# Patient Record
Sex: MALE | Race: WHITE | URBAN - NONMETROPOLITAN AREA
[De-identification: names, ages, dates, MRNs, and addresses within clinical notes are randomized per-mention and may not be internally consistent; named-entity substitution may affect disease eponyms.]

---

## 2017-05-30 ENCOUNTER — OPTICAL OFFICE (OUTPATIENT)
Dept: URBAN - NONMETROPOLITAN AREA CLINIC 4 | Facility: CLINIC | Age: 62
Setting detail: OPHTHALMOLOGY
End: 2017-05-30
Payer: COMMERCIAL

## 2017-05-30 ENCOUNTER — DOCTOR'S OFFICE (OUTPATIENT)
Dept: URBAN - NONMETROPOLITAN AREA CLINIC 1 | Facility: CLINIC | Age: 62
Setting detail: OPHTHALMOLOGY
End: 2017-05-30
Payer: COMMERCIAL

## 2017-05-30 DIAGNOSIS — H52.03: ICD-10-CM

## 2017-05-30 DIAGNOSIS — H43.393: ICD-10-CM

## 2017-05-30 DIAGNOSIS — H25.13: ICD-10-CM

## 2017-05-30 DIAGNOSIS — H52.4: ICD-10-CM

## 2017-05-30 DIAGNOSIS — H52.223: ICD-10-CM

## 2017-05-30 PROCEDURE — V2203 LENS SPHCYL BIFOCAL 4.00D/.1: HCPCS | Performed by: OPTOMETRIST

## 2017-05-30 PROCEDURE — 92014 COMPRE OPH EXAM EST PT 1/>: CPT | Performed by: OPTOMETRIST

## 2017-05-30 PROCEDURE — V2020 VISION SVCS FRAMES PURCHASES: HCPCS | Performed by: OPTOMETRIST

## 2017-05-30 PROCEDURE — V2760 SCRATCH RESISTANT COATING: HCPCS | Performed by: OPTOMETRIST

## 2017-05-30 PROCEDURE — 92015 DETERMINE REFRACTIVE STATE: CPT | Performed by: OPTOMETRIST

## 2017-05-30 ASSESSMENT — REFRACTION_MANIFEST
OU_VA: 20/
OS_VA2: 20/
OD_VA1: 20/
OS_VA2: 20/
OD_VA2: 20/
OU_VA: 20/
OD_VA1: 20/
OD_VA2: 20/
OS_VA1: 20/
OS_VA3: 20/
OD_VA3: 20/
OS_VA1: 20/
OD_VA3: 20/
OS_VA3: 20/

## 2017-05-30 ASSESSMENT — REFRACTION_CURRENTRX
OS_VPRISM_DIRECTION: BF
OS_OVR_VA: 20/
OD_CYLINDER: -0.50
OS_SPHERE: +1.00
OD_VPRISM_DIRECTION: BF
OD_AXIS: 177
OS_ADD: +2.25
OD_ADD: +2.25
OS_CYLINDER: 0.00
OD_OVR_VA: 20/
OS_AXIS: 180
OD_OVR_VA: 20/
OD_OVR_VA: 20/
OS_OVR_VA: 20/
OS_OVR_VA: 20/
OD_SPHERE: +1.25

## 2017-05-30 ASSESSMENT — VISUAL ACUITY
OS_BCVA: 20/20-2
OD_BCVA: 20/20-2

## 2017-05-30 ASSESSMENT — CONFRONTATIONAL VISUAL FIELD TEST (CVF)
OS_FINDINGS: FULL
OD_FINDINGS: FULL

## 2017-05-30 ASSESSMENT — REFRACTION_AUTOREFRACTION
OS_CYLINDER: -0.75
OS_AXIS: 062
OD_AXIS: 124
OD_CYLINDER: -0.25
OD_SPHERE: +1.75
OS_SPHERE: +2.50

## 2017-05-30 ASSESSMENT — SPHEQUIV_DERIVED
OD_SPHEQUIV: 1.625
OS_SPHEQUIV: 2.125

## 2017-05-30 ASSESSMENT — REFRACTION_OUTSIDERX
OD_VA3: 20/
OD_AXIS: 175
OS_CYLINDER: -0.50
OD_CYLINDER: -0.50
OS_SPHERE: +1.75
OS_VA3: 20/
OS_AXIS: 055
OD_VA1: 20/20
OD_ADD: +2.50
OD_VA2: 20/20
OS_VA1: 20/20
OS_ADD: +2.50
OU_VA: 20/
OD_SPHERE: +1.75
OS_VA2: 20/20

## 2017-09-27 ENCOUNTER — DOCTOR'S OFFICE (OUTPATIENT)
Dept: URBAN - NONMETROPOLITAN AREA CLINIC 1 | Facility: CLINIC | Age: 62
Setting detail: OPHTHALMOLOGY
End: 2017-09-27
Payer: COMMERCIAL

## 2017-09-27 DIAGNOSIS — H00.14: ICD-10-CM

## 2017-09-27 PROCEDURE — 92012 INTRM OPH EXAM EST PATIENT: CPT | Performed by: OPTOMETRIST

## 2017-09-27 ASSESSMENT — REFRACTION_OUTSIDERX
OS_AXIS: 055
OD_SPHERE: +1.75
OD_VA1: 20/20
OS_CYLINDER: -0.50
OS_ADD: +2.50
OD_ADD: +2.50
OD_CYLINDER: -0.50
OD_VA2: 20/20
OD_VA3: 20/
OS_VA3: 20/
OS_VA2: 20/20
OU_VA: 20/
OD_AXIS: 175
OS_SPHERE: +1.75
OS_VA1: 20/20

## 2017-09-27 ASSESSMENT — REFRACTION_CURRENTRX
OD_AXIS: 177
OD_OVR_VA: 20/
OS_OVR_VA: 20/
OD_VPRISM_DIRECTION: BF
OS_CYLINDER: 0.00
OD_CYLINDER: -0.50
OS_ADD: +2.25
OS_SPHERE: +1.00
OS_VPRISM_DIRECTION: BF
OD_OVR_VA: 20/
OD_SPHERE: +1.25
OS_OVR_VA: 20/
OS_AXIS: 180
OS_OVR_VA: 20/
OD_ADD: +2.25
OD_OVR_VA: 20/

## 2017-09-27 ASSESSMENT — REFRACTION_MANIFEST
OS_VA2: 20/
OU_VA: 20/
OS_VA3: 20/
OD_VA2: 20/
OD_VA1: 20/
OD_VA3: 20/
OS_VA3: 20/
OS_VA1: 20/
OD_VA1: 20/
OD_VA3: 20/
OD_VA2: 20/
OU_VA: 20/
OS_VA1: 20/
OS_VA2: 20/

## 2017-09-27 ASSESSMENT — SPHEQUIV_DERIVED
OS_SPHEQUIV: 2.125
OD_SPHEQUIV: 1.625

## 2017-09-27 ASSESSMENT — VISUAL ACUITY
OD_BCVA: 20/40-2
OS_BCVA: 20/20-2

## 2017-09-27 ASSESSMENT — REFRACTION_AUTOREFRACTION
OS_SPHERE: +2.50
OD_CYLINDER: -0.25
OD_AXIS: 124
OS_AXIS: 062
OD_SPHERE: +1.75
OS_CYLINDER: -0.75

## 2020-12-30 ENCOUNTER — OPTICAL OFFICE (OUTPATIENT)
Dept: URBAN - NONMETROPOLITAN AREA CLINIC 4 | Facility: CLINIC | Age: 65
Setting detail: OPHTHALMOLOGY
End: 2020-12-30
Payer: COMMERCIAL

## 2020-12-30 ENCOUNTER — DOCTOR'S OFFICE (OUTPATIENT)
Dept: URBAN - NONMETROPOLITAN AREA CLINIC 1 | Facility: CLINIC | Age: 65
Setting detail: OPHTHALMOLOGY
End: 2020-12-30
Payer: COMMERCIAL

## 2020-12-30 VITALS — HEIGHT: 60 IN

## 2020-12-30 DIAGNOSIS — H40.033: ICD-10-CM

## 2020-12-30 DIAGNOSIS — H52.4: ICD-10-CM

## 2020-12-30 DIAGNOSIS — H52.03: ICD-10-CM

## 2020-12-30 DIAGNOSIS — H52.223: ICD-10-CM

## 2020-12-30 PROBLEM — H00.14 CHALAZION; LEFT UPPER LID: Status: RESOLVED | Noted: 2017-09-27 | Resolved: 2020-12-30

## 2020-12-30 PROCEDURE — 92004 COMPRE OPH EXAM NEW PT 1/>: CPT | Performed by: OPTOMETRIST

## 2020-12-30 PROCEDURE — V2203 LENS SPHCYL BIFOCAL 4.00D/.1: HCPCS | Performed by: OPTOMETRIST

## 2020-12-30 PROCEDURE — 92132 CPTRZD OPH DX IMG ANT SGM: CPT | Performed by: OPTOMETRIST

## 2020-12-30 PROCEDURE — V2760 SCRATCH RESISTANT COATING: HCPCS | Performed by: OPTOMETRIST

## 2020-12-30 PROCEDURE — V2020 VISION SVCS FRAMES PURCHASES: HCPCS | Performed by: OPTOMETRIST

## 2020-12-30 ASSESSMENT — REFRACTION_CURRENTRX
OD_ADD: +2.50
OS_ADD: +2.50
OD_OVR_VA: 20/
OD_AXIS: 001
OD_CYLINDER: -0.50
OD_VPRISM_DIRECTION: BF
OS_AXIS: 052
OS_CYLINDER: -0.50
OS_OVR_VA: 20/
OS_SPHERE: +1.75
OD_SPHERE: +1.75
OS_VPRISM_DIRECTION: BF

## 2020-12-30 ASSESSMENT — REFRACTION_MANIFEST
OD_CYLINDER: -0.50
OS_VA1: 20/20
OD_SPHERE: +2.00
OS_SPHERE: +2.50
OD_VA2: 20/20
OD_AXIS: 120
OS_CYLINDER: -0.75
OD_VA1: 20/20
OS_ADD: +2.50
OS_AXIS: 075
OS_VA2: 20/20
OD_ADD: +2.50

## 2020-12-30 ASSESSMENT — VISUAL ACUITY
OD_BCVA: 20/30-1
OS_BCVA: 20/20-2

## 2020-12-30 ASSESSMENT — REFRACTION_AUTOREFRACTION
OD_AXIS: 119
OD_SPHERE: +2.25
OS_AXIS: 076
OS_SPHERE: +2.75
OS_CYLINDER: -1.00
OD_CYLINDER: -0.50

## 2020-12-30 ASSESSMENT — TONOMETRY
OD_IOP_MMHG: 15
OS_IOP_MMHG: 16

## 2020-12-30 ASSESSMENT — CONFRONTATIONAL VISUAL FIELD TEST (CVF)
OD_FINDINGS: FULL
OS_FINDINGS: FULL

## 2020-12-30 ASSESSMENT — SPHEQUIV_DERIVED
OS_SPHEQUIV: 2.25
OD_SPHEQUIV: 1.75
OS_SPHEQUIV: 2.125
OD_SPHEQUIV: 2

## 2021-09-11 ENCOUNTER — APPOINTMENT (EMERGENCY)
Dept: RADIOLOGY | Facility: HOSPITAL | Age: 66
DRG: 291 | End: 2021-09-11
Payer: MEDICARE

## 2021-09-11 ENCOUNTER — APPOINTMENT (EMERGENCY)
Dept: CT IMAGING | Facility: HOSPITAL | Age: 66
DRG: 291 | End: 2021-09-11
Payer: MEDICARE

## 2021-09-11 ENCOUNTER — HOSPITAL ENCOUNTER (INPATIENT)
Facility: HOSPITAL | Age: 66
LOS: 3 days | DRG: 291 | End: 2021-09-14
Attending: EMERGENCY MEDICINE | Admitting: FAMILY MEDICINE
Payer: MEDICARE

## 2021-09-11 ENCOUNTER — APPOINTMENT (EMERGENCY)
Dept: NON INVASIVE DIAGNOSTICS | Facility: HOSPITAL | Age: 66
DRG: 291 | End: 2021-09-11
Payer: MEDICARE

## 2021-09-11 DIAGNOSIS — I50.9 ACUTE CONGESTIVE HEART FAILURE, UNSPECIFIED HEART FAILURE TYPE (HCC): ICD-10-CM

## 2021-09-11 DIAGNOSIS — R06.02 SOB (SHORTNESS OF BREATH): Primary | ICD-10-CM

## 2021-09-11 DIAGNOSIS — R79.89 ELEVATED D-DIMER: ICD-10-CM

## 2021-09-11 DIAGNOSIS — R79.89 ELEVATED BRAIN NATRIURETIC PEPTIDE (BNP) LEVEL: ICD-10-CM

## 2021-09-11 DIAGNOSIS — I50.9 NEW ONSET OF CONGESTIVE HEART FAILURE (HCC): ICD-10-CM

## 2021-09-11 DIAGNOSIS — R60.0 BILATERAL LOWER EXTREMITY EDEMA: ICD-10-CM

## 2021-09-11 PROBLEM — I16.1 HYPERTENSIVE EMERGENCY: Status: ACTIVE | Noted: 2021-09-11

## 2021-09-11 LAB
ALBUMIN SERPL BCP-MCNC: 3.8 G/DL (ref 3.5–5)
ALP SERPL-CCNC: 127 U/L (ref 46–116)
ALT SERPL W P-5'-P-CCNC: 23 U/L (ref 12–78)
ANION GAP SERPL CALCULATED.3IONS-SCNC: 11 MMOL/L (ref 4–13)
AST SERPL W P-5'-P-CCNC: 31 U/L (ref 5–45)
BACTERIA UR QL AUTO: NORMAL /HPF
BASOPHILS # BLD AUTO: 0.05 THOUSANDS/ΜL (ref 0–0.1)
BASOPHILS NFR BLD AUTO: 1 % (ref 0–1)
BILIRUB SERPL-MCNC: 2.24 MG/DL (ref 0.2–1)
BILIRUB UR QL STRIP: NEGATIVE
BUN SERPL-MCNC: 15 MG/DL (ref 5–25)
CALCIUM SERPL-MCNC: 9.1 MG/DL (ref 8.3–10.1)
CHLORIDE SERPL-SCNC: 100 MMOL/L (ref 100–108)
CK SERPL-CCNC: 64 U/L (ref 39–308)
CLARITY UR: ABNORMAL
CO2 SERPL-SCNC: 25 MMOL/L (ref 21–32)
COLOR UR: YELLOW
CREAT SERPL-MCNC: 1.26 MG/DL (ref 0.6–1.3)
D DIMER PPP FEU-MCNC: 2.55 UG/ML FEU
EOSINOPHIL # BLD AUTO: 0.09 THOUSAND/ΜL (ref 0–0.61)
EOSINOPHIL NFR BLD AUTO: 1 % (ref 0–6)
ERYTHROCYTE [DISTWIDTH] IN BLOOD BY AUTOMATED COUNT: 13.4 % (ref 11.6–15.1)
GFR SERPL CREATININE-BSD FRML MDRD: 59 ML/MIN/1.73SQ M
GLUCOSE SERPL-MCNC: 138 MG/DL (ref 65–140)
GLUCOSE UR STRIP-MCNC: NEGATIVE MG/DL
HCT VFR BLD AUTO: 44.1 % (ref 36.5–49.3)
HGB BLD-MCNC: 14.8 G/DL (ref 12–17)
HGB UR QL STRIP.AUTO: ABNORMAL
IMM GRANULOCYTES # BLD AUTO: 0.03 THOUSAND/UL (ref 0–0.2)
IMM GRANULOCYTES NFR BLD AUTO: 0 % (ref 0–2)
INR PPP: 1.18 (ref 0.84–1.19)
KETONES UR STRIP-MCNC: NEGATIVE MG/DL
LEUKOCYTE ESTERASE UR QL STRIP: NEGATIVE
LIPASE SERPL-CCNC: 150 U/L (ref 73–393)
LYMPHOCYTES # BLD AUTO: 1.67 THOUSANDS/ΜL (ref 0.6–4.47)
LYMPHOCYTES NFR BLD AUTO: 19 % (ref 14–44)
MAGNESIUM SERPL-MCNC: 1.9 MG/DL (ref 1.6–2.6)
MCH RBC QN AUTO: 28.9 PG (ref 26.8–34.3)
MCHC RBC AUTO-ENTMCNC: 33.6 G/DL (ref 31.4–37.4)
MCV RBC AUTO: 86 FL (ref 82–98)
MONOCYTES # BLD AUTO: 0.59 THOUSAND/ΜL (ref 0.17–1.22)
MONOCYTES NFR BLD AUTO: 7 % (ref 4–12)
NEUTROPHILS # BLD AUTO: 6.5 THOUSANDS/ΜL (ref 1.85–7.62)
NEUTS SEG NFR BLD AUTO: 72 % (ref 43–75)
NITRITE UR QL STRIP: NEGATIVE
NON-SQ EPI CELLS URNS QL MICRO: NORMAL /HPF
NRBC BLD AUTO-RTO: 0 /100 WBCS
NT-PROBNP SERPL-MCNC: ABNORMAL PG/ML
PH UR STRIP.AUTO: 6 [PH]
PLATELET # BLD AUTO: 157 THOUSANDS/UL (ref 149–390)
PMV BLD AUTO: 11.8 FL (ref 8.9–12.7)
POTASSIUM SERPL-SCNC: 3.5 MMOL/L (ref 3.5–5.3)
PROT SERPL-MCNC: 8 G/DL (ref 6.4–8.2)
PROT UR STRIP-MCNC: NEGATIVE MG/DL
PROTHROMBIN TIME: 14.9 SECONDS (ref 11.6–14.5)
RBC # BLD AUTO: 5.12 MILLION/UL (ref 3.88–5.62)
RBC #/AREA URNS AUTO: NORMAL /HPF
SARS-COV-2 RNA RESP QL NAA+PROBE: NEGATIVE
SODIUM SERPL-SCNC: 136 MMOL/L (ref 136–145)
SP GR UR STRIP.AUTO: 1.01 (ref 1–1.03)
TROPONIN I SERPL-MCNC: 0.04 NG/ML
TROPONIN I SERPL-MCNC: 0.06 NG/ML
TROPONIN I SERPL-MCNC: 0.07 NG/ML
UROBILINOGEN UR QL STRIP.AUTO: 1 E.U./DL
WBC # BLD AUTO: 8.93 THOUSAND/UL (ref 4.31–10.16)
WBC #/AREA URNS AUTO: NORMAL /HPF

## 2021-09-11 PROCEDURE — 84484 ASSAY OF TROPONIN QUANT: CPT | Performed by: EMERGENCY MEDICINE

## 2021-09-11 PROCEDURE — 93970 EXTREMITY STUDY: CPT

## 2021-09-11 PROCEDURE — 96374 THER/PROPH/DIAG INJ IV PUSH: CPT

## 2021-09-11 PROCEDURE — 85025 COMPLETE CBC W/AUTO DIFF WBC: CPT | Performed by: EMERGENCY MEDICINE

## 2021-09-11 PROCEDURE — 82550 ASSAY OF CK (CPK): CPT | Performed by: EMERGENCY MEDICINE

## 2021-09-11 PROCEDURE — 99222 1ST HOSP IP/OBS MODERATE 55: CPT | Performed by: FAMILY MEDICINE

## 2021-09-11 PROCEDURE — 99285 EMERGENCY DEPT VISIT HI MDM: CPT

## 2021-09-11 PROCEDURE — 36415 COLL VENOUS BLD VENIPUNCTURE: CPT | Performed by: EMERGENCY MEDICINE

## 2021-09-11 PROCEDURE — U0003 INFECTIOUS AGENT DETECTION BY NUCLEIC ACID (DNA OR RNA); SEVERE ACUTE RESPIRATORY SYNDROME CORONAVIRUS 2 (SARS-COV-2) (CORONAVIRUS DISEASE [COVID-19]), AMPLIFIED PROBE TECHNIQUE, MAKING USE OF HIGH THROUGHPUT TECHNOLOGIES AS DESCRIBED BY CMS-2020-01-R: HCPCS | Performed by: EMERGENCY MEDICINE

## 2021-09-11 PROCEDURE — 80053 COMPREHEN METABOLIC PANEL: CPT | Performed by: EMERGENCY MEDICINE

## 2021-09-11 PROCEDURE — 85379 FIBRIN DEGRADATION QUANT: CPT | Performed by: EMERGENCY MEDICINE

## 2021-09-11 PROCEDURE — 83880 ASSAY OF NATRIURETIC PEPTIDE: CPT | Performed by: EMERGENCY MEDICINE

## 2021-09-11 PROCEDURE — 71045 X-RAY EXAM CHEST 1 VIEW: CPT

## 2021-09-11 PROCEDURE — 99285 EMERGENCY DEPT VISIT HI MDM: CPT | Performed by: EMERGENCY MEDICINE

## 2021-09-11 PROCEDURE — 71275 CT ANGIOGRAPHY CHEST: CPT

## 2021-09-11 PROCEDURE — 83690 ASSAY OF LIPASE: CPT | Performed by: EMERGENCY MEDICINE

## 2021-09-11 PROCEDURE — 83735 ASSAY OF MAGNESIUM: CPT | Performed by: EMERGENCY MEDICINE

## 2021-09-11 PROCEDURE — G1004 CDSM NDSC: HCPCS

## 2021-09-11 PROCEDURE — 81001 URINALYSIS AUTO W/SCOPE: CPT | Performed by: EMERGENCY MEDICINE

## 2021-09-11 PROCEDURE — 93005 ELECTROCARDIOGRAM TRACING: CPT

## 2021-09-11 PROCEDURE — 96375 TX/PRO/DX INJ NEW DRUG ADDON: CPT

## 2021-09-11 PROCEDURE — 1124F ACP DISCUSS-NO DSCNMKR DOCD: CPT | Performed by: EMERGENCY MEDICINE

## 2021-09-11 PROCEDURE — 85610 PROTHROMBIN TIME: CPT | Performed by: EMERGENCY MEDICINE

## 2021-09-11 PROCEDURE — 84484 ASSAY OF TROPONIN QUANT: CPT | Performed by: FAMILY MEDICINE

## 2021-09-11 PROCEDURE — U0005 INFEC AGEN DETEC AMPLI PROBE: HCPCS | Performed by: EMERGENCY MEDICINE

## 2021-09-11 RX ORDER — POTASSIUM CHLORIDE 20 MEQ/1
20 TABLET, EXTENDED RELEASE ORAL DAILY
Status: DISCONTINUED | OUTPATIENT
Start: 2021-09-12 | End: 2021-09-13

## 2021-09-11 RX ORDER — LABETALOL 20 MG/4 ML (5 MG/ML) INTRAVENOUS SYRINGE
10 ONCE
Status: COMPLETED | OUTPATIENT
Start: 2021-09-11 | End: 2021-09-11

## 2021-09-11 RX ORDER — FUROSEMIDE 10 MG/ML
40 INJECTION INTRAMUSCULAR; INTRAVENOUS 2 TIMES DAILY
Status: DISCONTINUED | OUTPATIENT
Start: 2021-09-12 | End: 2021-09-12

## 2021-09-11 RX ORDER — METHYLPREDNISOLONE SODIUM SUCCINATE 125 MG/2ML
80 INJECTION, POWDER, LYOPHILIZED, FOR SOLUTION INTRAMUSCULAR; INTRAVENOUS ONCE
Status: COMPLETED | OUTPATIENT
Start: 2021-09-11 | End: 2021-09-11

## 2021-09-11 RX ORDER — SODIUM CHLORIDE 9 MG/ML
3 INJECTION INTRAVENOUS
Status: DISCONTINUED | OUTPATIENT
Start: 2021-09-11 | End: 2021-09-14 | Stop reason: HOSPADM

## 2021-09-11 RX ORDER — CARVEDILOL 6.25 MG/1
6.25 TABLET ORAL 2 TIMES DAILY WITH MEALS
Status: DISCONTINUED | OUTPATIENT
Start: 2021-09-12 | End: 2021-09-11

## 2021-09-11 RX ORDER — ALBUTEROL SULFATE 90 UG/1
2 AEROSOL, METERED RESPIRATORY (INHALATION) ONCE
Status: COMPLETED | OUTPATIENT
Start: 2021-09-11 | End: 2021-09-11

## 2021-09-11 RX ORDER — CARVEDILOL 6.25 MG/1
6.25 TABLET ORAL 2 TIMES DAILY WITH MEALS
Status: DISCONTINUED | OUTPATIENT
Start: 2021-09-12 | End: 2021-09-14 | Stop reason: HOSPADM

## 2021-09-11 RX ORDER — ACETAMINOPHEN 325 MG/1
650 TABLET ORAL EVERY 4 HOURS PRN
Status: DISCONTINUED | OUTPATIENT
Start: 2021-09-11 | End: 2021-09-14 | Stop reason: HOSPADM

## 2021-09-11 RX ORDER — FUROSEMIDE 10 MG/ML
40 INJECTION INTRAMUSCULAR; INTRAVENOUS ONCE
Status: COMPLETED | OUTPATIENT
Start: 2021-09-11 | End: 2021-09-11

## 2021-09-11 RX ADMIN — FUROSEMIDE 40 MG: 10 INJECTION, SOLUTION INTRAMUSCULAR; INTRAVENOUS at 17:10

## 2021-09-11 RX ADMIN — METHYLPREDNISOLONE SODIUM SUCCINATE 80 MG: 125 INJECTION, POWDER, FOR SOLUTION INTRAMUSCULAR; INTRAVENOUS at 16:30

## 2021-09-11 RX ADMIN — ALBUTEROL SULFATE 2 PUFF: 90 AEROSOL, METERED RESPIRATORY (INHALATION) at 16:33

## 2021-09-11 RX ADMIN — IOHEXOL 85 ML: 350 INJECTION, SOLUTION INTRAVENOUS at 17:24

## 2021-09-11 RX ADMIN — LABETALOL 20 MG/4 ML (5 MG/ML) INTRAVENOUS SYRINGE 10 MG: at 16:31

## 2021-09-11 NOTE — Clinical Note
Case was discussed with and the patient's admission status was agreed to be Admission Status: inpatient status to the service of

## 2021-09-11 NOTE — PLAN OF CARE
Problem: PAIN - ADULT  Goal: Verbalizes/displays adequate comfort level or baseline comfort level  Description: Interventions:  - Encourage patient to monitor pain and request assistance  - Assess pain using appropriate pain scale  - Administer analgesics based on type and severity of pain and evaluate response  - Implement non-pharmacological measures as appropriate and evaluate response  - Consider cultural and social influences on pain and pain management  - Notify physician/advanced practitioner if interventions unsuccessful or patient reports new pain  Outcome: Progressing     Problem: INFECTION - ADULT  Goal: Absence or prevention of progression during hospitalization  Description: INTERVENTIONS:  - Assess and monitor for signs and symptoms of infection  - Monitor lab/diagnostic results  - Monitor all insertion sites, i e  indwelling lines, tubes, and drains  - Monitor endotracheal if appropriate and nasal secretions for changes in amount and color  - [place] appropriate cooling/warming therapies per order  - Administer medications as ordered  - Instruct and encourage patient and family to use good hand hygiene technique  - Identify and instruct in appropriate isolation precautions for identified infection/condition  Outcome: Progressing  Goal: Absence of fever/infection during neutropenic period  Description: INTERVENTIONS:  - Monitor WBC    Outcome: Progressing     Problem: SAFETY ADULT  Goal: Patient will remain free of falls  Description: INTERVENTIONS:  - Educate patient/family on patient safety including physical limitations  - Instruct patient to call for assistance with activity   - Consult OT/PT to assist with strengthening/mobility   - Keep Call bell within reach  - Keep bed low and locked with side rails adjusted as appropriate  - Keep care items and personal belongings within reach  - Initiate and maintain comfort rounds  - Make Fall Risk Sign visible to staff  -- Apply yellow socks and bracelet for high fall risk patients  - Consider moving patient to room near nurses station  Outcome: Progressing  Goal: Maintain or return to baseline ADL function  Description: INTERVENTIONS:  -  Assess patient's ability to carry out ADLs; assess patient's baseline for ADL function and identify physical deficits which impact ability to perform ADLs (bathing, care of mouth/teeth, toileting, grooming, dressing, etc )  - Assess/evaluate cause of self-care deficits   - Assess range of motion  - Assess patient's mobility; develop plan if impaired  - Assess patient's need for assistive devices and provide as appropriate  - Encourage maximum independence but intervene and supervise when necessary  - Involve family in performance of ADLs  - Assess for home care needs following discharge   - Consider OT consult to assist with ADL evaluation and planning for discharge  - Provide patient education as appropriate  Outcome: Progressing  Goal: Maintains/Returns to pre admission functional level  Description: INTERVENTIONS:  - Perform BMAT or MOVE assessment daily    - Set and communicate daily mobility goal to care team and patient/family/caregiver     - Collaborate with rehabilitation services on mobility goals if consulted  -- Out of bed for toileting  - Record patient progress and toleration of activity level   Outcome: Progressing     Problem: DISCHARGE PLANNING  Goal: Discharge to home or other facility with appropriate resources  Description: INTERVENTIONS:  - Identify barriers to discharge w/patient and caregiver  - Arrange for needed discharge resources and transportation as appropriate  - Identify discharge learning needs (meds, wound care, etc )  - Arrange for interpretive services to assist at discharge as needed  - Refer to Case Management Department for coordinating discharge planning if the patient needs post-hospital services based on physician/advanced practitioner order or complex needs related to functional status, cognitive ability, or social support system  Outcome: Progressing     Problem: Knowledge Deficit  Goal: Patient/family/caregiver demonstrates understanding of disease process, treatment plan, medications, and discharge instructions  Description: Complete learning assessment and assess knowledge base    Interventions:  - Provide teaching at level of understanding  - Provide teaching via preferred learning methods  Outcome: Progressing

## 2021-09-11 NOTE — ED PROVIDER NOTES
History  Chief Complaint   Patient presents with    Shortness of Breath     past two weeksv was abt of zithromax that end last saturday pt states is still sob     59-year-old male with a past medical history of smoking, quit two weeks ago presents with two weeks of gradually worsening shortness of breath, dyspnea on exertion, cough with productive white tan sputum him bilateral lower extremity edema  Patient states he he does not go to doctors does not take any medications  Patient arrives at ED with blood pressure 209/121  He is tachycardic at 116  Denies history of myocardial infarction, stents, congestive heart failure, pulmonary embolism, pneumonia or deep vein thrombosis  Patient has not received COVID-19 vaccinations  Patient denies fever, chills, chest pain, back pain, rash  Patient states he notice some swelling of his lower legs and ankles two weeks ago when he had symptom onset for shortness of breath and cough  Patient is not on home oxygen  No recent hospitalizations  History provided by:  Medical records and patient   used: No    Shortness of Breath  Severity:  Moderate  Onset quality:  Gradual  Duration:  2 weeks  Timing:  Constant  Progression:  Worsening  Chronicity:  New  Context: activity    Context: not animal exposure, not emotional upset, not fumes, not known allergens, not occupational exposure, not pollens, not smoke exposure, not strong odors, not URI and not weather changes    Relieved by:  Nothing  Worsened by:   Activity, coughing, exertion and movement  Ineffective treatments:  None tried  Associated symptoms: cough and sputum production    Associated symptoms: no abdominal pain, no chest pain, no claudication, no diaphoresis, no ear pain, no fever, no headaches, no hemoptysis, no neck pain, no PND, no rash, no sore throat, no syncope, no swollen glands, no vomiting and no wheezing    Cough:     Cough characteristics:  Productive    Sputum characteristics: White and brown    Severity:  Moderate    Onset quality:  Gradual    Duration:  2 days    Timing:  Intermittent    Progression:  Waxing and waning    Chronicity:  New  Risk factors: tobacco use    Risk factors: no recent alcohol use, no family hx of DVT, no hx of cancer, no hx of PE/DVT, no obesity, no prolonged immobilization and no recent surgery        None       History reviewed  No pertinent past medical history  History reviewed  No pertinent surgical history  History reviewed  No pertinent family history  I have reviewed and agree with the history as documented  E-Cigarette/Vaping    E-Cigarette Use Never User      E-Cigarette/Vaping Substances     Social History     Tobacco Use    Smoking status: Former Smoker     Types: Cigarettes    Smokeless tobacco: Never Used    Tobacco comment: quit two weeks ago   Vaping Use    Vaping Use: Never used   Substance Use Topics    Alcohol use: Yes     Alcohol/week: 4 0 standard drinks     Types: 4 Cans of beer per week    Drug use: Not Currently       Review of Systems   Constitutional: Negative  Negative for diaphoresis and fever  HENT: Negative  Negative for ear pain and sore throat  Eyes: Negative  Respiratory: Positive for cough, sputum production and shortness of breath  Negative for apnea, hemoptysis, choking, chest tightness and wheezing  Cardiovascular: Positive for leg swelling  Negative for chest pain, claudication, syncope and PND  Gastrointestinal: Negative  Negative for abdominal pain, constipation, diarrhea, nausea and vomiting  Endocrine: Negative  Genitourinary: Negative  Musculoskeletal: Negative  Negative for neck pain  Skin: Negative  Negative for rash  Allergic/Immunologic: Negative  Neurological: Negative  Negative for headaches  Hematological: Negative  Psychiatric/Behavioral: Negative  All other systems reviewed and are negative        Physical Exam  Physical Exam  Vitals and nursing note reviewed  Exam conducted with a chaperone present  Constitutional:       General: He is not in acute distress  Appearance: Normal appearance  He is well-developed and normal weight  He is not ill-appearing, toxic-appearing or diaphoretic  Comments: Slightly anxious, well appearing, in no acute distress   HENT:      Head: Normocephalic and atraumatic  Jaw: There is normal jaw occlusion  Right Ear: Hearing, tympanic membrane, ear canal and external ear normal  There is no impacted cerumen  No mastoid tenderness  No hemotympanum  Left Ear: Hearing, tympanic membrane, ear canal and external ear normal  There is no impacted cerumen  No mastoid tenderness  No hemotympanum  Nose: Nose normal       Right Nostril: No epistaxis  Left Nostril: No epistaxis  Right Sinus: No maxillary sinus tenderness or frontal sinus tenderness  Left Sinus: No maxillary sinus tenderness or frontal sinus tenderness  Mouth/Throat:      Lips: Pink  No lesions  Mouth: Mucous membranes are moist  No lacerations or angioedema  Tongue: No lesions  Tongue does not deviate from midline  Palate: No mass and lesions  Pharynx: Oropharynx is clear  Uvula midline  No pharyngeal swelling, oropharyngeal exudate, posterior oropharyngeal erythema or uvula swelling  Tonsils: No tonsillar exudate or tonsillar abscesses  Eyes:      General: Lids are normal  Vision grossly intact  Gaze aligned appropriately  No visual field deficit or scleral icterus  Right eye: No discharge  Left eye: No discharge  Extraocular Movements: Extraocular movements intact  Right eye: No nystagmus  Left eye: No nystagmus  Conjunctiva/sclera: Conjunctivae normal       Right eye: Right conjunctiva is not injected  Left eye: Left conjunctiva is not injected  Pupils: Pupils are equal, round, and reactive to light  Neck:      Thyroid: No thyroid mass or thyromegaly  Trachea: Trachea and phonation normal    Cardiovascular:      Rate and Rhythm: Regular rhythm  Tachycardia present  Pulses: Normal pulses  Radial pulses are 2+ on the right side and 2+ on the left side  Dorsalis pedis pulses are 2+ on the right side and 2+ on the left side  Heart sounds: Normal heart sounds, S1 normal and S2 normal    Pulmonary:      Effort: Pulmonary effort is normal  No tachypnea, accessory muscle usage, respiratory distress or retractions  Breath sounds: Normal air entry  No stridor or decreased air movement  Examination of the right-middle field reveals decreased breath sounds  Examination of the left-middle field reveals decreased breath sounds  Examination of the right-lower field reveals decreased breath sounds  Examination of the left-lower field reveals decreased breath sounds  Decreased breath sounds present  No wheezing, rhonchi or rales  Chest:      Chest wall: No tenderness  Abdominal:      General: Abdomen is flat  Bowel sounds are normal  There is no distension  Palpations: Abdomen is soft  Abdomen is not rigid  There is no hepatomegaly, splenomegaly or mass  Tenderness: There is no abdominal tenderness  There is no right CVA tenderness, left CVA tenderness, guarding or rebound  Negative signs include Edward's sign and McBurney's sign  Hernia: No hernia is present  Genitourinary:     Penis: Normal        Testes: Normal    Musculoskeletal:         General: No swelling, tenderness, deformity or signs of injury  Normal range of motion  Cervical back: Full passive range of motion without pain, normal range of motion and neck supple  No rigidity  No spinous process tenderness or muscular tenderness  Right lower leg: No deformity, lacerations, tenderness or bony tenderness  1+ Edema present  Left lower leg: No deformity, lacerations, tenderness or bony tenderness  1+ Edema present        Comments: Plus one pitting edema bilateral lower extremities; no swelling, induration, tenderness or warmth of bilateral calves; negative Anahi's sign bilaterally; +2 dorsalis pedis pulses bilaterally with good cap refill less than 2 seconds; motor and sensation intact  Lymphadenopathy:      Cervical: No cervical adenopathy  Skin:     General: Skin is warm and dry  Capillary Refill: Capillary refill takes less than 2 seconds  Coloration: Skin is not ashen, cyanotic, jaundiced, mottled, pale or sallow  Findings: No abrasion, abscess, acne, bruising, burn, ecchymosis, erythema, signs of injury, laceration, lesion, petechiae, rash or wound  Rash is not macular or papular  Nails: There is no clubbing  Neurological:      General: No focal deficit present  Mental Status: He is alert and oriented to person, place, and time  Mental status is at baseline  He is not disoriented  GCS: GCS eye subscore is 4  GCS verbal subscore is 5  GCS motor subscore is 6  Cranial Nerves: Cranial nerves are intact  No cranial nerve deficit, dysarthria or facial asymmetry  Sensory: Sensation is intact  No sensory deficit  Motor: Motor function is intact  No weakness, tremor, atrophy, abnormal muscle tone or seizure activity  Coordination: Coordination is intact  Coordination normal       Gait: Gait is intact  Gait normal       Comments: Patient is AAOx4, GCS 15; speaking clearly and appropriately; motor and sensation intact; visual fields intact; cranial nerves II-XII grossly intact; no facial droop, slurred speech or arm drift   Psychiatric:         Attention and Perception: Attention and perception normal  He is attentive  Mood and Affect: Mood and affect normal          Speech: Speech normal          Behavior: Behavior normal  Behavior is cooperative  Thought Content:  Thought content normal          Cognition and Memory: Cognition and memory normal          Judgment: Judgment normal          Vital Signs  ED Triage Vitals   Temperature Pulse Respirations Blood Pressure SpO2   09/11/21 1557 09/11/21 1557 09/11/21 1557 09/11/21 1557 09/11/21 1557   (!) 97 °F (36 1 °C) (!) 116 16 (!) 209/121 98 %      Temp src Heart Rate Source Patient Position - Orthostatic VS BP Location FiO2 (%)   -- 09/11/21 1756 09/11/21 1756 09/11/21 1557 --    Monitor Sitting Left arm       Pain Score       09/11/21 1757       No Pain           Vitals:    09/11/21 1745 09/11/21 1756 09/11/21 1845 09/11/21 1939   BP:  (!) 162/101  (!) 176/111   Pulse: 83 87 87 95   Patient Position - Orthostatic VS:  Sitting           Visual Acuity      ED Medications  Medications   sodium chloride (PF) 0 9 % injection 3 mL (has no administration in time range)   potassium chloride (K-DUR,KLOR-CON) CR tablet 20 mEq (has no administration in time range)   furosemide (LASIX) injection 40 mg (has no administration in time range)   enoxaparin (LOVENOX) subcutaneous injection 40 mg (has no administration in time range)   acetaminophen (TYLENOL) tablet 650 mg (has no administration in time range)   carvedilol (COREG) tablet 6 25 mg (has no administration in time range)   albuterol (PROVENTIL HFA,VENTOLIN HFA) inhaler 2 puff (2 puffs Inhalation Given 9/11/21 1633)   methylPREDNISolone sodium succinate (Solu-MEDROL) injection 80 mg (80 mg Intravenous Given 9/11/21 1630)   Labetalol HCl (NORMODYNE) injection 10 mg (10 mg Intravenous Given 9/11/21 1631)   furosemide (LASIX) injection 40 mg (40 mg Intravenous Given 9/11/21 1710)   iohexol (OMNIPAQUE) 350 MG/ML injection (SINGLE-DOSE) 100 mL (85 mL Intravenous Given 9/11/21 1724)       Diagnostic Studies  Results Reviewed     Procedure Component Value Units Date/Time    Urine Microscopic [[id number]]  (Normal) Collected: 09/11/21 1753    Lab Status: Final result Specimen: Urine, Clean Catch Updated: 09/11/21 1814     RBC, UA 0-1 /hpf      WBC, UA 0-1 /hpf      Epithelial Cells None Seen /hpf      Bacteria, UA None Seen /hpf     UA w Reflex to Microscopic w Reflex to Culture [[id number]]  (Abnormal) Collected: 09/11/21 1753    Lab Status: Final result Specimen: Urine, Clean Catch Updated: 09/11/21 1803     Color, UA Yellow     Clarity, UA Slightly Cloudy     Specific Sabinal, UA 1 010     pH, UA 6 0     Leukocytes, UA Negative     Nitrite, UA Negative     Protein, UA Negative mg/dl      Glucose, UA Negative mg/dl      Ketones, UA Negative mg/dl      Urobilinogen, UA 1 0 E U /dl      Bilirubin, UA Negative     Blood, UA Trace-Intact    [facility] [[id number]]  (Normal) Collected: 09/11/21 1620    Lab Status: Final result Specimen: Nares from Nose Updated: 09/11/21 1717     SARS-CoV-2 Negative    Narrative: The specimen collection materials, transport medium, and/or testing methodology utilized in the production of these test results have been proven to be reliable in a limited validation with an abbreviated program under the Emergency Utilization Authorization provided by the FDA  Testing reported as "Presumptive positive" will be confirmed with secondary testing to ensure result accuracy  Clinical caution and judgement should be used with the interpretation of these results with consideration of the clinical impression and other laboratory testing  Testing reported as "Positive" or "Negative" has been proven to be accurate according to standard laboratory validation requirements  All testing is performed with control materials showing appropriate reactivity at standard intervals        Troponin I [[id number]]  (Normal) Collected: 09/11/21 1609    Lab Status: Final result Specimen: Blood from Arm, Left Updated: 09/11/21 1643     Troponin I 0 04 ng/mL     Lipase [[id number]]  (Normal) Collected: 09/11/21 1609    Lab Status: Final result Specimen: Blood from Arm, Left Updated: 09/11/21 1641     Lipase 150 u/L     NT-BNP PRO [[id number]]  (Abnormal) Collected: 09/11/21 1609    Lab Status: Final result Specimen: Blood from Arm, Left Updated: 09/11/21 1641     NT-proBNP 23,164 pg/mL     Magnesium [[id number]]  (Normal) Collected: 09/11/21 1609    Lab Status: Final result Specimen: Blood from Arm, Left Updated: 09/11/21 1641     Magnesium 1 9 mg/dL     CK (with reflex to MB) [[id number]]  (Normal) Collected: 09/11/21 1609    Lab Status: Final result Specimen: Blood from Arm, Left Updated: 09/11/21 1641     Total CK 64 U/L     Comprehensive metabolic panel [[id number]]  (Abnormal) Collected: 09/11/21 1609    Lab Status: Final result Specimen: Blood from Arm, Left Updated: 09/11/21 1636     Sodium 136 mmol/L      Potassium 3 5 mmol/L      Chloride 100 mmol/L      CO2 25 mmol/L      ANION GAP 11 mmol/L      BUN 15 mg/dL      Creatinine 1 26 mg/dL      Glucose 138 mg/dL      Calcium 9 1 mg/dL      AST 31 U/L      ALT 23 U/L      Alkaline Phosphatase 127 U/L      Total Protein 8 0 g/dL      Albumin 3 8 g/dL      Total Bilirubin 2 24 mg/dL      eGFR 59 ml/min/1 73sq m     Narrative:      [place] guidelines for Chronic Kidney Disease (CKD):     Stage 1 with normal or high GFR (GFR > 90 mL/min/1 73 square meters)    Stage 2 Mild CKD (GFR = 60-89 mL/min/1 73 square meters)    Stage 3A Moderate CKD (GFR = 45-59 mL/min/1 73 square meters)    Stage 3B Moderate CKD (GFR = 30-44 mL/min/1 73 square meters)    Stage 4 Severe CKD (GFR = 15-29 mL/min/1 73 square meters)    Stage 5 End Stage CKD (GFR <15 mL/min/1 73 square meters)  Note: GFR calculation is accurate only with a steady state creatinine    D-dimer, quantitative [[id number]]  (Abnormal) Collected: 09/11/21 1609    Lab Status: Final result Specimen: Blood from Arm, Left Updated: 09/11/21 1634     D-Dimer, Quant 2 55 ug/ml FEU     Protime-INR [[id number]]  (Abnormal) Collected: 09/11/21 1609    Lab Status: Final result Specimen: Blood from Arm, Left Updated: 09/11/21 1630     Protime 14 9 seconds      INR 1 18    CBC and differential [[id number]] Collected: 09/11/21 1609    Lab Status: Final result Specimen: Blood from Arm, Left Updated: 09/11/21 1618     WBC 8 93 Thousand/uL      RBC 5 12 Million/uL      Hemoglobin 14 8 g/dL      Hematocrit 44 1 %      MCV 86 fL      MCH 28 9 pg      MCHC 33 6 g/dL      RDW 13 4 %      MPV 11 8 fL      Platelets 569 Thousands/uL      nRBC 0 /100 WBCs      Neutrophils Relative 72 %      Immat GRANS % 0 %      Lymphocytes Relative 19 %      Monocytes Relative 7 %      Eosinophils Relative 1 %      Basophils Relative 1 %      Neutrophils Absolute 6 50 Thousands/µL      Immature Grans Absolute 0 03 Thousand/uL      Lymphocytes Absolute 1 67 Thousands/µL      Monocytes Absolute 0 59 Thousand/µL      Eosinophils Absolute 0 09 Thousand/µL      Basophils Absolute 0 05 Thousands/µL                  CTA ED chest PE Study   Final Result by Becca Grace DO (09/11 1814)      Study limited by respiratory motion  No evidence of central pulmonary embolus within limitations  Moderate right and small left pleural effusions with bilateral compressive atelectasis and suspected mild central vascular congestion  Workstation performed: [id number]         X-ray chest 1 view portable   ED Interpretation by Ernie Collet, MD (98/59 [id number])   Chest x-ray has moderate vascular congestion and right pleural effusion; no pneumothorax, mediastinal widening or focal consolidation  VAS lower limb venous duplex study, complete bilateral    (Results Pending)              Procedures  ECG 12 Lead Documentation Only    Date/Time: 9/11/2021 4:10 PM  Performed by: Ernie Collet, MD  Authorized by:  Ernie Collet, MD     Indications / Diagnosis:  Sob  ECG reviewed by me, the ED Provider: yes    Patient location:  ED  Previous ECG:     Comparison to cardiac monitor: Yes    Interpretation:     Interpretation: abnormal    Rate:     ECG rate:  114bpm    ECG rate assessment: tachycardic    Rhythm:     Rhythm: sinus tachycardia Ectopy:     Ectopy: PVCs    QRS:     QRS axis:  Normal  Conduction:     Conduction: normal    ST segments:     ST segments:  Non-specific  T waves:     T waves: inverted      Inverted:  V5 and V6  Other findings:     Other findings: LAE and LVH    Comments:      No STEMI  CA 130ms  QRS 94ms  QT 482ms    Cardiac monitoring ordered  Heart rate and rhythm as above  I have personally read and interpreted the EKG as above  ED Course  ED Course as of Sep 11 2141   Sat Sep 11, 2021   1807 COVID-19 negative      1827 CTA:IMPRESSION:     Study limited by respiratory motion  No evidence of central pulmonary embolus within limitations      Moderate right and small left pleural effusions with bilateral compressive atelectasis and suspected mild central vascular congestion             1827 Reassessed patient  Updated him on labs, imaging and plan for admission for apparent new onset congestive heart failure  Patient is on 2 L nasal cannula  He is amenable to plan  Patient stated that vascular checks mention that his duplex lower extremities were negative for blood clot  Ivet Malone, Hospitalist for [place] inpatient admission for suspected new-onset congestive heart failure  [id number] Dr Mykel Malone accepts patient to [facility] inpatient  SBIRT 20yo+      Most Recent Value   SBIRT (24 yo +)   In order to provide better care to our patients, we are screening all of our patients for alcohol and drug use  Would it be okay to ask you these screening questions? Yes Filed at: 09/11/2021 1559   Initial Alcohol Screen: US AUDIT-C    1  How often do you have a drink containing alcohol?  0 Filed at: 09/11/2021 1559   2  How many drinks containing alcohol do you have on a typical day you are drinking? 0 Filed at: 09/11/2021 1559   3a  Male UNDER 65: How often do you have five or more drinks on one occasion? 0 Filed at: 09/11/2021 1559   3b  FEMALE Any Age, or MALE 65+:  How often do you have 4 or more drinks on one occassion? 0 Filed at: 09/11/2021 1559   Audit-C Score  0 Filed at: 09/11/2021 1559   ALONDRA: How many times in the past year have you    Used an illegal drug or used a prescription medication for non-medical reasons?   Never Filed at: 09/11/2021 1559        [facility]  Number of Diagnoses or Management Options     Amount and/or Complexity of Data Reviewed  Clinical lab tests: reviewed and ordered  Tests in the radiology section of CPT®: reviewed and ordered  Tests in the medicine section of CPT®: ordered and reviewed  Review and summarize past medical records: yes  Discuss the patient with other providers: yes  Independent visualization of images, tracings, or specimens: yes (CXR, EKG, CT PE, vascular duplex LEs)    Risk of Complications, Morbidity, and/or Mortality  Presenting problems: moderate  Diagnostic procedures: moderate  Management options: moderate    Patient Progress  Patient progress: stable      Disposition  Final diagnoses:   SOB (shortness of breath)   Elevated brain natriuretic peptide (BNP) level   Elevated d-dimer   Bilateral lower extremity edema     Time reflects when diagnosis was documented in both MDM as applicable and the Disposition within this note     Time User Action Codes Description Comment    9/11/2021  5:04 PM Atlantic Inch Add [R06 02] SOB (shortness of breath)     9/11/2021  5:04 PM [place] Inch Add [R79 89] Elevated brain natriuretic peptide (BNP) level     9/11/2021  5:04 PM [place] Inch Add [R79 89] Elevated d-dimer     9/11/2021  5:04 PM Atlantic Inch Add [R60 0] Bilateral lower extremity edema     9/11/2021  7:27 PM Lavada Ree Add [I50 9] New onset of congestive heart failure [facility])       ED Disposition     ED Disposition Condition Date/Time Comment    Admit Stable Sat Sep 11, 2021  6:42 PM Case was discussed with Dr Simone Mahan and the patient's admission status was agreed to be Admission Status: inpatient status to the service of   [facility]   Follow-up Information    None         There are no discharge medications for this patient  No discharge procedures on file      PDMP Review     None          ED Provider  Electronically Signed by    MD Mariela Wolf Ala, MD  09/11/21 2012

## 2021-09-11 NOTE — ASSESSMENT & PLAN NOTE
· Says SBP greater than 180 with the acute heart failure  Slowly lower the blood pressure okay in 160s today start Coreg 6 25 mg p o  B i d  Will hold off on Ace or Arb secondary to having IV contrast and having diuresis will stay start if EF is in systolic range it or blood pressure is uncontrolled when assure 24 hours creatinine remains stable    Avoid Norvasc secondary to leg swelling

## 2021-09-11 NOTE — PLAN OF CARE
Problem: INFECTION - ADULT  Goal: Absence or prevention of progression during hospitalization  Description: INTERVENTIONS:  - Assess and monitor for signs and symptoms of infection  - Monitor lab/diagnostic results  - Monitor all insertion sites, i e  indwelling lines, tubes, and drains  - Monitor endotracheal if appropriate and nasal secretions for changes in amount and color  - [place] appropriate cooling/warming therapies per order  - Administer medications as ordered  - Instruct and encourage patient and family to use good hand hygiene technique  - Identify and instruct in appropriate isolation precautions for identified infection/condition  Outcome: Progressing

## 2021-09-11 NOTE — H&P
[address]  H&P- Alli Blood 1955, 78 y o  male MRN: [id number]  Unit/Bed#: -[facility] Encounter: [id number]  Primary Care Provider: Tim Lund MD   Date and time admitted to hospital: 9/11/2021  3:56 PM    * Acute congestive heart failure ([address] )  Assessment & Plan  Wt Readings from Last 3 Encounters:   09/11/21 63 5 kg (140 lb)     · Patient has not seen a doctor has no medical problems or takes medications greater than 10 years positive edema pleural effusions shortness of breath proBNP of over 20,000  Unknown systolic or diastolic  · Was given Lasix 40 mg IV in the ER proceed with Lasix 40 mg IV b i d  Starting from tomorrow as he got a dose today KCl 20 mEq p o  Daily  · Control blood pressure although lower slowly will start Coreg 6 25 mg p o  B i d  Avoid Norvasc with leg swelling  · Daily weights I's and O's  · One thousand five hundred fluid restriction  · CTA is negative for acute PE  · T bili is elevated secondary to suspect congestive hepatopathy will see his RV as well  · 2D echo  · Cardiology consult  · Telemetry  · Check a TSH  · Lipid panel  · EKG nonischemic just sinus tachycardia  · Troponin negative trend 2 more  · Venous duplex is negative for acute DVT        Hypertensive emergency  Assessment & Plan  · Says SBP greater than 180 with the acute heart failure  Slowly lower the blood pressure okay in 160s today start Coreg 6 25 mg p o  B i d  Will hold off on Ace or Arb secondary to having IV contrast and having diuresis will stay start if EF is in systolic range it or blood pressure is uncontrolled when assure 24 hours creatinine remains stable  Avoid Norvasc secondary to leg swelling    VTE Pharmacologic Prophylaxis: VTE Score: 3 Moderate Risk (Score 3-4) - Pharmacological DVT Prophylaxis Ordered: enoxaparin (Lovenox)  Code Status: Level 1 - Full Code   Discussion with family: Updated  (wife) at bedside      Anticipated Length of Stay: Patient will be admitted on an inpatient basis with an anticipated length of stay of greater than 2 midnights secondary to Acute CHF hypertensive emergency  Total Time for Visit, including Counseling / Coordination of Care: 60 minutes Greater than 50% of this total time spent on direct patient counseling and coordination of care  Chief Complaint:  Shortness of breath    History of Present Illness:  Elaina Betts is a 78 y o  male with a PMH of no past medical history has not seen a physician in greater than 20 years who presents with shortness of breath that has been happening for a couple of days more exertional associated with productive cough he received antibiotics but has not gotten better today he woke up there was positive lower extremity edema  He has not been able to sleep secondary to shortness of breath so positive PND and orthopnea  Denies any chest pain or dizziness did not have any heart history does not take any medication has not seen a physician in greater than 10 years  No nausea vomiting diarrhea abdominal pain he did urinate after the Lasix dose in the ER  Review of Systems:  Review of Systems   Constitutional: Negative for chills and fever  HENT: Negative for ear pain and sore throat  Eyes: Negative for pain and visual disturbance  Respiratory: Positive for cough and shortness of breath  Cardiovascular: Positive for leg swelling  Negative for chest pain and palpitations  Gastrointestinal: Negative for abdominal pain and vomiting  Genitourinary: Negative for dysuria and hematuria  Musculoskeletal: Negative for arthralgias and back pain  Skin: Negative for color change and rash  Neurological: Negative for seizures and syncope  All other systems reviewed and are negative  Past Medical and Surgical History:   History reviewed  No pertinent past medical history  History reviewed  No pertinent surgical history      Meds/Allergies:  Prior to Admission medications Not on File     I have reviewed home medications with patient personally  Allergies: No Known Allergies    Social History:  Marital Status: /Civil Union     Substance Use History:   Social History     Substance and Sexual Activity   Alcohol Use Yes    Alcohol/week: 4 0 standard drinks    Types: 4 Cans of beer per week     Social History     Tobacco Use   Smoking Status Former Smoker    Types: Cigarettes   Smokeless Tobacco Never Used   Tobacco Comment    quit two weeks ago     Social History     Substance and Sexual Activity   Drug Use Not Currently       Family History:  History reviewed  No pertinent family history  Physical Exam:     Vitals:   Blood Pressure: (!) 162/101 (09/11/21 1756)  Pulse: 87 (09/11/21 1845)  Temperature: (!) 97 °F (36 1 °C) (09/11/21 1557)  Respirations: 16 (09/11/21 1845)  Height: 5' 6" (167 6 cm) (09/11/21 1557)  Weight - Scale: 63 5 kg (140 lb) (09/11/21 1557)  SpO2: 100 % (09/11/21 1845)    Physical Exam  Vitals and nursing note reviewed  Constitutional:       Appearance: He is well-developed  HENT:      Head: Normocephalic and atraumatic  Eyes:      Conjunctiva/sclera: Conjunctivae normal    Cardiovascular:      Rate and Rhythm: Normal rate and regular rhythm  Heart sounds: No murmur heard  Pulmonary:      Effort: Pulmonary effort is normal  No respiratory distress  Breath sounds: Rales present  Abdominal:      Palpations: Abdomen is soft  Tenderness: There is no abdominal tenderness  Musculoskeletal:         General: Swelling (Right greater than left + 2 on the right +1 on the left up to the knees) present  Cervical back: Neck supple  Skin:     General: Skin is warm and dry  Neurological:      General: No focal deficit present  Mental Status: He is alert and oriented to person, place, and time  Mental status is at baseline     Psychiatric:         Mood and Affect: Mood normal          Additional Data:     Lab Results:  Results from last 7 days   Lab Units 09/11/21  1609   WBC Thousand/uL 8 93   HEMOGLOBIN g/dL 14 8   HEMATOCRIT % 44 1   PLATELETS Thousands/uL 157   NEUTROS PCT % 72   LYMPHS PCT % 19   MONOS PCT % 7   EOS PCT % 1     Results from last 7 days   Lab Units 09/11/21  1609   SODIUM mmol/L 136   POTASSIUM mmol/L 3 5   CHLORIDE mmol/L 100   CO2 mmol/L 25   BUN mg/dL 15   CREATININE mg/dL 1 26   ANION GAP mmol/L 11   CALCIUM mg/dL 9 1   ALBUMIN g/dL 3 8   TOTAL BILIRUBIN mg/dL 2 24*   ALK PHOS U/L 127*   ALT U/L 23   AST U/L 31   GLUCOSE RANDOM mg/dL 138     Results from last 7 days   Lab Units 09/11/21  1609   INR  1 18                   Imaging: Reviewed radiology reports from this admission including: chest CT scan  CTA ED chest PE Study   Final Result by Eros Mills DO (09/11 1814)      Study limited by respiratory motion  No evidence of central pulmonary embolus within limitations  Moderate right and small left pleural effusions with bilateral compressive atelectasis and suspected mild central vascular congestion  Workstation performed: [id number]         X-ray chest 1 view portable   ED Interpretation by Yung Heart MD ([id number]/22 [id number])   Chest x-ray has moderate vascular congestion and right pleural effusion; no pneumothorax, mediastinal widening or focal consolidation  VAS lower limb venous duplex study, complete bilateral    (Results Pending)       EKG and Other Studies Reviewed on Admission:   · EKG: Sinus Tachycardia    ** Please Note: This note has been constructed using a voice recognition system   **

## 2021-09-11 NOTE — ASSESSMENT & PLAN NOTE
Wt Readings from Last 3 Encounters:   09/11/21 63 5 kg (140 lb)     · Patient has not seen a doctor has no medical problems or takes medications greater than 10 years positive edema pleural effusions shortness of breath proBNP of over 20,000  Unknown systolic or diastolic  · Was given Lasix 40 mg IV in the ER proceed with Lasix 40 mg IV b i d  Starting from tomorrow as he got a dose today KCl 20 mEq p o  Daily  · Control blood pressure although lower slowly will start Coreg 6 25 mg p o  B i d   Avoid Norvasc with leg swelling  · Daily weights I's and O's  · One thousand five hundred fluid restriction  · CTA is negative for acute PE  · T bili is elevated secondary to suspect congestive hepatopathy will see his RV as well  · 2D echo  · Cardiology consult  · Telemetry  · Check a TSH  · Lipid panel  · EKG nonischemic just sinus tachycardia  · Troponin negative trend 2 more  · Venous duplex is negative for acute DVT

## 2021-09-12 PROBLEM — R79.89 ELEVATED TROPONIN: Status: ACTIVE | Noted: 2021-09-12

## 2021-09-12 PROBLEM — J41.1 MUCOPURULENT CHRONIC BRONCHITIS (HCC): Status: ACTIVE | Noted: 2021-09-12

## 2021-09-12 PROBLEM — N18.31 STAGE 3A CHRONIC KIDNEY DISEASE (HCC): Status: ACTIVE | Noted: 2021-09-12

## 2021-09-12 PROBLEM — R77.8 ELEVATED TROPONIN: Status: ACTIVE | Noted: 2021-09-12

## 2021-09-12 PROBLEM — Z72.0 TOBACCO ABUSE: Status: ACTIVE | Noted: 2021-09-12

## 2021-09-12 LAB
ALBUMIN SERPL BCP-MCNC: 3 G/DL (ref 3.5–5)
ALP SERPL-CCNC: 97 U/L (ref 46–116)
ALT SERPL W P-5'-P-CCNC: 23 U/L (ref 12–78)
ANION GAP SERPL CALCULATED.3IONS-SCNC: 10 MMOL/L (ref 4–13)
AST SERPL W P-5'-P-CCNC: 24 U/L (ref 5–45)
BILIRUB SERPL-MCNC: 1.49 MG/DL (ref 0.2–1)
BUN SERPL-MCNC: 19 MG/DL (ref 5–25)
CALCIUM ALBUM COR SERPL-MCNC: 9.3 MG/DL (ref 8.3–10.1)
CALCIUM SERPL-MCNC: 8.5 MG/DL (ref 8.3–10.1)
CHLORIDE SERPL-SCNC: 102 MMOL/L (ref 100–108)
CHOLEST SERPL-MCNC: 115 MG/DL (ref 50–200)
CO2 SERPL-SCNC: 25 MMOL/L (ref 21–32)
CREAT SERPL-MCNC: 1.34 MG/DL (ref 0.6–1.3)
GFR SERPL CREATININE-BSD FRML MDRD: 54 ML/MIN/1.73SQ M
GLUCOSE SERPL-MCNC: 154 MG/DL (ref 65–140)
HDLC SERPL-MCNC: 35 MG/DL
LDLC SERPL CALC-MCNC: 73 MG/DL (ref 0–100)
MAGNESIUM SERPL-MCNC: 1.9 MG/DL (ref 1.6–2.6)
NONHDLC SERPL-MCNC: 80 MG/DL
POTASSIUM SERPL-SCNC: 3.8 MMOL/L (ref 3.5–5.3)
PROT SERPL-MCNC: 6.7 G/DL (ref 6.4–8.2)
SODIUM SERPL-SCNC: 137 MMOL/L (ref 136–145)
TRIGL SERPL-MCNC: 34 MG/DL
TROPONIN I SERPL-MCNC: 0.03 NG/ML
TSH SERPL DL<=0.05 MIU/L-ACNC: 0.58 UIU/ML (ref 0.36–3.74)

## 2021-09-12 PROCEDURE — 84484 ASSAY OF TROPONIN QUANT: CPT | Performed by: FAMILY MEDICINE

## 2021-09-12 PROCEDURE — 84443 ASSAY THYROID STIM HORMONE: CPT | Performed by: FAMILY MEDICINE

## 2021-09-12 PROCEDURE — 93970 EXTREMITY STUDY: CPT | Performed by: SURGERY

## 2021-09-12 PROCEDURE — 99233 SBSQ HOSP IP/OBS HIGH 50: CPT | Performed by: FAMILY MEDICINE

## 2021-09-12 PROCEDURE — 80053 COMPREHEN METABOLIC PANEL: CPT | Performed by: FAMILY MEDICINE

## 2021-09-12 PROCEDURE — 83735 ASSAY OF MAGNESIUM: CPT | Performed by: FAMILY MEDICINE

## 2021-09-12 PROCEDURE — 80061 LIPID PANEL: CPT | Performed by: FAMILY MEDICINE

## 2021-09-12 RX ORDER — NICOTINE 21 MG/24HR
14 PATCH, TRANSDERMAL 24 HOURS TRANSDERMAL DAILY
Status: DISCONTINUED | OUTPATIENT
Start: 2021-09-12 | End: 2021-09-14 | Stop reason: HOSPADM

## 2021-09-12 RX ORDER — FUROSEMIDE 40 MG/1
40 TABLET ORAL DAILY
Status: DISCONTINUED | OUTPATIENT
Start: 2021-09-13 | End: 2021-09-14 | Stop reason: HOSPADM

## 2021-09-12 RX ORDER — FUROSEMIDE 40 MG/1
40 TABLET ORAL DAILY
Status: DISCONTINUED | OUTPATIENT
Start: 2021-09-12 | End: 2021-09-12

## 2021-09-12 RX ADMIN — POTASSIUM CHLORIDE 20 MEQ: 1500 TABLET, EXTENDED RELEASE ORAL at 08:25

## 2021-09-12 RX ADMIN — FUROSEMIDE 40 MG: 10 INJECTION, SOLUTION INTRAMUSCULAR; INTRAVENOUS at 08:25

## 2021-09-12 RX ADMIN — CARVEDILOL 6.25 MG: 6.25 TABLET, FILM COATED ORAL at 21:13

## 2021-09-12 RX ADMIN — ENOXAPARIN SODIUM 40 MG: 40 INJECTION SUBCUTANEOUS at 05:45

## 2021-09-12 RX ADMIN — NICOTINE 14 MG: 14 PATCH, EXTENDED RELEASE TRANSDERMAL at 09:36

## 2021-09-12 NOTE — ASSESSMENT & PLAN NOTE
· SBP greater than 180 with the acute heart failure     · Placed on Coreg 6 25 mg p o  B i d  Will hold off on Ace or Arb secondary to having IV contrast on admission  · Blood pressure appears to be improving

## 2021-09-12 NOTE — ASSESSMENT & PLAN NOTE
Probably has undiagnosed COPD baseline  Heavy tobacco use  Will place on p r n   Albuterol as needed and counseled on smoking cessation

## 2021-09-12 NOTE — PLAN OF CARE
Problem: PAIN - ADULT  Goal: Verbalizes/displays adequate comfort level or baseline comfort level  Description: Interventions:  - Encourage patient to monitor pain and request assistance  - Assess pain using appropriate pain scale  - Administer analgesics based on type and severity of pain and evaluate response  - Implement non-pharmacological measures as appropriate and evaluate response  - Consider cultural and social influences on pain and pain management  - Notify physician/advanced practitioner if interventions unsuccessful or patient reports new pain  Outcome: Progressing     Problem: INFECTION - ADULT  Goal: Absence or prevention of progression during hospitalization  Description: INTERVENTIONS:  - Assess and monitor for signs and symptoms of infection  - Monitor lab/diagnostic results  - Monitor all insertion sites, i e  indwelling lines, tubes, and drains  - Monitor endotracheal if appropriate and nasal secretions for changes in amount and color  - [place] appropriate cooling/warming therapies per order  - Administer medications as ordered  - Instruct and encourage patient and family to use good hand hygiene technique  - Identify and instruct in appropriate isolation precautions for identified infection/condition  Outcome: Progressing  Goal: Absence of fever/infection during neutropenic period  Description: INTERVENTIONS:  - Monitor WBC    Outcome: Progressing     Problem: SAFETY ADULT  Goal: Patient will remain free of falls  Description: INTERVENTIONS:  - Educate patient/family on patient safety including physical limitations  - Instruct patient to call for assistance with activity   - Consult OT/PT to assist with strengthening/mobility   - Keep Call bell within reach  - Keep bed low and locked with side rails adjusted as appropriate  - Keep care items and personal belongings within reach  - Initiate and maintain comfort rounds  - Make Fall Risk Sign visible to staff  - Offer Toileting every 2 Hours, in advance of need  - Initiate/Maintain bed/chair alarm  - Obtain necessary fall risk management equipment:   - Apply yellow socks and bracelet for high fall risk patients  - Consider moving patient to room near nurses station  Outcome: Progressing  Goal: Maintain or return to baseline ADL function  Description: INTERVENTIONS:  -  Assess patient's ability to carry out ADLs; assess patient's baseline for ADL function and identify physical deficits which impact ability to perform ADLs (bathing, care of mouth/teeth, toileting, grooming, dressing, etc )  - Assess/evaluate cause of self-care deficits   - Assess range of motion  - Assess patient's mobility; develop plan if impaired  - Assess patient's need for assistive devices and provide as appropriate  - Encourage maximum independence but intervene and supervise when necessary  - Involve family in performance of ADLs  - Assess for home care needs following discharge   - Consider OT consult to assist with ADL evaluation and planning for discharge  - Provide patient education as appropriate  Outcome: Progressing  Goal: Maintains/Returns to pre admission functional level  Description: INTERVENTIONS:  - Perform BMAT or MOVE assessment daily    - Set and communicate daily mobility goal to care team and patient/family/caregiver  - Collaborate with rehabilitation services on mobility goals if consulted  - Perform Range of Motion 3 times a day  - Reposition patient every 2 hours    - Dangle patient 3 times a day  - Stand patient 3 times a day  - Ambulate patient 3 times a day  - Out of bed to chair 3 times a day   - Out of bed for meals 3 times a day  - Out of bed for toileting  - Record patient progress and toleration of activity level   Outcome: Progressing     Problem: DISCHARGE PLANNING  Goal: Discharge to home or other facility with appropriate resources  Description: INTERVENTIONS:  - Identify barriers to discharge w/patient and caregiver  - Arrange for needed discharge resources and transportation as appropriate  - Identify discharge learning needs (meds, wound care, etc )  - Arrange for interpretive services to assist at discharge as needed  - Refer to Case Management Department for coordinating discharge planning if the patient needs post-hospital services based on physician/advanced practitioner order or complex needs related to functional status, cognitive ability, or social support system  Outcome: Progressing     Problem: Knowledge Deficit  Goal: Patient/family/caregiver demonstrates understanding of disease process, treatment plan, medications, and discharge instructions  Description: Complete learning assessment and assess knowledge base  Interventions:  - Provide teaching at level of understanding  - Provide teaching via preferred learning methods  Outcome: Progressing     Problem: Nutrition/Hydration-ADULT  Goal: Nutrient/Hydration intake appropriate for improving, restoring or maintaining nutritional needs  Description: Monitor and assess patient's nutrition/hydration status for malnutrition  Collaborate with interdisciplinary team and initiate plan and interventions as ordered  Monitor patient's weight and dietary intake as ordered or per policy  Utilize nutrition screening tool and intervene as necessary  Determine patient's food preferences and provide high-protein, high-caloric foods as appropriate       INTERVENTIONS:  - Monitor oral intake, urinary output, labs, and treatment plans  - Assess nutrition and hydration status and recommend course of action  - Evaluate amount of meals eaten  - Assist patient with eating if necessary   - Allow adequate time for meals  - Recommend/ encourage appropriate diets, oral nutritional supplements, and vitamin/mineral supplements  - Order, calculate, and assess calorie counts as needed  - Recommend, monitor, and adjust tube feedings and TPN/PPN based on assessed needs  - Assess need for intravenous fluids  - Provide specific nutrition/hydration education as appropriate  - Include patient/family/caregiver in decisions related to nutrition  Outcome: Progressing     Problem: Potential for Falls  Goal: Patient will remain free of falls  Description: INTERVENTIONS:  - Educate patient/family on patient safety including physical limitations  - Instruct patient to call for assistance with activity   - Consult OT/PT to assist with strengthening/mobility   - Keep Call bell within reach  - Keep bed low and locked with side rails adjusted as appropriate  - Keep care items and personal belongings within reach  - Initiate and maintain comfort rounds  - Make Fall Risk Sign visible to staff  - Offer Toileting every 2 Hours, in advance of need  - Initiate/Maintain bed/chair alarm  - Obtain necessary fall risk management equipment:   - Apply yellow socks and bracelet for high fall risk patients  - Consider moving patient to room near nurses station  Outcome: Progressing

## 2021-09-12 NOTE — ASSESSMENT & PLAN NOTE
Secondary to demand ischemia due to acute CHF exacerbation and hypertensive urgency    Continue medical management

## 2021-09-12 NOTE — ASSESSMENT & PLAN NOTE
Lab Results   Component Value Date    EGFR 54 09/12/2021    EGFR 59 09/11/2021    CREATININE 1 34 (H) 09/12/2021    CREATININE 1 26 09/11/2021   Patient has CKD stage 3 with creatinine around 1 2  Continue to observe for now during diuresis    Currently at baseline

## 2021-09-12 NOTE — ASSESSMENT & PLAN NOTE
Wt Readings from Last 3 Encounters:   09/12/21 58 2 kg (128 lb 3 2 oz)     · Patient has not seen a doctor has no medical problems or takes medications greater than 10 years  · Presented with shortness of breath proBNP of over 20,000  CTA chest negative for pulmonary embolism but shows bilateral pleural effusions  Unknown systolic or diastolic  · Was given Lasix 40 mg IV in the ER proceed with Lasix 40 mg IV b i d  creatinine went up to 1 3  Also received IV contrast yesterday along with diuretics  Will discontinue IV Lasix and placed on Lasix 40 mg oral daily    Will wean off oxygen today as patient is clinically feeling better  · Daily weights I's and O's  · 1500 ml fluid restriction  · CTA is negative for acute PE  · T bili is elevated secondary to suspect congestive hepatopathy   · 2D echo  · Cardiology consult  · Telemetry  · Normal TSH and lipid panel except for low HDL  · EKG nonischemic just sinus tachycardia  · Venous duplex is negative for acute DVT

## 2021-09-12 NOTE — PROGRESS NOTES
[address]  Progress Note - Kendra Jaramillo 1955, 78 y o  male MRN: [id number]  Unit/Bed#: -01 Encounter: [id number]  Primary Care Provider: iLz Kinsey MD   Date and time admitted to hospital: 9/11/2021  3:56 PM    * Acute congestive heart failure ([address] )  Assessment & Plan  Wt Readings from Last 3 Encounters:   09/12/21 58 2 kg (128 lb 3 2 oz)     · Patient has not seen a doctor has no medical problems or takes medications greater than 10 years  · Presented with shortness of breath proBNP of over 20,000  CTA chest negative for pulmonary embolism but shows bilateral pleural effusions  Unknown systolic or diastolic  · Was given Lasix 40 mg IV in the ER proceed with Lasix 40 mg IV b i d  creatinine went up to 1 3  Also received IV contrast yesterday along with diuretics  Will discontinue IV Lasix and placed on Lasix 40 mg oral daily  Will wean off oxygen today as patient is clinically feeling better  · Daily weights I's and O's  · 1500 ml fluid restriction  · CTA is negative for acute PE  · T bili is elevated secondary to suspect congestive hepatopathy   · 2D echo  · Cardiology consult  · Telemetry  · Normal TSH and lipid panel except for low HDL  · EKG nonischemic just sinus tachycardia  · Venous duplex is negative for acute DVT        Hypertensive emergency  Assessment & Plan  · SBP greater than 180 with the acute heart failure  · Placed on Coreg 6 25 mg p o  B i d  Will hold off on Ace or Arb secondary to having IV contrast on admission  · Blood pressure appears to be improving    Mucopurulent chronic bronchitis (HCC)  Assessment & Plan  Probably has undiagnosed COPD baseline  Heavy tobacco use  Will place on p r n   Albuterol as needed and counseled on smoking cessation    Tobacco abuse  Assessment & Plan   on smoking cessation placed on nicotine replacement therapy    Elevated troponin  Assessment & Plan  Secondary to demand ischemia due to acute CHF exacerbation and hypertensive urgency  Continue medical management    Stage 3a chronic kidney disease [facility])  Assessment & Plan  Lab Results   Component Value Date    EGFR 54 2021    EGFR 59 2021    CREATININE 1 34 (H) 2021    CREATININE 1 26 2021   Patient has CKD stage 3 with creatinine around 1 2  Continue to observe for now during diuresis  Currently at baseline    Acute respiratory failure with hypoxia:  Secondary to fluid overload due to acute CHF exacerbation  Continue diuresis and try to wean off oxygen completely today  Patient was requiring 2 L since admission  VTE Pharmacologic Prophylaxis:   Pharmacologic: Enoxaparin (Lovenox)  Mechanical VTE Prophylaxis in Place: Yes    Patient Centered Rounds: I have performed bedside rounds with nursing staff today  Discussions with Specialists or Other Care Team Provider:  None    Education and Discussions with Family / Patient:  Discussed with patient at bedside    Time Spent for Care: 45 minutes  More than 50% of total time spent on counseling and coordination of care as described above  Current Length of Stay: 1 day(s)    Current Patient Status: Inpatient   Certification Statement: The patient will continue to require additional inpatient hospital stay due to Acute CHF exacerbation    Discharge Plan:  Pending progress    Code Status: Level 1 - Full Code      Subjective:   Patient denies any chest pain currently  He states shortness of breath is improving  He states he is urinating well  Feels much better compared to yesterday    Objective:     Vitals:   Temp (24hrs), Av 7 °F (36 5 °C), Min:97 °F (36 1 °C), Max:98 1 °F (36 7 °C)    Temp:  [97 °F (36 1 °C)-98 1 °F (36 7 °C)] 98 1 °F (36 7 °C)  HR:  [] 80  Resp:  [16-22] 18  BP: (154-209)/() 158/83  SpO2:  [91 %-100 %] 92 %  Body mass index is 20 69 kg/m²  Input and Output Summary (last 24 hours):        Intake/Output Summary (Last 24 hours) at 2021 [address] [place] [address] Road filed at 9/12/2021 1026  Gross per 24 hour   Intake 300 ml   Output 450 ml   Net -150 ml       Physical Exam:     Physical Exam  Vitals and nursing note reviewed  Constitutional:       Appearance: Normal appearance  HENT:      Head: Normocephalic and atraumatic  Right Ear: External ear normal       Left Ear: External ear normal       Nose: Nose normal       Mouth/Throat:      Pharynx: Oropharynx is clear  Eyes:      Pupils: Pupils are equal, round, and reactive to light  Cardiovascular:      Rate and Rhythm: Normal rate and regular rhythm  Heart sounds: Normal heart sounds  Pulmonary:      Effort: Pulmonary effort is normal       Comments: Moderate air entry bilaterally with decreased breath sounds bilateral bases  Abdominal:      General: Bowel sounds are normal       Palpations: Abdomen is soft  Tenderness: There is no abdominal tenderness  Musculoskeletal:         General: Normal range of motion  Cervical back: Normal range of motion and neck supple  Right lower leg: Edema present  Left lower leg: Edema present  Skin:     General: Skin is warm and dry  Capillary Refill: Capillary refill takes less than 2 seconds  Neurological:      General: No focal deficit present  Mental Status: He is alert and oriented to person, place, and time     Psychiatric:         Mood and Affect: Mood normal            Additional Data:     Labs:    Results from last 7 days   Lab Units 09/11/21  1609   WBC Thousand/uL 8 93   HEMOGLOBIN g/dL 14 8   HEMATOCRIT % 44 1   PLATELETS Thousands/uL 157   NEUTROS PCT % 72   LYMPHS PCT % 19   MONOS PCT % 7   EOS PCT % 1     Results from last 7 days   Lab Units 09/12/21  0549   SODIUM mmol/L 137   POTASSIUM mmol/L 3 8   CHLORIDE mmol/L 102   CO2 mmol/L 25   BUN mg/dL 19   CREATININE mg/dL 1 34*   ANION GAP mmol/L 10   CALCIUM mg/dL 8 5   ALBUMIN g/dL 3 0*   TOTAL BILIRUBIN mg/dL 1 49*   ALK PHOS U/L 97   ALT U/L 23   AST U/L 24 GLUCOSE RANDOM mg/dL 154*     Results from last 7 days   Lab Units 09/11/21  1609   INR  1 18                       * I Have Reviewed All Lab Data Listed Above  * Additional Pertinent Lab Tests Reviewed: [address] Admission Reviewed    Imaging:    Imaging Reports Reviewed Today Include:  CTA chest  Imaging Personally Reviewed by Myself Includes:  CTA chest    Recent Cultures (last 7 days):           Last 24 Hours Medication List:   Current Facility-Administered Medications   Medication Dose Route Frequency Provider Last Rate    acetaminophen  650 mg Oral Q4H PRN Conner Ramos MD      carvedilol  6 25 mg Oral BID With Meals Conner Ramos MD      enoxaparin  40 mg Subcutaneous Daily Conner Ramos MD      [START ON 9/13/2021] furosemide  40 mg Oral Daily Desiree Gutierrez MD      nicotine  14 mg Transdermal Daily Desiree Gutierrez MD      potassium chloride  20 mEq Oral Daily Conner Ramos MD      sodium chloride (PF)  3 mL Intravenous Q1H PRN Tamera Coon MD          Today, Patient Was Seen By: Desiree Gutierrez MD    ** Please Note: Dictation voice to text software may have been used in the creation of this document   **

## 2021-09-13 ENCOUNTER — APPOINTMENT (INPATIENT)
Dept: NON INVASIVE DIAGNOSTICS | Facility: HOSPITAL | Age: 66
DRG: 291 | End: 2021-09-13
Payer: MEDICARE

## 2021-09-13 PROBLEM — I50.21 ACUTE SYSTOLIC (CONGESTIVE) HEART FAILURE (HCC): Status: ACTIVE | Noted: 2021-09-11

## 2021-09-13 LAB
ANION GAP SERPL CALCULATED.3IONS-SCNC: 5 MMOL/L (ref 4–13)
ATRIAL RATE: 114 BPM
BUN SERPL-MCNC: 31 MG/DL (ref 5–25)
CALCIUM SERPL-MCNC: 8.7 MG/DL (ref 8.3–10.1)
CHLORIDE SERPL-SCNC: 101 MMOL/L (ref 100–108)
CO2 SERPL-SCNC: 30 MMOL/L (ref 21–32)
CREAT SERPL-MCNC: 1.51 MG/DL (ref 0.6–1.3)
GFR SERPL CREATININE-BSD FRML MDRD: 47 ML/MIN/1.73SQ M
GLUCOSE SERPL-MCNC: 102 MG/DL (ref 65–140)
P AXIS: 75 DEGREES
POTASSIUM SERPL-SCNC: 4.4 MMOL/L (ref 3.5–5.3)
PR INTERVAL: 130 MS
QRS AXIS: 59 DEGREES
QRSD INTERVAL: 94 MS
QT INTERVAL: 350 MS
QTC INTERVAL: 482 MS
SODIUM SERPL-SCNC: 136 MMOL/L (ref 136–145)
T WAVE AXIS: 113 DEGREES
VENTRICULAR RATE: 114 BPM

## 2021-09-13 PROCEDURE — 93306 TTE W/DOPPLER COMPLETE: CPT | Performed by: INTERNAL MEDICINE

## 2021-09-13 PROCEDURE — 99232 SBSQ HOSP IP/OBS MODERATE 35: CPT | Performed by: FAMILY MEDICINE

## 2021-09-13 PROCEDURE — 80048 BASIC METABOLIC PNL TOTAL CA: CPT | Performed by: FAMILY MEDICINE

## 2021-09-13 PROCEDURE — 99222 1ST HOSP IP/OBS MODERATE 55: CPT | Performed by: INTERNAL MEDICINE

## 2021-09-13 PROCEDURE — 93306 TTE W/DOPPLER COMPLETE: CPT

## 2021-09-13 RX ORDER — MAGNESIUM HYDROXIDE/ALUMINUM HYDROXICE/SIMETHICONE 120; 1200; 1200 MG/30ML; MG/30ML; MG/30ML
30 SUSPENSION ORAL EVERY 4 HOURS PRN
Status: DISCONTINUED | OUTPATIENT
Start: 2021-09-13 | End: 2021-09-14 | Stop reason: HOSPADM

## 2021-09-13 RX ADMIN — FUROSEMIDE 40 MG: 40 TABLET ORAL at 08:19

## 2021-09-13 RX ADMIN — CARVEDILOL 6.25 MG: 6.25 TABLET, FILM COATED ORAL at 08:19

## 2021-09-13 RX ADMIN — ALUMINUM HYDROXIDE, MAGNESIUM HYDROXIDE, AND SIMETHICONE 30 ML: 200; 200; 20 SUSPENSION ORAL at 17:12

## 2021-09-13 RX ADMIN — POTASSIUM CHLORIDE 20 MEQ: 1500 TABLET, EXTENDED RELEASE ORAL at 08:19

## 2021-09-13 RX ADMIN — ENOXAPARIN SODIUM 40 MG: 40 INJECTION SUBCUTANEOUS at 08:20

## 2021-09-13 RX ADMIN — NICOTINE 14 MG: 14 PATCH, EXTENDED RELEASE TRANSDERMAL at 08:22

## 2021-09-13 NOTE — ASSESSMENT & PLAN NOTE
/121 at admission  Evidence of LVH on ECG  Pt has not sought health care in at least 20 years    Currently on Carvedilol 6 25mg BID with improvement in BP  Avoiding ACE/ARB secondary to renal status, but can consider addition if renal status improves  Continue Furosemide 40mg daily  Avoiding Amlodipine secondary to leg swelling but can consider if needed for BP support

## 2021-09-13 NOTE — ASSESSMENT & PLAN NOTE
Reviewed the significant health risks associated with tobacco use and smoking  Strongly encouraged cessation  He is on nicotine replacement patches during hospitalization

## 2021-09-13 NOTE — ASSESSMENT & PLAN NOTE
Wt Readings from Last 3 Encounters:   09/13/21 58 7 kg (129 lb 6 4 oz)     · Patient has not seen a doctor has no medical problems or takes medications greater than 10 years  · Presented with shortness of breath proBNP of over 20,000  CTA chest negative for pulmonary embolism but shows bilateral pleural effusions  Unknown systolic or diastolic  · Was given Lasix 40 mg IV in the ER proceed with Lasix 40 mg IV b i d  creatinine went up to 1 3  Also received IV contrast yesterday along with diuretics  Will discontinue IV Lasix and placed on Lasix 40 mg oral daily  Will wean off oxygen today as patient is clinically feeling better  · Daily weights I's and O's  · 1500 ml fluid restriction  · CTA is negative for acute PE  · T bili is elevated secondary to suspect congestive hepatopathy   · 2D echo shows ef of 25-30 %  consult placed to cardio might need cardiac cath once renal function stabilizes  · Telemetry  · Normal TSH and lipid panel except for low HDL  · EKG nonischemic just sinus tachycardia  · Venous duplex is negative for acute DVT

## 2021-09-13 NOTE — CONSULTS
Consult received for CHF ed  Pt reports his wife was in critical care 3 years ago, survived and living at home with him though is unable to "do much" including prepare meals at home  He does not have any cooking skills, says he can only prepare cereal and therefore relies on convenience foods such as subs and pizza for meals  Pt is interested in meals on wheels which may optimize nutrition/decrease sodium intake, relayed to CM  Discussed avoidance of salt shaker, pt reports, "I'm the kind of norma that salts his salt " Says this would be a big adjustment for him though is willing to try, encouraged salt avoidance at home with use of Mrs  Dash, herbs/spices as alternative  Encouraged daily weights and report significant changes  Provided with HF nutrition therapy handout and RD contact information  Continue diet as ordered  Thank you for the consult

## 2021-09-13 NOTE — PROGRESS NOTES
[address]  Progress Note - Db Roberto 1955, 78 y o  male MRN: [id number]  Unit/Bed#: -01 Encounter: [id number]  Primary Care Provider: Jeremy Alvarado MD   Date and time admitted to hospital: 9/11/2021  3:56 PM    * Acute systolic (congestive) heart failure ([address] )  Assessment & Plan  Wt Readings from Last 3 Encounters:   09/13/21 58 7 kg (129 lb 6 4 oz)     · Patient has not seen a doctor has no medical problems or takes medications greater than 10 years  · Presented with shortness of breath proBNP of over 20,000  CTA chest negative for pulmonary embolism but shows bilateral pleural effusions  Unknown systolic or diastolic  · Was given Lasix 40 mg IV in the ER proceed with Lasix 40 mg IV b i d  creatinine went up to 1 3  Also received IV contrast yesterday along with diuretics  Will discontinue IV Lasix and placed on Lasix 40 mg oral daily  Will wean off oxygen today as patient is clinically feeling better  · Daily weights I's and O's  · 1500 ml fluid restriction  · CTA is negative for acute PE  · T bili is elevated secondary to suspect congestive hepatopathy   · 2D echo shows ef of 25-30 %  consult placed to cardio might need cardiac cath once renal function stabilizes  · Telemetry  · Normal TSH and lipid panel except for low HDL  · EKG nonischemic just sinus tachycardia  · Venous duplex is negative for acute DVT        Hypertensive emergency  Assessment & Plan  · SBP greater than 180 with the acute heart failure  · Placed on Coreg 6 25 mg p o  B i d  Will hold off on Ace or Arb secondary to having IV contrast on admission  · Blood pressure appears to be improving    Mucopurulent chronic bronchitis (HCC)  Assessment & Plan  Probably has undiagnosed COPD baseline  Heavy tobacco use  Will place on p r n   Albuterol as needed and counseled on smoking cessation    Tobacco abuse  Assessment & Plan   on smoking cessation placed on nicotine replacement therapy    Elevated troponin  Assessment & Plan  Secondary to demand ischemia due to acute CHF exacerbation and hypertensive urgency  Continue medical management    Stage 3a chronic kidney disease [facility])  Assessment & Plan  Lab Results   Component Value Date    EGFR 47 2021    EGFR 54 2021    EGFR 59 2021    CREATININE 1 51 (H) 2021    CREATININE 1 34 (H) 2021    CREATININE 1 26 2021   Patient has CKD stage 3 with creatinine around 1 2  Continue to observe for now during diuresis  Currently however at 1 5  Continue Lasix  Monitor renal function for now      VTE Pharmacologic Prophylaxis:   Pharmacologic: Enoxaparin (Lovenox)  Mechanical VTE Prophylaxis in Place: Yes    Patient Centered Rounds: I have performed bedside rounds with nursing staff today  Discussions with Specialists or Other Care Team Provider:  Discussed with Cardiology    Education and Discussions with Family / Patient:  Discussed with patient bedside    Time Spent for Care: 30 minutes  More than 50% of total time spent on counseling and coordination of care as described above  Current Length of Stay: 2 day(s)    Current Patient Status: Inpatient   Certification Statement: The patient will continue to require additional inpatient hospital stay due to Acute systolic CHF exacerbation    Discharge Plan:  Pending progress  Once renal function improves might need cardiac catheterization    Code Status: Level 1 - Full Code      Subjective:   Patient denies any chest pain or shortness of breath or abdominal pain  He states he is feeling a little better but complaining little bit of heartburn today  Objective:     Vitals:   Temp (24hrs), Av 8 °F (36 6 °C), Min:97 3 °F (36 3 °C), Max:98 3 °F (36 8 °C)    Temp:  [97 3 °F (36 3 °C)-98 3 °F (36 8 °C)] 97 6 °F (36 4 °C)  HR:  [] 83  Resp:  [18-20] 19  BP: (138-170)/() 138/93  SpO2:  [91 %-99 %] 99 %  Body mass index is 20 89 kg/m²       Input and Output Summary (last 24 hours): Intake/Output Summary (Last 24 hours) at 9/13/2021 1622  Last data filed at 9/13/2021 1408  Gross per 24 hour   Intake 960 ml   Output --   Net 960 ml       Physical Exam:     Physical Exam  Vitals and nursing note reviewed  Constitutional:       Appearance: Normal appearance  HENT:      Head: Normocephalic and atraumatic  Right Ear: External ear normal       Left Ear: External ear normal       Nose: Nose normal       Mouth/Throat:      Pharynx: Oropharynx is clear  Eyes:      Pupils: Pupils are equal, round, and reactive to light  Cardiovascular:      Rate and Rhythm: Normal rate and regular rhythm  Heart sounds: Normal heart sounds  Pulmonary:      Effort: Pulmonary effort is normal       Comments: Moderate air entry bilaterally with mild decreased breath sounds bilateral bases  Abdominal:      General: Bowel sounds are normal       Palpations: Abdomen is soft  Tenderness: There is no abdominal tenderness  Musculoskeletal:         General: Normal range of motion  Cervical back: Normal range of motion and neck supple  Skin:     General: Skin is warm and dry  Capillary Refill: Capillary refill takes less than 2 seconds  Neurological:      General: No focal deficit present  Mental Status: He is alert and oriented to person, place, and time     Psychiatric:         Mood and Affect: Mood normal            Additional Data:     Labs:    Results from last 7 days   Lab Units 09/11/21  1609   WBC Thousand/uL 8 93   HEMOGLOBIN g/dL 14 8   HEMATOCRIT % 44 1   PLATELETS Thousands/uL 157   NEUTROS PCT % 72   LYMPHS PCT % 19   MONOS PCT % 7   EOS PCT % 1     Results from last 7 days   Lab Units 09/13/21  0556 09/12/21  0549   SODIUM mmol/L 136 137   POTASSIUM mmol/L 4 4 3 8   CHLORIDE mmol/L 101 102   CO2 mmol/L 30 25   BUN mg/dL 31* 19   CREATININE mg/dL 1 51* 1 34*   ANION GAP mmol/L 5 10   CALCIUM mg/dL 8 7 8 5   ALBUMIN g/dL  --  3 0*   TOTAL BILIRUBIN mg/dL  --  1 49*   ALK PHOS U/L  --  97   ALT U/L  --  23   AST U/L  --  24   GLUCOSE RANDOM mg/dL 102 154*     Results from last 7 days   Lab Units 09/11/21  1609   INR  1 18                       * I Have Reviewed All Lab Data Listed Above  * Additional Pertinent Lab Tests Reviewed: [address] Admission Reviewed    Imaging:    Imaging Reports Reviewed Today Include:  2D echo  Imaging Personally Reviewed by Myself Includes:  2D echo    Recent Cultures (last 7 days):           Last 24 Hours Medication List:   Current Facility-Administered Medications   Medication Dose Route Frequency Provider Last Rate    acetaminophen  650 mg Oral Q4H PRN Jet Washburn MD      carvedilol  6 25 mg Oral BID With Meals Jet Washburn MD      enoxaparin  40 mg Subcutaneous Daily Jet Washburn MD      furosemide  40 mg Oral Daily Satnam Isaac MD      nicotine  14 mg Transdermal Daily Satnam Isaac MD      sodium chloride (PF)  3 mL Intravenous Q1H PRN Gilma Bonilla MD          Today, Patient Was Seen By: Satnam Isaac MD    ** Please Note: Dictation voice to text software may have been used in the creation of this document   **

## 2021-09-13 NOTE — NURSING NOTE
Patient instructed again to use the urinal, so we can keep track of his I and O's  He has been voiding in the toilet  He said he would try

## 2021-09-13 NOTE — ASSESSMENT & PLAN NOTE
Lab Results   Component Value Date    EGFR 47 09/13/2021    EGFR 54 09/12/2021    EGFR 59 09/11/2021    CREATININE 1 51 (H) 09/13/2021    CREATININE 1 34 (H) 09/12/2021    CREATININE 1 26 09/11/2021     Creatinine 1 51 today (1 26 at admission)  Continue Furosemide 40mg daily but will monitor labs tomorrow

## 2021-09-13 NOTE — ASSESSMENT & PLAN NOTE
Lab Results   Component Value Date    EGFR 47 09/13/2021    EGFR 54 09/12/2021    EGFR 59 09/11/2021    CREATININE 1 51 (H) 09/13/2021    CREATININE 1 34 (H) 09/12/2021    CREATININE 1 26 09/11/2021   Patient has CKD stage 3 with creatinine around 1 2  Continue to observe for now during diuresis  Currently however at 1 5  Continue Lasix    Monitor renal function for now

## 2021-09-13 NOTE — ASSESSMENT & PLAN NOTE
Troponin level 0 04->0 06-> 0 07-> 0 03  This is likely in the setting of demand with hypertensive urgency  Telemetry ordered  Continue Carvedilol 6 25mg BID  No complaints of chest pain

## 2021-09-13 NOTE — PLAN OF CARE
Problem: PAIN - ADULT  Goal: Verbalizes/displays adequate comfort level or baseline comfort level  Description: Interventions:  - Encourage patient to monitor pain and request assistance  - Assess pain using appropriate pain scale  - Administer analgesics based on type and severity of pain and evaluate response  - Implement non-pharmacological measures as appropriate and evaluate response  - Consider cultural and social influences on pain and pain management  - Notify physician/advanced practitioner if interventions unsuccessful or patient reports new pain  Outcome: Progressing     Problem: INFECTION - ADULT  Goal: Absence or prevention of progression during hospitalization  Description: INTERVENTIONS:  - Assess and monitor for signs and symptoms of infection  - Monitor lab/diagnostic results  - Monitor all insertion sites, i e  indwelling lines, tubes, and drains  - Monitor endotracheal if appropriate and nasal secretions for changes in amount and color  - [place] appropriate cooling/warming therapies per order  - Administer medications as ordered  - Instruct and encourage patient and family to use good hand hygiene technique  - Identify and instruct in appropriate isolation precautions for identified infection/condition  Outcome: Progressing     Problem: SAFETY ADULT  Goal: Patient will remain free of falls  Description: INTERVENTIONS:  - Educate patient/family on patient safety including physical limitations  - Instruct patient to call for assistance with activity   - Consult OT/PT to assist with strengthening/mobility   - Keep Call bell within reach  - Keep bed low and locked with side rails adjusted as appropriate  - Keep care items and personal belongings within reach  - Initiate and maintain comfort rounds  - Make Fall Risk Sign visible to staff  - Offer Toileting every 2 Hours, in advance of need  - Initiate/Maintain bed/chair alarm  - Obtain necessary fall risk management equipment:   - Apply yellow socks and bracelet for high fall risk patients  - Consider moving patient to room near nurses station  Outcome: Progressing  Goal: Maintain or return to baseline ADL function  Description: INTERVENTIONS:  -  Assess patient's ability to carry out ADLs; assess patient's baseline for ADL function and identify physical deficits which impact ability to perform ADLs (bathing, care of mouth/teeth, toileting, grooming, dressing, etc )  - Assess/evaluate cause of self-care deficits   - Assess range of motion  - Assess patient's mobility; develop plan if impaired  - Assess patient's need for assistive devices and provide as appropriate  - Encourage maximum independence but intervene and supervise when necessary  - Involve family in performance of ADLs  - Assess for home care needs following discharge   - Consider OT consult to assist with ADL evaluation and planning for discharge  - Provide patient education as appropriate  Outcome: Progressing  Goal: Maintains/Returns to pre admission functional level  Description: INTERVENTIONS:  - Perform BMAT or MOVE assessment daily    - Set and communicate daily mobility goal to care team and patient/family/caregiver  - Collaborate with rehabilitation services on mobility goals if consulted  - Perform Range of Motion 3 times a day  - Reposition patient every 2 hours    - Dangle patient 3 times a day  - Stand patient 3 times a day  - Ambulate patient 3 times a day  - Out of bed to chair 3 times a day   - Out of bed for meals 3 times a day  - Out of bed for toileting  - Record patient progress and toleration of activity level   Outcome: Progressing     Problem: DISCHARGE PLANNING  Goal: Discharge to home or other facility with appropriate resources  Description: INTERVENTIONS:  - Identify barriers to discharge w/patient and caregiver  - Arrange for needed discharge resources and transportation as appropriate  - Identify discharge learning needs (meds, wound care, etc )  - Arrange for interpretive services to assist at discharge as needed  - Refer to Case Management Department for coordinating discharge planning if the patient needs post-hospital services based on physician/advanced practitioner order or complex needs related to functional status, cognitive ability, or social support system  Outcome: Progressing     Problem: Knowledge Deficit  Goal: Patient/family/caregiver demonstrates understanding of disease process, treatment plan, medications, and discharge instructions  Description: Complete learning assessment and assess knowledge base  Interventions:  - Provide teaching at level of understanding  - Provide teaching via preferred learning methods  Outcome: Progressing     Problem: Nutrition/Hydration-ADULT  Goal: Nutrient/Hydration intake appropriate for improving, restoring or maintaining nutritional needs  Description: Monitor and assess patient's nutrition/hydration status for malnutrition  Collaborate with interdisciplinary team and initiate plan and interventions as ordered  Monitor patient's weight and dietary intake as ordered or per policy  Utilize nutrition screening tool and intervene as necessary  Determine patient's food preferences and provide high-protein, high-caloric foods as appropriate       INTERVENTIONS:  - Monitor oral intake, urinary output, labs, and treatment plans  - Assess nutrition and hydration status and recommend course of action  - Evaluate amount of meals eaten  - Assist patient with eating if necessary   - Allow adequate time for meals  - Recommend/ encourage appropriate diets, oral nutritional supplements, and vitamin/mineral supplements  - Order, calculate, and assess calorie counts as needed  - Recommend, monitor, and adjust tube feedings and TPN/PPN based on assessed needs  - Assess need for intravenous fluids  - Provide specific nutrition/hydration education as appropriate  - Include patient/family/caregiver in decisions related to nutrition  Outcome: Progressing     Problem: Potential for Falls  Goal: Patient will remain free of falls  Description: INTERVENTIONS:  - Educate patient/family on patient safety including physical limitations  - Instruct patient to call for assistance with activity   - Consult OT/PT to assist with strengthening/mobility   - Keep Call bell within reach  - Keep bed low and locked with side rails adjusted as appropriate  - Keep care items and personal belongings within reach  - Initiate and maintain comfort rounds  - Make Fall Risk Sign visible to staff  - Offer Toileting every 2 Hours, in advance of need  - Initiate/Maintain bed/chair alarm  - Obtain necessary fall risk management equipment:   - Apply yellow socks and bracelet for high fall risk patients  - Consider moving patient to room near nurses station  Outcome: Progressing

## 2021-09-13 NOTE — CONSULTS
Consult Cardiology    Daniels  1955, 78 y o  male MRN: [id number]    Unit/Bed#: -01 Encounter: [id number]    Attending Provider: Maria M Daigle MD   Primary Care Provider: Sonya Segal MD   Date admitted to hospital: 9/11/2021       Inpatient consult to Cardiology  Consult performed by: YUE Connor  Consult ordered by: Maria M Daigle MD          * Acute systolic (congestive) heart failure [facility])  Assessment & Plan  Wt Readings from Last 3 Encounters:   09/13/21 58 7 kg (129 lb 6 4 oz)     Pt presented with acute onset of shortness of breath and leg swelling  NT proBNP at admission was 23,164 with evidence of cardiopulmonary congestion on chest xray  He has responded well to diuresis with IV Furosemide 40mg initially  He reports good urine output today with oral Furosemide 40mg today  Echo today with final result pending, however preliminary review indicates < 35%  Ordered Telemetry  Continue Carvedilol 6 25mg BID  Continue Furosemide 40mg daily  Consider ACE/ARB addition as renal status allows  I did discuss risk for sudden cardiac death with pt with EF < 35%  Consider transfer to [facility] for cardiac cath and heart failure team consult vs discharge to home with [facility] and outpatient cardiac cath/heart failure referral asap  Recommend monitoring overnight and reassess pt status tomorrow        Tobacco abuse  Assessment & Plan  Reviewed the significant health risks associated with tobacco use and smoking  Strongly encouraged cessation  He is on nicotine replacement patches during hospitalization      Elevated troponin  Assessment & Plan  Troponin level 0 04->0 06-> 0 07-> 0 03  This is likely in the setting of demand with hypertensive urgency  Telemetry ordered  Continue Carvedilol 6 25mg BID  No complaints of chest pain      Stage 3a chronic kidney disease [facility])  Assessment & Plan  Lab Results   Component Value Date    EGFR 47 09/13/2021    EGFR 54 09/12/2021    EGFR 59 2021    CREATININE 1 51 (H) 2021    CREATININE 1 34 (H) 2021    CREATININE 1 26 2021     Creatinine 1 51 today (1 26 at admission)  Continue Furosemide 40mg daily but will monitor labs tomorrow      Hypertensive emergency  Assessment & Plan  /121 at admission  Evidence of LVH on ECG  Pt has not sought health care in at least 20 years  Currently on Carvedilol 6 25mg BID with improvement in BP  Avoiding ACE/ARB secondary to renal status, but can consider addition if renal status improves  Continue Furosemide 40mg daily  Avoiding Amlodipine secondary to leg swelling but can consider if needed for BP support            Physician Requesting Consult: Gayathri Elliott MD    Reason for Consult / Principal Problem: Acute heart failure          HPI: Rafael Allen is a 78y o  year old male who has a history of tobacco use  He has not sought medical care in the past 20 years  Patient presented to [facility] ER on 2021 with c/o acute shortness of breath  He states that on Friday night 9/10/2021 he was out to dinner with friends, and he noticed that he was short of breath  He states he felt "fluid in his lungs" with sputum production  His friend told him that he did not look great and thought he may be coming down with Covid 19 infection  He advised him to seek medical attention, which prompted him to seek medical care  Upon admission he was found to have evidence of cardiopulmonary congestion on chest xray  NT proBNP was 23,164  He was started on Furosemide 40mg IV and was transitioned to oral Furosemide 40mg daily today  He reports increased urine output, improved breathing, and improved leg swelling  His blood pressure was quite elevated at 209/121  Echocardiogram performed today with final read pending at the time of this consultation  He denies any cardiac history  He reports that his mother and brother both  from heart failure  He reports about 30 pack year smoking history   He states he drinks about 6 mixed alcoholic drinks per week  He denies h/o illicit substance use  He denies history of chest pain or shortness of breath  Denies lightheadedness other than the night before he came to the hospital  He states he did not notice leg swelling until he came to the hospital  He denies any ongoing shortness of breath  He retired from his job as an  in May of this year  He states he tolerated activity well while working but has been fairly sedentary since retiring  Review of Systems   Constitutional: Negative for chills and fever  HENT: Negative for ear pain and sore throat  Eyes: Negative for pain and visual disturbance  Respiratory: Positive for cough and shortness of breath  Cardiovascular: Positive for leg swelling  Negative for chest pain and palpitations  Gastrointestinal: Negative for abdominal pain, nausea and vomiting  Belching, indigestion   Genitourinary: Negative for dysuria and hematuria  Musculoskeletal: Negative for arthralgias and back pain  Skin: Negative for color change and rash  Neurological: Negative for seizures and syncope  All other systems reviewed and are negative  Historical Information     History reviewed  No pertinent past medical history    Past Surgical History:   Procedure Laterality Date    APPENDECTOMY      age 5    INGUINAL HERNIA REPAIR Right 1991     Social History     Substance and Sexual Activity   Alcohol Use Yes    Alcohol/week: 6 0 standard drinks    Types: 6 Shots of liquor per week     Social History     Substance and Sexual Activity   Drug Use Never     Social History     Tobacco Use   Smoking Status Former Smoker    Years: 30 00    Types: Cigarettes   Smokeless Tobacco Never Used   Tobacco Comment    quit two weeks ago       Family History:   Family History   Problem Relation Age of Onset    Heart failure Mother     Heart failure Brother        Meds/Allergies     current meds:   Current Facility-Administered Medications   Medication Dose Route Frequency    acetaminophen (TYLENOL) tablet 650 mg  650 mg Oral Q4H PRN    aluminum-magnesium hydroxide-simethicone (MYLANTA) oral suspension 30 mL  30 mL Oral Q4H PRN    carvedilol (COREG) tablet 6 25 mg  6 25 mg Oral BID With Meals    enoxaparin (LOVENOX) subcutaneous injection 40 mg  40 mg Subcutaneous Daily    furosemide (LASIX) tablet 40 mg  40 mg Oral Daily    nicotine (NICODERM CQ) 14 mg/24hr TD 24 hr patch 14 mg  14 mg Transdermal Daily    sodium chloride (PF) 0 9 % injection 3 mL  3 mL Intravenous Q1H PRN          No Known Allergies    Objective     Vitals: Blood pressure 138/93, pulse 83, temperature 97 6 °F (36 4 °C), temperature source Oral, resp  rate 18, height 5' 6" (1 676 m), weight 58 7 kg (129 lb 6 4 oz), SpO2 99 %  , Body mass index is 20 89 kg/m²  Orthostatic Blood Pressures      Most Recent Value   Blood Pressure  138/93 filed at 09/13/2021 1533   Patient Position - Orthostatic VS  Sitting filed at 09/11/2021 8333          Systolic (63GDF), EYC:908 , Min:138 , YBL:552     Diastolic (26NCT), PRH:62, Min:85, Max:110        Intake/Output Summary (Last 24 hours) at 9/13/2021 1722  Last data filed at 9/13/2021 1408  Gross per 24 hour   Intake 600 ml   Output --   Net 600 ml       Weight (last 2 days)     Date/Time   Weight    09/13/21 0555   58 7 (129 4)    09/12/21 0600   58 2 (128 2)    09/11/21 1930   58 1 (128)    09/11/21 1557   63 5 (140)              Invasive Devices     Peripheral Intravenous Line            Peripheral IV 09/11/21 Left Arm 2 days                Physical Exam  Vitals and nursing note reviewed  Constitutional:       Appearance: He is well-developed  HENT:      Head: Normocephalic and atraumatic  Eyes:      Conjunctiva/sclera: Conjunctivae normal    Neck:      Vascular: No carotid bruit or JVD  Cardiovascular:      Rate and Rhythm: Normal rate and regular rhythm  No extrasystoles are present       Heart sounds: Normal heart sounds, S1 normal and S2 normal  No murmur heard  Comments: Moderate bilateral ankle edema  Pulmonary:      Effort: Pulmonary effort is normal  No respiratory distress  Breath sounds: Examination of the right-lower field reveals decreased breath sounds and wheezing  Examination of the left-lower field reveals decreased breath sounds and wheezing  Decreased breath sounds and wheezing present  No rhonchi or rales  Abdominal:      Palpations: Abdomen is soft  Tenderness: There is no abdominal tenderness  Musculoskeletal:      Cervical back: Neck supple  Skin:     General: Skin is warm and dry  Neurological:      Mental Status: He is alert  Psychiatric:         Attention and Perception: Attention normal          Mood and Affect: Mood and affect normal          Speech: Speech normal          Behavior: Behavior normal  Behavior is cooperative           Cognition and Memory: Cognition and memory normal               Laboratory Results:    Results from last 7 days   Lab Units 09/12/21 0223 09/11/21 2231 09/11/21 2005 09/11/21  1609   CK TOTAL U/L  --   --   --  64   TROPONIN I ng/mL 0 03 0 07* 0 06* 0 04       CBC with diff:   Results from last 7 days   Lab Units 09/11/21  1609   WBC Thousand/uL 8 93   HEMOGLOBIN g/dL 14 8   HEMATOCRIT % 44 1   MCV fL 86   PLATELETS Thousands/uL 157   MCH pg 28 9   MCHC g/dL 33 6   RDW % 13 4   MPV fL 11 8   NRBC AUTO /100 WBCs 0         CMP:  Results from last 7 days   Lab Units 09/13/21 0556 09/12/21 0549 09/11/21  1609   POTASSIUM mmol/L 4 4 3 8 3 5   CHLORIDE mmol/L 101 102 100   CO2 mmol/L 30 25 25   BUN mg/dL 31* 19 15   CREATININE mg/dL 1 51* 1 34* 1 26   CALCIUM mg/dL 8 7 8 5 9 1   AST U/L  --  24 31   ALT U/L  --  23 23   ALK PHOS U/L  --  97 127*   EGFR ml/min/1 73sq m 47 54 59       BMP:  Results from last 7 days   Lab Units 09/13/21 0556 09/12/21 0549 09/11/21  1609   POTASSIUM mmol/L 4 4 3 8 3 5   CHLORIDE mmol/L 101 102 100 CO2 mmol/L 30 25 25   BUN mg/dL 31* 19 15   CREATININE mg/dL 1 51* 1 34* 1 26   CALCIUM mg/dL 8 7 8 5 9 1       BNP:  23, 164 on 9/11/2021    Magnesium:   Results from last 7 days   Lab Units 09/12/21  0549 09/11/21  1609   MAGNESIUM mg/dL 1 9 1 9       Coags:   Results from last 7 days   Lab Units 09/11/21  1609   INR  1 18       TSH:   0 581    Lipid Profile:   Lab Results   Component Value Date    CHOLESTEROL 115 09/12/2021     Lab Results   Component Value Date    HDL 35 (L) 09/12/2021     Lab Results   Component Value Date    TRIG 34 09/12/2021     Lab Results   Component Value Date    [place] 80 09/12/2021        Cardiac testing:     Echocardiogram results pending at the time of my examination    Imaging: I have personally reviewed pertinent reports  X-ray chest 1 view portable    Result Date: 9/12/2021  Narrative: CHEST INDICATION:   chest pain  COMPARISON:  None EXAM PERFORMED/VIEWS:  XR CHEST PORTABLE FINDINGS: Cardiomediastinal silhouette appears unremarkable  Mild central vascular congestion  No pneumothorax  Small to moderate bilateral pleural effusions  Osseous structures appear within normal limits for patient age  Impression: Mild central pulmonary vascular congestion  Small to moderate bilateral pleural effusions  Workstation performed: [id number]     CTA ED chest PE Study    Result Date: 9/11/2021  Narrative: CTA - CHEST WITH IV CONTRAST - PULMONARY ANGIOGRAM INDICATION:   Shortness of breath, PE suspected, intermediate prob, positive D-dimer  COMPARISON: Chest radiograph also on this date TECHNIQUE: CTA examination of the chest was performed using angiographic technique according to a protocol specifically tailored to evaluate for pulmonary embolism  Axial, sagittal, and coronal 2D reformatted images were created from the source data and  submitted for interpretation  In addition, coronal 3D MIP postprocessing was performed on the acquisition scanner    Radiation dose length product (DLP) for this visit:  212 mGy-cm  This examination, like all CT scans performed in the [facility], was performed utilizing techniques to minimize radiation dose exposure, including the use of iterative reconstruction and automated exposure control  IV Contrast:  85 mL of iohexol (OMNIPAQUE)  FINDINGS: PULMONARY ARTERIAL TREE:  No pulmonary embolus is seen  LUNGS:  Mild emphysema  Degradation from respiratory motion evident  There is mild compressive atelectasis right middle and bilateral lower lobes  Difficult to assess the pulmonary vasculature given respiratory motion although element of mild congestion is suspected  PLEURA:  Moderate right and small left pleural effusions  Mild biapical fibrocalcific scarring  HEART/GREAT VESSELS:  The heart is top normal size to borderline enlarged  MEDIASTINUM AND REINALDO:  Unremarkable  CHEST WALL AND LOWER NECK:   Unremarkable  VISUALIZED STRUCTURES IN THE UPPER ABDOMEN:  Unremarkable  OSSEOUS STRUCTURES:  No acute fracture or destructive osseous lesion  Degenerative changes of the spine  Impression: Study limited by respiratory motion  No evidence of central pulmonary embolus within limitations  Moderate right and small left pleural effusions with bilateral compressive atelectasis and suspected mild central vascular congestion  Workstation performed: [id number]     VAS lower limb venous duplex study, complete bilateral    Result Date: 9/12/2021  Narrative:  [facility] REPORT CLINICAL: Indications: Patient presents with right lower extremity edema  Operative History: No cardiovascular surgery   CONCLUSION:  Impression: RIGHT LOWER LIMB: No evidence of acute or chronic deep vein thrombosis  No evidence of superficial thrombophlebitis noted  Doppler evaluation shows a normal response to augmentation maneuvers  Popliteal, posterior tibial and anterior tibial arterial Doppler waveforms are monophasic    LEFT LOWER LIMB: No evidence of acute or chronic deep vein thrombosis  No evidence of superficial thrombophlebitis noted  Doppler evaluation shows a normal response to augmentation maneuvers  Popliteal, posterior tibial and anterior tibial arterial Doppler waveforms are monophasic  Technical findings were given to Dr Lyndon Garcia  SIGNATURE: Electronically Signed by: Tremayne Pappas on 2021 10:24:57 AM      EKG reviewed personally: [id number]: Sinus tachycardia, rate 114, possible left atrial enlargement, left ventricular hypertrophy  Counseling / Coordination of Care  Total floor / unit time spent today 45 minutes  Greater than 50% of total time was spent with the patient and / or family counseling and / or coordination of care  A description of the counseling / coordination of care: Discussed with Dr Viktor Olivas          Code Status: Level 1 - Full Code

## 2021-09-14 ENCOUNTER — HOSPITAL ENCOUNTER (INPATIENT)
Facility: HOSPITAL | Age: 66
LOS: 4 days | Discharge: HOME HEALTH | DRG: 286 | End: 2021-09-18
Attending: HOSPITALIST | Admitting: INTERNAL MEDICINE
Payer: MEDICARE

## 2021-09-14 ENCOUNTER — APPOINTMENT (INPATIENT)
Dept: RADIOLOGY | Facility: HOSPITAL | Age: 66
DRG: 291 | End: 2021-09-14
Payer: MEDICARE

## 2021-09-14 VITALS
HEIGHT: 66 IN | DIASTOLIC BLOOD PRESSURE: 103 MMHG | OXYGEN SATURATION: 98 % | WEIGHT: 129.85 LBS | TEMPERATURE: 97.6 F | SYSTOLIC BLOOD PRESSURE: 166 MMHG | HEART RATE: 81 BPM | RESPIRATION RATE: 17 BRPM | BODY MASS INDEX: 20.87 KG/M2

## 2021-09-14 DIAGNOSIS — I50.21 ACUTE SYSTOLIC (CONGESTIVE) HEART FAILURE (HCC): Primary | ICD-10-CM

## 2021-09-14 DIAGNOSIS — I16.1 HYPERTENSIVE EMERGENCY: ICD-10-CM

## 2021-09-14 DIAGNOSIS — I50.9 NEW ONSET OF CONGESTIVE HEART FAILURE (HCC): ICD-10-CM

## 2021-09-14 PROBLEM — E46 PROTEIN CALORIE MALNUTRITION (HCC): Status: ACTIVE | Noted: 2021-09-14

## 2021-09-14 LAB
ANION GAP SERPL CALCULATED.3IONS-SCNC: 8 MMOL/L (ref 4–13)
BUN SERPL-MCNC: 31 MG/DL (ref 5–25)
CALCIUM SERPL-MCNC: 8.8 MG/DL (ref 8.3–10.1)
CHLORIDE SERPL-SCNC: 100 MMOL/L (ref 100–108)
CO2 SERPL-SCNC: 28 MMOL/L (ref 21–32)
CREAT SERPL-MCNC: 1.4 MG/DL (ref 0.6–1.3)
GFR SERPL CREATININE-BSD FRML MDRD: 52 ML/MIN/1.73SQ M
GLUCOSE SERPL-MCNC: 104 MG/DL (ref 65–140)
POTASSIUM SERPL-SCNC: 4.2 MMOL/L (ref 3.5–5.3)
SODIUM SERPL-SCNC: 136 MMOL/L (ref 136–145)

## 2021-09-14 PROCEDURE — 99223 1ST HOSP IP/OBS HIGH 75: CPT | Performed by: INTERNAL MEDICINE

## 2021-09-14 PROCEDURE — 99239 HOSP IP/OBS DSCHRG MGMT >30: CPT | Performed by: STUDENT IN AN ORGANIZED HEALTH CARE EDUCATION/TRAINING PROGRAM

## 2021-09-14 PROCEDURE — 71046 X-RAY EXAM CHEST 2 VIEWS: CPT

## 2021-09-14 PROCEDURE — 80048 BASIC METABOLIC PNL TOTAL CA: CPT | Performed by: FAMILY MEDICINE

## 2021-09-14 PROCEDURE — 1124F ACP DISCUSS-NO DSCNMKR DOCD: CPT | Performed by: INTERNAL MEDICINE

## 2021-09-14 RX ORDER — NICOTINE 21 MG/24HR
14 PATCH, TRANSDERMAL 24 HOURS TRANSDERMAL DAILY
Status: CANCELLED | OUTPATIENT
Start: 2021-09-15

## 2021-09-14 RX ORDER — ACETAMINOPHEN 325 MG/1
650 TABLET ORAL EVERY 4 HOURS PRN
Status: CANCELLED | OUTPATIENT
Start: 2021-09-14

## 2021-09-14 RX ORDER — CARVEDILOL 6.25 MG/1
6.25 TABLET ORAL 2 TIMES DAILY WITH MEALS
Status: CANCELLED | OUTPATIENT
Start: 2021-09-14

## 2021-09-14 RX ORDER — SODIUM CHLORIDE 9 MG/ML
3 INJECTION INTRAVENOUS
Status: DISCONTINUED | OUTPATIENT
Start: 2021-09-14 | End: 2021-09-18 | Stop reason: HOSPADM

## 2021-09-14 RX ORDER — CARVEDILOL 6.25 MG/1
6.25 TABLET ORAL 2 TIMES DAILY WITH MEALS
Status: DISCONTINUED | OUTPATIENT
Start: 2021-09-15 | End: 2021-09-17

## 2021-09-14 RX ORDER — SODIUM CHLORIDE 9 MG/ML
3 INJECTION INTRAVENOUS
Status: CANCELLED | OUTPATIENT
Start: 2021-09-14

## 2021-09-14 RX ORDER — MAGNESIUM HYDROXIDE/ALUMINUM HYDROXICE/SIMETHICONE 120; 1200; 1200 MG/30ML; MG/30ML; MG/30ML
30 SUSPENSION ORAL EVERY 4 HOURS PRN
Status: DISCONTINUED | OUTPATIENT
Start: 2021-09-14 | End: 2021-09-18 | Stop reason: HOSPADM

## 2021-09-14 RX ORDER — NICOTINE 21 MG/24HR
14 PATCH, TRANSDERMAL 24 HOURS TRANSDERMAL DAILY
Status: DISCONTINUED | OUTPATIENT
Start: 2021-09-15 | End: 2021-09-18 | Stop reason: HOSPADM

## 2021-09-14 RX ORDER — MAGNESIUM HYDROXIDE/ALUMINUM HYDROXICE/SIMETHICONE 120; 1200; 1200 MG/30ML; MG/30ML; MG/30ML
30 SUSPENSION ORAL EVERY 4 HOURS PRN
Status: CANCELLED | OUTPATIENT
Start: 2021-09-14

## 2021-09-14 RX ORDER — ACETAMINOPHEN 325 MG/1
650 TABLET ORAL EVERY 4 HOURS PRN
Status: DISCONTINUED | OUTPATIENT
Start: 2021-09-14 | End: 2021-09-18 | Stop reason: HOSPADM

## 2021-09-14 RX ORDER — FUROSEMIDE 40 MG/1
40 TABLET ORAL DAILY
Status: CANCELLED | OUTPATIENT
Start: 2021-09-15

## 2021-09-14 RX ORDER — FUROSEMIDE 40 MG/1
40 TABLET ORAL DAILY
Status: DISCONTINUED | OUTPATIENT
Start: 2021-09-15 | End: 2021-09-16

## 2021-09-14 RX ADMIN — ENOXAPARIN SODIUM 40 MG: 40 INJECTION SUBCUTANEOUS at 08:53

## 2021-09-14 RX ADMIN — CARVEDILOL 6.25 MG: 6.25 TABLET, FILM COATED ORAL at 08:53

## 2021-09-14 RX ADMIN — FUROSEMIDE 40 MG: 40 TABLET ORAL at 08:53

## 2021-09-14 RX ADMIN — CARVEDILOL 6.25 MG: 6.25 TABLET, FILM COATED ORAL at 16:16

## 2021-09-14 RX ADMIN — NICOTINE 14 MG: 14 PATCH, EXTENDED RELEASE TRANSDERMAL at 08:54

## 2021-09-14 NOTE — TRANSPORTATION MEDICAL NECESSITY
Section I - General Information    Name of Patient: Giovanna Epley                 : 1955    Medicare #: [id number]  Transport Date: 21 (PCS is valid for round trips on this date and for all repetitive trips in the 60-day range as noted below )  Origin: [address]: [facility]   Is the pt's stay covered under Medicare Part A (PPS/DRG)   [x]     Closest appropriate facility? If no, why is transport to more distant facility required? Yes  If hospice pt, is this transport related to pt's terminal illness? NA       Section II - Medical Necessity Questionnaire  Ambulance transportation is medically necessary only if other means of transport are contraindicated or would be potentially harmful to the patient  To meet this requirement, the patient must either be "bed confined" or suffer from a condition such that transport by means other than ambulance is contraindicated by the patient's condition  The following questions must be answered by the medical professional signing below for this form to be valid:    1)  Describe the MEDICAL CONDITION (physical and/or mental) of this patient AT [address] [place] [id number] that requires the patient to be transported in an ambulance and why transport by other means is contraindicated by the patient's condition: Pt needing higher level of care, for cardiac cath    2) Is the patient "bed confined" as defined below? No  To be "be confined" the patient must satisfy all three of the following conditions: (1) unable to get up from bed without Assistance; AND (2) unable to ambulate; AND (3) unable to sit in a chair or wheelchair  3) Can this patient safely be transported by car or wheelchair van (i e , seated during transport without a medical attendant or monitoring)?    No    4) In addition to completing questions 1-3 above, please check any of the following conditions that apply*:   *Note: supporting documentation for any boxes checked must be maintained in the patient's medical records  If hosp-hosp transfer, describe services needed at 2nd facility not available at 1st facility? Medical attendant required   Cardiac monitoring required en route       Section III - Signature of Physician or Healthcare Professional  I certify that the above information is true and correct based on my evaluation of this patient, and represent that the patient requires transport by ambulance and that other forms of transport are contraindicated  I understand that this information will be used by the Centers for Medicare and Medicaid Services (CMS) to support the determination of medical necessity for ambulance services, and I represent that I have personal knowledge of the patient's condition at time of transport  []  If this box is checked, I also certify that the patient is physically or mentally incapable of signing the ambulance service's claim and that the institution with which I am affiliated has furnished care, services, or assistance to the patient  My signature below is made on behalf of the patient pursuant to 42 CFR §424 36(b)(4)  In accordance with 42 CFR §424 37, the specific reason(s) that the patient is physically or mentally incapable of signing the claim form is as follows: Mamie Zaidi of Physician* or Healthcare Professional______________________________________________________________  Signature Date 09/14/21 (For scheduled repetitive transports, this form is not valid for transports performed more than 60 days after this date)    Printed Name & Credentials of Physician or Healthcare Professional (MD, DO, RN, etc )________________________________  *Form must be signed by patient's attending physician for scheduled, repetitive transports   For non-repetitive, unscheduled ambulance transports, if unable to obtain the signature of the attending physician, any of the following may sign (choose appropriate option below)  [] Physician Assistant []  Clinical Nurse Specialist []  Registered Nurse  []  Nurse Practitioner  [x] Discharge Planner

## 2021-09-14 NOTE — DISCHARGE SUMMARY
[address]  Discharge- [facility] 1955, 78 y o  male MRN: [id number]  Unit/Bed#: -01 Encounter: [id number]  Primary Care Provider: Daniel Henry MD   Date and time admitted to hospital: 9/11/2021  3:56 PM    Mucopurulent chronic bronchitis ([address] )  Assessment & Plan  Probably has undiagnosed COPD baseline  Heavy tobacco use  Will place on p r n  Albuterol as needed and counseled on smoking cessation    Tobacco abuse  Assessment & Plan   on smoking cessation placed on nicotine replacement therapy    Elevated troponin  Assessment & Plan  Secondary to demand ischemia due to acute CHF exacerbation and hypertensive urgency  Continue medical management    Stage 3a chronic kidney disease [facility])  Assessment & Plan  Lab Results   Component Value Date    EGFR 52 09/14/2021    EGFR 47 09/13/2021    EGFR 54 09/12/2021    CREATININE 1 40 (H) 09/14/2021    CREATININE 1 51 (H) 09/13/2021    CREATININE 1 34 (H) 09/12/2021   Patient has CKD stage 3 with creatinine around 1 2  Continue to observe for now during diuresis  Currently however at 1 5  Continue Lasix  Monitor renal function for now    Hypertensive emergency  Assessment & Plan  · SBP greater than 180 with the acute heart failure  · Placed on Coreg 6 25 mg p o  B i d  Will hold off on Ace or Arb secondary to having IV contrast on admission  · Blood pressure appears to be improving    * Acute systolic (congestive) heart failure (HCC)  Assessment & Plan  Wt Readings from Last 3 Encounters:   09/14/21 58 9 kg (129 lb 13 6 oz)     · Patient has not seen a doctor has no medical problems or takes medications greater than 10 years  · Presented with shortness of breath proBNP of over 20,000  CTA chest negative for pulmonary embolism but shows bilateral pleural effusions  Unknown systolic or diastolic  · Was given Lasix 40 mg IV in the ER proceed with Lasix 40 mg IV b i d  creatinine went up to 1 3    Also received IV contrast yesterday along with diuretics  Diuresing well with p o  Lasix however patient developed worsening shortness of breath overnight  Discussed with Dr Deeanna Riedel who had recommended that patient be transferred for catheterization evaluation  Dr Can Enrique from [facility] AND [facility] aware  Patient accepted at [facility] under SLIM service pending bed  · Daily weights I's and O's  · 1500 ml fluid restriction  · CTA is negative for acute PE  · T bili is elevated secondary to suspect congestive hepatopathy   · 2D echo shows ef of 25-30 %  consult placed to cardio might need cardiac cath once renal function stabilizes  · Telemetry  · Normal TSH and lipid panel except for low HDL  · EKG nonischemic just sinus tachycardia  · Venous duplex is negative for acute DVT            Discharging Physician / Practitioner: Brooke Aldridge MD  PCP: Zakia Mari MD  Admission Date:   Admission Orders (From admission, onward)     Ordered        09/11/21 1842  Inpatient Admission  Once                   Discharge Date: 09/14/21    Medical Problems     Resolved Problems  Date Reviewed: 9/13/2021    None                Consultations During Hospital Stay:  · Cardiology    Procedures Performed:   · Imaging    Significant Findings / Test Results:   XR chest pa & lateral   Final Result by Ant Zavala MD (09/14 1129)      Persistent small pleural effusions and bibasilar atelectasis  Workstation performed: [id number]         CTA ED chest PE Study   Final Result by Anil Beckett DO (09/11 1814)      Study limited by respiratory motion  No evidence of central pulmonary embolus within limitations  Moderate right and small left pleural effusions with bilateral compressive atelectasis and suspected mild central vascular congestion              Workstation performed: [id number]         VAS lower limb venous duplex study, complete bilateral   Final Result by Kristyn Xiao DO (09/12 1024)      X-ray chest 1 view portable   ED Interpretation by Hadley Mueller MD (81/58 1041)   Chest x-ray has moderate vascular congestion and right pleural effusion; no pneumothorax, mediastinal widening or focal consolidation  Final Result by Christy Kohli MD (09/12 1521)      Mild central pulmonary vascular congestion  Small to moderate bilateral pleural effusions  Workstation performed: [id number]         ·   · 2D echo  "LEFT VENTRICLE:  Systolic function was severely reduced  Ejection fraction was estimated to be 25 %  There was severe diffuse hypokinesis  Wall thickness was increased  There was mild concentric hypertrophy      RIGHT VENTRICLE:  Systolic function was reduced  TAPSE 1 4 cm      LEFT ATRIUM:  The atrium was mildly dilated      MITRAL VALVE:  There was moderate regurgitation      AORTIC VALVE:  There was trace regurgitation      TRICUSPID VALVE:  There was mild regurgitation  Estimated peak PA pressure was 40 mmHg (assuming an estimated right atrial pressure of 10 mmHg given top normal IVC size and blunted IVC collapse)  The findings suggest mild pulmonary hypertension      PULMONIC VALVE:  There was mild regurgitation      IVC, HEPATIC VEINS:  The respirophasic change in diameter was less than 50%      PERICARDIUM:  There was a large left pleural effusion      COMPARISONS:  No prior study for comparison "    Incidental Findings:   · None    Test Results Pending at Discharge (will require follow up):   · none     Outpatient Tests Requested:  · none    Complications:  none    Reason for Admission: acute systolic congestive heart failure    Hospital Course:     Kristine Eden is a 78 y o  male patient who originally presented to the hospital on 9/11/2021 due to worsening shortness of breath  Had previously not seen a doctor in more than 20 years    Patient noted that shortness of breath was exertional   Was found to be fluid overloaded on admission and was started on IV diuresis  2D echo done during this admission showed ejection fraction of 25-30%  Cardiology was consulted on the case and recommendation for cardiac catheterization was dependent on whether not patient showed any signs of clinical improvement  Given worsening kidney function as well as no further improvement clinically, Cardiology recommended that patient be transferred to the [facility] for a cardiac catheterization and possible thoracentesis for his effusions  Dr Long Infante of Cardiology aware  Patient was accepted under the SLIM service pending bed  Please see above list of diagnoses and related plan for additional information  Condition at Discharge: stable     Discharge Day Visit / Exam:     Subjective:  Patient seen and examined at bedside  Endorses worsening shortness of breath at night and on ambulation  Endorses improvement in lower extremity edema  Vitals: Blood Pressure: (!) 166/103 (09/14/21 0655)  Pulse: 81 (09/14/21 0655)  Temperature: 97 6 °F (36 4 °C) (09/14/21 0655)  Temp Source: Oral (09/13/21 1533)  Respirations: 17 (09/14/21 0655)  Height: 5' 6" (167 6 cm) (09/11/21 1557)  Weight - Scale: 58 9 kg (129 lb 13 6 oz) (09/14/21 0600)  SpO2: 98 % (09/14/21 0900)    Exam:   Physical Exam  Vitals and nursing note reviewed  Constitutional:       Appearance: He is well-developed  HENT:      Head: Normocephalic and atraumatic  Eyes:      Conjunctiva/sclera: Conjunctivae normal    Cardiovascular:      Rate and Rhythm: Normal rate and regular rhythm  Heart sounds: No murmur heard  Pulmonary:      Effort: Pulmonary effort is normal  No respiratory distress  Breath sounds: Normal breath sounds  Comments: Decreased breath sounds bilateral bases  Abdominal:      Palpations: Abdomen is soft  Tenderness: There is no abdominal tenderness  Musculoskeletal:      Cervical back: Neck supple  Skin:     General: Skin is warm and dry     Neurological:      Mental Status: He is alert  Discussion with Family:  Discussed with the patient at bedside, left voicemail for his wife    Discharge instructions/Information to patient and family:   See after visit summary for information provided to patient and family  Provisions for Follow-Up Care:  See after visit summary for information related to follow-up care and any pertinent home health orders  Disposition:     [address] [facility]  [address] Transfer to [address] [place] [address] to [facility] SNF:   · Not Applicable to this Patient - Not Applicable to this Patient    Planned Readmission:  None     Discharge Statement:  I spent 40 minutes discharging the patient  This time was spent on the day of discharge  I had direct contact with the patient on the day of discharge  Greater than 50% of the total time was spent examining patient, answering all patient questions, arranging and discussing plan of care with patient as well as directly providing post-discharge instructions  Additional time then spent on discharge activities  Discharge Medications:  See after visit summary for reconciled discharge medications provided to patient and family        ** Please Note: This note has been constructed using a voice recognition system **

## 2021-09-14 NOTE — EMTALA/ACUTE CARE TRANSFER
[address] [facility] [address]  [address]  [facility] [address]  Dept: [phone]      ACUTE CARE TRANSFER CONSENT    NAME Indra Martel                                         1955                              MRN [id number]    I have been informed of my rights regarding examination, treatment, and transfer   by Dr Wanetta Spatz:      Risks:        Consent for Transfer:  I acknowledge that my medical condition has been evaluated and explained to me by the treating physician or other qualified medical person and/or my attending physician, who has recommended that I be transferred to the service of    at    The above potential benefits of such transfer, the potential risks associated with such transfer, and the probable risks of not being transferred have been explained to me, and I fully understand them  The doctor has explained that, in my case, the benefits of transfer outweigh the risks  I agree to be transferred  I authorize the performance of emergency medical procedures and treatments upon me in both transit and upon arrival at the receiving facility  Additionally, I authorize the release of any and all medical records to the receiving facility and request they be transported with me, if possible  I understand that the safest mode of transportation during a medical emergency is an ambulance and that the Hospital advocates the use of this mode of transport  Risks of traveling to the receiving facility by car, including absence of medical control, life sustaining equipment, such as oxygen, and medical personnel has been explained to me and I fully understand them  (CANDIDO CORRECT BOX BELOW)  [  ]  I consent to the stated transfer and to be transported by ambulance/helicopter  [  ]  I consent to the stated transfer, but refuse transportation by ambulance and accept full responsibility for my transportation by car    I understand the risks of non-ambulance transfers and I exonerate the Hospital and its staff from any deterioration in my condition that results from this refusal     X___________________________________________    DATE  21  TIME________  Signature of patient or legally responsible individual signing on patient behalf           RELATIONSHIP TO PATIENT_________________________          Provider Certification    NAME Minal Nix                                         1955                              MRN [id number]    A medical screening exam was performed on the above named patient  Based on the examination:    Condition Necessitating Transfer new diagnosis of heart failure    Patient Condition:  stable    Reason for Transfer:  evaluation for cardiac cath    Transfer Requirements: Facility     · Space available and qualified personnel available for treatment as acknowledged by    · Agreed to accept transfer and to provide appropriate medical treatment as acknowledged by          · Appropriate medical records of the examination and treatment of the patient are provided at the time of transfer   [address] [facility], [address] _MPT______  · Transfer will be performed by qualified personnel from    and appropriate transfer equipment as required, including the use of necessary and appropriate life support measures      Provider Certification: I have examined the patient and explained the following risks and benefits of being transferred/refusing transfer to the patient/family:         Based on these reasonable risks and benefits to the patient and/or the unborn child(belgica), and based upon the information available at the time of the patients examination, I certify that the medical benefits reasonably to be expected from the provision of appropriate medical treatments at another medical facility outweigh the increasing risks, if any, to the individuals medical condition, and in the case of labor to the unborn child, from effecting the transfer      X___Debra Galvez______________________ DATE 09/14/21        TIME_______      ORIGINAL - SEND TO MEDICAL RECORDS   COPY - SEND WITH PATIENT DURING TRANSFER

## 2021-09-14 NOTE — ASSESSMENT & PLAN NOTE
Lab Results   Component Value Date    EGFR 52 09/14/2021    EGFR 47 09/13/2021    EGFR 54 09/12/2021    CREATININE 1 40 (H) 09/14/2021    CREATININE 1 51 (H) 09/13/2021    CREATININE 1 34 (H) 09/12/2021   Patient has CKD stage 3 with creatinine around 1 2  Continue to observe for now during diuresis  Currently however at 1 5  Continue Lasix    Monitor renal function for now

## 2021-09-14 NOTE — ASSESSMENT & PLAN NOTE
Wt Readings from Last 3 Encounters:   09/14/21 58 9 kg (129 lb 13 6 oz)     · Patient has not seen a doctor has no medical problems or takes medications greater than 10 years  · Presented with shortness of breath proBNP of over 20,000  CTA chest negative for pulmonary embolism but shows bilateral pleural effusions  Unknown systolic or diastolic  · Was given Lasix 40 mg IV in the ER proceed with Lasix 40 mg IV b i d  creatinine went up to 1 3  Also received IV contrast yesterday along with diuretics  Diuresing well with p o  Lasix however patient developed worsening shortness of breath overnight  Discussed with Dr Marie Park who had recommended that patient be transferred for catheterization evaluation  Dr Sweetie Link from [facility] aware  Patient accepted at [facility] under SLIM service pending bed  · Daily weights I's and O's  · 1500 ml fluid restriction  · CTA is negative for acute PE  · T bili is elevated secondary to suspect congestive hepatopathy   · 2D echo shows ef of 25-30 %  consult placed to cardio might need cardiac cath once renal function stabilizes  · Telemetry  · Normal TSH and lipid panel except for low HDL  · EKG nonischemic just sinus tachycardia  · Venous duplex is negative for acute DVT

## 2021-09-14 NOTE — PLAN OF CARE
Problem: PAIN - ADULT  Goal: Verbalizes/displays adequate comfort level or baseline comfort level  Description: Interventions:  - Encourage patient to monitor pain and request assistance  - Assess pain using appropriate pain scale  - Administer analgesics based on type and severity of pain and evaluate response  - Implement non-pharmacological measures as appropriate and evaluate response  - Consider cultural and social influences on pain and pain management  - Notify physician/advanced practitioner if interventions unsuccessful or patient reports new pain  Outcome: Progressing     Problem: INFECTION - ADULT  Goal: Absence or prevention of progression during hospitalization  Description: INTERVENTIONS:  - Assess and monitor for signs and symptoms of infection  - Monitor lab/diagnostic results  - Monitor all insertion sites, i e  indwelling lines, tubes, and drains  - Monitor endotracheal if appropriate and nasal secretions for changes in amount and color  - [place] appropriate cooling/warming therapies per order  - Administer medications as ordered  - Instruct and encourage patient and family to use good hand hygiene technique  - Identify and instruct in appropriate isolation precautions for identified infection/condition  Outcome: Progressing     Problem: SAFETY ADULT  Goal: Patient will remain free of falls  Description: INTERVENTIONS:  - Educate patient/family on patient safety including physical limitations  - Instruct patient to call for assistance with activity   - Consult OT/PT to assist with strengthening/mobility   - Keep Call bell within reach  - Keep bed low and locked with side rails adjusted as appropriate  - Keep care items and personal belongings within reach  - Initiate and maintain comfort rounds  - Make Fall Risk Sign visible to staff  - Offer Toileting every 2 Hours, in advance of need  - Initiate/Maintain bed/chair alarm  - Obtain necessary fall risk management equipment:   - Apply yellow socks and bracelet for high fall risk patients  - Consider moving patient to room near nurses station  Outcome: Progressing  Goal: Maintain or return to baseline ADL function  Description: INTERVENTIONS:  -  Assess patient's ability to carry out ADLs; assess patient's baseline for ADL function and identify physical deficits which impact ability to perform ADLs (bathing, care of mouth/teeth, toileting, grooming, dressing, etc )  - Assess/evaluate cause of self-care deficits   - Assess range of motion  - Assess patient's mobility; develop plan if impaired  - Assess patient's need for assistive devices and provide as appropriate  - Encourage maximum independence but intervene and supervise when necessary  - Involve family in performance of ADLs  - Assess for home care needs following discharge   - Consider OT consult to assist with ADL evaluation and planning for discharge  - Provide patient education as appropriate  Outcome: Progressing  Goal: Maintains/Returns to pre admission functional level  Description: INTERVENTIONS:  - Perform BMAT or MOVE assessment daily    - Set and communicate daily mobility goal to care team and patient/family/caregiver  - Collaborate with rehabilitation services on mobility goals if consulted  - Perform Range of Motion 3 times a day  - Reposition patient every 2 hours    - Dangle patient 3 times a day  - Stand patient 3 times a day  - Ambulate patient 3 times a day  - Out of bed to chair 3 times a day   - Out of bed for meals 3 times a day  - Out of bed for toileting  - Record patient progress and toleration of activity level   Outcome: Progressing     Problem: DISCHARGE PLANNING  Goal: Discharge to home or other facility with appropriate resources  Description: INTERVENTIONS:  - Identify barriers to discharge w/patient and caregiver  - Arrange for needed discharge resources and transportation as appropriate  - Identify discharge learning needs (meds, wound care, etc )  - Arrange for interpretive services to assist at discharge as needed  - Refer to Case Management Department for coordinating discharge planning if the patient needs post-hospital services based on physician/advanced practitioner order or complex needs related to functional status, cognitive ability, or social support system  Outcome: Progressing     Problem: Knowledge Deficit  Goal: Patient/family/caregiver demonstrates understanding of disease process, treatment plan, medications, and discharge instructions  Description: Complete learning assessment and assess knowledge base  Interventions:  - Provide teaching at level of understanding  - Provide teaching via preferred learning methods  Outcome: Progressing     Problem: Nutrition/Hydration-ADULT  Goal: Nutrient/Hydration intake appropriate for improving, restoring or maintaining nutritional needs  Description: Monitor and assess patient's nutrition/hydration status for malnutrition  Collaborate with interdisciplinary team and initiate plan and interventions as ordered  Monitor patient's weight and dietary intake as ordered or per policy  Utilize nutrition screening tool and intervene as necessary  Determine patient's food preferences and provide high-protein, high-caloric foods as appropriate       INTERVENTIONS:  - Monitor oral intake, urinary output, labs, and treatment plans  - Assess nutrition and hydration status and recommend course of action  - Evaluate amount of meals eaten  - Assist patient with eating if necessary   - Allow adequate time for meals  - Recommend/ encourage appropriate diets, oral nutritional supplements, and vitamin/mineral supplements  - Order, calculate, and assess calorie counts as needed  - Recommend, monitor, and adjust tube feedings and TPN/PPN based on assessed needs  - Assess need for intravenous fluids  - Provide specific nutrition/hydration education as appropriate  - Include patient/family/caregiver in decisions related to nutrition  Outcome: Progressing     Problem: Potential for Falls  Goal: Patient will remain free of falls  Description: INTERVENTIONS:  - Educate patient/family on patient safety including physical limitations  - Instruct patient to call for assistance with activity   - Consult OT/PT to assist with strengthening/mobility   - Keep Call bell within reach  - Keep bed low and locked with side rails adjusted as appropriate  - Keep care items and personal belongings within reach  - Initiate and maintain comfort rounds  - Make Fall Risk Sign visible to staff  - Offer Toileting every 2 Hours, in advance of need  - Initiate/Maintain bed/chair alarm  - Obtain necessary fall risk management equipment:   - Apply yellow socks and bracelet for high fall risk patients  - Consider moving patient to room near nurses station  Outcome: Progressing

## 2021-09-15 LAB
AMPHETAMINES SERPL QL SCN: NEGATIVE
ANION GAP SERPL CALCULATED.3IONS-SCNC: 5 MMOL/L (ref 4–13)
BARBITURATES UR QL: NEGATIVE
BENZODIAZ UR QL: NEGATIVE
BUN SERPL-MCNC: 34 MG/DL (ref 5–25)
CALCIUM SERPL-MCNC: 8.8 MG/DL (ref 8.3–10.1)
CHLORIDE SERPL-SCNC: 102 MMOL/L (ref 100–108)
CO2 SERPL-SCNC: 29 MMOL/L (ref 21–32)
COCAINE UR QL: NEGATIVE
CREAT SERPL-MCNC: 1.3 MG/DL (ref 0.6–1.3)
EST. AVERAGE GLUCOSE BLD GHB EST-MCNC: 117 MG/DL
FERRITIN SERPL-MCNC: 65 NG/ML (ref 8–388)
GFR SERPL CREATININE-BSD FRML MDRD: 56 ML/MIN/1.73SQ M
GLUCOSE SERPL-MCNC: 100 MG/DL (ref 65–140)
HBA1C MFR BLD: 5.7 %
MAGNESIUM SERPL-MCNC: 2.5 MG/DL (ref 1.6–2.6)
METHADONE UR QL: NEGATIVE
OPIATES UR QL SCN: NEGATIVE
OXYCODONE+OXYMORPHONE UR QL SCN: NEGATIVE
PCP UR QL: NEGATIVE
POTASSIUM SERPL-SCNC: 4 MMOL/L (ref 3.5–5.3)
SODIUM SERPL-SCNC: 136 MMOL/L (ref 136–145)
THC UR QL: NEGATIVE

## 2021-09-15 PROCEDURE — 83735 ASSAY OF MAGNESIUM: CPT | Performed by: INTERNAL MEDICINE

## 2021-09-15 PROCEDURE — 99222 1ST HOSP IP/OBS MODERATE 55: CPT | Performed by: INTERNAL MEDICINE

## 2021-09-15 PROCEDURE — B2111ZZ FLUOROSCOPY OF MULTIPLE CORONARY ARTERIES USING LOW OSMOLAR CONTRAST: ICD-10-PCS | Performed by: INTERNAL MEDICINE

## 2021-09-15 PROCEDURE — 99233 SBSQ HOSP IP/OBS HIGH 50: CPT | Performed by: FAMILY MEDICINE

## 2021-09-15 PROCEDURE — 83036 HEMOGLOBIN GLYCOSYLATED A1C: CPT | Performed by: INTERNAL MEDICINE

## 2021-09-15 PROCEDURE — 80307 DRUG TEST PRSMV CHEM ANLYZR: CPT | Performed by: PHYSICIAN ASSISTANT

## 2021-09-15 PROCEDURE — 80048 BASIC METABOLIC PNL TOTAL CA: CPT | Performed by: INTERNAL MEDICINE

## 2021-09-15 PROCEDURE — 84166 PROTEIN E-PHORESIS/URINE/CSF: CPT | Performed by: PHYSICIAN ASSISTANT

## 2021-09-15 PROCEDURE — 82728 ASSAY OF FERRITIN: CPT | Performed by: PHYSICIAN ASSISTANT

## 2021-09-15 PROCEDURE — 84166 PROTEIN E-PHORESIS/URINE/CSF: CPT | Performed by: PATHOLOGY

## 2021-09-15 RX ORDER — ASPIRIN 81 MG/1
324 TABLET, CHEWABLE ORAL ONCE
Status: COMPLETED | OUTPATIENT
Start: 2021-09-16 | End: 2021-09-16

## 2021-09-15 RX ORDER — HYDRALAZINE HYDROCHLORIDE 25 MG/1
25 TABLET, FILM COATED ORAL EVERY 8 HOURS SCHEDULED
Status: DISCONTINUED | OUTPATIENT
Start: 2021-09-15 | End: 2021-09-17

## 2021-09-15 RX ADMIN — CARVEDILOL 6.25 MG: 6.25 TABLET, FILM COATED ORAL at 15:55

## 2021-09-15 RX ADMIN — ENOXAPARIN SODIUM 40 MG: 40 INJECTION SUBCUTANEOUS at 09:02

## 2021-09-15 RX ADMIN — HYDRALAZINE HYDROCHLORIDE 25 MG: 25 TABLET, FILM COATED ORAL at 21:21

## 2021-09-15 RX ADMIN — NICOTINE 14 MG: 14 PATCH, EXTENDED RELEASE TRANSDERMAL at 09:02

## 2021-09-15 RX ADMIN — FUROSEMIDE 40 MG: 40 TABLET ORAL at 09:02

## 2021-09-15 RX ADMIN — HYDRALAZINE HYDROCHLORIDE 25 MG: 25 TABLET, FILM COATED ORAL at 13:32

## 2021-09-15 RX ADMIN — CARVEDILOL 6.25 MG: 6.25 TABLET, FILM COATED ORAL at 09:02

## 2021-09-15 NOTE — ASSESSMENT & PLAN NOTE
Lab Results   Component Value Date    EGFR 56 09/15/2021    EGFR 52 09/14/2021    EGFR 47 09/13/2021    CREATININE 1 30 09/15/2021    CREATININE 1 40 (H) 09/14/2021    CREATININE 1 51 (H) 09/13/2021     Monitor kidney function closely  Avoid nephrotoxins  Monitor postvoid residuals

## 2021-09-15 NOTE — ASSESSMENT & PLAN NOTE
Wt Readings from Last 3 Encounters:   09/15/21 58 4 kg (128 lb 12 8 oz)   09/14/21 58 9 kg (129 lb 13 6 oz)     · Originally presented to  with shortness of breath, ProBNP >37156  · CTA is negative for acute PE  · Newly diagnosed acute systolic congestive heart failure  · Patient transferred to [facility] for HF evaluation and cardiac cath  · EF 25%  · Normal TSH and lipid panel except for low HDL  · Continue carvedilol  · Started Furosemide 40 mg p o   Daily  · Monitor I/O, daily weights  · As an 2 g salt diet fluid restriction  · Cardiology following

## 2021-09-15 NOTE — ASSESSMENT & PLAN NOTE
Lab Results   Component Value Date    EGFR 52 09/14/2021    EGFR 47 09/13/2021    EGFR 54 09/12/2021    CREATININE 1 40 (H) 09/14/2021    CREATININE 1 51 (H) 09/13/2021    CREATININE 1 34 (H) 09/12/2021     Monitor kidney function closely  Avoid nephrotoxins  Monitor postvoid residuals

## 2021-09-15 NOTE — CASE MANAGEMENT
67yo male transferred from [facility] with new CHF, COPD, bilateral pleural effusions, HTN, CKD3A  He is alert and oriented, independent ADLS, retired, drives  He resides in [place] with his wife Zach Diallo, phone [phone] and his son Immanuel Galindo, phone [phone]  They live in a 3 story home with 1STE and bathroom on 2 floors  Stairway has railings  He has no hx of HHC, no rehab, denies mental illness and D&A  However noted in chart pt has 6 ETOH drinks/week and 6 shots per week  Pt  Denies any problem  His PCP is Dr Daniel Sneed and he prefers [facility] in [place]  He has no RX plan and has not had a COVID vaccine but wants one, will inform attending  Pt is agreeable to [employer] [facility] if needed  Needs cardiac cath  Son will transport home at discharge  CM following  CM called financial counselors and pt is eligible for 100% coverage x1 at discharge for meds, under the FPL score of 149  CM reviewed d/c planning process including the following: identifying help at home, patient preference for d/c planning needs, Discharge Lounge, [facility] Meds to Bed program, availability of treatment team to discuss questions or concerns patient and/or family may have regarding understanding medications and recognizing signs and symptoms once discharged  CM also encouraged patient to follow up with all recommended appointments after discharge  Patient advised of importance for patient and family to participate in managing patients medical well being  Patient/caregiver received discharge checklist  Content reviewed  Patient/caregiver encouraged to participate in discharge plan of care prior to discharge home

## 2021-09-15 NOTE — PROGRESS NOTES
[facility]  Progress Note - John Valadez 1955, 78 y o  male MRN: [id number]  Unit/Bed#: [facility] 409-01 Encounter: [id number]  Primary Care Provider: Simran Brunner MD   Date and time admitted to hospital: 9/14/2021  6:08 PM    * Acute systolic (congestive) heart failure [facility])  Assessment & Plan  Wt Readings from Last 3 Encounters:   09/15/21 58 4 kg (128 lb 12 8 oz)   09/14/21 58 9 kg (129 lb 13 6 oz)     · Originally presented to  with shortness of breath, ProBNP >09071  · CTA is negative for acute PE  · Newly diagnosed acute systolic congestive heart failure  · [facility] and cardiac cath  · EF 25%  · Normal TSH and lipid panel except for low HDL  · Continue carvedilol  · Started Furosemide 40 mg p o  Daily  · Monitor I/O, daily weights  · As an 2 g salt diet fluid restriction  · Cardiology following        Protein calorie malnutrition ([address] )  Assessment & Plan  Encouraged adequate protein and calorie intake  Nutrition therapy    Tobacco abuse  Assessment & Plan  Tobacco cessation discussed    Stage 3a chronic kidney disease [facility])  Assessment & Plan  Lab Results   Component Value Date    EGFR 56 09/15/2021    EGFR 52 09/14/2021    EGFR 47 09/13/2021    CREATININE 1 30 09/15/2021    CREATININE 1 40 (H) 09/14/2021    CREATININE 1 51 (H) 09/13/2021     Monitor kidney function closely  Avoid nephrotoxins  Monitor postvoid residuals      VTE Pharmacologic Prophylaxis:   Pharmacologic: Enoxaparin (Lovenox)  Mechanical VTE Prophylaxis in Place: Yes    Patient Centered Rounds: I have performed bedside rounds with nursing staff today       Current Length of Stay: 1 day(s)    Current Patient Status: Inpatient   Certification Statement: The patient will continue to require additional inpatient hospital stay due to pending cardiac cath    Discharge Plan: pending progress    Code Status: Level 1 - Full Code      Subjective:   Patient sitting up, feels well  Denies any dyspnea or chest pain  Only trace pitting lower extremity edema  Objective:     Vitals:   Temp (24hrs), Av 9 °F (36 6 °C), Min:97 7 °F (36 5 °C), Max:98 2 °F (36 8 °C)    Temp:  [97 7 °F (36 5 °C)-98 2 °F (36 8 °C)] 97 7 °F (36 5 °C)  HR:  [75-81] 78  Resp:  [18] 18  BP: (134-160)/(76-94) 159/76  SpO2:  [94 %-98 %] 95 %  Body mass index is 20 79 kg/m²  Input and Output Summary (last 24 hours): Intake/Output Summary (Last 24 hours) at 9/15/2021 1105  Last data filed at 9/15/2021 0354  Gross per 24 hour   Intake 100 ml   Output 300 ml   Net -200 ml       Physical Exam:     Physical Exam  Vitals and nursing note reviewed  Constitutional:       General: He is not in acute distress  Appearance: Normal appearance  HENT:      Head: Normocephalic and atraumatic  Right Ear: External ear normal       Left Ear: External ear normal       Nose: Nose normal    Eyes:      Extraocular Movements: Extraocular movements intact  Pulmonary:      Effort: Pulmonary effort is normal    Musculoskeletal:      Cervical back: Normal range of motion  Right lower leg: Edema present  Left lower leg: Edema present  Comments: Only trace pitting edema   Neurological:      Mental Status: He is alert  Mental status is at baseline     Psychiatric:         Mood and Affect: Mood normal           Labs:    Results from last 7 days   Lab Units 21  1609   WBC Thousand/uL 8 93   HEMOGLOBIN g/dL 14 8   HEMATOCRIT % 44 1   PLATELETS Thousands/uL 157   NEUTROS PCT % 72   LYMPHS PCT % 19   MONOS PCT % 7   EOS PCT % 1     Results from last 7 days   Lab Units 09/15/21  0504 21  0549   SODIUM mmol/L 136 137   POTASSIUM mmol/L 4 0 3 8   CHLORIDE mmol/L 102 102   CO2 mmol/L 29 25   BUN mg/dL 34* 19   CREATININE mg/dL 1 30 1 34*   ANION GAP mmol/L 5 10   CALCIUM mg/dL 8 8 8 5   ALBUMIN g/dL  --  3 0*   TOTAL BILIRUBIN mg/dL  --  1 49*   ALK PHOS U/L  --  97   ALT U/L  --  23   AST U/L  --  24   GLUCOSE RANDOM mg/dL 100 154*     Results from last 7 days   Lab Units 09/11/21  1609   INR  1 18         Results from last 7 days   Lab Units 09/15/21  0504   HEMOGLOBIN A1C % 5 7*              Recent Cultures (last 7 days):           Last 24 Hours Medication List:   Current Facility-Administered Medications   Medication Dose Route Frequency Provider Last Rate    acetaminophen  650 mg Oral Q4H PRN Hanh Segundo MD      aluminum-magnesium hydroxide-simethicone  30 mL Oral Q4H PRN Hanh Segundo MD      carvedilol  6 25 mg Oral BID With Meals Hanh Segundo MD      enoxaparin  40 mg Subcutaneous Daily Hanh Segundo MD      furosemide  40 mg Oral Daily Hanh Segundo MD      nicotine  14 mg Transdermal Daily Hahn Segundo MD      sodium chloride (PF)  3 mL Intravenous Q1H PRN Hanh Segundo MD          Today, Patient Was Seen By: Rohan King MD    ** Please Note: Dictation voice to text software may have been used in the creation of this document   **

## 2021-09-15 NOTE — H&P
[address] [facility]  H&P- Mercy Correa 1955, 78 y o  male MRN: [id number]  Unit/Bed#: [facility] 409-01 Encounter: [id number]  Primary Care Provider: Henrry Last MD   Date and time admitted to hospital: 9/14/2021  6:08 PM    * Acute systolic (congestive) heart failure [facility])  Assessment & Plan  Wt Readings from Last 3 Encounters:   09/14/21 58 9 kg (129 lb 13 6 oz)       Newly diagnosed acute systolic congestive heart failure  EF 25%  Continue carvedilol  Furosemide 40 mg p o  Daily  Monitor I/O, daily weights  As an 2 g salt diet fluid restriction  Cardiology following      Protein calorie malnutrition ([address] )  Assessment & Plan  Encouraged adequate protein and calorie intake  Nutrition therapy    Tobacco abuse  Assessment & Plan  Tobacco cessation discussed    Stage 3a chronic kidney disease [facility])  Assessment & Plan  Lab Results   Component Value Date    EGFR 52 09/14/2021    EGFR 47 09/13/2021    EGFR 54 09/12/2021    CREATININE 1 40 (H) 09/14/2021    CREATININE 1 51 (H) 09/13/2021    CREATININE 1 34 (H) 09/12/2021     Monitor kidney function closely  Avoid nephrotoxins  Monitor postvoid residuals        VTE Pharmacologic Prophylaxis: VTE Score: 3 Moderate Risk (Score 3-4) - Pharmacological DVT Prophylaxis Ordered: enoxaparin (Lovenox)  Code Status: Level 1 - Full Code   Discussion with family: discussed with patient, wife and son at bedside - updated   Anticipated Length of Stay: Patient will be admitted on an inpatient basis with an anticipated length of stay of greater than 2 midnights secondary to Acute systolic chf for further management   Chief Complaint:     Transferred for management of new systolic CHF, Cardiology evaluation    History of Present Illness:  Mercy Correa is a 78 y o  male transferred for management of new systolic congestive heart failure cardiology evaluation    Patient [facility] cell he ER with worsening shortness of breath, he was noted to have systolic congestive heart failure EF 25%  He received IV Lasix and has been transition to p o  Lasix his placed on carvedilol  He has been transferred to [facility] for cardiology evaluation  Presently reports improving symptoms of dyspnea, reports decreased lower extremity swelling  He reports easy fatigability  Reports smoking cigarettes for many years and has quit 5 days back  Presented denies chest pain palpitations diaphoresis  Denies abdominal pain nausea vomiting diarrhea  Denies urinary symptoms  His hospital course at [facility] she SLE reviewed  His family has at bedside history reviewed    Review of Systems:  Review of Systems   All other systems reviewed and are negative  Past Medical and Surgical History:   No past medical history on file  Past Surgical History:   Procedure Laterality Date    APPENDECTOMY      age 5   Ch[facility] Anderson INGUINAL HERNIA REPAIR Right 1991       Meds/Allergies:  Prior to Admission medications    Not on File     I have reviewed home medications using recent Epic encounter      Allergies: No Known Allergies    Social History:  Marital Status: /Civil Union   Occupation:  Retired - reports he was the   Patient Pre-hospital Living Situation: Home  Patient Pre-hospital Level of Mobility: walks  Patient Pre-hospital Diet Restrictions: no  Substance Use History:   Social History     Substance and Sexual Activity   Alcohol Use Yes    Alcohol/week: 6 0 standard drinks    Types: 6 Shots of liquor per week     Social History     Tobacco Use   Smoking Status Former Smoker    Years: 30 00    Types: Cigarettes   Smokeless Tobacco Never Used   Tobacco Comment    quit two weeks ago     Social History     Substance and Sexual Activity   Drug Use Never       Family History:  Family History   Problem Relation Age of Onset    Heart failure Mother     Heart failure Brother        Physical Exam:     Vitals:   Blood Pressure: 157/93 (09/14/21 1831)  Pulse: 81 (09/14/21 1831)  Temperature: 97 9 °F (36 6 °C) (09/14/21 1831)  Temp Source: Oral (09/14/21 1831)  SpO2: 98 % (09/14/21 1831)    Physical Exam     Comfortably sitting up in chair  Features of protein calorie malnutrition noted  Neck supple  Lungs diminished breath sounds bilaterally  No additional sounds  Heart sounds S1 and S2 noted  Abdomen soft  Awake alert obeys simple commands  Pulses noted  Bilateral pedal edema noted  No rash    Additional Data:     Lab Results:  Results from last 7 days   Lab Units 09/11/21  1609   WBC Thousand/uL 8 93   HEMOGLOBIN g/dL 14 8   HEMATOCRIT % 44 1   PLATELETS Thousands/uL 157   NEUTROS PCT % 72   LYMPHS PCT % 19   MONOS PCT % 7   EOS PCT % 1     Results from last 7 days   Lab Units 09/14/21  0502 09/12/21  0549   SODIUM mmol/L 136 137   POTASSIUM mmol/L 4 2 3 8   CHLORIDE mmol/L 100 102   CO2 mmol/L 28 25   BUN mg/dL 31* 19   CREATININE mg/dL 1 40* 1 34*   ANION GAP mmol/L 8 10   CALCIUM mg/dL 8 8 8 5   ALBUMIN g/dL  --  3 0*   TOTAL BILIRUBIN mg/dL  --  1 49*   ALK PHOS U/L  --  97   ALT U/L  --  23   AST U/L  --  24   GLUCOSE RANDOM mg/dL 104 154*     Results from last 7 days   Lab Units 09/11/21  1609   INR  1 18       Imaging: Reviewed radiology reports from this admission including: chest xray and ECHO      EKG and Other Studies Reviewed on Admission:   · EKG: Sinus rhythm, features of LVH noted  ** Please Note: This note has been constructed using a voice recognition system   **

## 2021-09-15 NOTE — ASSESSMENT & PLAN NOTE
Wt Readings from Last 3 Encounters:   09/14/21 58 9 kg (129 lb 13 6 oz)       Newly diagnosed acute systolic congestive heart failure  EF 25%  Continue carvedilol  Furosemide 40 mg p o   Daily  Monitor I/O, daily weights  As an 2 g salt diet fluid restriction  Cardiology following

## 2021-09-15 NOTE — CONSULTS
Advanced Heart Failure / Pulmonary Hypertension Service Consultation    Beba Oliva 78 y o  male  MRN: [id number]  Unit/Bed#: [facility] 409-01; Encounter: [id number]    Assessment:  Principal Problem:    Acute systolic (congestive) heart failure ([facility] )  Active Problems:    Stage 3a chronic kidney disease ([facility] [address] )    Tobacco abuse    Protein calorie malnutrition ([facility] )      HPI:   Beba Oliva is a 48-year-old man with no PMH (had not seen by providers in over 10 years) who presented to [facility] on 09/11/2021 with worsening SOB and productive cough for about 2 days and bilateral LE swelling for the last 2 weeks, per documentation  Upon arrival, patient noted to be hypertensive (209/121 mmHg) and tachycardic  Workup in ED revealed troponin peaking at 0 07 with NT proBNP of 23,164  CTA chest with moderate right and small left pleural effusions; no evidence of PE  Was started on IV Lasix (received 2 doses of 40 mg in total) and also given albuterol and Solu-Medrol  Cardiology consulted, and TTE completed revealing LVEF 25%  Started on carvedilol for BP control  Transitioned to PO Lasix on 09/13  Transferred to [facility] on 09/14/2021 for HF evaluation and [facility]  Patient seen and examined  He admitted to not fully understanding hospital course and reason for transfer  Reviewed this and answered all questions to his satisfaction  Patient states that on Friday (09/10), while he was out for dinner with friends, friend noted that he didn't "look good" and that he was concerned patient might have pneumonia or COVID  Patient then proceeded to ED for testing  Upon further conversation, he does endorse improvement in breathing and lesser LE swelling  Reviewed patient's medical, family, and social history; EMR updated as appropriate  States that about 7 years ago, had his BP checked at his wife's doctor appointment and that it was "high" then  Does endorse decreased food intake/appetite  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 and often skips meals or eats out  Heart failure service was consulted for "new onset of congestive heart failure " Patient has never followed with an outpatient cardiologist      Today's Plan:   Orders placed for [facility]   Continue on PO Lasix   MAPs running in 100-110s  Will add on hydralazine 25 mg q8 hours   Strict I&Os with daily weights  Continue CV diet with fluid restriction  Agreeable to inpatient nutrition consult   Check NICM serology   Plan to discuss LifeVest prior to discharge  Plan:  Newly diagnosed HFrEF; LVEF 25%; LVIDd 4 76 cm; NYHA II; ACC/AHA Stage B   Etiology: unclear  Possible HTN heart disease  TTE 09/13/2021: LVEF 25%  LVIDd 4 76 cm  IVSd 1 14 cm  Severe diffuse hypokinesis  Reduced RVSF  Moderate MR  Mild TR  PASP 40 mmHg  Reviewed importance of low sodium diet and fluid restriction  Strict I&Os with daily weights  CV diet with fluid restriction recommended  Neurohormonal Blockade:  --Beta Blocker: carvedilol 6 25 mg q12 hours  --ARNi / ACEi / ARB: No    --Aldosterone Antagonist: No    --SGLT2 Inhibitor: No    --SVR Reducing Agents: hydralazine 25 mg q8 hours  --Home Diuretic: None  --Inpatient Diuretic: PO Lasix 40 mg daily  Sudden Cardiac Death Risk Reduction:  --LVEF 25%  Plan to discuss LifeVest prior to discharge  --Plan to reassess LVEF with limited TTE in mid 12/2021  Electrical Resynchronization:  --Candidacy for BiV device: narrow QRS  Advanced Therapies (if appropriate): Will continue to monitor  Chronic kidney disease, stage III   Baseline creatinine: unclear  Today, creatinine of 1 30 (1 40 on 09/14)  Hypertensive urgency: BP remain elevated, but more controlled  History of tobacco abuse: last smoked about 1 month ago     Unvaccinated against COVID-19    Past Medical History:   Diagnosis Date    HFrEF (heart failure with reduced ejection fraction) ([facility] ) 09/2021    Hypertension     Tobacco abuse        Review of Systems   Constitutional: Positive for appetite change  Negative for activity change, fatigue, fever and unexpected weight change  HENT: Negative for congestion, postnasal drip, rhinorrhea, sneezing, sore throat and trouble swallowing  Eyes: Negative  Respiratory: Positive for cough and shortness of breath (improved)  Negative for chest tightness  Cardiovascular: Positive for leg swelling (improved)  Negative for chest pain and palpitations  Gastrointestinal: Negative for abdominal distention, abdominal pain, diarrhea, nausea and vomiting  Endocrine: Negative  Genitourinary: Positive for frequency  Negative for decreased urine volume, difficulty urinating, dysuria and urgency  Musculoskeletal: Negative  Skin: Negative  Allergic/Immunologic: Negative  Neurological: Negative for dizziness, tremors, syncope, weakness, light-headedness and headaches  Hematological: Negative  Psychiatric/Behavioral: Negative for agitation, confusion and sleep disturbance  The patient is not nervous/anxious  14-point ROS completed and negative except as stated above and/or in the HPI      Current Facility-Administered Medications:     acetaminophen (TYLENOL) tablet 650 mg, 650 mg, Oral, Q4H PRN, Suma Qiu MD    aluminum-magnesium hydroxide-simethicone (MYLANTA) oral suspension 30 mL, 30 mL, Oral, Q4H PRN, Suma Qiu MD  [employer]  [START ON 9/16/2021] aspirin chewable tablet 324 mg, 324 mg, Oral, Once, Dragonfly, BETINA    carvedilol (COREG) tablet 6 25 mg, 6 25 mg, Oral, BID With Meals, Suma Qiu MD, 6 25 mg at 09/15/21 0902    enoxaparin (LOVENOX) subcutaneous injection 40 mg, 40 mg, Subcutaneous, Daily, Suma Qiu MD, 40 mg at 09/15/21 0902    furosemide (LASIX) tablet 40 mg, 40 mg, Oral, Daily, Suma Qiu MD, 40 mg at 09/15/21 0902    hydrALAZINE (APRESOLINE) tablet 25 mg, 25 mg, Oral, Q8H [facility] BETINA Valdez    nicotine (NICODERM CQ) 14 mg/24hr TD 24 hr patch 14 mg, 14 mg, Transdermal, Daily, Tigre Kwok MD, 14 mg at 09/15/21 0902    [COMPLETED] Insert peripheral IV, , , Once **AND** sodium chloride (PF) 0 9 % injection 3 mL, 3 mL, Intravenous, Q1H PRN, Tigre Kwok MD    No Known Allergies     Social History     Socioeconomic History    Marital status: /Civil Union     Spouse name: Not on file    Number of children: 2    Years of education: Not on file    Highest education level: Not on file   Occupational History    Not on file   Tobacco Use    Smoking status: Former Smoker     Packs/day: 1 00     Years: 45 00     Pack years: 45 00     Types: Cigarettes     Start date:      Quit date: 2021     Years since quittin 1    Smokeless tobacco: Never Used    Tobacco comment: Began smoking in his early 25s, on average smoking 1+ packs daily  Quit in 2021 (Updated 09/15/2021)  Vaping Use    Vaping Use: Never used   Substance and Sexual Activity    Alcohol use: Yes     Alcohol/week: 6 0 standard drinks     Types: 6 Shots of liquor per week     Comment: Drinks about 4 Bacardi and cokes every week  (Updated 09/15/2021)   Drug use: Never     Comment: Last assessed 09/15/2021   Sexual activity: Not on file   Other Topics Concern    Not on file   Social History Narrative    Has 2 biological sons  Social Determinants of Health     Financial Resource Strain:     Difficulty of Paying Living Expenses:    Food Insecurity:     Worried About Running Out of Food in the Last Year:     [address] in the Last Year:    Transportation Needs:     Lack of Transportation (Medical):      Lack of Transportation (Non-Medical):    Physical Activity:     Days of Exercise per Week:     Minutes of Exercise per Session:    Stress:     Feeling of Stress :    Social Connections:     Frequency of Communication with Friends and Family:     Frequency of Social Gatherings with Friends and Family:     Attends Yarsani Services:     Active Member of Clubs or Organizations:     Attends Club or Organization Meetings:     Marital Status:    Intimate Partner Violence:     Fear of Current or Ex-Partner:     Emotionally Abused:     Physically Abused:     Sexually Abused:      Family History   Problem Relation Age of Onset    Heart failure Mother     Other Father         "blood clot"    COPD Sister     Heart failure Brother     Valvular heart disease Brother     No Known Problems Son     No Known Problems Son        Vitals:  Blood pressure 167/92, pulse 66, temperature 97 8 °F (36 6 °C), temperature source Oral, resp  rate 16, weight 58 4 kg (128 lb 12 8 oz), SpO2 98 %  Body mass index is 20 79 kg/m²  I/O last 3 completed shifts: In: 100 [P O :100]  Out: 300 [Urine:300]    Wt Readings from Last 3 Encounters:   09/15/21 58 4 kg (128 lb 12 8 oz)   09/14/21 58 9 kg (129 lb 13 6 oz)       Vitals:    09/15/21 0255 09/15/21 0600 09/15/21 0700 09/15/21 1100   BP: 151/94  159/76 167/92   BP Location: Right arm   Right arm   Pulse: 75  78 66   Resp: 18  18 16   Temp: 97 7 °F (36 5 °C)  97 7 °F (36 5 °C) 97 8 °F (36 6 °C)   TempSrc: Oral  Oral Oral   SpO2: 96%  95% 98%   Weight:  58 4 kg (128 lb 12 8 oz)         Physical Exam  Vitals reviewed  Constitutional:       General: He is awake  He is not in acute distress  Appearance: Normal appearance  He is well-developed and normal weight  Comments: Appears frail   HENT:      Head: Normocephalic  Nose: Nose normal       Mouth/Throat:      Mouth: Mucous membranes are moist    Eyes:      General: No scleral icterus  Conjunctiva/sclera: Conjunctivae normal    Neck:      Vascular: JVD present  Trachea: No tracheal deviation  Cardiovascular:      Rate and Rhythm: Normal rate and regular rhythm  Pulses: Normal pulses  Heart sounds: Murmur heard       Pulmonary:      Effort: Pulmonary effort is normal  No tachypnea, bradypnea, accessory muscle usage or respiratory distress  Breath sounds: Normal air entry  No decreased air movement  Rhonchi present  No decreased breath sounds or rales  Abdominal:      General: Bowel sounds are normal  There is no distension  Palpations: Abdomen is soft  Tenderness: There is no abdominal tenderness  Musculoskeletal:      Cervical back: Neck supple  Right lower leg: Edema (trace) present  Left lower leg: Edema (trace) present  Skin:     General: Skin is warm and dry  Coloration: Skin is pale  Skin is not jaundiced  Neurological:      General: No focal deficit present  Mental Status: He is alert and oriented to person, place, and time  Psychiatric:         Attention and Perception: Attention normal          Mood and Affect: Mood and affect normal          Speech: Speech normal          Behavior: Behavior normal  Behavior is cooperative  Thought Content:  Thought content normal      Central Line (day, reason): No   Pinzon Catheter (day, reason): No      Labs & Results:  Results from last 7 days   Lab Units 09/12/21  0223 09/11/21  2231 09/11/21 2005 09/11/21  1609   CK TOTAL U/L  --   --   --  64   TROPONIN I ng/mL 0 03 0 07* 0 06* 0 04     Results from last 7 days   Lab Units 09/11/21  1609   WBC Thousand/uL 8 93   HEMOGLOBIN g/dL 14 8   HEMATOCRIT % 44 1   PLATELETS Thousands/uL 157         Results from last 7 days   Lab Units 09/15/21  0504 09/14/21  0502 09/13/21  0556 09/12/21  0549 09/11/21  1609   POTASSIUM mmol/L 4 0 4 2 4 4 3 8 3 5   CHLORIDE mmol/L 102 100 101 102 100   CO2 mmol/L 29 28 30 25 25   BUN mg/dL 34* 31* 31* 19 15   CREATININE mg/dL 1 30 1 40* 1 51* 1 34* 1 26   CALCIUM mg/dL 8 8 8 8 8 7 8 5 9 1   ALK PHOS U/L  --   --   --  97 127*   ALT U/L  --   --   --  23 23   AST U/L  --   --   --  24 31     Results from last 7 days   Lab Units 09/11/21  1609   INR  1 18     Flora Stern PA-C

## 2021-09-16 ENCOUNTER — APPOINTMENT (OUTPATIENT)
Dept: NON INVASIVE DIAGNOSTICS | Facility: HOSPITAL | Age: 66
DRG: 286 | End: 2021-09-16
Payer: MEDICARE

## 2021-09-16 LAB
ALBUMIN UR ELPH-MCNC: 100 %
ALPHA1 GLOB MFR UR ELPH: 0 %
ALPHA2 GLOB MFR UR ELPH: 0 %
ANION GAP SERPL CALCULATED.3IONS-SCNC: 3 MMOL/L (ref 4–13)
B-GLOBULIN MFR UR ELPH: 0 %
BUN SERPL-MCNC: 30 MG/DL (ref 5–25)
CALCIUM SERPL-MCNC: 8.9 MG/DL (ref 8.3–10.1)
CHLORIDE SERPL-SCNC: 101 MMOL/L (ref 100–108)
CO2 SERPL-SCNC: 30 MMOL/L (ref 21–32)
CREAT SERPL-MCNC: 1.33 MG/DL (ref 0.6–1.3)
CRYOGLOB RF SER-ACNC: ABNORMAL [IU]/ML
ERYTHROCYTE [DISTWIDTH] IN BLOOD BY AUTOMATED COUNT: 13.7 % (ref 11.6–15.1)
GAMMA GLOB MFR UR ELPH: 0 %
GFR SERPL CREATININE-BSD FRML MDRD: 55 ML/MIN/1.73SQ M
GLUCOSE SERPL-MCNC: 88 MG/DL (ref 65–140)
HCT VFR BLD AUTO: 42.6 % (ref 36.5–49.3)
HGB BLD-MCNC: 13.8 G/DL (ref 12–17)
HIV 1+2 AB+HIV1 P24 AG SERPL QL IA: NORMAL
HIV1 P24 AG SER QL: NORMAL
MCH RBC QN AUTO: 28.5 PG (ref 26.8–34.3)
MCHC RBC AUTO-ENTMCNC: 32.4 G/DL (ref 31.4–37.4)
MCV RBC AUTO: 88 FL (ref 82–98)
PLATELET # BLD AUTO: 140 THOUSANDS/UL (ref 149–390)
PMV BLD AUTO: 12.6 FL (ref 8.9–12.7)
POTASSIUM SERPL-SCNC: 3.3 MMOL/L (ref 3.5–5.3)
PROT PATTERN UR ELPH-IMP: NORMAL
PROT UR-MCNC: 6 MG/DL
RBC # BLD AUTO: 4.85 MILLION/UL (ref 3.88–5.62)
RHEUMATOID FACT SER QL LA: POSITIVE
SODIUM SERPL-SCNC: 134 MMOL/L (ref 136–145)
WBC # BLD AUTO: 8.09 THOUSAND/UL (ref 4.31–10.16)

## 2021-09-16 PROCEDURE — 86430 RHEUMATOID FACTOR TEST QUAL: CPT | Performed by: PHYSICIAN ASSISTANT

## 2021-09-16 PROCEDURE — 99232 SBSQ HOSP IP/OBS MODERATE 35: CPT | Performed by: FAMILY MEDICINE

## 2021-09-16 PROCEDURE — 84165 PROTEIN E-PHORESIS SERUM: CPT | Performed by: PATHOLOGY

## 2021-09-16 PROCEDURE — 87806 HIV AG W/HIV1&2 ANTB W/OPTIC: CPT | Performed by: PHYSICIAN ASSISTANT

## 2021-09-16 PROCEDURE — 86431 RHEUMATOID FACTOR QUANT: CPT | Performed by: PHYSICIAN ASSISTANT

## 2021-09-16 PROCEDURE — 84165 PROTEIN E-PHORESIS SERUM: CPT | Performed by: PHYSICIAN ASSISTANT

## 2021-09-16 PROCEDURE — 93454 CORONARY ARTERY ANGIO S&I: CPT | Performed by: PHYSICIAN ASSISTANT

## 2021-09-16 PROCEDURE — 99232 SBSQ HOSP IP/OBS MODERATE 35: CPT | Performed by: INTERNAL MEDICINE

## 2021-09-16 PROCEDURE — 80048 BASIC METABOLIC PNL TOTAL CA: CPT | Performed by: FAMILY MEDICINE

## 2021-09-16 PROCEDURE — 86038 ANTINUCLEAR ANTIBODIES: CPT | Performed by: PHYSICIAN ASSISTANT

## 2021-09-16 PROCEDURE — 85027 COMPLETE CBC AUTOMATED: CPT | Performed by: FAMILY MEDICINE

## 2021-09-16 PROCEDURE — 93454 CORONARY ARTERY ANGIO S&I: CPT | Performed by: INTERNAL MEDICINE

## 2021-09-16 RX ORDER — POTASSIUM CHLORIDE 20 MEQ/1
20 TABLET, EXTENDED RELEASE ORAL ONCE
Status: COMPLETED | OUTPATIENT
Start: 2021-09-16 | End: 2021-09-16

## 2021-09-16 RX ORDER — NITROGLYCERIN 20 MG/100ML
INJECTION INTRAVENOUS CODE/TRAUMA/SEDATION MEDICATION
Status: COMPLETED | OUTPATIENT
Start: 2021-09-16 | End: 2021-09-16

## 2021-09-16 RX ORDER — POTASSIUM CHLORIDE 20 MEQ/1
40 TABLET, EXTENDED RELEASE ORAL ONCE
Status: COMPLETED | OUTPATIENT
Start: 2021-09-16 | End: 2021-09-16

## 2021-09-16 RX ORDER — MIDAZOLAM HYDROCHLORIDE 2 MG/2ML
INJECTION, SOLUTION INTRAMUSCULAR; INTRAVENOUS CODE/TRAUMA/SEDATION MEDICATION
Status: COMPLETED | OUTPATIENT
Start: 2021-09-16 | End: 2021-09-16

## 2021-09-16 RX ORDER — HEPARIN SODIUM 1000 [USP'U]/ML
INJECTION, SOLUTION INTRAVENOUS; SUBCUTANEOUS CODE/TRAUMA/SEDATION MEDICATION
Status: COMPLETED | OUTPATIENT
Start: 2021-09-16 | End: 2021-09-16

## 2021-09-16 RX ORDER — FENTANYL CITRATE 50 UG/ML
INJECTION, SOLUTION INTRAMUSCULAR; INTRAVENOUS CODE/TRAUMA/SEDATION MEDICATION
Status: COMPLETED | OUTPATIENT
Start: 2021-09-16 | End: 2021-09-16

## 2021-09-16 RX ORDER — LIDOCAINE HYDROCHLORIDE 10 MG/ML
INJECTION, SOLUTION EPIDURAL; INFILTRATION; INTRACAUDAL; PERINEURAL CODE/TRAUMA/SEDATION MEDICATION
Status: COMPLETED | OUTPATIENT
Start: 2021-09-16 | End: 2021-09-16

## 2021-09-16 RX ORDER — FUROSEMIDE 20 MG/1
20 TABLET ORAL DAILY
Status: DISCONTINUED | OUTPATIENT
Start: 2021-09-17 | End: 2021-09-18 | Stop reason: HOSPADM

## 2021-09-16 RX ORDER — ONDANSETRON 2 MG/ML
4 INJECTION INTRAMUSCULAR; INTRAVENOUS EVERY 6 HOURS PRN
Status: DISCONTINUED | OUTPATIENT
Start: 2021-09-16 | End: 2021-09-18 | Stop reason: HOSPADM

## 2021-09-16 RX ORDER — VERAPAMIL HYDROCHLORIDE 2.5 MG/ML
INJECTION, SOLUTION INTRAVENOUS CODE/TRAUMA/SEDATION MEDICATION
Status: COMPLETED | OUTPATIENT
Start: 2021-09-16 | End: 2021-09-16

## 2021-09-16 RX ORDER — ACETAMINOPHEN 325 MG/1
650 TABLET ORAL EVERY 4 HOURS PRN
Status: DISCONTINUED | OUTPATIENT
Start: 2021-09-16 | End: 2021-09-16 | Stop reason: SDUPTHER

## 2021-09-16 RX ADMIN — CARVEDILOL 6.25 MG: 6.25 TABLET, FILM COATED ORAL at 08:29

## 2021-09-16 RX ADMIN — HYDRALAZINE HYDROCHLORIDE 25 MG: 25 TABLET, FILM COATED ORAL at 21:01

## 2021-09-16 RX ADMIN — VERAPAMIL HYDROCHLORIDE 2.5 MG: 2.5 INJECTION, SOLUTION INTRAVENOUS at 13:16

## 2021-09-16 RX ADMIN — POTASSIUM CHLORIDE 40 MEQ: 1500 TABLET, EXTENDED RELEASE ORAL at 11:46

## 2021-09-16 RX ADMIN — CARVEDILOL 6.25 MG: 6.25 TABLET, FILM COATED ORAL at 16:24

## 2021-09-16 RX ADMIN — MIDAZOLAM 2 MG: 1 INJECTION INTRAMUSCULAR; INTRAVENOUS at 13:06

## 2021-09-16 RX ADMIN — HYDRALAZINE HYDROCHLORIDE 25 MG: 25 TABLET, FILM COATED ORAL at 14:04

## 2021-09-16 RX ADMIN — HYDRALAZINE HYDROCHLORIDE 25 MG: 25 TABLET, FILM COATED ORAL at 06:23

## 2021-09-16 RX ADMIN — FUROSEMIDE 40 MG: 40 TABLET ORAL at 08:29

## 2021-09-16 RX ADMIN — Medication 200 MCG: at 13:15

## 2021-09-16 RX ADMIN — POTASSIUM CHLORIDE 20 MEQ: 1500 TABLET, EXTENDED RELEASE ORAL at 14:11

## 2021-09-16 RX ADMIN — FENTANYL CITRATE 50 MCG: 50 INJECTION INTRAMUSCULAR; INTRAVENOUS at 13:06

## 2021-09-16 RX ADMIN — LIDOCAINE HYDROCHLORIDE 1 ML: 10 INJECTION, SOLUTION EPIDURAL; INFILTRATION; INTRACAUDAL; PERINEURAL at 13:12

## 2021-09-16 RX ADMIN — IOHEXOL 40 ML: 350 INJECTION, SOLUTION INTRAVENOUS at 13:28

## 2021-09-16 RX ADMIN — HEPARIN SODIUM 4000 UNITS: 1000 INJECTION INTRAVENOUS; SUBCUTANEOUS at 13:16

## 2021-09-16 RX ADMIN — ASPIRIN 81 MG CHEWABLE TABLET 324 MG: 81 TABLET CHEWABLE at 08:30

## 2021-09-16 RX ADMIN — NICOTINE 14 MG: 14 PATCH, EXTENDED RELEASE TRANSDERMAL at 08:32

## 2021-09-16 NOTE — CASE MANAGEMENT
Pt agreeable to [facility] and referral sent for nursing care

## 2021-09-16 NOTE — ASSESSMENT & PLAN NOTE
Wt Readings from Last 3 Encounters:   09/16/21 56 7 kg (125 lb)   09/14/21 58 9 kg (129 lb 13 6 oz)     · Originally presented to  with shortness of breath, ProBNP >32659  · CTA is negative for acute PE  · Newly diagnosed acute systolic congestive heart failure  · Patient transferred to [place] for HF evaluation and cardiac cath- tentatively today  · EF 25%  · Normal TSH and lipid panel except for low HDL  · Continue carvedilol  · Started Furosemide 40 mg p o   Daily  · Started on hydralazine and coreg  · Monitor I/O, daily weights  · As an 2 g salt diet fluid restriction  · Cardiology following

## 2021-09-16 NOTE — ASSESSMENT & PLAN NOTE
Lab Results   Component Value Date    EGFR 55 09/16/2021    EGFR 56 09/15/2021    EGFR 52 09/14/2021    CREATININE 1 33 (H) 09/16/2021    CREATININE 1 30 09/15/2021    CREATININE 1 40 (H) 09/14/2021     Monitor kidney function closely  Avoid nephrotoxins  Monitor postvoid residuals

## 2021-09-16 NOTE — PROGRESS NOTES
Advanced Heart Failure / Pulmonary Hypertension Service Progress Note    Zehra Perdomo 78 y o  male   MRN: [id number]  Unit/Bed#: [facility] 409-01; Encounter: [id number]    Assessment:  Principal Problem:    Acute systolic (congestive) heart failure (HCC)  Active Problems:    Stage 3a chronic kidney disease ([facility] )    Tobacco abuse    Protein calorie malnutrition ([facility] )      Subjective:   Zehra Perdomo is a 42-year-old man with no PMH (had not seen by providers in over 10 years) who presented to [facility] on 09/11/2021 with worsening SOB and productive cough for about 2 days and bilateral LE swelling for the last 2 weeks, per documentation  Upon arrival, patient noted to be hypertensive (209/121 mmHg) and tachycardic  Workup in ED revealed troponin peaking at 0 07 with NT proBNP of 23,164  CTA chest with moderate right and small left pleural effusions; no evidence of PE  Was started on IV Lasix (received 2 doses of 40 mg in total) and also given albuterol and Solu-Medrol  Cardiology consulted, and TTE completed revealing LVEF 25%  Started on carvedilol for BP control  Transitioned to PO Lasix on 09/13  Transferred to [facility] on 09/14/2021 for HF evaluation and [facility]  Patient seen and examined  No significant events overnight  Feeling anxious about cath today  Breathing much improved  Reports was experiencing orthopnea prior to admission, now resolved  Objective: Intake/ Output: Not accurate  Weight: 125 lbs (128 lbs on 09/15)  Telemetry: NSR, rates in 60-70s  Today's Plan:   To go for [facility] today   Potassium of 3 3 this AM  Will give PO potassium 60 mEq in total today  Magnesium 2 5 on 09/15   Decrease Lasix to 20 mg daily in anticipation of adding on ARNi +/- MRA s/p [facility]   JORGE LUIS, RF screen, SPEP, and UPEP in process   Will consider outpatient PSG   Interested in receiving COVID vaccinations; plan to have completed as outpatient       Plan:  Newly diagnosed HFrEF; LVEF 25%; LVIDd 4 76 cm; NYHA II; ACC/AHA Stage B              Etiology: unclear  Possible HTN heart disease +/- ischemia  No EtOH or substance abuse history  Non-reactive HIV; unremarkable UDS  TTE 09/13/2021: LVEF 25%  LVIDd 4 76 cm  IVSd 1 14 cm  Severe diffuse hypokinesis  Reduced RVSF  Moderate MR  Mild TR  PASP 40 mmHg  Reviewed importance of low sodium diet and fluid restriction  Strict I&Os with daily weights  CV diet with fluid restriction recommended      Neurohormonal Blockade:  --Beta Blocker: carvedilol 6 25 mg q12 hours  --ARNi / ACEi / ARB: No    --Aldosterone Antagonist: No    --SGLT2 Inhibitor: No    --SVR Reducing Agents: hydralazine 25 mg q8 hours  --Home Diuretic: None  --Inpatient Diuretic: PO Lasix 20 mg daily      Sudden Cardiac Death Risk Reduction:  --LVEF 25%  Plan to discuss LifeVest prior to discharge  --Plan to reassess LVEF with limited TTE in mid 12/2021        Electrical Resynchronization:  --Candidacy for BiV device: narrow QRS     Advanced Therapies (if appropriate): Will continue to monitor      Chronic kidney disease, stage III              Baseline creatinine: unclear  Today, creatinine of 1 33 (1 30 on 09/15)     Hypertensive urgency / Hypertension  History of tobacco abuse: last smoked about 1 month ago  Unvaccinated against COVID-19    Vitals:   Blood pressure 139/73, pulse 63, temperature 97 5 °F (36 4 °C), temperature source Oral, resp  rate 14, weight 56 7 kg (125 lb), SpO2 94 %  Body mass index is 20 18 kg/m²  I/O last 3 completed shifts:   In: 460 [P O :460]  Out: 300 [Urine:300]    Wt Readings from Last 3 Encounters:   09/16/21 56 7 kg (125 lb)   09/14/21 58 9 kg (129 lb 13 6 oz)     Vitals:    09/16/21 0600 09/16/21 0623 09/16/21 0735 09/16/21 1100   BP:  (!) 185/111 162/80 139/73   BP Location:   Left arm Right arm   Pulse:   74 63   Resp:   14 14   Temp:   97 6 °F (36 4 °C) 97 5 °F (36 4 °C)   TempSrc:   Oral Oral   SpO2:   98% 94%   Weight: 56 7 kg (125 lb)          Physical Exam  Vitals reviewed  Constitutional:       General: He is awake  He is not in acute distress  Appearance: Normal appearance  He is well-developed and normal weight  Comments: Appears frail   HENT:      Head: Normocephalic  Nose: Nose normal       Mouth/Throat:      Mouth: Mucous membranes are moist    Eyes:      General: No scleral icterus  Conjunctiva/sclera: Conjunctivae normal    Neck:      Vascular: No JVD  Trachea: No tracheal deviation  Cardiovascular:      Rate and Rhythm: Normal rate and regular rhythm  No extrasystoles are present  Pulses: Normal pulses  Heart sounds: Murmur heard  Pulmonary:      Effort: Pulmonary effort is normal  No tachypnea, bradypnea, accessory muscle usage or respiratory distress  Breath sounds: Normal air entry  No decreased breath sounds, wheezing or rales  Abdominal:      General: Bowel sounds are normal  There is no distension  Palpations: Abdomen is soft  Tenderness: There is no abdominal tenderness  Musculoskeletal:      Cervical back: Neck supple  Right lower leg: No edema  Left lower leg: No edema  Skin:     General: Skin is warm and dry  Coloration: Skin is not jaundiced or pale  Neurological:      General: No focal deficit present  Mental Status: He is alert and oriented to person, place, and time  Psychiatric:         Attention and Perception: Attention normal          Mood and Affect: Affect normal  Mood is anxious  Speech: Speech normal          Behavior: Behavior normal  Behavior is cooperative  Thought Content:  Thought content normal      Central Line (day, reason): No    Pinzon Catheter (day, reason): No      Current Facility-Administered Medications:     acetaminophen (TYLENOL) tablet 650 mg, 650 mg, Oral, Q4H PRN, MD Maryam Marin aluminum-magnesium hydroxide-simethicone (MYLANTA) oral suspension 30 mL, 30 mL, Oral, Q4H PRN, Sis Azevedo MD    carvedilol (COREG) tablet 6 25 mg, 6 25 mg, Oral, BID With Meals, Sis Azevedo MD, 6 25 mg at 09/16/21 0829    enoxaparin (LOVENOX) subcutaneous injection 40 mg, 40 mg, Subcutaneous, Daily, Sis Azevedo MD, 40 mg at 09/15/21 0902    [START ON 9/17/2021] furosemide (LASIX) tablet 20 mg, 20 mg, Oral, Daily, Anders Hammond PA-C    hydrALAZINE (APRESOLINE) tablet 25 mg, 25 mg, Oral, Q8H [address], Mare Valdez PA-C, 25 mg at 09/16/21 0396    nicotine (NICODERM CQ) 14 mg/24hr TD 24 hr patch 14 mg, 14 mg, Transdermal, Daily, Sis Azevedo MD, 14 mg at 09/16/21 5413    potassium chloride (K-DUR,KLOR-CON) CR tablet 20 mEq, 20 mEq, Oral, Once, Anders Hammond PA-C    [COMPLETED] Insert peripheral IV, , , Once **AND** sodium chloride (PF) 0 9 % injection 3 mL, 3 mL, Intravenous, Q1H PRN, Sis zAevedo MD    Labs & Results:  Results from last 7 days   Lab Units 09/12/21  0223 09/11/21  2231 09/11/21 2005 09/11/21  1609   CK TOTAL U/L  --   --   --  64   TROPONIN I ng/mL 0 03 0 07* 0 06* 0 04     Results from last 7 days   Lab Units 09/16/21  0446 09/11/21  1609   WBC Thousand/uL 8 09 8 93   HEMOGLOBIN g/dL 13 8 14 8   HEMATOCRIT % 42 6 44 1   PLATELETS Thousands/uL 140* 157         Results from last 7 days   Lab Units 09/16/21  0446 09/15/21  0504 09/14/21  0502 09/12/21  0549 09/11/21  1609   POTASSIUM mmol/L 3 3* 4 0 4 2 3 8 3 5   CHLORIDE mmol/L 101 102 100 102 100   CO2 mmol/L 30 29 28 25 25   BUN mg/dL 30* 34* 31* 19 15   CREATININE mg/dL 1 33* 1 30 1 40* 1 34* 1 26   CALCIUM mg/dL 8 9 8 8 8 8 8 5 9 1   ALK PHOS U/L  --   --   --  97 127*   ALT U/L  --   --   --  23 23   AST U/L  --   --   --  24 31     Results from last 7 days   Lab Units 09/11/21  1609   INR  1 18     Sheldon Mar PA-C

## 2021-09-16 NOTE — PROGRESS NOTES
[address] [facility]  Progress Note - Elaina Betts 1955, 78 y o  male MRN: [id number]  Unit/Bed#: [facility] 409-01 Encounter: [id number]  Primary Care Provider: Ishaan Martínez MD   Date and time admitted to hospital: 9/14/2021  6:08 PM    * Acute systolic (congestive) heart failure [facility])  Assessment & Plan  Wt Readings from Last 3 Encounters:   09/16/21 56 7 kg (125 lb)   09/14/21 58 9 kg (129 lb 13 6 oz)     · Originally presented to  with shortness of breath, ProBNP >40391  · CTA is negative for acute PE  · Newly diagnosed acute systolic congestive heart failure  · Patient transferred to [facility] for HF evaluation and cardiac cath- tentatively today  · EF 25%  · Normal TSH and lipid panel except for low HDL  · Continue carvedilol  · Started Furosemide 40 mg p o  Daily  · Started on hydralazine and coreg  · Monitor I/O, daily weights  · As an 2 g salt diet fluid restriction  · Cardiology following        Protein calorie malnutrition ([address] )  Assessment & Plan  Encouraged adequate protein and calorie intake  Nutrition therapy    Tobacco abuse  Assessment & Plan  Tobacco cessation discussed    Stage 3a chronic kidney disease [facility])  Assessment & Plan  Lab Results   Component Value Date    EGFR 55 09/16/2021    EGFR 56 09/15/2021    EGFR 52 09/14/2021    CREATININE 1 33 (H) 09/16/2021    CREATININE 1 30 09/15/2021    CREATININE 1 40 (H) 09/14/2021     Monitor kidney function closely  Avoid nephrotoxins  Monitor postvoid residuals         VTE Pharmacologic Prophylaxis:   Pharmacologic: Enoxaparin (Lovenox)  Mechanical VTE Prophylaxis in Place: Yes    Patient Centered Rounds: I have performed bedside rounds with nursing staff today       Current Length of Stay: 2 day(s)    Current Patient Status: Inpatient   Certification Statement: The patient will continue to require additional inpatient hospital stay due to pending cardiac cath    Discharge Plan: pending progress    Code Status: Level 1 - Full Code      Subjective:   pablo feels well  denies dyspnea, chest pain, or nay other complains  No leg edema    Objective:     Vitals:   Temp (24hrs), Av 9 °F (36 6 °C), Min:97 5 °F (36 4 °C), Max:98 4 °F (36 9 °C)    Temp:  [97 5 °F (36 4 °C)-98 4 °F (36 9 °C)] 97 5 °F (36 4 °C)  HR:  [63-81] 63  Resp:  [14-18] 14  BP: (138-185)/() 139/73  SpO2:  [94 %-99 %] 94 %  Body mass index is 20 18 kg/m²  Input and Output Summary (last 24 hours): Intake/Output Summary (Last 24 hours) at 2021 1203  Last data filed at 9/15/2021 1401  Gross per 24 hour   Intake 240 ml   Output --   Net 240 ml       Physical Exam:     Physical Exam  Vitals and nursing note reviewed  Constitutional:       General: He is not in acute distress  Appearance: Normal appearance  HENT:      Head: Normocephalic and atraumatic  Right Ear: External ear normal       Left Ear: External ear normal       Nose: Nose normal    Eyes:      Extraocular Movements: Extraocular movements intact  Pulmonary:      Effort: Pulmonary effort is normal    Musculoskeletal:      Cervical back: Normal range of motion  Neurological:      Mental Status: He is alert  Mental status is at baseline     Psychiatric:         Mood and Affect: Mood normal           Additional Data:     Labs:    Results from last 7 days   Lab Units 21  0446 21  1609   WBC Thousand/uL 8 09 8 93   HEMOGLOBIN g/dL 13 8 14 8   HEMATOCRIT % 42 6 44 1   PLATELETS Thousands/uL 140* 157   NEUTROS PCT %  --  72   LYMPHS PCT %  --  19   MONOS PCT %  --  7   EOS PCT %  --  1     Results from last 7 days   Lab Units 21  0446 21  0549   SODIUM mmol/L 134* 137   POTASSIUM mmol/L 3 3* 3 8   CHLORIDE mmol/L 101 102   CO2 mmol/L 30 25   BUN mg/dL 30* 19   CREATININE mg/dL 1 33* 1 34*   ANION GAP mmol/L 3* 10   CALCIUM mg/dL 8 9 8 5   ALBUMIN g/dL  --  3 0*   TOTAL BILIRUBIN mg/dL  --  1 49*   ALK PHOS U/L  --  97   ALT U/L  --  23   AST U/L  --  24 GLUCOSE RANDOM mg/dL 88 154*     Results from last 7 days   Lab Units 09/11/21  1609   INR  1 18         Results from last 7 days   Lab Units 09/15/21  0504   HEMOGLOBIN A1C % 5 7*                        Last 24 Hours Medication List:   Current Facility-Administered Medications   Medication Dose Route Frequency Provider Last Rate    acetaminophen  650 mg Oral Q4H PRN Harini House MD      aluminum-magnesium hydroxide-simethicone  30 mL Oral Q4H PRN Harini House MD      carvedilol  6 25 mg Oral BID With Meals Harini House MD      enoxaparin  40 mg Subcutaneous Daily Harini House MD      [START ON 9/17/2021] furosemide  20 mg Oral Daily Hao Valdez PA-C      hydrALAZINE  25 mg Oral Q8H [address] Hao Valdez PA-C      nicotine  14 mg Transdermal Daily Harini House MD      potassium chloride  20 mEq Oral Once Hao Valdez PA-C      sodium chloride (PF)  3 mL Intravenous Q1H PRN Harini House MD          Today, Patient Was Seen By: Arabella Watts MD    ** Please Note: Dictation voice to text software may have been used in the creation of this document   **

## 2021-09-17 PROBLEM — R76.8 RHEUMATOID FACTOR POSITIVE: Status: ACTIVE | Noted: 2021-09-16

## 2021-09-17 LAB
ANION GAP SERPL CALCULATED.3IONS-SCNC: 5 MMOL/L (ref 4–13)
BUN SERPL-MCNC: 26 MG/DL (ref 5–25)
CALCIUM SERPL-MCNC: 8.9 MG/DL (ref 8.3–10.1)
CHLORIDE SERPL-SCNC: 103 MMOL/L (ref 100–108)
CO2 SERPL-SCNC: 27 MMOL/L (ref 21–32)
CREAT SERPL-MCNC: 1.28 MG/DL (ref 0.6–1.3)
ERYTHROCYTE [DISTWIDTH] IN BLOOD BY AUTOMATED COUNT: 13.8 % (ref 11.6–15.1)
GFR SERPL CREATININE-BSD FRML MDRD: 58 ML/MIN/1.73SQ M
GLUCOSE SERPL-MCNC: 90 MG/DL (ref 65–140)
HCT VFR BLD AUTO: 42.3 % (ref 36.5–49.3)
HGB BLD-MCNC: 13.7 G/DL (ref 12–17)
MCH RBC QN AUTO: 28.4 PG (ref 26.8–34.3)
MCHC RBC AUTO-ENTMCNC: 32.4 G/DL (ref 31.4–37.4)
MCV RBC AUTO: 88 FL (ref 82–98)
PLATELET # BLD AUTO: 156 THOUSANDS/UL (ref 149–390)
PMV BLD AUTO: 12.7 FL (ref 8.9–12.7)
POTASSIUM SERPL-SCNC: 4 MMOL/L (ref 3.5–5.3)
RBC # BLD AUTO: 4.83 MILLION/UL (ref 3.88–5.62)
RYE IGE QN: NEGATIVE
SODIUM SERPL-SCNC: 135 MMOL/L (ref 136–145)
WBC # BLD AUTO: 9.57 THOUSAND/UL (ref 4.31–10.16)

## 2021-09-17 PROCEDURE — 99232 SBSQ HOSP IP/OBS MODERATE 35: CPT | Performed by: INTERNAL MEDICINE

## 2021-09-17 PROCEDURE — 80048 BASIC METABOLIC PNL TOTAL CA: CPT | Performed by: FAMILY MEDICINE

## 2021-09-17 PROCEDURE — 85027 COMPLETE CBC AUTOMATED: CPT | Performed by: FAMILY MEDICINE

## 2021-09-17 PROCEDURE — 99232 SBSQ HOSP IP/OBS MODERATE 35: CPT | Performed by: FAMILY MEDICINE

## 2021-09-17 RX ORDER — HYDRALAZINE HYDROCHLORIDE 25 MG/1
25 TABLET, FILM COATED ORAL EVERY 8 HOURS SCHEDULED
Qty: 90 TABLET | Refills: 0 | Status: SHIPPED | OUTPATIENT
Start: 2021-09-17 | End: 2021-09-17 | Stop reason: HOSPADM

## 2021-09-17 RX ORDER — CARVEDILOL 6.25 MG/1
6.25 TABLET ORAL EVERY 12 HOURS SCHEDULED
Status: DISCONTINUED | OUTPATIENT
Start: 2021-09-17 | End: 2021-09-18 | Stop reason: HOSPADM

## 2021-09-17 RX ORDER — FUROSEMIDE 20 MG/1
20 TABLET ORAL DAILY
Qty: 30 TABLET | Refills: 0 | Status: SHIPPED | OUTPATIENT
Start: 2021-09-18 | End: 2021-10-06 | Stop reason: SDUPTHER

## 2021-09-17 RX ORDER — CARVEDILOL 6.25 MG/1
6.25 TABLET ORAL EVERY 12 HOURS SCHEDULED
Qty: 60 TABLET | Refills: 0 | Status: SHIPPED | OUTPATIENT
Start: 2021-09-17 | End: 2021-10-06 | Stop reason: SDUPTHER

## 2021-09-17 RX ADMIN — NICOTINE 14 MG: 14 PATCH, EXTENDED RELEASE TRANSDERMAL at 09:50

## 2021-09-17 RX ADMIN — ENOXAPARIN SODIUM 40 MG: 40 INJECTION SUBCUTANEOUS at 09:50

## 2021-09-17 RX ADMIN — SACUBITRIL AND VALSARTAN 1 TABLET: 49; 51 TABLET, FILM COATED ORAL at 17:35

## 2021-09-17 RX ADMIN — CARVEDILOL 6.25 MG: 6.25 TABLET, FILM COATED ORAL at 22:24

## 2021-09-17 RX ADMIN — FUROSEMIDE 20 MG: 20 TABLET ORAL at 09:50

## 2021-09-17 RX ADMIN — CARVEDILOL 6.25 MG: 6.25 TABLET, FILM COATED ORAL at 09:50

## 2021-09-17 RX ADMIN — SACUBITRIL AND VALSARTAN 1 TABLET: 49; 51 TABLET, FILM COATED ORAL at 09:50

## 2021-09-17 RX ADMIN — HYDRALAZINE HYDROCHLORIDE 25 MG: 25 TABLET, FILM COATED ORAL at 05:40

## 2021-09-17 NOTE — DISCHARGE INSTRUCTIONS
1  Please see the post cardiac catheterization instructions  No heavy lifting, greater than 10 lbs  or strenuous activity for 48 hrs  2 Remove band aid tomorrow  Shower and wash area- wrist gently with soap and water- beginning tomorrow  Rinse and pat dry  Apply new water seal band aid  Repeat this process for 5 days  No powders, creams lotions or antibiotic ointments  for 5 days  No tub baths, hot tubs or swimming for 5 days  3  No driving for 2 days      Left Heart Catheterization   WHAT YOU NEED TO KNOW:   A left heart catheterization is a procedure to look at your heart and its arteries  You may need this procedure if you have chest pain, heart disease, or your heart is not working as it should  DISCHARGE INSTRUCTIONS:   Call 911 for any of the following:   · You have any of the following signs of a heart attack:      ? Squeezing, pressure, or pain in your chest     ? and  any of the following:     ? Discomfort or pain in your back, neck, jaw, stomach, or arm     ? Shortness of breath    ? Nausea or vomiting    ? Lightheadedness or a sudden cold sweat    · You have any of the following signs of a stroke:      ? Numbness or drooping on one side of your face     ? Weakness in an arm or leg    ? Confusion or difficulty speaking    ? Dizziness, a severe headache, or vision loss    Seek care immediately if:   · Your arm or leg feels warm, tender, and painful  It may look swollen and red  · The leg or arm used for your angiogram is numb, painful, or changes color  · The bruise at your catheter site gets bigger or becomes swollen  · Your wound does not stop bleeding even after you apply firm pressure for 15 minutes  · You have weakness in an arm or leg  · You become confused or have difficulty speaking  · You have dizziness, a severe headache, or vision loss  Contact your healthcare provider if:   · You have a fever  · Your catheter site is red, leaks pus, or smells bad  · You have increasing pain at your catheter site  · You have questions or concerns about your condition or care  Limit activity as directed:   · Avoid unnecessary stair climbing for 48 hours, if a catheter was put in your groin  · Do not place pressure on your arm, hand, or wrist, if the catheter was placed in your wrist  Avoid pushing, pulling, or heavy lifting with that arm  · If you need to cough, support the area where the catheter was inserted with your hand  · Ask your healthcare provider how long you need to limit movement and avoid certain activities  · You may feel like resting more after your procedure  Slowly start to do more each day  Rest when you feel it is needed  Keep your bandage clean and dry:  Ask your healthcare provider when you can bathe and shower  Do not take baths or go in pools or hot tubs  Check your site every day for signs of infection such as swelling, redness, or pus  Drink liquids as directed:  Liquids help flush the dye used for your procedure out of your body  Ask your healthcare provider how much liquid to drink each day, and which liquids to drink  Some foods, such as soup and fruit, also provide liquid  Limit alcohol:  Do not drink alcohol for 24 hours after your procedure  Then limit alcohol  Women should limit alcohol to 1 drink a day  Men should limit alcohol to 2 drinks a day  A drink of alcohol is 12 ounces of beer, 5 ounces of wine, or 1½ ounces of liquor  Do not smoke:  Nicotine and other chemicals in cigarettes and cigars can damage your blood vessels  Ask your healthcare provider for information if you currently smoke and need help to quit  E-cigarettes or smokeless tobacco still contain nicotine  Talk to your healthcare provider before you use these products  Follow up with your healthcare provider as directed:  Write down your questions so you remember to ask them during your visits    © Copyright [employer] 2018 Information is for End User's use only and may not be sold, redistributed or otherwise used for commercial purposes  All illustrations and images included in CareNotes® are the copyrighted property of [employer]  or Cydney Laureano  The above information is an  only  It is not intended as medical advice for individual conditions or treatments  Talk to your doctor, nurse or pharmacist before following any medical regimen to see if it is safe and effective for you     --------------------------------------------------------------------------------------------------------------------------------------------------------------  Heart Failure   WHAT YOU NEED TO KNOW:   Heart failure is a condition that does not allow your heart to fill or pump properly  Not enough oxygen in your blood gets to your organs and tissues  Fluid may not move through your body properly  Fluid builds up and causes swelling and trouble breathing  This is known as congestive heart failure  Heart failure may start in the left or right ventricle  Heart failure is often caused by damage or injury to your heart  The damage may be caused by other heart problems, diabetes, or high blood pressure  The damage may have also been caused by an infection  Heart failure is a long-term condition that tends to get worse over time  It is important to manage your health to improve your quality of life  DISCHARGE INSTRUCTIONS:   Call your local emergency number (911 in the [address] [facility]) if:   · You have any of the following signs of a heart attack:      ? Squeezing, pressure, or pain in your chest    ? You may  also have any of the following:     § Discomfort or pain in your back, neck, jaw, stomach, or arm    § Shortness of breath    § Nausea or vomiting    § Lightheadedness or a sudden cold sweat      Call your doctor if:   · Your heartbeat is fast, slow, or uneven all the time  · You have symptoms of worsening heart failure:      ?  Shortness of breath at rest, at night, or that is getting worse in any way    ? Weight gain of 3 or more pounds (1 4 kg) in a day, or more than your healthcare provider says is okay    ? More swelling in your legs or ankles    ? Abdominal pain or swelling    ? More coughing    ? Loss of appetite    ? Feeling tired all the time    · You feel hopeless or depressed, or you have lost interest in things you used to enjoy  · You often feel worried or afraid  · You have questions or concerns about your condition or care  Medicines:   · Medicines  may be given to help regulate your heart rhythm and lower your blood pressure  You may also need medicines to help decrease extra fluids  Medicines, such as NSAIDs, may be stopped if they are causing your heart failure to become worse  Do not stop any of your medicines on your own  · Take your medicine as directed  Contact your healthcare provider if you think your medicine is not helping or if you have side effects  Tell him or her if you are allergic to any medicine  Keep a list of the medicines, vitamins, and herbs you take  Include the amounts, and when and why you take them  Bring the list or the pill bottles to follow-up visits  Carry your medicine list with you in case of an emergency  Go to cardiac rehab if directed:  Cardiac rehab is a program run by specialists who will help you safely strengthen your heart  In the program you will learn about exercise, relaxation, stress management, and heart-healthy nutrition  Manage swelling from extra fluid:   · Elevate (raise) your legs above the level of your heart  This will help with fluid that builds up in your legs or ankles  Elevate your legs as often as possible during the day  Prop your legs on pillows or blankets to keep them elevated comfortably  Try not to stand for long periods of time during the day  Move around to keep your blood circulating  · Limit sodium (salt)  Ask how much sodium you can have each day   Your healthcare provider may give you a limit, such as 2,300 milligrams (mg) a day  Your provider or a dietitian can teach you how to read food labels for the number of mg in a food  He or she can also help you find ways to have less salt  For example, if you add salt to food as you cook, do not add more at the table  · Drink liquids as directed  You may need to limit the amount of liquid you drink within 24 hours  Your healthcare provider will tell you how much liquid to have and which liquids are best for you  He or she may tell you to limit liquid to 1 5 to 2 liters in a day  He or she will also tell you how often to drink liquid throughout the day  · Weigh yourself every morning  Use the same scale, in the same spot  Do this after you use the bathroom, but before you eat or drink  Wear the same type of clothing each time  Write down your weight and call your healthcare provider if you have a sudden weight gain  Swelling and weight gain are signs of fluid buildup  Manage heart failure: Your quality of life may improve with treatment and the following:  · Do not smoke  Nicotine and other chemicals in cigarettes and cigars can cause lung and heart damage  Ask your healthcare provider for information if you currently smoke and need help to quit  E-cigarettes or smokeless tobacco still contain nicotine  Talk to your healthcare provider before you use these products  · Do not drink alcohol or use illegal drugs  Alcohol and drugs can increase your risk for high blood pressure, diabetes, and coronary artery disease  · Eat heart-healthy foods  Heart-healthy foods include fruits, vegetables, lean meat (such as beef, chicken, or pork), and low-fat dairy products  Fatty fish such as salmon and tuna are also heart healthy  Other heart-healthy foods include walnuts, whole-grain breads, beans, and cooked beans  Replace butter and margarine with heart-healthy oils such as olive oil or canola oil   Your provider or a dietitian can help you create heart-healthy meal plans  · Manage any chronic health conditions you have  These include high blood pressure, diabetes, obesity, high cholesterol, metabolic syndrome, and COPD  You will have fewer symptoms if you manage these health conditions  Follow your healthcare provider's recommendations and follow up with him or her regularly  · Maintain a healthy weight  Being overweight can increase your risk for high blood pressure, diabetes, and coronary artery disease  These conditions can make your symptoms worse  Ask your healthcare provider how much you should weigh  Ask him or her to help you create a weight loss plan if you are overweight  · Stay active  Activity can help keep your symptoms from getting worse  Walking is a type of physical activity that helps maintain your strength and improve your mood  Physical activity also helps you manage your weight  Work with your healthcare provider to create an exercise plan that is right for you  · Get vaccines as directed  The flu and pneumonia can be severe for a person who has heart failure  Vaccines protect you from these infections  Get a flu shot every year as soon as it is recommended, usually in September or October  You may also need the pneumonia vaccine  Your healthcare provider can tell you if you need other vaccines, and when to get them  Follow up with your doctor within 7 days and as directed: You may need to return for other tests  You may need home health care  A healthcare provider will monitor your vital signs, weight, and make sure your medicines are working  Write down your questions so you remember to ask them during your visits  Join a support group:  Heart failure can be difficult to manage  It may be helpful to talk with others who have heart failure  You may learn how to better manage your condition or get emotional support   For more information:  · American [employer] Association  [address]   Phone: 8- 253 - [phone]  Web Address: https://[employer]/  [address] \[facility] [place] [facility]\"" 2021 Information is for End User's use only and may not be sold, redistributed or otherwise used for commercial purposes  All illustrations and images included in CareNotes® are the copyrighted property of [employer]  or [facility] Josesito Martini   The above information is an  only  It is not intended as medical advice for individual conditions or treatments  Talk to your doctor, nurse or pharmacist before following any medical regimen to see if it is safe and effective for you

## 2021-09-17 NOTE — PROGRESS NOTES
Advanced Heart Failure / Pulmonary Hypertension Service Progress Note    Tg Saravia 78 y o  male   MRN: [id number]  Unit/Bed#: [facility] 409-01; Encounter: [id number]    Assessment:  Principal Problem:    Acute systolic (congestive) heart failure (HCC)  Active Problems:    Stage 3a chronic kidney disease ([facility] )    Tobacco abuse    Protein calorie malnutrition ([facility] )    Rheumatoid factor positive      Subjective:   Tg Saravia is a 79-year-old man with no PMH (had not seen by providers in over 10 years) who presented to [facility] on 09/11/2021 with worsening SOB and productive cough for about 2 days and bilateral LE swelling for the last 2 weeks, per documentation  Upon arrival, patient noted to be hypertensive (209/121 mmHg) and tachycardic  Workup in ED revealed troponin peaking at 0 07 with NT proBNP of 23,164  CTA chest with moderate right and small left pleural effusions; no evidence of PE  Was started on IV Lasix (received 2 doses of 40 mg in total) and also given albuterol and Solu-Medrol  Cardiology consulted, and TTE completed revealing LVEF 25%  Started on carvedilol for BP control  Transitioned to PO Lasix on 09/13  Transferred to [address] [place] [address] on 09/14/2021 for HF evaluation and LHC  Patient seen and examined  No significant events overnight  Reviewed [facility] results  Marcia Howell for discharge; reports he has a lot of things to do at home (including grocery shopping for his wife and 2 sons)  Feeling fatigued today  Objective: Intake/ Output: Not accurate  Weight: 123 lbs (125 lbs on 09/16)  Telemetry: NSR, rates in 80s  Today's Plan:   Start Entresto 49-51 mg BID  Rx sent down to [facility] for price check   Discontinue hydralazine   Discussed LifeVest today, patient agreeable if affordable  Paperwork submitted to case management  Okay for home fitting if necessary   [facility] yesterday with 30% stenosis of mid RCA  Cardiomyopathy out of proportion to CAD   Plan for outpatient cMRI   Plan to add on spironolactone at hospital follow-up if MAP >70 and eGFR remains >30   JORGE LUIS and SPEP in process   Will consider outpatient PSG   Interested in receiving COVID vaccinations; plan to have completed as outpatient   Stable for discharge today from cardiology standpoint  Hospital follow-up scheduled for 09/24/2021 at 4 PM at [place] office  Plan:  Newly diagnosed HFrEF; LVEF 25%; LVIDd 4 76 cm; NYHA II; ACC/AHA Stage B              Etiology: Nonischemic  Possible HTN heart disease  No substance abuse history; daily alcohol use Non-reactive HIV; unremarkable UDS and UPEP  RF positive  TTE 09/13/2021: LVEF 25%  LVIDd 4 76 cm  IVSd 1 14 cm  Severe diffuse hypokinesis  Reduced RVSF  Moderate MR  Mild TR  PASP 40 mmHg  [facility] 09/16/2021: 30% stenosis of mid RCA  Reviewed importance of low sodium diet and fluid restriction  Strict I&Os with daily weights  CV diet with fluid restriction recommended      Neurohormonal Blockade:  --Beta Blocker: carvedilol 6 25 mg q12 hours  --ARNi / ACEi / ARB: Entresto 49-51 mg BID  --Aldosterone Antagonist: No    --SGLT2 Inhibitor: No   --Home Diuretic: None  --Inpatient Diuretic: PO Lasix 20 mg daily      Sudden Cardiac Death Risk Reduction:  --LVEF 25%  Agreeable to LifeVest, paperwork in process  --Plan to reassess LVEF with limited TTE in mid 12/2021        Electrical Resynchronization:  --Candidacy for BiV device: narrow QRS     Advanced Therapies (if appropriate): Will continue to monitor      Chronic kidney disease, stage III              Baseline creatinine: unclear  Today, creatinine of 1 28 (1 33 on 09/16)     Hypertensive urgency / Hypertension  History of tobacco abuse: last smoked about 1 month ago  Rheumatoid factor positive  Health maintenance: unvaccinated against COVID-19      Vitals:   Blood pressure 122/59, pulse 75, temperature 97 9 °F (36 6 °C), temperature source Oral, resp  rate 17, weight 56 kg (123 lb 6 4 oz), SpO2 96 %  Body mass index is 19 92 kg/m²  I/O last 3 completed shifts: In: 476 [P O :476]  Out: 100 [Urine:100]    Wt Readings from Last 3 Encounters:   09/17/21 56 kg (123 lb 6 4 oz)   09/14/21 58 9 kg (129 lb 13 6 oz)     Vitals:    09/17/21 0543 09/17/21 0700 09/17/21 1100 09/17/21 1157   BP:  (!) 172/86 120/59 122/59   BP Location:  Right arm     Pulse:  85 73 75   Resp:  16 17    Temp:  98 1 °F (36 7 °C) 97 9 °F (36 6 °C)    TempSrc:  Oral Oral    SpO2:  96%     Weight: 56 kg (123 lb 6 4 oz)          Physical Exam  Vitals reviewed  Constitutional:       General: He is awake  He is not in acute distress  Appearance: Normal appearance  He is well-developed and normal weight  HENT:      Head: Normocephalic  Nose: Nose normal       Mouth/Throat:      Mouth: Mucous membranes are moist    Eyes:      General: No scleral icterus  Conjunctiva/sclera: Conjunctivae normal    Neck:      Vascular: No JVD  Trachea: No tracheal deviation  Cardiovascular:      Rate and Rhythm: Normal rate and regular rhythm  No extrasystoles are present  Pulses: Normal pulses  Heart sounds: Murmur heard  Comments: Radial access site without hematoma or active bleeding  Pulmonary:      Effort: Pulmonary effort is normal  No tachypnea, bradypnea or respiratory distress  Breath sounds: Normal air entry  No decreased breath sounds, rhonchi or rales  Abdominal:      General: Bowel sounds are normal  There is no distension  Palpations: Abdomen is soft  Tenderness: There is no abdominal tenderness  Musculoskeletal:      Cervical back: Neck supple  Right lower leg: No edema  Left lower leg: No edema  Skin:     General: Skin is warm and dry  Coloration: Skin is not jaundiced or pale  Neurological:      General: No focal deficit present        Mental Status: He is alert and oriented to person, place, and time  Psychiatric:         Attention and Perception: Attention normal          Mood and Affect: Mood and affect normal          Speech: Speech normal          Behavior: Behavior normal  Behavior is cooperative  Thought Content:  Thought content normal      Central Line (day, reason): No    Pinzon Catheter (day, reason): No      Current Facility-Administered Medications:     acetaminophen (TYLENOL) tablet 650 mg, 650 mg, Oral, Q4H PRN, Derrick Fothergill, MD    aluminum-magnesium hydroxide-simethicone (MYLANTA) oral suspension 30 mL, 30 mL, Oral, Q4H PRN, Derrick Fothergill, MD    carvedilol (COREG) tablet 6 25 mg, 6 25 mg, Oral, Q12H [address], Mare Valdez PA-C, 6 25 mg at 09/17/21 0950    enoxaparin (LOVENOX) subcutaneous injection 40 mg, 40 mg, Subcutaneous, Daily, Derrick Fothergill, MD, 40 mg at 09/17/21 0950    furosemide (LASIX) tablet 20 mg, 20 mg, Oral, Daily, Mare Valdez PA-C, 20 mg at 09/17/21 0950    nicotine (NICODERM CQ) 14 mg/24hr TD 24 hr patch 14 mg, 14 mg, Transdermal, Daily, Derrick Fothergill, MD, 14 mg at 09/17/21 0950    ondansetron (ZOFRAN) injection 4 mg, 4 mg, Intravenous, Q6H PRN, Taiwo Steinberg MD    sacubitril-valsartan (ENTRESTO) 49-51 MG per tablet 1 tablet, 1 tablet, Oral, BID, Adelaide Carrasco PA-C, 1 tablet at 09/17/21 0950    [COMPLETED] Insert peripheral IV, , , Once **AND** sodium chloride (PF) 0 9 % injection 3 mL, 3 mL, Intravenous, Q1H PRN, Derrick Fothergill, MD    Labs & Results:  Results from last 7 days   Lab Units 09/12/21  0223 09/11/21  2231 09/11/21 2005 09/11/21  1609   CK TOTAL U/L  --   --   --  64   TROPONIN I ng/mL 0 03 0 07* 0 06* 0 04     Results from last 7 days   Lab Units 09/17/21  0457 09/16/21  0446 09/11/21  1609   WBC Thousand/uL 9 57 8 09 8 93   HEMOGLOBIN g/dL 13 7 13 8 14 8   HEMATOCRIT % 42 3 42 6 44 1   PLATELETS Thousands/uL 156 140* 157         Results from last 7 days   Lab Units 09/17/21  0457 09/16/21  0446 09/15/21  0504 09/12/21  0549 09/11/21  1609   POTASSIUM mmol/L 4 0 3 3* 4 0 3 8 3 5   CHLORIDE mmol/L 103 101 102 102 100   CO2 mmol/L 27 30 29 25 25   BUN mg/dL 26* 30* 34* 19 15   CREATININE mg/dL 1 28 1 33* 1 30 1 34* 1 26   CALCIUM mg/dL 8 9 8 9 8 8 8 5 9 1   ALK PHOS U/L  --   --   --  97 127*   ALT U/L  --   --   --  23 23   AST U/L  --   --   --  24 31     Results from last 7 days   Lab Units 09/11/21  1609   INR  1 18     Mary Sousa PA-C

## 2021-09-17 NOTE — PROGRESS NOTES
[address] [facility]  Progress Note - Bernadine Ortega 1955, 78 y o  male MRN: [id number]  Unit/Bed#: [facility] 409-01 Encounter: [id number]  Primary Care Provider: Rudy Pascual MD   Date and time admitted to hospital: 9/14/2021  6:08 PM    * Acute systolic (congestive) heart failure [facility])  Assessment & Plan  Wt Readings from Last 3 Encounters:   09/17/21 56 kg (123 lb 6 4 oz)   09/14/21 58 9 kg (129 lb 13 6 oz)     · Originally presented to  with shortness of breath, ProBNP >71919  · CTA is negative for acute PE  · Newly diagnosed acute systolic congestive heart failure  · Patient transferred to [place] for HF evaluation and cardiac cath- tentatively today  · EF 25%  · Normal TSH and lipid panel except for low HDL  · Started Furosemide 40 mg p o  Daily  · Started on Coreg  · Started on [place] [place], sent meds to home start for Loni checking  · As an 2 g salt diet fluid restriction  · Cardiology following  · Patient will need LifeVest on discharge, likely tomorrow  Rheumatoid factor positive  Assessment & Plan  No other signs or symptoms, found positive on routine lab works via heart failure  Follow-up with PCP outpatient    Protein calorie malnutrition ([address] )  Assessment & Plan  Encouraged adequate protein and calorie intake  Nutrition therapy    Tobacco abuse  Assessment & Plan  Tobacco cessation discussed    Stage 3a chronic kidney disease [facility])  Assessment & Plan  Lab Results   Component Value Date    EGFR 58 09/17/2021    EGFR 55 09/16/2021    EGFR 56 09/15/2021    CREATININE 1 28 09/17/2021    CREATININE 1 33 (H) 09/16/2021    CREATININE 1 30 09/15/2021     Monitor kidney function closely  Avoid nephrotoxins  Monitor postvoid residuals      VTE Pharmacologic Prophylaxis:   Pharmacologic: Enoxaparin (Lovenox)  Mechanical VTE Prophylaxis in Place: Yes    Patient Centered Rounds: I have performed bedside rounds with nursing staff today      Discussions with Specialists or Other Care Team Provider:  Cardiology    Education and Discussions with Family / Patient:  Patient at bedside        Current Length of Stay: 3 day(s)    Current Patient Status: Inpatient   Certification Statement: The patient will continue to require additional inpatient hospital stay due to Pending set up with outpatient medication, pending LifeVest    Discharge Plan:  Home likely tomorrow, after LifeVest fitted, after outpatient medication sorted out    Code Status: Level 1 - Full Code      Subjective:   Patient examined at bedside  Not in distress  Denies any dyspnea, or any other complaints at this point  No leg swelling  Objective:     Vitals:   Temp (24hrs), Av 1 °F (36 7 °C), Min:97 9 °F (36 6 °C), Max:98 3 °F (36 8 °C)    Temp:  [97 9 °F (36 6 °C)-98 3 °F (36 8 °C)] 97 9 °F (36 6 °C)  HR:  [65-85] 75  Resp:  [16-17] 17  BP: (120-179)/(59-86) 122/59  SpO2:  [96 %-100 %] 96 %  Body mass index is 19 92 kg/m²  Input and Output Summary (last 24 hours): Intake/Output Summary (Last 24 hours) at 2021 1256  Last data filed at 2021 1732  Gross per 24 hour   Intake 476 ml   Output 100 ml   Net 376 ml       Physical Exam:     Physical Exam  Vitals and nursing note reviewed  Constitutional:       General: He is not in acute distress  Appearance: Normal appearance  HENT:      Head: Normocephalic and atraumatic  Right Ear: External ear normal       Left Ear: External ear normal       Nose: Nose normal    Eyes:      Extraocular Movements: Extraocular movements intact  Pulmonary:      Effort: Pulmonary effort is normal    Musculoskeletal:      Cervical back: Normal range of motion  Neurological:      Mental Status: He is alert  Mental status is at baseline     Psychiatric:         Mood and Affect: Mood normal          Results from last 7 days   Lab Units 21  0457 21  1609   WBC Thousand/uL 9 57 8 93   HEMOGLOBIN g/dL 13 7 14 8   HEMATOCRIT % 42 3 44 1   PLATELETS Thousands/uL 156 157   NEUTROS PCT %  --  72   LYMPHS PCT %  --  19   MONOS PCT %  --  7   EOS PCT %  --  1     Results from last 7 days   Lab Units 09/17/21  0457 09/12/21  0549   SODIUM mmol/L 135* 137   POTASSIUM mmol/L 4 0 3 8   CHLORIDE mmol/L 103 102   CO2 mmol/L 27 25   BUN mg/dL 26* 19   CREATININE mg/dL 1 28 1 34*   ANION GAP mmol/L 5 10   CALCIUM mg/dL 8 9 8 5   ALBUMIN g/dL  --  3 0*   TOTAL BILIRUBIN mg/dL  --  1 49*   ALK PHOS U/L  --  97   ALT U/L  --  23   AST U/L  --  24   GLUCOSE RANDOM mg/dL 90 154*     Results from last 7 days   Lab Units 09/11/21  1609   INR  1 18         Results from last 7 days   Lab Units 09/15/21  0504   HEMOGLOBIN A1C % 5 7*                      Last 24 Hours Medication List:   Current Facility-Administered Medications   Medication Dose Route Frequency Provider Last Rate    acetaminophen  650 mg Oral Q4H PRN Gina Sam MD      aluminum-magnesium hydroxide-simethicone  30 mL Oral Q4H PRN Gina Sam MD      carvedilol  6 25 mg Oral Q12H [address] Mare Mathews PA-C      enoxaparin  40 mg Subcutaneous Daily Gina Sam MD      furosemide  20 mg Oral Daily Gavino Jameson PA-C      nicotine  14 mg Transdermal Daily Gina Sam MD      ondansetron  4 mg Intravenous Q6H PRN Mellissa Strange MD      sacubitril-valsartan  1 tablet Oral BID Gavino Jameson PA-C      sodium chloride (PF)  3 mL Intravenous Q1H PRN Gina Sam MD          Today, Patient Was Seen By: Rosangela Tom MD    ** Please Note: Dictation voice to text software may have been used in the creation of this document   **

## 2021-09-17 NOTE — CASE MANAGEMENT
OP HF LCSW met with pt and HF Team     Addressed pt's use of ETOH  Pt has not made any decisions to quit drinking  He stated that he mostly drinks when he dines out and drinks about 1-2 drinks per day  Offered HOST for ETOH tx support however pt declined  Pt has HF Educational Booklet on his bedside table and stated that he's reading it  Encouraged pt to make healthy choices for his fluid intake since it's a limited amount

## 2021-09-17 NOTE — ASSESSMENT & PLAN NOTE
Lab Results   Component Value Date    EGFR 58 09/17/2021    EGFR 55 09/16/2021    EGFR 56 09/15/2021    CREATININE 1 28 09/17/2021    CREATININE 1 33 (H) 09/16/2021    CREATININE 1 30 09/15/2021     Monitor kidney function closely  Avoid nephrotoxins  Monitor postvoid residuals

## 2021-09-17 NOTE — ASSESSMENT & PLAN NOTE
Wt Readings from Last 3 Encounters:   09/17/21 56 kg (123 lb 6 4 oz)   09/14/21 58 9 kg (129 lb 13 6 oz)     · Originally presented to  with shortness of breath, ProBNP >30582  · CTA is negative for acute PE  · Newly diagnosed acute systolic congestive heart failure  · Patient transferred to [place] for HF evaluation and cardiac cath- tentatively today  · EF 25%  · Normal TSH and lipid panel except for low HDL  · Started Furosemide 40 mg p o  Daily  · Started on Coreg  · Started on [place] [place], sent meds to home start for Loni checking  · As an 2 g salt diet fluid restriction  · Cardiology following  · Patient will need LifeVest on discharge, likely tomorrow

## 2021-09-17 NOTE — CASE MANAGEMENT
Discharge planning continues  Possible d/c home on 9/18/21  Pt  Will have [employer] [facility] [place] on 9/21  Medicines are 100% covered by [facility]  Sherley Wilkinson CM director aware  [facility]  Pt educated about RX insurance when open enrollment starts 10/1/21  He has good understanding  Anticipate d/c home 1-2 days  Family transporting  Pt  Is eligible for 100% Mary Carmen care  Entresto, Lasix and Carvedilol priced at $1747 32   Sherley calhoun aware  Form completed and faxed to [address] for tomorrow  Life Vest will be delivered tomorrow at 1000

## 2021-09-17 NOTE — ASSESSMENT & PLAN NOTE
No other signs or symptoms, found positive on routine lab works via heart failure    Follow-up with PCP outpatient

## 2021-09-18 VITALS
SYSTOLIC BLOOD PRESSURE: 91 MMHG | BODY MASS INDEX: 19.64 KG/M2 | OXYGEN SATURATION: 98 % | HEART RATE: 66 BPM | DIASTOLIC BLOOD PRESSURE: 59 MMHG | RESPIRATION RATE: 14 BRPM | TEMPERATURE: 97.7 F | WEIGHT: 121.69 LBS

## 2021-09-18 LAB
ALBUMIN SERPL ELPH-MCNC: 3.91 G/DL (ref 3.5–5)
ALBUMIN SERPL ELPH-MCNC: 58.4 % (ref 52–65)
ALPHA1 GLOB SERPL ELPH-MCNC: 0.3 G/DL (ref 0.1–0.4)
ALPHA1 GLOB SERPL ELPH-MCNC: 4.5 % (ref 2.5–5)
ALPHA2 GLOB SERPL ELPH-MCNC: 0.55 G/DL (ref 0.4–1.2)
ALPHA2 GLOB SERPL ELPH-MCNC: 8.2 % (ref 7–13)
BETA GLOB ABNORMAL SERPL ELPH-MCNC: 0.44 G/DL (ref 0.4–0.8)
BETA1 GLOB SERPL ELPH-MCNC: 6.6 % (ref 5–13)
BETA2 GLOB SERPL ELPH-MCNC: 5.7 % (ref 2–8)
BETA2+GAMMA GLOB SERPL ELPH-MCNC: 0.38 G/DL (ref 0.2–0.5)
GAMMA GLOB ABNORMAL SERPL ELPH-MCNC: 1.11 G/DL (ref 0.5–1.6)
GAMMA GLOB SERPL ELPH-MCNC: 16.6 % (ref 12–22)
IGG/ALB SER: 1.4 {RATIO} (ref 1.1–1.8)
PROT PATTERN SERPL ELPH-IMP: NORMAL
PROT SERPL-MCNC: 6.7 G/DL (ref 6.4–8.2)

## 2021-09-18 PROCEDURE — 99232 SBSQ HOSP IP/OBS MODERATE 35: CPT | Performed by: INTERNAL MEDICINE

## 2021-09-18 PROCEDURE — 99239 HOSP IP/OBS DSCHRG MGMT >30: CPT | Performed by: FAMILY MEDICINE

## 2021-09-18 RX ADMIN — SACUBITRIL AND VALSARTAN 1 TABLET: 49; 51 TABLET, FILM COATED ORAL at 09:40

## 2021-09-18 RX ADMIN — FUROSEMIDE 20 MG: 20 TABLET ORAL at 09:33

## 2021-09-18 RX ADMIN — NICOTINE 14 MG: 14 PATCH, EXTENDED RELEASE TRANSDERMAL at 09:33

## 2021-09-18 RX ADMIN — CARVEDILOL 6.25 MG: 6.25 TABLET, FILM COATED ORAL at 09:33

## 2021-09-18 NOTE — ASSESSMENT & PLAN NOTE
Lab Results   Component Value Date    EGFR 58 09/17/2021    EGFR 55 09/16/2021    EGFR 56 09/15/2021    CREATININE 1 28 09/17/2021    CREATININE 1 33 (H) 09/16/2021    CREATININE 1 30 09/15/2021

## 2021-09-18 NOTE — PROGRESS NOTES
General Cardiology Progress Note   Kristine Eden 78 y o  male MRN: [id number]  Unit/Bed#: [facility] 409-01 Encounter: [id number]      Assessment:  Principal Problem:    Acute systolic (congestive) heart failure (HCC)  Active Problems:    Stage 3a chronic kidney disease (HCC)    Tobacco abuse    Protein calorie malnutrition (HCC)    Rheumatoid factor positive      Impression:     HTN/alcoholic CMP   Acute systolic CHF  o EF 97%  o Coreg, entresto, furosemide   Minimal Nonobstructive CAD   CKD 3   Tobacco use    Plan:     euvolemic   Stable for DC    Subjective:   Patient seen and examined  No complaints for me    Review of Systems   Constitutional: Negative for diaphoresis, fever, malaise/fatigue and night sweats  Cardiovascular: Negative for chest pain, dyspnea on exertion, leg swelling, orthopnea, palpitations and syncope  Respiratory: Negative for cough, shortness of breath, sputum production and wheezing  Skin: Negative for itching and rash  Gastrointestinal: Negative for abdominal pain, change in bowel habit and diarrhea  Neurological: Negative for dizziness, focal weakness, light-headedness and weakness  Objective   Vitals: Blood pressure 120/67, pulse 76, temperature 97 7 °F (36 5 °C), temperature source Oral, resp  rate 16, weight 55 2 kg (121 lb 11 1 oz), SpO2 95 %  , Body mass index is 19 64 kg/m² , I/O last 3 completed shifts:  In: -   Out: 200 [Urine:200]  No intake/output data recorded  Wt Readings from Last 3 Encounters:   09/18/21 55 2 kg (121 lb 11 1 oz)   09/14/21 58 9 kg (129 lb 13 6 oz)       Intake/Output Summary (Last 24 hours) at 9/18/2021 5755  Last data filed at 9/17/2021 2201  Gross per 24 hour   Intake --   Output 200 ml   Net -200 ml     I/O last 3 completed shifts:  In: -   Out: 200 [Urine:200]    No significant arrhythmias seen on telemetry review  Physical Exam  Constitutional:       General: He is not in acute distress       Appearance: He is not diaphoretic  HENT:      Head: Normocephalic and atraumatic  Neck:      Vascular: No JVD  Cardiovascular:      Rate and Rhythm: Normal rate and regular rhythm  Heart sounds: Normal heart sounds  No murmur heard  Pulmonary:      Effort: Pulmonary effort is normal  No respiratory distress  Breath sounds: Normal breath sounds  No wheezing or rales  Abdominal:      General: Bowel sounds are normal  There is no distension  Palpations: Abdomen is soft  Tenderness: There is no abdominal tenderness  Musculoskeletal:      Cervical back: Normal range of motion and neck supple  Right lower leg: No edema  Left lower leg: No edema  Skin:     General: Skin is warm and dry  Neurological:      Mental Status: He is alert and oriented to person, place, and time           Meds/Allergies   No Known Allergies    Current Facility-Administered Medications:     acetaminophen (TYLENOL) tablet 650 mg, 650 mg, Oral, Q4H PRN, Neha Newman MD    aluminum-magnesium hydroxide-simethicone (MYLANTA) oral suspension 30 mL, 30 mL, Oral, Q4H PRN, Neha Newman MD    carvedilol (COREG) tablet 6 25 mg, 6 25 mg, Oral, Q12H [address], Mare Valdez PA-C, 6 25 mg at 09/17/21 2224    enoxaparin (LOVENOX) subcutaneous injection 40 mg, 40 mg, Subcutaneous, Daily, Neha Newman MD, 40 mg at 09/17/21 0950    furosemide (LASIX) tablet 20 mg, 20 mg, Oral, Daily, Mare Valdez PA-C, 20 mg at 09/17/21 0950    nicotine (NICODERM CQ) 14 mg/24hr TD 24 hr patch 14 mg, 14 mg, Transdermal, Daily, Neha Newman MD, 14 mg at 09/17/21 0950    ondansetron (ZOFRAN) injection 4 mg, 4 mg, Intravenous, Q6H PRN, Taiwo Steinberg MD    sacubitril-valsartan (ENTRESTO) 49-51 MG per tablet 1 tablet, 1 tablet, Oral, BID, Chiquita Rodriguez PA-C, 1 tablet at 09/17/21 6905    [COMPLETED] Insert peripheral IV, , , Once **AND** sodium chloride (PF) 0 9 % injection 3 mL, 3 mL, Intravenous, Q1H PRN, Alisia Jade MD    Laboratory Results:  Results from last 7 days   Lab Units 09/12/21 0223 09/11/21 2231 09/11/21 2005 09/11/21  1609   CK TOTAL U/L  --   --   --  64   TROPONIN I ng/mL 0 03 0 07* 0 06* 0 04       CBC with diff:   Results from last 7 days   Lab Units 09/17/21 0457 09/16/21  0446 09/11/21  1609   WBC Thousand/uL 9 57 8 09 8 93   HEMOGLOBIN g/dL 13 7 13 8 14 8   HEMATOCRIT % 42 3 42 6 44 1   MCV fL 88 88 86   PLATELETS Thousands/uL 156 140* 157   MCH pg 28 4 28 5 28 9   MCHC g/dL 32 4 32 4 33 6   RDW % 13 8 13 7 13 4   MPV fL 12 7 12 6 11 8   NRBC AUTO /100 WBCs  --   --  0       CMP:  Results from last 7 days   Lab Units 09/17/21  0457 09/16/21  0446 09/15/21  0504 09/14/21  0502 09/13/21  0556 09/12/21  0549 09/11/21  1609   SODIUM mmol/L 135* 134* 136 136 136 137 136   POTASSIUM mmol/L 4 0 3 3* 4 0 4 2 4 4 3 8 3 5   CHLORIDE mmol/L 103 101 102 100 101 102 100   CO2 mmol/L 27 30 29 28 30 25 25   BUN mg/dL 26* 30* 34* 31* 31* 19 15   CREATININE mg/dL 1 28 1 33* 1 30 1 40* 1 51* 1 34* 1 26   CALCIUM mg/dL 8 9 8 9 8 8 8 8 8 7 8 5 9 1   AST U/L  --   --   --   --   --  24 31   ALT U/L  --   --   --   --   --  23 23   ALK PHOS U/L  --   --   --   --   --  97 127*   EGFR ml/min/1 73sq m 58 55 56 52 47 54 59       BMP:  Results from last 7 days   Lab Units 09/17/21  0457 09/16/21  0446 09/15/21  0504 09/14/21  0502 09/13/21  0556 09/12/21  0549 09/11/21  1609   SODIUM mmol/L 135* 134* 136 136 136 137 136   POTASSIUM mmol/L 4 0 3 3* 4 0 4 2 4 4 3 8 3 5   CHLORIDE mmol/L 103 101 102 100 101 102 100   CO2 mmol/L 27 30 29 28 30 25 25   BUN mg/dL 26* 30* 34* 31* 31* 19 15   CREATININE mg/dL 1 28 1 33* 1 30 1 40* 1 51* 1 34* 1 26   CALCIUM mg/dL 8 9 8 9 8 8 8 8 8 7 8 5 9 1       NT-proBNP: No results for input(s): NTBNP in the last 72 hours       Magnesium:   Results from last 7 days   Lab Units 09/15/21  0504 09/12/21  0549 09/11/21  1609   MAGNESIUM mg/dL 2 5 1 9 1 9       Coags:   Results from last 7 days   Lab Units 21  1609   INR  1 18       TSH:    Results from last 7 days   Lab Units 21  0549   TSH 3RD GENERATON uIU/mL 0 581       Hemoglobin A1C )  Results from last 7 days   Lab Units 09/15/21  0504   HEMOGLOBIN A1C % 5 7*       Lipid Profile:   No results found for: CHOL  Lab Results   Component Value Date    HDL 35 (L) 2021     Lab Results   Component Value Date    LDLCALC 73 2021     No results found for: LDLDIRECT  Lab Results   Component Value Date    TRIG 34 2021       Cardiac testing:   EKG personally reviewed by Megan Rodriguez MD      Results for orders placed during the hospital encounter of 21    Echo complete with contrast if indicated    Narrative  [address] [place] [address]    Transthoracic Echocardiogram  2D, M-mode, Doppler, and Color Doppler    Study date:  13-Sep-2021    Patient: Gabbie Abrams  MR number: [id number]  Account number: [de-identified]  : 1955  Age: 78 years  Gender: Male  Status: Inpatient  Location: [address] [place] [address]  Height: 66 in  Weight: 128 7 lb  BP: 144/ 85 mmHg    Indications: Shortness of Breath    Diagnoses: R06 02 - Shortness of breath    Sonographer:  Jose A Dukes RDCS, TRACEY  Referring Physician:  Purvi Grant MD  Group:  Melecio Wright  Interpreting Physician:  Dmitri Ramírez DO    SUMMARY    LEFT VENTRICLE:  Systolic function was severely reduced  Ejection fraction was estimated to be 25 %  There was severe diffuse hypokinesis  Wall thickness was increased  There was mild concentric hypertrophy  RIGHT VENTRICLE:  Systolic function was reduced  TAPSE 1 4 cm  LEFT ATRIUM:  The atrium was mildly dilated  MITRAL VALVE:  There was moderate regurgitation  AORTIC VALVE:  There was trace regurgitation  TRICUSPID VALVE:  There was mild regurgitation    Estimated peak PA pressure was 40 mmHg (assuming an estimated right atrial pressure of 10 mmHg given top normal IVC size and blunted IVC collapse)  The findings suggest mild pulmonary hypertension  PULMONIC VALVE:  There was mild regurgitation  IVC, HEPATIC VEINS:  The respirophasic change in diameter was less than 50%  PERICARDIUM:  There was a large left pleural effusion  COMPARISONS:  No prior study for comparison  HISTORY: PRIOR HISTORY: CHF,Htn    PROCEDURE: The study was performed in the [facility]  This was a routine study  The transthoracic approach was used  The study included complete 2D imaging, M-mode, complete spectral Doppler, and color Doppler  The heart  rate was 81 bpm, at the start of the study  Image quality was adequate  LEFT VENTRICLE: Size was normal  Systolic function was severely reduced  Ejection fraction was estimated to be 25 %  There was severe diffuse hypokinesis  Wall thickness was increased  There was mild concentric hypertrophy  DOPPLER: Left  ventricular diastolic function is abnormal     RIGHT VENTRICLE: The size was normal  Systolic function was reduced  TAPSE 1 4 cm  LEFT ATRIUM: The atrium was mildly dilated  RIGHT ATRIUM: Size was normal     MITRAL VALVE: Valve structure was normal  There was normal leaflet separation  DOPPLER: The transmitral velocity was within the normal range  There was no evidence for stenosis  There was moderate regurgitation  AORTIC VALVE: The valve was trileaflet  Leaflets exhibited mildly to moderately increased thickness, mild calcification, and normal cuspal separation  DOPPLER: Transaortic velocity was within the normal range  There was no evidence for  stenosis  There was trace regurgitation  TRICUSPID VALVE: The valve structure was normal  There was normal leaflet separation  DOPPLER: The transtricuspid velocity was within the normal range  There was no evidence for stenosis  There was mild regurgitation   Estimated peak PA  pressure was 40 mmHg (assuming an estimated right atrial pressure of 10 mmHg given top normal IVC size and blunted IVC collapse)  The findings suggest mild pulmonary hypertension  PULMONIC VALVE: Not well visualized  DOPPLER: There was mild regurgitation  PERICARDIUM: There was no pericardial effusion  There was a large left pleural effusion  The pericardium was normal in appearance  AORTA: The root exhibited normal size  SYSTEMIC VEINS: IVC: The inferior vena cava was top normal in size  The respirophasic change in diameter was less than 50%      SYSTEM MEASUREMENT TABLES    2D  %FS: 12 66 %  Ao Diam: 2 91 cm  Ao asc: 2 64 cm  EDV(Teich): 105 24 ml  EF(Teich): 27 26 %  ESV(Teich): 76 55 ml  HR 2Ch Q: 77 26 BPM  HR 4Ch Q: 79 3 BPM  IVSd: 1 14 cm  LA Diam: 3 97 cm  LAAs A4C: 14 04 cm2  LAESV A-L A4C: 32 79 ml  LAESV MOD A4C: 31 7 ml  LALs A4C: 5 1 cm  LVCO 2Ch Q: 3 55 L/min  LVCO 4Ch Q: 2 3 L/min  LVCO BiP Q: 2 93 L/min  LVEDV MOD A4C: 102 64 ml  LVEF 2Ch Q: 26 59 %  LVEF 4Ch Q: 24 12 %  LVEF BiP Q: 25 34 %  LVEF MOD A4C: 31 82 %  LVESV MOD A4C: 69 98 ml  LVIDd: 4 76 cm  LVIDs: 4 15 cm  LVLd 2Ch Q: 8 12 cm  LVLd 4Ch Q: 8 19 cm  LVLd A4C: 7 39 cm  LVLs 2Ch Q: 7 46 cm  LVLs 4Ch Q: 7 54 cm  LVLs A4C: 6 47 cm  LVPWd: 1 37 cm  LVSV 2Ch Q: 45 96 ml  LVSV 4Ch Q: 29 02 ml  LVSV BiP Q: 36 92 ml  LVVED 2Ch Q: 172 85 ml  LVVED 4Ch Q: 120 31 ml  LVVED BiP Q: 145 67 ml  LVVES 2Ch Q: 126 89 ml  LVVES 4Ch Q: 91 29 ml  LVVES BiP Q: 108 76 ml  RAAs A4C: 14 22 cm2  RAESV A-L: 40 17 ml  RAESV MOD: 39 48 ml  RALs: 4 27 cm  RVIDd: 3 44 cm  SV MOD A4C: 32 66 ml  SV(Teich): 28 69 ml    CW  TR Vmax: 2 74 m/s  TR maxP 97 mmHg    MM  TAPSE: 1 36 cm    PW  MV A Philip: 0 34 m/s  MV Dec Bremer: 6 97 m/s2  MV DecT: 131 75 ms  MV E Philip: 0 92 m/s  MV E/A Ratio: 2 7  MV PHT: 38 21 ms  MVA By PHT: 5 76 cm2    [facility] Accredited Echocardi[facility]    Prepared and electronically signed by    DO Sam  Signed 13-Sep-2021 17:11:48    No results found for this or any previous visit  Results for orders placed during the hospital encounter of 21    Cardiac catheterization    Narrative  [address]  [address] [facility], [address] [facility]  [phone]    [facility]    Invasive Cardiovascular Lab Complete Report    Patient: Georgia Whalen  MR number: [id number]  Account number: [de-identified]  Study date: 2021  Gender: Male  : 1955  Height: 66 1 in  Weight: 128 5 lb  BSA: 1 66 mï¾²    Allergies: NO KNOWN ALLERGIES    Diagnostic Cardiologist:  Talisha Samson MD  Primary Physician:  Qamar Rodriguez MD    SUMMARY    CORONARY CIRCULATION:  Left main: The vessel was medium to large sized  Angiography showed mild atherosclerosis  LAD: The vessel was medium to large sized  Angiography showed minor luminal irregularities  Circumflex: The vessel was medium sized  Angiography showed minor luminal irregularities  RCA: The vessel was medium sized  Mid RCA: There was a discrete 30 % stenosis  PROCEDURES PERFORMED    --  Left coronary angiography  --  Right coronary angiography  PROCEDURE: The risks and alternatives of the procedures and conscious sedation were explained to the patient and informed consent was obtained  The patient was brought to the cath lab and placed on the table  The planned puncture sites  were prepped and draped in the usual sterile fashion  --  Right radial artery access  After performing an Paresh's test to verify adequate ulnar artery supply to the hand, the radial site was prepped  The puncture site was infiltrated with local anesthetic  The vessel was accessed using the  modified Seldinger technique, a wire was advanced into the vessel, and a sheath was advanced over the wire into the vessel  --  Left coronary artery angiography  A catheter was advanced over a guidewire into the aorta and positioned in the left coronary artery ostium under fluoroscopic guidance   Angiography was performed  --  Right coronary artery angiography  A catheter was advanced over a guidewire into the aorta and positioned in the right coronary artery ostium under fluoroscopic guidance  Angiography was performed  PROCEDURE COMPLETION: The patient tolerated the procedure well and was discharged from the cath lab  TIMING: Test started at 13:07  Test concluded at 13:21  MEDICATIONS GIVEN: Fentanyl (1OOmcg/2 ml), 50 mcg, IV, at 13:03  Versed (2mg/2ml),  2 mg, IV, at 13:04  1% Lidocaine, 1 ml, subcutaneously, at 13:13  Nitroglycerin (200mcg/ml), 200 mcg, at 13:14  Verapamil (5mg/2ml), 2 5 mg, IV, at 13:14  Heparin 1000 units/ml, 4,000 units, IV, at 13:14  RADIATION EXPOSURE: Fluoroscopy  time: 0 min  CORONARY VESSELS:   --  Left main: The vessel was medium to large sized  Angiography showed mild atherosclerosis  --  LAD: The vessel was medium to large sized  Angiography showed minor luminal irregularities  --  Circumflex: The vessel was medium sized  Angiography showed minor luminal irregularities  --  RCA: The vessel was medium sized  --  Mid RCA: There was a discrete 30 % stenosis  IMPRESSIONS:  1  Non critical CAD  Non ischemic CM  Medical management  Prepared and signed by    Hermes Crespo MD  Signed 2021 13:25:30    Study diagram    Angiographic findings  Native coronary lesions:  ï¾·Mid RCA: Lesion 1: discrete, 30 % stenosis  Hemodynamic tables    Pressures:  Baseline  Pressures:  - HR: 66  Pressures:  - Rhythm:  Pressures:  -- Aortic Pressure (S/D/M): 161/83/110    Outputs:  Baseline  Outputs:  -- CALCULATIONS: [age]  Outputs:  -- CALCULATIONS: Body Surface Area: 1 66  Outputs:  -- CALCULATIONS: Height in cm: 168 00  Outputs:  -- CALCULATIONS: Sex: Male  Outputs:  -- CALCULATIONS: Weight in k 40    No results found for this or any previous visit  No results found for this or any previous visit  No results found for this or any previous visit            Trae Torres Kobe Francis MD    Portions of the record may have been created with voice recognition software  Occasional wrong word or "sound a like" substitutions may have occurred due to the inherent limitations of voice recognition software  Read the chart carefully and recognize, using context, where substitutions have occurred

## 2021-09-18 NOTE — ASSESSMENT & PLAN NOTE
Wt Readings from Last 3 Encounters:   09/18/21 55 2 kg (121 lb 11 1 oz)   09/14/21 58 9 kg (129 lb 13 6 oz)     · Originally presented to  with shortness of breath, ProBNP >00392  · CTA is negative for acute PE  · Newly diagnosed acute systolic congestive heart failure  · Patient transferred to [place] for HF evaluation and cardiac cath- tentatively today  · EF 25%  · Normal TSH and lipid panel except for low HDL  · Started Furosemide 40 mg p o   Daily  · Started on Coreg  · Started on [place], sent meds to home start for [place] checking  · As an 2 g salt diet fluid restriction  · Cardiology following  · Patient has LifeVest delivered today at bedside, discharged home

## 2021-09-18 NOTE — DISCHARGE SUMMARY
[facility]  Discharge- Larance Meigs 1955, 78 y o  male MRN: [id number]  Unit/Bed#: [facility] 409-01 Encounter: [id number]  Primary Care Provider: Larisa Morales MD   Date and time admitted to hospital: 9/14/2021  6:08 PM    * Acute systolic (congestive) heart failure [facility])  Assessment & Plan  Wt Readings from Last 3 Encounters:   09/18/21 55 2 kg (121 lb 11 1 oz)   09/14/21 58 9 kg (129 lb 13 6 oz)     · Originally presented to  with shortness of breath, ProBNP >69915  · CTA is negative for acute PE  · Newly diagnosed acute systolic congestive heart failure  · Patient transferred to [place] for HF evaluation and cardiac cath- tentatively today  · EF 25%  · Normal TSH and lipid panel except for low HDL  · Started Furosemide 40 mg p o  Daily  · Started on Coreg  · Started on Radha Mess, sent meds to home start for [place] checking  · As an 2 g salt diet fluid restriction  · Cardiology following  · Patient has LifeVest delivered today at bedside, discharged home        Rheumatoid factor positive  Assessment & Plan  No other signs or symptoms, found positive on routine lab works via heart failure    Follow-up with PCP outpatient    Protein calorie malnutrition [facility])  Assessment & Plan  Encouraged adequate protein and calorie intake  Nutrition therapy    Tobacco abuse  Assessment & Plan  Tobacco cessation discussed    Stage 3a chronic kidney disease [facility])  Assessment & Plan  Lab Results   Component Value Date    EGFR 58 09/17/2021    EGFR 55 09/16/2021    EGFR 56 09/15/2021    CREATININE 1 28 09/17/2021    CREATININE 1 33 (H) 09/16/2021    CREATININE 1 30 09/15/2021           Discharging Physician / Practitioner: Barbara Awad MD  PCP: Larisa Morales MD  Admission Date:   Admission Orders (From admission, onward)     Ordered        09/14/21 1856  Inpatient Admission  Once                   Discharge Date: 09/18/21    Medical Problems     Resolved Problems  Date Reviewed: 9/17/2021 None                Consultations During Hospital Stay:  · Cardiology/heart failure    Procedures Performed:   Cardiac catheterization 09/16/2020  SUMMARY     CORONARY CIRCULATION:  Left main: The vessel was medium to large sized  Angiography showed mild atherosclerosis  LAD: The vessel was medium to large sized  Angiography showed minor luminal irregularities  Circumflex: The vessel was medium sized  Angiography showed minor luminal irregularities  RCA: The vessel was medium sized  Mid RCA: There was a discrete 30 % stenosis      PROCEDURES PERFORMED     --  Left coronary angiography  --  Right coronary angiography  Significant Findings / Test Results:   CTA chest PE:    IMPRESSION:     Study limited by respiratory motion  No evidence of central pulmonary embolus within limitations      Moderate right and small left pleural effusions with bilateral compressive atelectasis and suspected mild central vascular congestion             Reason for Admission:  new onset heart failure    Hospital Course:     Zehra Perdomo is a 78 y o  male patient who originally presented to the hospital on 9/14/2021 , transfer from [facility] for heart failure evaluation and cardiac catheterization  Patient originally presented to ER with complaint of worsening shortness of breath  COVID-19 was negative, CTA PE was negative for pulmonary embolism  However revealed bilateral pleural effusion  Patient was started with IV Lasix  Patient had no previous history of any medical problem as he was not compliant with sitting PCP  Echocardiogram was obtained which revealed 25-30% ejection fraction  Cardio recommended patient to be transferred to [facility]  Patient underwent cardiac catheterization, no intervention was done  Patient started on Entresto, Coreg and Lasix  Case Management was consulted for medication for outpatient  Cardiology recommended outpatient cardiac MRI    Patient was fitted with LifeVest today and has at bedside  No patient is clear for discharge  Please see above list of diagnoses and related plan for additional information  Condition at Discharge: good     Discharge Day Visit / Exam:     Subjective:  Denies any dyspnea or leg swelling  Feels well  Vitals: Blood Pressure: 91/59 (09/18/21 1158)  Pulse: 66 (09/18/21 1158)  Temperature: 97 7 °F (36 5 °C) (09/18/21 1158)  Temp Source: Oral (09/18/21 1158)  Respirations: 14 (09/18/21 1158)  Weight - Scale: 55 2 kg (121 lb 11 1 oz) (09/18/21 0552)  SpO2: 98 % (09/18/21 1158)      Discharge instructions/Information to patient and family:   See after visit summary for information provided to patient and family  Provisions for Follow-Up Care:  See after visit summary for information related to follow-up care and any pertinent home health orders  Disposition:     Home with VNA Services (Reminder: Complete face to face encounter)  ·     Planned Readmission: no     Discharge Statement:  I spent 45 minutes discharging the patient  This time was spent on the day of discharge  I had direct contact with the patient on the day of discharge  Greater than 50% of the total time was spent examining patient, answering all patient questions, arranging and discussing plan of care with patient as well as directly providing post-discharge instructions  Additional time then spent on discharge activities  Discharge Medications:  See after visit summary for reconciled discharge medications provided to patient and family        ** Please Note: This note has been constructed using a voice recognition system **

## 2021-09-21 ENCOUNTER — PATIENT OUTREACH (OUTPATIENT)
Dept: CASE MANAGEMENT | Facility: OTHER | Age: 66
End: 2021-09-21

## 2021-09-21 NOTE — PROGRESS NOTES
Outreach TC to patient for CM assessment  No answer to call  Left message on voicemail requesting a return call from patient to Selin Dorsey 12, BSN; Outpatient Care Manager @ [phone]  First unanswered call to patient  Chart review reveals that patient was originally admitted to [place] [address] for CHF  Patient was in need of a higher level of service and was transferred to [facility] where he underwent a cardiac cahteterization  Patient was found to have EF of 25%  Patient was treated with IV diuretics and started on Entresto  Patient also rceived a Eloy Baldwin 125  This RN care manager will continue outreach for assist with obtaining part D coverage

## 2021-09-22 ENCOUNTER — EPISODE CHANGES (OUTPATIENT)
Dept: CASE MANAGEMENT | Facility: OTHER | Age: 66
End: 2021-09-22

## 2021-10-06 ENCOUNTER — OFFICE VISIT (OUTPATIENT)
Dept: CARDIOLOGY CLINIC | Facility: CLINIC | Age: 66
End: 2021-10-06
Payer: MEDICARE

## 2021-10-06 VITALS
SYSTOLIC BLOOD PRESSURE: 180 MMHG | WEIGHT: 123 LBS | OXYGEN SATURATION: 98 % | DIASTOLIC BLOOD PRESSURE: 110 MMHG | HEART RATE: 90 BPM | BODY MASS INDEX: 19.77 KG/M2 | HEIGHT: 66 IN | TEMPERATURE: 98.2 F

## 2021-10-06 DIAGNOSIS — Z72.0 TOBACCO ABUSE: ICD-10-CM

## 2021-10-06 DIAGNOSIS — I10 UNCONTROLLED HYPERTENSION: ICD-10-CM

## 2021-10-06 DIAGNOSIS — R76.8 RHEUMATOID FACTOR POSITIVE: ICD-10-CM

## 2021-10-06 DIAGNOSIS — I25.10 CORONARY ARTERY DISEASE INVOLVING NATIVE CORONARY ARTERY OF NATIVE HEART WITHOUT ANGINA PECTORIS: ICD-10-CM

## 2021-10-06 DIAGNOSIS — I42.8 NONISCHEMIC CARDIOMYOPATHY (HCC): Primary | ICD-10-CM

## 2021-10-06 DIAGNOSIS — N18.31 STAGE 3A CHRONIC KIDNEY DISEASE (HCC): ICD-10-CM

## 2021-10-06 PROCEDURE — 99214 OFFICE O/P EST MOD 30 MIN: CPT | Performed by: NURSE PRACTITIONER

## 2021-10-06 RX ORDER — CARVEDILOL 3.12 MG/1
3.12 TABLET ORAL EVERY 12 HOURS SCHEDULED
Qty: 60 TABLET | Refills: 11 | Status: SHIPPED | OUTPATIENT
Start: 2021-10-06 | End: 2022-08-04 | Stop reason: SDUPTHER

## 2021-10-06 RX ORDER — ASPIRIN 81 MG/1
81 TABLET ORAL DAILY
Qty: 90 TABLET | Refills: 3 | Status: SHIPPED | OUTPATIENT
Start: 2021-10-06

## 2021-10-06 RX ORDER — FUROSEMIDE 20 MG/1
20 TABLET ORAL DAILY
Qty: 30 TABLET | Refills: 11 | Status: SHIPPED | OUTPATIENT
Start: 2021-10-06 | End: 2022-08-04 | Stop reason: SDUPTHER

## 2021-10-08 PROBLEM — I10 UNCONTROLLED HYPERTENSION: Status: ACTIVE | Noted: 2021-09-11

## 2021-10-08 PROBLEM — I42.8 NONISCHEMIC CARDIOMYOPATHY (HCC): Status: ACTIVE | Noted: 2021-09-11

## 2021-10-08 PROBLEM — I25.10 CORONARY ARTERY DISEASE INVOLVING NATIVE CORONARY ARTERY OF NATIVE HEART WITHOUT ANGINA PECTORIS: Status: ACTIVE | Noted: 2021-10-08

## 2021-10-19 ENCOUNTER — TELEPHONE (OUTPATIENT)
Dept: RHEUMATOLOGY | Facility: CLINIC | Age: 66
End: 2021-10-19

## 2021-11-01 ENCOUNTER — EPISODE CHANGES (OUTPATIENT)
Dept: CASE MANAGEMENT | Facility: HOSPITAL | Age: 66
End: 2021-11-01

## 2021-12-22 ENCOUNTER — TELEPHONE (OUTPATIENT)
Dept: CARDIOLOGY CLINIC | Facility: CLINIC | Age: 66
End: 2021-12-22

## 2022-01-28 NOTE — CASE MANAGEMENT
Case Management Progress Note    Patient name Beba Oliva  Location /-01 MRN [id number]  : 1955 Date 2021       LOS (days): 2  Geometric Mean LOS (GMLOS) (days): 3 10  Days to GMLOS:1 4        PROGRESS NOTE:      As per Dietitian, pt is interested in receiving MOW  CM placing call to [facility] and referred pt to MOW program   Someone from the program will be reaching out to pt 
Case Management Progress Note    Patient name Jody Hernandez  Location /-01 MRN [id number]  : 1955 Date 2021       LOS (days): 3  Geometric Mean LOS (GMLOS) (days): 3 10  Days to GMLOS:0 3        OBJECTIVE:  Bundle(if applicable):    Current admission status: Inpatient  Preferred Pharmacy:   [address]  [address] [place] [address]  Phone: [phone] Fax: [phone]    Primary Care Provider: Patsy Major MD    Primary Insurance: MEDICARE  Secondary Insurance:     PROGRESS NOTE:  Pt for cardiac cath, for transfer to E  CM completed MN and placed on physical chart 
[phone]

## 2022-08-04 DIAGNOSIS — I50.21 ACUTE SYSTOLIC (CONGESTIVE) HEART FAILURE (HCC): ICD-10-CM

## 2022-08-04 DIAGNOSIS — I42.8 NONISCHEMIC CARDIOMYOPATHY (HCC): ICD-10-CM

## 2022-08-04 RX ORDER — FUROSEMIDE 20 MG/1
20 TABLET ORAL DAILY
Qty: 30 TABLET | Refills: 0 | Status: SHIPPED | OUTPATIENT
Start: 2022-08-04 | End: 2022-10-07 | Stop reason: SDUPTHER

## 2022-08-04 RX ORDER — CARVEDILOL 3.12 MG/1
3.12 TABLET ORAL EVERY 12 HOURS SCHEDULED
Qty: 60 TABLET | Refills: 0 | Status: SHIPPED | OUTPATIENT
Start: 2022-08-04 | End: 2022-10-07 | Stop reason: SDUPTHER

## 2022-08-04 NOTE — TELEPHONE ENCOUNTER
Patient has not been seen since last year  Will send 1 month supply but needs to come in for an appointment  Thank you!

## 2022-10-07 DIAGNOSIS — I42.8 NONISCHEMIC CARDIOMYOPATHY (HCC): ICD-10-CM

## 2022-10-07 DIAGNOSIS — I50.21 ACUTE SYSTOLIC (CONGESTIVE) HEART FAILURE (HCC): ICD-10-CM

## 2022-10-07 RX ORDER — FUROSEMIDE 20 MG/1
20 TABLET ORAL DAILY
Qty: 30 TABLET | Refills: 0 | Status: SHIPPED | OUTPATIENT
Start: 2022-10-07

## 2022-10-07 RX ORDER — CARVEDILOL 3.12 MG/1
3.12 TABLET ORAL EVERY 12 HOURS SCHEDULED
Qty: 60 TABLET | Refills: 0 | Status: SHIPPED | OUTPATIENT
Start: 2022-10-07

## 2022-10-07 NOTE — TELEPHONE ENCOUNTER
Pt called and needs refills on medications  Entresto Carvedilol Furosemide    Pharm [facility]   Call pt [phone]

## 2022-10-07 NOTE — TELEPHONE ENCOUNTER
This patient requested refills on all his medications, however he has not returned to the office in over a year and no showed repeatedly  I will send medications for short supply but he needs to follow up in order to continue receiving prescriptions    Thank you

## 2022-10-18 NOTE — TELEPHONE ENCOUNTER
Called Pt  No answer, LM to return call  Also, called spouse  No answer no voicemail  Letter sent to call the office

## 2022-12-30 DIAGNOSIS — I25.10 CORONARY ARTERY DISEASE INVOLVING NATIVE CORONARY ARTERY OF NATIVE HEART WITHOUT ANGINA PECTORIS: ICD-10-CM

## 2022-12-30 DIAGNOSIS — I42.8 NONISCHEMIC CARDIOMYOPATHY (HCC): ICD-10-CM

## 2022-12-30 DIAGNOSIS — I50.21 ACUTE SYSTOLIC (CONGESTIVE) HEART FAILURE (HCC): ICD-10-CM

## 2022-12-30 RX ORDER — CARVEDILOL 3.12 MG/1
3.12 TABLET ORAL EVERY 12 HOURS SCHEDULED
Qty: 60 TABLET | Refills: 0 | Status: SHIPPED | OUTPATIENT
Start: 2022-12-30

## 2022-12-30 RX ORDER — FUROSEMIDE 20 MG/1
20 TABLET ORAL DAILY
Qty: 30 TABLET | Refills: 0 | Status: SHIPPED | OUTPATIENT
Start: 2022-12-30

## 2022-12-30 RX ORDER — ASPIRIN 81 MG/1
81 TABLET ORAL DAILY
Qty: 90 TABLET | Refills: 3 | Status: CANCELLED | OUTPATIENT
Start: 2022-12-30

## 2023-01-03 ENCOUNTER — TELEPHONE (OUTPATIENT)
Dept: CARDIOLOGY CLINIC | Facility: CLINIC | Age: 68
End: 2023-01-03

## 2023-03-03 DIAGNOSIS — I42.8 NONISCHEMIC CARDIOMYOPATHY (HCC): ICD-10-CM

## 2023-03-03 DIAGNOSIS — I50.21 ACUTE SYSTOLIC (CONGESTIVE) HEART FAILURE (HCC): ICD-10-CM

## 2023-03-03 RX ORDER — FUROSEMIDE 20 MG/1
20 TABLET ORAL DAILY
Qty: 30 TABLET | Refills: 1 | Status: SHIPPED | OUTPATIENT
Start: 2023-03-03

## 2023-03-03 RX ORDER — CARVEDILOL 3.12 MG/1
3.12 TABLET ORAL EVERY 12 HOURS SCHEDULED
Qty: 60 TABLET | Refills: 1 | Status: SHIPPED | OUTPATIENT
Start: 2023-03-03

## 2023-03-03 NOTE — TELEPHONE ENCOUNTER
Pt calling in for refills  hasn't been seen since 10/2021, he did no show a few times since also  He is scheduled for April 4th  Pended a short supply    Last refilled end of Dec, so should have been out a while ago

## 2023-05-16 DIAGNOSIS — I25.10 CORONARY ARTERY DISEASE INVOLVING NATIVE CORONARY ARTERY OF NATIVE HEART WITHOUT ANGINA PECTORIS: ICD-10-CM

## 2023-05-16 DIAGNOSIS — I50.21 ACUTE SYSTOLIC (CONGESTIVE) HEART FAILURE (HCC): ICD-10-CM

## 2023-05-16 DIAGNOSIS — I42.8 NONISCHEMIC CARDIOMYOPATHY (HCC): ICD-10-CM

## 2023-05-16 RX ORDER — CARVEDILOL 3.12 MG/1
3.12 TABLET ORAL EVERY 12 HOURS SCHEDULED
Qty: 60 TABLET | Refills: 1 | Status: SHIPPED | OUTPATIENT
Start: 2023-05-16

## 2023-05-16 RX ORDER — ASPIRIN 81 MG/1
81 TABLET ORAL DAILY
Qty: 90 TABLET | Refills: 3 | Status: SHIPPED | OUTPATIENT
Start: 2023-05-16

## 2023-05-16 RX ORDER — FUROSEMIDE 20 MG/1
20 TABLET ORAL DAILY
Qty: 30 TABLET | Refills: 1 | Status: SHIPPED | OUTPATIENT
Start: 2023-05-16

## 2023-05-18 ENCOUNTER — TELEPHONE (OUTPATIENT)
Dept: CARDIOLOGY CLINIC | Facility: CLINIC | Age: 68
End: 2023-05-18

## 2023-05-18 NOTE — TELEPHONE ENCOUNTER
Patient called office for a medication refill stating he has been out for 3 days now on his medication     carvedilol (COREG) 3 125 mg tablet Take 1 tablet (3 125 mg total) by mouth every 12 (twelve) hours    furosemide (LASIX) 20 mg tablet Take 1 tablet (20 mg total) by mouth daily    sacubitril-valsartan (ENTRESTO) 49-51 MG TABS Take 1 tablet by mouth 2 (two) times a day    Pharmacy: [facility] [id number]   Ordering Provider: Deleta Seip CRNP  Supply: 90 day  Last OV: 4/4/23  Next OV: 6/27/23

## 2023-05-18 NOTE — TELEPHONE ENCOUNTER
These were all sent on 5/16 and is says receipt confirmed  Please have patient verify with pharmacy  OK to resend if pharmacy states they did not receive    Thank you

## 2023-06-27 ENCOUNTER — OFFICE VISIT (OUTPATIENT)
Dept: CARDIOLOGY CLINIC | Facility: CLINIC | Age: 68
End: 2023-06-27

## 2023-06-27 VITALS
DIASTOLIC BLOOD PRESSURE: 98 MMHG | HEIGHT: 66 IN | BODY MASS INDEX: 19.54 KG/M2 | HEART RATE: 60 BPM | WEIGHT: 121.6 LBS | OXYGEN SATURATION: 99 % | TEMPERATURE: 97.7 F | SYSTOLIC BLOOD PRESSURE: 168 MMHG

## 2023-06-27 DIAGNOSIS — I10 UNCONTROLLED HYPERTENSION: ICD-10-CM

## 2023-06-27 DIAGNOSIS — I42.8 NONISCHEMIC CARDIOMYOPATHY (HCC): ICD-10-CM

## 2023-06-27 DIAGNOSIS — I25.10 CORONARY ARTERY DISEASE INVOLVING NATIVE CORONARY ARTERY OF NATIVE HEART WITHOUT ANGINA PECTORIS: Primary | ICD-10-CM

## 2023-06-27 DIAGNOSIS — Z72.0 TOBACCO ABUSE: ICD-10-CM

## 2023-06-27 DIAGNOSIS — N18.31 STAGE 3A CHRONIC KIDNEY DISEASE (HCC): ICD-10-CM

## 2023-06-27 RX ORDER — FUROSEMIDE 20 MG/1
20 TABLET ORAL DAILY
Qty: 30 TABLET | Refills: 11 | Status: SHIPPED | OUTPATIENT
Start: 2023-06-27 | End: 2023-07-05 | Stop reason: ALTCHOICE

## 2023-06-27 RX ORDER — POLYETHYLENE GLYCOL 3350 17 G
2 POWDER IN PACKET (EA) ORAL AS NEEDED
Qty: 100 EACH | Refills: 0 | Status: SHIPPED | OUTPATIENT
Start: 2023-06-27

## 2023-06-27 RX ORDER — CARVEDILOL 3.12 MG/1
3.12 TABLET ORAL EVERY 12 HOURS SCHEDULED
Qty: 60 TABLET | Refills: 11 | Status: SHIPPED | OUTPATIENT
Start: 2023-06-27

## 2023-06-27 NOTE — PROGRESS NOTES
Cardiology Follow Up    [facility]  1955  [phone]  [facility]  [address]  [address] [facility] [place]  [phone]    Nadia Castillo presents for follow up of CAD, cardiomyopathy, uncontrolled HTN. 1. Coronary artery disease involving native coronary artery of native heart without angina pectoris  Assessment & Plan:  Cardiac catheterization on 9/16/2021 reveals 30% stenosis in mid RCA with minor luminal irregularities in other vessels. He is not taking aspirin, which I asked him to resume at 81 mg daily dosing. Not on statin therapy. Lipid panel ordered with plan for statin therapy. Strongly counseled on smoking cessation. Given script for nicotine lozenges. Orders:  -     Echo complete w/ contrast if indicated; Future; Expected date: 06/27/2023  -     Lipid Panel with Direct LDL reflex; Future  -     Comprehensive metabolic panel  -     CBC and differential; Future  -     aspirin (ECOTRIN LOW STRENGTH) 81 mg EC tablet; Take 1 tablet (81 mg total) by mouth daily  -     POCT ECG    2. Nonischemic cardiomyopathy [facility])  Assessment & Plan:  Admitted 9/2021 with heart failure. Echo on 9/13/2021 reveals EF < 25%  Cardiac cath 9/16/2021 showed mild disease. He met with [facility] Heart failure team.  Started on carvedilol, Entresto. He has only been taking medications once daily and I have urged him to resume BID dosing. Will titrate further as able based on renal function. Labs today. Suspect uncontrolled HTN and +/- ETOH as likely sources of cardiomyopathy. Strongly urged complete cessation of alcohol. He was prescribed a LifeVest which he quickly stopped using and no longer has. Will repeat echocardiogram with further discussion regarding ICD pending results. Orders:  -     Echo complete w/ contrast if indicated;  Future; Expected date: 06/27/2023  -     Comprehensive metabolic panel  -     CBC and differential; Future  - carvedilol (COREG) 3.125 mg tablet; Take 1 tablet (3.125 mg total) by mouth every 12 (twelve) hours  -     POCT ECG    3. Uncontrolled hypertension  Assessment & Plan:  Blood pressure remains poorly controlled, however he is not taking medications appropriately. Currently prescribed carvedilol 3.125 mg BID (did not tolerate higher dose), Entresto 49/51 mg BID. Will obtain labs now with plan to further titrate Entresto to 97/103 mg BID dosing. I urged him to take carvedilol and Entresto twice daily. Nurse visit BP check in 1-2 weeks. 4. Stage 3a chronic kidney disease ([address])  Assessment & Plan:  Baseline creatinine 1.2. Will monitor as no labs have been done in 2 years. 5. Tobacco abuse  Assessment & Plan:  Strongly counseled on the risks associated with smoking. He wishes to try lozenges to aid in cessation. Will monitor closely. Orders:  -     nicotine polacrilex (COMMIT) 2 MG lozenge; Apply 1 lozenge (2 mg total) to the mouth or throat as needed for smoking cessation       HPI  Damir Nuñez has a past medical history of nonischemic cardiomyopathy, nonobstructive CAD by cath, uncontrolled HTN, alcohol and tobacco use, and medical noncompliance. He established with [facility] cardiology during an admission to [address] [place] [address] in September 2021 for acute heart failure. Echocardiogram showed LVEF 25% with severe diffuse hypokinesis and mild LVH, mod MR. He was transferred to [facility] where he underwent evaluation by the Heart failure team. Cardiac catheterization 9/16/2023 showed 30% mid RCA lesion with MLI in the LM, LAD, LCx. He was started on goal directed medical therapy with carvedilol 6.25 mg BID, Entresto 49/51 mg BID, and furosemide 20 mg daily. He followed up with me on 10/6/2021 at which time he had missed an appointment with the Heart Failure team. He stopped his carvedilol on his own as he felt dizzy whenever taking it. He was only taking Entresto once daily. He told me he felt "great'.  He was using a nicotine patch and smoking 4 cig daily on top of that. He was not wearing his LifeVest as he reported he was not sure how to use it. He was instructed to resume carvedilol at 3.125 mg BID dosing. He was advised that he should be taking this and the Entresto every 12 hours. He was started on daily aspirin 81 mg. Blood work was ordered with plan to start statin at follow up. Unfortunately he was lost to follow up after that time. Our office and Mary Partida reached out to him repeatedly with no return call. 6/27/2023: Oc Higgins presents today for follow up. He was contacted to advise that we must follow up with him to continue filling his prescriptions. He states he is taking his medications but only once daily. He states he feels "great". He denies chest pain, shortness of breath, edema. He is not taking aspirin. He continues to smoke. He states he is only drinking socially. He is in need of a primary care provider. BP is very elevated today. He denies headaches or lightheadedness.      Medical Problems     Problem List     Nonischemic cardiomyopathy ([address] [facility])    Coronary artery disease involving native coronary artery of native heart without angina pectoris    Uncontrolled hypertension    Tobacco abuse    Stage 3a chronic kidney disease (HCC)    Elevated troponin    Mucopurulent chronic bronchitis (HCC)    Protein calorie malnutrition (HCC)    Rheumatoid factor positive        Past Medical History:   Diagnosis Date   • CAD (coronary artery disease)    • HFrEF (heart failure with reduced ejection fraction) ([address] [facility]) 09/2021   • Hypertension    • Rheumatoid factor positive 09/2021   • Stage 3 chronic kidney disease (HCC)    • Tobacco abuse      Social History     Socioeconomic History   • Marital status: /Civil Union     Spouse name: Not on file   • Number of children: 2   • Years of education: Not on file   • Highest education level: Not on file   Occupational History   • Not on file   Tobacco Use   • Smoking status: Every Day     Packs/day: 0.50     Years: 45.00     Total pack years: 22.50     Types: Cigarettes     Start date: 1974   • Smokeless tobacco: Never   • Tobacco comments:     Began smoking in his early 25s, on average smoking 1+ packs daily. Quit in 08/2021 (Updated 09/15/2021). Vaping Use   • Vaping Use: Never used   Substance and Sexual Activity   • Alcohol use: Not Currently     Alcohol/week: 6.0 standard drinks of alcohol     Types: 6 Shots of liquor per week     Comment: Drinks about 4 Bacardi and cokes every week. (Updated 09/15/2021). • Drug use: Never     Comment: Last assessed 09/15/2021. • Sexual activity: Not on file     Comment: defer   Other Topics Concern   • Not on file   Social History Narrative    Has 2 biological sons.       Social Determinants of Health     Financial Resource Strain: Not on file   Food Insecurity: Not on file   Transportation Needs: Not on file   Physical Activity: Not on file   Stress: Not on file   Social Connections: Not on file   Intimate Partner Violence: Not on file   Housing Stability: Not on file      Family History   Problem Relation Age of Onset   • Heart failure Mother    • Other Father         "blood clot"   • COPD Sister    • Heart failure Brother    • Valvular heart disease Brother    • No Known Problems Son    • No Known Problems Son      Past Surgical History:   Procedure Laterality Date   • APPENDECTOMY  1965   • CARDIAC CATHETERIZATION  9/16/2021   • INGUINAL HERNIA REPAIR Right 1991       Current Outpatient Medications:   •  aspirin (ECOTRIN LOW STRENGTH) 81 mg EC tablet, Take 1 tablet (81 mg total) by mouth daily, Disp: 90 tablet, Rfl: 3  •  carvedilol (COREG) 3.125 mg tablet, Take 1 tablet (3.125 mg total) by mouth every 12 (twelve) hours, Disp: 60 tablet, Rfl: 11  •  nicotine polacrilex (COMMIT) 2 MG lozenge, Apply 1 lozenge (2 mg total) to the mouth or throat as needed for smoking cessation, Disp: 100 each, Rfl: 0  •  amLODIPine (NORVASC) 5 mg tablet, TAKE 1 TABLET (5 MG TOTAL) BY MOUTH DAILY. , Disp: 90 tablet, Rfl: 4  No Known Allergies    Labs:     Chemistry        Component Value Date/Time    K 4.5 07/05/2023 1341     07/05/2023 1341    CO2 25 07/05/2023 1341    BUN 25 07/05/2023 1341    CREATININE 2.21 (H) 07/05/2023 1341        Component Value Date/Time    CALCIUM 9.1 07/05/2023 1341    ALKPHOS 70 07/05/2023 1341    AST 15 07/05/2023 1341    ALT 6 (L) 07/05/2023 1341        Cardiac testing:  ECG 6/27/2023: Normal sinus rhythm. LAD. LVH. Rate 89 bpm.    Cardiac catheterization 9/16/2021:  Left main: The vessel was medium to large sized. Angiography showed mild atherosclerosis. LAD: The vessel was medium to large sized. Angiography showed minor luminal irregularities. Circumflex: The vessel was medium sized. Angiography showed minor luminal irregularities. RCA: The vessel was medium sized. Mid RCA: There was a discrete 30 % stenosis.      Echo complete 9/13/2021:  EF 25 %. There was severe diffuse hypokinesis. Mild LVH. Right ventricle systolic function was reduced. TAPSE 1.4 cm. The left atrium was mildly dilated. Moderate MR. Trace AI. Mild TR. PASP 40 mmHg. The findings suggest mild pulmonary hypertension. Mild MD. PERICARDIUM: There was a large left pleural effusion.      ECG 9/11/2021: sinus tachycardia with possible left atrial enlargement, left ventricular hypertrophy    Review of Systems   Constitutional: Negative. HENT: Negative. Cardiovascular: Negative for chest pain, dyspnea on exertion, irregular heartbeat, leg swelling, near-syncope, orthopnea and palpitations. Respiratory: Negative for cough and snoring. Endocrine: Negative. Skin: Negative. Musculoskeletal: Negative. Gastrointestinal: Negative. Genitourinary: Negative. Neurological: Negative. Psychiatric/Behavioral: Negative.         Vitals:    06/27/23 1103   BP: 168/98   Pulse: 60   Temp: 97.7 °F (36.5 °C)   SpO2: 99%     Vitals:    06/27/23 1103 Weight: 55.2 kg (121 lb 9.6 oz)     Height: 5' 6" (167.6 cm)   Body mass index is 19.63 kg/m². Physical Exam  Vitals and nursing note reviewed. Constitutional:       General: He is not in acute distress. Appearance: He is well-developed. He is not diaphoretic. HENT:      Head: Normocephalic and atraumatic. Neck:      Vascular: No carotid bruit or JVD. Cardiovascular:      Rate and Rhythm: Normal rate and regular rhythm. Pulses: Intact distal pulses. Heart sounds: Normal heart sounds, S1 normal and S2 normal. No murmur heard. No friction rub. No gallop. Pulmonary:      Effort: Pulmonary effort is normal. No respiratory distress. Breath sounds: Normal breath sounds. Abdominal:      General: There is no distension. Palpations: Abdomen is soft. Tenderness: There is no abdominal tenderness. Skin:     General: Skin is warm and dry. Findings: No rash. Neurological:      Mental Status: He is alert and oriented to person, place, and time.    Psychiatric:         Behavior: Behavior normal.

## 2023-06-27 NOTE — PATIENT INSTRUCTIONS
Please restart aspirin enteric coated 81 mg daily. Ensure you are taking the carvedilol and Entresto every 12 hours (twice daily). Your blood pressure remains high. We will bring you back for a BP check next week after taking your medications twice daily and I will further adjust your medication as needed. Please continue to avoid alcohol completely. Continue to work on smoking cessation. Nicotine lozenges ordered, replace 1 cigarette with 1 lozenge. It is very important to follow up routinely so we can help improve your health conditions. Echocardiogram in 2 months.   Phone number for primary care in [address] [place] [address], [address] [place] [address], [address] [place] [address] Rd   [phone]

## 2023-07-05 ENCOUNTER — APPOINTMENT (OUTPATIENT)
Dept: LAB | Facility: HOSPITAL | Age: 68
End: 2023-07-05
Payer: COMMERCIAL

## 2023-07-05 ENCOUNTER — CLINICAL SUPPORT (OUTPATIENT)
Dept: CARDIOLOGY CLINIC | Facility: CLINIC | Age: 68
End: 2023-07-05
Payer: COMMERCIAL

## 2023-07-05 ENCOUNTER — TELEPHONE (OUTPATIENT)
Dept: CARDIOLOGY CLINIC | Facility: CLINIC | Age: 68
End: 2023-07-05

## 2023-07-05 VITALS
HEART RATE: 82 BPM | HEIGHT: 66 IN | BODY MASS INDEX: 19.29 KG/M2 | DIASTOLIC BLOOD PRESSURE: 108 MMHG | SYSTOLIC BLOOD PRESSURE: 182 MMHG | WEIGHT: 120 LBS | OXYGEN SATURATION: 98 %

## 2023-07-05 DIAGNOSIS — I42.8 NONISCHEMIC CARDIOMYOPATHY (HCC): ICD-10-CM

## 2023-07-05 DIAGNOSIS — I10 UNCONTROLLED HYPERTENSION: Primary | ICD-10-CM

## 2023-07-05 DIAGNOSIS — I25.10 CORONARY ARTERY DISEASE INVOLVING NATIVE CORONARY ARTERY OF NATIVE HEART WITHOUT ANGINA PECTORIS: ICD-10-CM

## 2023-07-05 LAB
ALBUMIN SERPL BCP-MCNC: 4.2 G/DL (ref 3.5–5)
ALP SERPL-CCNC: 70 U/L (ref 34–104)
ALT SERPL W P-5'-P-CCNC: 6 U/L (ref 7–52)
ANION GAP SERPL CALCULATED.3IONS-SCNC: 7 MMOL/L
AST SERPL W P-5'-P-CCNC: 15 U/L (ref 13–39)
BASOPHILS # BLD AUTO: 0.09 THOUSANDS/ÂΜL (ref 0–0.1)
BASOPHILS NFR BLD AUTO: 1 % (ref 0–1)
BILIRUB SERPL-MCNC: 0.77 MG/DL (ref 0.2–1)
BUN SERPL-MCNC: 25 MG/DL (ref 5–25)
CALCIUM SERPL-MCNC: 9.1 MG/DL (ref 8.4–10.2)
CHLORIDE SERPL-SCNC: 102 MMOL/L (ref 96–108)
CHOLEST SERPL-MCNC: 164 MG/DL
CO2 SERPL-SCNC: 25 MMOL/L (ref 21–32)
CREAT SERPL-MCNC: 2.21 MG/DL (ref 0.6–1.3)
EOSINOPHIL # BLD AUTO: 0.15 THOUSAND/ÂΜL (ref 0–0.61)
EOSINOPHIL NFR BLD AUTO: 2 % (ref 0–6)
ERYTHROCYTE [DISTWIDTH] IN BLOOD BY AUTOMATED COUNT: 13.3 % (ref 11.6–15.1)
GFR SERPL CREATININE-BSD FRML MDRD: 29 ML/MIN/1.73SQ M
GLUCOSE P FAST SERPL-MCNC: 99 MG/DL (ref 65–99)
HCT VFR BLD AUTO: 43.2 % (ref 36.5–49.3)
HDLC SERPL-MCNC: 35 MG/DL
HGB BLD-MCNC: 14.2 G/DL (ref 12–17)
IMM GRANULOCYTES # BLD AUTO: 0.03 THOUSAND/UL (ref 0–0.2)
IMM GRANULOCYTES NFR BLD AUTO: 0 % (ref 0–2)
LDLC SERPL CALC-MCNC: 114 MG/DL (ref 0–100)
LYMPHOCYTES # BLD AUTO: 1.4 THOUSANDS/ÂΜL (ref 0.6–4.47)
LYMPHOCYTES NFR BLD AUTO: 19 % (ref 14–44)
MCH RBC QN AUTO: 28.9 PG (ref 26.8–34.3)
MCHC RBC AUTO-ENTMCNC: 32.9 G/DL (ref 31.4–37.4)
MCV RBC AUTO: 88 FL (ref 82–98)
MONOCYTES # BLD AUTO: 0.41 THOUSAND/ÂΜL (ref 0.17–1.22)
MONOCYTES NFR BLD AUTO: 6 % (ref 4–12)
NEUTROPHILS # BLD AUTO: 5.3 THOUSANDS/ÂΜL (ref 1.85–7.62)
NEUTS SEG NFR BLD AUTO: 72 % (ref 43–75)
NRBC BLD AUTO-RTO: 0 /100 WBCS
PLATELET # BLD AUTO: 148 THOUSANDS/UL (ref 149–390)
PMV BLD AUTO: 12.1 FL (ref 8.9–12.7)
POTASSIUM SERPL-SCNC: 4.5 MMOL/L (ref 3.5–5.3)
PROT SERPL-MCNC: 7.9 G/DL (ref 6.4–8.4)
RBC # BLD AUTO: 4.92 MILLION/UL (ref 3.88–5.62)
SODIUM SERPL-SCNC: 134 MMOL/L (ref 135–147)
TRIGL SERPL-MCNC: 75 MG/DL
WBC # BLD AUTO: 7.38 THOUSAND/UL (ref 4.31–10.16)

## 2023-07-05 PROCEDURE — 99211 OFF/OP EST MAY X REQ PHY/QHP: CPT

## 2023-07-05 PROCEDURE — 85025 COMPLETE CBC W/AUTO DIFF WBC: CPT

## 2023-07-05 PROCEDURE — 80061 LIPID PANEL: CPT

## 2023-07-05 PROCEDURE — 36415 COLL VENOUS BLD VENIPUNCTURE: CPT

## 2023-07-05 PROCEDURE — 80053 COMPREHEN METABOLIC PANEL: CPT | Performed by: NURSE PRACTITIONER

## 2023-07-05 RX ORDER — AMLODIPINE BESYLATE 5 MG/1
5 TABLET ORAL DAILY
Qty: 30 TABLET | Refills: 11 | Status: SHIPPED | OUTPATIENT
Start: 2023-07-05

## 2023-07-05 NOTE — TELEPHONE ENCOUNTER
I attempted to reach patient regarding his lab results. His phone gave a busy signal and wife's phone went to voicemail but mailbox is full. I called his son and reached William Murray. I advised him that Matthew's kidney function is low on this blood work. He needs to stop the Entresto and furosemide. Start amlodipine 5 mg daily. I ordered a BMP to complete in 1 week. He needs to come back to the office for a BP check early next week. I asked his son to have Nadia Castillo call the office tomorrow to ensure he received the instructions and schedule the BP check.

## 2023-07-05 NOTE — PROGRESS NOTES
Pt is here for a BP check. States that he took his BP med's at ten oclock. States that he is having a lot of family issues at home that are stressing him out.

## 2023-07-11 NOTE — ASSESSMENT & PLAN NOTE
Lab Results   Component Value Date    HGBA1C 6.9 (A) 04/19/2023     Repeat A1c in one week at lab   Glucose in office today 180   Continue with current regimen at this time and will adjust regimen once result of A1c is received, educated on diet/ lifestyle changes continue metformin  mg daily and Tradjenta 5 mg daily Wt Readings from Last 3 Encounters:   09/13/21 58 7 kg (129 lb 6 4 oz)     Pt presented with acute onset of shortness of breath and leg swelling  NT proBNP at admission was 23,164 with evidence of cardiopulmonary congestion on chest xray  He has responded well to diuresis with IV Furosemide 40mg initially  He reports good urine output today with oral Furosemide 40mg today  Echo today with final result pending, however preliminary review indicates < 35%  Ordered Telemetry  Continue Carvedilol 6 25mg BID  Continue Furosemide 40mg daily  Consider ACE/ARB addition as renal status allows  I did discuss risk for sudden cardiac death with pt with EF < 35%  Consider transfer to [facility] for cardiac cath and heart failure team consult vs discharge to home with LifeVest and outpatient cardiac cath/heart failure referral asap    Recommend monitoring overnight and reassess pt status tomorrow

## 2023-07-13 NOTE — TELEPHONE ENCOUNTER
Called Pt, FAIZAM to return call. Called spouse, no answer, mailbox full. Call son, no answer, mailbox full. Letter sent to Pt to call us back.

## 2023-08-01 DIAGNOSIS — I10 UNCONTROLLED HYPERTENSION: ICD-10-CM

## 2023-08-01 RX ORDER — AMLODIPINE BESYLATE 5 MG/1
5 TABLET ORAL DAILY
Qty: 90 TABLET | Refills: 4 | Status: SHIPPED | OUTPATIENT
Start: 2023-08-01

## 2023-08-13 NOTE — ASSESSMENT & PLAN NOTE
Strongly counseled on the risks associated with smoking. He wishes to try lozenges to aid in cessation. Will monitor closely.

## 2023-08-13 NOTE — ASSESSMENT & PLAN NOTE
Admitted 9/2021 with heart failure. Echo on 9/13/2021 reveals EF < 25%  Cardiac cath 9/16/2021 showed mild disease. He met with [facility] failure team.  Started on carvedilol, Entresto. He has only been taking medications once daily and I have urged him to resume BID dosing. Will titrate further as able based on renal function. Labs today. Suspect uncontrolled HTN and +/- ETOH as likely sources of cardiomyopathy. Strongly urged complete cessation of alcohol. He was prescribed a LifeVest which he quickly stopped using and no longer has. Will repeat echocardiogram with further discussion regarding ICD pending results.

## 2023-08-13 NOTE — ASSESSMENT & PLAN NOTE
Blood pressure remains poorly controlled, however he is not taking medications appropriately. Currently prescribed carvedilol 3.125 mg BID (did not tolerate higher dose), Entresto 49/51 mg BID. Will obtain labs now with plan to further titrate Entresto to 97/103 mg BID dosing. I urged him to take carvedilol and Entresto twice daily. Nurse visit BP check in 1-2 weeks.

## 2023-08-13 NOTE — ASSESSMENT & PLAN NOTE
Cardiac catheterization on 9/16/2021 reveals 30% stenosis in mid RCA with minor luminal irregularities in other vessels. He is not taking aspirin, which I asked him to resume at 81 mg daily dosing. Not on statin therapy. Lipid panel ordered with plan for statin therapy. Strongly counseled on smoking cessation. Given script for nicotine lozenges.

## 2023-09-19 ENCOUNTER — APPOINTMENT (OUTPATIENT)
Dept: LAB | Facility: HOSPITAL | Age: 68
End: 2023-09-19
Payer: COMMERCIAL

## 2023-09-19 ENCOUNTER — OFFICE VISIT (OUTPATIENT)
Dept: CARDIOLOGY CLINIC | Facility: CLINIC | Age: 68
End: 2023-09-19
Payer: COMMERCIAL

## 2023-09-19 ENCOUNTER — TELEPHONE (OUTPATIENT)
Dept: CARDIOLOGY CLINIC | Facility: CLINIC | Age: 68
End: 2023-09-19

## 2023-09-19 VITALS
SYSTOLIC BLOOD PRESSURE: 198 MMHG | OXYGEN SATURATION: 99 % | TEMPERATURE: 98.3 F | HEART RATE: 96 BPM | DIASTOLIC BLOOD PRESSURE: 148 MMHG | HEIGHT: 66 IN | WEIGHT: 119.8 LBS | BODY MASS INDEX: 19.25 KG/M2

## 2023-09-19 DIAGNOSIS — R76.8 RHEUMATOID FACTOR POSITIVE: ICD-10-CM

## 2023-09-19 DIAGNOSIS — I42.8 NONISCHEMIC CARDIOMYOPATHY (HCC): ICD-10-CM

## 2023-09-19 DIAGNOSIS — Z72.0 TOBACCO ABUSE: ICD-10-CM

## 2023-09-19 DIAGNOSIS — I42.8 NONISCHEMIC CARDIOMYOPATHY (HCC): Primary | ICD-10-CM

## 2023-09-19 DIAGNOSIS — I10 UNCONTROLLED HYPERTENSION: ICD-10-CM

## 2023-09-19 DIAGNOSIS — N18.4 CHRONIC RENAL DISEASE, STAGE IV (HCC): ICD-10-CM

## 2023-09-19 DIAGNOSIS — I25.10 CORONARY ARTERY DISEASE INVOLVING NATIVE CORONARY ARTERY OF NATIVE HEART WITHOUT ANGINA PECTORIS: ICD-10-CM

## 2023-09-19 LAB
ANION GAP SERPL CALCULATED.3IONS-SCNC: 6 MMOL/L
BNP SERPL-MCNC: >4700 PG/ML (ref 0–100)
BUN SERPL-MCNC: 19 MG/DL (ref 5–25)
CALCIUM SERPL-MCNC: 9.5 MG/DL (ref 8.4–10.2)
CHLORIDE SERPL-SCNC: 106 MMOL/L (ref 96–108)
CO2 SERPL-SCNC: 25 MMOL/L (ref 21–32)
CREAT SERPL-MCNC: 1.94 MG/DL (ref 0.6–1.3)
GFR SERPL CREATININE-BSD FRML MDRD: 34 ML/MIN/1.73SQ M
GLUCOSE SERPL-MCNC: 113 MG/DL (ref 65–140)
POTASSIUM SERPL-SCNC: 5.1 MMOL/L (ref 3.5–5.3)
SODIUM SERPL-SCNC: 137 MMOL/L (ref 135–147)

## 2023-09-19 PROCEDURE — 80048 BASIC METABOLIC PNL TOTAL CA: CPT

## 2023-09-19 PROCEDURE — 99214 OFFICE O/P EST MOD 30 MIN: CPT | Performed by: NURSE PRACTITIONER

## 2023-09-19 PROCEDURE — 36415 COLL VENOUS BLD VENIPUNCTURE: CPT

## 2023-09-19 PROCEDURE — 83880 ASSAY OF NATRIURETIC PEPTIDE: CPT

## 2023-09-19 RX ORDER — SACUBITRIL AND VALSARTAN 49; 51 MG/1; MG/1
1 TABLET, FILM COATED ORAL 2 TIMES DAILY
COMMUNITY
End: 2023-09-19 | Stop reason: SINTOL

## 2023-09-19 RX ORDER — AMLODIPINE BESYLATE 10 MG/1
10 TABLET ORAL DAILY
Qty: 30 TABLET | Refills: 11 | Status: SHIPPED | OUTPATIENT
Start: 2023-09-19

## 2023-09-19 RX ORDER — FUROSEMIDE 20 MG/1
20 TABLET ORAL 2 TIMES DAILY
COMMUNITY
End: 2023-09-19 | Stop reason: ALTCHOICE

## 2023-09-19 RX ORDER — FUROSEMIDE 20 MG/1
20 TABLET ORAL DAILY
Qty: 30 TABLET | Refills: 11 | Status: SHIPPED | OUTPATIENT
Start: 2023-09-19

## 2023-09-19 NOTE — ASSESSMENT & PLAN NOTE
Cardiac catheterization on 9/16/2021 reveals 30% stenosis in mid RCA with minor luminal irregularities in other vessels. Continue aspirin 81 mg daily. Not on statin therapy.  on recent labs. Will discuss statin at follow up. Strongly counseled on smoking cessation. Given script for nicotine lozenges.

## 2023-09-19 NOTE — ASSESSMENT & PLAN NOTE
+ RF on lab testing for cardiomyopathy work up.   No additional findings and all other testing WNL  Rheumatology referral created

## 2023-09-19 NOTE — ASSESSMENT & PLAN NOTE
Admitted 9/2021 with heart failure. Echo on 9/13/2021 reveals EF < 25%  Cardiac cath 9/16/2021 showed mild disease. He met with [facility] failure team.  Started on carvedilol, Entresto. DAVID on recent labs. He was instructed to stop furosemide and Entresto. He never stopped Entresto. Repeat BMP today. Will adjust medication able based on renal function. Suspect uncontrolled HTN and +/- ETOH as likely sources of cardiomyopathy. He reports complete cessation of alcohol since his DUI. He was prescribed a LifeVest which he quickly stopped using and no longer has. Will repeat echocardiogram with further discussion regarding ICD pending results.

## 2023-09-19 NOTE — ASSESSMENT & PLAN NOTE
Lab Results   Component Value Date    EGFR 29 07/05/2023    EGFR 58 09/17/2021    EGFR 55 09/16/2021    CREATININE 2.21 (H) 07/05/2023    CREATININE 1.28 09/17/2021    CREATININE 1.33 (H) 09/16/2021     Furosemide and Entresto discontinued in July due to worsened renal function. He stopped furosemide but has continued to take Entresto. Repeat BMP today.

## 2023-09-19 NOTE — PATIENT INSTRUCTIONS
Please take your medications at 8am, 8pm for consistency. Labs today and we will decide on medication adjustments based on your kidney function. Our phone number is [phone] ([place]). I recommend getting OMRON blood pressure cuff. Bring this with you to your next visit. We will reschedule your echocardiogram.  Check into [employer] meals, or Meals on Wheels.

## 2023-09-19 NOTE — PROGRESS NOTES
Cardiology Follow Up    Azucena Alexandra  1955  [id number]  [facility]  [address]  [address] [facility] [place]  [phone]    Zainab King presents for follow up of CAD, cardiomyopathy, uncontrolled HTN. 1. Nonischemic cardiomyopathy [facility])  Assessment & Plan:  Admitted 9/2021 with heart failure. Echo on 9/13/2021 reveals EF < 25%  Cardiac cath 9/16/2021 showed mild disease. He met with [facility] Heart failure team.  Started on carvedilol, Entresto. DAVID on recent labs. He was instructed to stop furosemide and Entresto. He never stopped Entresto. Repeat BMP today. Will adjust medication able based on renal function. Suspect uncontrolled HTN and +/- ETOH as likely sources of cardiomyopathy. He reports complete cessation of alcohol since his DUI. He was prescribed a LifeVest which he quickly stopped using and no longer has. Will repeat echocardiogram with further discussion regarding ICD pending results. Orders:  -     B-Type Natriuretic Peptide(BNP); Future  -     Basic metabolic panel; Future    2. Coronary artery disease involving native coronary artery of native heart without angina pectoris  Assessment & Plan:  Cardiac catheterization on 9/16/2021 reveals 30% stenosis in mid RCA with minor luminal irregularities in other vessels. Continue aspirin 81 mg daily. Not on statin therapy.  on recent labs. Will discuss statin at follow up. Strongly counseled on smoking cessation. Given script for nicotine lozenges. 3. Uncontrolled hypertension  Assessment & Plan:  Blood pressure remains poorly controlled, however he is not taking medications appropriately. Currently prescribed amlodipine 5 mg daily, carvedilol 3.125 mg BID (did not tolerate higher dose). Entresto 49/51 mg BID was discontinued but he has continued to take. Repeat BMP today and further medication adjustments pending results.   Nurse visit BP check in 1-2 weeks.      4. Chronic renal disease, stage IV [facility])  Assessment & Plan:  Lab Results   Component Value Date    EGFR 29 07/05/2023    EGFR 58 09/17/2021    EGFR 55 09/16/2021    CREATININE 2.21 (H) 07/05/2023    CREATININE 1.28 09/17/2021    CREATININE 1.33 (H) 09/16/2021     Furosemide and Entresto discontinued in July due to worsened renal function. He stopped furosemide but has continued to take Entresto. Repeat BMP today. 5. Tobacco abuse  Assessment & Plan:  Strongly counseled on the risks associated with smoking. He wishes to try lozenges to aid in cessation. Will monitor closely. 6. Rheumatoid factor positive  Assessment & Plan:  + RF on lab testing for cardiomyopathy work up. No additional findings and all other testing WNL  Rheumatology referral created         IRIS Kay has a past medical history of nonischemic cardiomyopathy, nonobstructive CAD by cath, uncontrolled HTN, alcohol and tobacco use, and medical noncompliance.     [facility] an admission to [address] [place] [address] in September 2021 for acute heart failure. Echocardiogram showed LVEF 25% with severe diffuse hypokinesis and mild LVH, mod MR. He was transferred to [facility] where he underwent evaluation by the Heart failure team. Cardiac catheterization 9/16/2023 showed 30% mid RCA lesion with MLI in the LM, LAD, LCx. He was started on goal directed medical therapy with carvedilol 6.25 mg BID, Entresto 49/51 mg BID, and furosemide 20 mg daily.      He followed up with me on 10/6/2021 at which time he had missed an appointment with the Heart Failure team. He stopped his carvedilol on his own as he felt dizzy whenever taking it. He was only taking Entresto once daily. He told me he felt "great'. He was using a nicotine patch and smoking 4 cig daily on top of that. He was not wearing his LifeVest as he reported he was not sure how to use it. He was instructed to resume carvedilol at 3.125 mg BID dosing.  He was advised that he should be taking this and the Entresto every 12 hours. He was started on daily aspirin 81 mg. Blood work was ordered with plan to start statin at follow up.      Unfortunately he was lost to follow up after that time. Our office and Emma Cortés reached out to him repeatedly with no return call. He was lost to follow up until 6/27/2023. He was only taking medications once daily at that visit. He had gotten a DUI and quit drinking very recently. He continued to smoke. He denied cardiac complaints and stated he felt "great". He had not completed labs since 2021. An updated echocardiogram was ordered. Lab work was ordered and creatinine was elevated at 2. 2. he was asked to stop Entresto and furosemide and amlodipine 5 mg daily was started. 9/19/2023: Ricardo Kingsley presents for routine follow up. His BP is very elevated today. He states he took his meds right before this visit. He does not take them at consistent times each day. He tells me he did stop the furosemide but not the Entresto. He missed his echo appointment because he had a court hearing. He did not complete repeat labs. He has noticed orthopnea since being off furosemide. His weight continues to drop. He has not been eating well. His wife is struggling to cook. He reports reduced appetite with increased stress - currently dealing with legal issues related to a DUI. He denies chest pain, cough, swelling. No palpitations, syncope or near syncope. No lightheadedness.      Medical Problems     Problem List     Nonischemic cardiomyopathy [facility])    Coronary artery disease involving native coronary artery of native heart without angina pectoris    Uncontrolled hypertension    Tobacco abuse    Stage 3a chronic kidney disease (HCC)    Elevated troponin    Mucopurulent chronic bronchitis (HCC)    Protein calorie malnutrition (HCC)    Rheumatoid factor positive        Past Medical History:   Diagnosis Date   • CAD (coronary artery disease)    • HFrEF (heart failure with reduced ejection fraction) ([address] ) 09/2021   • Hypertension    • Rheumatoid factor positive 09/2021   • Stage 3 chronic kidney disease (HCC)    • Tobacco abuse      Social History     Socioeconomic History   • Marital status: /Civil Union     Spouse name: Not on file   • Number of children: 2   • Years of education: Not on file   • Highest education level: Not on file   Occupational History   • Not on file   Tobacco Use   • Smoking status: Every Day     Packs/day: 0.50     Years: 45.00     Total pack years: 22.50     Types: Cigarettes     Start date: 1974   • Smokeless tobacco: Never   • Tobacco comments:     Began smoking in his early 25s, on average smoking 1+ packs daily. Quit in 08/2021 (Updated 09/15/2021). Vaping Use   • Vaping Use: Never used   Substance and Sexual Activity   • Alcohol use: Not Currently     Alcohol/week: 6.0 standard drinks of alcohol     Types: 6 Shots of liquor per week     Comment: Drinks about 4 Bacardi and cokes every week. (Updated 09/15/2021). • Drug use: Never     Comment: Last assessed 09/15/2021. • Sexual activity: Not on file     Comment: defer   Other Topics Concern   • Not on file   Social History Narrative    Has 2 biological sons.       Social Determinants of Health     Financial Resource Strain: Not on file   Food Insecurity: Not on file   Transportation Needs: Not on file   Physical Activity: Not on file   Stress: Not on file   Social Connections: Not on file   Intimate Partner Violence: Not on file   Housing Stability: Not on file      Family History   Problem Relation Age of Onset   • Heart failure Mother    • Other Father         "blood clot"   • COPD Sister    • Heart failure Brother    • Valvular heart disease Brother    • No Known Problems Son    • No Known Problems Son      Past Surgical History:   Procedure Laterality Date   • APPENDECTOMY  1965   • CARDIAC CATHETERIZATION  9/16/2021   • INGUINAL HERNIA REPAIR Right 1991       Current Outpatient Medications:   •  amLODIPine (NORVASC) 5 mg tablet, TAKE 1 TABLET (5 MG TOTAL) BY MOUTH DAILY. , Disp: 90 tablet, Rfl: 4  •  aspirin (ECOTRIN LOW STRENGTH) 81 mg EC tablet, Take 1 tablet (81 mg total) by mouth daily, Disp: 90 tablet, Rfl: 3  •  carvedilol (COREG) 3.125 mg tablet, Take 1 tablet (3.125 mg total) by mouth every 12 (twelve) hours, Disp: 60 tablet, Rfl: 11  •  nicotine polacrilex (COMMIT) 2 MG lozenge, Apply 1 lozenge (2 mg total) to the mouth or throat as needed for smoking cessation, Disp: 100 each, Rfl: 0  •  sacubitril-valsartan (Entresto) 49-51 MG TABS, Take 1 tablet by mouth 2 (two) times a day, Disp: , Rfl:   No Known Allergies    Labs:     Chemistry        Component Value Date/Time    K 4.5 07/05/2023 1341     07/05/2023 1341    CO2 25 07/05/2023 1341    BUN 25 07/05/2023 1341    CREATININE 2.21 (H) 07/05/2023 1341        Component Value Date/Time    CALCIUM 9.1 07/05/2023 1341    ALKPHOS 70 07/05/2023 1341    AST 15 07/05/2023 1341    ALT 6 (L) 07/05/2023 1341        Lipid panel 7/5/2023 C 164. T 75. H 35. L 114. Cardiac testing:  ECG 6/27/2023: Normal sinus rhythm. LAD. LVH. Rate 89 bpm.     Cardiac catheterization 9/16/2021:  Left main: The vessel was medium to large sized. Angiography showed mild atherosclerosis. LAD: The vessel was medium to large sized. Angiography showed minor luminal irregularities. Circumflex: The vessel was medium sized. Angiography showed minor luminal irregularities. RCA: The vessel was medium sized. Mid RCA: There was a discrete 30 % stenosis.      Echo complete 9/13/2021:  EF 25 %. There was severe diffuse hypokinesis. Mild LVH. Right ventricle systolic function was reduced. TAPSE 1.4 cm. The left atrium was mildly dilated. Moderate MR.  Trace AI. Mild TR. PASP 40 mmHg. The findings suggest mild pulmonary hypertension.  Mild AK. PERICARDIUM: There was a large left pleural effusion.      ECG 9/11/2021: sinus tachycardia with possible left atrial enlargement, left ventricular hypertrophy    Review of Systems   Constitutional: Negative. HENT: Negative. Cardiovascular: Positive for dyspnea on exertion. Negative for chest pain, irregular heartbeat, near-syncope, orthopnea and palpitations. Respiratory: Negative for cough and snoring. Endocrine: Negative. Skin: Negative. Musculoskeletal: Negative. Gastrointestinal: Positive for bloating. Genitourinary: Negative. Neurological: Negative. Psychiatric/Behavioral: Negative. Vitals:    09/19/23 1305   BP: (!) 198/148   Pulse: 96   Temp: 98.3 °F (36.8 °C)   SpO2: 99%     Vitals:    09/19/23 1305   Weight: 54.3 kg (119 lb 12.8 oz)     Height: 5' 6" (167.6 cm)   Body mass index is 19.34 kg/m². Physical Exam  Vitals and nursing note reviewed. Constitutional:       General: He is not in acute distress. Appearance: He is well-developed. He is not diaphoretic. HENT:      Head: Normocephalic and atraumatic. Neck:      Vascular: No carotid bruit or JVD. Cardiovascular:      Rate and Rhythm: Normal rate and regular rhythm. Pulses: Intact distal pulses. Heart sounds: Normal heart sounds, S1 normal and S2 normal. No murmur heard. No friction rub. No gallop. Comments: Trace ankle edema  Pulmonary:      Effort: Pulmonary effort is normal. No respiratory distress. Breath sounds: Normal breath sounds. Abdominal:      General: There is no distension. Palpations: Abdomen is soft. Tenderness: There is no abdominal tenderness. Skin:     General: Skin is warm and dry. Findings: No rash. Neurological:      Mental Status: He is alert and oriented to person, place, and time.    Psychiatric:         Behavior: Behavior normal.

## 2023-09-19 NOTE — TELEPHONE ENCOUNTER
----- Message from Mariah Scott sent at 9/19/2023  2:32 PM EDT -----  Please notify Ricardo Kingsley that his kidney function is slightly improved but remains above normal. Please stop Entresto and increase amlodipine to 10 mg daily. Please bring him back for a BP check in the office in 2 weeks. Repeat BMP lab test in 2 weeks ordered. I sent the new dose of amlodipine to his pharmacy. I also placed a referral to the kidney specialist, their office should call in the next week or so to schedule an appointment. Thank you!

## 2023-09-19 NOTE — TELEPHONE ENCOUNTER
----- Message from [address] Kapil Scott sent at 9/19/2023  2:54 PM EDT -----  BNP is very elevated. Resume furosemide 20 mg daily. Sent to pharmacy. See other result note from today with additional instructions on BP check and repeat labs.

## 2023-09-19 NOTE — LETTER
[facility] [place] [id number]  [address] [place] [address] [address] [facility] [address]  Phone#  [phone]  Fax#  [phone]      September 27, 2023      Dear:   Marco Garay         Our office has attempted to contact you several times regarding your Results. Could you please contact our office at [phone]. Thank you.      Sincerely,     Faith Mejia

## 2023-09-19 NOTE — ASSESSMENT & PLAN NOTE
Blood pressure remains poorly controlled, however he is not taking medications appropriately. Currently prescribed amlodipine 5 mg daily, carvedilol 3.125 mg BID (did not tolerate higher dose). Entresto 49/51 mg BID was discontinued but he has continued to take. Repeat BMP today and further medication adjustments pending results. Nurse visit BP check in 1-2 weeks.

## 2023-09-19 NOTE — ADDENDUM NOTE
[facility]2023 [facility]54 PM     Modules accepted: Orders

## 2023-09-20 NOTE — TELEPHONE ENCOUNTER
Left message on mobile phone to call office back. Please advise as below when pt returns call. Home phone mailbox full.

## 2023-10-13 DIAGNOSIS — I10 UNCONTROLLED HYPERTENSION: ICD-10-CM

## 2023-10-13 RX ORDER — AMLODIPINE BESYLATE 10 MG/1
10 TABLET ORAL DAILY
Qty: 90 TABLET | Refills: 4 | Status: SHIPPED | OUTPATIENT
Start: 2023-10-13

## 2023-11-07 DIAGNOSIS — I42.8 NONISCHEMIC CARDIOMYOPATHY (HCC): ICD-10-CM

## 2023-11-09 RX ORDER — CARVEDILOL 3.12 MG/1
3.12 TABLET ORAL EVERY 12 HOURS SCHEDULED
Qty: 180 TABLET | Refills: 3 | Status: SHIPPED | OUTPATIENT
Start: 2023-11-09

## 2023-11-11 DIAGNOSIS — I42.8 NONISCHEMIC CARDIOMYOPATHY (HCC): ICD-10-CM

## 2023-11-13 RX ORDER — FUROSEMIDE 20 MG/1
20 TABLET ORAL DAILY
Qty: 90 TABLET | Refills: 4 | Status: SHIPPED | OUTPATIENT
Start: 2023-11-13

## 2023-12-04 ENCOUNTER — TELEPHONE (OUTPATIENT)
Dept: CARDIOLOGY CLINIC | Facility: CLINIC | Age: 68
End: 2023-12-04

## 2023-12-04 NOTE — TELEPHONE ENCOUNTER
Patient called to schedule a follow up with YUE Reid patient was due back in November in 2023.     Patient is currently scheduled for 2/5/24 @ 1140, please advise if patient needs to be scheduled sooner

## 2024-02-05 ENCOUNTER — OFFICE VISIT (OUTPATIENT)
Dept: CARDIOLOGY CLINIC | Facility: CLINIC | Age: 69
End: 2024-02-05
Payer: COMMERCIAL

## 2024-02-05 ENCOUNTER — TELEPHONE (OUTPATIENT)
Dept: CARDIOLOGY CLINIC | Facility: CLINIC | Age: 69
End: 2024-02-05

## 2024-02-05 ENCOUNTER — APPOINTMENT (OUTPATIENT)
Dept: LAB | Facility: HOSPITAL | Age: 69
End: 2024-02-05
Payer: COMMERCIAL

## 2024-02-05 VITALS
HEART RATE: 75 BPM | DIASTOLIC BLOOD PRESSURE: 90 MMHG | WEIGHT: 123.4 LBS | OXYGEN SATURATION: 99 % | HEIGHT: 66 IN | BODY MASS INDEX: 19.83 KG/M2 | SYSTOLIC BLOOD PRESSURE: 160 MMHG | TEMPERATURE: 98.5 F

## 2024-02-05 DIAGNOSIS — N18.31 STAGE 3A CHRONIC KIDNEY DISEASE (HCC): ICD-10-CM

## 2024-02-05 DIAGNOSIS — I50.22 CHRONIC SYSTOLIC HEART FAILURE (HCC): ICD-10-CM

## 2024-02-05 DIAGNOSIS — I10 UNCONTROLLED HYPERTENSION: ICD-10-CM

## 2024-02-05 DIAGNOSIS — R76.8 RHEUMATOID FACTOR POSITIVE: ICD-10-CM

## 2024-02-05 DIAGNOSIS — I42.8 NONISCHEMIC CARDIOMYOPATHY (HCC): Primary | ICD-10-CM

## 2024-02-05 DIAGNOSIS — I25.10 CORONARY ARTERY DISEASE INVOLVING NATIVE CORONARY ARTERY OF NATIVE HEART WITHOUT ANGINA PECTORIS: ICD-10-CM

## 2024-02-05 DIAGNOSIS — Z72.0 TOBACCO ABUSE: ICD-10-CM

## 2024-02-05 LAB
ANION GAP SERPL CALCULATED.3IONS-SCNC: 8 MMOL/L
BNP SERPL-MCNC: 2201 PG/ML (ref 0–100)
BUN SERPL-MCNC: 19 MG/DL (ref 5–25)
CALCIUM SERPL-MCNC: 9.3 MG/DL (ref 8.4–10.2)
CHLORIDE SERPL-SCNC: 101 MMOL/L (ref 96–108)
CO2 SERPL-SCNC: 28 MMOL/L (ref 21–32)
CREAT SERPL-MCNC: 2.01 MG/DL (ref 0.6–1.3)
ERYTHROCYTE [DISTWIDTH] IN BLOOD BY AUTOMATED COUNT: 13.3 % (ref 11.6–15.1)
GFR SERPL CREATININE-BSD FRML MDRD: 32 ML/MIN/1.73SQ M
GLUCOSE P FAST SERPL-MCNC: 110 MG/DL (ref 65–99)
HCT VFR BLD AUTO: 38 % (ref 36.5–49.3)
HGB BLD-MCNC: 12.3 G/DL (ref 12–17)
MCH RBC QN AUTO: 27.5 PG (ref 26.8–34.3)
MCHC RBC AUTO-ENTMCNC: 32.4 G/DL (ref 31.4–37.4)
MCV RBC AUTO: 85 FL (ref 82–98)
PLATELET # BLD AUTO: 159 THOUSANDS/UL (ref 149–390)
PMV BLD AUTO: 10.8 FL (ref 8.9–12.7)
POTASSIUM SERPL-SCNC: 3.9 MMOL/L (ref 3.5–5.3)
RBC # BLD AUTO: 4.47 MILLION/UL (ref 3.88–5.62)
SODIUM SERPL-SCNC: 137 MMOL/L (ref 135–147)
WBC # BLD AUTO: 7.97 THOUSAND/UL (ref 4.31–10.16)

## 2024-02-05 PROCEDURE — 85027 COMPLETE CBC AUTOMATED: CPT | Performed by: NURSE PRACTITIONER

## 2024-02-05 PROCEDURE — 83880 ASSAY OF NATRIURETIC PEPTIDE: CPT

## 2024-02-05 PROCEDURE — 36415 COLL VENOUS BLD VENIPUNCTURE: CPT | Performed by: NURSE PRACTITIONER

## 2024-02-05 PROCEDURE — 80048 BASIC METABOLIC PNL TOTAL CA: CPT

## 2024-02-05 PROCEDURE — 99214 OFFICE O/P EST MOD 30 MIN: CPT | Performed by: NURSE PRACTITIONER

## 2024-02-05 RX ORDER — FUROSEMIDE 40 MG/1
40 TABLET ORAL DAILY
Qty: 90 TABLET | Refills: 3 | Status: SHIPPED | OUTPATIENT
Start: 2024-02-05

## 2024-02-05 RX ORDER — ROSUVASTATIN CALCIUM 5 MG/1
5 TABLET, COATED ORAL DAILY
Qty: 30 TABLET | Refills: 11 | Status: SHIPPED | OUTPATIENT
Start: 2024-02-05

## 2024-02-05 RX ORDER — HYDRALAZINE HYDROCHLORIDE 25 MG/1
25 TABLET, FILM COATED ORAL 3 TIMES DAILY
Qty: 90 TABLET | Refills: 11 | Status: SHIPPED | OUTPATIENT
Start: 2024-02-05

## 2024-02-05 NOTE — ASSESSMENT & PLAN NOTE
Baseline creatinine 1.2.  Now with DAVID on CKD.  Referred to [facility] nephrology. I have encouraged him to schedule the appointment.  Will monitor BMP today

## 2024-02-05 NOTE — ASSESSMENT & PLAN NOTE
Admitted 9/2021 with heart failure.  Echo on 9/13/2021 reveals EF < 25%  Cardiac cath 9/16/2021 showed mild disease.  He met with [facility] Heart failure team.  Started on carvedilol, Entresto.  DAVID on recent labs and Entresto was stopped.  Add hydralazine for GDMT and BP optimization.   Plan for addition of isosorbide pending response.   Repeat BMP today.  Suspect uncontrolled HTN and +/- ETOH as likely sources of cardiomyopathy.  He reports complete cessation of alcohol since his DUI.  He was prescribed a LifeVest which he quickly stopped using and no longer has.  Will repeat echocardiogram with further discussion regarding ICD pending results.

## 2024-02-05 NOTE — PATIENT INSTRUCTIONS
Blood work today to recheck kidney function.  Your blood pressure is not controlled and you are retaining fluid. Increase furosemide back to 40 mg daily. Add hydralazine 25 mg 3 times daily.  Return in 1 week for a BP check.  Start rosuvastatin 5 mg daily in 1 week for cholesterol control.  Please continue to avoid alcohol completely.  We need the echocardiogram - will reschedule again today.

## 2024-02-05 NOTE — TELEPHONE ENCOUNTER
----- Message from YUE Griffin sent at 2/5/2024 12:58 PM EST -----  Please let Matthew know his blood count is stable. Kidney function remains at stage 3b CKD. I do want him to schedule with the nephrologist - can you please give him the phone number to schedule at [facility]? A referral was created in September. His BNP (test for fluid stretch in the heart) is better since being back on furosemide. We will need to monitor his kidney function closely with increasing the furosemide. I put an order to recheck labs in 1 week (BMP in chart). He can complete when he comes for his nurse visit next week.   Back Pain

## 2024-02-05 NOTE — ASSESSMENT & PLAN NOTE
Cardiac catheterization on 9/16/2021 reveals 30% stenosis in mid RCA with minor luminal irregularities in other vessels.  Continue aspirin 81 mg daily.  Not on statin therapy.  on 7/2023 labs.  Start rosuvastatin 5 mg daily.  Strongly counseled on smoking cessation. Given script for nicotine lozenges.

## 2024-02-05 NOTE — PROGRESS NOTES
Cardiology Follow Up    Matthew Alvarez  1955  [id number]  [facility] [place] [address]  [address]  [facility] [address] [phone]    Matthew presents for follow up of CAD, cardiomyopathy, uncontrolled HTN     1. Nonischemic cardiomyopathy (HCC)  Assessment & Plan:  Admitted 9/2021 with heart failure.  Echo on 9/13/2021 reveals EF < 25%  Cardiac cath 9/16/2021 showed mild disease.  He met with [facility] Heart failure team.  Started on carvedilol, Entresto.  DAVID on recent labs and Entresto was stopped.  Add hydralazine for GDMT and BP optimization.   Plan for addition of isosorbide pending response.   Repeat BMP today.  Suspect uncontrolled HTN and +/- ETOH as likely sources of cardiomyopathy.  He reports complete cessation of alcohol since his DUI.  He was prescribed a LifeVest which he quickly stopped using and no longer has.  Will repeat echocardiogram with further discussion regarding ICD pending results.    Orders:  -     furosemide (LASIX) 40 mg tablet; Take 1 tablet (40 mg total) by mouth daily    2. Chronic systolic heart failure (HCC)  Assessment & Plan:  Wt Readings from Last 3 Encounters:   02/05/24 56 kg (123 lb 6.4 oz)   09/19/23 54.3 kg (119 lb 12.8 oz)   07/05/23 54.4 kg (120 lb)     Echo 9/13/2021 with EF 25%. Updated echo ordered and pending.  He appears mildly volume overloaded on exam. + orthopnea  Increase furosemide back to 40 mg daily.  BMP, BNP today.  Reviewed 2 g sodium diet, daily weights, and s/s to report.  Nurse visit in 1 week for weight/BP check            Orders:  -     B-Type Natriuretic Peptide(BNP); Future    3. Coronary artery disease involving native coronary artery of native heart without angina pectoris  Assessment & Plan:  Cardiac catheterization on 9/16/2021 reveals 30% stenosis in mid RCA with minor luminal irregularities in other vessels.  Continue aspirin 81 mg daily.  Not on statin therapy.  on  7/2023 labs.  Start rosuvastatin 5 mg daily.  Strongly counseled on smoking cessation. Given script for nicotine lozenges.    Orders:  -     rosuvastatin (CRESTOR) 5 mg tablet; Take 1 tablet (5 mg total) by mouth daily    4. Uncontrolled hypertension  Assessment & Plan:  Blood pressure remains poorly controlled.  Currently prescribed amlodipine 10 mg daily, carvedilol 3.125 mg BID (did not tolerate higher dose), furosemide 20 mg daily.  Entresto stopped due to DAVID.  Add hydralazine 25 mg TID. Increase furosemide back to 40 mg daily.  Repeat BMP today.  Nurse visit BP check in 1 week.    Orders:  -     CBC and Platelet  -     hydrALAZINE (APRESOLINE) 25 mg tablet; Take 1 tablet (25 mg total) by mouth 3 (three) times a day    5. Tobacco abuse  Assessment & Plan:  Strongly counseled on the risks associated with smoking.  Previously given a script for nicotine lozenges which he has not yet started to use. Encouraged smoking cessation efforts.        6. Stage 3a chronic kidney disease (HCC)  Assessment & Plan:  Baseline creatinine 1.2.  Now with DAVID on CKD.  Referred to [facility] nephrology. I have encouraged him to schedule the appointment.  Will monitor BMP today    Orders:  -     Basic metabolic panel; Future    7. Rheumatoid factor positive  Assessment & Plan:  + RF on lab testing for cardiomyopathy work up.  No additional findings and all other testing [facility]  Rheumatology referral created but he never scheduled           HPI  Matthew has a past medical history of nonischemic cardiomyopathy, nonobstructive CAD by cath, uncontrolled HTN, alcohol and tobacco use, and medical noncompliance.     He established with [facility] cardiology during an admission to [facility] in September 2021 for acute heart failure. Echocardiogram showed LVEF 25% with severe diffuse hypokinesis and mild LVH, mod MR. He was transferred to [facility] where he underwent evaluation by the Heart failure team. Cardiac catheterization 9/16/2023 showed 30% mid RCA  "lesion with MLI in the LM, LAD, LCx. He was started on goal directed medical therapy with carvedilol 6.25 mg BID, Entresto 49/51 mg BID, and furosemide 20 mg daily.      He followed up with me on 10/6/2021 at which time he had missed an appointment with the Heart Failure team. He stopped his carvedilol on his own as he felt dizzy whenever taking it. He was only taking Entresto once daily. He told me he felt \"great'. He was using a nicotine patch and smoking 4 cig daily on top of that. He was not wearing his LifeVest as he reported he was not sure how to use it. He was instructed to resume carvedilol at 3.125 mg BID dosing. He was advised that he should be taking this and the Entresto every 12 hours. He was started on daily aspirin 81 mg. Blood work was ordered with plan to start statin at follow up.      Unfortunately he was lost to follow up after that time. Our office and [facility] reached out to him repeatedly with no return call.      He was lost to follow up until 6/27/2023. He was only taking medications once daily at that visit. He had gotten a DUI and quit drinking very recently. He continued to smoke. He denied cardiac complaints and stated he felt \"great\". He had not completed labs since 2021. An updated echocardiogram was ordered. Lab work was ordered and creatinine was elevated at 2.2. he was asked to stop Entresto and furosemide and amlodipine 5 mg daily was started.     He followed up with me on 9/19/2023 at which time he had not yet completed his echocardiogram. He was still taking the Entresto but had stopped furosemide and was experiencing orthopnea. His weight was down and he admitted he was not eating well due to increased stress. Repeat blood work continued to show progressive CKD. His Entresto was stopped and amlodipine increased to 10 mg daily. He was referred to nephrology.     2/5/2024: Matthew presents for routine follow up. He has not yet completed his echocardiogram. He is not taking Entresto. " He has been taking furosemide 20 mg daily and continues with lower leg swelling and orthopnea. Weight is up about 4 pounds from last OV. He denies chest pain, lightheadedness. He does note fatigue. He did not schedule with nephrology. He missed his last appt to monitor BP and is not checking at home. BP remains significantly elevated today. He is avoiding alcohol. He continues to smoke. He states he is waiting on a prescription of nicotine lozenges from his pharmacy.     Medical Problems       Problem List       Nonischemic cardiomyopathy (HCC)    Coronary artery disease involving native coronary artery of native heart without angina pectoris    Uncontrolled hypertension    Tobacco abuse    Stage 3a chronic kidney disease (HCC)    Elevated troponin    Mucopurulent chronic bronchitis (HCC)    Protein calorie malnutrition (HCC)    Rheumatoid factor positive    Chronic renal disease, stage IV ([facility])        Past Medical History:   Diagnosis Date    CAD (coronary artery disease)     HFrEF (heart failure with reduced ejection fraction) ([facility]) 09/2021    Hypertension     Rheumatoid factor positive 09/2021    Stage 3 chronic kidney disease ([facility])     Tobacco abuse      Social History     Socioeconomic History    Marital status: /Civil Union     Spouse name: Not on file    Number of children: 2    Years of education: Not on file    Highest education level: Not on file   Occupational History    Not on file   Tobacco Use    Smoking status: Every Day     Current packs/day: 0.50     Average packs/day: 0.5 packs/day for 50.1 years (25.0 ttl pk-yrs)     Types: Cigarettes     Start date: 1974    Smokeless tobacco: Never    Tobacco comments:     Began smoking in his early 20s, on average smoking 1+ packs daily. Quit in 08/2021 (Updated 09/15/2021).    Vaping Use    Vaping status: Never Used   Substance and Sexual Activity    Alcohol use: Not Currently     Alcohol/week: 6.0 standard drinks of alcohol     Types: 6 Shots of liquor  "per week    Drug use: Never     Comment: Last assessed 09/15/2021.    Sexual activity: Not on file     Comment: defer   Other Topics Concern    Not on file   Social History Narrative    Has 2 biological sons.      Social Determinants of Health     Financial Resource Strain: Not on file   Food Insecurity: Not on file   Transportation Needs: Not on file   Physical Activity: Not on file   Stress: Not on file   Social Connections: Not on file   Intimate Partner Violence: Not on file   Housing Stability: Not on file      Family History   Problem Relation Age of Onset    Heart failure Mother     Other Father         \"blood clot\"    COPD Sister     Heart failure Brother     Valvular heart disease Brother     No Known Problems Son     No Known Problems Son      Past Surgical History:   Procedure Laterality Date    APPENDECTOMY  1965    CARDIAC CATHETERIZATION  9/16/2021    INGUINAL HERNIA REPAIR Right 1991       Current Outpatient Medications:     amLODIPine (NORVASC) 10 mg tablet, TAKE 1 TABLET BY MOUTH EVERY DAY, Disp: 90 tablet, Rfl: 4    aspirin (ECOTRIN LOW STRENGTH) 81 mg EC tablet, Take 1 tablet (81 mg total) by mouth daily, Disp: 90 tablet, Rfl: 3    carvedilol (COREG) 3.125 mg tablet, TAKE 1 TABLET (3.125 MG TOTAL) BY MOUTH EVERY 12 (TWELVE) HOURS, Disp: 180 tablet, Rfl: 3    furosemide (LASIX) 40 mg tablet, Take 1 tablet (40 mg total) by mouth daily, Disp: 90 tablet, Rfl: 3    hydrALAZINE (APRESOLINE) 25 mg tablet, Take 1 tablet (25 mg total) by mouth 3 (three) times a day, Disp: 90 tablet, Rfl: 11    rosuvastatin (CRESTOR) 5 mg tablet, Take 1 tablet (5 mg total) by mouth daily, Disp: 30 tablet, Rfl: 11    nicotine polacrilex (COMMIT) 2 MG lozenge, Apply 1 lozenge (2 mg total) to the mouth or throat as needed for smoking cessation (Patient not taking: Reported on 2/5/2024), Disp: 100 each, Rfl: 0  No Known Allergies    Labs:     Chemistry        Component Value Date/Time    K 5.1 09/19/2023 1352     " "09/19/2023 1352    CO2 25 09/19/2023 1352    BUN 19 09/19/2023 1352    CREATININE 1.94 (H) 09/19/2023 1352        Component Value Date/Time    CALCIUM 9.5 09/19/2023 1352    ALKPHOS 70 07/05/2023 1341    AST 15 07/05/2023 1341    ALT 6 (L) 07/05/2023 1341        Lipid panel 7/5/2023 C 164. T 75. H 35. L 114.     Cardiac testing:  ECG 6/27/2023: Normal sinus rhythm. LAD. LVH. Rate 89 bpm.     Cardiac catheterization 9/16/2021:  Left main: The vessel was medium to large sized. Angiography showed mild atherosclerosis.   LAD: The vessel was medium to large sized. Angiography showed minor luminal irregularities.   Circumflex: The vessel was medium sized. Angiography showed minor luminal irregularities.   RCA: The vessel was medium sized. Mid RCA: There was a discrete 30 % stenosis.      Echo complete 9/13/2021:  EF 25 %. There was severe diffuse hypokinesis. Mild LVH. Right ventricle systolic function was reduced. TAPSE 1.4 cm. The left atrium was mildly dilated. Moderate MR.  Trace AI. Mild TR. PASP 40 mmHg. The findings suggest mild pulmonary hypertension. Mild DE. PERICARDIUM: There was a large left pleural effusion.      ECG 9/11/2021: sinus tachycardia with possible left atrial enlargement, left ventricular hypertrophy    Review of Systems   Constitutional: Positive for malaise/fatigue.   HENT: Negative.     Cardiovascular:  Positive for dyspnea on exertion, leg swelling and orthopnea. Negative for chest pain, irregular heartbeat, near-syncope and palpitations.   Respiratory:  Negative for cough and snoring.    Endocrine: Negative.    Skin: Negative.    Musculoskeletal: Negative.    Gastrointestinal: Negative.    Genitourinary: Negative.    Neurological: Negative.    Psychiatric/Behavioral: Negative.         Vitals:    02/05/24 1143   BP: 160/90   Pulse: 75   Temp: 98.5 °F (36.9 °C)   SpO2: 99%     Vitals:    02/05/24 1143   Weight: 56 kg (123 lb 6.4 oz)     Height: 5' 6\" (167.6 cm)   Body mass index is 19.92 " kg/m².    Physical Exam  Vitals and nursing note reviewed.   Constitutional:       General: He is not in acute distress.     Appearance: He is well-developed. He is not diaphoretic.   HENT:      Head: Normocephalic and atraumatic.   Neck:      Vascular: No carotid bruit or JVD.   Cardiovascular:      Rate and Rhythm: Normal rate and regular rhythm.      Pulses: Intact distal pulses.      Heart sounds: S1 normal and S2 normal. Murmur heard.      No friction rub. No gallop.      Comments: Bilateral ankle edema  Pulmonary:      Effort: Pulmonary effort is normal. No respiratory distress.      Breath sounds: Normal breath sounds.   Abdominal:      General: There is no distension.      Palpations: Abdomen is soft.      Tenderness: There is no abdominal tenderness.   Skin:     General: Skin is warm and dry.      Findings: No rash.   Neurological:      Mental Status: He is alert and oriented to person, place, and time.   Psychiatric:         Behavior: Behavior normal.

## 2024-02-05 NOTE — ASSESSMENT & PLAN NOTE
Strongly counseled on the risks associated with smoking.  Previously given a script for nicotine lozenges which he has not yet started to use. Encouraged smoking cessation efforts.

## 2024-02-05 NOTE — ASSESSMENT & PLAN NOTE
Blood pressure remains poorly controlled.  Currently prescribed amlodipine 10 mg daily, carvedilol 3.125 mg BID (did not tolerate higher dose), furosemide 20 mg daily.  Entresto stopped due to DAVID.  Add hydralazine 25 mg TID. Increase furosemide back to 40 mg daily.  Repeat BMP today.  Nurse visit BP check in 1 week.

## 2024-02-05 NOTE — ASSESSMENT & PLAN NOTE
Wt Readings from Last 3 Encounters:   02/05/24 56 kg (123 lb 6.4 oz)   09/19/23 54.3 kg (119 lb 12.8 oz)   07/05/23 54.4 kg (120 lb)     Echo 9/13/2021 with EF 25%. Updated echo ordered and pending.  He appears mildly volume overloaded on exam. + orthopnea  Increase furosemide back to 40 mg daily.  BMP, BNP today.  Reviewed 2 g sodium diet, daily weights, and s/s to report.  Nurse visit in 1 week for weight/BP check

## 2024-02-05 NOTE — ASSESSMENT & PLAN NOTE
+ RF on lab testing for cardiomyopathy work up.  No additional findings and all other testing WNL  Rheumatology referral created but he never scheduled

## 2024-02-28 DIAGNOSIS — I10 UNCONTROLLED HYPERTENSION: ICD-10-CM

## 2024-02-28 DIAGNOSIS — I25.10 CORONARY ARTERY DISEASE INVOLVING NATIVE CORONARY ARTERY OF NATIVE HEART WITHOUT ANGINA PECTORIS: ICD-10-CM

## 2024-03-01 RX ORDER — HYDRALAZINE HYDROCHLORIDE 25 MG/1
25 TABLET, FILM COATED ORAL 3 TIMES DAILY
Qty: 270 TABLET | Refills: 4 | Status: SHIPPED | OUTPATIENT
Start: 2024-03-01

## 2024-03-01 RX ORDER — ROSUVASTATIN CALCIUM 5 MG/1
5 TABLET, COATED ORAL DAILY
Qty: 90 TABLET | Refills: 4 | Status: SHIPPED | OUTPATIENT
Start: 2024-03-01

## 2024-09-03 ENCOUNTER — OFFICE VISIT (OUTPATIENT)
Dept: CARDIOLOGY CLINIC | Facility: CLINIC | Age: 69
End: 2024-09-03
Payer: COMMERCIAL

## 2024-09-03 VITALS
HEART RATE: 89 BPM | OXYGEN SATURATION: 99 % | DIASTOLIC BLOOD PRESSURE: 100 MMHG | WEIGHT: 122 LBS | HEIGHT: 66 IN | BODY MASS INDEX: 19.61 KG/M2 | SYSTOLIC BLOOD PRESSURE: 182 MMHG

## 2024-09-03 DIAGNOSIS — I42.8 NONISCHEMIC CARDIOMYOPATHY (HCC): Primary | ICD-10-CM

## 2024-09-03 DIAGNOSIS — Z72.0 TOBACCO ABUSE: ICD-10-CM

## 2024-09-03 DIAGNOSIS — I25.10 CORONARY ARTERY DISEASE INVOLVING NATIVE CORONARY ARTERY OF NATIVE HEART WITHOUT ANGINA PECTORIS: ICD-10-CM

## 2024-09-03 DIAGNOSIS — I50.22 CHRONIC SYSTOLIC HEART FAILURE (HCC): ICD-10-CM

## 2024-09-03 DIAGNOSIS — N18.31 STAGE 3A CHRONIC KIDNEY DISEASE (HCC): ICD-10-CM

## 2024-09-03 DIAGNOSIS — I10 UNCONTROLLED HYPERTENSION: ICD-10-CM

## 2024-09-03 PROCEDURE — 99214 OFFICE O/P EST MOD 30 MIN: CPT | Performed by: NURSE PRACTITIONER

## 2024-09-03 RX ORDER — FUROSEMIDE 40 MG
40 TABLET ORAL DAILY
Qty: 90 TABLET | Refills: 3 | Status: SHIPPED | OUTPATIENT
Start: 2024-09-03

## 2024-09-03 RX ORDER — CARVEDILOL 3.12 MG/1
3.12 TABLET ORAL EVERY 12 HOURS SCHEDULED
Qty: 180 TABLET | Refills: 3 | Status: SHIPPED | OUTPATIENT
Start: 2024-09-03

## 2024-09-03 RX ORDER — AMLODIPINE BESYLATE 10 MG/1
10 TABLET ORAL DAILY
Qty: 90 TABLET | Refills: 4 | Status: SHIPPED | OUTPATIENT
Start: 2024-09-03

## 2024-09-03 RX ORDER — ROSUVASTATIN CALCIUM 5 MG/1
5 TABLET, COATED ORAL DAILY
Qty: 90 TABLET | Refills: 4 | Status: SHIPPED | OUTPATIENT
Start: 2024-09-03

## 2024-09-03 NOTE — ASSESSMENT & PLAN NOTE
Blood pressure remains poorly controlled, complicated by poor medication adherence.  He has been taking only carvedilol 3.125 mg once daily.  I have asked him to resume BID dosing. Resume amlodipine 10 mg daily.  Consider resuming hydralazine based on BP response.  Nurse visit within 1 week for BP check.  BMP ordered, please complete.  Urged avoidance of alcohol and tobacco cessation.

## 2024-09-03 NOTE — PATIENT INSTRUCTIONS
Please restart amlodipine 10 mg daily. Continue carvedilol 3.125 mg twice daily.  Return within 1 week for a BP check. Bring your home BP cuff so we can show you how to use it.  Please complete the blood test and echo I ordered.   Schedule with primary care - [phone]  Avoid alcohol completely  I recommend compression socks daily

## 2024-09-03 NOTE — ASSESSMENT & PLAN NOTE
Wt Readings from Last 3 Encounters:   09/03/24 55.3 kg (122 lb)   02/05/24 56 kg (123 lb 6.4 oz)   09/19/23 54.3 kg (119 lb 12.8 oz)     Echo 9/13/2021 with EF 25%. Updated echo ordered and pending.  He appears fairly euvolemic on furosemide 40 mg daily.  BMP now  Reviewed 2 g sodium diet, daily weights, and s/s to report.

## 2024-09-03 NOTE — ASSESSMENT & PLAN NOTE
Admitted 9/2021 with heart failure.  Echo on 9/13/2021 reveals EF < 25%  Cardiac cath 9/16/2021 showed mild disease.  He met with [facility] Heart failure team.  Started on carvedilol, Entresto.  Entresto was stopped due to DAVID.  Suspect uncontrolled HTN and +/- ETOH as likely sources of cardiomyopathy.  He was prescribed a LifeVest which he quickly stopped using and no longer has.  Will repeat echocardiogram now and further treatment pending results.  Urged the importance of medication adherence.

## 2024-09-03 NOTE — PROGRESS NOTES
Cardiology Follow Up    Matthew Alvarez  1955  [id number]  [facility] [place] [address]  [address]  [facility] [address] [phone]    Matthew presents for follow up of non-obstructive CAD, non-ischemic cardiomyopathy, HFrEF, uncontrolled HTN     1. Nonischemic cardiomyopathy (HCC)  Assessment & Plan:  Admitted 9/2021 with heart failure.  Echo on 9/13/2021 reveals EF < 25%  Cardiac cath 9/16/2021 showed mild disease.  He met with [facility] Heart failure team.  Started on carvedilol, Entresto.  Entresto was stopped due to DAVID.  Suspect uncontrolled HTN and +/- ETOH as likely sources of cardiomyopathy.  He was prescribed a LifeVest which he quickly stopped using and no longer has.  Will repeat echocardiogram now and further treatment pending results.  Urged the importance of medication adherence.  Orders:  -     furosemide (LASIX) 40 mg tablet; Take 1 tablet (40 mg total) by mouth daily  -     carvedilol (COREG) 3.125 mg tablet; Take 1 tablet (3.125 mg total) by mouth every 12 (twelve) hours  2. Chronic systolic heart failure (HCC)  Assessment & Plan:  Wt Readings from Last 3 Encounters:   09/03/24 55.3 kg (122 lb)   02/05/24 56 kg (123 lb 6.4 oz)   09/19/23 54.3 kg (119 lb 12.8 oz)     Echo 9/13/2021 with EF 25%. Updated echo ordered and pending.  He appears fairly euvolemic on furosemide 40 mg daily.  BMP now  Reviewed 2 g sodium diet, daily weights, and s/s to report.    3. Coronary artery disease involving native coronary artery of native heart without angina pectoris  Assessment & Plan:  Cardiac catheterization on 9/16/2021 reveals 30% stenosis in mid RCA with minor luminal irregularities in other vessels.  Continue aspirin 81 mg daily.  Not on statin therapy.  on 7/2023 labs.  Start rosuvastatin 5 mg daily.  Strongly counseled on smoking cessation. Given script for nicotine lozenges.  Orders:  -     aspirin (ECOTRIN LOW STRENGTH) 81 mg EC  tablet; Take 1 tablet (81 mg total) by mouth daily  -     rosuvastatin (CRESTOR) 5 mg tablet; Take 1 tablet (5 mg total) by mouth daily  4. Uncontrolled hypertension  Assessment & Plan:  Blood pressure remains poorly controlled, complicated by poor medication adherence.  He has been taking only carvedilol 3.125 mg once daily.  I have asked him to resume BID dosing. Resume amlodipine 10 mg daily.  Consider resuming hydralazine based on BP response.  Nurse visit within 1 week for BP check.  BMP ordered, please complete.  Urged avoidance of alcohol and tobacco cessation.  Orders:  -     amLODIPine (NORVASC) 10 mg tablet; Take 1 tablet (10 mg total) by mouth daily  5. Stage 3a chronic kidney disease (HCC)  Assessment & Plan:  Baseline creatinine 1.2.  Now with DAVID on CKD.  Referred to [facility] nephrology. I have encouraged him to schedule the appointment.  Will monitor BMP today  6. Tobacco abuse  Assessment & Plan:  Strongly counseled on the risks associated with smoking.  Previously given a script for nicotine lozenges which he has not yet started to use. Encouraged smoking cessation efforts.       IRIS Castro has a past medical history of nonischemic cardiomyopathy, nonobstructive CAD by cath, uncontrolled HTN, alcohol and tobacco use, and medical noncompliance.     He established with [facility] cardiology during an admission to [facility] in September 2021 for acute heart failure. Echocardiogram showed LVEF 25% with severe diffuse hypokinesis and mild LVH, mod MR. He was transferred to [facility] where he underwent evaluation by the Heart failure team. Cardiac catheterization 9/16/2023 showed 30% mid RCA lesion with MLI in the LM, LAD, LCx. He was started on goal directed medical therapy with carvedilol 6.25 mg BID, Entresto 49/51 mg BID, and furosemide 20 mg daily.      He followed up with me on 10/6/2021 at which time he had missed an appointment with the Heart Failure team. He stopped his carvedilol on his own as he felt  "dizzy whenever taking it. He was only taking Entresto once daily. He told me he felt \"great'. He was using a nicotine patch and smoking 4 cig daily on top of that. He was not wearing his LifeVest as he reported he was not sure how to use it. He was instructed to resume carvedilol at 3.125 mg BID dosing. He was advised that he should be taking this and the Entresto every 12 hours. He was started on daily aspirin 81 mg. Blood work was ordered with plan to start statin at follow up.      Unfortunately he was lost to follow up after that time. Our office and [facility] reached out to him repeatedly with no return call.      He was lost to follow up until 6/27/2023. He was only taking medications once daily at that visit. He had gotten a DUI and quit drinking very recently. He continued to smoke. He denied cardiac complaints and stated he felt \"great\". He had not completed labs since 2021. An updated echocardiogram was ordered. Lab work was ordered and creatinine was elevated at 2.2. he was asked to stop Entresto and furosemide and amlodipine 5 mg daily was started.      He followed up with me on 9/19/2023 at which time he had not yet completed his echocardiogram. He was still taking the Entresto but had stopped furosemide and was experiencing orthopnea. His weight was down and he admitted he was not eating well due to increased stress. Repeat blood work continued to show progressive CKD. His Entresto was stopped and amlodipine increased to 10 mg daily. He was referred to nephrology.    He last followed up 2/5/2024 at which time he had not yet completed his echocardiogram He was taking furosemide 20 mg daily and noted lower leg edema, weight gain, and fatigue. Furosemide was increased to 40 mg daily. BP was significantly elevated. Hydralazine 25 mg TID was added to his regimen. He was again urged to take his medications as prescribed.    9/3/2024: Matthew presents for overdue follow up. BP remains severely elevated today. He " states he is taking carvedilol once daily and not taking any other medications. He is tearful today as his son was recently found  after a cardiac arrest. He is taking this hard. He denies any chest discomfort. He remains active doing home projects. He states he is tolerating activity well. He notes stable dyspnea on exertion. He states his edema is improved. No orthopnea. He is not checking his BP at home. He is due for blood work and the echo ordered last year. He states he is drinking occasionally but smoking heavily. He has not been following with primary care recently and I asked him to please schedule.    Medical Problems       Problem List       Nonischemic cardiomyopathy (HCC)    Coronary artery disease involving native coronary artery of native heart without angina pectoris    Uncontrolled hypertension    Tobacco abuse    Stage 3a chronic kidney disease (HCC)    Elevated troponin    Mucopurulent chronic bronchitis (HCC)    Protein calorie malnutrition (HCC)    Rheumatoid factor positive    Chronic systolic heart failure (HCC)        Past Medical History:   Diagnosis Date    CAD (coronary artery disease)     HFrEF (heart failure with reduced ejection fraction) ([facility]) 2021    Hypertension     Rheumatoid factor positive 2021    Stage 3 chronic kidney disease (HCC)     Tobacco abuse      Social History     Socioeconomic History    Marital status: /Civil Union     Spouse name: Not on file    Number of children: 2    Years of education: Not on file    Highest education level: Not on file   Occupational History    Not on file   Tobacco Use    Smoking status: Every Day     Current packs/day: 0.50     Average packs/day: 0.5 packs/day for 50.7 years (25.3 ttl pk-yrs)     Types: Cigarettes     Start date:     Smokeless tobacco: Never    Tobacco comments:     Began smoking in his early 20s, on average smoking 1+ packs daily. Quit in 2021 (Updated 09/15/2021).    Vaping Use    Vaping status:  "Never Used   Substance and Sexual Activity    Alcohol use: Yes     Alcohol/week: 6.0 standard drinks of alcohol     Types: 6 Shots of liquor per week     Comment: rare    Drug use: Never    Sexual activity: Not on file     Comment: defer   Other Topics Concern    Not on file   Social History Narrative    Has 2 biological sons.      Social Determinants of Health     Financial Resource Strain: Not on file   Food Insecurity: Not on file   Transportation Needs: Not on file   Physical Activity: Not on file   Stress: Not on file   Social Connections: Not on file   Intimate Partner Violence: Not on file   Housing Stability: Not on file      Family History   Problem Relation Age of Onset    Heart failure Mother     Other Father         \"blood clot\"    COPD Sister     Heart failure Brother     Valvular heart disease Brother     Heart attack Son     No Known Problems Son      Past Surgical History:   Procedure Laterality Date    APPENDECTOMY  1965    CARDIAC CATHETERIZATION  9/16/2021    INGUINAL HERNIA REPAIR Right 1991       Current Outpatient Medications:     amLODIPine (NORVASC) 10 mg tablet, Take 1 tablet (10 mg total) by mouth daily, Disp: 90 tablet, Rfl: 4    aspirin (ECOTRIN LOW STRENGTH) 81 mg EC tablet, Take 1 tablet (81 mg total) by mouth daily, Disp: 90 tablet, Rfl: 3    carvedilol (COREG) 3.125 mg tablet, Take 1 tablet (3.125 mg total) by mouth every 12 (twelve) hours, Disp: 180 tablet, Rfl: 3    furosemide (LASIX) 40 mg tablet, Take 1 tablet (40 mg total) by mouth daily, Disp: 90 tablet, Rfl: 3    rosuvastatin (CRESTOR) 5 mg tablet, Take 1 tablet (5 mg total) by mouth daily, Disp: 90 tablet, Rfl: 4    nicotine polacrilex (COMMIT) 2 MG lozenge, Apply 1 lozenge (2 mg total) to the mouth or throat as needed for smoking cessation (Patient not taking: Reported on 2/5/2024), Disp: 100 each, Rfl: 0  No Known Allergies    Labs:     Chemistry        Component Value Date/Time    K 3.9 02/05/2024 1221     02/05/2024 " "1221    CO2 28 02/05/2024 1221    BUN 19 02/05/2024 1221    CREATININE 2.01 (H) 02/05/2024 1221        Component Value Date/Time    CALCIUM 9.3 02/05/2024 1221    ALKPHOS 70 07/05/2023 1341    AST 15 07/05/2023 1341    ALT 6 (L) 07/05/2023 1341        Lipid panel 7/5/2023 C 164. T 75. H 35. L 114.     Cardiac testing:  ECG 6/27/2023: Normal sinus rhythm. LAD. LVH. Rate 89 bpm.     Cardiac catheterization 9/16/2021:  Left main: The vessel was medium to large sized. Angiography showed mild atherosclerosis.   LAD: The vessel was medium to large sized. Angiography showed minor luminal irregularities.   Circumflex: The vessel was medium sized. Angiography showed minor luminal irregularities.   RCA: The vessel was medium sized. Mid RCA: There was a discrete 30 % stenosis.      Echo complete 9/13/2021:  EF 25 %. There was severe diffuse hypokinesis. Mild LVH. Right ventricle systolic function was reduced. TAPSE 1.4 cm. The left atrium was mildly dilated. Moderate MR.  Trace AI. Mild TR. PASP 40 mmHg. The findings suggest mild pulmonary hypertension. Mild IL. PERICARDIUM: There was a large left pleural effusion.      ECG 9/11/2021: sinus tachycardia with possible left atrial enlargement, left ventricular hypertrophy    Review of Systems   Constitutional: Negative.   HENT: Negative.     Cardiovascular:  Positive for dyspnea on exertion and leg swelling. Negative for chest pain, irregular heartbeat, near-syncope, orthopnea and palpitations.   Respiratory:  Negative for cough and snoring.    Endocrine: Negative.    Skin: Negative.    Musculoskeletal: Negative.    Gastrointestinal: Negative.    Genitourinary: Negative.    Neurological: Negative.    Psychiatric/Behavioral: Negative.         Vitals:    09/03/24 0957   BP: (!) 182/100   Pulse: 89   SpO2: 99%     Vitals:    09/03/24 0957   Weight: 55.3 kg (122 lb)     Height: 5' 6\" (167.6 cm)   Body mass index is 19.69 kg/m².    Physical Exam  Vitals and nursing note reviewed. "   Constitutional:       General: He is not in acute distress.     Appearance: He is well-developed. He is not diaphoretic.   HENT:      Head: Normocephalic and atraumatic.   Neck:      Vascular: No carotid bruit or JVD.   Cardiovascular:      Rate and Rhythm: Normal rate and regular rhythm.      Pulses: Intact distal pulses.      Heart sounds: Normal heart sounds, S1 normal and S2 normal. No murmur heard.     No friction rub. No gallop.      Comments: Mild ankle edema bilaterally  Pulmonary:      Effort: Pulmonary effort is normal. No respiratory distress.      Breath sounds: Normal breath sounds.   Abdominal:      General: There is no distension.      Palpations: Abdomen is soft.      Tenderness: There is no abdominal tenderness.   Skin:     General: Skin is warm and dry.      Findings: No rash.   Neurological:      Mental Status: He is alert and oriented to person, place, and time.   Psychiatric:         Mood and Affect: Mood and affect normal.         Behavior: Behavior normal.

## 2024-11-10 ENCOUNTER — HOSPITAL ENCOUNTER (INPATIENT)
Facility: HOSPITAL | Age: 69
LOS: 10 days | Discharge: HOME | DRG: 673 | End: 2024-11-20
Attending: EMERGENCY MEDICINE | Admitting: ANESTHESIOLOGY
Payer: COMMERCIAL

## 2024-11-10 ENCOUNTER — APPOINTMENT (EMERGENCY)
Dept: RADIOLOGY | Facility: HOSPITAL | Age: 69
DRG: 673 | End: 2024-11-10
Payer: COMMERCIAL

## 2024-11-10 ENCOUNTER — APPOINTMENT (INPATIENT)
Dept: CT IMAGING | Facility: HOSPITAL | Age: 69
DRG: 673 | End: 2024-11-10
Payer: COMMERCIAL

## 2024-11-10 DIAGNOSIS — I50.9 CHF (CONGESTIVE HEART FAILURE) (HCC): Primary | ICD-10-CM

## 2024-11-10 DIAGNOSIS — R79.89 ELEVATED D-DIMER: ICD-10-CM

## 2024-11-10 DIAGNOSIS — I50.23 ACUTE ON CHRONIC SYSTOLIC HEART FAILURE (HCC): ICD-10-CM

## 2024-11-10 DIAGNOSIS — N17.9 ACUTE KIDNEY INJURY SUPERIMPOSED ON STAGE 3A CHRONIC KIDNEY DISEASE (HCC): ICD-10-CM

## 2024-11-10 DIAGNOSIS — Z99.2 ANEMIA DUE TO CHRONIC KIDNEY DISEASE, ON CHRONIC DIALYSIS (HCC): ICD-10-CM

## 2024-11-10 DIAGNOSIS — Z72.0 TOBACCO ABUSE: ICD-10-CM

## 2024-11-10 DIAGNOSIS — F41.9 ANXIETY: ICD-10-CM

## 2024-11-10 DIAGNOSIS — R53.1 WEAKNESS: ICD-10-CM

## 2024-11-10 DIAGNOSIS — I42.8 NONISCHEMIC CARDIOMYOPATHY (HCC): ICD-10-CM

## 2024-11-10 DIAGNOSIS — I42.0 DILATED CARDIOMYOPATHY (HCC): ICD-10-CM

## 2024-11-10 DIAGNOSIS — D63.1 ANEMIA DUE TO CHRONIC KIDNEY DISEASE, ON CHRONIC DIALYSIS (HCC): ICD-10-CM

## 2024-11-10 DIAGNOSIS — N18.31 ACUTE KIDNEY INJURY SUPERIMPOSED ON STAGE 3A CHRONIC KIDNEY DISEASE (HCC): ICD-10-CM

## 2024-11-10 DIAGNOSIS — N18.6 ANEMIA DUE TO CHRONIC KIDNEY DISEASE, ON CHRONIC DIALYSIS (HCC): ICD-10-CM

## 2024-11-10 DIAGNOSIS — I10 HYPERTENSION: ICD-10-CM

## 2024-11-10 DIAGNOSIS — N17.9 AKI (ACUTE KIDNEY INJURY) (HCC): ICD-10-CM

## 2024-11-10 PROBLEM — R65.10 SIRS (SYSTEMIC INFLAMMATORY RESPONSE SYNDROME) (HCC): Status: ACTIVE | Noted: 2024-11-10

## 2024-11-10 PROBLEM — R74.01 ELEVATED LIVER TRANSAMINASE LEVEL: Status: ACTIVE | Noted: 2024-11-10

## 2024-11-10 LAB
2HR DELTA HS TROPONIN: 29 NG/L
4HR DELTA HS TROPONIN: 86 NG/L
ALBUMIN SERPL BCG-MCNC: 4.2 G/DL (ref 3.5–5)
ALP SERPL-CCNC: 125 U/L (ref 34–104)
ALT SERPL W P-5'-P-CCNC: 724 U/L (ref 7–52)
ANION GAP SERPL CALCULATED.3IONS-SCNC: 16 MMOL/L (ref 4–13)
ANION GAP SERPL CALCULATED.3IONS-SCNC: 19 MMOL/L (ref 4–13)
APAP SERPL-MCNC: <2 UG/ML (ref 10–20)
APTT PPP: 32 SECONDS (ref 23–34)
AST SERPL W P-5'-P-CCNC: 962 U/L (ref 13–39)
BASOPHILS # BLD AUTO: 0.03 THOUSANDS/ÂΜL (ref 0–0.1)
BASOPHILS NFR BLD AUTO: 0 % (ref 0–1)
BILIRUB SERPL-MCNC: 3.58 MG/DL (ref 0.2–1)
BNP SERPL-MCNC: >4700 PG/ML (ref 0–100)
BUN SERPL-MCNC: 45 MG/DL (ref 5–25)
BUN SERPL-MCNC: 50 MG/DL (ref 5–25)
CALCIUM SERPL-MCNC: 8.9 MG/DL (ref 8.4–10.2)
CALCIUM SERPL-MCNC: 9.7 MG/DL (ref 8.4–10.2)
CARDIAC TROPONIN I PNL SERPL HS: 112 NG/L
CARDIAC TROPONIN I PNL SERPL HS: 169 NG/L
CARDIAC TROPONIN I PNL SERPL HS: 83 NG/L
CHLORIDE SERPL-SCNC: 97 MMOL/L (ref 96–108)
CHLORIDE SERPL-SCNC: 98 MMOL/L (ref 96–108)
CO2 SERPL-SCNC: 20 MMOL/L (ref 21–32)
CO2 SERPL-SCNC: 20 MMOL/L (ref 21–32)
CREAT SERPL-MCNC: 3.58 MG/DL (ref 0.6–1.3)
CREAT SERPL-MCNC: 3.95 MG/DL (ref 0.6–1.3)
D DIMER PPP FEU-MCNC: 12.54 UG/ML FEU
EOSINOPHIL # BLD AUTO: 0 THOUSAND/ÂΜL (ref 0–0.61)
EOSINOPHIL NFR BLD AUTO: 0 % (ref 0–6)
ERYTHROCYTE [DISTWIDTH] IN BLOOD BY AUTOMATED COUNT: 14.9 % (ref 11.6–15.1)
GFR SERPL CREATININE-BSD FRML MDRD: 14 ML/MIN/1.73SQ M
GFR SERPL CREATININE-BSD FRML MDRD: 16 ML/MIN/1.73SQ M
GLUCOSE SERPL-MCNC: 51 MG/DL (ref 65–140)
GLUCOSE SERPL-MCNC: 85 MG/DL (ref 65–140)
HCT VFR BLD AUTO: 42.3 % (ref 36.5–49.3)
HGB BLD-MCNC: 13.5 G/DL (ref 12–17)
IMM GRANULOCYTES # BLD AUTO: 0.08 THOUSAND/UL (ref 0–0.2)
IMM GRANULOCYTES NFR BLD AUTO: 1 % (ref 0–2)
INR PPP: 1.83 (ref 0.85–1.19)
LACTATE SERPL-SCNC: 4.6 MMOL/L (ref 0.5–2)
LACTATE SERPL-SCNC: 5.9 MMOL/L (ref 0.5–2)
LIPASE SERPL-CCNC: 26 U/L (ref 11–82)
LYMPHOCYTES # BLD AUTO: 0.82 THOUSANDS/ÂΜL (ref 0.6–4.47)
LYMPHOCYTES NFR BLD AUTO: 6 % (ref 14–44)
MAGNESIUM SERPL-MCNC: 2.2 MG/DL (ref 1.9–2.7)
MCH RBC QN AUTO: 26.8 PG (ref 26.8–34.3)
MCHC RBC AUTO-ENTMCNC: 31.9 G/DL (ref 31.4–37.4)
MCV RBC AUTO: 84 FL (ref 82–98)
MONOCYTES # BLD AUTO: 1.1 THOUSAND/ÂΜL (ref 0.17–1.22)
MONOCYTES NFR BLD AUTO: 8 % (ref 4–12)
NEUTROPHILS # BLD AUTO: 12.61 THOUSANDS/ÂΜL (ref 1.85–7.62)
NEUTS SEG NFR BLD AUTO: 85 % (ref 43–75)
NRBC BLD AUTO-RTO: 0 /100 WBCS
PLATELET # BLD AUTO: 188 THOUSANDS/UL (ref 149–390)
PMV BLD AUTO: 11.4 FL (ref 8.9–12.7)
POTASSIUM SERPL-SCNC: 4.4 MMOL/L (ref 3.5–5.3)
POTASSIUM SERPL-SCNC: 4.6 MMOL/L (ref 3.5–5.3)
PROT SERPL-MCNC: 8.1 G/DL (ref 6.4–8.4)
PROTHROMBIN TIME: 21.4 SECONDS (ref 12.3–15)
RBC # BLD AUTO: 5.03 MILLION/UL (ref 3.88–5.62)
SODIUM SERPL-SCNC: 134 MMOL/L (ref 135–147)
SODIUM SERPL-SCNC: 136 MMOL/L (ref 135–147)
TSH SERPL DL<=0.05 MIU/L-ACNC: 4.43 UIU/ML (ref 0.45–4.5)
WBC # BLD AUTO: 14.64 THOUSAND/UL (ref 4.31–10.16)

## 2024-11-10 PROCEDURE — 85730 THROMBOPLASTIN TIME PARTIAL: CPT | Performed by: EMERGENCY MEDICINE

## 2024-11-10 PROCEDURE — 84443 ASSAY THYROID STIM HORMONE: CPT

## 2024-11-10 PROCEDURE — 83735 ASSAY OF MAGNESIUM: CPT | Performed by: EMERGENCY MEDICINE

## 2024-11-10 PROCEDURE — 99285 EMERGENCY DEPT VISIT HI MDM: CPT

## 2024-11-10 PROCEDURE — 85379 FIBRIN DEGRADATION QUANT: CPT | Performed by: EMERGENCY MEDICINE

## 2024-11-10 PROCEDURE — 84484 ASSAY OF TROPONIN QUANT: CPT

## 2024-11-10 PROCEDURE — 80053 COMPREHEN METABOLIC PANEL: CPT | Performed by: EMERGENCY MEDICINE

## 2024-11-10 PROCEDURE — 36415 COLL VENOUS BLD VENIPUNCTURE: CPT | Performed by: EMERGENCY MEDICINE

## 2024-11-10 PROCEDURE — 83605 ASSAY OF LACTIC ACID: CPT

## 2024-11-10 PROCEDURE — 80048 BASIC METABOLIC PNL TOTAL CA: CPT

## 2024-11-10 PROCEDURE — 96374 THER/PROPH/DIAG INJ IV PUSH: CPT

## 2024-11-10 PROCEDURE — 85610 PROTHROMBIN TIME: CPT | Performed by: EMERGENCY MEDICINE

## 2024-11-10 PROCEDURE — 80074 ACUTE HEPATITIS PANEL: CPT

## 2024-11-10 PROCEDURE — 99223 1ST HOSP IP/OBS HIGH 75: CPT

## 2024-11-10 PROCEDURE — 71045 X-RAY EXAM CHEST 1 VIEW: CPT

## 2024-11-10 PROCEDURE — 83690 ASSAY OF LIPASE: CPT | Performed by: EMERGENCY MEDICINE

## 2024-11-10 PROCEDURE — 83880 ASSAY OF NATRIURETIC PEPTIDE: CPT | Performed by: EMERGENCY MEDICINE

## 2024-11-10 PROCEDURE — 83605 ASSAY OF LACTIC ACID: CPT | Performed by: EMERGENCY MEDICINE

## 2024-11-10 PROCEDURE — 85025 COMPLETE CBC W/AUTO DIFF WBC: CPT | Performed by: EMERGENCY MEDICINE

## 2024-11-10 PROCEDURE — 71250 CT THORAX DX C-: CPT

## 2024-11-10 PROCEDURE — 99285 EMERGENCY DEPT VISIT HI MDM: CPT | Performed by: EMERGENCY MEDICINE

## 2024-11-10 PROCEDURE — 93005 ELECTROCARDIOGRAM TRACING: CPT

## 2024-11-10 PROCEDURE — 74176 CT ABD & PELVIS W/O CONTRAST: CPT

## 2024-11-10 PROCEDURE — 80143 DRUG ASSAY ACETAMINOPHEN: CPT

## 2024-11-10 PROCEDURE — 84484 ASSAY OF TROPONIN QUANT: CPT | Performed by: EMERGENCY MEDICINE

## 2024-11-10 PROCEDURE — 96375 TX/PRO/DX INJ NEW DRUG ADDON: CPT

## 2024-11-10 RX ORDER — HEPARIN SODIUM 5000 [USP'U]/ML
5000 INJECTION, SOLUTION INTRAVENOUS; SUBCUTANEOUS EVERY 8 HOURS SCHEDULED
Status: DISCONTINUED | OUTPATIENT
Start: 2024-11-10 | End: 2024-11-20 | Stop reason: HOSPADM

## 2024-11-10 RX ORDER — HYDRALAZINE HYDROCHLORIDE 25 MG/1
25 TABLET, FILM COATED ORAL EVERY 8 HOURS SCHEDULED
Status: DISCONTINUED | OUTPATIENT
Start: 2024-11-10 | End: 2024-11-15

## 2024-11-10 RX ORDER — FUROSEMIDE 10 MG/ML
20 INJECTION INTRAMUSCULAR; INTRAVENOUS ONCE
Status: COMPLETED | OUTPATIENT
Start: 2024-11-10 | End: 2024-11-10

## 2024-11-10 RX ORDER — HYDRALAZINE HYDROCHLORIDE 20 MG/ML
10 INJECTION INTRAMUSCULAR; INTRAVENOUS EVERY 6 HOURS PRN
Status: DISCONTINUED | OUTPATIENT
Start: 2024-11-10 | End: 2024-11-20 | Stop reason: HOSPADM

## 2024-11-10 RX ORDER — CARVEDILOL 3.12 MG/1
3.12 TABLET ORAL EVERY 12 HOURS SCHEDULED
Status: DISCONTINUED | OUTPATIENT
Start: 2024-11-10 | End: 2024-11-12

## 2024-11-10 RX ORDER — FUROSEMIDE 10 MG/ML
40 INJECTION INTRAMUSCULAR; INTRAVENOUS ONCE
Status: COMPLETED | OUTPATIENT
Start: 2024-11-10 | End: 2024-11-10

## 2024-11-10 RX ORDER — NICOTINE 21 MG/24HR
1 PATCH, TRANSDERMAL 24 HOURS TRANSDERMAL DAILY
Status: DISCONTINUED | OUTPATIENT
Start: 2024-11-11 | End: 2024-11-20 | Stop reason: HOSPADM

## 2024-11-10 RX ORDER — HYDRALAZINE HYDROCHLORIDE 20 MG/ML
10 INJECTION INTRAMUSCULAR; INTRAVENOUS ONCE
Status: COMPLETED | OUTPATIENT
Start: 2024-11-10 | End: 2024-11-10

## 2024-11-10 RX ORDER — CLONIDINE HYDROCHLORIDE 0.1 MG/1
0.2 TABLET ORAL ONCE
Status: COMPLETED | OUTPATIENT
Start: 2024-11-10 | End: 2024-11-10

## 2024-11-10 RX ORDER — METOPROLOL TARTRATE 1 MG/ML
5 INJECTION, SOLUTION INTRAVENOUS ONCE
Status: COMPLETED | OUTPATIENT
Start: 2024-11-10 | End: 2024-11-10

## 2024-11-10 RX ORDER — AMLODIPINE BESYLATE 10 MG/1
10 TABLET ORAL DAILY
Status: DISCONTINUED | OUTPATIENT
Start: 2024-11-11 | End: 2024-11-11

## 2024-11-10 RX ORDER — FUROSEMIDE 10 MG/ML
60 INJECTION INTRAMUSCULAR; INTRAVENOUS
Status: DISCONTINUED | OUTPATIENT
Start: 2024-11-11 | End: 2024-11-11

## 2024-11-10 RX ORDER — ASPIRIN 81 MG/1
81 TABLET ORAL DAILY
Status: DISCONTINUED | OUTPATIENT
Start: 2024-11-11 | End: 2024-11-20 | Stop reason: HOSPADM

## 2024-11-10 RX ADMIN — CLONIDINE HYDROCHLORIDE 0.2 MG: 0.1 TABLET ORAL at 20:18

## 2024-11-10 RX ADMIN — FUROSEMIDE 40 MG: 10 INJECTION, SOLUTION INTRAMUSCULAR; INTRAVENOUS at 19:11

## 2024-11-10 RX ADMIN — METOPROLOL TARTRATE 5 MG: 5 INJECTION INTRAVENOUS at 20:00

## 2024-11-10 RX ADMIN — HYDRALAZINE HYDROCHLORIDE 25 MG: 25 TABLET ORAL at 21:30

## 2024-11-10 RX ADMIN — CARVEDILOL 3.12 MG: 3.12 TABLET, FILM COATED ORAL at 21:31

## 2024-11-10 RX ADMIN — HYDRALAZINE HYDROCHLORIDE 10 MG: 20 INJECTION INTRAMUSCULAR; INTRAVENOUS at 19:40

## 2024-11-10 RX ADMIN — HYDRALAZINE HYDROCHLORIDE 10 MG: 20 INJECTION INTRAMUSCULAR; INTRAVENOUS at 23:20

## 2024-11-10 RX ADMIN — FUROSEMIDE 20 MG: 10 INJECTION, SOLUTION INTRAMUSCULAR; INTRAVENOUS at 21:30

## 2024-11-10 NOTE — ED PROVIDER NOTES
Time reflects when diagnosis was documented in both MDM as applicable and the Disposition within this note       Time User Action Codes Description Comment    11/10/2024  7:10 PM Dian Wilkesdorothy VILLA Add [I50.9] CHF (congestive heart failure) ([facility])     11/10/2024  8:00 PM Dian Wilkesdorothy VILLA Add [N17.9] DAVID (acute kidney injury) ([facility])     11/10/2024  8:02 PM WalkerDian balesdorothy VILLA Add [I10] Hypertension     11/10/2024  8:16 PM Devyn Wilkes ALLEN Add [R79.89] Elevated d-dimer           ED Disposition       ED Disposition   Admit    Condition   Stable    Date/Time   Sun Nov 10, 2024  7:37 PM    Comment                  Assessment & Plan       Medical Decision Making  Amount and/or Complexity of Data Reviewed  Labs: ordered. Decision-making details documented in ED Course.  Radiology: ordered and independent interpretation performed. Decision-making details documented in ED Course.  ECG/medicine tests: ordered and independent interpretation performed. Decision-making details documented in ED Course.  Discussion of management or test interpretation with external provider(s): Differential diagnosis includes but not limited to STEMI, NSTEMI, PE, pneumonia, pneumothorax, musculoskeletal chest pain, costochondritis, gastritis, cholelithiasis, contusion, strain    Risk  Prescription drug management.  Decision regarding hospitalization.        ED Course as of 11/10/24 2036   Sun Nov 10, 2024   1854 Echocardiogram done in 2021 showed an EF of 25%.   1934 Echocardiogram from September 2021:  LEFT VENTRICLE:  Systolic function was severely reduced. Ejection fraction was estimated to be 25 %.  There was severe diffuse hypokinesis.  Wall thickness was increased.  There was mild concentric hypertrophy.     2035 LACTIC ACID(!!): 5.9  Doubt infectious etiology.  Probably secondary to CHF.       Medications   furosemide (LASIX) injection 40 mg (40 mg Intravenous Given 11/10/24 1911)   hydrALAZINE (APRESOLINE) injection 10 mg (10 mg Intravenous  "Given 11/10/24 1940)   metoprolol (LOPRESSOR) injection 5 mg (5 mg Intravenous Given 11/10/24 2000)   cloNIDine (CATAPRES) tablet 0.2 mg (0.2 mg Oral Given 11/10/24 2018)       ED Risk Strat Scores                           SBIRT 22yo+      Flowsheet Row Most Recent Value   Initial Alcohol Screen: US AUDIT-C     1. How often do you have a drink containing alcohol? 0 Filed at: 11/10/2024 1849   2. How many drinks containing alcohol do you have on a typical day you are drinking?  0 Filed at: 11/10/2024 1849   3a. Male UNDER 65: How often do you have five or more drinks on one occasion? 0 Filed at: 11/10/2024 1849   3b. FEMALE Any Age, or MALE 65+: How often do you have 4 or more drinks on one occassion? 0 Filed at: 11/10/2024 1849   Audit-C Score 0 Filed at: 11/10/2024 1849   ALONDRA: How many times in the past year have you...    Used an illegal drug or used a prescription medication for non-medical reasons? Never Filed at: 11/10/2024 1849                            History of Present Illness       Chief Complaint   Patient presents with    Shortness of Breath     Patient presents to the ED with complaints of shortness of breath especially while lying down. The patient reports a history of CHF.        Past Medical History:   Diagnosis Date    CAD (coronary artery disease)     HFrEF (heart failure with reduced ejection fraction) ([facility]) 09/2021    Hypertension     Rheumatoid factor positive 09/2021    Stage 3 chronic kidney disease (HCC)     Tobacco abuse       Past Surgical History:   Procedure Laterality Date    APPENDECTOMY  1965    CARDIAC CATHETERIZATION  9/16/2021    INGUINAL HERNIA REPAIR Right 1991      Family History   Problem Relation Age of Onset    Heart failure Mother     Other Father         \"blood clot\"    COPD Sister     Heart failure Brother     Valvular heart disease Brother     Heart attack Son     No Known Problems Son       Social History     Tobacco Use    Smoking status: Every Day     Current " packs/day: 0.50     Average packs/day: 0.5 packs/day for 50.9 years (25.4 ttl pk-yrs)     Types: Cigarettes     Start date: 1974    Smokeless tobacco: Never    Tobacco comments:     Began smoking in his early 20s, on average smoking 1+ packs daily. Quit in 08/2021 (Updated 09/15/2021).    Vaping Use    Vaping status: Never Used   Substance Use Topics    Alcohol use: Yes     Alcohol/week: 6.0 standard drinks of alcohol     Types: 6 Shots of liquor per week     Comment: rare    Drug use: Never      E-Cigarette/Vaping    E-Cigarette Use Never User       E-Cigarette/Vaping Substances      I have reviewed and agree with the history as documented.     Patient with history of CHF.  Does smoke.  No recent cough or cold symptoms.  Complains of increasing shortness of breath over the past few days.  Has dyspnea on exertion.  Has orthopnea and PND.  Increased leg swelling.  Increased abdominal girth.  Feels like when he had fluid overload.  Has been compliant with his water pill.  Not really compliant with his salt intake.  No chest pain.  Patient states he can normally get up a full flight of steps which is 14 steps.  Over the last day or 2 can only make it up [facility] before he needs to stop to catch his breath.      History provided by:  Patient   used: No    Shortness of Breath  Severity:  Mild  Onset quality:  Gradual  Duration:  3 days  Timing:  Constant  Progression:  Worsening  Chronicity:  Recurrent  Context: not emotional upset, not pollens and not URI    Relieved by:  Rest  Worsened by:  Exertion  Ineffective treatments:  None tried  Associated symptoms: no abdominal pain, no chest pain, no claudication, no cough, no diaphoresis, no ear pain, no fever, no headaches, no neck pain, no rash, no sore throat, no syncope, no vomiting and no wheezing        Review of Systems   Constitutional:  Negative for chills, diaphoresis and fever.   HENT:  Negative for ear pain, hearing loss, sore throat, trouble  swallowing and voice change.    Eyes:  Negative for pain and discharge.   Respiratory:  Positive for shortness of breath. Negative for cough and wheezing.    Cardiovascular:  Positive for leg swelling. Negative for chest pain, palpitations, claudication and syncope.   Gastrointestinal:  Negative for abdominal pain, blood in stool, constipation, diarrhea, nausea and vomiting.   Genitourinary:  Negative for dysuria, flank pain, frequency and hematuria.   Musculoskeletal:  Negative for joint swelling, neck pain and neck stiffness.   Skin:  Negative for rash and wound.   Neurological:  Negative for dizziness, seizures, syncope, facial asymmetry and headaches.   Psychiatric/Behavioral:  Negative for hallucinations, self-injury and suicidal ideas.    All other systems reviewed and are negative.          Objective       ED Triage Vitals [11/10/24 1847]   Temperature Pulse Blood Pressure Respirations SpO2 Patient Position - Orthostatic VS   98.2 °F (36.8 °C) 104 (!) 216/122 18 97 % Sitting      Temp Source Heart Rate Source BP Location FiO2 (%) Pain Score    Temporal Monitor Left arm -- 3      Vitals      Date and Time Temp Pulse SpO2 Resp BP Pain Score FACES Pain Rating User   11/10/24 2018 -- -- -- -- 169/89 -- --    11/10/24 1958 -- 97 97 % 18 197/106 -- --    11/10/24 1940 -- -- -- -- 196/109 -- --    11/10/24 1847 98.2 °F (36.8 °C) 104 97 % 18 216/122 3 -- RR            Physical Exam  Vitals and nursing note reviewed.   Constitutional:       General: He is not in acute distress.     Appearance: He is well-developed.   HENT:      Head: Normocephalic and atraumatic.      Right Ear: External ear normal.      Left Ear: External ear normal.   Eyes:      General: No scleral icterus.        Right eye: No discharge.         Left eye: No discharge.      Extraocular Movements: Extraocular movements intact.      Conjunctiva/sclera: Conjunctivae normal.   Cardiovascular:      Rate and Rhythm: Normal rate and regular rhythm.       Heart sounds: Normal heart sounds. No murmur heard.  Pulmonary:      Effort: Pulmonary effort is normal.      Breath sounds: Normal breath sounds. No wheezing or rales.   Abdominal:      General: Bowel sounds are normal. There is no distension.      Palpations: Abdomen is soft.      Tenderness: There is no abdominal tenderness. There is no guarding or rebound.   Musculoskeletal:         General: No deformity. Normal range of motion.      Cervical back: Normal range of motion and neck supple.      Right lower leg: Edema present.      Left lower leg: Edema present.   Skin:     General: Skin is warm and dry.      Findings: No rash.   Neurological:      General: No focal deficit present.      Mental Status: He is alert and oriented to person, place, and time.      Cranial Nerves: No cranial nerve deficit.   Psychiatric:         Mood and Affect: Mood normal.         Behavior: Behavior normal.         Thought Content: Thought content normal.         Judgment: Judgment normal.         Results Reviewed       Procedure Component Value Units Date/Time    HS Troponin I 2hr [[id number]] Collected: 11/10/24 2034    Lab Status: No result Specimen: Blood from Arm, Right     HS Troponin I 4hr [[id number]]     Lab Status: No result Specimen: Blood     Comprehensive metabolic panel [[id number]]  (Abnormal) Collected: 11/10/24 1853    Lab Status: Final result Specimen: Blood from Arm, Right Updated: 11/10/24 2003     Sodium 136 mmol/L      Potassium 4.4 mmol/L      Chloride 97 mmol/L      CO2 20 mmol/L      ANION GAP 19 mmol/L      BUN 45 mg/dL      Creatinine 3.58 mg/dL      Glucose 51 mg/dL      Calcium 9.7 mg/dL       U/L       U/L      Alkaline Phosphatase 125 U/L      Total Protein 8.1 g/dL      Albumin 4.2 g/dL      Total Bilirubin 3.58 mg/dL      eGFR 16 ml/min/1.73sq m     Narrative:      National Kid[employer] guidelines for Chronic Kidney Disease (CKD):     Stage 1 with normal or high GFR (GFR >  90 mL/min/1.73 square meters)    Stage 2 Mild CKD (GFR = 60-89 mL/min/1.73 square meters)    Stage 3A Moderate CKD (GFR = 45-59 mL/min/1.73 square meters)    Stage 3B Moderate CKD (GFR = 30-44 mL/min/1.73 square meters)    Stage 4 Severe CKD (GFR = 15-29 mL/min/1.73 square meters)    Stage 5 End Stage CKD (GFR <15 mL/min/1.73 square meters)  Note: GFR calculation is accurate only with a steady state creatinine    Magnesium [[id number]]  (Normal) Collected: 11/10/24 1853    Lab Status: Final result Specimen: Blood from Arm, Right Updated: 11/10/24 1959     Magnesium 2.2 mg/dL     Lipase [[id number]]  (Normal) Collected: 11/10/24 1853    Lab Status: Final result Specimen: Blood from Arm, Right Updated: 11/10/24 1959     Lipase 26 u/L     D-Dimer [[id number]]  (Abnormal) Collected: 11/10/24 1853    Lab Status: Final result Specimen: Blood from Arm, Right Updated: 11/10/24 1958     D-Dimer, Quant 12.54 ug/ml FEU     Narrative:      In the evaluation for possible pulmonary embolism, in the appropriate (Well's Score of 4 or less) patient, the age adjusted d-dimer cutoff for this patient can be calculated as:    Age x 0.01 (in ug/mL) for Age-adjusted D-dimer exclusion threshold for a patient over 50 years.    Lactic acid, plasma (w/reflex if result > 2.0) [[id number]]  (Abnormal) Collected: 11/10/24 1853    Lab Status: Final result Specimen: Blood from Arm, Right Updated: 11/10/24 1958     LACTIC ACID 5.9 mmol/L     Narrative:      Result may be elevated if tourniquet was used during collection.    Lactic acid 2 Hours [[id number]]     Lab Status: No result Specimen: Blood     B-Type Natriuretic Peptide(BNP) [[id number]]  (Abnormal) Collected: 11/10/24 1853    Lab Status: Final result Specimen: Blood from Arm, Right Updated: 11/10/24 1946     BNP >4,700 pg/mL     HS Troponin 0hr (reflex protocol) [[id number]]  (Abnormal) Collected: 11/10/24 0509    Lab Status: Final result Specimen: Blood from Arm, Right Updated: 11/10/24 1937      hs TnI 0hr 83 ng/L     Protime-INR [[id number]]  (Abnormal) Collected: 11/10/24 1853    Lab Status: Final result Specimen: Blood from Arm, Right Updated: 11/10/24 1930     Protime 21.4 seconds      INR 1.83    Narrative:      INR Therapeutic Range    Indication                                             INR Range      Atrial Fibrillation                                               2.0-3.0  Hypercoagulable State                                    2.0.2.3  Left Ventricular Asist Device                            2.0-3.0  Mechanical Heart Valve                                  -    Aortic(with afib, MI, embolism, HF, LA enlargement,    and/or coagulopathy)                                     2.0-3.0 (2.5-3.5)     Mitral                                                             2.5-3.5  Prosthetic/Bioprosthetic Heart Valve               2.0-3.0  Venous thromboembolism (VTE: VT, PE        2.0-3.0    APTT [[id number]]  (Normal) Collected: 11/10/24 1853    Lab Status: Final result Specimen: Blood from Arm, Right Updated: 11/10/24 1930     PTT 32 seconds     CBC and differential [[id number]]  (Abnormal) Collected: 11/10/24 1853    Lab Status: Final result Specimen: Blood from Arm, Right Updated: 11/10/24 1900     WBC 14.64 Thousand/uL      RBC 5.03 Million/uL      Hemoglobin 13.5 g/dL      Hematocrit 42.3 %      MCV 84 fL      MCH 26.8 pg      MCHC 31.9 g/dL      RDW 14.9 %      MPV 11.4 fL      Platelets 188 Thousands/uL      nRBC 0 /100 WBCs      Segmented % 85 %      Immature Grans % 1 %      Lymphocytes % 6 %      Monocytes % 8 %      Eosinophils Relative 0 %      Basophils Relative 0 %      Absolute Neutrophils 12.61 Thousands/µL      Absolute Immature Grans 0.08 Thousand/uL      Absolute Lymphocytes 0.82 Thousands/µL      Absolute Monocytes 1.10 Thousand/µL      Eosinophils Absolute 0.00 Thousand/µL      Basophils Absolute 0.03 Thousands/µL             XR chest 1 view portable   ED Interpretation by Devyn VILLA  MD Chung (11/10 1909)   CHF with effusions.          ECG 12 Lead Documentation Only    Date/Time: 11/10/2024 7:04 PM    Performed by: Devyn Wilkes MD  Authorized by: Devyn Wilkes MD    ECG reviewed by me, the ED Provider: yes    Patient location:  ED  Rate:     ECG rate:  100  Rhythm:     Rhythm: sinus rhythm    Ectopy:     Ectopy: none    QRS:     QRS axis:  Normal      ED Medication and Procedure Management   Prior to Admission Medications   Prescriptions Last Dose Informant Patient Reported? Taking?   amLODIPine (NORVASC) 10 mg tablet   No No   Sig: Take 1 tablet (10 mg total) by mouth daily   aspirin (ECOTRIN LOW STRENGTH) 81 mg EC tablet   No No   Sig: Take 1 tablet (81 mg total) by mouth daily   carvedilol (COREG) 3.125 mg tablet   No No   Sig: Take 1 tablet (3.125 mg total) by mouth every 12 (twelve) hours   furosemide (LASIX) 40 mg tablet   No No   Sig: Take 1 tablet (40 mg total) by mouth daily   nicotine polacrilex (COMMIT) 2 MG lozenge   No No   Sig: Apply 1 lozenge (2 mg total) to the mouth or throat as needed for smoking cessation   Patient not taking: Reported on 2/5/2024   rosuvastatin (CRESTOR) 5 mg tablet   No No   Sig: Take 1 tablet (5 mg total) by mouth daily      Facility-Administered Medications: None     Patient's Medications   Discharge Prescriptions    No medications on file     No discharge procedures on file.  ED SEPSIS DOCUMENTATION   Time reflects when diagnosis was documented in both MDM as applicable and the Disposition within this note       Time User Action Codes Description Comment    11/10/2024  7:10 PM Devyn Wilkes Add [I50.9] CHF (congestive heart failure) ([facility])     11/10/2024  8:00 PM Devyn Wilkes [N17.9] DAVID (acute kidney injury) ([facility])     11/10/2024  8:02 PM Devyn Wilkes [I10] Hypertension     11/10/2024  8:16 PM Devyn Wilkes [R79.89] Elevated d-dimer                  Devyn Wilkes MD  11/10/24 2016       Devyn Wilkes  MD  11/10/24 2017       Devyn Wilkes MD  11/10/24 2031

## 2024-11-11 ENCOUNTER — APPOINTMENT (INPATIENT)
Dept: NON INVASIVE DIAGNOSTICS | Facility: HOSPITAL | Age: 69
DRG: 673 | End: 2024-11-11
Payer: COMMERCIAL

## 2024-11-11 ENCOUNTER — APPOINTMENT (INPATIENT)
Dept: ULTRASOUND IMAGING | Facility: HOSPITAL | Age: 69
DRG: 673 | End: 2024-11-11
Payer: COMMERCIAL

## 2024-11-11 PROBLEM — N18.9 CHRONIC KIDNEY DISEASE: Status: ACTIVE | Noted: 2024-11-11

## 2024-11-11 PROBLEM — I25.5 ISCHEMIC CARDIOMYOPATHY: Status: ACTIVE | Noted: 2024-11-11

## 2024-11-11 PROBLEM — R80.8 OTHER PROTEINURIA: Status: ACTIVE | Noted: 2024-11-11

## 2024-11-11 PROBLEM — N17.9 AKI (ACUTE KIDNEY INJURY) (HCC): Status: ACTIVE | Noted: 2024-11-11

## 2024-11-11 LAB
ANA SER QL IA: NEGATIVE
ANION GAP SERPL CALCULATED.3IONS-SCNC: 11 MMOL/L (ref 4–13)
AORTIC ROOT: 2.6 CM
APICAL FOUR CHAMBER EJECTION FRACTION: 25 %
ASCENDING AORTA: 2.5 CM
ATRIAL RATE: 99 BPM
BACTERIA UR QL AUTO: ABNORMAL /HPF
BILIRUB UR QL STRIP: NEGATIVE
BSA FOR ECHO PROCEDURE: 1.62 M2
BUN SERPL-MCNC: 59 MG/DL (ref 5–25)
C3 SERPL-MCNC: 62 MG/DL (ref 87–200)
C4 SERPL-MCNC: 13 MG/DL (ref 19–52)
CALCIUM SERPL-MCNC: 8.4 MG/DL (ref 8.4–10.2)
CHLORIDE SERPL-SCNC: 100 MMOL/L (ref 96–108)
CLARITY UR: CLEAR
CO2 SERPL-SCNC: 22 MMOL/L (ref 21–32)
COLOR UR: YELLOW
CREAT SERPL-MCNC: 4.39 MG/DL (ref 0.6–1.3)
E WAVE DECELERATION TIME: 161 MS
E/A RATIO: 1.96
FRACTIONAL SHORTENING: 12 (ref 28–44)
GFR SERPL CREATININE-BSD FRML MDRD: 12 ML/MIN/1.73SQ M
GLUCOSE SERPL-MCNC: 105 MG/DL (ref 65–140)
GLUCOSE UR STRIP-MCNC: NEGATIVE MG/DL
HAV IGM SER QL: NORMAL
HBV CORE IGM SER QL: NORMAL
HBV SURFACE AG SER QL: NORMAL
HCV AB SER QL: NORMAL
HGB UR QL STRIP.AUTO: ABNORMAL
INTERVENTRICULAR SEPTUM IN DIASTOLE (PARASTERNAL SHORT AXIS VIEW): 1.4 CM
INTERVENTRICULAR SEPTUM: 1.4 CM (ref 0.6–1.1)
KETONES UR STRIP-MCNC: NEGATIVE MG/DL
LAAS-AP2: 21.1 CM2
LAAS-AP4: 11.5 CM2
LEFT ATRIUM SIZE: 4.6 CM
LEFT ATRIUM VOLUME (MOD BIPLANE): 57 ML
LEFT ATRIUM VOLUME INDEX (MOD BIPLANE): 35.2 ML/M2
LEFT INTERNAL DIMENSION IN SYSTOLE: 4.6 CM (ref 2.1–4)
LEFT VENTRICLE DIASTOLIC VOLUME (MOD BIPLANE): 108 ML
LEFT VENTRICLE DIASTOLIC VOLUME INDEX (MOD BIPLANE): 66.7 ML/M2
LEFT VENTRICLE SYSTOLIC VOLUME (MOD BIPLANE): 66 ML
LEFT VENTRICLE SYSTOLIC VOLUME INDEX (MOD BIPLANE): 40.7 ML/M2
LEFT VENTRICULAR INTERNAL DIMENSION IN DIASTOLE: 5.2 CM (ref 3.5–6)
LEFT VENTRICULAR POSTERIOR WALL IN END DIASTOLE: 1.4 CM
LEFT VENTRICULAR STROKE VOLUME: 33 ML
LEUKOCYTE ESTERASE UR QL STRIP: ABNORMAL
LV EF: 39 %
LVSV (TEICH): 33 ML
MUCOUS THREADS UR QL AUTO: ABNORMAL
MV E'TISSUE VEL-SEP: 6 CM/S
MV PEAK A VEL: 0.51 M/S
MV PEAK E VEL: 100 CM/S
MV STENOSIS PRESSURE HALF TIME: 47 MS
MV VALVE AREA P 1/2 METHOD: 4.68
NITRITE UR QL STRIP: NEGATIVE
NON-SQ EPI CELLS URNS QL MICRO: ABNORMAL /HPF
P AXIS: 53 DEGREES
PH UR STRIP.AUTO: 6 [PH]
POTASSIUM SERPL-SCNC: 4.6 MMOL/L (ref 3.5–5.3)
PR INTERVAL: 154 MS
PROT UR STRIP-MCNC: ABNORMAL MG/DL
QRS AXIS: -37 DEGREES
QRSD INTERVAL: 102 MS
QT INTERVAL: 390 MS
QTC INTERVAL: 500 MS
RA PRESSURE ESTIMATED: 8 MMHG
RBC #/AREA URNS AUTO: ABNORMAL /HPF
RIGHT ATRIUM AREA SYSTOLE A4C: 18.8 CM2
RIGHT VENTRICLE ID DIMENSION: 3 CM
RV PSP: 67 MMHG
SL CV LEFT ATRIUM LENGTH A2C: 4.7 CM
SL CV PED ECHO LEFT VENTRICLE DIASTOLIC VOLUME (MOD BIPLANE) 2D: 128 ML
SL CV PED ECHO LEFT VENTRICLE SYSTOLIC VOLUME (MOD BIPLANE) 2D: 95 ML
SODIUM SERPL-SCNC: 133 MMOL/L (ref 135–147)
SP GR UR STRIP.AUTO: 1.02 (ref 1–1.03)
T WAVE AXIS: 97 DEGREES
TR MAX PG: 59 MMHG
TR PEAK VELOCITY: 3.8 M/S
TRICUSPID ANNULAR PLANE SYSTOLIC EXCURSION: 1.9 CM
TRICUSPID VALVE PEAK REGURGITATION VELOCITY: 3.83 M/S
UROBILINOGEN UR QL STRIP.AUTO: 1 E.U./DL
VENTRICULAR RATE: 99 BPM
WBC #/AREA URNS AUTO: ABNORMAL /HPF

## 2024-11-11 PROCEDURE — 84165 PROTEIN E-PHORESIS SERUM: CPT | Performed by: INTERNAL MEDICINE

## 2024-11-11 PROCEDURE — 83520 IMMUNOASSAY QUANT NOS NONAB: CPT | Performed by: INTERNAL MEDICINE

## 2024-11-11 PROCEDURE — 76775 US EXAM ABDO BACK WALL LIM: CPT

## 2024-11-11 PROCEDURE — 86038 ANTINUCLEAR ANTIBODIES: CPT | Performed by: INTERNAL MEDICINE

## 2024-11-11 PROCEDURE — 86160 COMPLEMENT ANTIGEN: CPT | Performed by: INTERNAL MEDICINE

## 2024-11-11 PROCEDURE — 86037 ANCA TITER EACH ANTIBODY: CPT | Performed by: INTERNAL MEDICINE

## 2024-11-11 PROCEDURE — 80048 BASIC METABOLIC PNL TOTAL CA: CPT

## 2024-11-11 PROCEDURE — 86334 IMMUNOFIX E-PHORESIS SERUM: CPT | Performed by: INTERNAL MEDICINE

## 2024-11-11 PROCEDURE — 93970 EXTREMITY STUDY: CPT | Performed by: SURGERY

## 2024-11-11 PROCEDURE — 81001 URINALYSIS AUTO W/SCOPE: CPT

## 2024-11-11 PROCEDURE — 76705 ECHO EXAM OF ABDOMEN: CPT

## 2024-11-11 PROCEDURE — 93306 TTE W/DOPPLER COMPLETE: CPT

## 2024-11-11 PROCEDURE — 99222 1ST HOSP IP/OBS MODERATE 55: CPT | Performed by: INTERNAL MEDICINE

## 2024-11-11 PROCEDURE — 99232 SBSQ HOSP IP/OBS MODERATE 35: CPT | Performed by: STUDENT IN AN ORGANIZED HEALTH CARE EDUCATION/TRAINING PROGRAM

## 2024-11-11 PROCEDURE — 93970 EXTREMITY STUDY: CPT

## 2024-11-11 RX ORDER — ISOSORBIDE MONONITRATE 60 MG/1
60 TABLET, EXTENDED RELEASE ORAL DAILY
Status: DISCONTINUED | OUTPATIENT
Start: 2024-11-11 | End: 2024-11-12

## 2024-11-11 RX ADMIN — AMLODIPINE BESYLATE 10 MG: 10 TABLET ORAL at 08:03

## 2024-11-11 RX ADMIN — ASPIRIN 81 MG: 81 TABLET, COATED ORAL at 08:03

## 2024-11-11 RX ADMIN — HYDRALAZINE HYDROCHLORIDE 25 MG: 25 TABLET ORAL at 05:25

## 2024-11-11 RX ADMIN — CARVEDILOL 3.12 MG: 3.12 TABLET, FILM COATED ORAL at 21:04

## 2024-11-11 RX ADMIN — HYDRALAZINE HYDROCHLORIDE 25 MG: 25 TABLET ORAL at 14:19

## 2024-11-11 RX ADMIN — ISOSORBIDE MONONITRATE 60 MG: 60 TABLET, EXTENDED RELEASE ORAL at 11:36

## 2024-11-11 RX ADMIN — FUROSEMIDE 60 MG: 10 INJECTION, SOLUTION INTRAMUSCULAR; INTRAVENOUS at 08:03

## 2024-11-11 RX ADMIN — NICOTINE 1 PATCH: 14 PATCH, EXTENDED RELEASE TRANSDERMAL at 08:03

## 2024-11-11 RX ADMIN — CARVEDILOL 3.12 MG: 3.12 TABLET, FILM COATED ORAL at 08:03

## 2024-11-11 RX ADMIN — HYDRALAZINE HYDROCHLORIDE 25 MG: 25 TABLET ORAL at 21:04

## 2024-11-11 NOTE — ASSESSMENT & PLAN NOTE
Patient presents with volume overload  No known history of liver disease, does have history of heavy alcohol use  Patient endorses initially no further use of alcohol, then states last drink was 2 weeks ago -unreliable historian  Monitor on CIWA as a precaution  Endorses no upper abdominal pain, states lower abdominal distention/pain increasing over the past week  , , bilirubin 3.58  Check CT abdomen pelvis: Mild degree of ascites within the gallbladder.  Versus pericholecystic fluid.  Evaluation gallbladder wall thickening is limited.  Recommend dedicated ultrasound for further evaluation  Right upper quadrant ultrasound pending  Hepatitis panel negative  Tylenol level within normal limits  Trend in the a.m.

## 2024-11-11 NOTE — ASSESSMENT & PLAN NOTE
Elevated troponin upon presentation of 83, 2-hour troponin of 112  Patient is without chest pain currently  Nonischemic EKG  Likely elevated secondary to demand from accelerated hypertension and CHF exacerbation  Appreciate cardiology consultation

## 2024-11-11 NOTE — ASSESSMENT & PLAN NOTE
Wt Readings from Last 3 Encounters:   11/10/24 55.6 kg (122 lb 9.6 oz)   09/03/24 55.3 kg (122 lb)   02/05/24 56 kg (123 lb 6.4 oz)   Presents to ED with increasing shortness of breath and leg swelling, abdominal distention  ED diuresed with Lasix IV 40 mg x 1  Takes Lasix 40 mg daily for diuresis as outpatient -endorses compliance but does have history of medication noncompliance  Is on beta-blocker with Coreg, no longer on Entresto secondary to DAVID in the past  Was noncompliant with LifeVest and no longer has this  Follows with [facility] cardiology    CXR with concern for CHF  BNP >4700  EKG NSR without ischemic change, QTc 500  ECHO 2021 revealed EF 25% with severe diffuse hypokinesis, right ventricle with reduced systolic function left atrium dilation, mild pulmonary valve regurg, moderate mitral regurgitation, trace aortic regurgitation, mild tricuspid regurgitation, findings to suggest mild pulmonary hypertension  Repeat ECHO ordered    Was previously on Entresto-stopped due to DAVID in the past  Continue diuresis with 60 mg IV Lasix twice daily per CHF protocol algorithm   Daily weights, Strict I & Os  Cardiac diet, low sodium <2g, fluid restriction <1800  Appreciate cardiology consult

## 2024-11-11 NOTE — ASSESSMENT & PLAN NOTE
Lab Results   Component Value Date    EGFR 12 11/11/2024    EGFR 14 11/10/2024    EGFR 16 11/10/2024    CREATININE 4.39 (H) 11/11/2024    CREATININE 3.95 (H) 11/10/2024    CREATININE 3.58 (H) 11/10/2024

## 2024-11-11 NOTE — CASE MANAGEMENT
Case Management Assessment & Discharge Planning Note    Patient name Matthew Alvarez  Location /-01 MRN [id number]  : 1955 Date 2024       Current Admission Date: 11/10/2024  Current Admission Diagnosis:Acute on chronic systolic heart failure (HCC)   Patient Active Problem List    Diagnosis Date Noted Date Diagnosed    Chronic kidney disease 2024     Other proteinuria 2024     DAVID (acute kidney injury) (HCC) 2024     Ischemic cardiomyopathy 2024     Elevated liver transaminase level 11/10/2024     SIRS (systemic inflammatory response syndrome) (HCC) 11/10/2024     Elevated d-dimer 11/10/2024     Acute on chronic systolic heart failure (HCC) 2024     Coronary artery disease involving native coronary artery of native heart without angina pectoris 10/08/2021     Rheumatoid factor positive 2021     Protein calorie malnutrition (HCC) 2021     Acute kidney injury superimposed on stage 3a chronic kidney disease (HCC) 2021     Elevated troponin 2021     Tobacco abuse 2021     Mucopurulent chronic bronchitis (HCC) 2021     Nonischemic cardiomyopathy (HCC) 2021     Uncontrolled hypertension 2021       LOS (days): 1  Geometric Mean LOS (GMLOS) (days): 4.4  Days to GMLOS:3.7     OBJECTIVE:    Risk of Unplanned Readmission Score: 12.12         Current admission status: Inpatient  Referral Reason: Other (PT/OT pnd)    Preferred Pharmacy:   [facility]pharmacy #[id number] - [place] - [address] [facility]  [address]  [facility] [place] [address]  Phone: [phone] Fax: [phone]    [facility] - [place] - [address]  [address]  [place] [address]  Phone: [phone] Fax: [phone]    Primary Care Provider: Olive Rodriguez MD    Primary Insurance: [facility] RE[facility]  Secondary Insurance:     ASSESSMENT:  Active Health Care Proxies    There are no active Health Care  Proxies on file.       Advance Directives  Does patient have a Health Care POA?: No  Was patient offered paperwork?: Yes  Does patient currently have a Health Care decision maker?: Yes, please see Health Care Proxy section  Does patient have Advance Directives?: No  Was patient offered paperwork?: Yes  Primary Contact: Kellee (Spouse)         Readmission Root Cause  30 Day Readmission: No    Patient Information  Admitted from:: Home  Mental Status: Alert  During Assessment patient was accompanied by: Not accompanied during assessment  Assessment information provided by:: Patient  Primary Caregiver: Self  Support Systems: Spouse/significant other, Son  County of Residence: [facility]  What city do you live in?: [place]  Home entry access options. Select all that apply.: Stairs  Number of steps to enter home.: 1  Do the steps have railings?: No  Type of Current Residence: [address] home  Upon entering residence, is there a bedroom on the main floor (no further steps)?: No  A bedroom is located on the following floor levels of residence (select all that apply):: 2nd Floor  Upon entering residence, is there a bathroom on the main floor (no further steps)?: No  Indicate which floors of current residence have a bathroom (select all the apply):: 2nd Floor  Number of steps to 2nd floor from main floor: One Flight  Living Arrangements: Lives w/ Spouse/significant other, Lives w/ Son  Is patient a ?: No    Activities of Daily Living Prior to Admission  Functional Status: Independent  Completes ADLs independently?: Yes  Ambulates independently?: Yes  Does patient use assisted devices?: No  Does patient currently own DME?: Yes  What DME does the patient currently own?: Wheelchair, Walker  Does patient have a history of Outpatient Therapy (PT/OT)?: No  Does the patient have a history of Short-Term Rehab?: No  Does patient have a history of HHC?: Yes  Does patient currently have HHC?: No         Patient Information  Continued  Income Source: SSI/SSD  Does patient have prescription coverage?: Yes  Does patient receive dialysis treatments?: No  Does patient have a history of substance abuse?: Yes  Historical substance use preference: Alcohol/ETOH  History of Withdrawal Symptoms: Denies past symptoms  Is patient currently in treatment for substance abuse?: No. Patient declined treatment information.  Does patient have a history of Mental Health Diagnosis?: No         Means of Transportation  Means of Transport to [facility]:: Drives Self      Social Determinants of Health (SDOH)      Flowsheet Row Most Recent Value   Housing Stability    In the last 12 months, was there a time when you were not able to pay the mortgage or rent on time? N   At any time in the past 12 months, were you homeless or living in a shelter (including now)? N   Transportation Needs    In the past 12 months, has lack of transportation kept you from medical appointments or from getting medications? no   In the past 12 months, has lack of transportation kept you from meetings, work, or from getting things needed for daily living? No   Food Insecurity    Within the past 12 months, you worried that your food would run out before you got the money to buy more. Never true   Within the past 12 months, the food you bought just didn't last and you didn't have money to get more. Never true   Utilities    In the past 12 months has the electric, gas, oil, or water company threatened to shut off services in your home? No            DISCHARGE DETAILS:    Discharge planning discussed with:: Patient  Freedom of Choice: Yes  Comments - Freedom of Choice: pending therapy assessments  CM contacted family/caregiver?: Yes (TCT to spouse - no answer, VM not set up)  Were Treatment Team discharge recommendations reviewed with patient/caregiver?: Yes  Did patient/caregiver verbalize understanding of patient care needs?: Yes  Were patient/caregiver advised of the risks associated with  not following Treatment Team discharge recommendations?: Yes    Contacts  Relationship to Patient:: Family  Contact Method: Phone  Phone Number: [phone]  Reason/Outcome: Continuity of Care, Discharge Planning         CM met with patient at the bedside,baseline information  was obtained. CM discussed the role of CM in helping the patient develop a discharge plan and assist the patient in carry out their plan.  Patient was IPTA, uses no DME at baseline for ambulation, and has no hx of rehab/therapy.   Patient lives at home independently with their spouse/son Matthew and uses no  for ambulation but owns WC, walker, and shower chair. Patient has no hx of STR/OPPT and a history of HC with [employer].  Patient admitted to drinking 1x mo - last time 3 weeks ago - usually has 2 mixed drinks.   CM discussed discharge planning and pending therapy assessments.   Patient is unsure of discharge transportation - their spouse does not drive - it may have to be his sister to pick him up.     Cm attempted TCT to spouse - no answer, VM not set up. CM unable to reach family.

## 2024-11-11 NOTE — ASSESSMENT & PLAN NOTE
Lab Results   Component Value Date    EGFR 12 11/11/2024    EGFR 14 11/10/2024    EGFR 16 11/10/2024    CREATININE 4.39 (H) 11/11/2024    CREATININE 3.95 (H) 11/10/2024    CREATININE 3.58 (H) 11/10/2024   Nephrology involved , work up ongoing

## 2024-11-11 NOTE — ASSESSMENT & PLAN NOTE
Patient presents with volume overload  No known history of liver disease, does have history of heavy alcohol use  Patient endorses initially no further use of alcohol, then states last drink was 2 weeks ago -unreliable historian  Monitor on CIWA as a precaution  Endorses no upper abdominal pain, states lower abdominal distention/pain increasing over the past week  , , bilirubin 3.58  Check CT abdomen pelvis, pending results low threshold for right upper quadrant ultrasound  Check hepatitis panel  Tylenol level  Trend in the a.m.

## 2024-11-11 NOTE — ASSESSMENT & PLAN NOTE
D-dimer elevated to 12.54  Patient without hemoptysis, pleuritic chest pain  Does have shortness of breath and mild tachycardia that is improved  Currently no oxygen requirements, saturating well on room air  Kidney function precludes CTA chest  We will check lower extremity venous duplex and echo tomorrow  Heparin subcu DVT prophylaxis

## 2024-11-11 NOTE — PROGRESS NOTES
Progress Note - Hospitalist   Name: Matthew Alvarez 69 y.o. male I MRN: [id number]  Unit/Bed#: -01 I Date of Admission: 11/10/2024   Date of Service: 11/11/2024 I Hospital Day: 1    Assessment & Plan  Acute on chronic systolic heart failure (HCC)  Wt Readings from Last 3 Encounters:   11/10/24 55.6 kg (122 lb 9.6 oz)   09/03/24 55.3 kg (122 lb)   02/05/24 56 kg (123 lb 6.4 oz)   Presents to ED with increasing shortness of breath and leg swelling, abdominal distention  ED diuresed with Lasix IV 40 mg x 1  Takes Lasix 40 mg daily for diuresis as outpatient -endorses compliance but does have history of medication noncompliance  Is on beta-blocker with Coreg, no longer on Entresto secondary to DAVID in the past  Was noncompliant with LifeVest and no longer has this  [facility] cardiology  CXR with concern for CHF  BNP >4700  EKG NSR without ischemic change, QTc 500  ECHO 2021 revealed EF 25% with severe diffuse hypokinesis, right ventricle with reduced systolic function left atrium dilation, mild pulmonary valve regurg, moderate mitral regurgitation, trace aortic regurgitation, mild tricuspid regurgitation, findings to suggest mild pulmonary hypertension  Repeat ECHO ordered  On admission started on 60 mg IV Lasix twice daily per CHF protocol algorithm -on hold currently due to worsening kidney function  Today 11/11 patient appears euvolemic on exam  Daily weights, Strict I & Os  Cardiac diet, low sodium <2g, fluid restriction <1800  Cardiology on board and following  Uncontrolled hypertension  Patient with uncontrolled hypertension-known history of this  Prescribed amlodipine, Coreg, Lasix -patient endorses that he is only taking Coreg and Lasix  Amlodipine discontinued  Continue Coreg and hydralazine 25 mg 3 times daily  IV Lasix on hold due to worsening DAVID  Was previously on Entresto which was stopped due to DAVID  Start Imdur 60 mg p.o. daily  Acute kidney injury superimposed on stage 3a chronic  kidney disease (HCC)  Lab Results   Component Value Date    EGFR 12 11/11/2024    EGFR 14 11/10/2024    EGFR 16 11/10/2024    CREATININE 4.39 (H) 11/11/2024    CREATININE 3.95 (H) 11/10/2024    CREATININE 3.58 (H) 11/10/2024   Creatinine worsened today to 4.3 likely secondary to IV diuresis  Patient with acute on chronic kidney disease  Baseline creatinine 1.9-2  Creatinine 3.9 at time of admission likely secondary to hypervolemia  CT abdomen pelvis: Shows thickening of the bladder wall which could be due to chronic bladder outlet obstruction   Renal ultrasound pending  Follow urinary retention protocol  Nephrology on board and following recommend serologies to rule out intrinsic disease  SPEP/UPEP, ANCA, JORGE LUIS, C3, C4, urine protein creatinine ratio  Elevated troponin  Elevated troponin upon presentation of 83, 2-hour troponin of 112  Patient is without chest pain currently  Nonischemic EKG  Likely elevated secondary to demand from accelerated hypertension and CHF exacerbation  Cardiology on board and following  Tobacco abuse  Recommend complete cessation-patient endorses he stopped smoking 3 days ago  NRT offered  Mucopurulent chronic bronchitis (HCC)  Currently patient without wheeze-not in acute exacerbation  Not maintained on medications at baseline  Elevated liver transaminase level  Patient presents with volume overload  No known history of liver disease, does have history of heavy alcohol use  Patient endorses initially no further use of alcohol, then states last drink was 2 weeks ago -unreliable historian  Monitor on CIWA as a precaution  Endorses no upper abdominal pain, states lower abdominal distention/pain increasing over the past week  , , bilirubin 3.58  Check CT abdomen pelvis: Mild degree of ascites within the gallbladder.  Versus pericholecystic fluid.  Evaluation gallbladder wall thickening is limited.  Recommend dedicated ultrasound for further evaluation  Right upper quadrant  ultrasound pending  Hepatitis panel negative  Tylenol level within normal limits  Trend in the a.m.  SIRS (systemic inflammatory response syndrome) (HCC)  Met SIRS criteria with tachycardia, leukocytosis   Has elevated lactic acid of 5.9 which is thought to be secondary to CHF exacerbation  No active signs for infection at this time  Monitor off of antibiotics  Elevated d-dimer  D-dimer elevated to 12.54  Patient without hemoptysis, pleuritic chest pain  Does have shortness of breath and mild tachycardia that is improved  Currently no oxygen requirements, saturating well on room air  Kidney function precludes CTA chest  Lower extremity venous duplex pending  TTE pending  Heparin subcu DVT prophylaxis  Chronic kidney disease  Lab Results   Component Value Date    EGFR 12 11/11/2024    EGFR 14 11/10/2024    EGFR 16 11/10/2024    CREATININE 4.39 (H) 11/11/2024    CREATININE 3.95 (H) 11/10/2024    CREATININE 3.58 (H) 11/10/2024     Ischemic cardiomyopathy  Chronic, continue aspirin, carvedilol    VTE Pharmacologic Prophylaxis:   High Risk (Score >/= 5) - Pharmacological DVT Prophylaxis Ordered: heparin. Sequential Compression Devices Ordered.    Mobility:   Basic Mobility Inpatient Raw Score: 24  JH-HLM Goal: 8: Walk 250 feet or more  JH-HLM Achieved: 7: Walk 25 feet or more  JH-HLM Goal NOT achieved. Continue with multidisciplinary rounding and encourage appropriate mobility to improve upon JH-HLM goals.    Patient Centered Rounds: I performed bedside rounds with nursing staff today.   Discussions with Specialists or Other Care Team Provider: Cardiology    Education and Discussions with Family / Patient: Attempted to update  (wife) via phone. Unable to contact.    Current Length of Stay: 1 day(s)  Current Patient Status: Inpatient   Certification Statement: The patient will continue to require additional inpatient hospital stay due to worsening kidney function  Discharge Plan: Anticipate discharge in  "48-72 hrs to to be determined    Code Status: Level 1 - Full Code    Subjective   Seen and examined at bedside.  No acute events overnight.  Patient asks when he will be discharged.  Discussed with patient that he had worsening kidney function likely secondary to IV diuresis, and is not currently medically stable for discharge.  Patient verbalized understanding.  Patient reports that he feels \"110% better compared to admission\".  He denies any shortness of breath, chest tightness, nausea, vomiting, or constipation.  No other acute complaints at this time.    Objective :  Temp:  [97.2 °F (36.2 °C)-98.2 °F (36.8 °C)] 97.3 °F (36.3 °C)  HR:  [] 71  BP: (142-216)/() 157/90  Resp:  [17-18] 17  SpO2:  [91 %-97 %] 95 %  O2 Device: Nasal cannula  Nasal Cannula O2 Flow Rate (L/min):  [0.5 L/min] 0.5 L/min    Body mass index is 19.79 kg/m².     Input and Output Summary (last 24 hours):     Intake/Output Summary (Last 24 hours) at 11/11/2024 1225  Last data filed at 11/11/2024 0616  Gross per 24 hour   Intake 720 ml   Output 20 ml   Net 700 ml       Physical Exam  Constitutional:       General: He is not in acute distress.     Appearance: Normal appearance. He is not ill-appearing or toxic-appearing.   HENT:      Head: Normocephalic and atraumatic.   Eyes:      Extraocular Movements: Extraocular movements intact.      Pupils: Pupils are equal, round, and reactive to light.   Cardiovascular:      Rate and Rhythm: Normal rate and regular rhythm.      Heart sounds: Murmur heard.   Pulmonary:      Effort: Pulmonary effort is normal.      Comments: Diminished breath sounds bilaterally  Abdominal:      General: Abdomen is flat.      Tenderness: There is no abdominal tenderness. There is no guarding.      Comments: No asterixis   Musculoskeletal:         General: Swelling present.      Comments: Trace bilateral lower extremity pitting edema   Skin:     General: Skin is warm.   Neurological:      General: No focal deficit " present.      Mental Status: He is alert and oriented to person, place, and time. Mental status is at baseline.   Psychiatric:         Mood and Affect: Mood normal.         Behavior: Behavior normal.           Lines/Drains:        Telemetry:  Telemetry Orders (From admission, onward)               24 Hour Telemetry Monitoring  Continuous x 24 Hours (Telem)        Question:  Reason for 24 Hour Telemetry  Answer:  Decompensated CHF- and any one of the following: continuous diuretic infusion or total diuretic dose >200 mg daily, associated electrolyte derangement (I.e. K < 3.0), ionotropic drip (continuous infusion), hx of ventricular arrhythmia, or new EF < 35%                     Telemetry Reviewed: Normal Sinus Rhythm  Indication for Continued Telemetry Use: Acute CHF on >200 mg lasix/day or equivalent dose or with new reduced EF.                Lab Results: I have reviewed the following results:   Results from last 7 days   Lab Units 11/10/24  1853   WBC Thousand/uL 14.64*   HEMOGLOBIN g/dL 13.5   HEMATOCRIT % 42.3   PLATELETS Thousands/uL 188   SEGS PCT % 85*   LYMPHO PCT % 6*   MONO PCT % 8   EOS PCT % 0     Results from last 7 days   Lab Units 11/11/24  0528 11/10/24  2320 11/10/24  1853   SODIUM mmol/L 133*   < > 136   POTASSIUM mmol/L 4.6   < > 4.4   CHLORIDE mmol/L 100   < > 97   CO2 mmol/L 22   < > 20*   BUN mg/dL 59*   < > 45*   CREATININE mg/dL 4.39*   < > 3.58*   ANION GAP mmol/L 11   < > 19*   CALCIUM mg/dL 8.4   < > 9.7   ALBUMIN g/dL  --   --  4.2   TOTAL BILIRUBIN mg/dL  --   --  3.58*   ALK PHOS U/L  --   --  125*   ALT U/L  --   --  724*   AST U/L  --   --  962*   GLUCOSE RANDOM mg/dL 105   < > 51*    < > = values in this interval not displayed.     Results from last 7 days   Lab Units 11/10/24  1853   INR  1.83*             Results from last 7 days   Lab Units 11/10/24  2320 11/10/24  1853   LACTIC ACID mmol/L 4.6* 5.9*       Recent Cultures (last 7 days):               Last 24 Hours Medication  List:     Current Facility-Administered Medications:     aspirin (ECOTRIN LOW STRENGTH) EC tablet 81 mg, Daily    carvedilol (COREG) tablet 3.125 mg, Q12H JEEVAN    heparin (porcine) subcutaneous injection 5,000 Units, Q8H JEEVAN    hydrALAZINE (APRESOLINE) injection 10 mg, Q6H PRN    hydrALAZINE (APRESOLINE) tablet 25 mg, Q8H JEEVAN    isosorbide mononitrate (IMDUR) 24 hr tablet 60 mg, Daily    nicotine (NICODERM CQ) 14 mg/24hr TD 24 hr patch 1 patch, Daily    Administrative Statements   Today, Patient Was Seen By: Amy Rodriguez MD      **Please Note: This note may have been constructed using a voice recognition system.**

## 2024-11-11 NOTE — ASSESSMENT & PLAN NOTE
Patient with uncontrolled hypertension-known history of this  Prescribed amlodipine, Coreg, Lasix -patient endorses that he is only taking Coreg and Lasix  Will restart amlodipine, continue Coreg and transition Lasix to IV for help with volume control  Was previously on Entresto which was stopped due to DAVID  Trial hydralazine 25 mg 3 times daily -as recommended by outpatient cardiologist if blood pressures are not adequately controlled

## 2024-11-11 NOTE — PLAN OF CARE
Problem: CARDIOVASCULAR - ADULT  Goal: Maintains optimal cardiac output and hemodynamic stability  Description: INTERVENTIONS:  - Monitor I/O, vital signs and rhythm  - Monitor for S/S and trends of decreased cardiac output  - Administer and titrate ordered vasoactive medications to optimize hemodynamic stability  - Assess quality of pulses, skin color and temperature  - Assess for signs of decreased coronary artery perfusion  - Instruct patient to report change in severity of symptoms  11/11/2024 0029 by Janett Olivo  Outcome: Progressing  11/11/2024 0029 by Janett Olivo  Outcome: Progressing  11/11/2024 0024 by Janett Olivo  Outcome: Progressing  Goal: Absence of cardiac dysrhythmias or at baseline rhythm  Description: INTERVENTIONS:  - Continuous cardiac monitoring, vital signs, obtain 12 lead EKG if ordered  - Administer antiarrhythmic and heart rate control medications as ordered  - Monitor electrolytes and administer replacement therapy as ordered  11/11/2024 0029 by Janett Olivo  Outcome: Progressing  11/11/2024 0029 by Janett Olivo  Outcome: Progressing  11/11/2024 0024 by Janett Olivo  Outcome: Progressing     Problem: PAIN - ADULT  Goal: Verbalizes/displays adequate comfort level or baseline comfort level  Description: Interventions:  - Encourage patient to monitor pain and request assistance  - Assess pain using appropriate pain scale  - Administer analgesics based on type and severity of pain and evaluate response  - Implement non-pharmacological measures as appropriate and evaluate response  - Consider cultural and social influences on pain and pain management  - Notify physician/advanced practitioner if interventions unsuccessful or patient reports new pain  11/11/2024 0029 by Janett Olivo  Outcome: Progressing  11/11/2024 0029 by Janett Olivo  Outcome: Progressing  11/11/2024 0024 by Janett Olivo  Outcome: Progressing     Problem: SAFETY ADULT  Goal: Patient will remain free of falls  Description: INTERVENTIONS:  - Educate  patient/family on patient safety including physical limitations  - Instruct patient to call for assistance with activity   - Consult OT/PT to assist with strengthening/mobility   - Keep Call bell within reach  - Keep bed low and locked with side rails adjusted as appropriate  - Keep care items and personal belongings within reach  - Initiate and maintain comfort rounds  - Make Fall Risk Sign visible to staff  - Offer Toileting every 2 Hours, in advance of need  - Initiate/Maintain bed/chair alarm  - Obtain necessary fall risk management equipment:   - Apply yellow socks and bracelet for high fall risk patients  - Consider moving patient to room near nurses station  11/11/2024 0029 by Janett Olivo  Outcome: Progressing  11/11/2024 0029 by Janett Olivo  Outcome: Progressing  11/11/2024 0024 by Janett Olivo  Outcome: Progressing  Goal: Maintain or return to baseline ADL function  Description: INTERVENTIONS:  -  Assess patient's ability to carry out ADLs; assess patient's baseline for ADL function and identify physical deficits which impact ability to perform ADLs (bathing, care of mouth/teeth, toileting, grooming, dressing, etc.)  - Assess/evaluate cause of self-care deficits   - Assess range of motion  - Assess patient's mobility; develop plan if impaired  - Assess patient's need for assistive devices and provide as appropriate  - Encourage maximum independence but intervene and supervise when necessary  - Involve family in performance of ADLs  - Assess for home care needs following discharge   - Consider OT consult to assist with ADL evaluation and planning for discharge  - Provide patient education as appropriate  11/11/2024 0029 by Janett Olivo  Outcome: Progressing  11/11/2024 0029 by Janett Olivo  Outcome: Progressing  11/11/2024 0024 by Janett Olivo  Outcome: Progressing  Goal: Maintains/Returns to pre admission functional level  Description: INTERVENTIONS:  - Perform AM-PAC 6 Click Basic Mobility/ Daily Activity assessment  daily.  - Set and communicate daily mobility goal to care team and patient/family/caregiver.   - Collaborate with rehabilitation services on mobility goals if consulted  - Perform Range of Motion  times a day.  - Reposition patient .  - Dangle patient 4 times a day  - Stand patient 4 times a day  - Ambulate patient 4 times a day  - Out of bed to chair 3 times a day   - Out of bed for meals 3 times a day  - Out of bed for toileting  - Record patient progress and toleration of activity level   11/11/2024 0029 by Janett Olivo  Outcome: Progressing  11/11/2024 0029 by Janett Olivo  Outcome: Progressing  11/11/2024 0024 by Janett Olivo  Outcome: Progressing     Problem: Knowledge Deficit  Goal: Patient/family/caregiver demonstrates understanding of disease process, treatment plan, medications, and discharge instructions  Description: Complete learning assessment and assess knowledge base.  Interventions:  - Provide teaching at level of understanding  - Provide teaching via preferred learning methods  11/11/2024 0029 by Janett Olivo  Outcome: Progressing  11/11/2024 0029 by Janett Olivo  Outcome: Progressing  11/11/2024 0024 by Janett Olivo  Outcome: Progressing

## 2024-11-11 NOTE — ASSESSMENT & PLAN NOTE
D-dimer elevated to 12.54  Patient without hemoptysis, pleuritic chest pain  Does have shortness of breath and mild tachycardia that is improved  Currently no oxygen requirements, saturating well on room air  Kidney function precludes CTA chest  Lower extremity venous duplex pending  TTE pending  Heparin subcu DVT prophylaxis

## 2024-11-11 NOTE — CONSULTS
Consultation - Nephrology   Matthew Alvarez 69 y.o. male MRN: [id number]  Unit/Bed#: -01 Encounter: [id number]      Assessment & Plan     Assessment / Plan:    1.  Renal    The patient presents to the hospital with worsening of his kidney function over the last couple years.  In 2021 his creatinine was 1.2 in 2023 and into early 2024 it was 2.  He presents and his creatinine is 3.9 with diuretic and went up to 4.3 today.  Urinalysis had trace leukocytes 1+ protein microscopically was not that impressive for an active urine sediment.  He had a CAT scan and the comment was no hydronephrosis and he had an atrophic right kidney.    The patient's last echocardiogram had an LVEF of 25% and RV systolic dysfunction and that was from 2021.    He also was found to have significant increase in his AST and ALT.possibly from chronic passive congestion if worsening right-sided heart failure present.    From talking to the patient it does not sound like he really had any changes in medications his blood pressures just been running high.  He does not really have any new symptoms related to anything.    Does not sound like he is volume depleted there was not evidence of obstruction so it raises the concern of an intrinsic process.    The urine is not that impressive for glomerulonephritis or for that matter interstitial nephritis but we should check some serologies to rule out an intrinsic disease.    Ultimately if nothing pans out in terms of serologic workup the issue is whether or not it is due to worsening cardiorenal syndrome.  He is going to be having an echocardiogram cardiology is following.  Potentially suggest presenting with worse right and left-sided heart failure resulting in cardiorenal syndrome?    The acute rise potentially was from diuretics so would hold for now unless urgently needed.    Of note he did have an SPEP and UPEP a few years ago that were negative for monoclonal protein.    Renal ultrasound to get  size of kidneys  SPEP/UPEP  ANCA, JORGE LUIS, C3, C4  Urine protein creatinine ratio  Hold diuretics for now  Monitor renal function and volume status.  Await echocardiogram report to see if this has worsened  Monitor LFTs primary service    2.  Hypertension    Patient had significantly elevated blood pressure with initiating some medications systolic pressures been around 140 mmHg.      History of Present Illness   Physician Requesting Consult: Amy Rodriguez MD  Reason for Consult / Principal Problem: Acute on chronic kidney disease  Hx and PE limited by:   HPI: Matthew Alvarez is a 69 y.o. year old male who presents to the hospital complaining that over the last month he has had some shortness of breath especially when he tried to lay flat in bed and he noticed that his legs were swelling.  Otherwise he had no complaints he denied taking any new medications he was not taking over-the-counter medications.  He was evaluated in the emergency room and found to have a creatinine of 3.5 which is above his prior baseline and we are asked to see him for his renal failure for evaluation and recommendations.  History obtained from chart review and the patient.    Inpatient consult to Nephrology  Consult performed by: Brandyn Ariza MD  Consult ordered by: Kerri Chisholm PA-C          Review of Systems   Constitutional:  Negative for appetite change, chills, diaphoresis and fever.   HENT: Negative.     Eyes: Negative.    Respiratory:  Negative for cough, chest tightness, shortness of breath and wheezing.         No hemoptysis.   Cardiovascular:  Positive for leg swelling. Negative for chest pain and palpitations.   Gastrointestinal:  Negative for abdominal pain, diarrhea, nausea and vomiting.   Genitourinary:  Negative for difficulty urinating, dysuria, flank pain and hematuria.   Musculoskeletal:         No significant arthralgias or myalgias.   Skin:  Negative for rash.   Neurological:  Negative for dizziness, syncope and  "headaches.   Psychiatric/Behavioral:  Negative for agitation, behavioral problems, confusion and decreased concentration.        Historical Information   Patient Active Problem List   Diagnosis    Nonischemic cardiomyopathy (HCC)    Uncontrolled hypertension    Acute kidney injury superimposed on stage 3a chronic kidney disease (HCC)    Elevated troponin    Tobacco abuse    Mucopurulent chronic bronchitis (HCC)    Protein calorie malnutrition (HCC)    Rheumatoid factor positive    Coronary artery disease involving native coronary artery of native heart without angina pectoris    Acute on chronic systolic heart failure (HCC)    Elevated liver transaminase level    SIRS (systemic inflammatory response syndrome) (HCC)    Elevated d-dimer     Past Medical History:   Diagnosis Date    CAD (coronary artery disease)     HFrEF (heart failure with reduced ejection fraction) (HCC) 09/2021    Hypertension     Rheumatoid factor positive 09/2021    Stage 3 chronic kidney disease (HCC)     Tobacco abuse      Past Surgical History:   Procedure Laterality Date    APPENDECTOMY  1965    CARDIAC CATHETERIZATION  9/16/2021    INGUINAL HERNIA REPAIR Right 1991     Social History   Social History     Substance and Sexual Activity   Alcohol Use Yes    Alcohol/week: 6.0 standard drinks of alcohol    Types: 6 Shots of liquor per week    Comment: rare     Social History     Substance and Sexual Activity   Drug Use Never     Social History     Tobacco Use   Smoking Status Every Day    Current packs/day: 0.50    Average packs/day: 0.5 packs/day for 50.9 years (25.4 ttl pk-yrs)    Types: Cigarettes    Start date: 1974   Smokeless Tobacco Never   Tobacco Comments    Began smoking in his early 20s, on average smoking 1+ packs daily. Quit in 08/2021 (Updated 09/15/2021).      Family History   Problem Relation Age of Onset    Heart failure Mother     Other Father         \"blood clot\"    COPD Sister     Heart failure Brother     Valvular heart " "disease Brother     Heart attack Son     No Known Problems Son        Meds/Allergies   current meds:   Current Facility-Administered Medications:     amLODIPine (NORVASC) tablet 10 mg, Daily    aspirin (ECOTRIN LOW STRENGTH) EC tablet 81 mg, Daily    carvedilol (COREG) tablet 3.125 mg, Q12H JEEVAN    furosemide (LASIX) injection 60 mg, BID (diuretic)    heparin (porcine) subcutaneous injection 5,000 Units, Q8H JEEVAN    hydrALAZINE (APRESOLINE) injection 10 mg, Q6H PRN    hydrALAZINE (APRESOLINE) tablet 25 mg, Q8H JEEVAN    nicotine (NICODERM CQ) 14 mg/24hr TD 24 hr patch 1 patch, Daily  No Known Allergies    Objective     Intake/Output Summary (Last 24 hours) at 11/11/2024 0945  Last data filed at 11/11/2024 0616  Gross per 24 hour   Intake 720 ml   Output 20 ml   Net 700 ml     Body mass index is 19.79 kg/m².    Invasive Devices:        PHYSICAL EXAM:  /80 (BP Location: Right arm)   Pulse 71   Temp 97.7 °F (36.5 °C) (Temporal)   Resp 18   Ht 5' 6\" (1.676 m)   Wt 55.6 kg (122 lb 9.6 oz)   SpO2 95%   BMI 19.79 kg/m²     Physical Exam  Constitutional:       General: He is not in acute distress.     Appearance: He is not toxic-appearing.   HENT:      Head: Normocephalic and atraumatic.      Nose: Nose normal.      Mouth/Throat:      Mouth: Mucous membranes are dry.   Eyes:      General: No scleral icterus.     Extraocular Movements: Extraocular movements intact.   Cardiovascular:      Rate and Rhythm: Normal rate and regular rhythm.      Heart sounds: Murmur heard.      No friction rub. No gallop.      Comments: Mild edema.  Pulmonary:      Effort: Pulmonary effort is normal. No respiratory distress.      Breath sounds: Examination of the right-lower field reveals decreased breath sounds. Examination of the left-lower field reveals decreased breath sounds. Decreased breath sounds present. No wheezing or rales.   Abdominal:      General: Bowel sounds are normal. There is no distension.      Palpations: Abdomen is " soft.      Tenderness: There is no abdominal tenderness.   Musculoskeletal:      Cervical back: Normal range of motion and neck supple.   Skin:     Findings: No rash.   Neurological:      Mental Status: He is alert and oriented to person, place, and time.      Comments: No asterixis.   Psychiatric:         Mood and Affect: Mood normal.         Behavior: Behavior normal.           Current Weight: Weight - Scale: 55.6 kg (122 lb 9.6 oz)  First Weight: Weight - Scale: 57.9 kg (127 lb 10.3 oz)    Lab Results:    Results from last 7 days   Lab Units 11/10/24  1853   WBC Thousand/uL 14.64*   HEMOGLOBIN g/dL 13.5   HEMATOCRIT % 42.3   PLATELETS Thousands/uL 188     Results from last 7 days   Lab Units 11/11/24  0528   POTASSIUM mmol/L 4.6   CHLORIDE mmol/L 100   CO2 mmol/L 22   BUN mg/dL 59*   CREATININE mg/dL 4.39*   CALCIUM mg/dL 8.4     Results from last 7 days   Lab Units 11/11/24  0528 11/10/24  2320 11/10/24  1853   POTASSIUM mmol/L 4.6   < > 4.4   CHLORIDE mmol/L 100   < > 97   CO2 mmol/L 22   < > 20*   BUN mg/dL 59*   < > 45*   CREATININE mg/dL 4.39*   < > 3.58*   CALCIUM mg/dL 8.4   < > 9.7   ALK PHOS U/L  --   --  125*   ALT U/L  --   --  724*   AST U/L  --   --  962*    < > = values in this interval not displayed.

## 2024-11-11 NOTE — PLAN OF CARE
Problem: CARDIOVASCULAR - ADULT  Goal: Maintains optimal cardiac output and hemodynamic stability  Description: INTERVENTIONS:  - Monitor I/O, vital signs and rhythm  - Monitor for S/S and trends of decreased cardiac output  - Administer and titrate ordered vasoactive medications to optimize hemodynamic stability  - Assess quality of pulses, skin color and temperature  - Assess for signs of decreased coronary artery perfusion  - Instruct patient to report change in severity of symptoms  Outcome: Progressing  Goal: Absence of cardiac dysrhythmias or at baseline rhythm  Description: INTERVENTIONS:  - Continuous cardiac monitoring, vital signs, obtain 12 lead EKG if ordered  - Administer antiarrhythmic and heart rate control medications as ordered  - Monitor electrolytes and administer replacement therapy as ordered  Outcome: Progressing     Problem: PAIN - ADULT  Goal: Verbalizes/displays adequate comfort level or baseline comfort level  Description: Interventions:  - Encourage patient to monitor pain and request assistance  - Assess pain using appropriate pain scale  - Administer analgesics based on type and severity of pain and evaluate response  - Implement non-pharmacological measures as appropriate and evaluate response  - Consider cultural and social influences on pain and pain management  - Notify physician/advanced practitioner if interventions unsuccessful or patient reports new pain  Outcome: Progressing     Problem: INFECTION - ADULT  Goal: Absence or prevention of progression during hospitalization  Description: INTERVENTIONS:  - Assess and monitor for signs and symptoms of infection  - Monitor lab/diagnostic results  - Monitor all insertion sites, i.e. indwelling lines, tubes, and drains  - Monitor endotracheal if appropriate and nasal secretions for changes in amount and color  - [place] appropriate cooling/warming therapies per order  - Administer medications as ordered  - Instruct and encourage  patient and family to use good hand hygiene technique  - Identify and instruct in appropriate isolation precautions for identified infection/condition  Outcome: Progressing  Goal: Absence of fever/infection during neutropenic period  Description: INTERVENTIONS:  - Monitor WBC    Outcome: Progressing     Problem: SAFETY ADULT  Goal: Patient will remain free of falls  Description: INTERVENTIONS:  - Educate patient/family on patient safety including physical limitations  - Instruct patient to call for assistance with activity   - Consult OT/PT to assist with strengthening/mobility   - Keep Call bell within reach  - Keep bed low and locked with side rails adjusted as appropriate  - Keep care items and personal belongings within reach  - Initiate and maintain comfort rounds  - Make Fall Risk Sign visible to staff  - Offer Toileting every 2 Hours, in advance of need  - Initiate/Maintain bed/chair alarm  - Obtain necessary fall risk management equipment:   - Apply yellow socks and bracelet for high fall risk patients  - Consider moving patient to room near nurses station  Outcome: Progressing  Goal: Maintain or return to baseline ADL function  Description: INTERVENTIONS:  -  Assess patient's ability to carry out ADLs; assess patient's baseline for ADL function and identify physical deficits which impact ability to perform ADLs (bathing, care of mouth/teeth, toileting, grooming, dressing, etc.)  - Assess/evaluate cause of self-care deficits   - Assess range of motion  - Assess patient's mobility; develop plan if impaired  - Assess patient's need for assistive devices and provide as appropriate  - Encourage maximum independence but intervene and supervise when necessary  - Involve family in performance of ADLs  - Assess for home care needs following discharge   - Consider OT consult to assist with ADL evaluation and planning for discharge  - Provide patient education as appropriate  Outcome: Progressing  Goal:  Maintains/Returns to pre admission functional level  Description: INTERVENTIONS:  - Perform AM-PAC 6 Click Basic Mobility/ Daily Activity assessment daily.  - Set and communicate daily mobility goal to care team and patient/family/caregiver.   - Collaborate with rehabilitation services on mobility goals if consulted  - Perform Range of Motion  times a day.  - Reposition patient .  - Dangle patient 4 times a day  - Stand patient 4 times a day  - Ambulate patient 4 times a day  - Out of bed to chair 3 times a day   - Out of bed for meals 3 times a day  - Out of bed for toileting  - Record patient progress and toleration of activity level   Outcome: Progressing     Problem: Knowledge Deficit  Goal: Patient/family/caregiver demonstrates understanding of disease process, treatment plan, medications, and discharge instructions  Description: Complete learning assessment and assess knowledge base.  Interventions:  - Provide teaching at level of understanding  - Provide teaching via preferred learning methods  Outcome: Progressing

## 2024-11-11 NOTE — ASSESSMENT & PLAN NOTE
Wt Readings from Last 3 Encounters:   11/10/24 55.6 kg (122 lb 9.6 oz)   09/03/24 55.3 kg (122 lb)   02/05/24 56 kg (123 lb 6.4 oz)   Presents to ED with increasing shortness of breath and leg swelling, abdominal distention  ED diuresed with Lasix IV 40 mg x 1  Takes Lasix 40 mg daily for diuresis as outpatient -endorses compliance but does have history of medication noncompliance  Is on beta-blocker with Coreg, no longer on Entresto secondary to DAVID in the past  Was noncompliant with LifeVest and no longer has this  Follows with [facility] cardiology  CXR with concern for CHF  BNP >4700  EKG NSR without ischemic change, QTc 500  ECHO 2021 revealed EF 25% with severe diffuse hypokinesis, right ventricle with reduced systolic function left atrium dilation, mild pulmonary valve regurg, moderate mitral regurgitation, trace aortic regurgitation, mild tricuspid regurgitation, findings to suggest mild pulmonary hypertension  Repeat ECHO ordered  On admission started on 60 mg IV Lasix twice daily per CHF protocol algorithm -on hold currently due to worsening kidney function  Today 11/11 patient appears euvolemic on exam  Daily weights, Strict I & Os  Cardiac diet, low sodium <2g, fluid restriction <1800  Cardiology on board and following

## 2024-11-11 NOTE — ASSESSMENT & PLAN NOTE
Elevated troponin upon presentation of 83, 2-hour troponin of 112  Patient is without chest pain currently  Nonischemic EKG  Likely elevated secondary to demand from accelerated hypertension and CHF exacerbation  Cardiology on board and following

## 2024-11-11 NOTE — UTILIZATION REVIEW
Initial Clinical Review    Admission: Date/Time/Statement:   Admission Orders (From admission, onward)       Ordered        11/10/24 2016  INPATIENT ADMISSION  Once                          Orders Placed This Encounter   Procedures    INPATIENT ADMISSION     Standing Status:   Standing     Number of Occurrences:   1     Order Specific Question:   Level of Care     Answer:   Med Surg [16]     Order Specific Question:   Estimated length of stay     Answer:   More than 2 Midnights     Order Specific Question:   Certification     Answer:   I certify that inpatient services are medically necessary for this patient for a duration of greater than two midnights. See H&P and MD Progress Notes for additional information about the patient's course of treatment.     ED Arrival Information       Expected   -    Arrival   11/10/2024 18:45    Acuity   Urgent              Means of arrival   Ambulance    Escorted by   [facility]   Hospitalist    Admission type   Emergency              Arrival complaint   -             Chief Complaint   Patient presents with    Shortness of Breath     Patient presents to the ED with complaints of shortness of breath especially while lying down. The patient reports a history of CHF.        Initial Presentation: 69 y.o. male to ED via EMS from home  Present to ED with worsening shortness of breath. Endorses lower abdominal pain sporadically as well as bloating with shortness of breath worse with laying down and exertion as well as lower extremity swelling.   PMHX: CAD, HFrEF, hypertension, stage III CKD, tobacco use, history of alcohol use; ECHO 2021 revealed EF 25%    Admitted to [facility] with DX: Acute on chronic systolic heart failure   on exam: hypertensive; tachy; lungs with rales; B/L LE edema; BNP >4,700; WBC 14.64; Cr 3.58 (baseline 1.9-2.0); AG 19; T bili 3.58; ; ; Gluc 51; INR 1.83; LA 5.9; D-dimer 12.54  PLAN: recd additional lasix iv x1; recd additional hydralazine  "iv x1; tele monitoring; fluid restriction; monitor labs; f/u echo; cardiology consult; f/u CT abdomen / pelvis to evaluate kidneys for any signs of obstructive uropathy; nephrology consult; f/u Hepatitis panel; f/u LE venous duplex      Anticipated Length of Stay/Certification Statement: Patient will be admitted on an inpatient basis with an anticipated length of stay of greater than 2 midnights secondary to CHF exacerbation.       Date: 11/11/24      Day 2   Patient reports that he feels \"110% better compared to admission\". Lasix on hold d/t worsening kidney function; Cr 4.3; Hepatitis panel negative   CT Groundglass opacities at the left lower lobe which could be on the basis of pulmonary edema. Atypical pneumonia may have a similar appearance. Bilateral mild to moderate pleural effusions with overlying compressive atelectasis. Mild degree of ascites within the gallbladder fossa versus pericholecystic fluid. There is thickening of the bladder wall which could be due to chronic bladder outlet obstruction. However, possibility of cystitis is not excluded. Correlate clinically and with urinalysis.   Plan: Start Imdur 60 mg p.o. daily; tele monitoring; fluid restriction; monitor labs; f/u echo; cardiology following; f/u LE venous duplex; f/u renal U/S; f/u SPEP/UPEP, ANCA, JORGE LUIS, C3, C4, urine protein creatinine ratio        NEPHROLOGY CONSULT  Renal: The patient presents to the hospital with worsening of his kidney function over the last couple years.  In 2021 his creatinine was 1.2 in 2023 and into early 2024 it was 2.  He presents and his creatinine is 3.9 with diuretic and went up to 4.3 today.  Urinalysis had trace leukocytes 1+ protein microscopically was not that impressive for an active urine sediment.  He had a CAT scan and the comment was no hydronephrosis and he had an atrophic right kidney. He also was found to have significant increase in his AST and ALT.possibly from chronic passive congestion if worsening " right-sided heart failure present. From talking to the patient it does not sound like he really had any changes in medications his blood pressures just been running high.  He does not really have any new symptoms related to anything. Does not sound like he is volume depleted there was not evidence of obstruction so it raises the concern of an intrinsic process. The urine is not that impressive for glomerulonephritis or for that matter interstitial nephritis but we should check some serologies to rule out an intrinsic disease. Ultimately if nothing pans out in terms of serologic workup the issue is whether or not it is due to worsening cardiorenal syndrome.  He is going to be having an echocardiogram cardiology is following.  Potentially suggest presenting with worse right and left-sided heart failure resulting in cardiorenal syndrome? The acute rise potentially was from diuretics so would hold for now unless urgently needed.  Plan: Renal U/S; SPEP/UPEP / ANCA, JORGE LUIS, C3, C4 / Urine protein creatinine ratio; hold diuretics      ED Treatment-Medication Administration from 11/10/2024 1845 to 11/10/2024 2047         Date/Time Order Dose Route Action     11/10/2024 1911 furosemide (LASIX) injection 40 mg 40 mg Intravenous Given     11/10/2024 1940 hydrALAZINE (APRESOLINE) injection 10 mg 10 mg Intravenous Given     11/10/2024 2000 metoprolol (LOPRESSOR) injection 5 mg 5 mg Intravenous Given     11/10/2024 2018 cloNIDine (CATAPRES) tablet 0.2 mg 0.2 mg Oral Given            Scheduled Medications:  aspirin, 81 mg, Oral, Daily  carvedilol, 3.125 mg, Oral, Q12H [facility]  heparin (porcine), 5,000 Units, Subcutaneous, Q8H [facility]  hydrALAZINE, 25 mg, Oral, Q8H [facility]  isosorbide mononitrate, 60 mg, Oral, Daily  nicotine, 1 patch, Transdermal, Daily  furosemide (LASIX) injection 60 mg, intravenous BID (recd x1 dose 11/11)       furosemide (LASIX) injection 20 mg  Dose: 20 mg  Freq: Once Route: IV  Start: 11/10/24 2100 End: 11/10/24  2130      Continuous IV Infusions: None       PRN Meds:  hydrALAZINE, 10 mg, Intravenous, Q6H PRN  (11/10 recd x1)       ED Triage Vitals [11/10/24 1847]   Temperature Pulse Respirations Blood Pressure SpO2 Pain Score   98.2 °F (36.8 °C) 104 18 (!) 216/122 97 % 3     Weight (last 2 days)       Date/Time Weight    11/11/24 1046 55.3 (122)    11/10/24 2059 55.6 (122.6)    11/10/24 1847 57.9 (127.65)            Vital Signs (last 3 days)       Date/Time Temp Pulse Resp BP MAP (mmHg) SpO2 Calculated FIO2 (%) - Nasal Cannula Nasal Cannula O2 Flow Rate (L/min) O2 Device Patient Position - Orthostatic VS Dennis Coma Scale Score CIWA-Ar Total Pain    11/11/24 1115 97.3 °F (36.3 °C) 71 17 157/90 112 -- -- -- -- Lying -- 0 --    11/11/24 1046 -- 75 -- 157/90 -- -- -- -- -- -- -- -- --    11/11/24 0715 97.7 °F (36.5 °C) 71 18 142/80 101 -- -- -- -- -- -- 0 --    11/11/24 06:16:58 97.7 °F (36.5 °C) 71 -- 147/81 103 95 % -- -- -- -- -- -- --    11/11/24 05:24:55 -- 75 -- 147/88 108 94 % -- -- -- -- -- -- --    11/11/24 03:14:59 97.9 °F (36.6 °C) 73 -- 148/89 109 -- -- -- -- -- -- 0 --    11/11/24 01:33:33 -- 77 -- 178/96 123 91 % -- -- -- -- -- -- --    11/10/24 2349 -- -- -- -- -- -- -- -- -- -- -- -- No Pain    11/10/24 2315 -- 81 -- 183/100 -- -- -- -- -- -- -- 0 --    11/10/24 2242 97.5 °F (36.4 °C) 82 17 180/100 127 96 % -- -- -- -- -- -- --    11/10/24 2050 -- -- -- -- -- 94 % 22 0.5 L/min Nasal cannula -- -- -- No Pain    11/10/24 20:49:59 97.2 °F (36.2 °C) 80 18 181/100 127 94 % -- -- -- Lying -- -- --    11/10/24 2018 -- -- -- 169/89 -- -- -- -- -- -- -- -- --    11/10/24 1958 -- 97 18 197/106 144 97 % -- -- -- -- -- -- --    11/10/24 1940 -- -- -- 196/109 -- -- -- -- -- -- -- -- --    11/10/24 1928 -- -- -- -- -- -- -- -- -- -- 15 -- --    11/10/24 1927 -- -- -- -- -- -- -- -- None (Room air) -- -- -- --    11/10/24 1847 98.2 °F (36.8 °C) 104 18 216/122 -- 97 % -- -- None (Room air) Sitting -- -- 3           Jeanie  Score       Row Name 11/11/24 1115 11/11/24 0715 11/11/24 03:14:59       CIWA-Ar    Pulse -- -- 73    Nausea and Vomiting 0 0 0    Tactile Disturbances 0 0 0    Tremor 0 0 0    Auditory Disturbances 0 0 0    Paroxysmal Sweats 0 0 0    Visual Disturbances 0 0 0    Anxiety 0 0 0    Headache, Fullness in Head 0 0 0    Agitation 0 0 0    Orientation and Clouding of Sensorium 0 0 0    CIWA-Ar Total 0 0 0      Row Name 11/10/24 2315             CIWA-Ar    /100      Pulse 81      Nausea and Vomiting 0      Tactile Disturbances 0      Tremor 0      Auditory Disturbances 0      Paroxysmal Sweats 0      Visual Disturbances 0      Anxiety 0      Headache, Fullness in Head 0      Agitation 0      Orientation and Clouding of Sensorium 0      CIWA-Ar Total 0                      Pertinent Labs/Diagnostic Test Results:   Radiology:  CT chest abdomen pelvis wo contrast   Final Interpretation by Fabián Singh DO (11/11 0039)      Groundglass opacities at the left lower lobe which could be on the basis of pulmonary edema. Atypical pneumonia may have a similar appearance. Correlate clinically.      Bilateral mild to moderate pleural effusions with overlying compressive atelectasis      Mild degree of ascites within the gallbladder fossa versus pericholecystic fluid. Evaluation for gallbladder wall thickening is limited secondary to noncontrast examination and patient motion. Recommend dedicated ultrasound for further evaluation.      There is thickening of the bladder wall which could be due to chronic bladder outlet obstruction. However, possibility of cystitis is not excluded. Correlate clinically and with urinalysis.            Workstation performed: [id number]         XR chest 1 view portable   ED Interpretation by Devyn Wilkes MD (11/10 1909)   CHF with effusions.      Final Interpretation by Luis Almeida MD (11/11 0910)      Findings suggest CHF with mild edema and bilateral left greater than right  small to moderate pleural effusions. Findings are concordant with ER interpretation            Workstation performed: [id number]          VAS VENOUS DUPLEX - LOWER LIMB BILATERAL    (Results Pending)   US right upper quadrant    (Results Pending)   US kidney and bladder    (Results Pending)     Cardiology:  Echo complete w/ contrast if indicated   Final Result by Shaina Delgado MD (11/11 1336)        Left Ventricle: Left ventricular cavity size is severely dilated. There    is mild concentric hypertrophy. The left ventricular ejection fraction is    20-24 %. Systolic function is severely reduced. There is severe global    hypokinesis.     Left Atrium: The atrium is mildly dilated.     Mitral Valve: There is mild regurgitation.     Tricuspid Valve: There is mild regurgitation.     Pericardium: There is a moderately sized left pleural effusion.         ECG 12 lead   Final Result by Shaina Delgado MD (11/11 0805)   Normal sinus rhythm   Left axis deviation   Minimal voltage criteria for LVH, may be normal variant ( [place] product    )   T wave abnormality, consider lateral ischemia   Prolonged QT   When compared with ECG of 9/11/2021   Premature ventricular complexes are no longer Present   Confirmed by Shaina Delgado ([id number]) on 11/11/2024 8:05:44 AM          Results from last 7 days   Lab Units 11/10/24  1853   WBC Thousand/uL 14.64*   HEMOGLOBIN g/dL 13.5   HEMATOCRIT % 42.3   PLATELETS Thousands/uL 188   TOTAL NEUT ABS Thousands/µL 12.61*        Results from last 7 days   Lab Units 11/11/24  0528 11/10/24  2320 11/10/24  1853   SODIUM mmol/L 133* 134* 136   POTASSIUM mmol/L 4.6 4.6 4.4   CHLORIDE mmol/L 100 98 97   CO2 mmol/L 22 20* 20*   ANION GAP mmol/L 11 16* 19*   BUN mg/dL 59* 50* 45*   CREATININE mg/dL 4.39* 3.95* 3.58*   EGFR ml/min/1.73sq m 12 14 16   CALCIUM mg/dL 8.4 8.9 9.7   MAGNESIUM mg/dL  --   --  2.2     Results from last 7 days   Lab Units 11/10/24  1853   AST U/L 962*   ALT U/L 724*   ALK PHOS U/L  125*   TOTAL PROTEIN g/dL 8.1   ALBUMIN g/dL 4.2   TOTAL BILIRUBIN mg/dL 3.58*        Results from last 7 days   Lab Units 11/11/24  0528 11/10/24  2320 11/10/24  1853   GLUCOSE RANDOM mg/dL 105 85 51*        Results from last 7 days   Lab Units 11/10/24  2320 11/10/24  2034 11/10/24  1853   HS TNI 0HR ng/L  --   --  83*   HS TNI 2HR ng/L  --  112*  --    HSTNI D2 ng/L  --  29*  --    HS TNI 4HR ng/L 169*  --   --    HSTNI D4 ng/L 86*  --   --      Results from last 7 days   Lab Units 11/10/24  1853   D-DIMER QUANTITATIVE ug/ml FEU 12.54*     Results from last 7 days   Lab Units 11/10/24  1853   PROTIME seconds 21.4*   INR  1.83*   PTT seconds 32     Results from last 7 days   Lab Units 11/10/24  1853   TSH 3RD GENERATON uIU/mL 4.435        Results from last 7 days   Lab Units 11/10/24  2320 11/10/24  1853   LACTIC ACID mmol/L 4.6* 5.9*        Results from last 7 days   Lab Units 11/10/24  1853   BNP pg/mL >4,700*        Results from last 7 days   Lab Units 11/10/24  2320   HEP B S AG  Non-reactive   HEP C AB  Non-reactive   HEP B C IGM  Non-reactive     Results from last 7 days   Lab Units 11/10/24  1853   LIPASE u/L 26        Results from last 7 days   Lab Units 11/11/24  0541   CLARITY UA  Clear   COLOR UA  Yellow   SPEC GRAV UA  1.020   PH UA  6.0   GLUCOSE UA mg/dl Negative   KETONES UA mg/dl Negative   BLOOD UA  Small*   PROTEIN UA mg/dl 30 (1+)*   NITRITE UA  Negative   BILIRUBIN UA  Negative   UROBILINOGEN UA E.U./dl 1.0   LEUKOCYTES UA  Trace*   WBC UA /hpf 2-4   RBC UA /hpf 0-1   BACTERIA UA /hpf Moderate*   EPITHELIAL CELLS WET PREP /hpf Occasional   MUCUS THREADS  Occasional*        Results from last 7 days   Lab Units 11/10/24  1853   ACETAMINOPHEN LVL ug/mL <2*          Past Medical History:   Diagnosis Date    CAD (coronary artery disease)     HFrEF (heart failure with reduced ejection fraction) ([facility]) 09/2021    Hypertension     Rheumatoid factor positive 09/2021    Stage 3 chronic kidney disease  (HCC)     Tobacco abuse      Present on Admission:   Uncontrolled hypertension   Acute kidney injury superimposed on stage 3a chronic kidney disease (HCC)   Acute on chronic systolic heart failure (HCC)   Tobacco abuse   Mucopurulent chronic bronchitis (HCC)   Elevated troponin      Admitting Diagnosis: CHF (congestive heart failure) (HCC) [I50.9]  SOB (shortness of breath) [R06.02]  Hypertension [I10]  DAVID (acute kidney injury) (HCC) [N17.9]  Elevated d-dimer [R79.89]  Age/Sex: 69 y.o. male    Network Utilization Review Department  ATTENTION: Please call with any questions or concerns to [phone] and carefully listen to the prompts so that you are directed to the right person. All voicemails are confidential.   For Discharge needs, contact Care Management DC Support Team at [phone] opt. 2  Send all requests for admission clinical reviews, approved or denied determinations and any other requests to dedicated fax number below belonging to the campus where the patient is receiving treatment. List of dedicated fax numbers for the Facilities:  FACILITY NAME UR FAX NUMBER   ADMISSION DENIALS (Administrative/Medical Necessity) [phone]   DISCHARGE SUPPORT TEAM (NETWORK) [phone]   PARENT CHILD HEALTH (Maternity/NICU/Pediatrics) [phone]   [facility] [phone]   [facility] [phone]   [facility] [phone]   [facility] [phone]   [facility] [phone]   General acute hospital [phone]   [facility] [phone]   [facility] [phone]   [facility] [phone]   [facility] [phone]   [facility] [phone]   [facility] [phone]

## 2024-11-11 NOTE — ASSESSMENT & PLAN NOTE
Met SIRS criteria with tachycardia, leukocytosis   Has elevated lactic acid of 5.9 which is thought to be secondary to CHF exacerbation  No active signs for infection at this time  Monitor off of antibiotics

## 2024-11-11 NOTE — ASSESSMENT & PLAN NOTE
Currently patient without wheeze-not in acute exacerbation  Not maintained on medications at baseline

## 2024-11-11 NOTE — ASSESSMENT & PLAN NOTE
Currently on Coreg and Hydralazine   DAVID caused by entresto in the past  Add imdur 60 mg PO daily

## 2024-11-11 NOTE — CONSULTS
Consultation - Cardiology   Name: Matthew Alvarez 69 y.o. male I MRN: [id number]  Unit/Bed#: -01 I Date of Admission: 11/10/2024   Date of Service: 11/11/2024 I Hospital Day: 1   Consults  Physician Requesting Evaluation: Amy Rodriguez MD   Reason for Evaluation / Principal Problem: Acute on chronic HFrEF    Assessment & Plan  Acute on chronic systolic heart failure (HCC)  Wt Readings from Last 3 Encounters:   11/10/24 55.6 kg (122 lb 9.6 oz)   09/03/24 55.3 kg (122 lb)   02/05/24 56 kg (123 lb 6.4 oz)   Came in with edema, sob, elevated bnp, and pulmonary vascular congestion  Given IV lasix but with worsening renal function  Diuresis now on hold   Currently appearing euvolemic clinically     Uncontrolled hypertension  Stop amlodipine   Start imdur 60 mg PO daily  Continue Coreg and Hydralazine   Ischemic cardiomyopathy  Currently on Coreg and Hydralazine   DAVID caused by entresto in the past  Add imdur 60 mg PO daily   Acute kidney injury superimposed on stage 3a chronic kidney disease (HCC)  Lab Results   Component Value Date    EGFR 12 11/11/2024    EGFR 14 11/10/2024    EGFR 16 11/10/2024    CREATININE 4.39 (H) 11/11/2024    CREATININE 3.95 (H) 11/10/2024    CREATININE 3.58 (H) 11/10/2024   Nephrology involved , work up ongoing   Elevated troponin  No cp  No EKG changes   Tobacco abuse    Mucopurulent chronic bronchitis (HCC)    I have discussed the above management plan in detail with the primary service.     History of Present Illness   Matthew Alvarez is a 69 y.o. male with history of nonischemic cardiomyopathy, smoking, CHFrEF, presented to the hospital for concerns with edema and pulmonary vascular congestion. Initially found to be in DAVID felt to be from volume overload. With diuresis, symptoms improved but renal function worsening. He is happy with his improvement at this time. Currently feels back to his baseline. Follows with [facility] cardiology.     Review of Systems   Constitutional:  Negative for  chills and fatigue.   Respiratory:  Positive for shortness of breath. Negative for chest tightness.    Cardiovascular:  Positive for leg swelling. Negative for chest pain.   Skin:  Negative for color change and pallor.   Neurological:  Negative for dizziness and weakness.   Psychiatric/Behavioral:  Negative for agitation.      I have reviewed the patient's PMH, PSH, Social History, Family History, Meds, and Allergies    Objective :  Temp:  [97.2 °F (36.2 °C)-98.2 °F (36.8 °C)] 97.7 °F (36.5 °C)  HR:  [] 71  BP: (142-216)/() 142/80  Resp:  [17-18] 18  SpO2:  [91 %-97 %] 95 %  O2 Device: Nasal cannula  Nasal Cannula O2 Flow Rate (L/min):  [0.5 L/min] 0.5 L/min  Orthostatic Blood Pressures      Flowsheet Row Most Recent Value   Blood Pressure 142/80 filed at 11/11/2024 0715   Patient Position - Orthostatic VS Lying filed at 11/10/2024 2049          First Weight: Weight - Scale: 57.9 kg (127 lb 10.3 oz) (11/10/24 1847)  Vitals:    11/10/24 1847 11/10/24 2059   Weight: 57.9 kg (127 lb 10.3 oz) 55.6 kg (122 lb 9.6 oz)       Physical Exam  Vitals and nursing note reviewed.   Constitutional:       Appearance: Normal appearance.   HENT:      Head: Normocephalic and atraumatic.   Neck:      Comments: No JVD  Cardiovascular:      Rate and Rhythm: Normal rate.      Heart sounds: No murmur heard.  Pulmonary:      Effort: Pulmonary effort is normal.   Abdominal:      Palpations: Abdomen is soft.   Musculoskeletal:         General: No swelling.   Skin:     General: Skin is warm.      Capillary Refill: Capillary refill takes less than 2 seconds.   Neurological:      General: No focal deficit present.      Mental Status: He is alert and oriented to person, place, and time.   Psychiatric:         Mood and Affect: Mood normal.         Thought Content: Thought content normal.           Lab Results: I have reviewed the following results:CBC/BMP:   .     11/10/24  1853 11/10/24  2320 11/11/24  0528   WBC 14.64*  --   --     HGB 13.5  --   --    HCT 42.3  --   --      --   --    SODIUM 136   < > 133*   K 4.4   < > 4.6   CL 97   < > 100   CO2 20*   < > 22   BUN 45*   < > 59*   CREATININE 3.58*   < > 4.39*   GLUC 51*   < > 105   MG 2.2  --   --     < > = values in this interval not displayed.      Results from last 7 days   Lab Units 11/10/24  1853   WBC Thousand/uL 14.64*   HEMOGLOBIN g/dL 13.5   HEMATOCRIT % 42.3   PLATELETS Thousands/uL 188     Results from last 7 days   Lab Units 11/11/24  0528 11/10/24  2320 11/10/24  1853   POTASSIUM mmol/L 4.6 4.6 4.4   CHLORIDE mmol/L 100 98 97   CO2 mmol/L 22 20* 20*   BUN mg/dL 59* 50* 45*   CREATININE mg/dL 4.39* 3.95* 3.58*   CALCIUM mg/dL 8.4 8.9 9.7     Results from last 7 days   Lab Units 11/10/24  1853   INR  1.83*   PTT seconds 32     Lab Results   Component Value Date    HGBA1C 5.7 (H) 09/15/2021     Lab Results   Component Value Date    CKTOTAL 64 09/11/2021    TROPONINI 0.03 09/12/2021       Imaging Results Review: I reviewed radiology reports from this admission including: chest xray.  Other Study Results Review: EKG was reviewed.

## 2024-11-11 NOTE — H&P
H&P - Hospitalist   Name: Matthew Alvarez 69 y.o. male I MRN: [id number]  Unit/Bed#: -01 I Date of Admission: 11/10/2024   Date of Service: 11/10/2024 I Hospital Day: 0     Assessment & Plan  Acute on chronic systolic heart failure (HCC)  Wt Readings from Last 3 Encounters:   11/10/24 55.6 kg (122 lb 9.6 oz)   09/03/24 55.3 kg (122 lb)   02/05/24 56 kg (123 lb 6.4 oz)   Presents to ED with increasing shortness of breath and leg swelling, abdominal distention  ED diuresed with Lasix IV 40 mg x 1  Takes Lasix 40 mg daily for diuresis as outpatient -endorses compliance but does have history of medication noncompliance  Is on beta-blocker with Coreg, no longer on Entresto secondary to DAVID in the past  Was noncompliant with LifeVest and no longer has this  Follows with [facility] cardiology    CXR with concern for CHF  BNP >4700  EKG NSR without ischemic change, QTc 500  ECHO 2021 revealed EF 25% with severe diffuse hypokinesis, right ventricle with reduced systolic function left atrium dilation, mild pulmonary valve regurg, moderate mitral regurgitation, trace aortic regurgitation, mild tricuspid regurgitation, findings to suggest mild pulmonary hypertension  Repeat ECHO ordered    Was previously on Entresto-stopped due to DAVID in the past  Continue diuresis with 60 mg IV Lasix twice daily per CHF protocol algorithm   Daily weights, Strict I & Os  Cardiac diet, low sodium <2g, fluid restriction <1800  Appreciate cardiology consult  Uncontrolled hypertension  Patient with uncontrolled hypertension-known history of this  Prescribed amlodipine, Coreg, Lasix -patient endorses that he is only taking Coreg and Lasix  Will restart amlodipine, continue Coreg and transition Lasix to IV for help with volume control  Was previously on Entresto which was stopped due to DAVID  Trial hydralazine 25 mg 3 times daily -as recommended by outpatient cardiologist if blood pressures are not adequately controlled  Acute kidney injury  superimposed on stage 3a chronic kidney disease (HCC)  Lab Results   Component Value Date    EGFR 16 11/10/2024    EGFR 32 02/05/2024    EGFR 34 09/19/2023    CREATININE 3.58 (H) 11/10/2024    CREATININE 2.01 (H) 02/05/2024    CREATININE 1.94 (H) 09/19/2023   Patient with acute on chronic kidney disease  Baseline creatinine 1.9-2  Elevated to 3.5 at time of presentation likely secondary to hypervolemia  Check CT abdomen pelvis to evaluate kidneys for any signs of obstructive uropathy  Follow urinary retention protocol  Continue diuresis with IV Lasix  Appreciate nephrology consult  Elevated troponin  Elevated troponin upon presentation of 83, 2-hour troponin of 112  Patient is without chest pain currently  Nonischemic EKG  Likely elevated secondary to demand from accelerated hypertension and CHF exacerbation  Appreciate cardiology consultation  Tobacco abuse  Recommend complete cessation-patient endorses he stopped smoking 3 days ago  NRT offered  Mucopurulent chronic bronchitis (HCC)  Currently patient without wheeze-not in acute exacerbation  Not maintained on medications at baseline  Elevated liver transaminase level  Patient presents with volume overload  No known history of liver disease, does have history of heavy alcohol use  Patient endorses initially no further use of alcohol, then states last drink was 2 weeks ago -unreliable historian  Monitor on CIWA as a precaution  Endorses no upper abdominal pain, states lower abdominal distention/pain increasing over the past week  , , bilirubin 3.58  Check CT abdomen pelvis, pending results low threshold for right upper quadrant ultrasound  Check hepatitis panel  Tylenol level  Trend in the a.m.  SIRS (systemic inflammatory response syndrome) (HCC)  Met SIRS criteria with tachycardia, leukocytosis   Has elevated lactic acid of 5.9 which is thought to be secondary to CHF exacerbation  No active signs for infection at this time  Monitor off of  antibiotics  Elevated d-dimer  D-dimer elevated to 12.54  Patient without hemoptysis, pleuritic chest pain  Does have shortness of breath and mild tachycardia that is improved  Currently no oxygen requirements, saturating well on room air  Kidney function precludes CTA chest  We will check lower extremity venous duplex and echo tomorrow  Heparin subcu DVT prophylaxis      VTE Pharmacologic Prophylaxis:   High Risk (Score >/= 5) - Pharmacological DVT Prophylaxis Ordered: heparin. Sequential Compression Devices Ordered.  Code Status: Level 1 - Full Code   Discussion with family:  Attempted to reach patient's wife via phone, sent to voicemail and voicemail box full unable to leave message.     Anticipated Length of Stay: Patient will be admitted on an inpatient basis with an anticipated length of stay of greater than 2 midnights secondary to CHF exacerbation.    History of Present Illness   Chief Complaint:   Chief Complaint   Patient presents with    Shortness of Breath     Patient presents to the ED with complaints of shortness of breath especially while lying down. The patient reports a history of CHF.          Matthew Alvarez is a 69 y.o. male with a PMH of CAD, HFrEF, hypertension, stage III CKD, tobacco use, history of alcohol use who presents with shortness of breath increasing.  Denying any chest pain, states he is having lower abdominal pain sporadically as well as bloating with shortness of breath worse with laying down and exertion as well as lower extremity swelling.  This is on ongoing and worsening.  Endorses compliance with his current diuretic regimen, does not take all his blood pressure medications as prescribed that she has been not taking amlodipine.  Also states he is no longer taking his aspirin daily for his CAD.  Endorses that his diet is not always compliant with low-sodium but does feel that he does not break a fluid restriction.  Denies any dizziness, lightheadedness, chest pain, pleuritic  chest pain, cough, hemoptysis, nausea vomiting, difficulty with urination.    Review of Systems   Constitutional:  Positive for fatigue. Negative for activity change, chills and fever.   HENT:  Negative for congestion, rhinorrhea and sore throat.    Eyes:  Negative for visual disturbance.   Respiratory:  Positive for shortness of breath. Negative for cough and chest tightness.    Cardiovascular:  Positive for leg swelling. Negative for chest pain and palpitations.   Gastrointestinal:  Positive for abdominal distention and abdominal pain. Negative for constipation, diarrhea, nausea and vomiting.   Genitourinary:  Negative for difficulty urinating, dysuria, frequency and urgency.   Musculoskeletal:  Negative for arthralgias and myalgias.   Skin:  Negative for rash.   Neurological:  Negative for seizures, syncope, weakness and headaches.   All other systems reviewed and are negative.      Historical Information   Past Medical History:   Diagnosis Date    CAD (coronary artery disease)     HFrEF (heart failure with reduced ejection fraction) ([facility]) 09/2021    Hypertension     Rheumatoid factor positive 09/2021    Stage 3 chronic kidney disease (HCC)     Tobacco abuse      Past Surgical History:   Procedure Laterality Date    APPENDECTOMY  1965    CARDIAC CATHETERIZATION  9/16/2021    INGUINAL HERNIA REPAIR Right 1991     Social History     Tobacco Use    Smoking status: Every Day     Current packs/day: 0.50     Average packs/day: 0.5 packs/day for 50.9 years (25.4 ttl pk-yrs)     Types: Cigarettes     Start date: 1974    Smokeless tobacco: Never    Tobacco comments:     Began smoking in his early 20s, on average smoking 1+ packs daily. Quit in 08/2021 (Updated 09/15/2021).    Vaping Use    Vaping status: Never Used   Substance and Sexual Activity    Alcohol use: Yes     Alcohol/week: 6.0 standard drinks of alcohol     Types: 6 Shots of liquor per week     Comment: rare    Drug use: Never    Sexual activity: Not on file     " Comment: defer     E-Cigarette/Vaping    E-Cigarette Use Never User      E-Cigarette/Vaping Substances     Family History   Problem Relation Age of Onset    Heart failure Mother     Other Father         \"blood clot\"    COPD Sister     Heart failure Brother     Valvular heart disease Brother     Heart attack Son     No Known Problems Son      Social History:  Marital Status: /Civil Union   Occupation: None  Patient Pre-hospital Living Situation: Home, With spouse  Patient Pre-hospital Level of Mobility: walks  Patient Pre-hospital Diet Restrictions: None    Meds/Allergies   I have reviewed home medications with patient personally.  Prior to Admission medications    Medication Sig Start Date End Date Taking? Authorizing Provider   carvedilol (COREG) 3.125 mg tablet Take 1 tablet (3.125 mg total) by mouth every 12 (twelve) hours 9/3/24  Yes YUE Griffin   furosemide (LASIX) 40 mg tablet Take 1 tablet (40 mg total) by mouth daily 9/3/24  Yes YUE Griffin   amLODIPine (NORVASC) 10 mg tablet Take 1 tablet (10 mg total) by mouth daily 9/3/24 11/10/24  YUE Griffin   aspirin (ECOTRIN LOW STRENGTH) 81 mg EC tablet Take 1 tablet (81 mg total) by mouth daily 9/3/24 11/10/24  YUE Griffin   nicotine polacrilex (COMMIT) 2 MG lozenge Apply 1 lozenge (2 mg total) to the mouth or throat as needed for smoking cessation  Patient not taking: Reported on 2/5/2024 6/27/23 11/10/24  YUE Griffin   rosuvastatin (CRESTOR) 5 mg tablet Take 1 tablet (5 mg total) by mouth daily 9/3/24 11/10/24  YUE Griffin     No Known Allergies    Objective :  Temp:  [97.2 °F (36.2 °C)-98.2 °F (36.8 °C)] 97.2 °F (36.2 °C)  HR:  [] 80  BP: (169-216)/() 181/100  Resp:  [18] 18  SpO2:  [94 %-97 %] 94 %  O2 Device: None (Room air)    Physical Exam  Vitals and nursing note reviewed.   Constitutional:       General: He is not in acute distress.     Appearance: Normal appearance. He is ill-appearing. "   HENT:      Head: Normocephalic and atraumatic.      Nose: No congestion.      Mouth/Throat:      Mouth: Mucous membranes are moist.      Pharynx: Oropharynx is clear.   Eyes:      Conjunctiva/sclera: Conjunctivae normal.   Cardiovascular:      Rate and Rhythm: Normal rate.      Pulses: Normal pulses.      Heart sounds: Normal heart sounds.   Pulmonary:      Effort: Pulmonary effort is normal. No respiratory distress.      Breath sounds: Rales present.   Abdominal:      General: Bowel sounds are normal. There is distension.      Palpations: Abdomen is soft.   Musculoskeletal:         General: Normal range of motion.      Cervical back: Normal range of motion.      Right lower leg: Edema present.      Left lower leg: Edema present.   Skin:     General: Skin is warm and dry.   Neurological:      Mental Status: He is alert and oriented to person, place, and time.   Psychiatric:         Mood and Affect: Mood normal.         Behavior: Behavior normal.          Lines/Drains:            Lab Results: I have reviewed the following results:  Results from last 7 days   Lab Units 11/10/24  1853   WBC Thousand/uL 14.64*   HEMOGLOBIN g/dL 13.5   HEMATOCRIT % 42.3   PLATELETS Thousands/uL 188   SEGS PCT % 85*   LYMPHO PCT % 6*   MONO PCT % 8   EOS PCT % 0     Results from last 7 days   Lab Units 11/10/24  1853   SODIUM mmol/L 136   POTASSIUM mmol/L 4.4   CHLORIDE mmol/L 97   CO2 mmol/L 20*   BUN mg/dL 45*   CREATININE mg/dL 3.58*   ANION GAP mmol/L 19*   CALCIUM mg/dL 9.7   ALBUMIN g/dL 4.2   TOTAL BILIRUBIN mg/dL 3.58*   ALK PHOS U/L 125*   ALT U/L 724*   AST U/L 962*   GLUCOSE RANDOM mg/dL 51*     Results from last 7 days   Lab Units 11/10/24  1853   INR  1.83*         Lab Results   Component Value Date    HGBA1C 5.7 (H) 09/15/2021     Results from last 7 days   Lab Units 11/10/24  1853   LACTIC ACID mmol/L 5.9*       Imaging Results Review: I reviewed radiology reports from this admission including: chest xray.  Other Study  Results Review: EKG was reviewed.     Administrative Statements       ** Please Note: This note has been constructed using a voice recognition system. **

## 2024-11-11 NOTE — ASSESSMENT & PLAN NOTE
Lab Results   Component Value Date    EGFR 12 11/11/2024    EGFR 14 11/10/2024    EGFR 16 11/10/2024    CREATININE 4.39 (H) 11/11/2024    CREATININE 3.95 (H) 11/10/2024    CREATININE 3.58 (H) 11/10/2024   Creatinine worsened today to 4.3 likely secondary to IV diuresis  Patient with acute on chronic kidney disease  Baseline creatinine 1.9-2  Creatinine 3.9 at time of admission likely secondary to hypervolemia  CT abdomen pelvis: Shows thickening of the bladder wall which could be due to chronic bladder outlet obstruction   Renal ultrasound pending  Follow urinary retention protocol  Nephrology on board and following recommend serologies to rule out intrinsic disease  SPEP/UPEP, ANCA, JORGE LUIS, C3, C4, urine protein creatinine ratio

## 2024-11-11 NOTE — ASSESSMENT & PLAN NOTE
Wt Readings from Last 3 Encounters:   11/10/24 55.6 kg (122 lb 9.6 oz)   09/03/24 55.3 kg (122 lb)   02/05/24 56 kg (123 lb 6.4 oz)   Came in with edema, sob, elevated bnp, and pulmonary vascular congestion  Given IV lasix but with worsening renal function  Diuresis now on hold   Currently appearing euvolemic clinically

## 2024-11-11 NOTE — ASSESSMENT & PLAN NOTE
Lab Results   Component Value Date    EGFR 16 11/10/2024    EGFR 32 02/05/2024    EGFR 34 09/19/2023    CREATININE 3.58 (H) 11/10/2024    CREATININE 2.01 (H) 02/05/2024    CREATININE 1.94 (H) 09/19/2023   Patient with acute on chronic kidney disease  Baseline creatinine 1.9-2  Elevated to 3.5 at time of presentation likely secondary to hypervolemia  Check CT abdomen pelvis to evaluate kidneys for any signs of obstructive uropathy  Follow urinary retention protocol  Continue diuresis with IV Lasix  Appreciate nephrology consult

## 2024-11-11 NOTE — ASSESSMENT & PLAN NOTE
Patient with uncontrolled hypertension-known history of this  Prescribed amlodipine, Coreg, Lasix -patient endorses that he is only taking Coreg and Lasix  Amlodipine discontinued  Continue Coreg and hydralazine 25 mg 3 times daily  IV Lasix on hold due to worsening DAVID  Was previously on Entresto which was stopped due to DAVID  Start Imdur 60 mg p.o. daily

## 2024-11-12 ENCOUNTER — APPOINTMENT (INPATIENT)
Dept: CT IMAGING | Facility: HOSPITAL | Age: 69
DRG: 673 | End: 2024-11-12
Payer: COMMERCIAL

## 2024-11-12 PROBLEM — E87.1 HYPONATREMIA: Status: ACTIVE | Noted: 2024-11-12

## 2024-11-12 PROBLEM — I42.0 DILATED CARDIOMYOPATHY (HCC): Status: ACTIVE | Noted: 2024-11-11

## 2024-11-12 LAB
ALBUMIN SERPL BCG-MCNC: 3.4 G/DL (ref 3.5–5)
ALBUMIN SERPL BCG-MCNC: 3.8 G/DL (ref 3.5–5)
ALP SERPL-CCNC: 101 U/L (ref 34–104)
ALP SERPL-CCNC: 94 U/L (ref 34–104)
ALT SERPL W P-5'-P-CCNC: 1255 U/L (ref 7–52)
ALT SERPL W P-5'-P-CCNC: 1260 U/L (ref 7–52)
ANION GAP SERPL CALCULATED.3IONS-SCNC: 14 MMOL/L (ref 4–13)
ANION GAP SERPL CALCULATED.3IONS-SCNC: 15 MMOL/L (ref 4–13)
AST SERPL W P-5'-P-CCNC: 1122 U/L (ref 13–39)
AST SERPL W P-5'-P-CCNC: 939 U/L (ref 13–39)
BILIRUB SERPL-MCNC: 2.01 MG/DL (ref 0.2–1)
BILIRUB SERPL-MCNC: 2.13 MG/DL (ref 0.2–1)
BUN SERPL-MCNC: 86 MG/DL (ref 5–25)
BUN SERPL-MCNC: 97 MG/DL (ref 5–25)
CALCIUM ALBUM COR SERPL-MCNC: 8.9 MG/DL (ref 8.3–10.1)
CALCIUM SERPL-MCNC: 8.4 MG/DL (ref 8.4–10.2)
CALCIUM SERPL-MCNC: 8.5 MG/DL (ref 8.4–10.2)
CHLORIDE SERPL-SCNC: 95 MMOL/L (ref 96–108)
CHLORIDE SERPL-SCNC: 96 MMOL/L (ref 96–108)
CO2 SERPL-SCNC: 19 MMOL/L (ref 21–32)
CO2 SERPL-SCNC: 19 MMOL/L (ref 21–32)
CREAT SERPL-MCNC: 6.14 MG/DL (ref 0.6–1.3)
CREAT SERPL-MCNC: 6.62 MG/DL (ref 0.6–1.3)
ERYTHROCYTE [DISTWIDTH] IN BLOOD BY AUTOMATED COUNT: 15 % (ref 11.6–15.1)
GFR SERPL CREATININE-BSD FRML MDRD: 7 ML/MIN/1.73SQ M
GFR SERPL CREATININE-BSD FRML MDRD: 8 ML/MIN/1.73SQ M
GLUCOSE SERPL-MCNC: 114 MG/DL (ref 65–140)
GLUCOSE SERPL-MCNC: 137 MG/DL (ref 65–140)
HCT VFR BLD AUTO: 32.9 % (ref 36.5–49.3)
HGB BLD-MCNC: 11 G/DL (ref 12–17)
LACTATE SERPL-SCNC: 1.3 MMOL/L (ref 0.5–2)
MCH RBC QN AUTO: 27.4 PG (ref 26.8–34.3)
MCHC RBC AUTO-ENTMCNC: 33.4 G/DL (ref 31.4–37.4)
MCV RBC AUTO: 82 FL (ref 82–98)
PLATELET # BLD AUTO: 106 THOUSANDS/UL (ref 149–390)
PMV BLD AUTO: 11.6 FL (ref 8.9–12.7)
POTASSIUM SERPL-SCNC: 4.8 MMOL/L (ref 3.5–5.3)
POTASSIUM SERPL-SCNC: 4.8 MMOL/L (ref 3.5–5.3)
PROT SERPL-MCNC: 6.7 G/DL (ref 6.4–8.4)
PROT SERPL-MCNC: 7 G/DL (ref 6.4–8.4)
RBC # BLD AUTO: 4.02 MILLION/UL (ref 3.88–5.62)
SODIUM SERPL-SCNC: 129 MMOL/L (ref 135–147)
SODIUM SERPL-SCNC: 129 MMOL/L (ref 135–147)
WBC # BLD AUTO: 10.93 THOUSAND/UL (ref 4.31–10.16)

## 2024-11-12 PROCEDURE — 83605 ASSAY OF LACTIC ACID: CPT | Performed by: PHYSICIAN ASSISTANT

## 2024-11-12 PROCEDURE — 74176 CT ABD & PELVIS W/O CONTRAST: CPT

## 2024-11-12 PROCEDURE — 85027 COMPLETE CBC AUTOMATED: CPT | Performed by: STUDENT IN AN ORGANIZED HEALTH CARE EDUCATION/TRAINING PROGRAM

## 2024-11-12 PROCEDURE — 84166 PROTEIN E-PHORESIS/URINE/CSF: CPT

## 2024-11-12 PROCEDURE — 80053 COMPREHEN METABOLIC PANEL: CPT | Performed by: STUDENT IN AN ORGANIZED HEALTH CARE EDUCATION/TRAINING PROGRAM

## 2024-11-12 PROCEDURE — 99233 SBSQ HOSP IP/OBS HIGH 50: CPT | Performed by: ANESTHESIOLOGY

## 2024-11-12 PROCEDURE — 86335 IMMUNFIX E-PHORSIS/URINE/CSF: CPT

## 2024-11-12 PROCEDURE — 99233 SBSQ HOSP IP/OBS HIGH 50: CPT | Performed by: INTERNAL MEDICINE

## 2024-11-12 RX ORDER — METOLAZONE 5 MG/1
10 TABLET ORAL DAILY
Status: DISCONTINUED | OUTPATIENT
Start: 2024-11-12 | End: 2024-11-13

## 2024-11-12 RX ORDER — MILRINONE LACTATE 0.2 MG/ML
0.25 INJECTION, SOLUTION INTRAVENOUS CONTINUOUS
Status: DISCONTINUED | OUTPATIENT
Start: 2024-11-12 | End: 2024-11-12

## 2024-11-12 RX ORDER — CARVEDILOL 3.12 MG/1
3.12 TABLET ORAL 2 TIMES DAILY WITH MEALS
Status: DISCONTINUED | OUTPATIENT
Start: 2024-11-12 | End: 2024-11-14

## 2024-11-12 RX ORDER — BUMETANIDE 0.25 MG/ML
1 INJECTION INTRAMUSCULAR; INTRAVENOUS CONTINUOUS
Status: DISCONTINUED | OUTPATIENT
Start: 2024-11-12 | End: 2024-11-13

## 2024-11-12 RX ORDER — ISOSORBIDE MONONITRATE 30 MG/1
60 TABLET, EXTENDED RELEASE ORAL DAILY
Status: DISCONTINUED | OUTPATIENT
Start: 2024-11-12 | End: 2024-11-14

## 2024-11-12 RX ORDER — BUMETANIDE 0.25 MG/ML
4 INJECTION, SOLUTION INTRAMUSCULAR; INTRAVENOUS ONCE
Status: COMPLETED | OUTPATIENT
Start: 2024-11-12 | End: 2024-11-12

## 2024-11-12 RX ADMIN — BUMETANIDE 4 MG: 0.25 INJECTION INTRAMUSCULAR; INTRAVENOUS at 12:52

## 2024-11-12 RX ADMIN — Medication 1 MG/HR: at 12:57

## 2024-11-12 RX ADMIN — ASPIRIN 81 MG: 81 TABLET, COATED ORAL at 09:45

## 2024-11-12 RX ADMIN — ISOSORBIDE MONONITRATE 60 MG: 30 TABLET, EXTENDED RELEASE ORAL at 15:24

## 2024-11-12 RX ADMIN — HYDRALAZINE HYDROCHLORIDE 25 MG: 25 TABLET ORAL at 15:24

## 2024-11-12 RX ADMIN — METOLAZONE 10 MG: 5 TABLET ORAL at 12:52

## 2024-11-12 RX ADMIN — CARVEDILOL 3.12 MG: 3.12 TABLET, FILM COATED ORAL at 17:57

## 2024-11-12 RX ADMIN — HYDRALAZINE HYDROCHLORIDE 25 MG: 25 TABLET ORAL at 05:09

## 2024-11-12 RX ADMIN — HEPARIN SODIUM 5000 UNITS: 5000 INJECTION, SOLUTION INTRAVENOUS; SUBCUTANEOUS at 15:24

## 2024-11-12 RX ADMIN — Medication 1 MG/HR: at 19:05

## 2024-11-12 RX ADMIN — HYDRALAZINE HYDROCHLORIDE 25 MG: 25 TABLET ORAL at 21:00

## 2024-11-12 NOTE — PROGRESS NOTES
NEPHROLOGY PROGRESS NOTE    Matthew Alvarez 69 y.o. male MRN: [id number]  Unit/Bed#: -01 Encounter: [id number]  Reason for Consult: Renal failure    The patient is awake and alert he is feels chilled is having a little bit of shivers.  He has no fever.  Says he is not eating that much and is not urinating that much at all.  He is worried about his wife being at home and that seems to be his biggest concern.    ASSESSMENT/PLAN:    1.  Renal    Patient has acute on chronic kidney disease.  He presents to the hospital and the creatinine is significantly above prior baseline and is increasing now on a daily basis significantly and today is up to 6.1.  Some serologies are being sent C3 and C4 are low there is nothing necessarily specific about that but will await other serologies.  JORGE LUIS was negative.  The patient is currently oliguric.    I had a long discussion with him about worsening kidney function and given his clinical presentation he is not urinating there is nothing really obvious at this point to point to an exact cause I am concerned there is an intrinsic reason and we will working that up with some serologies.    In the meantime I told him we need to deal with what is going on now and my concern is that he is not producing urine and he is not going to start recovering his renal function near future as were trying to determine the cause so I would recommend initiating dialysis.    He understood was in agreement.    Consult IR for PermCath placement  Case management aware regarding outpatient dialysis placement if needed on discharge  Initiate hemodialysis when lying in likely 11/13    2.  Cardiac    Patient has history of significant cardiomyopathy.  Repeat echocardiogram done yesterday was relatively unchanged as the EF was 20-24%.  The right ventricle function was commented as normal.  There did not appear to be any significant valvular heart disease.  His BNP is significantly elevated and there is some  "concern that he may have low cardiac output cardiology has been involved in the planning a right heart cath to assess.  A venous oxygen saturation would be a good indicator as to whether or not he had very poor cardiac output.  These will help determine if inotropic support would be helpful.    It is discovered that he is very low cardiac output essentially in cardiogenic shock could explain renal failure and hepatitis.  Await right heart catheter results.    ICU and cardiology on consult  Hold diuretics for now    3.  Hepatitis    Patient with transaminitis and AST and ALT are increasing.  Wondering if this could be related to passive congestion from cardiogenic issues.    ICU team involved await results of cardiac cath to see if that gives any insight.        SUBJECTIVE:  Review of Systems   Constitutional: Positive for decreased appetite and malaise/fatigue. Negative for chills, diaphoresis and fever.   HENT: Negative.     Eyes: Negative.    Cardiovascular:  Negative for chest pain, dyspnea on exertion and orthopnea.   Respiratory:  Negative for cough, hemoptysis and shortness of breath.    Gastrointestinal:  Negative for abdominal pain, diarrhea, nausea and vomiting.   Genitourinary:         Not urinating much.   Neurological:  Negative for dizziness and headaches.   Psychiatric/Behavioral:  Negative for altered mental status.        OBJECTIVE:  Current Weight: Weight - Scale: 55.2 kg (121 lb 12.8 oz)  Vitals:Temp (24hrs), Av.5 °F (36.4 °C), Min:97.2 °F (36.2 °C), Max:97.9 °F (36.6 °C)  Current: Temperature: 97.5 °F (36.4 °C)   Blood pressure 148/88, pulse 72, temperature 97.5 °F (36.4 °C), resp. rate 17, height 5' 6\" (1.676 m), weight 55.2 kg (121 lb 12.8 oz), SpO2 94%. , Body mass index is 19.66 kg/m².      Intake/Output Summary (Last 24 hours) at 2024 0907  Last data filed at 2024 1507  Gross per 24 hour   Intake 240 ml   Output --   Net 240 ml       Physical Exam: /88   Pulse 72   " "Temp 97.5 °F (36.4 °C)   Resp 17   Ht 5' 6\" (1.676 m)   Wt 55.2 kg (121 lb 12.8 oz)   SpO2 94%   BMI 19.66 kg/m²   Physical Exam  Constitutional:       General: He is not in acute distress.     Appearance: He is not toxic-appearing.   HENT:      Head: Normocephalic and atraumatic.      Nose: Nose normal.      Mouth/Throat:      Mouth: Mucous membranes are dry.   Eyes:      General: No scleral icterus.     Extraocular Movements: Extraocular movements intact.   Cardiovascular:      Rate and Rhythm: Normal rate and regular rhythm.      Heart sounds:      No friction rub. No gallop.   Pulmonary:      Effort: Pulmonary effort is normal. No respiratory distress.      Breath sounds: Rhonchi present. No wheezing or rales.   Abdominal:      General: Bowel sounds are normal. There is no distension.      Palpations: Abdomen is soft.      Tenderness: There is no abdominal tenderness.   Neurological:      Mental Status: He is alert and oriented to person, place, and time.      Comments: No asterixis     Psychiatric:         Mood and Affect: Mood normal.         Behavior: Behavior normal.         Medications:    Current Facility-Administered Medications:     aspirin (ECOTRIN LOW STRENGTH) EC tablet 81 mg, 81 mg, Oral, Daily, Kerri Chisholm PA-C, 81 mg at 11/12/24 0945    carvedilol (COREG) tablet 3.125 mg, 3.125 mg, Oral, Q12H JEEVAN, JASON Estrella-C, 3.125 mg at 11/12/24 0945    heparin (porcine) subcutaneous injection 5,000 Units, 5,000 Units, Subcutaneous, Q8H [facility], Kerri Chisholm PA-C    hydrALAZINE (APRESOLINE) injection 10 mg, 10 mg, Intravenous, Q6H PRN, JASON Estrella-C, 10 mg at 11/10/24 2320    hydrALAZINE (APRESOLINE) tablet 25 mg, 25 mg, Oral, Q8H [facility], JASON Estrella-MARYCRUZ, 25 mg at 11/12/24 0509    isosorbide mononitrate (IMDUR) 24 hr tablet 60 mg, 60 mg, Oral, Daily, JASON Rayo-MARYCRUZ, 60 mg at 11/12/24 0945    nicotine (NICODERM CQ) 14 mg/24hr TD 24 hr patch 1 patch, 1 patch, Transdermal, Daily, Kerri Chisholm, " "BETINA, 1 patch at 11/11/24 0803    Laboratory Results:  Lab Results   Component Value Date    WBC 10.93 (H) 11/12/2024    HGB 11.0 (L) 11/12/2024    HCT 32.9 (L) 11/12/2024    MCV 82 11/12/2024     (L) 11/12/2024     Lab Results   Component Value Date    SODIUM 129 (L) 11/12/2024    K 4.8 11/12/2024    CL 96 11/12/2024    CO2 19 (L) 11/12/2024    BUN 86 (H) 11/12/2024    CREATININE 6.14 (H) 11/12/2024    GLUC 114 11/12/2024    CALCIUM 8.4 11/12/2024     Lab Results   Component Value Date    CALCIUM 8.4 11/12/2024     No results found for: \"LABPROT\"    "

## 2024-11-12 NOTE — OCCUPATIONAL THERAPY NOTE
Occupational Therapy         Patient Name: Matthew Alvarez  Today's Date: 11/12/2024 11/12/24 6443   Note Type   Note type Evaluation;Cancelled Session   Cancel Reasons Medical status       Received order for OT consult. Chart reviewed. Pt with change in medical status this date requiring transfer to critical care service. Will cancel OT services this date and continue to follow case and address as appropriate and as time allows.     Antoino Santiago OTR/L

## 2024-11-12 NOTE — ASSESSMENT & PLAN NOTE
Lab Results   Component Value Date    EGFR 7 11/12/2024    EGFR 8 11/12/2024    EGFR 12 11/11/2024    CREATININE 6.62 (H) 11/12/2024    CREATININE 6.14 (H) 11/12/2024    CREATININE 4.39 (H) 11/11/2024   DAVID worsening  Unlikely caused by simply IV Lasix initially given  Nephrology team likely to start CRRT

## 2024-11-12 NOTE — ASSESSMENT & PLAN NOTE
Lab Results   Component Value Date    EGFR 7 11/12/2024    EGFR 8 11/12/2024    EGFR 12 11/11/2024    CREATININE 6.62 (H) 11/12/2024    CREATININE 6.14 (H) 11/12/2024    CREATININE 4.39 (H) 11/11/2024     Suspected secondary to cardiorenal syndrome  Baseline Cr 1.3-1.5   Home regimen lasix 40mg daily   Diuresed with total of lasix 120 mg since admission   Has become essentially anuric overnight   Kidney US negative   Cr now 6.14   Nephrology following, recommends iHD 11/13  Will place temp hd line today  Bumex 4mg and metolazone 10mg bolus followed by bumex gtt at 1mL/hr

## 2024-11-12 NOTE — UTILIZATION REVIEW
Continued Stay Review  SEE INITIAL REVIEW COMPLETED 11/10 BY JUAN M VASQUEZ RN  Date: 11/12                          Current Patient Class: Inpatient  Current Level of Care: MEd/surg    HPI:69 y.o. male initially admitted on 11/10     Assessment/Plan:Date: 11/12  Day 3: Has surpassed a 2nd midnight with active treatments and services.Initially admitted for acute on chronic chf. With acute on chronic ckd.  Being followed by nephrology.  NA neg.  NOt urinating well.  Plan to start HD. Consult IR for permcath placement. Creat 6.14 today up from 3.58 on admission.  Baseline 1.9-2. EF 20-24%.  Rhonchi heard in lungs. IV lasix remains on hold due to renal function.  Continue with primacor drip.          Medications:   Scheduled Medications:  aspirin, 81 mg, Oral, Daily  heparin (porcine), 5,000 Units, Subcutaneous, Q8H JEEVAN  hydrALAZINE, 25 mg, Oral, Q8H JEEVAN  nicotine, 1 patch, Transdermal, Daily      Continuous IV Infusions:  milrinone (Primacor) infusion, 0.25 mcg/kg/min, Intravenous, Continuous      PRN Meds:  hydrALAZINE, 10 mg, Intravenous, Q6H PRN      Discharge Plan: TBD    Vital Signs (last 3 days)       Date/Time Temp Pulse Resp BP MAP (mmHg) SpO2 Calculated FIO2 (%) - Nasal Cannula Nasal Cannula O2 Flow Rate (L/min) O2 Device Patient Position - Orthostatic VS [place] Coma Scale Score CIWA-Ar Total Pain    11/12/24 0945 -- 72 -- 148/88 -- -- -- -- -- -- -- -- --    11/12/24 0800 -- -- -- -- -- 94 % -- -- None (Room air) -- 15 -- No Pain    11/12/24 07:30:51 -- -- -- -- -- 93 % -- -- -- -- -- -- --    11/12/24 0715 97.5 °F (36.4 °C) 74 17 148/86 107 93 % -- -- -- -- -- 0 --    11/12/24 04:59:42 -- 69 -- 146/83 104 93 % -- -- -- -- -- -- --    11/12/24 0300 97.9 °F (36.6 °C) 68 18 144/84 104 96 % -- -- None (Room air) Lying -- 0 --    11/11/24 23:21:18 97.5 °F (36.4 °C) 66 -- 143/83 103 93 % -- -- -- -- -- 0 --    11/11/24 2100 -- -- -- -- -- -- -- -- -- -- -- -- No Pain    11/11/24 1900 97.5 °F (36.4 °C) 67 18  140/79 99 94 % -- -- None (Room air) Sitting -- 0 --    11/11/24 1515 97.2 °F (36.2 °C) 69 17 126/65 85 -- -- -- -- Lying -- 0 --    11/11/24 1419 -- -- -- 147/87 -- -- -- -- -- -- -- -- --    11/11/24 1115 97.3 °F (36.3 °C) 71 17 157/90 112 -- -- -- -- Lying -- 0 --    11/11/24 1046 -- 75 -- 157/90 -- -- -- -- -- -- -- -- --    11/11/24 0800 -- -- -- -- -- 95 % -- -- None (Room air) -- 15 -- No Pain    11/11/24 0715 97.7 °F (36.5 °C) 71 18 142/80 101 -- -- -- -- -- -- 0 --    11/11/24 06:16:58 97.7 °F (36.5 °C) 71 -- 147/81 103 95 % -- -- -- -- -- -- --    11/11/24 05:24:55 -- 75 -- 147/88 108 94 % -- -- -- -- -- -- --    11/11/24 03:14:59 97.9 °F (36.6 °C) 73 -- 148/89 109 -- -- -- -- -- -- 0 --    11/11/24 01:33:33 -- 77 -- 178/96 123 91 % -- -- -- -- -- -- --    11/10/24 2349 -- -- -- -- -- -- -- -- -- -- -- -- No Pain    11/10/24 2315 -- 81 -- 183/100 -- -- -- -- -- -- -- 0 --    11/10/24 2242 97.5 °F (36.4 °C) 82 17 180/100 127 96 % -- -- -- -- -- -- --    11/10/24 2050 -- -- -- -- -- 94 % 22 0.5 L/min Nasal cannula -- -- -- No Pain    11/10/24 20:49:59 97.2 °F (36.2 °C) 80 18 181/100 127 94 % -- -- -- Lying -- -- --    11/10/24 2018 -- -- -- 169/89 -- -- -- -- -- -- -- -- --    11/10/24 1958 -- 97 18 197/106 144 97 % -- -- -- -- -- -- --    11/10/24 1940 -- -- -- 196/109 -- -- -- -- -- -- -- -- --    11/10/24 1928 -- -- -- -- -- -- -- -- -- -- 15 -- --    11/10/24 1927 -- -- -- -- -- -- -- -- None (Room air) -- -- -- --    11/10/24 1847 98.2 °F (36.8 °C) 104 18 216/122 -- 97 % -- -- None (Room air) Sitting -- -- 3          Weight (last 2 days)       Date/Time Weight    11/12/24 0546 55.2 (121.8)    11/12/24 0500 55.2 (121.8)    11/11/24 1046 55.3 (122)    11/10/24 2059 55.6 (122.6)    11/10/24 1847 57.9 (127.65)           CIWA-Ar Score       Row Name 11/12/24 0715 11/12/24 0300 11/11/24 23:21:18       CIWA-Ar    Nausea and Vomiting 0 0 0    Tactile Disturbances 0 0 0    Tremor 0 0 0    Auditory Disturbances 0  0 0    Paroxysmal Sweats 0 0 0    Visual Disturbances 0 0 0    Anxiety 0 0 0    Headache, Fullness in Head 0 0 0    Agitation 0 0 0    Orientation and Clouding of Sensorium 0 0 0    CIWA-Ar Total 0 0 0      Row Name 11/11/24 1900 11/11/24 1515 11/11/24 1115       CIWA-Ar    Nausea and Vomiting 0 0 0    Tactile Disturbances 0 0 0    Tremor 0 0 0    Auditory Disturbances 0 0 0    Paroxysmal Sweats 0 0 0    Visual Disturbances 0 0 0    Anxiety 0 0 0    Headache, Fullness in Head 0 0 0    Agitation 0 0 0    Orientation and Clouding of Sensorium 0 0 0    CIWA-Ar Total 0 0 0      Row Name 11/11/24 0715 11/11/24 03:14:59 11/10/24 2315       CIWA-Ar    BP -- -- 183/100    Pulse -- 73 81    Nausea and Vomiting 0 0 0    Tactile Disturbances 0 0 0    Tremor 0 0 0    Auditory Disturbances 0 0 0    Paroxysmal Sweats 0 0 0    Visual Disturbances 0 0 0    Anxiety 0 0 0    Headache, Fullness in Head 0 0 0    Agitation 0 0 0    Orientation and Clouding of Sensorium 0 0 0    CIWA-Ar Total 0 0 0                  Pertinent Labs/Diagnostic Results:   Radiology:  US right upper quadrant   Final Interpretation by Ciaran Gutierrez MD (11/11 1451)      No acute findings.            Workstation performed: [id number]         US kidney and bladder   Final Interpretation by Ciaran Gutierrez MD (11/11 1451)      No acute findings.            Workstation performed: [id number]          VAS VENOUS DUPLEX - LOWER LIMB BILATERAL   Final Interpretation by Kristyn Xiao DO (11/11 1814)      CT chest abdomen pelvis wo contrast   Final Interpretation by Fabián Singh DO (11/11 0039)      Groundglass opacities at the left lower lobe which could be on the basis of pulmonary edema. Atypical pneumonia may have a similar appearance. Correlate clinically.      Bilateral mild to moderate pleural effusions with overlying compressive atelectasis      Mild degree of ascites within the gallbladder fossa versus pericholecystic  fluid. Evaluation for gallbladder wall thickening is limited secondary to noncontrast examination and patient motion. Recommend dedicated ultrasound for further evaluation.      There is thickening of the bladder wall which could be due to chronic bladder outlet obstruction. However, possibility of cystitis is not excluded. Correlate clinically and with urinalysis.            Workstation performed: [id number]         XR chest 1 view portable   ED Interpretation by Devyn Wilkes MD (11/10 1909)   CHF with effusions.      Final Interpretation by Luis Almeida MD (11/11 0910)      Findings suggest CHF with mild edema and bilateral left greater than right small to moderate pleural effusions. Findings are concordant with ER interpretation            Workstation performed: [id number]         CT abdomen pelvis wo contrast    (Results Pending)     Cardiology:  Echo complete w/ contrast if indicated   Final Result by Shaina Delgado MD (11/11 6623)        Left Ventricle: Left ventricular cavity size is severely dilated. There    is mild concentric hypertrophy. The left ventricular ejection fraction is    20-24 %. Systolic function is severely reduced. There is severe global    hypokinesis.     Left Atrium: The atrium is mildly dilated.     Mitral Valve: There is mild regurgitation.     Tricuspid Valve: There is mild regurgitation.     Pericardium: There is a moderately sized left pleural effusion.         ECG 12 lead   Final Result by Shaina Delgado MD (11/11 9485)   Normal sinus rhythm   Left axis deviation   Minimal voltage criteria for LVH, may be normal variant ( Maciel product    )   T wave abnormality, consider lateral ischemia   Prolonged QT   When compared with ECG of 9/11/2021   Premature ventricular complexes are no longer Present   Confirmed by Shaina Delgado ([id number]) on 11/11/2024 8:05:44 AM                  Results from last 7 days   Lab Units 11/12/24  0508 11/10/24  1853   WBC Thousand/uL 10.93*  14.64*   HEMOGLOBIN g/dL 11.0* 13.5   HEMATOCRIT % 32.9* 42.3   PLATELETS Thousands/uL 106* 188   TOTAL NEUT ABS Thousands/µL  --  12.61*         Results from last 7 days   Lab Units 11/12/24  0508 11/11/24  0528 11/10/24  2320 11/10/24  1853   SODIUM mmol/L 129* 133* 134* 136   POTASSIUM mmol/L 4.8 4.6 4.6 4.4   CHLORIDE mmol/L 96 100 98 97   CO2 mmol/L 19* 22 20* 20*   ANION GAP mmol/L 14* 11 16* 19*   BUN mg/dL 86* 59* 50* 45*   CREATININE mg/dL 6.14* 4.39* 3.95* 3.58*   EGFR ml/min/1.73sq m 8 12 14 16   CALCIUM mg/dL 8.4 8.4 8.9 9.7   MAGNESIUM mg/dL  --   --   --  2.2     Results from last 7 days   Lab Units 11/12/24  0508 11/10/24  1853   AST U/L 1,122* 962*   ALT U/L 1,255* 724*   ALK PHOS U/L 94 125*   TOTAL PROTEIN g/dL 6.7 8.1   ALBUMIN g/dL 3.4* 4.2   TOTAL BILIRUBIN mg/dL 2.01* 3.58*         Results from last 7 days   Lab Units 11/12/24  0508 11/11/24  0528 11/10/24  2320 11/10/24  1853   GLUCOSE RANDOM mg/dL 114 105 85 51*     Results from last 7 days   Lab Units 11/10/24  2320 11/10/24  2034 11/10/24  1853   HS TNI 0HR ng/L  --   --  83*   HS TNI 2HR ng/L  --  112*  --    HSTNI D2 ng/L  --  29*  --    HS TNI 4HR ng/L 169*  --   --    HSTNI D4 ng/L 86*  --   --      Results from last 7 days   Lab Units 11/10/24  1853   D-DIMER QUANTITATIVE ug/ml FEU 12.54*     Results from last 7 days   Lab Units 11/10/24  1853   PROTIME seconds 21.4*   INR  1.83*   PTT seconds 32     Results from last 7 days   Lab Units 11/10/24  1853   TSH 3RD GENERATON uIU/mL 4.435         Results from last 7 days   Lab Units 11/10/24  2320 11/10/24  1853   LACTIC ACID mmol/L 4.6* 5.9*             Results from last 7 days   Lab Units 11/10/24  1853   BNP pg/mL >4,700*       Results from last 7 days   Lab Units 11/10/24  2320   HEP B S AG  Non-reactive   HEP C AB  Non-reactive   HEP B C IGM  Non-reactive     Results from last 7 days   Lab Units 11/10/24  1853   LIPASE u/L 26     Results from last 7 days   Lab Units 11/11/24  0541    CLARITY UA  Clear   COLOR UA  Yellow   SPEC GRAV UA  1.020   PH UA  6.0   GLUCOSE UA mg/dl Negative   KETONES UA mg/dl Negative   BLOOD UA  Small*   PROTEIN UA mg/dl 30 (1+)*   NITRITE UA  Negative   BILIRUBIN UA  Negative   UROBILINOGEN UA E.U./dl 1.0   LEUKOCYTES UA  Trace*   WBC UA /hpf 2-4   RBC UA /hpf 0-1   BACTERIA UA /hpf Moderate*   EPITHELIAL CELLS WET PREP /hpf Occasional   MUCUS THREADS  Occasional*       Results from last 7 days   Lab Units 11/10/24  1853   ACETAMINOPHEN LVL ug/mL <2*             Network Utilization Review Department  ATTENTION: Please call with any questions or concerns to [phone] and carefully listen to the prompts so that you are directed to the right person. All voicemails are confidential.   For Discharge needs, contact Care Management DC Support Team at [phone] opt. 2  Send all requests for admission clinical reviews, approved or denied determinations and any other requests to dedicated fax number below belonging to the [facility] where the patient is receiving treatment. List of dedicated fax numbers for the Facilities:  FACILITY NAME UR FAX NUMBER   ADMISSION DENIALS (Administrative/Medical Necessity) [phone]   DISCHARGE SUPPORT TEAM (NETWORK) [phone]   PARENT CHILD HEALTH (Maternity/NICU/Pediatrics) [phone]   [facility] [phone]   [facility] [phone]   [facility] [phone]   [facility] [phone]   [facility] [phone]   [facility] [phone]   [facility] [phone]   [facility] [phone]   [facility] [phone]   [facility] [phone]   [facility] [phone]   [facility] [phone]

## 2024-11-12 NOTE — ASSESSMENT & PLAN NOTE
Suspected in the setting of CHF exacerbation   On admission met criteria with tachycardia and leukocytosis   No hypotension, lactic elevated in the setting of poor perfusion/limited forward flow

## 2024-11-12 NOTE — PROGRESS NOTES
Progress Note - Cardiology   Name: Matthew Alvarez 69 y.o. male I MRN: [id number]  Unit/Bed#: -01 I Date of Admission: 11/10/2024   Date of Service: 11/12/2024 I Hospital Day: 2    Assessment & Plan  Acute on chronic systolic heart failure (HCC)  Wt Readings from Last 3 Encounters:   11/12/24 55.2 kg (121 lb 12.8 oz)   09/03/24 55.3 kg (122 lb)   02/05/24 56 kg (123 lb 6.4 oz)   Came in with edema, sob, elevated bnp, and pulmonary vascular congestion  Given IV lasix but with worsening renal function  Diuresis now on hold   Currently appearing only slightly hypervolemic   Would recommend RHC vs floating [place] to measure PA pressures and guide management  Elevated LFTs noted but pt is warm and perfused, clearly NOT cardiogenic shock picture with elevated bp  It is likely that with HD, patient will clinically improve  Given pressure elevated and no evidence of cardiogenic shock, would not recommend inotropic support   Uncontrolled hypertension  Continue Coreg, hydralazine and imdur  Dilated cardiomyopathy (HCC)  On GDMT with Coreg, hydralazine and imdue  Acute kidney injury superimposed on stage 3a chronic kidney disease (HCC)  Lab Results   Component Value Date    EGFR 7 11/12/2024    EGFR 8 11/12/2024    EGFR 12 11/11/2024    CREATININE 6.62 (H) 11/12/2024    CREATININE 6.14 (H) 11/12/2024    CREATININE 4.39 (H) 11/11/2024   DAVID worsening  Unlikely caused by simply IV Lasix initially given  Nephrology team likely to start CRRT   Elevated troponin  No cp  No EKG changes   Tobacco abuse    Mucopurulent chronic bronchitis (HCC)      Subjective     Pt reports breathing has improved but has not urinated. Feels that he doesn't even need to urinate    Objective :  Temp:  [97.2 °F (36.2 °C)-97.9 °F (36.6 °C)] 97.5 °F (36.4 °C)  HR:  [66-74] 72  BP: (126-148)/(65-88) 148/88  Resp:  [17-18] 17  SpO2:  [93 %-96 %] 94 %  O2 Device: None (Room air)  Orthostatic Blood Pressures      Flowsheet Row Most Recent Value   Blood  Pressure 148/88 filed at 11/12/2024 0945   Patient Position - Orthostatic VS Lying filed at 11/12/2024 0300          First Weight: Weight - Scale: 57.9 kg (127 lb 10.3 oz) (11/10/24 1847)  Vitals:    11/12/24 0500 11/12/24 0546   Weight: 55.2 kg (121 lb 12.8 oz) 55.2 kg (121 lb 12.8 oz)     Renal function worsening and now showing shock liver signs. Perfusing extremities and bp elevated. Likely in acute renal failure. Question volume status.       Physical Exam  Vitals and nursing note reviewed.   Constitutional:       Appearance: Normal appearance.   HENT:      Head: Normocephalic and atraumatic.   Cardiovascular:      Rate and Rhythm: Normal rate.      Heart sounds: No murmur heard.  Pulmonary:      Effort: Pulmonary effort is normal.   Musculoskeletal:         General: No swelling.      Cervical back: Neck supple.   Skin:     General: Skin is warm.      Capillary Refill: Capillary refill takes less than 2 seconds.   Neurological:      General: No focal deficit present.      Mental Status: He is alert.   Psychiatric:         Mood and Affect: Mood normal.           Lab Results: I have reviewed the following results:  Results from last 7 days   Lab Units 11/12/24  0508 11/10/24  1853   WBC Thousand/uL 10.93* 14.64*   HEMOGLOBIN g/dL 11.0* 13.5   HEMATOCRIT % 32.9* 42.3   PLATELETS Thousands/uL 106* 188     Results from last 7 days   Lab Units 11/12/24  1051 11/12/24  0508 11/11/24  0528   POTASSIUM mmol/L 4.8 4.8 4.6   CHLORIDE mmol/L 95* 96 100   CO2 mmol/L 19* 19* 22   BUN mg/dL 97* 86* 59*   CREATININE mg/dL 6.62* 6.14* 4.39*   CALCIUM mg/dL 8.5 8.4 8.4     Results from last 7 days   Lab Units 11/10/24  1853   INR  1.83*   PTT seconds 32     Lab Results   Component Value Date    HGBA1C 5.7 (H) 09/15/2021     Lab Results   Component Value Date    CKTOTAL 64 09/11/2021    TROPONINI 0.03 09/12/2021     Echo 11/11/2024:    Left Ventricle: Left ventricular cavity size is severely dilated. There is mild concentric  hypertrophy. The left ventricular ejection fraction is 20-24 %. Systolic function is severely reduced. There is severe global hypokinesis.    Left Atrium: The atrium is mildly dilated.    Mitral Valve: There is mild regurgitation.    Tricuspid Valve: There is mild regurgitation.    Pericardium: There is a moderately sized left pleural effusion.

## 2024-11-12 NOTE — PROGRESS NOTES
ICU ACCEPTANCE NOTE- Critical Care/ICU   Name: Matthew Alvarez 69 y.o. male I MRN: [id number]  Unit/Bed#: -01 I Date of Admission: 11/10/2024   Date of Service: 11/12/2024 I Hospital Day: 2      Assessment & Plan  Acute on chronic systolic heart failure (HCC)  Wt Readings from Last 3 Encounters:   11/12/24 55.2 kg (121 lb 12.8 oz)   09/03/24 55.3 kg (122 lb)   02/05/24 56 kg (123 lb 6.4 oz)     Etiology: nonischemic, dilated cardiomyopathy   Echo 11/11: LVEF 20-24%, LV severely dilated, diastolic dysfunction  Home regimen: coreg 3.125 BID  Not in cardiogenic shock currently - warm extremities, well perfused, lactic cleared from 11/11  Cardiology following  Recommends continue hydralazine 25 q8hrs, continue coreg, add imdur 60 daily     Dilated cardiomyopathy (HCC)  See plan under acute on chronic systolic heart failure   Acute kidney injury superimposed on stage 3a chronic kidney disease (HCC)  Lab Results   Component Value Date    EGFR 7 11/12/2024    EGFR 8 11/12/2024    EGFR 12 11/11/2024    CREATININE 6.62 (H) 11/12/2024    CREATININE 6.14 (H) 11/12/2024    CREATININE 4.39 (H) 11/11/2024     Suspected secondary to cardiorenal syndrome  Baseline Cr 1.3-1.5   Home regimen lasix 40mg daily   Diuresed with total of lasix 120 mg since admission   Has become essentially anuric overnight   Kidney US negative   Cr now 6.14   Nephrology following, recommends iHD 11/13  Will place temp hd line today  Bumex 4mg and metolazone 10mg bolus followed by bumex gtt at 1mL/hr  Uncontrolled hypertension  Home regimen: coreg 3.125 mg BID  Cardiology following, added hydralazine and imdur  Elevated troponin  Non ischemic in origin, likely demand related vs. Reduced clearance in the setting of renal failure   EKG nonischemic   Denies chest pain     Tobacco abuse  Continue NRT  Elevated liver transaminase level  Suspected secondary to congestive hepatopathy/ischemic hepatitis  and low CO  Hx of heavy EtOH use   Transaminases  continuing to rise into 1000s  Reports abdominal pain   RUQ US negative  CT C/A/P on admission: Mild degree of ascites within the gallbladder.  Versus pericholecystic fluid.   CT A/P 11/12: follow up  Hepatitis panel, tylenol level negative   SIRS (systemic inflammatory response syndrome) (HCC)  Suspected in the setting of CHF exacerbation   On admission met criteria with tachycardia and leukocytosis   No hypotension, lactic elevated in the setting of poor perfusion/limited forward flow    Elevated d-dimer  D dimer elevated on admission 12.54  Pt without hemoptysis, pleuritic chest pain  Currently on RA  LE venous duplex negative for DVT   TTE not revealing of any large PE or abnormal RV:LV ratio   Continue heparin subcutaneous DVT ppx   Hyponatremia  In the setting of volume overload and CHF exacerbation  Na 129   Continue to trend BMP  Fluid restriction 1800mL  Disposition: Critical care    ICU Core Measures     A: Assess, Prevent, and Manage Pain Has pain been assessed? Yes  Need for changes to pain regimen? No   B: Both SAT/SAT  N/A   C: Choice of Sedation RASS Goal: 0 Alert and Calm  Need for changes to sedation or analgesia regimen? No   D: Delirium CAM-ICU: Negative   E: Early Mobility  Plan for early mobility? Yes   F: Family Engagement Plan for family engagement today? Yes         Prophylaxis:  VTE VTE covered by:  heparin (porcine), Subcutaneous       Stress Ulcer  not ordered         24 Hour Events : 69M Hx of nonischemic dilated cardiomyopathy (EF 20-24%) who PRESENTED with SOB and orthopnea and was ADMITTED 11/10 for acute on chronic HFrEF exacerbation complicated by acute on chronic renal failure in the setting of cardiorenal syndrome. NOW being transferred to critical care service for multi-organ failure (cardiac, renal, and hepatic) requiring dialysis in the setting of decompensated HF.      On admission with lasix with no improvement in renal function, kidney US negative. Will diurese again today  with Bumex and metolazone. Planning for dialysis tomorrow 11/13. Cardiology following for HFrEF. Continued home coreg and added hydralazine and imdur. Echo on 11/11 showed LVEF 20-24%. LFTs elevated on admission suspected due to congestion in setting of HF exacerbation. CT C/A/P on 11/10 showed mild degree of ascites within the gallbladder fossa versus pericholecystic fluid. RUQ US negative. Pt reporting abdominal pain on 11/12, repeat CT A/P today.    Subjective   Review of Systems: Review of Systems   Constitutional:  Positive for activity change and unexpected weight change.   Respiratory:  Positive for shortness of breath.    Cardiovascular: Negative.    Gastrointestinal:  Positive for abdominal pain.   Neurological: Negative.    Psychiatric/Behavioral: Negative.         Objective :                   Vitals I/O      Most Recent Min/Max in 24hrs   Temp (!) 97.3 °F (36.3 °C) Temp  Min: 97.2 °F (36.2 °C)  Max: 97.9 °F (36.6 °C)   Pulse 77 Pulse  Min: 66  Max: 77   Resp (!) 23 Resp  Min: 17  Max: 23   /86 BP  Min: 126/65  Max: 158/86   O2 Sat 96 % SpO2  Min: 93 %  Max: 96 %      Intake/Output Summary (Last 24 hours) at 11/12/2024 1319  Last data filed at 11/11/2024 1507  Gross per 24 hour   Intake 240 ml   Output --   Net 240 ml       Diet Cardiovascular; Cardiac; Fluid Restriction 1800 ML    Invasive Monitoring           Physical Exam   Physical Exam  Skin:     General: Skin is warm and dry.      Capillary Refill: Capillary refill takes less than 2 seconds.   HENT:      Head: Normocephalic and atraumatic.   Cardiovascular:      Rate and Rhythm: Normal rate and regular rhythm.      Heart sounds: Normal heart sounds.   Abdominal:      Palpations: Abdomen is soft.      Tenderness: There is abdominal tenderness (RUQ).   Constitutional:       Appearance: He is well-developed and well-nourished.   Pulmonary:      Effort: Pulmonary effort is normal.      Breath sounds: Normal breath sounds.   Neurological:       General: No focal deficit present.      Mental Status: He is alert and oriented to person, place and time. Mental status is at baseline.      Motor: gross motor function is at baseline for patient. Strength full and intact in all extremities.          Diagnostic Studies        Lab Results: I have reviewed the following results:     Medications:  Scheduled PRN   aspirin, 81 mg, Daily  heparin (porcine), 5,000 Units, Q8H JEEVAN  hydrALAZINE, 25 mg, Q8H JEEVAN  isosorbide mononitrate, 60 mg, Daily  metolazone, 10 mg, Daily  nicotine, 1 patch, Daily      hydrALAZINE, 10 mg, Q6H PRN       Continuous    bumetanide (BUMEX) 12.5 mg infusion 50 mL, 1 mg/hr, Last Rate: 1 mg/hr (11/12/24 1257)         Labs:   CBC    Recent Labs     11/10/24  1853 11/12/24  0508   WBC 14.64* 10.93*   HGB 13.5 11.0*   HCT 42.3 32.9*    106*     BMP    Recent Labs     11/12/24  0508 11/12/24  1051   SODIUM 129* 129*   K 4.8 4.8   CL 96 95*   CO2 19* 19*   AGAP 14* 15*   BUN 86* 97*   CREATININE 6.14* 6.62*   CALCIUM 8.4 8.5       Coags    Recent Labs     11/10/24  1853   INR 1.83*   PTT 32        Additional Electrolytes  Recent Labs     11/10/24  1853   MG 2.2          Blood Gas    No recent results  No recent results LFTs  Recent Labs     11/12/24  0508 11/12/24  1051   ALT 1,255* 1,260*   AST 1,122* 939*   ALKPHOS 94 101   ALB 3.4* 3.8   TBILI 2.01* 2.13*       Infectious  No recent results  Glucose  Recent Labs     11/10/24  2320 11/11/24  0528 11/12/24  0508 11/12/24  1051   GLUC 85 105 114 137

## 2024-11-12 NOTE — ASSESSMENT & PLAN NOTE
D dimer elevated on admission 12.54  Pt without hemoptysis, pleuritic chest pain  Currently on RA  LE venous duplex negative for DVT   TTE not revealing of any large PE or abnormal RV:LV ratio   Continue heparin subcutaneous DVT ppx

## 2024-11-12 NOTE — PLAN OF CARE
Problem: GENITOURINARY - ADULT  Goal: Maintains or returns to baseline urinary function  Description: INTERVENTIONS:  - Assess urinary function  - Encourage oral fluids to ensure adequate hydration if ordered  - Administer IV fluids as ordered to ensure adequate hydration  - Administer ordered medications as needed  - Offer frequent toileting  - Follow urinary retention protocol if ordered  Outcome: Progressing  Goal: Absence of urinary retention  Description: INTERVENTIONS:  - Assess patient’s ability to void and empty bladder  - Monitor I/O  - Bladder scan as needed  - Discuss with physician/AP medications to alleviate retention as needed  - Discuss catheterization for long term situations as appropriate  Outcome: Progressing  Goal: Urinary catheter remains patent  Description: INTERVENTIONS:  - Assess patency of urinary catheter  - If patient has a chronic cabral, consider changing catheter if non-functioning  - Follow guidelines for intermittent irrigation of non-functioning urinary catheter  Outcome: Progressing     Problem: METABOLIC, FLUID AND ELECTROLYTES - ADULT  Goal: Electrolytes maintained within normal limits  Description: INTERVENTIONS:  - Monitor labs and assess patient for signs and symptoms of electrolyte imbalances  - Administer electrolyte replacement as ordered  - Monitor response to electrolyte replacements, including repeat lab results as appropriate  - Instruct patient on fluid and nutrition as appropriate  Outcome: Progressing  Goal: Fluid balance maintained  Description: INTERVENTIONS:  - Monitor labs   - Monitor I/O and WT  - Instruct patient on fluid and nutrition as appropriate  - Assess for signs & symptoms of volume excess or deficit  Outcome: Progressing  Goal: Glucose maintained within target range  Description: INTERVENTIONS:  - Monitor Blood Glucose as ordered  - Assess for signs and symptoms of hyperglycemia and hypoglycemia  - Administer ordered medications to maintain glucose  within target range  - Assess nutritional intake and initiate nutrition service referral as needed  Outcome: Progressing     Problem: CARDIOVASCULAR - ADULT  Goal: Maintains optimal cardiac output and hemodynamic stability  Description: INTERVENTIONS:  - Monitor I/O, vital signs and rhythm  - Monitor for S/S and trends of decreased cardiac output  - Administer and titrate ordered vasoactive medications to optimize hemodynamic stability  - Assess quality of pulses, skin color and temperature  - Assess for signs of decreased coronary artery perfusion  - Instruct patient to report change in severity of symptoms  Outcome: Progressing  Goal: Absence of cardiac dysrhythmias or at baseline rhythm  Description: INTERVENTIONS:  - Continuous cardiac monitoring, vital signs, obtain 12 lead EKG if ordered  - Administer antiarrhythmic and heart rate control medications as ordered  - Monitor electrolytes and administer replacement therapy as ordered  Outcome: Progressing     Problem: Knowledge Deficit  Goal: Patient/family/caregiver demonstrates understanding of disease process, treatment plan, medications, and discharge instructions  Description: Complete learning assessment and assess knowledge base.  Interventions:  - Provide teaching at level of understanding  - Provide teaching via preferred learning methods  Outcome: Progressing

## 2024-11-12 NOTE — ASSESSMENT & PLAN NOTE
Wt Readings from Last 3 Encounters:   11/12/24 55.2 kg (121 lb 12.8 oz)   09/03/24 55.3 kg (122 lb)   02/05/24 56 kg (123 lb 6.4 oz)   Came in with edema, sob, elevated bnp, and pulmonary vascular congestion  Given IV lasix but with worsening renal function  Diuresis now on hold   Currently appearing only slightly hypervolemic   Would recommend RHC vs floating [place] to measure PA pressures and guide management  Elevated LFTs noted but pt is warm and perfused, clearly NOT cardiogenic shock picture with elevated bp  It is likely that with HD, patient will clinically improve  Given pressure elevated and no evidence of cardiogenic shock, would not recommend inotropic support

## 2024-11-12 NOTE — HOSPITAL COURSE
69M Hx of nonischemic dilated cardiomyopathy (EF 20-24%) who PRESENTED with SOB and orthopnea and was ADMITTED 11/10 to [facility] for acute on chronic HFrEF exacerbation complicated by acute on chronic renal failure in the setting of cardiorenal syndrome. 11/12 transferred to critical care service for multi-organ failure requiring dialysis in the setting of decompensated HF.     On admission diuresed with lasix with no improvement in renal function, kidney US negative. Diuresed again on 11/12  with bumex and metolazone bolus and bumex gtt without any effect on UOP. Cardiology following for HFrEF with HTN. Echo on 11/11 showed LVEF 20-24%. Continued home coreg and added hydralazine and imdur. LFTs elevated on admission suspected due to congestion secondary to HF exacerbation poor flow. CT C/A/P on 11/10 showed mild degree of ascites within the gallbladder fossa vs. pericholecystic fluid. RUQ US negative. Pt reporting abdominal pain on 11/12, repeat CT A/P negative. Received one session intermittent hemodialysis on 11/13 with 1L volume removal. Transaminases now down trending. Overall, pt is a poor candidate for dialysis and return of renal function has poor prognosis. Will likely continue to require HD moving forward. Cardiology recommends life vest on discharge and outpt. Follow up.

## 2024-11-12 NOTE — ASSESSMENT & PLAN NOTE
Suspected secondary to congestive hepatopathy/ischemic hepatitis  and low CO  Hx of heavy EtOH use   Transaminases continuing to rise into 1000s  Reports abdominal pain   RUQ US negative  CT C/A/P on admission: Mild degree of ascites within the gallbladder.  Versus pericholecystic fluid.   CT A/P 11/12: follow up  Hepatitis panel, tylenol level negative

## 2024-11-12 NOTE — ASSESSMENT & PLAN NOTE
Non ischemic in origin, likely demand related vs. Reduced clearance in the setting of renal failure   EKG nonischemic   Denies chest pain

## 2024-11-12 NOTE — ASSESSMENT & PLAN NOTE
In the setting of volume overload and CHF exacerbation  Na 129   Continue to trend BMP  Fluid restriction 1800mL

## 2024-11-12 NOTE — PHYSICAL THERAPY NOTE
PHYSICAL THERAPY NOTE          Patient Name: Matthew Alvarez  Today's Date: 11/12/2024 11/12/24 6121   Note Type   Note type Evaluation;Cancelled Session   Cancel Reasons Medical status       Received order for PT consult. Chart reviewed. Patient with change in medical status this date requiring transfer to critical care service. Will cancel PT services this date and see patient as medically appropriate at a later time.     Maral Morris, PT,DPT

## 2024-11-12 NOTE — TELEMEDICINE
e-Consult (IPC)  - Interventional Radiology  Matthew Alvarez 69 y.o. male MRN: [id number]  Unit/Bed#: -01 Encounter: [id number]          Interventional Radiology has been consulted to evaluate Matthew Alvarez    We were consulted by Nephrology concerning this patient with acute on chronic renal failure.    Inpatient Consult to IR  Consult performed by: Juan Alberto Collins MD  Consult ordered by: Brandyn Ariza MD        11/12/24    Assessment/Recommendation:   69 yr old male with acute on chronic renal failure. Request to place Permacath for dialysis. This will be scheduled in IR. Coordinate with IR nurse for time.    >5 min, >50% time spent reviewing/analyzing record, written report will be generated in the EMR.     Thank you for allowing Interventional Radiology to participate in the care of Matthew Alvarez. Please don't hesitate to call or TigerText us with any questions.     Juan Alberto Collins MD

## 2024-11-12 NOTE — PLAN OF CARE
Problem: CARDIOVASCULAR - ADULT  Goal: Maintains optimal cardiac output and hemodynamic stability  Description: INTERVENTIONS:  - Monitor I/O, vital signs and rhythm  - Monitor for S/S and trends of decreased cardiac output  - Administer and titrate ordered vasoactive medications to optimize hemodynamic stability  - Assess quality of pulses, skin color and temperature  - Assess for signs of decreased coronary artery perfusion  - Instruct patient to report change in severity of symptoms  Outcome: Progressing  Goal: Absence of cardiac dysrhythmias or at baseline rhythm  Description: INTERVENTIONS:  - Continuous cardiac monitoring, vital signs, obtain 12 lead EKG if ordered  - Administer antiarrhythmic and heart rate control medications as ordered  - Monitor electrolytes and administer replacement therapy as ordered  Outcome: Progressing     Problem: PAIN - ADULT  Goal: Verbalizes/displays adequate comfort level or baseline comfort level  Description: Interventions:  - Encourage patient to monitor pain and request assistance  - Assess pain using appropriate pain scale  - Administer analgesics based on type and severity of pain and evaluate response  - Implement non-pharmacological measures as appropriate and evaluate response  - Consider cultural and social influences on pain and pain management  - Notify physician/advanced practitioner if interventions unsuccessful or patient reports new pain  Outcome: Progressing     Problem: INFECTION - ADULT  Goal: Absence or prevention of progression during hospitalization  Description: INTERVENTIONS:  - Assess and monitor for signs and symptoms of infection  - Monitor lab/diagnostic results  - Monitor all insertion sites, i.e. indwelling lines, tubes, and drains  - Monitor endotracheal if appropriate and nasal secretions for changes in amount and color  - [place] appropriate cooling/warming therapies per order  - Administer medications as ordered  - Instruct and encourage  patient and family to use good hand hygiene technique  - Identify and instruct in appropriate isolation precautions for identified infection/condition  Outcome: Progressing     Problem: SAFETY ADULT  Goal: Patient will remain free of falls  Description: INTERVENTIONS:  - Educate patient/family on patient safety including physical limitations  - Instruct patient to call for assistance with activity   - Consult OT/PT to assist with strengthening/mobility   - Keep Call bell within reach  - Keep bed low and locked with side rails adjusted as appropriate  - Keep care items and personal belongings within reach  - Initiate and maintain comfort rounds  - Make Fall Risk Sign visible to staff  - Offer Toileting every 2 Hours, in advance of need  - Initiate/Maintain bed/chair alarm  - Obtain necessary fall risk management equipment:   - Apply yellow socks and bracelet for high fall risk patients  - Consider moving patient to room near nurses station  Outcome: Progressing  Goal: Maintain or return to baseline ADL function  Description: INTERVENTIONS:  -  Assess patient's ability to carry out ADLs; assess patient's baseline for ADL function and identify physical deficits which impact ability to perform ADLs (bathing, care of mouth/teeth, toileting, grooming, dressing, etc.)  - Assess/evaluate cause of self-care deficits   - Assess range of motion  - Assess patient's mobility; develop plan if impaired  - Assess patient's need for assistive devices and provide as appropriate  - Encourage maximum independence but intervene and supervise when necessary  - Involve family in performance of ADLs  - Assess for home care needs following discharge   - Consider OT consult to assist with ADL evaluation and planning for discharge  - Provide patient education as appropriate  Outcome: Progressing  Goal: Maintains/Returns to pre admission functional level  Description: INTERVENTIONS:  - Perform AM-PAC 6 Click Basic Mobility/ Daily Activity  assessment daily.  - Set and communicate daily mobility goal to care team and patient/family/caregiver.   - Collaborate with rehabilitation services on mobility goals if consulted  - Perform Range of Motion  times a day.  - Reposition patient .  - Dangle patient 4 times a day  - Stand patient 4 times a day  - Ambulate patient 4 times a day  - Out of bed to chair 3 times a day   - Out of bed for meals 3 times a day  - Out of bed for toileting  - Record patient progress and toleration of activity level   Outcome: Progressing     Problem: Knowledge Deficit  Goal: Patient/family/caregiver demonstrates understanding of disease process, treatment plan, medications, and discharge instructions  Description: Complete learning assessment and assess knowledge base.  Interventions:  - Provide teaching at level of understanding  - Provide teaching via preferred learning methods  Outcome: Progressing

## 2024-11-12 NOTE — ASSESSMENT & PLAN NOTE
Wt Readings from Last 3 Encounters:   11/12/24 55.2 kg (121 lb 12.8 oz)   09/03/24 55.3 kg (122 lb)   02/05/24 56 kg (123 lb 6.4 oz)     Etiology: nonischemic, dilated cardiomyopathy   Echo 11/11: LVEF 20-24%, LV severely dilated, diastolic dysfunction  Home regimen: coreg 3.125 BID  Not in cardiogenic shock currently - warm extremities, well perfused, lactic cleared from 11/11  Cardiology following  Recommends continue hydralazine 25 q8hrs, continue coreg, add imdur 60 daily

## 2024-11-13 ENCOUNTER — APPOINTMENT (INPATIENT)
Dept: DIALYSIS | Facility: HOSPITAL | Age: 69
DRG: 673 | End: 2024-11-13
Payer: COMMERCIAL

## 2024-11-13 ENCOUNTER — APPOINTMENT (INPATIENT)
Dept: RADIOLOGY | Facility: HOSPITAL | Age: 69
DRG: 673 | End: 2024-11-13
Payer: COMMERCIAL

## 2024-11-13 PROBLEM — R34 OLIGURIA: Status: ACTIVE | Noted: 2024-11-13

## 2024-11-13 PROBLEM — N18.6 ENCOUNTER FOR HEMODIALYSIS FOR ESRD (HCC): Status: ACTIVE | Noted: 2024-11-13

## 2024-11-13 PROBLEM — Z99.2 ENCOUNTER FOR HEMODIALYSIS FOR ESRD (HCC): Status: ACTIVE | Noted: 2024-11-13

## 2024-11-13 PROBLEM — E87.5 HYPERKALEMIA: Status: ACTIVE | Noted: 2024-11-13

## 2024-11-13 LAB
ALBUMIN SERPL BCG-MCNC: 3.6 G/DL (ref 3.5–5)
ALP SERPL-CCNC: 96 U/L (ref 34–104)
ALT SERPL W P-5'-P-CCNC: 1000 U/L (ref 7–52)
ANION GAP SERPL CALCULATED.3IONS-SCNC: 13 MMOL/L (ref 4–13)
ANION GAP SERPL CALCULATED.3IONS-SCNC: 16 MMOL/L (ref 4–13)
AST SERPL W P-5'-P-CCNC: 522 U/L (ref 13–39)
BILIRUB SERPL-MCNC: 1.83 MG/DL (ref 0.2–1)
BUN SERPL-MCNC: 112 MG/DL (ref 5–25)
BUN SERPL-MCNC: 53 MG/DL (ref 5–25)
CA-I BLD-SCNC: 0.98 MMOL/L (ref 1.12–1.32)
CA-I BLD-SCNC: 1.06 MMOL/L (ref 1.12–1.32)
CALCIUM SERPL-MCNC: 8.3 MG/DL (ref 8.4–10.2)
CALCIUM SERPL-MCNC: 8.9 MG/DL (ref 8.4–10.2)
CHLORIDE SERPL-SCNC: 94 MMOL/L (ref 96–108)
CHLORIDE SERPL-SCNC: 95 MMOL/L (ref 96–108)
CO2 SERPL-SCNC: 17 MMOL/L (ref 21–32)
CO2 SERPL-SCNC: 24 MMOL/L (ref 21–32)
CREAT SERPL-MCNC: 4.22 MG/DL (ref 0.6–1.3)
CREAT SERPL-MCNC: 7.57 MG/DL (ref 0.6–1.3)
CREAT UR-MCNC: 61.6 MG/DL
ERYTHROCYTE [DISTWIDTH] IN BLOOD BY AUTOMATED COUNT: 15.1 % (ref 11.6–15.1)
GFR SERPL CREATININE-BSD FRML MDRD: 13 ML/MIN/1.73SQ M
GFR SERPL CREATININE-BSD FRML MDRD: 6 ML/MIN/1.73SQ M
GLUCOSE SERPL-MCNC: 118 MG/DL (ref 65–140)
GLUCOSE SERPL-MCNC: 119 MG/DL (ref 65–140)
HCT VFR BLD AUTO: 33.9 % (ref 36.5–49.3)
HGB BLD-MCNC: 11.1 G/DL (ref 12–17)
MAGNESIUM SERPL-MCNC: 2 MG/DL (ref 1.9–2.7)
MAGNESIUM SERPL-MCNC: 2.3 MG/DL (ref 1.9–2.7)
MCH RBC QN AUTO: 26.8 PG (ref 26.8–34.3)
MCHC RBC AUTO-ENTMCNC: 32.7 G/DL (ref 31.4–37.4)
MCV RBC AUTO: 82 FL (ref 82–98)
PHOSPHATE SERPL-MCNC: 3.9 MG/DL (ref 2.3–4.1)
PHOSPHATE SERPL-MCNC: 6.3 MG/DL (ref 2.3–4.1)
PLATELET # BLD AUTO: 102 THOUSANDS/UL (ref 149–390)
PMV BLD AUTO: 11.9 FL (ref 8.9–12.7)
POTASSIUM SERPL-SCNC: 3.5 MMOL/L (ref 3.5–5.3)
POTASSIUM SERPL-SCNC: 5 MMOL/L (ref 3.5–5.3)
PROT SERPL-MCNC: 6.7 G/DL (ref 6.4–8.4)
PROT UR-MCNC: 39.8 MG/DL
PROT/CREAT UR: 0.6 MG/G{CREAT} (ref 0–0.1)
RBC # BLD AUTO: 4.14 MILLION/UL (ref 3.88–5.62)
SODIUM SERPL-SCNC: 128 MMOL/L (ref 135–147)
SODIUM SERPL-SCNC: 131 MMOL/L (ref 135–147)
WBC # BLD AUTO: 10.21 THOUSAND/UL (ref 4.31–10.16)

## 2024-11-13 PROCEDURE — 71045 X-RAY EXAM CHEST 1 VIEW: CPT

## 2024-11-13 PROCEDURE — 85027 COMPLETE CBC AUTOMATED: CPT

## 2024-11-13 PROCEDURE — NC001 PR NO CHARGE

## 2024-11-13 PROCEDURE — 87081 CULTURE SCREEN ONLY: CPT | Performed by: ANESTHESIOLOGY

## 2024-11-13 PROCEDURE — 82570 ASSAY OF URINE CREATININE: CPT

## 2024-11-13 PROCEDURE — 76937 US GUIDE VASCULAR ACCESS: CPT

## 2024-11-13 PROCEDURE — 84100 ASSAY OF PHOSPHORUS: CPT

## 2024-11-13 PROCEDURE — 02HV33Z INSERTION OF INFUSION DEVICE INTO SUPERIOR VENA CAVA, PERCUTANEOUS APPROACH: ICD-10-PCS | Performed by: ANESTHESIOLOGY

## 2024-11-13 PROCEDURE — 90935 HEMODIALYSIS ONE EVALUATION: CPT | Performed by: INTERNAL MEDICINE

## 2024-11-13 PROCEDURE — 80053 COMPREHEN METABOLIC PANEL: CPT

## 2024-11-13 PROCEDURE — 99233 SBSQ HOSP IP/OBS HIGH 50: CPT | Performed by: ANESTHESIOLOGY

## 2024-11-13 PROCEDURE — 83735 ASSAY OF MAGNESIUM: CPT

## 2024-11-13 PROCEDURE — 82330 ASSAY OF CALCIUM: CPT

## 2024-11-13 PROCEDURE — 80048 BASIC METABOLIC PNL TOTAL CA: CPT

## 2024-11-13 PROCEDURE — 36556 INSERT NON-TUNNEL CV CATH: CPT

## 2024-11-13 PROCEDURE — 5A1D70Z PERFORMANCE OF URINARY FILTRATION, INTERMITTENT, LESS THAN 6 HOURS PER DAY: ICD-10-PCS | Performed by: INTERNAL MEDICINE

## 2024-11-13 PROCEDURE — 84156 ASSAY OF PROTEIN URINE: CPT

## 2024-11-13 PROCEDURE — NC001 PR NO CHARGE: Performed by: INTERNAL MEDICINE

## 2024-11-13 RX ORDER — CALCIUM GLUCONATE 20 MG/ML
2 INJECTION, SOLUTION INTRAVENOUS ONCE
Status: COMPLETED | OUTPATIENT
Start: 2024-11-13 | End: 2024-11-13

## 2024-11-13 RX ORDER — TRANEXAMIC ACID 100 MG/ML
500 INJECTION, SOLUTION INTRAVENOUS ONCE
Status: COMPLETED | OUTPATIENT
Start: 2024-11-13 | End: 2024-11-13

## 2024-11-13 RX ADMIN — HYDRALAZINE HYDROCHLORIDE 25 MG: 25 TABLET ORAL at 05:09

## 2024-11-13 RX ADMIN — TRANEXAMIC ACID 500 MG: 1 INJECTION, SOLUTION INTRAVENOUS at 16:02

## 2024-11-13 RX ADMIN — ASPIRIN 81 MG: 81 TABLET, COATED ORAL at 08:07

## 2024-11-13 RX ADMIN — CARVEDILOL 3.12 MG: 3.12 TABLET, FILM COATED ORAL at 11:52

## 2024-11-13 RX ADMIN — HYDRALAZINE HYDROCHLORIDE 25 MG: 25 TABLET ORAL at 21:05

## 2024-11-13 RX ADMIN — CALCIUM GLUCONATE 2 G: 20 INJECTION, SOLUTION INTRAVENOUS at 09:51

## 2024-11-13 RX ADMIN — Medication 1 MG/HR: at 06:13

## 2024-11-13 RX ADMIN — CARVEDILOL 3.12 MG: 3.12 TABLET, FILM COATED ORAL at 18:20

## 2024-11-13 RX ADMIN — HYDRALAZINE HYDROCHLORIDE 25 MG: 25 TABLET ORAL at 14:58

## 2024-11-13 RX ADMIN — CALCIUM GLUCONATE 2 G: 20 INJECTION, SOLUTION INTRAVENOUS at 08:17

## 2024-11-13 RX ADMIN — CALCIUM GLUCONATE 2 G: 20 INJECTION, SOLUTION INTRAVENOUS at 15:05

## 2024-11-13 RX ADMIN — ISOSORBIDE MONONITRATE 60 MG: 30 TABLET, EXTENDED RELEASE ORAL at 08:07

## 2024-11-13 NOTE — PROGRESS NOTES
Progress Note - Critical Care/ICU   Name: Matthew Alvarez 69 y.o. male I MRN: [id number]  Unit/Bed#: -01 I Date of Admission: 11/10/2024   Date of Service: 11/13/2024 I Hospital Day: 3      Assessment & Plan  Acute on chronic systolic heart failure (HCC)  Wt Readings from Last 3 Encounters:   11/13/24 55.9 kg (123 lb 3.8 oz)   09/03/24 55.3 kg (122 lb)   02/05/24 56 kg (123 lb 6.4 oz)     Etiology: nonischemic, dilated cardiomyopathy   Echo 11/11: LVEF 20-24%, LV severely dilated, diastolic dysfunction  Home regimen: coreg 3.125 BID  Not in cardiogenic shock currently - warm extremities, well perfused, lactic cleared from 11/11  Cardiology following  Recommends continue hydralazine 25 q8hrs, continue coreg, add imdur 60 daily     Dilated cardiomyopathy (HCC)  See plan under acute on chronic systolic heart failure   Acute kidney injury superimposed on stage 3a chronic kidney disease (HCC)  Lab Results   Component Value Date    EGFR 7 11/12/2024    EGFR 8 11/12/2024    EGFR 12 11/11/2024    CREATININE 6.62 (H) 11/12/2024    CREATININE 6.14 (H) 11/12/2024    CREATININE 4.39 (H) 11/11/2024     Suspected secondary to cardiorenal syndrome  Baseline Cr 1.3-1.5   Home regimen lasix 40mg daily   Diuresed with total of lasix 120 mg since admission   Has become essentially anuric overnight   Kidney US negative   Cr now 6.14   Nephrology following, recommends iHD 11/13  Will place temp hd line  Bumex 4mg and metolazone 10mg bolus followed by bumex gtt at 1mL/hr  Uncontrolled hypertension  Home regimen: coreg 3.125 mg BID  Cardiology following, added hydralazine and imdur  Elevated troponin  Non ischemic in origin, likely demand related vs. Reduced clearance in the setting of renal failure   EKG nonischemic   Denies chest pain     Tobacco abuse  Continue NRT  Elevated liver transaminase level  Suspected secondary to congestive hepatopathy/ischemic hepatitis  and low CO  Hx of heavy EtOH use   Transaminases continuing to  "rise into 1000s  Reports abdominal pain   RUQ US negative  CT C/A/P on admission: Mild degree of ascites within the gallbladder.  Versus pericholecystic fluid.   CT A/P 11/12: Similar cardiomegaly with anasarca suggestive of fluid overload.   Hepatitis panel, tylenol level negative   SIRS (systemic inflammatory response syndrome) (HCC)  Suspected in the setting of CHF exacerbation   On admission met criteria with tachycardia and leukocytosis   No hypotension, lactic elevated in the setting of poor perfusion/limited forward flow    Elevated d-dimer  D dimer elevated on admission 12.54  Pt without hemoptysis, pleuritic chest pain  Currently on RA  LE venous duplex negative for DVT   TTE not revealing of any large PE or abnormal RV:LV ratio   Continue heparin subcutaneous DVT ppx   Hyponatremia  In the setting of volume overload and CHF exacerbation  Na 129   Continue to trend BMP  Fluid restriction 1800mL  Disposition: Stepdown Level 1    ICU Core Measures     A: Assess, Prevent, and Manage Pain Has pain been assessed? Yes  Need for changes to pain regimen? No   B: Both SAT/SAT  N/A   C: Choice of Sedation RASS Goal: N/A patient not on sedation  Need for changes to sedation or analgesia regimen? NA   D: Delirium CAM-ICU: Negative   E: Early Mobility  Plan for early mobility? Yes   F: Family Engagement Plan for family engagement today? Yes         Prophylaxis:  VTE VTE covered by:  heparin (porcine), Subcutaneous, 5,000 Units at 11/12/24 1524       Stress Ulcer  not ordered         24 Hour Events : Patient has very little response to therapy to increase u/o.  He refused SQ heparin, bladder scan and the placement of the HD line last night.  He stated, \"I'm trying to sleep and people keep waking me up. I'll get it placed tomorrow morning.\"  HD line is to be placed this AM.    Objective :                   Vitals I/O      Most Recent Min/Max in 24hrs   Temp 98.4 °F (36.9 °C) Temp  Min: 97.3 °F (36.3 °C)  Max: 98.5 °F " (36.9 °C)   Pulse 73 Pulse  Min: 70  Max: 77   Resp 17 Resp  Min: 16  Max: 44   /90 BP  Min: 144/80  Max: 163/86   O2 Sat 94 % SpO2  Min: 93 %  Max: 96 %      Intake/Output Summary (Last 24 hours) at 11/13/2024 0630  Last data filed at 11/13/2024 0205  Gross per 24 hour   Intake 532.53 ml   Output 239 ml   Net 293.53 ml       Diet Cardiovascular; Cardiac; Fluid Restriction 1800 ML    Invasive Monitoring           Physical Exam   Physical Exam  Eyes:      General:         Right eye: No discharge.         Left eye: No discharge.      Extraocular Movements: Extraocular movements intact.      Pupils: Pupils are equal, round, and reactive to light.   Skin:     General: Skin is warm and dry.      Coloration: Skin is not jaundiced or pale.      Findings: No rash.   HENT:      Head: Normocephalic and atraumatic.      Right Ear: Hearing normal.      Left Ear: Hearing normal.      Nose: No congestion.      Mouth/Throat:      Mouth: Mucous membranes are moist.   Neck:      Vascular: No JVD.   no midline tenderness Cardiovascular:      Rate and Rhythm: Normal rate and regular rhythm.   Musculoskeletal:         General: No swelling, tenderness, deformity or signs of injury. Normal range of motion.   Abdominal: General: Bowel sounds are normal.      Palpations: Abdomen is soft.   Constitutional:       General: He is not in acute distress.     Appearance: He is well-developed and well-nourished. He is not ill-appearing.   Pulmonary:      Effort: Pulmonary effort is normal.      Breath sounds: Normal breath sounds.   Neurological:      General: No focal deficit present.      Mental Status: He is alert and oriented to person, place and time.      Motor: Strength full and intact in all extremities.          Diagnostic Studies        Lab Results: I have reviewed the following results:     Medications:  Scheduled PRN   aspirin, 81 mg, Daily  calcium gluconate, 2 g, Once   Followed by  calcium gluconate, 2 g, Once  carvedilol,  3.125 mg, BID With Meals  heparin (porcine), 5,000 Units, Q8H JEEVAN  hydrALAZINE, 25 mg, Q8H JEEVAN  isosorbide mononitrate, 60 mg, Daily  metolazone, 10 mg, Daily  nicotine, 1 patch, Daily      hydrALAZINE, 10 mg, Q6H PRN       Continuous    bumetanide (BUMEX) 12.5 mg infusion 50 mL, 1 mg/hr, Last Rate: 1 mg/hr (11/13/24 0613)         Labs:   CBC    Recent Labs     11/12/24  0508 11/13/24  0514   WBC 10.93* 10.21*   HGB 11.0* 11.1*   HCT 32.9* 33.9*   * 102*     BMP    Recent Labs     11/12/24  0508 11/12/24  1051   SODIUM 129* 129*   K 4.8 4.8   CL 96 95*   CO2 19* 19*   AGAP 14* 15*   BUN 86* 97*   CREATININE 6.14* 6.62*   CALCIUM 8.4 8.5       Coags    No recent results     Additional Electrolytes  Recent Labs     11/13/24  0514   CAIONIZED 0.98*          Blood Gas    No recent results  No recent results LFTs  Recent Labs     11/12/24  0508 11/12/24  1051   ALT 1,255* 1,260*   AST 1,122* 939*   ALKPHOS 94 101   ALB 3.4* 3.8   TBILI 2.01* 2.13*       Infectious  No recent results  Glucose  Recent Labs     11/12/24  0508 11/12/24  1051   GLUC 114 137

## 2024-11-13 NOTE — PHYSICAL THERAPY NOTE
PHYSICAL THERAPY NOTE          Patient Name: Matthew Alvarez  Today's Date: 11/13/2024 11/13/24 5330   Note Type   Note type Evaluation;Cancelled Session   Cancel Reasons Patient off floor/hemodialysis     Chart reviewed. Patient receiving hemodialysis at this time. Will attempt to see patient as medically appropriate at a later time as schedule allows.     Maral Morris, PT,DPT

## 2024-11-13 NOTE — ASSESSMENT & PLAN NOTE
On GDMT with Coreg, hydralazine and imdur    LifeVest on discharge outpatient follow-up with cardiology

## 2024-11-13 NOTE — PLAN OF CARE
Target UF Goal  1  L as tolerated. Patient dialyzing for 3 hours on 2 K bath for serum K of 5 per protocol. Treatment plan reviewed with Nephrology.   Post-Dialysis RN Treatment Note    Blood Pressure:  Pre 185/95 mm/Hg  Post 187/62 mmHg   EDW  TBD kg    Weight:  Pre 55.9 kg   Post 54.7 kg   Mode of weight measurement: Bed Scale   Volume Removed  1501 ml    Treatment duration 181 minutes    NS given  N/A    Treatment shortened? No   Medications given during Rx None Reported   Estimated Kt/V  0.83   Access type: Temporary HD catheter   Access Issues: Yes, describe: bleeding around insertion site before and during treatment providers made aware dressing changed during treatment and current dressing reinforced      Report called to primary nurse   Yes Suzanne Strange RN    Problem: METABOLIC, FLUID AND ELECTROLYTES - ADULT  Goal: Electrolytes maintained within normal limits  Description: INTERVENTIONS:  - Monitor labs and assess patient for signs and symptoms of electrolyte imbalances  - Administer electrolyte replacement as ordered  - Monitor response to electrolyte replacements, including repeat lab results as appropriate  - Instruct patient on fluid and nutrition as appropriate  Outcome: Progressing  Goal: Fluid balance maintained  Description: INTERVENTIONS:  - Monitor labs   - Monitor I/O and WT  - Instruct patient on fluid and nutrition as appropriate  - Assess for signs & symptoms of volume excess or deficit  Outcome: Progressing

## 2024-11-13 NOTE — PROGRESS NOTES
Progress Note - Cardiology   Name: Matthew Alvarez 69 y.o. male I MRN: [id number]  Unit/Bed#: -01 I Date of Admission: 11/10/2024   Date of Service: 11/13/2024 I Hospital Day: 3     Assessment & Plan  Acute on chronic systolic heart failure (HCC)  Euvolemic on exam well-perfused with warm extremities  Uncontrolled hypertension  Continue Coreg, hydralazine and imdur  Dilated cardiomyopathy (HCC)  On GDMT with Coreg, hydralazine and imdur    LifeVest on discharge outpatient follow-up with cardiology   Acute kidney injury superimposed on stage 3a chronic kidney disease (HCC)  Lab Results   Component Value Date    EGFR 6 11/13/2024    EGFR 7 11/12/2024    EGFR 8 11/12/2024    CREATININE 7.57 (H) 11/13/2024    CREATININE 6.62 (H) 11/12/2024    CREATININE 6.14 (H) 11/12/2024   Started on dialysis today  Elevated troponin  No cp  No EKG changes   Tobacco abuse    Hyponatremia    Hyperkalemia    Oliguria    Encounter for hemodialysis for ESRD ([facility])  Lab Results   Component Value Date    EGFR 6 11/13/2024    EGFR 7 11/12/2024    EGFR 8 11/12/2024    CREATININE 7.57 (H) 11/13/2024    CREATININE 6.62 (H) 11/12/2024    CREATININE 6.14 (H) 11/12/2024           Objective :  Temp:  [97.3 °F (36.3 °C)-98.5 °F (36.9 °C)] 97.5 °F (36.4 °C)  HR:  [70-82] 81  BP: (143-185)/(80-99) 184/99  Resp:  [16-44] 22  SpO2:  [89 %-96 %] 91 %  O2 Device: None (Room air)  Orthostatic Blood Pressures      Flowsheet Row Most Recent Value   Blood Pressure 184/99 filed at 11/13/2024 1052   Patient Position - Orthostatic VS Lying filed at 11/13/2024 0841          First Weight: Weight - Scale: 57.9 kg (127 lb 10.3 oz) (11/10/24 1847)  Vitals:    11/12/24 0546 11/13/24 0600   Weight: 55.2 kg (121 lb 12.8 oz) 55.9 kg (123 lb 3.8 oz)     Physical Exam  HEENT atraumatic normocephalic  Neck: Catheter in place  Chest clear to auscultation bilaterally  Heart regular S1-S2 no murmur  Extremities warm/dry    Lab Results: I have reviewed the following  results:  Results from last 7 days   Lab Units 11/13/24  0514 11/12/24  0508 11/10/24  1853   WBC Thousand/uL 10.21* 10.93* 14.64*   HEMOGLOBIN g/dL 11.1* 11.0* 13.5   HEMATOCRIT % 33.9* 32.9* 42.3   PLATELETS Thousands/uL 102* 106* 188     Results from last 7 days   Lab Units 11/13/24  0514 11/12/24  1051 11/12/24  0508   POTASSIUM mmol/L 5.0 4.8 4.8   CHLORIDE mmol/L 95* 95* 96   CO2 mmol/L 17* 19* 19*   BUN mg/dL 112* 97* 86*   CREATININE mg/dL 7.57* 6.62* 6.14*   CALCIUM mg/dL 8.3* 8.5 8.4     Results from last 7 days   Lab Units 11/10/24  1853   INR  1.83*   PTT seconds 32     Lab Results   Component Value Date    HGBA1C 5.7 (H) 09/15/2021     Lab Results   Component Value Date    CKTOTAL 64 09/11/2021    TROPONINI 0.03 09/12/2021

## 2024-11-13 NOTE — ASSESSMENT & PLAN NOTE
Lab Results   Component Value Date    EGFR 7 11/12/2024    EGFR 8 11/12/2024    EGFR 12 11/11/2024    CREATININE 6.62 (H) 11/12/2024    CREATININE 6.14 (H) 11/12/2024    CREATININE 4.39 (H) 11/11/2024     Suspected secondary to cardiorenal syndrome  Baseline Cr 1.3-1.5   Home regimen lasix 40mg daily   Diuresed with total of lasix 120 mg since admission   Has become essentially anuric overnight   Kidney US negative   Cr now 6.14   Nephrology following, recommends iHD 11/13  Will place temp hd line  Bumex 4mg and metolazone 10mg bolus followed by bumex gtt at 1mL/hr

## 2024-11-13 NOTE — ASSESSMENT & PLAN NOTE
Lab Results   Component Value Date    EGFR 6 11/13/2024    EGFR 7 11/12/2024    EGFR 8 11/12/2024    CREATININE 7.57 (H) 11/13/2024    CREATININE 6.62 (H) 11/12/2024    CREATININE 6.14 (H) 11/12/2024   Started on dialysis today

## 2024-11-13 NOTE — CASE MANAGEMENT
Case Management Discharge Planning Note    Patient name Matthew Alvarez  Location /-01 MRN [id number]  : 1955 Date 2024       Current Admission Date: 11/10/2024  Current Admission Diagnosis:Acute on chronic systolic heart failure (HCC)   Patient Active Problem List    Diagnosis Date Noted Date Diagnosed    Hyperkalemia 2024     Oliguria 2024     Encounter for hemodialysis for ESRD (HCC) 2024     Hyponatremia 2024     Dilated cardiomyopathy (HCC) 2024     Elevated liver transaminase level 11/10/2024     SIRS (systemic inflammatory response syndrome) (HCC) 11/10/2024     Elevated d-dimer 11/10/2024     Acute on chronic systolic heart failure (HCC) 2024     Coronary artery disease involving native coronary artery of native heart without angina pectoris 10/08/2021     Rheumatoid factor positive 2021     Protein calorie malnutrition (HCC) 2021     Acute kidney injury superimposed on stage 3a chronic kidney disease (HCC) 2021     Elevated troponin 2021     Tobacco abuse 2021     Nonischemic cardiomyopathy (HCC) 2021     Uncontrolled hypertension 2021       LOS (days): 3  Geometric Mean LOS (GMLOS) (days): 4.4  Days to GMLOS:1.6     OBJECTIVE:  Risk of Unplanned Readmission Score: 16.2         Current admission status: Inpatient   Preferred Pharmacy:   [facility]/pharmacy #[id number] - [place], [place] - [address] [facility] [address]  [address]  [facility] [address]  Phone: [phone] Fax: [phone]    [facility] - [place], [place] - [address]  [address]  [place] [address]  Phone: [phone] Fax: [phone]    Primary Care Provider: Olive Rodriguez MD    Primary Insurance: [facility]  Secondary Insurance:     DISCHARGE DETAILS:     Discussed in rounds, patient will most likely need a chair time for OP HD.  AIDIN referrals placed to [facility] which is closer to his home  and [facility] in [place] to see what availability they have.  CM will continue to follow.

## 2024-11-13 NOTE — PROGRESS NOTES
NEPHROLOGY PROGRESS NOTE    Matthew Alvarez 69 y.o. male MRN: [id number]  Unit/Bed#: -01 Encounter: [id number]  Reason for Consult: Renal failure    The patient was observed in ICU he really has had no decompensation overnight no specific change in his overall cardiac treatment.  He was having some oozing and bleeding from the temporary dialysis catheter site in his neck.  Today he says he feels very achy in his joints and muscles.    ASSESSMENT/PLAN:    1.  Renal    The patient presents with unexplained acute renal failure on chronic kidney disease that the patient has been oliguric and has had significant increase in his creatinine very rapidly.  There was no obvious clinical inciting event and his creatinine on admission was 3.5 and has increased to 7.5 while in the hospital over the last couple days.  He has hyponatremia due to his renal failure and inability to excrete water as he was oliguric.  Mild hyperkalemia potassium is 5 and metabolic acidosis due to his renal failure.    His hemoglobin is 11.1 no Epogen for now.    He also was noted to have significant transaminitis and the LFTs are starting to plateau and this is unexplained as well.    He does not appear to be in cardiogenic shock.    He had no hydronephrosis on imaging.      Serologic workup was sent to assess for vasculitis.  So far C3 and C4 are low JORGE LUIS is negative.  SPEP UPEP is pending and ANCA is pending.  Anti-GBM is pending.    2.  Transaminitis    LFTs starting to plateau unexplained elevation.  Primary service aware.        SUBJECTIVE:  Review of Systems   Constitutional: Positive for malaise/fatigue. Negative for chills, diaphoresis and fever.   HENT:          Bleeding from the temporary dialysis catheter site.   Eyes: Negative.    Cardiovascular:  Negative for chest pain, dyspnea on exertion and orthopnea.   Respiratory:  Positive for cough. Negative for shortness of breath.    Endocrine: Positive for cold intolerance.  "  Musculoskeletal:  Positive for arthritis, joint pain and myalgias.   Gastrointestinal:  Negative for abdominal pain, diarrhea, nausea and vomiting.   Genitourinary:         Not urinating much.   Neurological:  Negative for dizziness and headaches.   Psychiatric/Behavioral:  Negative for altered mental status.        OBJECTIVE:  Current Weight: Weight - Scale: 55.9 kg (123 lb 3.8 oz)  Vitals:Temp (24hrs), Av.9 °F (36.6 °C), Min:97.3 °F (36.3 °C), Max:98.5 °F (36.9 °C)  Current: Temperature: 97.5 °F (36.4 °C)   Blood pressure (!) 172/95, pulse 80, temperature 97.5 °F (36.4 °C), temperature source Oral, resp. rate (!) 23, height 5' 6\" (1.676 m), weight 55.9 kg (123 lb 3.8 oz), SpO2 90%. , Body mass index is 19.89 kg/m².      Intake/Output Summary (Last 24 hours) at 2024 1018  Last data filed at 2024 0855  Gross per 24 hour   Intake 732.53 ml   Output 239 ml   Net 493.53 ml       Physical Exam: BP (!) 172/95   Pulse 80   Temp 97.5 °F (36.4 °C) (Oral)   Resp (!) 23   Ht 5' 6\" (1.676 m)   Wt 55.9 kg (123 lb 3.8 oz)   SpO2 90%   BMI 19.89 kg/m²   Physical Exam  Constitutional:       General: He is not in acute distress.     Appearance: He is not toxic-appearing.   HENT:      Head: Normocephalic and atraumatic.      Mouth/Throat:      Mouth: Mucous membranes are dry.   Eyes:      General: No scleral icterus.     Extraocular Movements: Extraocular movements intact.   Cardiovascular:      Rate and Rhythm: Normal rate and regular rhythm.      Heart sounds:      No friction rub. No gallop.   Pulmonary:      Effort: Pulmonary effort is normal. No respiratory distress.      Breath sounds: Rhonchi present. No wheezing or rales.   Abdominal:      General: Bowel sounds are normal. There is no distension.      Palpations: Abdomen is soft.      Tenderness: There is no abdominal tenderness. There is no rebound.   Neurological:      Mental Status: He is alert and oriented to person, place, and time. " "  Psychiatric:         Mood and Affect: Mood normal.         Behavior: Behavior normal.         Medications:    Current Facility-Administered Medications:     aspirin (ECOTRIN LOW STRENGTH) EC tablet 81 mg, 81 mg, Oral, Daily, Jared Bateman PA-C, 81 mg at 11/13/24 0807    [COMPLETED] calcium gluconate 2 g in sodium chloride 0.9% 100 mL (premix), 2 g, Intravenous, Once, Stopped at 11/13/24 0917 **FOLLOWED BY** calcium gluconate 2 g in sodium chloride 0.9% 100 mL (premix), 2 g, Intravenous, Once, Carl Puri Jr., PA-C, Last Rate: 100 mL/hr at 11/13/24 0951, 2 g at 11/13/24 0951    carvedilol (COREG) tablet 3.125 mg, 3.125 mg, Oral, BID With Meals, Tao Rios MD, 3.125 mg at 11/12/24 1757    heparin (porcine) subcutaneous injection 5,000 Units, 5,000 Units, Subcutaneous, Q8H JEEVAN, Jared Bateman PA-C, 5,000 Units at 11/12/24 1524    hydrALAZINE (APRESOLINE) injection 10 mg, 10 mg, Intravenous, Q6H PRN, Jared Bateman PA-C, 10 mg at 11/10/24 2320    hydrALAZINE (APRESOLINE) tablet 25 mg, 25 mg, Oral, Q8H JEEVAN, Jared Bateman PA-C, 25 mg at 11/13/24 0509    isosorbide mononitrate (IMDUR) 24 hr tablet 60 mg, 60 mg, Oral, Daily, Jared Bateman PA-C, 60 mg at 11/13/24 0807    nicotine (NICODERM CQ) 14 mg/24hr TD 24 hr patch 1 patch, 1 patch, Transdermal, Daily, Jared Bateman PA-C, 1 patch at 11/11/24 0803    Laboratory Results:  Lab Results   Component Value Date    WBC 10.21 (H) 11/13/2024    HGB 11.1 (L) 11/13/2024    HCT 33.9 (L) 11/13/2024    MCV 82 11/13/2024     (L) 11/13/2024     Lab Results   Component Value Date    SODIUM 128 (L) 11/13/2024    K 5.0 11/13/2024    CL 95 (L) 11/13/2024    CO2 17 (L) 11/13/2024     (H) 11/13/2024    CREATININE 7.57 (H) 11/13/2024    GLUC 118 11/13/2024    CALCIUM 8.3 (L) 11/13/2024     Lab Results   Component Value Date    CALCIUM 8.3 (L) 11/13/2024    PHOS 6.3 (H) 11/13/2024     No results found for: \"LABPROT\"    "

## 2024-11-13 NOTE — ASSESSMENT & PLAN NOTE
Lab Results   Component Value Date    EGFR 6 11/13/2024    EGFR 7 11/12/2024    EGFR 8 11/12/2024    CREATININE 7.57 (H) 11/13/2024    CREATININE 6.62 (H) 11/12/2024    CREATININE 6.14 (H) 11/12/2024

## 2024-11-13 NOTE — OCCUPATIONAL THERAPY NOTE
Occupational Therapy         Patient Name: Matthew Alvarez  Today's Date: 11/13/2024 11/13/24 0915   OT Last Visit   OT Visit Date 11/13/24   Note Type   Note type Evaluation;Cancelled Session   Cancel Reasons Patient off floor/hemodialysis   Additional Comments Orders received and chart review completeded. Patient receiving hemodialysis at this time. Will attempt to provide skilled OT intervention as pt is medically appropriate/as schedule allows.       Chasidy Casey OTR/L

## 2024-11-13 NOTE — PLAN OF CARE
Problem: CARDIOVASCULAR - ADULT  Goal: Maintains optimal cardiac output and hemodynamic stability  Description: INTERVENTIONS:  - Monitor I/O, vital signs and rhythm  - Monitor for S/S and trends of decreased cardiac output  - Administer and titrate ordered vasoactive medications to optimize hemodynamic stability  - Assess quality of pulses, skin color and temperature  - Assess for signs of decreased coronary artery perfusion  - Instruct patient to report change in severity of symptoms  Outcome: Progressing  Goal: Absence of cardiac dysrhythmias or at baseline rhythm  Description: INTERVENTIONS:  - Continuous cardiac monitoring, vital signs, obtain 12 lead EKG if ordered  - Administer antiarrhythmic and heart rate control medications as ordered  - Monitor electrolytes and administer replacement therapy as ordered  Outcome: Progressing     Problem: GENITOURINARY - ADULT  Goal: Maintains or returns to baseline urinary function  Description: INTERVENTIONS:  - Assess urinary function  - Encourage oral fluids to ensure adequate hydration if ordered  - Administer IV fluids as ordered to ensure adequate hydration  - Administer ordered medications as needed  - Offer frequent toileting  - Follow urinary retention protocol if ordered  Outcome: Not Progressing  Goal: Absence of urinary retention  Description: INTERVENTIONS:  - Assess patient's ability to void and empty bladder  - Monitor I/O  - Bladder scan as needed  - Discuss with physician/AP medications to alleviate retention as needed  - Discuss catheterization for long term situations as appropriate  Outcome: Progressing     Problem: METABOLIC, FLUID AND ELECTROLYTES - ADULT  Goal: Electrolytes maintained within normal limits  Description: INTERVENTIONS:  - Monitor labs and assess patient for signs and symptoms of electrolyte imbalances  - Administer electrolyte replacement as ordered  - Monitor response to electrolyte replacements, including repeat lab results as  appropriate  - Instruct patient on fluid and nutrition as appropriate  Outcome: Not Progressing  Goal: Fluid balance maintained  Description: INTERVENTIONS:  - Monitor labs   - Monitor I/O and WT  - Instruct patient on fluid and nutrition as appropriate  - Assess for signs & symptoms of volume excess or deficit  Outcome: Progressing  Goal: Glucose maintained within target range  Description: INTERVENTIONS:  - Monitor Blood Glucose as ordered  - Assess for signs and symptoms of hyperglycemia and hypoglycemia  - Administer ordered medications to maintain glucose within target range  - Assess nutritional intake and initiate nutrition service referral as needed  Outcome: Progressing     Problem: PAIN - ADULT  Goal: Verbalizes/displays adequate comfort level or baseline comfort level  Description: Interventions:  - Encourage patient to monitor pain and request assistance  - Assess pain using appropriate pain scale  - Administer analgesics based on type and severity of pain and evaluate response  - Implement non-pharmacological measures as appropriate and evaluate response  - Consider cultural and social influences on pain and pain management  - Notify physician/advanced practitioner if interventions unsuccessful or patient reports new pain  Outcome: Progressing     Problem: INFECTION - ADULT  Goal: Absence or prevention of progression during hospitalization  Description: INTERVENTIONS:  - Assess and monitor for signs and symptoms of infection  - Monitor lab/diagnostic results  - Monitor all insertion sites, i.e. indwelling lines, tubes, and drains  - Monitor endotracheal if appropriate and nasal secretions for changes in amount and color  - [place] appropriate cooling/warming therapies per order  - Administer medications as ordered  - Instruct and encourage patient and family to use good hand hygiene technique  - Identify and instruct in appropriate isolation precautions for identified infection/condition  Outcome:  Progressing     Problem: SAFETY ADULT  Goal: Patient will remain free of falls  Description: INTERVENTIONS:  - Educate patient/family on patient safety including physical limitations  - Instruct patient to call for assistance with activity   - Consult OT/PT to assist with strengthening/mobility   - Keep Call bell within reach  - Keep bed low and locked with side rails adjusted as appropriate  - Keep care items and personal belongings within reach  - Initiate and maintain comfort rounds  - Make Fall Risk Sign visible to staff  - Offer Toileting every 2 Hours, in advance of need  - Initiate/Maintain bed/chair alarm  - Obtain necessary fall risk management equipment:   - Apply yellow socks and bracelet for high fall risk patients  - Consider moving patient to room near nurses station  Outcome: Progressing  Goal: Maintain or return to baseline ADL function  Description: INTERVENTIONS:  -  Assess patient's ability to carry out ADLs; assess patient's baseline for ADL function and identify physical deficits which impact ability to perform ADLs (bathing, care of mouth/teeth, toileting, grooming, dressing, etc.)  - Assess/evaluate cause of self-care deficits   - Assess range of motion  - Assess patient's mobility; develop plan if impaired  - Assess patient's need for assistive devices and provide as appropriate  - Encourage maximum independence but intervene and supervise when necessary  - Involve family in performance of ADLs  - Assess for home care needs following discharge   - Consider OT consult to assist with ADL evaluation and planning for discharge  - Provide patient education as appropriate  Outcome: Progressing  Goal: Maintains/Returns to pre admission functional level  Description: INTERVENTIONS:  - Perform AM-PAC 6 Click Basic Mobility/ Daily Activity assessment daily.  - Set and communicate daily mobility goal to care team and patient/family/caregiver.   - Collaborate with rehabilitation services on mobility  goals if consulted  - Perform Range of Motion  times a day.  - Reposition patient .  - Dangle patient 4 times a day  - Stand patient 4 times a day  - Ambulate patient 4 times a day  - Out of bed to chair 3 times a day   - Out of bed for meals 3 times a day  - Out of bed for toileting  - Record patient progress and toleration of activity level   Outcome: Progressing     Problem: Prexisting or High Potential for Compromised Skin Integrity  Goal: Skin integrity is maintained or improved  Description: INTERVENTIONS:  - Identify patients at risk for skin breakdown  - Assess and monitor skin integrity  - Assess and monitor nutrition and hydration status  - Monitor labs   - Assess for incontinence   - Turn and reposition patient  - Assist with mobility/ambulation  - Relieve pressure over bony prominences  - Avoid friction and shearing  - Provide appropriate hygiene as needed including keeping skin clean and dry  - Evaluate need for skin moisturizer/barrier cream  - Collaborate with interdisciplinary team   - Patient/family teaching  - Consider wound care consult   Outcome: Progressing     Problem: Knowledge Deficit  Goal: Patient/family/caregiver demonstrates understanding of disease process, treatment plan, medications, and discharge instructions  Description: Complete learning assessment and assess knowledge base.  Interventions:  - Provide teaching at level of understanding  - Provide teaching via preferred learning methods  Outcome: Progressing

## 2024-11-13 NOTE — PROCEDURES
The patient developed oliguric renal failure mild hyperkalemia hyponatremia weakness so initiating hemodialysis today.  I was present for the initiation.    Labs reviewed.    Lungs positive rhonchi  Cardiovascular regular rhythm no S3 no rub  Abdomen bowel sounds and soft nontender nondistended  Extremities mild edema    Details of today's treatment outlined below.    HEMODIALYSIS PROCEDURE NOTE  The patient was seen and examined on hemodialysis.  Time: 3 hours  Sodium: 138 Blood flow: 250   Dialyzer: F160 Potassium: 2 Dialysate flow: 600   Access: catheter Bicarbonate: 35 Ultrafiltration goal: 1 L as tolerated          Monitor hemodynamically  Case management to arrange outpatient dialysis prior to discharge    The patient was having bleeding and oozing from the temporary dialysis catheter site.  We were holding pressure and I alerted the nurse as well as the primary service to evaluate for ongoing bleeding.    Next dialysis for now will be Friday.

## 2024-11-13 NOTE — PROGRESS NOTES
Progress Note - Critical Care/ICU   Name: Matthew Alvarez 69 y.o. male I MRN: [id number]  Unit/Bed#: -01 I Date of Admission: 11/10/2024   Date of Service: 11/13/2024 I Hospital Day: 3      Critical Care Interval Transfer Note:    Brief Hospital Summary: Hospital Course: 69M Hx of nonischemic dilated cardiomyopathy (EF 20-24%) who PRESENTED with SOB and orthopnea and was ADMITTED 11/10 to [facility] for acute on chronic HFrEF exacerbation complicated by acute on chronic renal failure in the setting of cardiorenal syndrome. 11/12 transferred to critical care service for multi-organ failure requiring dialysis in the setting of decompensated HF.     On admission diuresed with lasix with no improvement in renal function, kidney US negative. Diuresed again on 11/12  with bumex and metolazone bolus and bumex gtt without any effect on UOP. Cardiology following for HFrEF with HTN. Echo on 11/11 showed LVEF 20-24%. Continued home coreg and added hydralazine and imdur. LFTs elevated on admission suspected due to congestion secondary to HF exacerbation poor flow. CT C/A/P on 11/10 showed mild degree of ascites within the gallbladder fossa vs. pericholecystic fluid. RUQ US negative. Pt reporting abdominal pain on 11/12, repeat CT A/P negative. Received one session intermittent hemodialysis on 11/13 with 1L volume removal. Transaminases now down trending. Overall, pt is a poor candidate for dialysis and return of renal function has poor prognosis. Will likely continue to require HD moving forward. Cardiology recommends life vest on discharge and outpt. Follow up.             Barriers to discharge:   Dialysis/renal recovery   Cardiology follow-up and optimization of GDMT  PT/OT      Consults: IP CONSULT TO CARDIOLOGY  IP CONSULT TO NEPHROLOGY  INPATIENT CONSULT TO IR    Recommended to review admission imaging for incidental findings and document in discharge navigator: Chart reviewed, no known incidental findings noted at this  time.      Discharge Plan: Anticipate discharge in 48-72 hrs to discharge location to be determined pending rehab evaluations.  Central access plan: Hemodynamic monitoring       Patient seen and evaluated by Critical Care today and deemed to be appropriate for transfer to Med Surg. Spoke to Dr. Carson from [facility] to accept transfer. Critical care can be contacted via SecureChat with any questions or concerns. Please use the Critical Care AP Role in Secure Chat for any provider inquires until the patient is transferred out of the ICU or until tomorrow at 0600.

## 2024-11-13 NOTE — ASSESSMENT & PLAN NOTE
Wt Readings from Last 3 Encounters:   11/13/24 55.9 kg (123 lb 3.8 oz)   09/03/24 55.3 kg (122 lb)   02/05/24 56 kg (123 lb 6.4 oz)     Etiology: nonischemic, dilated cardiomyopathy   Echo 11/11: LVEF 20-24%, LV severely dilated, diastolic dysfunction  Home regimen: coreg 3.125 BID  Not in cardiogenic shock currently - warm extremities, well perfused, lactic cleared from 11/11  Cardiology following  Recommends continue hydralazine 25 q8hrs, continue coreg, add imdur 60 daily

## 2024-11-13 NOTE — ASSESSMENT & PLAN NOTE
Suspected secondary to congestive hepatopathy/ischemic hepatitis  and low CO  Hx of heavy EtOH use   Transaminases continuing to rise into 1000s  Reports abdominal pain   RUQ US negative  CT C/A/P on admission: Mild degree of ascites within the gallbladder.  Versus pericholecystic fluid.   CT A/P 11/12: Similar cardiomegaly with anasarca suggestive of fluid overload.   Hepatitis panel, tylenol level negative

## 2024-11-13 NOTE — PROCEDURES
Temporary HD Catheter    Date/Time: 11/13/2024 7:14 AM    Performed by: Jared Bateman PA-C  Authorized by: Jared Bateman PA-C    Patient location:  Bedside  Consent:     Consent obtained:  Written    Consent given by:  Patient    Risks discussed:  Arterial puncture, infection, bleeding, incorrect placement, nerve damage and pneumothorax    Alternatives discussed:  No treatment  Universal protocol:     Patient identity confirmed:  Hospital-assigned identification number and arm band  Pre-procedure details:     Hand hygiene: Hand hygiene performed prior to insertion      Sterile barrier technique: All elements of maximal sterile technique followed      Skin preparation:  2% chlorhexidine    Skin preparation agent: Skin preparation agent completely dried prior to procedure    Indications:     Central line indications: dialysis    Anesthesia (see MAR for exact dosages):     Anesthesia method:  Local infiltration    Local anesthetic:  Lidocaine 1% w/o epi  Procedure details:     Location:  Right internal jugular    Vessel type: vein      Laterality:  Right    Approach: percutaneous technique used      Patient position:  Flat    Catheter type:  Double lumen    Ultrasound guidance: yes      Ultrasound image availability:  Images available in PACS    Sterile ultrasound techniques: Sterile gel and sterile probe covers were used      Number of attempts:  1    Successful placement: yes  Vessel of catheter tip end: atriocaval junction  Post-procedure details:     Post-procedure:  Dressing applied and line sutured    Assessment:  Blood return through all ports, free fluid flow, placement verified by x-ray and no pneumothorax on x-ray    Post-procedure complications: none      Patient tolerance of procedure:  Tolerated well, no immediate complications

## 2024-11-14 ENCOUNTER — APPOINTMENT (INPATIENT)
Dept: INTERVENTIONAL RADIOLOGY/VASCULAR | Facility: HOSPITAL | Age: 69
DRG: 673 | End: 2024-11-14
Payer: COMMERCIAL

## 2024-11-14 PROBLEM — Z99.2 ACUTE HEMODIALYSIS PATIENT (HCC): Status: ACTIVE | Noted: 2024-11-14

## 2024-11-14 PROBLEM — E87.5 HYPERKALEMIA: Status: RESOLVED | Noted: 2024-11-13 | Resolved: 2024-11-14

## 2024-11-14 PROBLEM — R65.10 SIRS (SYSTEMIC INFLAMMATORY RESPONSE SYNDROME) (HCC): Status: RESOLVED | Noted: 2024-11-10 | Resolved: 2024-11-14

## 2024-11-14 LAB
ALBUMIN SERPL BCG-MCNC: 3.8 G/DL (ref 3.5–5)
ALBUMIN SERPL ELPH-MCNC: 3.43 G/DL (ref 3.2–5.1)
ALBUMIN SERPL ELPH-MCNC: 52.7 % (ref 48–70)
ALBUMIN UR ELPH-MCNC: 50.1 %
ALP SERPL-CCNC: 109 U/L (ref 34–104)
ALPHA1 GLOB MFR UR ELPH: 10 %
ALPHA1 GLOB SERPL ELPH-MCNC: 0.3 G/DL (ref 0.15–0.47)
ALPHA1 GLOB SERPL ELPH-MCNC: 4.6 % (ref 1.8–7)
ALPHA2 GLOB MFR UR ELPH: 8.4 %
ALPHA2 GLOB SERPL ELPH-MCNC: 0.56 G/DL (ref 0.42–1.04)
ALPHA2 GLOB SERPL ELPH-MCNC: 8.6 % (ref 5.9–14.9)
ALT SERPL W P-5'-P-CCNC: 768 U/L (ref 7–52)
ANION GAP SERPL CALCULATED.3IONS-SCNC: 15 MMOL/L (ref 4–13)
AST SERPL W P-5'-P-CCNC: 301 U/L (ref 13–39)
B-GLOBULIN MFR UR ELPH: 6.4 %
BASE EXCESS BLDA CALC-SCNC: -1 MMOL/L (ref -2–3)
BETA GLOB ABNORMAL SERPL ELPH-MCNC: 0.44 G/DL (ref 0.31–0.57)
BETA1 GLOB SERPL ELPH-MCNC: 6.7 % (ref 4.7–7.7)
BETA2 GLOB SERPL ELPH-MCNC: 6.5 % (ref 3.1–7.9)
BETA2+GAMMA GLOB SERPL ELPH-MCNC: 0.42 G/DL (ref 0.2–0.58)
BILIRUB DIRECT SERPL-MCNC: 1.11 MG/DL (ref 0–0.2)
BILIRUB SERPL-MCNC: 2.41 MG/DL (ref 0.2–1)
BUN SERPL-MCNC: 78 MG/DL (ref 5–25)
C-ANCA TITR SER IF: NORMAL TITER
CA-I BLD-SCNC: 1.07 MMOL/L (ref 1.12–1.32)
CA-I BLD-SCNC: 1.13 MMOL/L (ref 1.12–1.32)
CALCIUM SERPL-MCNC: 9.1 MG/DL (ref 8.4–10.2)
CHLORIDE SERPL-SCNC: 95 MMOL/L (ref 96–108)
CO2 SERPL-SCNC: 22 MMOL/L (ref 21–32)
CREAT SERPL-MCNC: 5.99 MG/DL (ref 0.6–1.3)
ERYTHROCYTE [DISTWIDTH] IN BLOOD BY AUTOMATED COUNT: 15.1 % (ref 11.6–15.1)
GAMMA GLOB ABNORMAL SERPL ELPH-MCNC: 1.36 G/DL (ref 0.4–1.66)
GAMMA GLOB MFR UR ELPH: 25.1 %
GAMMA GLOB SERPL ELPH-MCNC: 20.9 % (ref 6.9–22.3)
GFR SERPL CREATININE-BSD FRML MDRD: 8 ML/MIN/1.73SQ M
GLUCOSE SERPL-MCNC: 123 MG/DL (ref 65–140)
GLUCOSE SERPL-MCNC: 147 MG/DL (ref 65–140)
HCO3 BLDA-SCNC: 24.6 MMOL/L (ref 24–30)
HCT VFR BLD AUTO: 32.8 % (ref 36.5–49.3)
HCT VFR BLD CALC: 32 % (ref 36.5–49.3)
HGB BLD-MCNC: 11 G/DL (ref 12–17)
HGB BLDA-MCNC: 10.9 G/DL (ref 12–17)
IGG/ALB SER: 1.11 {RATIO} (ref 1.1–1.8)
INR PPP: 1.73 (ref 0.85–1.19)
INTERPRETATION UR IFE-IMP: NORMAL
INTERPRETATION UR IFE-IMP: NORMAL
MAGNESIUM SERPL-MCNC: 2.2 MG/DL (ref 1.9–2.7)
MCH RBC QN AUTO: 27.2 PG (ref 26.8–34.3)
MCHC RBC AUTO-ENTMCNC: 33.5 G/DL (ref 31.4–37.4)
MCV RBC AUTO: 81 FL (ref 82–98)
MRSA NOSE QL CULT: NORMAL
MYELOPEROXIDASE AB SER IA-ACNC: <0.2 UNITS (ref 0–0.9)
P-ANCA ATYPICAL TITR SER IF: NORMAL TITER
P-ANCA TITR SER IF: NORMAL TITER
PCO2 BLD: 26 MMOL/L (ref 21–32)
PCO2 BLD: 42 MM HG (ref 42–50)
PH BLD: 7.38 [PH] (ref 7.3–7.4)
PHOSPHATE SERPL-MCNC: 5 MG/DL (ref 2.3–4.1)
PLATELET # BLD AUTO: 86 THOUSANDS/UL (ref 149–390)
PMV BLD AUTO: 11.2 FL (ref 8.9–12.7)
PO2 BLD: 31 MM HG (ref 35–45)
POTASSIUM BLD-SCNC: 4.2 MMOL/L (ref 3.5–5.3)
POTASSIUM SERPL-SCNC: 4.2 MMOL/L (ref 3.5–5.3)
PROT PATTERN SERPL ELPH-IMP: NORMAL
PROT PATTERN UR ELPH-IMP: NORMAL
PROT SERPL-MCNC: 6.5 G/DL (ref 6.4–8.2)
PROT SERPL-MCNC: 7.2 G/DL (ref 6.4–8.4)
PROT UR-MCNC: 79.5 MG/DL
PROTEINASE3 AB SER IA-ACNC: <0.2 UNITS (ref 0–0.9)
PROTHROMBIN TIME: 20.5 SECONDS (ref 12.3–15)
RBC # BLD AUTO: 4.05 MILLION/UL (ref 3.88–5.62)
SAO2 % BLD FROM PO2: 58 % (ref 60–85)
SODIUM BLD-SCNC: 131 MMOL/L (ref 136–145)
SODIUM SERPL-SCNC: 132 MMOL/L (ref 135–147)
SPECIMEN SOURCE: ABNORMAL
WBC # BLD AUTO: 10.17 THOUSAND/UL (ref 4.31–10.16)

## 2024-11-14 PROCEDURE — 86334 IMMUNOFIX E-PHORESIS SERUM: CPT | Performed by: STUDENT IN AN ORGANIZED HEALTH CARE EDUCATION/TRAINING PROGRAM

## 2024-11-14 PROCEDURE — 82330 ASSAY OF CALCIUM: CPT

## 2024-11-14 PROCEDURE — 99232 SBSQ HOSP IP/OBS MODERATE 35: CPT | Performed by: INTERNAL MEDICINE

## 2024-11-14 PROCEDURE — 84132 ASSAY OF SERUM POTASSIUM: CPT

## 2024-11-14 PROCEDURE — 80048 BASIC METABOLIC PNL TOTAL CA: CPT

## 2024-11-14 PROCEDURE — 76937 US GUIDE VASCULAR ACCESS: CPT

## 2024-11-14 PROCEDURE — 85014 HEMATOCRIT: CPT

## 2024-11-14 PROCEDURE — 82803 BLOOD GASES ANY COMBINATION: CPT

## 2024-11-14 PROCEDURE — 85027 COMPLETE CBC AUTOMATED: CPT

## 2024-11-14 PROCEDURE — 86335 IMMUNFIX E-PHORSIS/URINE/CSF: CPT | Performed by: STUDENT IN AN ORGANIZED HEALTH CARE EDUCATION/TRAINING PROGRAM

## 2024-11-14 PROCEDURE — 77001 FLUOROGUIDE FOR VEIN DEVICE: CPT

## 2024-11-14 PROCEDURE — 99232 SBSQ HOSP IP/OBS MODERATE 35: CPT | Performed by: FAMILY MEDICINE

## 2024-11-14 PROCEDURE — C1750 CATH, HEMODIALYSIS,LONG-TERM: HCPCS

## 2024-11-14 PROCEDURE — 82947 ASSAY GLUCOSE BLOOD QUANT: CPT

## 2024-11-14 PROCEDURE — 84100 ASSAY OF PHOSPHORUS: CPT

## 2024-11-14 PROCEDURE — C1894 INTRO/SHEATH, NON-LASER: HCPCS

## 2024-11-14 PROCEDURE — 85610 PROTHROMBIN TIME: CPT | Performed by: RADIOLOGY

## 2024-11-14 PROCEDURE — 80076 HEPATIC FUNCTION PANEL: CPT | Performed by: FAMILY MEDICINE

## 2024-11-14 PROCEDURE — 84295 ASSAY OF SERUM SODIUM: CPT

## 2024-11-14 PROCEDURE — 84165 PROTEIN E-PHORESIS SERUM: CPT | Performed by: STUDENT IN AN ORGANIZED HEALTH CARE EDUCATION/TRAINING PROGRAM

## 2024-11-14 PROCEDURE — 84166 PROTEIN E-PHORESIS/URINE/CSF: CPT | Performed by: STUDENT IN AN ORGANIZED HEALTH CARE EDUCATION/TRAINING PROGRAM

## 2024-11-14 PROCEDURE — 02H633Z INSERTION OF INFUSION DEVICE INTO RIGHT ATRIUM, PERCUTANEOUS APPROACH: ICD-10-PCS | Performed by: RADIOLOGY

## 2024-11-14 PROCEDURE — 0JH63XZ INSERTION OF TUNNELED VASCULAR ACCESS DEVICE INTO CHEST SUBCUTANEOUS TISSUE AND FASCIA, PERCUTANEOUS APPROACH: ICD-10-PCS | Performed by: RADIOLOGY

## 2024-11-14 PROCEDURE — 36558 INSERT TUNNELED CV CATH: CPT

## 2024-11-14 PROCEDURE — 83735 ASSAY OF MAGNESIUM: CPT

## 2024-11-14 RX ORDER — CARVEDILOL 12.5 MG/1
12.5 TABLET ORAL 2 TIMES DAILY WITH MEALS
Status: DISCONTINUED | OUTPATIENT
Start: 2024-11-14 | End: 2024-11-20 | Stop reason: HOSPADM

## 2024-11-14 RX ORDER — ISOSORBIDE MONONITRATE 30 MG/1
120 TABLET, EXTENDED RELEASE ORAL DAILY
Status: DISCONTINUED | OUTPATIENT
Start: 2024-11-15 | End: 2024-11-20 | Stop reason: HOSPADM

## 2024-11-14 RX ORDER — FENTANYL CITRATE 50 UG/ML
INJECTION, SOLUTION INTRAMUSCULAR; INTRAVENOUS AS NEEDED
Status: COMPLETED | OUTPATIENT
Start: 2024-11-14 | End: 2024-11-14

## 2024-11-14 RX ORDER — CEFAZOLIN SODIUM 1 G/50ML
SOLUTION INTRAVENOUS
Status: COMPLETED | OUTPATIENT
Start: 2024-11-14 | End: 2024-11-14

## 2024-11-14 RX ADMIN — CEFAZOLIN SODIUM 1000 MG: 1 SOLUTION INTRAVENOUS at 13:27

## 2024-11-14 RX ADMIN — FENTANYL CITRATE 50 MCG: 50 INJECTION, SOLUTION INTRAMUSCULAR; INTRAVENOUS at 13:42

## 2024-11-14 RX ADMIN — Medication 3 MG: at 21:04

## 2024-11-14 RX ADMIN — HYDRALAZINE HYDROCHLORIDE 25 MG: 25 TABLET ORAL at 21:04

## 2024-11-14 RX ADMIN — FENTANYL CITRATE 50 MCG: 50 INJECTION, SOLUTION INTRAMUSCULAR; INTRAVENOUS at 13:28

## 2024-11-14 RX ADMIN — Medication 3 MG: at 00:18

## 2024-11-14 RX ADMIN — HYDRALAZINE HYDROCHLORIDE 25 MG: 25 TABLET ORAL at 14:13

## 2024-11-14 RX ADMIN — HYDRALAZINE HYDROCHLORIDE 25 MG: 25 TABLET ORAL at 05:47

## 2024-11-14 RX ADMIN — ISOSORBIDE MONONITRATE 60 MG: 30 TABLET, EXTENDED RELEASE ORAL at 08:05

## 2024-11-14 RX ADMIN — CARVEDILOL 12.5 MG: 12.5 TABLET, FILM COATED ORAL at 16:59

## 2024-11-14 RX ADMIN — CARVEDILOL 3.12 MG: 3.12 TABLET, FILM COATED ORAL at 07:39

## 2024-11-14 NOTE — ASSESSMENT & PLAN NOTE
Patient presents with volume overload  No known history of liver disease, does have history of heavy alcohol use  Patient endorses initially no further use of alcohol, then states last drink was 2 weeks ago -unreliable historian  Mayra liver enzymes suspected secondary to congestive hepatopathy that started improving with dialysis will repeat hepatic panel today   Ultrasound of the right upper quadrant is negative

## 2024-11-14 NOTE — PHYSICAL THERAPY NOTE
PHYSICAL THERAPY TREATMENT NOTE  NAME:  Matthew Alvarez  DATE: 11/14/24 11/14/24 5699   Note Type   Note type Cancelled Session         PT order received, chart review completed. Pt admitted to [facility] on 11/10/2024 w/ dx of Acute on chronic systolic heart failure (HCC). Attempted to see pt for PT session this date, however pt just returning from IR procedure, not appropriate for PT at this time. Will continue to follow pt and provide care as pt is appropriate and available.    Jud Lowe, PT,DPT

## 2024-11-14 NOTE — ASSESSMENT & PLAN NOTE
Lab Results   Component Value Date    EGFR 8 11/14/2024    EGFR 13 11/13/2024    EGFR 6 11/13/2024    CREATININE 5.99 (H) 11/14/2024    CREATININE 4.22 (H) 11/13/2024    CREATININE 7.57 (H) 11/13/2024

## 2024-11-14 NOTE — NURSING NOTE
Patient returned from IR with tunneled HD catheter to RCW intact with no drainage or redness noted. /89; the patient was medicated as ordered. Patient denies any pain or nausea and was instructed to alert staff to any needs.

## 2024-11-14 NOTE — ASSESSMENT & PLAN NOTE
Increase coreg to 12.5 mg PO BID  Increase imdur to 120 mg daily  Continue hydralazine 25 mg PO TID

## 2024-11-14 NOTE — NURSING NOTE
Patient's spouse called for an update. This RN informed her that Dr Carson had attempted to call her, but there was no answer and her VM was full. This RN provided update and POC as reflected in 's noted. Spouse verbalized understanding and had no further questions at the end of conversation. Spouse confirmed her son is staying with her as she does not drive.

## 2024-11-14 NOTE — ASSESSMENT & PLAN NOTE
Elevated troponin upon presentation of 83, 2-hour troponin of 112  Patient is without chest pain currently  Nonischemic EKG  Likely elevated secondary to demand from accelerated hypertension and CHF exacerbation  Cardiology on board and following  Tte-EF is 20 to 24%

## 2024-11-14 NOTE — ASSESSMENT & PLAN NOTE
Wt Readings from Last 3 Encounters:   11/14/24 55.3 kg (121 lb 14.6 oz)   09/03/24 55.3 kg (122 lb)   02/05/24 56 kg (123 lb 6.4 oz)   Repeat TTE-EF is 20 to 25% severe reduction of LV  Patient currently is not in cardiogenic shock.  He failed diuresis with oliguria with rising creatinine and he has been started on dialysis and now he is can IV on Monday Wednesday Friday as discussed with nephrology next Alysis is tomorrow.  Per cardiology on Imdur aspirin and beta-blocker  He will need a LifeVest prior to discharge

## 2024-11-14 NOTE — PROGRESS NOTES
Progress Note - Hospitalist   Name: Matthew Alvarez 69 y.o. male I MRN: [id number]  Unit/Bed#: -01 I Date of Admission: 11/10/2024   Date of Service: 11/14/2024 I Hospital Day: 4    Assessment & Plan  Acute on chronic systolic heart failure (HCC)  Wt Readings from Last 3 Encounters:   11/14/24 55.3 kg (121 lb 14.6 oz)   09/03/24 55.3 kg (122 lb)   02/05/24 56 kg (123 lb 6.4 oz)   Repeat TTE-EF is 20 to 25% severe reduction of LV  Patient currently is not in cardiogenic shock.  He failed diuresis with oliguria with rising creatinine and he has been started on dialysis and now he is can IV on Monday Wednesday Friday as discussed with nephrology next Alysis is tomorrow.  Per cardiology on Imdur aspirin and beta-blocker  He will need a LifeVest prior to discharge  Uncontrolled hypertension  Blood pressure is uncontrolled cardiology increased Coreg to 12.5 mg p.o. twice daily continue hydralazine 25 mg p.o. every 8 hours increased Imdur to 120 mg daily  Acute kidney injury superimposed on stage 3a chronic kidney disease (HCC)  Lab Results   Component Value Date    EGFR 8 11/14/2024    EGFR 13 11/13/2024    EGFR 6 11/13/2024    CREATININE 5.99 (H) 11/14/2024    CREATININE 4.22 (H) 11/13/2024    CREATININE 7.57 (H) 11/13/2024   CKD stage III with a baseline creatinine of 1.9-2 his creatinine has risen up to 7 with oliguria he was started on dialysis he received his dialysis yesterday discussed with nephrology he will be on Monday Wednesday Friday today he is getting a permacath with interventional radiology next Alysis is tomorrow.  He is on room air but still has Rales on exam.  Nephrology on board and following recommend serologies to rule out intrinsic disease  SPEP/UPEP, ANCA, JORGE LUIS, C3, C4, urine protein creatinine ratio  Elevated troponin  Elevated troponin upon presentation of 83, 2-hour troponin of 112  Patient is without chest pain currently  Nonischemic EKG  Likely elevated secondary to demand from  accelerated hypertension and CHF exacerbation  Cardiology on board and following  Tte-EF is 20 to 24%  Tobacco abuse  Recommend complete cessation-patient endorses he stopped smoking 3 days ago  NRT offered  Elevated liver transaminase level  Patient presents with volume overload  No known history of liver disease, does have history of heavy alcohol use  Patient endorses initially no further use of alcohol, then states last drink was 2 weeks ago -unreliable historian  Mayra liver enzymes suspected secondary to congestive hepatopathy that started improving with dialysis will repeat hepatic panel today   Ultrasound of the right upper quadrant is negative  Elevated d-dimer  D-dimer elevated to 12.54  Patient without hemoptysis, pleuritic chest pain  Does have shortness of breath and mild tachycardia that is improved  Currently no oxygen requirements, saturating well on room air  Kidney function precludes CTA chest  Lower extremity venous duplex pending  TTE pending  Heparin subcu DVT prophylaxis  Dilated cardiomyopathy (HCC)  Chronic, continue aspirin, carvedilol Imdur management as per systolic heart failure  Hyponatremia  Improving with dialysis  Oliguria    Encounter for hemodialysis for ESRD (HCC)  Lab Results   Component Value Date    EGFR 8 11/14/2024    EGFR 13 11/13/2024    EGFR 6 11/13/2024    CREATININE 5.99 (H) 11/14/2024    CREATININE 4.22 (H) 11/13/2024    CREATININE 7.57 (H) 11/13/2024       VTE Pharmacologic Prophylaxis:   Moderate Risk (Score 3-4) - Pharmacological DVT Prophylaxis Ordered: heparin.    Mobility:   Basic Mobility Inpatient Raw Score: 18  JH-HLM Goal: 6: Walk 10 steps or more  JH-HLM Achieved: 6: Walk 10 steps or more  JH-HLM Goal NOT achieved. Continue with multidisciplinary rounding and encourage appropriate mobility to improve upon JH-HLM goals.    Patient Centered Rounds: I performed bedside rounds with nursing staff today.   Discussions with Specialists or Other Care Team Provider:  Discussed with nephrology cardiology    Education and Discussions with Family / Patient: Patient will update wife    Current Length of Stay: 4 day(s)  Current Patient Status: Inpatient   Certification Statement: The patient will continue to require additional inpatient hospital stay due to secondary to dialysis CHF  Discharge Plan: Anticipate discharge in >72 hrs to discharge location to be determined pending rehab evaluations.    Code Status: Level 1 - Full Code    Subjective   Patient seen and examined denies any chest pain or shortness of breath just feels tired    Objective :  Temp:  [97.6 °F (36.4 °C)-99.8 °F (37.7 °C)] 98.1 °F (36.7 °C)  HR:  [74-87] 75  BP: (133-187)/() 172/92  Resp:  [16-28] 18  SpO2:  [88 %-96 %] 92 %  O2 Device: None (Room air)    Body mass index is 19.68 kg/m².     Input and Output Summary (last 24 hours):     Intake/Output Summary (Last 24 hours) at 11/14/2024 1038  Last data filed at 11/14/2024 1001  Gross per 24 hour   Intake 1255 ml   Output 1651 ml   Net -396 ml       Physical Exam  Vitals and nursing note reviewed.   Constitutional:       General: He is not in acute distress.     Appearance: He is well-developed.   HENT:      Head: Normocephalic and atraumatic.   Eyes:      Conjunctiva/sclera: Conjunctivae normal.   Cardiovascular:      Rate and Rhythm: Normal rate and regular rhythm.      Heart sounds: No murmur heard.  Pulmonary:      Effort: Pulmonary effort is normal. No respiratory distress.      Breath sounds: Rales present. No wheezing.   Abdominal:      Palpations: Abdomen is soft.      Tenderness: There is no abdominal tenderness.   Musculoskeletal:         General: No swelling.      Cervical back: Neck supple.   Skin:     General: Skin is warm and dry.      Capillary Refill: Capillary refill takes less than 2 seconds.   Neurological:      Mental Status: He is alert and oriented to person, place, and time.   Psychiatric:         Mood and Affect: Mood normal.            Lines/Drains:  Lines/Drains/Airways       Active Status       Name Placement date Placement time Site Days    HD Temporary Double Catheter 11/13/24  0714  Right internal jugular  1                            Lab Results: I have reviewed the following results:   Results from last 7 days   Lab Units 11/14/24  0545 11/12/24  0508 11/10/24  1853   WBC Thousand/uL 10.17*   < > 14.64*   HEMOGLOBIN g/dL 11.0*   < > 13.5   HEMATOCRIT % 32.8*   < > 42.3   PLATELETS Thousands/uL 86*   < > 188   SEGS PCT %  --   --  85*   LYMPHO PCT %  --   --  6*   MONO PCT %  --   --  8   EOS PCT %  --   --  0    < > = values in this interval not displayed.     Results from last 7 days   Lab Units 11/14/24  0545 11/13/24  1241 11/13/24  0514   SODIUM mmol/L 132*   < > 128*   POTASSIUM mmol/L 4.2   < > 5.0   CHLORIDE mmol/L 95*   < > 95*   CO2 mmol/L 22   < > 17*   BUN mg/dL 78*   < > 112*   CREATININE mg/dL 5.99*   < > 7.57*   ANION GAP mmol/L 15*   < > 16*   CALCIUM mg/dL 9.1   < > 8.3*   ALBUMIN g/dL  --   --  3.6   TOTAL BILIRUBIN mg/dL  --   --  1.83*   ALK PHOS U/L  --   --  96   ALT U/L  --   --  1,000*   AST U/L  --   --  522*   GLUCOSE RANDOM mg/dL 123   < > 118    < > = values in this interval not displayed.     Results from last 7 days   Lab Units 11/10/24  1853   INR  1.83*             Results from last 7 days   Lab Units 11/12/24  1051 11/10/24  2320 11/10/24  1853   LACTIC ACID mmol/L 1.3 4.6* 5.9*       Recent Cultures (last 7 days):         Imaging Results Review: I reviewed radiology reports from this admission including: chest xray.  Other Study Results Review: No additional pertinent studies reviewed.    Last 24 Hours Medication List:     Current Facility-Administered Medications:     aspirin (ECOTRIN LOW STRENGTH) EC tablet 81 mg, Daily    carvedilol (COREG) tablet 12.5 mg, BID With Meals    heparin (porcine) subcutaneous injection 5,000 Units, Q8H JEEVAN    hydrALAZINE (APRESOLINE) injection 10 mg, Q6H PRN     hydrALAZINE (APRESOLINE) tablet 25 mg, Q8H JEEVAN    [START ON 11/15/2024] isosorbide mononitrate (IMDUR) 24 hr tablet 120 mg, Daily    melatonin tablet 3 mg, HS    nicotine (NICODERM CQ) 14 mg/24hr TD 24 hr patch 1 patch, Daily    Administrative Statements   Today, Patient Was Seen By: Stacy Carson MD  I have spent a total time of >35 minutes in caring for this patient on the day of the visit/encounter including Documenting in the medical record, Reviewing / ordering tests, medicine, procedures  , and Communicating with other healthcare professionals .    **Please Note: This note may have been constructed using a voice recognition system.**

## 2024-11-14 NOTE — PLAN OF CARE
Problem: CARDIOVASCULAR - ADULT  Goal: Maintains optimal cardiac output and hemodynamic stability  Description: INTERVENTIONS:  - Monitor I/O, vital signs and rhythm  - Monitor for S/S and trends of decreased cardiac output  - Administer and titrate ordered vasoactive medications to optimize hemodynamic stability  - Assess quality of pulses, skin color and temperature  - Assess for signs of decreased coronary artery perfusion  - Instruct patient to report change in severity of symptoms  Outcome: Progressing  Goal: Absence of cardiac dysrhythmias or at baseline rhythm  Description: INTERVENTIONS:  - Continuous cardiac monitoring, vital signs, obtain 12 lead EKG if ordered  - Administer antiarrhythmic and heart rate control medications as ordered  - Monitor electrolytes and administer replacement therapy as ordered  Outcome: Progressing     Problem: GENITOURINARY - ADULT  Goal: Maintains or returns to baseline urinary function  Description: INTERVENTIONS:  - Assess urinary function  - Encourage oral fluids to ensure adequate hydration if ordered  - Administer IV fluids as ordered to ensure adequate hydration  - Administer ordered medications as needed  - Offer frequent toileting  - Follow urinary retention protocol if ordered  Outcome: Progressing  Goal: Absence of urinary retention  Description: INTERVENTIONS:  - Assess patient's ability to void and empty bladder  - Monitor I/O  - Bladder scan as needed  - Discuss with physician/AP medications to alleviate retention as needed  - Discuss catheterization for long term situations as appropriate  Outcome: Progressing     Problem: METABOLIC, FLUID AND ELECTROLYTES - ADULT  Goal: Electrolytes maintained within normal limits  Description: INTERVENTIONS:  - Monitor labs and assess patient for signs and symptoms of electrolyte imbalances  - Administer electrolyte replacement as ordered  - Monitor response to electrolyte replacements, including repeat lab results as  appropriate  - Instruct patient on fluid and nutrition as appropriate  Outcome: Progressing  Goal: Fluid balance maintained  Description: INTERVENTIONS:  - Monitor labs   - Monitor I/O and WT  - Instruct patient on fluid and nutrition as appropriate  - Assess for signs & symptoms of volume excess or deficit  Outcome: Progressing  Goal: Glucose maintained within target range  Description: INTERVENTIONS:  - Monitor Blood Glucose as ordered  - Assess for signs and symptoms of hyperglycemia and hypoglycemia  - Administer ordered medications to maintain glucose within target range  - Assess nutritional intake and initiate nutrition service referral as needed  Outcome: Progressing     Problem: PAIN - ADULT  Goal: Verbalizes/displays adequate comfort level or baseline comfort level  Description: Interventions:  - Encourage patient to monitor pain and request assistance  - Assess pain using appropriate pain scale  - Administer analgesics based on type and severity of pain and evaluate response  - Implement non-pharmacological measures as appropriate and evaluate response  - Consider cultural and social influences on pain and pain management  - Notify physician/advanced practitioner if interventions unsuccessful or patient reports new pain  Outcome: Progressing     Problem: INFECTION - ADULT  Goal: Absence or prevention of progression during hospitalization  Description: INTERVENTIONS:  - Assess and monitor for signs and symptoms of infection  - Monitor lab/diagnostic results  - Monitor all insertion sites, i.e. indwelling lines, tubes, and drains  - Monitor endotracheal if appropriate and nasal secretions for changes in amount and color  - Galva appropriate cooling/warming therapies per order  - Administer medications as ordered  - Instruct and encourage patient and family to use good hand hygiene technique  - Identify and instruct in appropriate isolation precautions for identified infection/condition  Outcome:  Progressing     Problem: SAFETY ADULT  Goal: Patient will remain free of falls  Description: INTERVENTIONS:  - Educate patient/family on patient safety including physical limitations  - Instruct patient to call for assistance with activity   - Consult OT/PT to assist with strengthening/mobility   - Keep Call bell within reach  - Keep bed low and locked with side rails adjusted as appropriate  - Keep care items and personal belongings within reach  - Initiate and maintain comfort rounds  - Make Fall Risk Sign visible to staff  - Offer Toileting every 2 Hours, in advance of need  - Initiate/Maintain bed/chair alarm  - Obtain necessary fall risk management equipment:   - Apply yellow socks and bracelet for high fall risk patients  - Consider moving patient to room near nurses station  Outcome: Progressing  Goal: Maintain or return to baseline ADL function  Description: INTERVENTIONS:  -  Assess patient's ability to carry out ADLs; assess patient's baseline for ADL function and identify physical deficits which impact ability to perform ADLs (bathing, care of mouth/teeth, toileting, grooming, dressing, etc.)  - Assess/evaluate cause of self-care deficits   - Assess range of motion  - Assess patient's mobility; develop plan if impaired  - Assess patient's need for assistive devices and provide as appropriate  - Encourage maximum independence but intervene and supervise when necessary  - Involve family in performance of ADLs  - Assess for home care needs following discharge   - Consider OT consult to assist with ADL evaluation and planning for discharge  - Provide patient education as appropriate  Outcome: Progressing  Goal: Maintains/Returns to pre admission functional level  Description: INTERVENTIONS:  - Perform AM-PAC 6 Click Basic Mobility/ Daily Activity assessment daily.  - Set and communicate daily mobility goal to care team and patient/family/caregiver.   - Collaborate with rehabilitation services on mobility  goals if consulted  - Perform Range of Motion  times a day.  - Reposition patient .  - Dangle patient 4 times a day  - Stand patient 4 times a day  - Ambulate patient 4 times a day  - Out of bed to chair 3 times a day   - Out of bed for meals 3 times a day  - Out of bed for toileting  - Record patient progress and toleration of activity level   Outcome: Progressing

## 2024-11-14 NOTE — ASSESSMENT & PLAN NOTE
Lab Results   Component Value Date    EGFR 8 11/14/2024    EGFR 13 11/13/2024    EGFR 6 11/13/2024    CREATININE 5.99 (H) 11/14/2024    CREATININE 4.22 (H) 11/13/2024    CREATININE 7.57 (H) 11/13/2024   Planning for perm cath and then HD today

## 2024-11-14 NOTE — ASSESSMENT & PLAN NOTE
Blood pressure is uncontrolled cardiology increased Coreg to 12.5 mg p.o. twice daily continue hydralazine 25 mg p.o. every 8 hours increased Imdur to 120 mg daily

## 2024-11-14 NOTE — OCCUPATIONAL THERAPY NOTE
Occupational Therapy         Patient Name: Matthew Alvarez  Today's Date: 11/14/2024 11/14/24 1406   OT Last Visit   OT Visit Date 11/14/24   Note Type   Note type Cancelled Session       Chart reviewed. Attempted to see pt for OT session at this time. Spoke with pt's nurse, who reports he just returned from IR procedure and received fentanyl and is currently lethargic. IR recommended allowing an hour for rest prior to engaging in therapy. Will continue to follow case and address as appropriate and as time allows.

## 2024-11-14 NOTE — ASSESSMENT & PLAN NOTE
Lab Results   Component Value Date    EGFR 8 11/14/2024    EGFR 13 11/13/2024    EGFR 6 11/13/2024    CREATININE 5.99 (H) 11/14/2024    CREATININE 4.22 (H) 11/13/2024    CREATININE 7.57 (H) 11/13/2024   CKD stage III with a baseline creatinine of 1.9-2 his creatinine has risen up to 7 with oliguria he was started on dialysis he received his dialysis yesterday discussed with nephrology he will be on Monday Wednesday Friday today he is getting a permacath with interventional radiology next Alysis is tomorrow.  He is on room air but still has Rales on exam.  Nephrology on board and following recommend serologies to rule out intrinsic disease  SPEP/UPEP, ANCA, JORGE LUIS, C3, C4, urine protein creatinine ratio

## 2024-11-14 NOTE — BRIEF OP NOTE (RAD/CATH)
INTERVENTIONAL RADIOLOGY PROCEDURE NOTE    Date: 11/14/2024    Procedure: Permacath placement  Procedure Summary       Date:  Room / Location:     Anesthesia Start:  Anesthesia Stop:     Procedure:  Diagnosis:     Scheduled Providers:  Responsible Provider:     Anesthesia Type: Not recorded ASA Status: Not recorded            Preoperative diagnosis:   1. CHF (congestive heart failure) (HCC)    2. DAVID (acute kidney injury) (HCC)    3. Hypertension    4. Elevated d-dimer    5. Acute kidney injury superimposed on stage 3a chronic kidney disease (HCC)         Postoperative diagnosis: Same.    Surgeon: Juan Alberto Collins MD     Assistant: None. No qualified resident was available.    Blood loss: Minimal    Specimens: None     Findings: Right internal jugular 23 cm tip to cuff permacath placed. Neck a cess closed with glue.  Existing high access temp catheter removed and hemostasis obtained. The silk sutures were left in place and to be removed by placing team.    OK to use.    Complications: None immediate.    Anesthesia: local and IV Fentanyl

## 2024-11-14 NOTE — CASE MANAGEMENT
Case Management Discharge Planning Note    Patient name Matthew Alvarez  Location /-01 MRN [id number]  : 1955 Date 2024       Current Admission Date: 11/10/2024  Current Admission Diagnosis:Acute on chronic systolic heart failure (HCC)   Patient Active Problem List    Diagnosis Date Noted Date Diagnosed    Acute hemodialysis patient (HCC) 2024     Oliguria 2024     Encounter for hemodialysis for ESRD (HCC) 2024     Hyponatremia 2024     Dilated cardiomyopathy (HCC) 2024     Elevated liver transaminase level 11/10/2024     Elevated d-dimer 11/10/2024     Acute on chronic systolic heart failure (HCC) 2024     Coronary artery disease involving native coronary artery of native heart without angina pectoris 10/08/2021     Rheumatoid factor positive 2021     Protein calorie malnutrition (HCC) 2021     Acute kidney injury superimposed on stage 3a chronic kidney disease (HCC) 2021     Elevated troponin 2021     Tobacco abuse 2021     Nonischemic cardiomyopathy (HCC) 2021     Uncontrolled hypertension 2021       LOS (days): 4  Geometric Mean LOS (GMLOS) (days): 4.4  Days to GMLOS:0.6     OBJECTIVE:  Risk of Unplanned Readmission Score: 14.51         Current admission status: Inpatient   Preferred Pharmacy:   [facility]/pharmacy #[id number] - [place], [place] - [address]  [address] [place] [address]  Phone: [phone] Fax: [phone]    [facility] [place] [place] [facility] [address]  [address]  [place] [address]  Phone: [phone] Fax: [phone]    Primary Care Provider: Olive Rodriguez MD    Primary Insurance: [email or web address] [facility]  Secondary Insurance:     DISCHARGE DETAILS:    [facility]  - Janiya Parry  ex [phone]  Assigned Chair time is M/F/W at 3 :10 PM. At the [place] Location  Tentative start date is   18th    CM spoke to patient and his choice was [facility] as he resides in [place], he drives but his wife does not.  Per Rafael, his sister is [place] is going to take him to HD _   Rafael is in agreement of C if this is recommended, he had HHC in the past, however unsure which agency he had.    Rafael provided me [employer]'s phone number for me to chat with her.  [phone].  Was unable to reach [place], however left a message.    CM will continue to follow.       4:45 Called and spoke to spouse in regards to HD, and the chair times.  Spouse is concern about Wednesdays as his sister works, however as we talked further finding a ride to HD on Wednesday is the only issue at this time. Spouse will be looking for a ride for Rafael. His sister is available for transport home from HD on Wednesdays. Support provided.

## 2024-11-14 NOTE — PROGRESS NOTES
NEPHROLOGY PROGRESS NOTE    Matthew Alvarez 69 y.o. male MRN: [id number]  Unit/Bed#: -01 Encounter: [id number]  Reason for Consult: Acute on chronic kidney disease    The patient underwent his dialysis treatment yesterday said that he felt tired today and after the treatment.  The bleeding seems to have stopped from his temporary dialysis site and scheduled to undergo tunneled dialysis catheter per IR.    ASSESSMENT/PLAN:    1.  Renal    Patient has acute renal failure with prior baseline creatinine around 2 and it did not really seem like there were any inciting clinical events and he presented as an outpatient and creatinine significantly increased and had to be initiated on dialysis.    He underwent his first treatment yesterday tolerated the procedure.    Hemoglobin is 11 which is in the therapeutic range for ESRD patient.    Next dialysis will be tomorrow and for now we will just do intermittent dialysis 3 times a week.    Serologic workup was sent in this patient to present from the outpatient setting unlikely to have an intrinsic process but so far unrevealing.  SPEP and UPEP had no M spike.  P ANCA and anti-GBM are pending.  Complements were low.    Case management to work on outpatient dialysis placement prior to discharge.    2.  Transaminitis    Patient with increased AST and ALT monitoring per primary service.    3.  Cardiac    Patient has heart failure with reduced left ventricular ejection fraction.  On medical management for hypertension cardiology making some adjustments.      SUBJECTIVE:  Review of Systems   Constitutional: Positive for malaise/fatigue. Negative for chills, diaphoresis and fever.   HENT: Negative.     Eyes: Negative.    Cardiovascular:  Negative for chest pain, dyspnea on exertion and orthopnea.   Respiratory:  Positive for cough. Negative for shortness of breath and sputum production.    Musculoskeletal:  Positive for arthritis and myalgias.   Gastrointestinal:  Negative for  "abdominal pain, diarrhea, nausea and vomiting.   Neurological:  Positive for weakness. Negative for dizziness and headaches.   Psychiatric/Behavioral:  Negative for altered mental status.        OBJECTIVE:  Current Weight: Weight - Scale: 55.3 kg (121 lb 14.6 oz)  Vitals:Temp (24hrs), Av.3 °F (36.8 °C), Min:97.6 °F (36.4 °C), Max:99.8 °F (37.7 °C)  Current: Temperature: 98.1 °F (36.7 °C)   Blood pressure (!) 172/92, pulse 75, temperature 98.1 °F (36.7 °C), temperature source Oral, resp. rate 18, height 5' 6\" (1.676 m), weight 55.3 kg (121 lb 14.6 oz), SpO2 92%. , Body mass index is 19.68 kg/m².      Intake/Output Summary (Last 24 hours) at 2024 1029  Last data filed at 2024 1001  Gross per 24 hour   Intake 1255 ml   Output 1651 ml   Net -396 ml       Physical Exam: BP (!) 172/92   Pulse 75   Temp 98.1 °F (36.7 °C) (Oral)   Resp 18   Ht 5' 6\" (1.676 m)   Wt 55.3 kg (121 lb 14.6 oz)   SpO2 92%   BMI 19.68 kg/m²   Physical Exam  Constitutional:       General: He is not in acute distress.     Appearance: He is not toxic-appearing.   HENT:      Head: Normocephalic and atraumatic.      Nose: Nose normal.      Mouth/Throat:      Mouth: Mucous membranes are dry.   Eyes:      General: No scleral icterus.     Extraocular Movements: Extraocular movements intact.   Cardiovascular:      Rate and Rhythm: Normal rate and regular rhythm.      Heart sounds:      No gallop.   Pulmonary:      Effort: Pulmonary effort is normal. No respiratory distress.      Breath sounds: Rhonchi present. No wheezing or rales.   Abdominal:      General: Bowel sounds are normal. There is no distension.      Palpations: Abdomen is soft.      Tenderness: There is no abdominal tenderness.   Neurological:      Mental Status: He is alert and oriented to person, place, and time.   Psychiatric:         Mood and Affect: Mood normal.         Behavior: Behavior normal.         Medications:    Current Facility-Administered Medications:     " "aspirin (ECOTRIN LOW STRENGTH) EC tablet 81 mg, 81 mg, Oral, Daily, Jared Bateman PA-C, 81 mg at 11/13/24 0807    carvedilol (COREG) tablet 12.5 mg, 12.5 mg, Oral, BID With Meals, Kathleen Bui PA-C    heparin (porcine) subcutaneous injection 5,000 Units, 5,000 Units, Subcutaneous, Q8H JEEVAN, Jared Bateman PA-C, 5,000 Units at 11/12/24 1524    hydrALAZINE (APRESOLINE) injection 10 mg, 10 mg, Intravenous, Q6H PRN, Jared Bateman PA-C, 10 mg at 11/10/24 2320    hydrALAZINE (APRESOLINE) tablet 25 mg, 25 mg, Oral, Q8H JEEVAN, Jared Bateman PA-C, 25 mg at 11/14/24 0547    [START ON 11/15/2024] isosorbide mononitrate (IMDUR) 24 hr tablet 120 mg, 120 mg, Oral, Daily, Kathleen Bui PA-C    melatonin tablet 3 mg, 3 mg, Oral, HS, Jany S Jake, CRNP, 3 mg at 11/14/24 0018    nicotine (NICODERM CQ) 14 mg/24hr TD 24 hr patch 1 patch, 1 patch, Transdermal, Daily, Jared Bateman PA-C, 1 patch at 11/11/24 0803    Laboratory Results:  Lab Results   Component Value Date    WBC 10.17 (H) 11/14/2024    HGB 11.0 (L) 11/14/2024    HCT 32.8 (L) 11/14/2024    MCV 81 (L) 11/14/2024    PLT 86 (L) 11/14/2024     Lab Results   Component Value Date    SODIUM 132 (L) 11/14/2024    K 4.2 11/14/2024    CL 95 (L) 11/14/2024    CO2 22 11/14/2024    BUN 78 (H) 11/14/2024    CREATININE 5.99 (H) 11/14/2024    GLUC 123 11/14/2024    CALCIUM 9.1 11/14/2024     Lab Results   Component Value Date    CALCIUM 9.1 11/14/2024    PHOS 5.0 (H) 11/14/2024     No results found for: \"LABPROT\"    "

## 2024-11-14 NOTE — PROGRESS NOTES
Progress Note - Cardiology   Name: Matthew Alvarez 69 y.o. male I MRN: [id number]  Unit/Bed#: -01 I Date of Admission: 11/10/2024   Date of Service: 11/14/2024 I Hospital Day: 4    Assessment & Plan  Acute on chronic systolic heart failure (HCC)  Euvolemic on exam well-perfused with warm extremities  Uncontrolled hypertension  Increase coreg to 12.5 mg PO BID  Increase imdur to 120 mg daily  Continue hydralazine 25 mg PO TID   Dilated cardiomyopathy (HCC)  On GDMT with Coreg, hydralazine and imdur    LifeVest on discharge outpatient follow-up with cardiology   Acute kidney injury superimposed on stage 3a chronic kidney disease (HCC)  Lab Results   Component Value Date    EGFR 8 11/14/2024    EGFR 13 11/13/2024    EGFR 6 11/13/2024    CREATININE 5.99 (H) 11/14/2024    CREATININE 4.22 (H) 11/13/2024    CREATININE 7.57 (H) 11/13/2024   Planning for perm cath and then HD today   Elevated troponin  No cp  No EKG changes   Tobacco abuse    Encounter for hemodialysis for ESRD ([facility])  Lab Results   Component Value Date    EGFR 8 11/14/2024    EGFR 13 11/13/2024    EGFR 6 11/13/2024    CREATININE 5.99 (H) 11/14/2024    CREATININE 4.22 (H) 11/13/2024    CREATININE 7.57 (H) 11/13/2024       Subjective     Pt reports no chest pain. Reports coughing this AM     Objective :  Temp:  [97.6 °F (36.4 °C)-99.8 °F (37.7 °C)] 98.1 °F (36.7 °C)  HR:  [74-87] 74  BP: (133-187)/() 179/86  Resp:  [16-28] 18  SpO2:  [88 %-96 %] 92 %  O2 Device: None (Room air)  Orthostatic Blood Pressures      Flowsheet Row Most Recent Value   Blood Pressure 179/86 filed at 11/14/2024 0735   Patient Position - Orthostatic VS Lying filed at 11/14/2024 0735          First Weight: Weight - Scale: 57.9 kg (127 lb 10.3 oz) (11/10/24 1847)  Vitals:    11/13/24 0600 11/14/24 0557   Weight: 55.9 kg (123 lb 3.8 oz) 55.3 kg (121 lb 14.6 oz)     Planning for HD today and perm cath placement    Physical Exam  Constitutional:       Appearance: Normal  appearance. He is ill-appearing. He is not toxic-appearing.   HENT:      Head: Normocephalic and atraumatic.   Cardiovascular:      Rate and Rhythm: Normal rate.      Heart sounds: No murmur heard.  Pulmonary:      Effort: Pulmonary effort is normal.      Breath sounds: Rhonchi present.   Musculoskeletal:         General: No swelling.   Skin:     General: Skin is warm and dry.      Capillary Refill: Capillary refill takes less than 2 seconds.   Neurological:      General: No focal deficit present.      Mental Status: He is alert.   Psychiatric:         Mood and Affect: Mood normal.           Lab Results: I have reviewed the following results:  Results from last 7 days   Lab Units 11/14/24  0545 11/13/24  0514 11/12/24  0508   WBC Thousand/uL 10.17* 10.21* 10.93*   HEMOGLOBIN g/dL 11.0* 11.1* 11.0*   HEMATOCRIT % 32.8* 33.9* 32.9*   PLATELETS Thousands/uL 86* 102* 106*     Results from last 7 days   Lab Units 11/14/24  0545 11/13/24  1241 11/13/24  0514   POTASSIUM mmol/L 4.2 3.5 5.0   CHLORIDE mmol/L 95* 94* 95*   CO2 mmol/L 22 24 17*   BUN mg/dL 78* 53* 112*   CREATININE mg/dL 5.99* 4.22* 7.57*   CALCIUM mg/dL 9.1 8.9 8.3*     Results from last 7 days   Lab Units 11/10/24  1853   INR  1.83*   PTT seconds 32     Lab Results   Component Value Date    HGBA1C 5.7 (H) 09/15/2021     Lab Results   Component Value Date    CKTOTAL 64 09/11/2021    TROPONINI 0.03 09/12/2021     Echo 11/11/2024:      Left Ventricle: Left ventricular cavity size is severely dilated. There is mild concentric hypertrophy. The left ventricular ejection fraction is 20-24 %. Systolic function is severely reduced. There is severe global hypokinesis.    Left Atrium: The atrium is mildly dilated.    Mitral Valve: There is mild regurgitation.    Tricuspid Valve: There is mild regurgitation.    Pericardium: There is a moderately sized left pleural effusion.

## 2024-11-15 ENCOUNTER — APPOINTMENT (INPATIENT)
Dept: RADIOLOGY | Facility: HOSPITAL | Age: 69
DRG: 673 | End: 2024-11-15
Payer: COMMERCIAL

## 2024-11-15 ENCOUNTER — APPOINTMENT (INPATIENT)
Dept: DIALYSIS | Facility: HOSPITAL | Age: 69
DRG: 673 | End: 2024-11-15
Payer: COMMERCIAL

## 2024-11-15 PROBLEM — D63.1 ANEMIA DUE TO CHRONIC KIDNEY DISEASE, ON CHRONIC DIALYSIS (HCC): Status: ACTIVE | Noted: 2024-11-15

## 2024-11-15 PROBLEM — N18.6 ANEMIA DUE TO CHRONIC KIDNEY DISEASE, ON CHRONIC DIALYSIS (HCC): Status: ACTIVE | Noted: 2024-11-15

## 2024-11-15 PROBLEM — Z99.2 ENCOUNTER FOR HEMODIALYSIS (HCC): Status: ACTIVE | Noted: 2024-11-15

## 2024-11-15 PROBLEM — Z99.2 ANEMIA DUE TO CHRONIC KIDNEY DISEASE, ON CHRONIC DIALYSIS (HCC): Status: ACTIVE | Noted: 2024-11-15

## 2024-11-15 PROBLEM — N19 RENAL FAILURE: Status: ACTIVE | Noted: 2024-11-15

## 2024-11-15 LAB
ALBUMIN SERPL BCG-MCNC: 3.6 G/DL (ref 3.5–5)
ALP SERPL-CCNC: 99 U/L (ref 34–104)
ALT SERPL W P-5'-P-CCNC: 451 U/L (ref 7–52)
ANION GAP SERPL CALCULATED.3IONS-SCNC: 17 MMOL/L (ref 4–13)
AST SERPL W P-5'-P-CCNC: 169 U/L (ref 13–39)
BASOPHILS # BLD AUTO: 0.01 THOUSANDS/ÂΜL (ref 0–0.1)
BASOPHILS NFR BLD AUTO: 0 % (ref 0–1)
BILIRUB SERPL-MCNC: 2.36 MG/DL (ref 0.2–1)
BUN SERPL-MCNC: 96 MG/DL (ref 5–25)
CALCIUM SERPL-MCNC: 8.8 MG/DL (ref 8.4–10.2)
CHLORIDE SERPL-SCNC: 91 MMOL/L (ref 96–108)
CO2 SERPL-SCNC: 20 MMOL/L (ref 21–32)
CREAT SERPL-MCNC: 6.92 MG/DL (ref 0.6–1.3)
EOSINOPHIL # BLD AUTO: 0.03 THOUSAND/ÂΜL (ref 0–0.61)
EOSINOPHIL NFR BLD AUTO: 0 % (ref 0–6)
ERYTHROCYTE [DISTWIDTH] IN BLOOD BY AUTOMATED COUNT: 16.7 % (ref 11.6–15.1)
FERRITIN SERPL-MCNC: 70 NG/ML (ref 24–336)
GFR SERPL CREATININE-BSD FRML MDRD: 7 ML/MIN/1.73SQ M
GLUCOSE SERPL-MCNC: 116 MG/DL (ref 65–140)
HCT VFR BLD AUTO: 30.9 % (ref 36.5–49.3)
HGB BLD-MCNC: 10.2 G/DL (ref 12–17)
IMM GRANULOCYTES # BLD AUTO: 0.03 THOUSAND/UL (ref 0–0.2)
IMM GRANULOCYTES NFR BLD AUTO: 0 % (ref 0–2)
IRON SATN MFR SERPL: 4 % (ref 15–50)
IRON SERPL-MCNC: 16 UG/DL (ref 50–212)
LYMPHOCYTES # BLD AUTO: 0.46 THOUSANDS/ÂΜL (ref 0.6–4.47)
LYMPHOCYTES NFR BLD AUTO: 5 % (ref 14–44)
MCH RBC QN AUTO: 27.1 PG (ref 26.8–34.3)
MCHC RBC AUTO-ENTMCNC: 33 G/DL (ref 31.4–37.4)
MCV RBC AUTO: 82 FL (ref 82–98)
MONOCYTES # BLD AUTO: 0.96 THOUSAND/ÂΜL (ref 0.17–1.22)
MONOCYTES NFR BLD AUTO: 10 % (ref 4–12)
NEUTROPHILS # BLD AUTO: 7.86 THOUSANDS/ÂΜL (ref 1.85–7.62)
NEUTS SEG NFR BLD AUTO: 85 % (ref 43–75)
NRBC BLD AUTO-RTO: 0 /100 WBCS
PLATELET # BLD AUTO: 181 THOUSANDS/UL (ref 149–390)
POTASSIUM SERPL-SCNC: 4.3 MMOL/L (ref 3.5–5.3)
PROT SERPL-MCNC: 6.9 G/DL (ref 6.4–8.4)
RBC # BLD AUTO: 3.77 MILLION/UL (ref 3.88–5.62)
SODIUM SERPL-SCNC: 128 MMOL/L (ref 135–147)
TIBC SERPL-MCNC: 373 UG/DL (ref 250–450)
UIBC SERPL-MCNC: 357 UG/DL (ref 155–355)
WBC # BLD AUTO: 9.35 THOUSAND/UL (ref 4.31–10.16)

## 2024-11-15 PROCEDURE — 83540 ASSAY OF IRON: CPT | Performed by: INTERNAL MEDICINE

## 2024-11-15 PROCEDURE — 71045 X-RAY EXAM CHEST 1 VIEW: CPT

## 2024-11-15 PROCEDURE — 83550 IRON BINDING TEST: CPT | Performed by: INTERNAL MEDICINE

## 2024-11-15 PROCEDURE — 5A1D70Z PERFORMANCE OF URINARY FILTRATION, INTERMITTENT, LESS THAN 6 HOURS PER DAY: ICD-10-PCS | Performed by: INTERNAL MEDICINE

## 2024-11-15 PROCEDURE — 99232 SBSQ HOSP IP/OBS MODERATE 35: CPT | Performed by: FAMILY MEDICINE

## 2024-11-15 PROCEDURE — 80053 COMPREHEN METABOLIC PANEL: CPT | Performed by: FAMILY MEDICINE

## 2024-11-15 PROCEDURE — 90935 HEMODIALYSIS ONE EVALUATION: CPT | Performed by: INTERNAL MEDICINE

## 2024-11-15 PROCEDURE — 82728 ASSAY OF FERRITIN: CPT | Performed by: INTERNAL MEDICINE

## 2024-11-15 PROCEDURE — 85025 COMPLETE CBC W/AUTO DIFF WBC: CPT | Performed by: FAMILY MEDICINE

## 2024-11-15 RX ORDER — ALPRAZOLAM 0.25 MG/1
0.25 TABLET ORAL 2 TIMES DAILY PRN
Status: DISCONTINUED | OUTPATIENT
Start: 2024-11-15 | End: 2024-11-20 | Stop reason: HOSPADM

## 2024-11-15 RX ORDER — HYDRALAZINE HYDROCHLORIDE 25 MG/1
50 TABLET, FILM COATED ORAL EVERY 8 HOURS SCHEDULED
Status: DISCONTINUED | OUTPATIENT
Start: 2024-11-15 | End: 2024-11-18

## 2024-11-15 RX ADMIN — HYDRALAZINE HYDROCHLORIDE 25 MG: 25 TABLET ORAL at 05:06

## 2024-11-15 RX ADMIN — Medication 3 MG: at 22:18

## 2024-11-15 RX ADMIN — ASPIRIN 81 MG: 81 TABLET, COATED ORAL at 08:52

## 2024-11-15 RX ADMIN — CARVEDILOL 12.5 MG: 12.5 TABLET, FILM COATED ORAL at 17:12

## 2024-11-15 RX ADMIN — HYDRALAZINE HYDROCHLORIDE 50 MG: 25 TABLET ORAL at 14:05

## 2024-11-15 RX ADMIN — ISOSORBIDE MONONITRATE 120 MG: 30 TABLET, EXTENDED RELEASE ORAL at 08:52

## 2024-11-15 RX ADMIN — HYDRALAZINE HYDROCHLORIDE 50 MG: 25 TABLET ORAL at 22:18

## 2024-11-15 RX ADMIN — ALPRAZOLAM 0.25 MG: 0.25 TABLET ORAL at 19:34

## 2024-11-15 RX ADMIN — CARVEDILOL 12.5 MG: 12.5 TABLET, FILM COATED ORAL at 08:51

## 2024-11-15 RX ADMIN — EPOETIN ALFA 5000 UNITS: 4000 SOLUTION INTRAVENOUS; SUBCUTANEOUS at 10:54

## 2024-11-15 NOTE — PROGRESS NOTES
Progress Note - Cardiology   Name: Matthew Alvarez 69 y.o. male I MRN: [id number]  Unit/Bed#: -01 I Date of Admission: 11/10/2024   Date of Service: 11/15/2024 I Hospital Day: 5    Assessment & Plan  Acute on chronic systolic heart failure (HCC)  Hypervolemic on exam well-perfused with warm extremities  Uncontrolled hypertension  continue coreg to 12.5 mg PO BID  Continue imdur to 120 mg daily  Increase hydralazine to 50 mg PO TID   Dilated cardiomyopathy (HCC)  On GDMT with Coreg, hydralazine and imdur    LifeVest on discharge outpatient follow-up with cardiology   Acute kidney injury superimposed on stage 3a chronic kidney disease (HCC)  Lab Results   Component Value Date    EGFR 7 11/15/2024    EGFR 8 11/14/2024    EGFR 13 11/13/2024    CREATININE 6.92 (H) 11/15/2024    CREATININE 5.99 (H) 11/14/2024    CREATININE 4.22 (H) 11/13/2024   HD today sp perm cath placement   Elevated troponin  No cp  No EKG changes   Tobacco abuse    Encounter for hemodialysis for ESRD ([facility])  Lab Results   Component Value Date    EGFR 7 11/15/2024    EGFR 8 11/14/2024    EGFR 13 11/13/2024    CREATININE 6.92 (H) 11/15/2024    CREATININE 5.99 (H) 11/14/2024    CREATININE 4.22 (H) 11/13/2024     Acute hemodialysis patient ([facility])    Renal failure    Encounter for hemodialysis ([facility])    Anemia due to chronic kidney disease, on chronic dialysis ([facility])  Lab Results   Component Value Date    EGFR 7 11/15/2024    EGFR 8 11/14/2024    EGFR 13 11/13/2024    CREATININE 6.92 (H) 11/15/2024    CREATININE 5.99 (H) 11/14/2024    CREATININE 4.22 (H) 11/13/2024       Subjective     Pt reports improved breathing. Getting HD. Has positive mood today.     Objective :  Temp:  [97.9 °F (36.6 °C)-98.9 °F (37.2 °C)] 97.9 °F (36.6 °C)  HR:  [65-78] 72  BP: (149-176)/() 176/111  Resp:  [18-49] 18  SpO2:  [92 %-98 %] 95 %  O2 Device: None (Room air)  Nasal Cannula O2 Flow Rate (L/min):  [3 L/min] 3 L/min  Orthostatic Blood Pressures      Flowsheet Row  Most Recent Value   Blood Pressure 176/111 filed at 11/15/2024 0930   Patient Position - Orthostatic VS Sitting filed at 11/15/2024 0700          First Weight: Weight - Scale: 57.9 kg (127 lb 10.3 oz) (11/10/24 1847)  Vitals:    11/15/24 0528 11/15/24 0830   Weight: 81.4 kg (179 lb 7.3 oz) 56 kg (123 lb 6.4 oz)     Physical Exam  Vitals and nursing note reviewed.   Constitutional:       Appearance: Normal appearance.   HENT:      Head: Normocephalic and atraumatic.   Cardiovascular:      Rate and Rhythm: Normal rate.      Heart sounds: No murmur heard.  Pulmonary:      Effort: Pulmonary effort is normal.      Breath sounds: Rhonchi present.   Musculoskeletal:         General: Swelling present.   Skin:     General: Skin is warm.      Capillary Refill: Capillary refill takes less than 2 seconds.   Neurological:      General: No focal deficit present.      Mental Status: He is alert and oriented to person, place, and time.   Psychiatric:         Mood and Affect: Mood normal.           Lab Results: I have reviewed the following results:  Results from last 7 days   Lab Units 11/15/24  0521 11/14/24  1342 11/14/24  0545 11/13/24  0514   WBC Thousand/uL 9.35  --  10.17* 10.21*   HEMOGLOBIN g/dL 10.2*  --  11.0* 11.1*   I STAT HEMOGLOBIN g/dl  --  10.9*  --   --    HEMATOCRIT % 30.9*  --  32.8* 33.9*   HEMATOCRIT, ISTAT %  --  32*  --   --    PLATELETS Thousands/uL 181  --  86* 102*     Results from last 7 days   Lab Units 11/15/24  0640 11/14/24  1342 11/14/24  0545 11/13/24  1241   POTASSIUM mmol/L 4.3  --  4.2 3.5   CHLORIDE mmol/L 91*  --  95* 94*   CO2 mmol/L 20*  --  22 24   CO2, I-STAT mmol/L  --  26  --   --    BUN mg/dL 96*  --  78* 53*   CREATININE mg/dL 6.92*  --  5.99* 4.22*   GLUCOSE, ISTAT mg/dl  --  147*  --   --    CALCIUM mg/dL 8.8  --  9.1 8.9     Results from last 7 days   Lab Units 11/14/24  1344 11/10/24  1853   INR  1.73* 1.83*   PTT seconds  --  32     Lab Results   Component Value Date    HGBA1C 5.7  (H) 09/15/2021     Lab Results   Component Value Date    CKTOTAL 64 09/11/2021    TROPONINI 0.03 09/12/2021

## 2024-11-15 NOTE — ASSESSMENT & PLAN NOTE
Lab Results   Component Value Date    EGFR 7 11/15/2024    EGFR 8 11/14/2024    EGFR 13 11/13/2024    CREATININE 6.92 (H) 11/15/2024    CREATININE 5.99 (H) 11/14/2024    CREATININE 4.22 (H) 11/13/2024   Hemoglobin is stable

## 2024-11-15 NOTE — PLAN OF CARE
Problem: CARDIOVASCULAR - ADULT  Goal: Maintains optimal cardiac output and hemodynamic stability  Description: INTERVENTIONS:  - Monitor I/O, vital signs and rhythm  - Monitor for S/S and trends of decreased cardiac output  - Administer and titrate ordered vasoactive medications to optimize hemodynamic stability  - Assess quality of pulses, skin color and temperature  - Assess for signs of decreased coronary artery perfusion  - Instruct patient to report change in severity of symptoms  Outcome: Progressing  Goal: Absence of cardiac dysrhythmias or at baseline rhythm  Description: INTERVENTIONS:  - Continuous cardiac monitoring, vital signs, obtain 12 lead EKG if ordered  - Administer antiarrhythmic and heart rate control medications as ordered  - Monitor electrolytes and administer replacement therapy as ordered  Outcome: Progressing     Problem: GENITOURINARY - ADULT  Goal: Maintains or returns to baseline urinary function  Description: INTERVENTIONS:  - Assess urinary function  - Encourage oral fluids to ensure adequate hydration if ordered  - Administer IV fluids as ordered to ensure adequate hydration  - Administer ordered medications as needed  - Offer frequent toileting  - Follow urinary retention protocol if ordered  Outcome: Progressing  Goal: Absence of urinary retention  Description: INTERVENTIONS:  - Assess patient's ability to void and empty bladder  - Monitor I/O  - Bladder scan as needed  - Discuss with physician/AP medications to alleviate retention as needed  - Discuss catheterization for long term situations as appropriate  Outcome: Progressing     Problem: METABOLIC, FLUID AND ELECTROLYTES - ADULT  Goal: Electrolytes maintained within normal limits  Description: INTERVENTIONS:  - Monitor labs and assess patient for signs and symptoms of electrolyte imbalances  - Administer electrolyte replacement as ordered  - Monitor response to electrolyte replacements, including repeat lab results as  appropriate  - Instruct patient on fluid and nutrition as appropriate  Outcome: Progressing  Goal: Fluid balance maintained  Description: INTERVENTIONS:  - Monitor labs   - Monitor I/O and WT  - Instruct patient on fluid and nutrition as appropriate  - Assess for signs & symptoms of volume excess or deficit  Outcome: Progressing  Goal: Glucose maintained within target range  Description: INTERVENTIONS:  - Monitor Blood Glucose as ordered  - Assess for signs and symptoms of hyperglycemia and hypoglycemia  - Administer ordered medications to maintain glucose within target range  - Assess nutritional intake and initiate nutrition service referral as needed  Outcome: Progressing     Problem: PAIN - ADULT  Goal: Verbalizes/displays adequate comfort level or baseline comfort level  Description: Interventions:  - Encourage patient to monitor pain and request assistance  - Assess pain using appropriate pain scale  - Administer analgesics based on type and severity of pain and evaluate response  - Implement non-pharmacological measures as appropriate and evaluate response  - Consider cultural and social influences on pain and pain management  - Notify physician/advanced practitioner if interventions unsuccessful or patient reports new pain  Outcome: Progressing     Problem: INFECTION - ADULT  Goal: Absence or prevention of progression during hospitalization  Description: INTERVENTIONS:  - Assess and monitor for signs and symptoms of infection  - Monitor lab/diagnostic results  - Monitor all insertion sites, i.e. indwelling lines, tubes, and drains  - Monitor endotracheal if appropriate and nasal secretions for changes in amount and color  - [place] appropriate cooling/warming therapies per order  - Administer medications as ordered  - Instruct and encourage patient and family to use good hand hygiene technique  - Identify and instruct in appropriate isolation precautions for identified infection/condition  Outcome:  Progressing     Problem: SAFETY ADULT  Goal: Patient will remain free of falls  Description: INTERVENTIONS:  - Educate patient/family on patient safety including physical limitations  - Instruct patient to call for assistance with activity   - Consult OT/PT to assist with strengthening/mobility   - Keep Call bell within reach  - Keep bed low and locked with side rails adjusted as appropriate  - Keep care items and personal belongings within reach  - Initiate and maintain comfort rounds  - Make Fall Risk Sign visible to staff  - Offer Toileting every 2 Hours, in advance of need  - Initiate/Maintain bed/chair alarm  - Obtain necessary fall risk management equipment:   - Apply yellow socks and bracelet for high fall risk patients  - Consider moving patient to room near nurses station  Outcome: Progressing  Goal: Maintain or return to baseline ADL function  Description: INTERVENTIONS:  -  Assess patient's ability to carry out ADLs; assess patient's baseline for ADL function and identify physical deficits which impact ability to perform ADLs (bathing, care of mouth/teeth, toileting, grooming, dressing, etc.)  - Assess/evaluate cause of self-care deficits   - Assess range of motion  - Assess patient's mobility; develop plan if impaired  - Assess patient's need for assistive devices and provide as appropriate  - Encourage maximum independence but intervene and supervise when necessary  - Involve family in performance of ADLs  - Assess for home care needs following discharge   - Consider OT consult to assist with ADL evaluation and planning for discharge  - Provide patient education as appropriate  Outcome: Progressing  Goal: Maintains/Returns to pre admission functional level  Description: INTERVENTIONS:  - Perform AM-PAC 6 Click Basic Mobility/ Daily Activity assessment daily.  - Set and communicate daily mobility goal to care team and patient/family/caregiver.   - Collaborate with rehabilitation services on mobility  goals if consulted  - Perform Range of Motion  times a day.  - Reposition patient .  - Dangle patient 4 times a day  - Stand patient 4 times a day  - Ambulate patient 4 times a day  - Out of bed to chair 3 times a day   - Out of bed for meals 3 times a day  - Out of bed for toileting  - Record patient progress and toleration of activity level   Outcome: Progressing     Problem: Knowledge Deficit  Goal: Patient/family/caregiver demonstrates understanding of disease process, treatment plan, medications, and discharge instructions  Description: Complete learning assessment and assess knowledge base.  Interventions:  - Provide teaching at level of understanding  - Provide teaching via preferred learning methods  Outcome: Progressing     Problem: Prexisting or High Potential for Compromised Skin Integrity  Goal: Skin integrity is maintained or improved  Description: INTERVENTIONS:  - Identify patients at risk for skin breakdown  - Assess and monitor skin integrity  - Assess and monitor nutrition and hydration status  - Monitor labs   - Assess for incontinence   - Turn and reposition patient  - Assist with mobility/ambulation  - Relieve pressure over bony prominences  - Avoid friction and shearing  - Provide appropriate hygiene as needed including keeping skin clean and dry  - Evaluate need for skin moisturizer/barrier cream  - Collaborate with interdisciplinary team   - Patient/family teaching  - Consider wound care consult   Outcome: Progressing     Problem: Nutrition/Hydration-ADULT  Goal: Nutrient/Hydration intake appropriate for improving, restoring or maintaining nutritional needs  Description: Monitor and assess patient's nutrition/hydration status for malnutrition. Collaborate with interdisciplinary team and initiate plan and interventions as ordered.  Monitor patient's weight and dietary intake as ordered or per policy. Utilize nutrition screening tool and intervene as necessary. Determine patient's food  preferences and provide high-protein, high-caloric foods as appropriate.     INTERVENTIONS:  - Monitor oral intake, urinary output, labs, and treatment plans  - Assess nutrition and hydration status and recommend course of action  - Evaluate amount of meals eaten  - Assist patient with eating if necessary   - Allow adequate time for meals  - Recommend/ encourage appropriate diets, oral nutritional supplements, and vitamin/mineral supplements  - Order, calculate, and assess calorie counts as needed  - Recommend, monitor, and adjust tube feedings and TPN/PPN based on assessed needs  - Assess need for intravenous fluids  - Provide specific nutrition/hydration education as appropriate  - Include patient/family/caregiver in decisions related to nutrition  Outcome: Progressing

## 2024-11-15 NOTE — ASSESSMENT & PLAN NOTE
Wt Readings from Last 3 Encounters:   11/15/24 56 kg (123 lb 6.4 oz)   09/03/24 55.3 kg (122 lb)   02/05/24 56 kg (123 lb 6.4 oz)   Repeat TTE-EF is 20 to 25% severe reduction of LV  Patient currently is not in cardiogenic shock.  He failed diuresis with oliguria with rising creatinine and he has been started on dialysis and now he is can IV on Monday Wednesday Friday as discussed with nephrology next today he still has evidence of pulmonary edema and pleural effusions for which more liters will be removed today if tolerable.  Also asked the critical care to evaluate for thoracocentesis if there is enough fluid.  Per cardiology on Imdur aspirin and beta-blocker  He will need a LifeVest prior to discharge

## 2024-11-15 NOTE — PLAN OF CARE
Problem: CARDIOVASCULAR - ADULT  Goal: Maintains optimal cardiac output and hemodynamic stability  Description: INTERVENTIONS:  - Monitor I/O, vital signs and rhythm  - Monitor for S/S and trends of decreased cardiac output  - Administer and titrate ordered vasoactive medications to optimize hemodynamic stability  - Assess quality of pulses, skin color and temperature  - Assess for signs of decreased coronary artery perfusion  - Instruct patient to report change in severity of symptoms  Outcome: Progressing  Goal: Absence of cardiac dysrhythmias or at baseline rhythm  Description: INTERVENTIONS:  - Continuous cardiac monitoring, vital signs, obtain 12 lead EKG if ordered  - Administer antiarrhythmic and heart rate control medications as ordered  - Monitor electrolytes and administer replacement therapy as ordered  Outcome: Progressing     Problem: PAIN - ADULT  Goal: Verbalizes/displays adequate comfort level or baseline comfort level  Description: Interventions:  - Encourage patient to monitor pain and request assistance  - Assess pain using appropriate pain scale  - Administer analgesics based on type and severity of pain and evaluate response  - Implement non-pharmacological measures as appropriate and evaluate response  - Consider cultural and social influences on pain and pain management  - Notify physician/advanced practitioner if interventions unsuccessful or patient reports new pain  Outcome: Progressing     Problem: INFECTION - ADULT  Goal: Absence or prevention of progression during hospitalization  Description: INTERVENTIONS:  - Assess and monitor for signs and symptoms of infection  - Monitor lab/diagnostic results  - Monitor all insertion sites, i.e. indwelling lines, tubes, and drains  - Monitor endotracheal if appropriate and nasal secretions for changes in amount and color  - [place] appropriate cooling/warming therapies per order  - Administer medications as ordered  - Instruct and encourage  patient and family to use good hand hygiene technique  - Identify and instruct in appropriate isolation precautions for identified infection/condition  Outcome: Progressing     Problem: SAFETY ADULT  Goal: Patient will remain free of falls  Description: INTERVENTIONS:  - Educate patient/family on patient safety including physical limitations  - Instruct patient to call for assistance with activity   - Consult OT/PT to assist with strengthening/mobility   - Keep Call bell within reach  - Keep bed low and locked with side rails adjusted as appropriate  - Keep care items and personal belongings within reach  - Initiate and maintain comfort rounds  - Make Fall Risk Sign visible to staff  - Offer Toileting every 2 Hours, in advance of need  - Initiate/Maintain bed/chair alarm  - Obtain necessary fall risk management equipment:   - Apply yellow socks and bracelet for high fall risk patients  - Consider moving patient to room near nurses station  Outcome: Progressing  Goal: Maintain or return to baseline ADL function  Description: INTERVENTIONS:  -  Assess patient's ability to carry out ADLs; assess patient's baseline for ADL function and identify physical deficits which impact ability to perform ADLs (bathing, care of mouth/teeth, toileting, grooming, dressing, etc.)  - Assess/evaluate cause of self-care deficits   - Assess range of motion  - Assess patient's mobility; develop plan if impaired  - Assess patient's need for assistive devices and provide as appropriate  - Encourage maximum independence but intervene and supervise when necessary  - Involve family in performance of ADLs  - Assess for home care needs following discharge   - Consider OT consult to assist with ADL evaluation and planning for discharge  - Provide patient education as appropriate  Outcome: Progressing  Goal: Maintains/Returns to pre admission functional level  Description: INTERVENTIONS:  - Perform AM-PAC 6 Click Basic Mobility/ Daily Activity  assessment daily.  - Set and communicate daily mobility goal to care team and patient/family/caregiver.   - Collaborate with rehabilitation services on mobility goals if consulted  - Perform Range of Motion  times a day.  - Reposition patient .  - Dangle patient 4 times a day  - Stand patient 4 times a day  - Ambulate patient 4 times a day  - Out of bed to chair 3 times a day   - Out of bed for meals 3 times a day  - Out of bed for toileting  - Record patient progress and toleration of activity level   Outcome: Progressing     Problem: Knowledge Deficit  Goal: Patient/family/caregiver demonstrates understanding of disease process, treatment plan, medications, and discharge instructions  Description: Complete learning assessment and assess knowledge base.  Interventions:  - Provide teaching at level of understanding  - Provide teaching via preferred learning methods  Outcome: Progressing     Problem: GENITOURINARY - ADULT  Goal: Maintains or returns to baseline urinary function  Description: INTERVENTIONS:  - Assess urinary function  - Encourage oral fluids to ensure adequate hydration if ordered  - Administer IV fluids as ordered to ensure adequate hydration  - Administer ordered medications as needed  - Offer frequent toileting  - Follow urinary retention protocol if ordered  Outcome: Progressing  Goal: Absence of urinary retention  Description: INTERVENTIONS:  - Assess patient's ability to void and empty bladder  - Monitor I/O  - Bladder scan as needed  - Discuss with physician/AP medications to alleviate retention as needed  - Discuss catheterization for long term situations as appropriate  Outcome: Progressing     Problem: METABOLIC, FLUID AND ELECTROLYTES - ADULT  Goal: Electrolytes maintained within normal limits  Description: INTERVENTIONS:  - Monitor labs and assess patient for signs and symptoms of electrolyte imbalances  - Administer electrolyte replacement as ordered  - Monitor response to electrolyte  replacements, including repeat lab results as appropriate  - Instruct patient on fluid and nutrition as appropriate  Outcome: Progressing  Goal: Fluid balance maintained  Description: INTERVENTIONS:  - Monitor labs   - Monitor I/O and WT  - Instruct patient on fluid and nutrition as appropriate  - Assess for signs & symptoms of volume excess or deficit  Outcome: Progressing  Goal: Glucose maintained within target range  Description: INTERVENTIONS:  - Monitor Blood Glucose as ordered  - Assess for signs and symptoms of hyperglycemia and hypoglycemia  - Administer ordered medications to maintain glucose within target range  - Assess nutritional intake and initiate nutrition service referral as needed  Outcome: Progressing     Problem: Prexisting or High Potential for Compromised Skin Integrity  Goal: Skin integrity is maintained or improved  Description: INTERVENTIONS:  - Identify patients at risk for skin breakdown  - Assess and monitor skin integrity  - Assess and monitor nutrition and hydration status  - Monitor labs   - Assess for incontinence   - Turn and reposition patient  - Assist with mobility/ambulation  - Relieve pressure over bony prominences  - Avoid friction and shearing  - Provide appropriate hygiene as needed including keeping skin clean and dry  - Evaluate need for skin moisturizer/barrier cream  - Collaborate with interdisciplinary team   - Patient/family teaching  - Consider wound care consult   Outcome: Progressing

## 2024-11-15 NOTE — PROGRESS NOTES
Progress Note - Hospitalist   Name: Matthew Alvarez 69 y.o. male I MRN: [id number]  Unit/Bed#: -01 I Date of Admission: 11/10/2024   Date of Service: 11/15/2024 I Hospital Day: 5    Assessment & Plan  Acute on chronic systolic heart failure (HCC)  Wt Readings from Last 3 Encounters:   11/15/24 56 kg (123 lb 6.4 oz)   09/03/24 55.3 kg (122 lb)   02/05/24 56 kg (123 lb 6.4 oz)   Repeat TTE-EF is 20 to 25% severe reduction of LV  Patient currently is not in cardiogenic shock.  He failed diuresis with oliguria with rising creatinine and he has been started on dialysis and now he is can IV on Monday Wednesday Friday as discussed with nephrology next today he still has evidence of pulmonary edema and pleural effusions for which more liters will be removed today if tolerable.  Also asked the critical care to evaluate for thoracocentesis if there is enough fluid.  Per cardiology on Imdur aspirin and beta-blocker  He will need a LifeVest prior to discharge  Uncontrolled hypertension  Uncontrolled today increased hydralazine to 50 mg p.o. every 8 hours, continue Coreg 12.5 mg p.o. twice daily and Imdur 120 mg p.o. daily  Acute kidney injury superimposed on stage 3a chronic kidney disease (HCC)  Lab Results   Component Value Date    EGFR 7 11/15/2024    EGFR 8 11/14/2024    EGFR 13 11/13/2024    CREATININE 6.92 (H) 11/15/2024    CREATININE 5.99 (H) 11/14/2024    CREATININE 4.22 (H) 11/13/2024   CKD stage III with a baseline creatinine of 1.9-2 his creatinine has risen up to 7 with oliguria he was started on dialysis he received his dialysis yesterday discussed with nephrology he will be on Monday Wednesday Friday  S/p permacath placement  He is on room air but still has Rales on exam chest x-ray with still pulmonary edema and pleural effusion reached out to nephrology if we could remove more fluid today they are in agreement they can be removed a couple of liters of tolerable.  Also reached out to critical care to  evaluate with ultrasound if there is any evidence of effusions for thoracocentesis.  Nephrology on board and following recommend serologies to rule out intrinsic disease  SPEP/UPEP, ANCA, JORGE LUIS, C3, C4, urine protein creatinine ratio  Elevated troponin  Elevated troponin upon presentation of 83, 2-hour troponin of 112  Patient is without chest pain currently  Nonischemic EKG  Likely elevated secondary to demand from accelerated hypertension and CHF exacerbation  Cardiology on board and following  Tte-EF is 20 to 24%  Tobacco abuse  Recommend complete cessation-patient endorses he stopped smoking 3 days ago  NRT offered  Elevated liver transaminase level  Patient presents with volume overload  No known history of liver disease, does have history of heavy alcohol use  Patient endorses initially no further use of alcohol, then states last drink was 2 weeks ago -unreliable historian   liver enzymes suspected secondary to congestive hepatopathy that started improving with dialysis Ultrasound of the right upper quadrant is negative  Elevated d-dimer  D-dimer elevated to 12.54  Patient without hemoptysis, pleuritic chest pain  Does have shortness of breath and mild tachycardia that is improved  Currently no oxygen requirements, saturating well on room air  Kidney function precludes CTA chest  Lower extremity venous duplex pending  TTE pending  Heparin subcu DVT prophylaxis  Dilated cardiomyopathy (HCC)  Chronic, continue aspirin, carvedilol Imdur management as per systolic heart failure  Hyponatremia  Sodium lowered to 128 dialysis today to remove more fluid repeat sodium tomorrow  Oliguria    Encounter for hemodialysis for ESRD (HCC)  Lab Results   Component Value Date    EGFR 7 11/15/2024    EGFR 8 11/14/2024    EGFR 13 11/13/2024    CREATININE 6.92 (H) 11/15/2024    CREATININE 5.99 (H) 11/14/2024    CREATININE 4.22 (H) 11/13/2024     Acute hemodialysis patient (HCC)    Renal failure    Encounter for hemodialysis  (HCC)    Anemia due to chronic kidney disease, on chronic dialysis (HCC)  Lab Results   Component Value Date    EGFR 7 11/15/2024    EGFR 8 11/14/2024    EGFR 13 11/13/2024    CREATININE 6.92 (H) 11/15/2024    CREATININE 5.99 (H) 11/14/2024    CREATININE 4.22 (H) 11/13/2024   Hemoglobin is stable    VTE Pharmacologic Prophylaxis:   Moderate Risk (Score 3-4) - Pharmacological DVT Prophylaxis Ordered: heparin.    Mobility:   Basic Mobility Inpatient Raw Score: 18  JH-HLM Goal: 6: Walk 10 steps or more  JH-HLM Achieved: 5: Stand (1 or more minutes)  JH-HLM Goal NOT achieved. Continue with multidisciplinary rounding and encourage appropriate mobility to improve upon JH-HLM goals.    Patient Centered Rounds: I performed bedside rounds with nursing staff today.   Discussions with Specialists or Other Care Team Provider: Discussed with nephrology critical care and cardiology    Education and Discussions with Family / Patient: Attempted to update  (wife) via phone. Unable to contact. For the second day straight to  and mailbox is full     Current Length of Stay: 5 day(s)  Current Patient Status: Inpatient   Certification Statement: The patient will continue to require additional inpatient hospital stay due to secondary to CHF  Discharge Plan: Anticipate discharge in 48 hrs to discharge location to be determined pending rehab evaluations.    Code Status: Level 1 - Full Code    Subjective   Patient seen and examined some shortness of breath and fatigue    Objective :  Temp:  [97.9 °F (36.6 °C)-98.9 °F (37.2 °C)] 97.9 °F (36.6 °C)  HR:  [65-78] 72  BP: (149-179)/() 179/106  Resp:  [18-49] 18  SpO2:  [92 %-98 %] 95 %  O2 Device: None (Room air)  Nasal Cannula O2 Flow Rate (L/min):  [3 L/min] 3 L/min    Body mass index is 19.92 kg/m².     Input and Output Summary (last 24 hours):     Intake/Output Summary (Last 24 hours) at 11/15/2024 1150  Last data filed at 11/15/2024 0930  Gross per 24 hour   Intake 1230  ml   Output 350 ml   Net 880 ml       Physical Exam  Vitals and nursing note reviewed.   Constitutional:       General: He is not in acute distress.     Appearance: He is well-developed.   HENT:      Head: Normocephalic and atraumatic.   Eyes:      Conjunctiva/sclera: Conjunctivae normal.   Cardiovascular:      Rate and Rhythm: Normal rate and regular rhythm.      Heart sounds: No murmur heard.  Pulmonary:      Effort: Pulmonary effort is normal. No respiratory distress.      Breath sounds: Rales present. No wheezing.   Abdominal:      Palpations: Abdomen is soft.      Tenderness: There is no abdominal tenderness.   Musculoskeletal:         General: No swelling.      Cervical back: Neck supple.   Skin:     General: Skin is warm and dry.      Capillary Refill: Capillary refill takes less than 2 seconds.   Neurological:      Mental Status: He is alert and oriented to person, place, and time.   Psychiatric:         Mood and Affect: Mood normal.           Lines/Drains:  Lines/Drains/Airways       Active Status       Name Placement date Placement time Site Days    HD Permanent Double Catheter 11/14/24  1352  Internal jugular  less than 1                            Lab Results: I have reviewed the following results:   Results from last 7 days   Lab Units 11/15/24  0521   WBC Thousand/uL 9.35   HEMOGLOBIN g/dL 10.2*   HEMATOCRIT % 30.9*   PLATELETS Thousands/uL 181   SEGS PCT % 85*   LYMPHO PCT % 5*   MONO PCT % 10   EOS PCT % 0     Results from last 7 days   Lab Units 11/15/24  0640   SODIUM mmol/L 128*   POTASSIUM mmol/L 4.3   CHLORIDE mmol/L 91*   CO2 mmol/L 20*   BUN mg/dL 96*   CREATININE mg/dL 6.92*   ANION GAP mmol/L 17*   CALCIUM mg/dL 8.8   ALBUMIN g/dL 3.6   TOTAL BILIRUBIN mg/dL 2.36*   ALK PHOS U/L 99   ALT U/L 451*   AST U/L 169*   GLUCOSE RANDOM mg/dL 116     Results from last 7 days   Lab Units 11/14/24  1344   INR  1.73*             Results from last 7 days   Lab Units 11/12/24  1051 11/10/24  2320  11/10/24  1853   LACTIC ACID mmol/L 1.3 4.6* 5.9*       Recent Cultures (last 7 days):         Imaging Results Review: I reviewed radiology reports from this admission including: chest xray.  Other Study Results Review: No additional pertinent studies reviewed.    Last 24 Hours Medication List:     Current Facility-Administered Medications:     aspirin (ECOTRIN LOW STRENGTH) EC tablet 81 mg, Daily    carvedilol (COREG) tablet 12.5 mg, BID With Meals    epoetin loida (EPOGEN,PROCRIT) injection 5,000 Units, Once per day on Monday Wednesday Friday    heparin (porcine) subcutaneous injection 5,000 Units, Q8H JEEVAN    hydrALAZINE (APRESOLINE) injection 10 mg, Q6H PRN    hydrALAZINE (APRESOLINE) tablet 50 mg, Q8H JEEVAN    isosorbide mononitrate (IMDUR) 24 hr tablet 120 mg, Daily    melatonin tablet 3 mg, HS    nicotine (NICODERM CQ) 14 mg/24hr TD 24 hr patch 1 patch, Daily    Administrative Statements   Today, Patient Was Seen By: Stacy Carson MD  I have spent a total time of >35 minutes in caring for this patient on the day of the visit/encounter including Documenting in the medical record, Reviewing / ordering tests, medicine, procedures  , and Communicating with other healthcare professionals .    **Please Note: This note may have been constructed using a voice recognition system.**

## 2024-11-15 NOTE — ASSESSMENT & PLAN NOTE
Lab Results   Component Value Date    EGFR 7 11/15/2024    EGFR 8 11/14/2024    EGFR 13 11/13/2024    CREATININE 6.92 (H) 11/15/2024    CREATININE 5.99 (H) 11/14/2024    CREATININE 4.22 (H) 11/13/2024   HD today sp perm cath placement

## 2024-11-15 NOTE — ASSESSMENT & PLAN NOTE
Lab Results   Component Value Date    EGFR 7 11/15/2024    EGFR 8 11/14/2024    EGFR 13 11/13/2024    CREATININE 6.92 (H) 11/15/2024    CREATININE 5.99 (H) 11/14/2024    CREATININE 4.22 (H) 11/13/2024   CKD stage III with a baseline creatinine of 1.9-2 his creatinine has risen up to 7 with oliguria he was started on dialysis he received his dialysis yesterday discussed with nephrology he will be on Monday Wednesday Friday  S/p permacath placement  He is on room air but still has Rales on exam chest x-ray with still pulmonary edema and pleural effusion reached out to nephrology if we could remove more fluid today they are in agreement they can be removed a couple of liters of tolerable.  Also reached out to critical care to evaluate with ultrasound if there is any evidence of effusions for thoracocentesis.  Nephrology on board and following recommend serologies to rule out intrinsic disease  SPEP/UPEP, ANCA, JORGE LUIS, C3, C4, urine protein creatinine ratio

## 2024-11-15 NOTE — ASSESSMENT & PLAN NOTE
continue coreg to 12.5 mg PO BID  Continue imdur to 120 mg daily  Increase hydralazine to 50 mg PO TID

## 2024-11-15 NOTE — ASSESSMENT & PLAN NOTE
Uncontrolled today increased hydralazine to 50 mg p.o. every 8 hours, continue Coreg 12.5 mg p.o. twice daily and Imdur 120 mg p.o. daily

## 2024-11-15 NOTE — PROGRESS NOTES
NEPHROLOGY PROGRESS NOTE    Matthew Alvarez 69 y.o. male MRN: [id number]  Unit/Bed#: -01 Encounter: [id number]  Reason for Consult: Renal failure requiring dialysis       The patient was relatively stable clinically says he just continues to feel weak has a slight cough.  Had his tunneled dialysis catheter placed yesterday no bleeding.      ASSESSMENT/PLAN:  1.  Renal    The patient presents with advanced renal failure that worsened in the hospital with no clear inciting event so raises suspicion of intrinsic process.  Creatinine had been 2 in the past and then increased significantly and he was initiated on hemodialysis.  Labs from today showed a creatinine of 6.9 sodium is 128 mEq/L.  Potassium is 4.3.  Hemoglobin is 10.2.    A urine protein creatinine ratio came back and was only 600 mg.  I sent some serologies as I wanted to rule out some systemic illness and vasculitis serologies for ANCA are negative.  SPEP and UPEP are negative.  Anti-GBM is pending.  Rheumatoid factor positive years ago.  C3 and C4 were low.    This point the patient has renal failure he has no significant urine output so likely has end-stage renal disease.    He was seen on dialysis details of treatment outlined below.    HEMODIALYSIS PROCEDURE NOTE  The patient was seen and examined on hemodialysis.  Time: 3.5 hours  Sodium: 138 Blood flow: 350   Dialyzer: F160 Potassium: 3 Dialysate flow: 600   Access: catheter Bicarbonate: 35 Ultrafiltration goal: 2 liters as tolerated        While inpatient doing dialysis Monday Wednesday Friday  Case management consulted to start to arrange outpatient dialysis placement  Has tunneled dialysis catheter in place  On Epogen 5000 units with each dialysis.  Hemoglobins been in targeted range of 10-12.  Check iron studies.    2.  Transaminitis    Patient had significant increase in AST and ALT which are improving.    3.  Cardiac    Patient is chronic heart failure and LVEF was unchanged as it was  "20-24%.        SUBJECTIVE:  Review of Systems   Constitutional: Negative for chills and fever.   HENT: Negative.     Eyes: Negative.    Cardiovascular:  Negative for chest pain, dyspnea on exertion and orthopnea.   Respiratory:  Positive for cough. Negative for shortness of breath and sputum production.    Gastrointestinal:  Negative for abdominal pain, diarrhea, nausea and vomiting.   Neurological:  Negative for dizziness and headaches.   Psychiatric/Behavioral:  Negative for altered mental status.        OBJECTIVE:  Current Weight: Weight - Scale: 56 kg (123 lb 6.4 oz)  Vitals:Temp (24hrs), Av.4 °F (36.9 °C), Min:97.9 °F (36.6 °C), Max:98.9 °F (37.2 °C)  Current: Temperature: 97.9 °F (36.6 °C)   Blood pressure (!) 174/88, pulse 66, temperature 97.9 °F (36.6 °C), temperature source Temporal, resp. rate 18, height 5' 6\" (1.676 m), weight 56 kg (123 lb 6.4 oz), SpO2 95%. , Body mass index is 19.92 kg/m².      Intake/Output Summary (Last 24 hours) at 11/15/2024 0935  Last data filed at 11/15/2024 0801  Gross per 24 hour   Intake 1190 ml   Output 350 ml   Net 840 ml       Physical Exam: BP (!) 174/88   Pulse 66   Temp 97.9 °F (36.6 °C) (Temporal)   Resp 18   Ht 5' 6\" (1.676 m)   Wt 56 kg (123 lb 6.4 oz)   SpO2 95%   BMI 19.92 kg/m²   Physical Exam  Constitutional:       General: He is not in acute distress.     Appearance: He is not toxic-appearing.   HENT:      Head: Normocephalic and atraumatic.      Nose: Nose normal.      Mouth/Throat:      Mouth: Mucous membranes are dry.   Eyes:      General: No scleral icterus.  Cardiovascular:      Rate and Rhythm: Normal rate and regular rhythm.      Heart sounds:      No friction rub. No gallop.   Pulmonary:      Effort: Pulmonary effort is normal. No respiratory distress.      Breath sounds: No wheezing or rales.   Abdominal:      General: Bowel sounds are normal. There is no distension.      Palpations: Abdomen is soft.      Tenderness: There is no abdominal " "tenderness.   Neurological:      Mental Status: He is alert and oriented to person, place, and time.   Psychiatric:         Mood and Affect: Mood normal.         Behavior: Behavior normal.         Medications:    Current Facility-Administered Medications:     aspirin (ECOTRIN LOW STRENGTH) EC tablet 81 mg, 81 mg, Oral, Daily, Jared Bateman PA-C, 81 mg at 11/15/24 0852    carvedilol (COREG) tablet 12.5 mg, 12.5 mg, Oral, BID With Meals, Kathleen Bui PA-C, 12.5 mg at 11/15/24 0851    heparin (porcine) subcutaneous injection 5,000 Units, 5,000 Units, Subcutaneous, Q8H JEEVAN, Jared Bateman PA-C, 5,000 Units at 11/12/24 1524    hydrALAZINE (APRESOLINE) injection 10 mg, 10 mg, Intravenous, Q6H PRN, Jared Bateman PA-C, 10 mg at 11/10/24 2320    hydrALAZINE (APRESOLINE) tablet 25 mg, 25 mg, Oral, Q8H JEEVAN, Jared Bateman PA-C, 25 mg at 11/15/24 0506    isosorbide mononitrate (IMDUR) 24 hr tablet 120 mg, 120 mg, Oral, Daily, Kathleen Bui PA-C, 120 mg at 11/15/24 0852    melatonin tablet 3 mg, 3 mg, Oral, HS, Jany S Jake, CRNP, 3 mg at 11/14/24 2104    nicotine (NICODERM CQ) 14 mg/24hr TD 24 hr patch 1 patch, 1 patch, Transdermal, Daily, Jared Bateman PA-C, 1 patch at 11/11/24 0803    Laboratory Results:  Lab Results   Component Value Date    WBC 9.35 11/15/2024    HGB 10.2 (L) 11/15/2024    HCT 30.9 (L) 11/15/2024    MCV 82 11/15/2024     11/15/2024     Lab Results   Component Value Date    SODIUM 128 (L) 11/15/2024    K 4.3 11/15/2024    CL 91 (L) 11/15/2024    CO2 20 (L) 11/15/2024    BUN 96 (H) 11/15/2024    CREATININE 6.92 (H) 11/15/2024    GLUC 116 11/15/2024    CALCIUM 8.8 11/15/2024     Lab Results   Component Value Date    CALCIUM 8.8 11/15/2024    PHOS 5.0 (H) 11/14/2024     No results found for: \"LABPROT\"    "

## 2024-11-15 NOTE — PROGRESS NOTES
Patient:    MRN:  [id number]    [facility] Request ID:  [id number]    Level of care reserved:  Dialysis    Partner Reserved:  [place], [place] [id number] [phone]    Clinical needs requested:    Geography searched:  30 miles around [id number]    Start of Service:    Pickup/appointment time:    Request sent:  4:22pm EST on 11/13/2024 by Hanh Bey    Partner reserved:  2:48pm EST on 11/15/2024 by Hanh Bey    Choice list shared:

## 2024-11-15 NOTE — CASE MANAGEMENT
Case Management Discharge Planning Note    Patient name Matthew Alvarez  Location /-01 MRN [id number]  : 1955 Date 11/15/2024       Current Admission Date: 11/10/2024  Current Admission Diagnosis:Acute on chronic systolic heart failure (HCC)   Patient Active Problem List    Diagnosis Date Noted Date Diagnosed    Renal failure 11/15/2024     Encounter for hemodialysis ([facility]) 11/15/2024     Anemia due to chronic kidney disease, on chronic dialysis (HCC) 11/15/2024     Acute hemodialysis patient ([facility]) 2024     Oliguria 2024     Encounter for hemodialysis for ESRD ([facility]) 2024     Hyponatremia 2024     Dilated cardiomyopathy ([facility]) 2024     Elevated liver transaminase level 11/10/2024     Elevated d-dimer 11/10/2024     Acute on chronic systolic heart failure (HCC) 2024     Coronary artery disease involving native coronary artery of native heart without angina pectoris 10/08/2021     Rheumatoid factor positive 2021     Protein calorie malnutrition (HCC) 2021     Acute kidney injury superimposed on stage 3a chronic kidney disease (HCC) 2021     Elevated troponin 2021     Tobacco abuse 2021     Nonischemic cardiomyopathy ([facility]) 2021     Uncontrolled hypertension 2021       LOS (days): 5  Geometric Mean LOS (GMLOS) (days): 4.4  Days to GMLOS:-0.2     OBJECTIVE:  Risk of Unplanned Readmission Score: 16.59         Current admission status: Inpatient   Preferred Pharmacy:   [facility]/pharmacy #[id number] - [place] - [address] [facility]  [address]  [facility] [place] [address]  Phone: [phone] Fax: [phone]    [facility] - [place] - [address]  [address]  [place] [address]  Phone: [phone] Fax: [phone]    Primary Care Provider: Olive Rodriguez MD    Primary Insurance: [facility] REP  Secondary Insurance:     DISCHARGE DETAILS:     Not medically ready for dc-    Called Jenifer, and left a message with Janiya informing her pt will be not be ready for a Monday Start of date for HD.  CM will follow up with HD when medically stable.  Therapy to see patient.  ? HHC vs STR to be determined        1228_ Aware pt will need a Life Vest for dc. Orders were placed by Cardiology.  Emailed Life Vest Rep that GSL will need a Vest upon dc and he will be here until at least Monday.

## 2024-11-15 NOTE — OCCUPATIONAL THERAPY NOTE
Occupational Therapy         Patient Name: Matthew Alvarez  Today's Date: 11/15/2024       11/15/24 0953   Note Type   Note type Cancelled Session   Cancel Reasons Patient off floor/hemodialysis         Chart reviewed. Attempted to see pt for OT session this AM. Nursing initiating dialysis at time of evaluation attempt. Will continue to follow case and address as appropriate and as time allows.

## 2024-11-15 NOTE — PLAN OF CARE
Pre-tx:  UF 2 L as tolerated per discussion with Nephrologist Annita.  HD permcath accessed without issue.  Catheter dressing with surgifoam under per IR, pt had bleeding problems earlier this week.  Dsg dry and intact, no active drainage.  3K bath today per last serum K of 4.3.      Post-Dialysis RN Treatment Note    Blood Pressure:  Pre 176/111 mm/Hg  Post 184/106 mmHg   EDW:  TBD kg    Weight:  Pre 55.9 kg   Post 54 kg   Mode of weight measurement: Standing Scale   Volume Removed:  1900 ml    Treatment duration: 3.5 hours    NS given:  No    Treatment shortened No   Medications given during Rx: Epogen per order   Estimated Kt/V: 1.2   Access type: Permacath/TDC   Needle Gauge:    Access Issues: No    Report called to primary nurse:   Yes                 Problem: METABOLIC, FLUID AND ELECTROLYTES - ADULT  Goal: Electrolytes maintained within normal limits  Description: INTERVENTIONS:  - Monitor labs and assess patient for signs and symptoms of electrolyte imbalances  - Administer electrolyte replacement as ordered  - Monitor response to electrolyte replacements, including repeat lab results as appropriate  - Instruct patient on fluid and nutrition as appropriate  Outcome: Progressing  Goal: Fluid balance maintained  Description: INTERVENTIONS:  - Monitor labs   - Monitor I/O and WT  - Instruct patient on fluid and nutrition as appropriate  - Assess for signs & symptoms of volume excess or deficit  Outcome: Progressing

## 2024-11-15 NOTE — ASSESSMENT & PLAN NOTE
Lab Results   Component Value Date    EGFR 7 11/15/2024    EGFR 8 11/14/2024    EGFR 13 11/13/2024    CREATININE 6.92 (H) 11/15/2024    CREATININE 5.99 (H) 11/14/2024    CREATININE 4.22 (H) 11/13/2024

## 2024-11-15 NOTE — PHYSICAL THERAPY NOTE
PHYSICAL THERAPY TREATMENT NOTE  NAME:  Matthew Alvarez  DATE: 11/15/24       11/15/24 0952   Note Type   Note type Cancelled Session;Evaluation   Cancel Reasons Patient off floor/hemodialysis         PT order received, chart review completed. Pt admitted to [facility] on 11/10/2024 w/ dx of Acute on chronic systolic heart failure (HCC). Attempted to see pt for PT session this date, however pt currently receiving HD. Will continue to follow pt and provide care as pt is appropriate and available.    Jud Lowe, PT,DPT

## 2024-11-15 NOTE — ASSESSMENT & PLAN NOTE
Patient presents with volume overload  No known history of liver disease, does have history of heavy alcohol use  Patient endorses initially no further use of alcohol, then states last drink was 2 weeks ago -unreliable historian   liver enzymes suspected secondary to congestive hepatopathy that started improving with dialysis Ultrasound of the right upper quadrant is negative

## 2024-11-16 LAB
ALBUMIN SERPL BCG-MCNC: 3.6 G/DL (ref 3.5–5)
ALP SERPL-CCNC: 108 U/L (ref 34–104)
ALT SERPL W P-5'-P-CCNC: 263 U/L (ref 7–52)
ANION GAP SERPL CALCULATED.3IONS-SCNC: 13 MMOL/L (ref 4–13)
ANISOCYTOSIS BLD QL SMEAR: PRESENT
AST SERPL W P-5'-P-CCNC: 102 U/L (ref 13–39)
BASOPHILS # BLD MANUAL: 0 THOUSAND/UL (ref 0–0.1)
BASOPHILS NFR MAR MANUAL: 0 % (ref 0–1)
BILIRUB SERPL-MCNC: 2.36 MG/DL (ref 0.2–1)
BUN SERPL-MCNC: 61 MG/DL (ref 5–25)
BURR CELLS BLD QL SMEAR: PRESENT
CALCIUM SERPL-MCNC: 9.2 MG/DL (ref 8.4–10.2)
CHLORIDE SERPL-SCNC: 94 MMOL/L (ref 96–108)
CO2 SERPL-SCNC: 24 MMOL/L (ref 21–32)
CREAT SERPL-MCNC: 5.35 MG/DL (ref 0.6–1.3)
EOSINOPHIL # BLD MANUAL: 0.07 THOUSAND/UL (ref 0–0.4)
EOSINOPHIL NFR BLD MANUAL: 1 % (ref 0–6)
ERYTHROCYTE [DISTWIDTH] IN BLOOD BY AUTOMATED COUNT: 15.3 % (ref 11.6–15.1)
GFR SERPL CREATININE-BSD FRML MDRD: 9 ML/MIN/1.73SQ M
GLUCOSE SERPL-MCNC: 110 MG/DL (ref 65–140)
HCT VFR BLD AUTO: 32.3 % (ref 36.5–49.3)
HGB BLD-MCNC: 10.7 G/DL (ref 12–17)
LG PLATELETS BLD QL SMEAR: PRESENT
LYMPHOCYTES # BLD AUTO: 0.79 THOUSAND/UL (ref 0.6–4.47)
LYMPHOCYTES # BLD AUTO: 11 % (ref 14–44)
MCH RBC QN AUTO: 27 PG (ref 26.8–34.3)
MCHC RBC AUTO-ENTMCNC: 33.1 G/DL (ref 31.4–37.4)
MCV RBC AUTO: 82 FL (ref 82–98)
MONOCYTES # BLD AUTO: 0.93 THOUSAND/UL (ref 0–1.22)
MONOCYTES NFR BLD: 13 % (ref 4–12)
NEUTROPHILS # BLD MANUAL: 5.39 THOUSAND/UL (ref 1.85–7.62)
NEUTS SEG NFR BLD AUTO: 75 % (ref 43–75)
OVALOCYTES BLD QL SMEAR: PRESENT
PLATELET # BLD AUTO: 79 THOUSANDS/UL (ref 149–390)
PLATELET BLD QL SMEAR: ABNORMAL
PMV BLD AUTO: 11.8 FL (ref 8.9–12.7)
POLYCHROMASIA BLD QL SMEAR: PRESENT
POTASSIUM SERPL-SCNC: 4.6 MMOL/L (ref 3.5–5.3)
PROT SERPL-MCNC: 7 G/DL (ref 6.4–8.4)
RBC # BLD AUTO: 3.96 MILLION/UL (ref 3.88–5.62)
RBC MORPH BLD: PRESENT
SODIUM SERPL-SCNC: 131 MMOL/L (ref 135–147)
WBC # BLD AUTO: 7.19 THOUSAND/UL (ref 4.31–10.16)

## 2024-11-16 PROCEDURE — 85027 COMPLETE CBC AUTOMATED: CPT | Performed by: FAMILY MEDICINE

## 2024-11-16 PROCEDURE — 99232 SBSQ HOSP IP/OBS MODERATE 35: CPT | Performed by: INTERNAL MEDICINE

## 2024-11-16 PROCEDURE — 97116 GAIT TRAINING THERAPY: CPT

## 2024-11-16 PROCEDURE — 85007 BL SMEAR W/DIFF WBC COUNT: CPT | Performed by: FAMILY MEDICINE

## 2024-11-16 PROCEDURE — 97166 OT EVAL MOD COMPLEX 45 MIN: CPT

## 2024-11-16 PROCEDURE — 99232 SBSQ HOSP IP/OBS MODERATE 35: CPT | Performed by: FAMILY MEDICINE

## 2024-11-16 PROCEDURE — 97163 PT EVAL HIGH COMPLEX 45 MIN: CPT

## 2024-11-16 PROCEDURE — 97535 SELF CARE MNGMENT TRAINING: CPT

## 2024-11-16 PROCEDURE — 80053 COMPREHEN METABOLIC PANEL: CPT | Performed by: FAMILY MEDICINE

## 2024-11-16 RX ADMIN — Medication 3 MG: at 22:08

## 2024-11-16 RX ADMIN — CARVEDILOL 12.5 MG: 12.5 TABLET, FILM COATED ORAL at 15:55

## 2024-11-16 RX ADMIN — CARVEDILOL 12.5 MG: 12.5 TABLET, FILM COATED ORAL at 08:30

## 2024-11-16 RX ADMIN — HYDRALAZINE HYDROCHLORIDE 50 MG: 25 TABLET ORAL at 14:52

## 2024-11-16 RX ADMIN — ASPIRIN 81 MG: 81 TABLET, COATED ORAL at 08:37

## 2024-11-16 RX ADMIN — ISOSORBIDE MONONITRATE 120 MG: 30 TABLET, EXTENDED RELEASE ORAL at 08:37

## 2024-11-16 RX ADMIN — ALPRAZOLAM 0.25 MG: 0.25 TABLET ORAL at 22:11

## 2024-11-16 RX ADMIN — HYDRALAZINE HYDROCHLORIDE 50 MG: 25 TABLET ORAL at 07:47

## 2024-11-16 RX ADMIN — HYDRALAZINE HYDROCHLORIDE 50 MG: 25 TABLET ORAL at 22:08

## 2024-11-16 NOTE — ASSESSMENT & PLAN NOTE
Lab Results   Component Value Date    EGFR 9 11/16/2024    EGFR 7 11/15/2024    EGFR 8 11/14/2024    CREATININE 5.35 (H) 11/16/2024    CREATININE 6.92 (H) 11/15/2024    CREATININE 5.99 (H) 11/14/2024   Hemoglobin is stable

## 2024-11-16 NOTE — PROGRESS NOTES
Progress Note - Hospitalist   Name: Matthew Alvarez 69 y.o. male I MRN: [id number]  Unit/Bed#: -01 I Date of Admission: 11/10/2024   Date of Service: 11/16/2024 I Hospital Day: 6    Assessment & Plan  Acute on chronic systolic heart failure (HCC)  Wt Readings from Last 3 Encounters:   11/15/24 56 kg (123 lb 6.4 oz)   09/03/24 55.3 kg (122 lb)   02/05/24 56 kg (123 lb 6.4 oz)   Repeat TTE-EF is 20 to 25% severe reduction of LV  Patient currently is not in cardiogenic shock.  He failed diuresis with oliguria with rising creatinine and he has been started on dialysis and now he is can IV on Monday Wednesday Friday as discussed with nephrology next today he still has evidence of pulmonary edema and pleural effusions for which more liters will be removed today if tolerable.  Also asked the critical care to evaluate for thoracocentesis if there is enough fluid.- pt declined   Per cardiology on Imdur aspirin and beta-blocker  He will need a LifeVest prior to discharge  Uncontrolled hypertension  Uncontrolled today increased hydralazine to 50 mg p.o. every 8 hours, continue Coreg 12.5 mg p.o. twice daily and Imdur 120 mg p.o. daily  11/16- bp improved   Acute kidney injury superimposed on stage 3a chronic kidney disease (HCC)  Lab Results   Component Value Date    EGFR 9 11/16/2024    EGFR 7 11/15/2024    EGFR 8 11/14/2024    CREATININE 5.35 (H) 11/16/2024    CREATININE 6.92 (H) 11/15/2024    CREATININE 5.99 (H) 11/14/2024   CKD stage III with a baseline creatinine of 1.9-2 his creatinine has risen up to 7 with oliguria he was started on dialysis he received his dialysis yesterday discussed with nephrology he will be on Monday Wednesday Friday  S/p permacath placement  He is on room air but still has Rales on exam chest x-ray with still pulmonary edema and pleural effusion reached out to nephrology if we could remove more fluid today they are in agreement they can be removed a couple of liters of tolerable.  Also  reached out to critical care to evaluate with ultrasound if there is any evidence of effusions for thoracocentesis.- patient declined   Nephrology on board and following recommend serologies to rule out intrinsic disease  SPEP/UPEP, ANCA, JORGE LUIS, C3, C4, urine protein creatinine ratio  Elevated troponin  Elevated troponin upon presentation of 83, 2-hour troponin of 112  Patient is without chest pain currently  Nonischemic EKG  Likely elevated secondary to demand from accelerated hypertension and CHF exacerbation  Cardiology on board and following  Tte-EF is 20 to 24%  Tobacco abuse  Recommend complete cessation-patient endorses he stopped smoking 3 days ago  NRT offered  Elevated liver transaminase level  Patient presents with volume overload  No known history of liver disease, does have history of heavy alcohol use  Patient endorses initially no further use of alcohol, then states last drink was 2 weeks ago -unreliable historian   liver enzymes suspected secondary to congestive hepatopathy that started improving with dialysis Ultrasound of the right upper quadrant is negative  Elevated d-dimer  D-dimer elevated to 12.54  Patient without hemoptysis, pleuritic chest pain  Does have shortness of breath and mild tachycardia that is improved  Currently no oxygen requirements, saturating well on room air  Kidney function precludes CTA chest  Lower extremity venous duplex pending  TTE pending  Heparin subcu DVT prophylaxis  Dilated cardiomyopathy (HCC)  Chronic, continue aspirin, carvedilol Imdur management as per systolic heart failure  Hyponatremia  Post dialysis improved  Oliguria    Encounter for hemodialysis for ESRD (HCC)  Lab Results   Component Value Date    EGFR 9 11/16/2024    EGFR 7 11/15/2024    EGFR 8 11/14/2024    CREATININE 5.35 (H) 11/16/2024    CREATININE 6.92 (H) 11/15/2024    CREATININE 5.99 (H) 11/14/2024     Acute hemodialysis patient (HCC)    Renal failure    Encounter for hemodialysis (HCC)    Anemia  due to chronic kidney disease, on chronic dialysis (HCC)  Lab Results   Component Value Date    EGFR 9 11/16/2024    EGFR 7 11/15/2024    EGFR 8 11/14/2024    CREATININE 5.35 (H) 11/16/2024    CREATININE 6.92 (H) 11/15/2024    CREATININE 5.99 (H) 11/14/2024   Hemoglobin is stable    VTE Pharmacologic Prophylaxis:   Moderate Risk (Score 3-4) - Pharmacological DVT Prophylaxis Ordered: heparin.    Mobility:   Basic Mobility Inpatient Raw Score: 17  JH-HLM Goal: 5: Stand one or more mins  JH-HLM Achieved: 8: Walk 250 feet ot more  JH-HLM Goal NOT achieved. Continue with multidisciplinary rounding and encourage appropriate mobility to improve upon JH-HLM goals.    Patient Centered Rounds: I performed bedside rounds with nursing staff today.   Discussions with Specialists or Other Care Team Provider: none    Education and Discussions with Family / Patient: pt no new updates for wife     Current Length of Stay: 6 day(s)  Current Patient Status: Inpatient   Certification Statement: The patient will continue to require additional inpatient hospital stay due to dialysis   Discharge Plan: Anticipate discharge in 48 hrs to discharge location to be determined pending rehab evaluations.    Code Status: Level 1 - Full Code    Subjective   Seen and examined sob improved     Objective :  Temp:  [97.7 °F (36.5 °C)-98.6 °F (37 °C)] 98.6 °F (37 °C)  HR:  [62-82] 62  BP: (140-170)/(70-97) 140/78  Resp:  [16-20] 16  SpO2:  [95 %-96 %] 96 %  O2 Device: None (Room air)    Body mass index is 19.92 kg/m².     Input and Output Summary (last 24 hours):     Intake/Output Summary (Last 24 hours) at 11/16/2024 1335  Last data filed at 11/15/2024 1658  Gross per 24 hour   Intake --   Output 200 ml   Net -200 ml       Physical Exam  Vitals and nursing note reviewed.   Constitutional:       General: He is not in acute distress.     Appearance: He is well-developed.   HENT:      Head: Normocephalic and atraumatic.   Eyes:      Conjunctiva/sclera:  Conjunctivae normal.   Cardiovascular:      Rate and Rhythm: Normal rate and regular rhythm.      Heart sounds: No murmur heard.  Pulmonary:      Effort: Pulmonary effort is normal. No respiratory distress.      Breath sounds: Rales present. No wheezing.   Abdominal:      Palpations: Abdomen is soft.      Tenderness: There is no abdominal tenderness.   Musculoskeletal:         General: No swelling.      Cervical back: Neck supple.   Skin:     General: Skin is warm and dry.      Capillary Refill: Capillary refill takes less than 2 seconds.   Neurological:      Mental Status: He is alert and oriented to person, place, and time.   Psychiatric:         Mood and Affect: Mood normal.           Lines/Drains:  Lines/Drains/Airways       Active Status       Name Placement date Placement time Site Days    HD Permanent Double Catheter 11/14/24  1352  Internal jugular  1                            Lab Results: I have reviewed the following results:   Results from last 7 days   Lab Units 11/16/24  0630 11/15/24  0521   WBC Thousand/uL 7.19 9.35   HEMOGLOBIN g/dL 10.7* 10.2*   HEMATOCRIT % 32.3* 30.9*   PLATELETS Thousands/uL 79* 181   SEGS PCT %  --  85*   LYMPHO PCT % 11* 5*   MONO PCT % 13* 10   EOS PCT % 1 0     Results from last 7 days   Lab Units 11/16/24  0630   SODIUM mmol/L 131*   POTASSIUM mmol/L 4.6   CHLORIDE mmol/L 94*   CO2 mmol/L 24   BUN mg/dL 61*   CREATININE mg/dL 5.35*   ANION GAP mmol/L 13   CALCIUM mg/dL 9.2   ALBUMIN g/dL 3.6   TOTAL BILIRUBIN mg/dL 2.36*   ALK PHOS U/L 108*   ALT U/L 263*   AST U/L 102*   GLUCOSE RANDOM mg/dL 110     Results from last 7 days   Lab Units 11/14/24  1344   INR  1.73*             Results from last 7 days   Lab Units 11/12/24  1051 11/10/24  2320 11/10/24  1853   LACTIC ACID mmol/L 1.3 4.6* 5.9*       Recent Cultures (last 7 days):         Imaging Results Review: No pertinent imaging studies reviewed.  Other Study Results Review: No additional pertinent studies reviewed.    Last  24 Hours Medication List:     Current Facility-Administered Medications:     ALPRAZolam (XANAX) tablet 0.25 mg, BID PRN    aspirin (ECOTRIN LOW STRENGTH) EC tablet 81 mg, Daily    carvedilol (COREG) tablet 12.5 mg, BID With Meals    epoetin loida (EPOGEN,PROCRIT) injection 5,000 Units, Once per day on Monday Wednesday Friday    heparin (porcine) subcutaneous injection 5,000 Units, Q8H JEEVAN    hydrALAZINE (APRESOLINE) injection 10 mg, Q6H PRN    hydrALAZINE (APRESOLINE) tablet 50 mg, Q8H JEEVAN    isosorbide mononitrate (IMDUR) 24 hr tablet 120 mg, Daily    melatonin tablet 3 mg, HS    nicotine (NICODERM CQ) 14 mg/24hr TD 24 hr patch 1 patch, Daily    Administrative Statements   Today, Patient Was Seen By: Stacy Carson MD  I have spent a total time of >35 minutes in caring for this patient on the day of the visit/encounter including Documenting in the medical record and Reviewing / ordering tests, medicine, procedures  .    **Please Note: This note may have been constructed using a voice recognition system.**

## 2024-11-16 NOTE — ASSESSMENT & PLAN NOTE
Continue HD MWF. Etiology DAVID unclear but serologic workup negative thus far. UPEP/SPEP/ANCA/hepatitis panel negative.   UOP remain oliguric.  C3,C4 low. Rheumatoid factor + years ago.Antigbm pending.  Case management working on placement.  HD access-- VASQUEZ GARCIA 11/14/24

## 2024-11-16 NOTE — PLAN OF CARE
Problem: PHYSICAL THERAPY ADULT  Goal: Performs mobility at highest level of function for planned discharge setting.  See evaluation for individualized goals.  Description: Treatment/Interventions: ADL retraining, Functional transfer training, LE strengthening/ROM, Elevations, Therapeutic exercise, Endurance training, Patient/family training, Equipment eval/education, Bed mobility, Gait training, Compensatory technique education, Spoke to nursing, Spoke to case management, OT  Equipment Recommended:  (walker-reports he can use his spouses, however if unable, would beenfit from RW upon discharge. pt provided cane and fit to patient's height for use should he defer use of RW as this is likely the case)       See flowsheet documentation for full assessment, interventions and recommendations.  11/16/2024 1309 by Maral Morris, PT  Note: Prognosis: Good  Problem List: Decreased strength, Decreased endurance, Impaired balance, Decreased mobility, Impaired judgement, Decreased safety awareness  Pt tolerated session fairly well. He was able to compete mobility with decreased asisstance wiht use of RW comapred to spc and demonstrated improved safety with stair completion after verbal cues for sequence. He is limited by decreased strenght, balance, endurance. He will continue to beenfit from PTs ervices to maximize LOF.        Barriers to Discharge Comments: inc fall risk, requires assistance to complete mobility, 2SH with BRODY and steps to 2nd floor  Rehab Resource Intensity Level, PT: III (Minimum Resource Intensity) (OPPT services with handout provided for [facility]. Pt aware of freedom of choice.)    See flowsheet documentation for full assessment.

## 2024-11-16 NOTE — ASSESSMENT & PLAN NOTE
Lab Results   Component Value Date    EGFR 9 11/16/2024    EGFR 7 11/15/2024    EGFR 8 11/14/2024    CREATININE 5.35 (H) 11/16/2024    CREATININE 6.92 (H) 11/15/2024    CREATININE 5.99 (H) 11/14/2024   CKD stage III with a baseline creatinine of 1.9-2 his creatinine has risen up to 7 with oliguria he was started on dialysis he received his dialysis yesterday discussed with nephrology he will be on Monday Wednesday Friday  S/p permacath placement  He is on room air but still has Rales on exam chest x-ray with still pulmonary edema and pleural effusion reached out to nephrology if we could remove more fluid today they are in agreement they can be removed a couple of liters of tolerable.  Also reached out to critical care to evaluate with ultrasound if there is any evidence of effusions for thoracocentesis.- patient declined   Nephrology on board and following recommend serologies to rule out intrinsic disease  SPEP/UPEP, ANCA, JORGE LUIS, C3, C4, urine protein creatinine ratio

## 2024-11-16 NOTE — PLAN OF CARE
Problem: CARDIOVASCULAR - ADULT  Goal: Maintains optimal cardiac output and hemodynamic stability  Description: INTERVENTIONS:  - Monitor I/O, vital signs and rhythm  - Monitor for S/S and trends of decreased cardiac output  - Administer and titrate ordered vasoactive medications to optimize hemodynamic stability  - Assess quality of pulses, skin color and temperature  - Assess for signs of decreased coronary artery perfusion  - Instruct patient to report change in severity of symptoms  Outcome: Progressing  Goal: Absence of cardiac dysrhythmias or at baseline rhythm  Description: INTERVENTIONS:  - Continuous cardiac monitoring, vital signs, obtain 12 lead EKG if ordered  - Administer antiarrhythmic and heart rate control medications as ordered  - Monitor electrolytes and administer replacement therapy as ordered  Outcome: Progressing     Problem: GENITOURINARY - ADULT  Goal: Maintains or returns to baseline urinary function  Description: INTERVENTIONS:  - Assess urinary function  - Encourage oral fluids to ensure adequate hydration if ordered  - Administer IV fluids as ordered to ensure adequate hydration  - Administer ordered medications as needed  - Offer frequent toileting  - Follow urinary retention protocol if ordered  Outcome: Progressing  Goal: Absence of urinary retention  Description: INTERVENTIONS:  - Assess patient's ability to void and empty bladder  - Monitor I/O  - Bladder scan as needed  - Discuss with physician/AP medications to alleviate retention as needed  - Discuss catheterization for long term situations as appropriate  Outcome: Progressing     Problem: METABOLIC, FLUID AND ELECTROLYTES - ADULT  Goal: Electrolytes maintained within normal limits  Description: INTERVENTIONS:  - Monitor labs and assess patient for signs and symptoms of electrolyte imbalances  - Administer electrolyte replacement as ordered  - Monitor response to electrolyte replacements, including repeat lab results as  appropriate  - Instruct patient on fluid and nutrition as appropriate  Outcome: Progressing  Goal: Fluid balance maintained  Description: INTERVENTIONS:  - Monitor labs   - Monitor I/O and WT  - Instruct patient on fluid and nutrition as appropriate  - Assess for signs & symptoms of volume excess or deficit  Outcome: Progressing  Goal: Glucose maintained within target range  Description: INTERVENTIONS:  - Monitor Blood Glucose as ordered  - Assess for signs and symptoms of hyperglycemia and hypoglycemia  - Administer ordered medications to maintain glucose within target range  - Assess nutritional intake and initiate nutrition service referral as needed  Outcome: Progressing     Problem: PAIN - ADULT  Goal: Verbalizes/displays adequate comfort level or baseline comfort level  Description: Interventions:  - Encourage patient to monitor pain and request assistance  - Assess pain using appropriate pain scale  - Administer analgesics based on type and severity of pain and evaluate response  - Implement non-pharmacological measures as appropriate and evaluate response  - Consider cultural and social influences on pain and pain management  - Notify physician/advanced practitioner if interventions unsuccessful or patient reports new pain  Outcome: Progressing     Problem: INFECTION - ADULT  Goal: Absence or prevention of progression during hospitalization  Description: INTERVENTIONS:  - Assess and monitor for signs and symptoms of infection  - Monitor lab/diagnostic results  - Monitor all insertion sites, i.e. indwelling lines, tubes, and drains  - Monitor endotracheal if appropriate and nasal secretions for changes in amount and color  - [place] appropriate cooling/warming therapies per order  - Administer medications as ordered  - Instruct and encourage patient and family to use good hand hygiene technique  - Identify and instruct in appropriate isolation precautions for identified infection/condition  Outcome:  Progressing     Problem: SAFETY ADULT  Goal: Patient will remain free of falls  Description: INTERVENTIONS:  - Educate patient/family on patient safety including physical limitations  - Instruct patient to call for assistance with activity   - Consult OT/PT to assist with strengthening/mobility   - Keep Call bell within reach  - Keep bed low and locked with side rails adjusted as appropriate  - Keep care items and personal belongings within reach  - Initiate and maintain comfort rounds  - Make Fall Risk Sign visible to staff  - Offer Toileting every 2 Hours, in advance of need  - Initiate/Maintain bed/chair alarm  - Obtain necessary fall risk management equipment:   - Apply yellow socks and bracelet for high fall risk patients  - Consider moving patient to room near nurses station  Outcome: Progressing  Goal: Maintain or return to baseline ADL function  Description: INTERVENTIONS:  -  Assess patient's ability to carry out ADLs; assess patient's baseline for ADL function and identify physical deficits which impact ability to perform ADLs (bathing, care of mouth/teeth, toileting, grooming, dressing, etc.)  - Assess/evaluate cause of self-care deficits   - Assess range of motion  - Assess patient's mobility; develop plan if impaired  - Assess patient's need for assistive devices and provide as appropriate  - Encourage maximum independence but intervene and supervise when necessary  - Involve family in performance of ADLs  - Assess for home care needs following discharge   - Consider OT consult to assist with ADL evaluation and planning for discharge  - Provide patient education as appropriate  Outcome: Progressing  Goal: Maintains/Returns to pre admission functional level  Description: INTERVENTIONS:  - Perform AM-PAC 6 Click Basic Mobility/ Daily Activity assessment daily.  - Set and communicate daily mobility goal to care team and patient/family/caregiver.   - Collaborate with rehabilitation services on mobility  goals if consulted  - Perform Range of Motion  times a day.  - Reposition patient .  - Dangle patient 4 times a day  - Stand patient 4 times a day  - Ambulate patient 4 times a day  - Out of bed to chair 3 times a day   - Out of bed for meals 3 times a day  - Out of bed for toileting  - Record patient progress and toleration of activity level   Outcome: Progressing     Problem: Knowledge Deficit  Goal: Patient/family/caregiver demonstrates understanding of disease process, treatment plan, medications, and discharge instructions  Description: Complete learning assessment and assess knowledge base.  Interventions:  - Provide teaching at level of understanding  - Provide teaching via preferred learning methods  Outcome: Progressing     Problem: Prexisting or High Potential for Compromised Skin Integrity  Goal: Skin integrity is maintained or improved  Description: INTERVENTIONS:  - Identify patients at risk for skin breakdown  - Assess and monitor skin integrity  - Assess and monitor nutrition and hydration status  - Monitor labs   - Assess for incontinence   - Turn and reposition patient  - Assist with mobility/ambulation  - Relieve pressure over bony prominences  - Avoid friction and shearing  - Provide appropriate hygiene as needed including keeping skin clean and dry  - Evaluate need for skin moisturizer/barrier cream  - Collaborate with interdisciplinary team   - Patient/family teaching  - Consider wound care consult   Outcome: Progressing     Problem: Nutrition/Hydration-ADULT  Goal: Nutrient/Hydration intake appropriate for improving, restoring or maintaining nutritional needs  Description: Monitor and assess patient's nutrition/hydration status for malnutrition. Collaborate with interdisciplinary team and initiate plan and interventions as ordered.  Monitor patient's weight and dietary intake as ordered or per policy. Utilize nutrition screening tool and intervene as necessary. Determine patient's food  preferences and provide high-protein, high-caloric foods as appropriate.     INTERVENTIONS:  - Monitor oral intake, urinary output, labs, and treatment plans  - Assess nutrition and hydration status and recommend course of action  - Evaluate amount of meals eaten  - Assist patient with eating if necessary   - Allow adequate time for meals  - Recommend/ encourage appropriate diets, oral nutritional supplements, and vitamin/mineral supplements  - Order, calculate, and assess calorie counts as needed  - Recommend, monitor, and adjust tube feedings and TPN/PPN based on assessed needs  - Assess need for intravenous fluids  - Provide specific nutrition/hydration education as appropriate  - Include patient/family/caregiver in decisions related to nutrition  Outcome: Progressing

## 2024-11-16 NOTE — PROGRESS NOTES
Progress Note - Nephrology   Name: Matthew Alvarez 69 y.o. male I MRN: [id number]  Unit/Bed#: -01 I Date of Admission: 11/10/2024   Date of Service: 11/16/2024 I Hospital Day: 6     Assessment & Plan  Acute hemodialysis patient (HCC)  Continue HD MWF. Etiology DAVID unclear but serologic workup negative thus far. UPEP/SPEP/ANCA/hepatitis panel negative.   UOP remain oliguric.  C3,C4 low. Rheumatoid factor + years ago.Antigbm pending.  Case management working on placement.  HD access-- [facility] 11/14/24  Acute on chronic systolic heart failure (HCC)  Wt Readings from Last 3 Encounters:   11/15/24 56 kg (123 lb 6.4 oz)   09/03/24 55.3 kg (122 lb)   02/05/24 56 kg (123 lb 6.4 oz)     Uncontrolled hypertension  BP trends improving today. If trend remain elevated, increase hydralazine to 100mg TID.  Antihypertensive titrated during hospitalization--hydralazine 50mg TID and coreg 12.5 mg BID and imdur 120mg/day.  Acute kidney injury superimposed on stage 3a chronic kidney disease (HCC)  Lab Results   Component Value Date    EGFR 9 11/16/2024    EGFR 7 11/15/2024    EGFR 8 11/14/2024    CREATININE 5.35 (H) 11/16/2024    CREATININE 6.92 (H) 11/15/2024    CREATININE 5.99 (H) 11/14/2024     Dilated cardiomyopathy (HCC)  Volume status compensated at present. Continue UF with HD.  Continue GDMT with coreg,imdur and hydrazine.   [facility] on DC.  Hyponatremia  Fluid restriction , if Na<130, tighten up to 1500 ml.  Anemia due to chronic kidney disease, on chronic dialysis (HCC)  Lab Results   Component Value Date    EGFR 9 11/16/2024    EGFR 7 11/15/2024    EGFR 8 11/14/2024    CREATININE 5.35 (H) 11/16/2024    CREATININE 6.92 (H) 11/15/2024    CREATININE 5.99 (H) 11/14/2024   Iron deficiency anemia---T sat 4%.  Hgb 10.7, stable.  Hgb in 10-12 range. Continue EPO with HD. Start venofer      Subjective   Patient seen and examined today. Reports left hand cramping, aside from this, no chest pain,shortness of  breath,nausea,vomiting or diarrhea.   A complete 10 point review of systems was performed and is otherwise negative.     Objective :  Temp:  [98.6 °F (37 °C)-98.7 °F (37.1 °C)] 98.7 °F (37.1 °C)  HR:  [60-82] 60  BP: (140-170)/(74-97) 141/74  Resp:  [16-18] 17  SpO2:  [95 %-96 %] 95 %  O2 Device: None (Room air)    Current Weight: Weight - Scale: 56 kg (123 lb 6.4 oz)  First Weight: Weight - Scale: 57.9 kg (127 lb 10.3 oz)  I/O         11/14 0701  11/15 0700 11/15 0701  11/16 0700 11/16 0701 11/17 0700    P.O. 960 360 660    I.V. (mL/kg)  500 (8.9)     IV Piggyback 50      Total Intake(mL/kg) 1010 (12.4) 860 (15.4) 660 (11.8)    Urine (mL/kg/hr) 350 (0.2) 200 (0.1)     Other  2339     Total Output 350 2539     Net +660 -1679 +660                 Physical Exam  Vitals reviewed.   Constitutional:       General: He is not in acute distress.     Appearance: He is not ill-appearing.   HENT:      Head: Normocephalic and atraumatic.      Nose: Nose normal. No congestion.   Cardiovascular:      Rate and Rhythm: Normal rate and regular rhythm.      Heart sounds:      No friction rub.      Comments: [facility] PC dressing c/d/i  Pulmonary:      Breath sounds: Normal breath sounds. No wheezing, rhonchi or rales.   Abdominal:      Palpations: Abdomen is soft.      Tenderness: There is no abdominal tenderness. There is no guarding.   Musculoskeletal:      Right lower leg: No edema.      Left lower leg: No edema.   Skin:     Coloration: Skin is not jaundiced.      Findings: No bruising.   Neurological:      General: No focal deficit present.      Mental Status: He is alert and oriented to person, place, and time.      Cranial Nerves: No cranial nerve deficit.   Psychiatric:         Mood and Affect: Mood normal.         Behavior: Behavior normal.         Medications:    Current Facility-Administered Medications:     ALPRAZolam (XANAX) tablet 0.25 mg, 0.25 mg, Oral, BID PRN, David Carlos Meraz, MD, 0.25 mg at 11/15/24 1934    aspirin  (ECOTRIN LOW STRENGTH) EC tablet 81 mg, 81 mg, Oral, Daily, Jared Bateman PA-C, 81 mg at 11/16/24 0837    carvedilol (COREG) tablet 12.5 mg, 12.5 mg, Oral, BID With Meals, Kathleen Biu PA-C, 12.5 mg at 11/16/24 1555    epoetin loida (EPOGEN,PROCRIT) injection 5,000 Units, 5,000 Units, Subcutaneous, Once per day on Monday Wednesday Friday, Brandyn Ariza MD, 5,000 Units at 11/15/24 1054    heparin (porcine) subcutaneous injection 5,000 Units, 5,000 Units, Subcutaneous, Q8H JEEVAN, Jared Bateman PA-C, 5,000 Units at 11/12/24 1524    hydrALAZINE (APRESOLINE) injection 10 mg, 10 mg, Intravenous, Q6H PRN, Jared Bateman PA-C, 10 mg at 11/10/24 2320    hydrALAZINE (APRESOLINE) tablet 50 mg, 50 mg, Oral, Q8H JEEVAN, Kathleen Bui PA-C, 50 mg at 11/16/24 1452    isosorbide mononitrate (IMDUR) 24 hr tablet 120 mg, 120 mg, Oral, Daily, Kathleen Bui PA-C, 120 mg at 11/16/24 0837    melatonin tablet 3 mg, 3 mg, Oral, HS, Jany S Jake, CRNP, 3 mg at 11/15/24 2218    nicotine (NICODERM CQ) 14 mg/24hr TD 24 hr patch 1 patch, 1 patch, Transdermal, Daily, Jared Bateman PA-C, 1 patch at 11/11/24 0803      Lab Results: I have reviewed the following results:  Results from last 7 days   Lab Units 11/16/24  0630 11/15/24  0640 11/15/24  0521 11/14/24  1342 11/14/24  0545 11/13/24  1241 11/13/24  0514 11/12/24  1051 11/12/24  0508 11/10/24  2320 11/10/24  1853   WBC Thousand/uL 7.19  --  9.35  --  10.17*  --  10.21*  --  10.93*  --  14.64*   HEMOGLOBIN g/dL 10.7*  --  10.2*  --  11.0*  --  11.1*  --  11.0*  --  13.5   I STAT HEMOGLOBIN g/dl  --   --   --  10.9*  --   --   --   --   --   --   --    HEMATOCRIT % 32.3*  --  30.9*  --  32.8*  --  33.9*  --  32.9*  --  42.3   HEMATOCRIT, ISTAT %  --   --   --  32*  --   --   --   --   --   --   --    PLATELETS Thousands/uL 79*  --  181  --  86*  --  102*  --  106*  --  188   POTASSIUM mmol/L 4.6 4.3  --   --  4.2 3.5 5.0 4.8 4.8   < > 4.4   CHLORIDE  "mmol/L 94* 91*  --   --  95* 94* 95* 95* 96   < > 97   CO2 mmol/L 24 20*  --   --  22 24 17* 19* 19*   < > 20*   CO2, I-STAT mmol/L  --   --   --  26  --   --   --   --   --   --   --    BUN mg/dL 61* 96*  --   --  78* 53* 112* 97* 86*   < > 45*   CREATININE mg/dL 5.35* 6.92*  --   --  5.99* 4.22* 7.57* 6.62* 6.14*   < > 3.58*   CALCIUM mg/dL 9.2 8.8  --   --  9.1 8.9 8.3* 8.5 8.4   < > 9.7   MAGNESIUM mg/dL  --   --   --   --  2.2 2.0 2.3  --   --   --  2.2   PHOSPHORUS mg/dL  --   --   --   --  5.0* 3.9 6.3*  --   --   --   --    ALBUMIN g/dL 3.6 3.6  --   --  3.8  --  3.6 3.8 3.4*  --  4.2   GLUCOSE, ISTAT mg/dl  --   --   --  147*  --   --   --   --   --   --   --     < > = values in this interval not displayed.       Administrative Statements     Portions of the record may have been created with voice recognition software. Occasional wrong word or \"sound a like\" substitutions may have occurred due to the inherent limitations of voice recognition software. Read the chart carefully and recognize, using context, where substitutions have occurred.If you have any questions, please contact the dictating provider.  "

## 2024-11-16 NOTE — ASSESSMENT & PLAN NOTE
Lab Results   Component Value Date    EGFR 9 11/16/2024    EGFR 7 11/15/2024    EGFR 8 11/14/2024    CREATININE 5.35 (H) 11/16/2024    CREATININE 6.92 (H) 11/15/2024    CREATININE 5.99 (H) 11/14/2024   Iron deficiency anemia---T sat 4%.  Hgb 10.7, stable.  Hgb in 10-12 range. Continue EPO with HD. Start venofer   Yes

## 2024-11-16 NOTE — ASSESSMENT & PLAN NOTE
BP trends improving today. If trend remain elevated, increase hydralazine to 100mg TID.  Antihypertensive titrated during hospitalization--hydralazine 50mg TID and coreg 12.5 mg BID and imdur 120mg/day.

## 2024-11-16 NOTE — PLAN OF CARE
Problem: OCCUPATIONAL THERAPY ADULT  Goal: Performs self-care activities at highest level of function for planned discharge setting.  See evaluation for individualized goals.  Description: Treatment Interventions: ADL retraining, Endurance training, Patient/family training, Equipment evaluation/education          See flowsheet documentation for full assessment, interventions and recommendations.   Note: Limitation: Decreased ADL status, Decreased Safe judgement during ADL, Decreased UE strength, Decreased UE ROM, Decreased cognition, Decreased endurance, Decreased high-level ADLs, Decreased self-care trans  Prognosis: Fair  Assessment: Pt is a 69 y.o. male who presented to [facility] on 11/10/2024 with increasing SOB. Pt with diagnosis of acute on chronic systolic heart failure, SIRS, renal failure, and Elevated liver transaminase level. Pt is now on HD. Pt  has a past medical history of CAD (coronary artery disease), HFrEF (heart failure with reduced ejection fraction) ([facility]) (09/2021), Hypertension, Rheumatoid factor positive (09/2021), Stage 3 chronic kidney disease ([facility]), and Tobacco abuse. Pt greeted bedside for OT evaluation on 11/16/24. Pt resides with spouse and son in a 2SH with 1 BRODY. PTA, Pt reports being I with ADLs/IADLs/no AD/ +. Pt with supportive family able to assist upon DC. Pt demonstrating the following occupational performance levels: Supervision with UB ADLs, Min A with LB ADLs, Mod A  with bed mobility, Supervision with functional transfers, and Min A with functional mobility with cane.Limitations that impact functional performance include decreased ADL status, safe judgement during ADLs, and high level ADLs. Occupational performance areas to address ADL retraining, endurance training, equipment evaluation/education, compensatory technique education, and activity engagement . Pt would benefit from continued skilled OT services while in hospital to maximize independence with ADLs. Will continue  to follow Pt's progress. Pt would benefit from returning home with increased social support upon DC to maximize safety and independence with ADLs and functional tasks of choice.     Rehab Resource Intensity Level, OT: No post-acute rehabilitation needs

## 2024-11-16 NOTE — ASSESSMENT & PLAN NOTE
Volume status compensated at present. Continue UF with HD.  Continue GDMT with coreg,imdur and hydrazine.   [facility] on DC.

## 2024-11-16 NOTE — ASSESSMENT & PLAN NOTE
Wt Readings from Last 3 Encounters:   11/15/24 56 kg (123 lb 6.4 oz)   09/03/24 55.3 kg (122 lb)   02/05/24 56 kg (123 lb 6.4 oz)

## 2024-11-16 NOTE — PHYSICAL THERAPY NOTE
PHYSICAL THERAPY EVALUATION  NAME:  Matthew Alvarez  DATE: 11/16/24    AGE:   69 y.o.  Mrn:   [id number]  ADMIT DX:  CHF (congestive heart failure) ([facility]) [I50.9]  SOB (shortness of breath) [R06.02]  Hypertension [I10]  DAVID (acute kidney injury) ([facility]) [N17.9]  Elevated d-dimer [R79.89]    Past Medical History:   Diagnosis Date    CAD (coronary artery disease)     HFrEF (heart failure with reduced ejection fraction) ([facility]) 09/2021    Hypertension     Rheumatoid factor positive 09/2021    Stage 3 chronic kidney disease ([facility])     Tobacco abuse      Length Of Stay: 6  Performed at least 2 patient identifiers during session: Name and Birthday  PHYSICAL THERAPY EVALUATION :      11/16/24 0945   PT Last Visit   PT Visit Date 11/16/24   Note Type   Note type Evaluation   Pain Assessment   Pain Assessment Tool 0-10   Pain Score No Pain   Restrictions/Precautions   Other Precautions Chair Alarm;Bed Alarm;Fall Risk;Pain;Multiple lines   Home Living   Type of Home House  (1 BRODY)   Home Layout Two level;Bed/bath upstairs;Performs ADLs on one level;1/2 bath on main level  (FF L HR)   Bathroom Shower/Tub Walk-in shower   Bathroom Toilet Standard   Bathroom Equipment Shower chair   Home Equipment Walker;Wheelchair-manual  (equipment is his spouses. wasn't using an AD PTA.)   Additional Comments Pt resides with spouse and son in a 2SH with 1 BRODY.   Prior Function   Level of [place] Independent with ADLs;Independent with functional mobility;Independent with IADLS   Lives With Spouse   Receives Help From Family   IADLs Independent with driving;Independent with medication management;Family/Friend/Other provides meals   Falls in the last 6 months 0   Vocational Retired   Comments Reports being independent with mobility, ADLs and IADLs.   General   Additional Pertinent History Patient with change in medical status resulting in transfer to critical care services on 11/12/24. Pt with nonischemic cardiomyopathy, LVEF 20%. Pt requiring  "hemodialysis. Pt is s/p temporary HD catheter 11/13/24 and Permacath placement 11/14/24. Pt ordered LifeVest on discharge.   Family/Caregiver Present No   Cognition   Orientation Level Oriented X4   Following Commands Follows one step commands without difficulty   Subjective   Subjective \"I was hanging from the side of my house from scaffolding before I came here.\"   RLE Assessment   RLE Assessment WFL  (4/5)   LLE Assessment   LLE Assessment WFL  (4-/5)   Coordination   Rapid Alternating Movements Intact   Light Touch   RLE Light Touch Impaired   RLE Light Touch Comments absent distally midcalf and down   LLE Light Touch Impaired   LLE Light Touch Comments absent distally mid calf and down   Bed Mobility   Rolling L 4  Minimal assistance   Additional items Assist x 1;Increased time required;Verbal cues   Supine to Sit 3  Moderate assistance   Additional items Assist x 1;Increased time required;Verbal cues  (trunk management)   Additional Comments HOB flat without bedrail. mod verbal cues for sequence and tehcnique to complete. rolling to left with minAx1 and then modAx1 to ahcieve sitting on EOB with manua cues for trun management   Transfers   Sit to Stand   (SBA)   Additional items Increased time required;Verbal cues   Stand to Sit   (SBA)   Additional items Increased time required;Verbal cues   Stand pivot 4  Minimal assistance   Additional items Assist x 1;Increased time required;Verbal cues   Additional Comments no AD. sit<>stand wiht SBA with inc time with wide MELISSA, inc knee flexion. spt without AD with miNAx1 wiht manual cues for balance.   Ambulation/Elevation   Gait pattern Wide MELISSA;Improper Weight shift;Decreased foot clearance;Short stride;Excessively slow   Gait Assistance 4  Minimal assist   Additional items Assist x 1;Verbal cues   Assistive Device None   Distance ambulated 65'x1 without AD with minAx1 with slow cadnec,e inc path devaition, decreased step length and pt reaching for support at times. " "cues for inc step length.   Balance   Static Sitting Fair +   Dynamic Sitting Fair   Static Standing Fair -   Dynamic Standing Poor +   Ambulatory Poor +   Activity Tolerance   Activity Tolerance Patient limited by fatigue   Medical Staff Made Aware Ines MARY   Nurse Made Aware RNMarly   Assessment   Prognosis Good   Problem List Decreased strength;Decreased endurance;Impaired balance;Decreased mobility;Impaired judgement;Decreased safety awareness   Barriers to Discharge Comments inc fall risk, requires assistance to complete mobility, 2SH with BRODY and steps to 2nd floor   Goals   Patient Goals \"To have a drink.\" (advised that it would be in his best interest to avoid drinking)   STG Expiration Date 11/30/24   PT Treatment Day 1   Plan   Treatment/Interventions ADL retraining;Functional transfer training;LE strengthening/ROM;Elevations;Therapeutic exercise;Endurance training;Patient/family training;Equipment eval/education;Bed mobility;Gait training;Compensatory technique education;Spoke to nursing;Spoke to case management;OT   PT Frequency 3-5x/wk   Discharge Recommendation   Rehab Resource Intensity Level, PT III (Minimum Resource Intensity)  (OPPT services with handout provided for [facility]. Pt aware of freedom of choice.)   Equipment Recommended   (walker-reports he can use his spouses, however if unable, would beenfit from RW upon discharge. pt provided cane and fit to patient's height for use should he defer use of RW as this is likely the case)   Additional Comments recommend support and assistance from family prn   AM-PAC Basic Mobility Inpatient   Turning in Flat Bed Without Bedrails 3   Lying on Back to Sitting on Edge of Flat Bed Without Bedrails 2   Moving Bed to Chair 3   Standing Up From Chair Using Arms 3   Walk in Room 3   Climb 3-5 Stairs With Railing 3   Basic Mobility Inpatient Raw Score 17   Basic Mobility Standardized Score 39.67   [facility] Highest Level Of Mobility   -[facility] Goal 5: " Stand one or more mins   -[facility] Achieved 8: Walk 250 feet ot more   Additional Treatment Session   Start Time 1002   End Time 1015   Treatment Assessment Pt tolerated session fairly well. He was able to compete mobility with decreased asisstance wiht use of RW comapred to spc and demonstrated improved safety with stair completion after verbal cues for sequence. He is limited by decreased strenght, balance, endurance. He will continue to beenfit from PTs ervices to maximize LOF.   Equipment Use initiated use of spc. sit<>stand with spc with SBA. spt with spc with SBA. ambulated 60'x2 with spc with inconsistent use with SBA with cues for continuous use and sequence. completed 6 steps-initially use of cane and no HR with minAx1 for 2 steps as patient caught left foot on step requiring minAx1 to recover LOB. cues fo ruse of HR and cane. completed with steadying asisstance for 4 steps wiht R HR and spc recirpocal pattern and descended 6 steps with step to pattern down left LE with SBA. then completed 4 steps with HR and spc with SBA with improved sequencing and safety. intiiated RW. sit<>stand with RW with supervision. min cues for hand placement. cues for turning completely with RW throughout for spt. ambulated 140'x1 with RW with Supervision with cues for staying wihtin MELISSA of RW. discussed use of RW for improved balance and safety with ambulation. pt verbalized he will use spouses walker. provided patient wiht cane for use upstairs and as progresses with mobility. discussed OPPT services upon discharge. pt agreeable and vrbalized understanding.   End of Consult   Patient Position at End of Consult Bedside chair;Bed/Chair alarm activated;All needs within reach       Pt requires PT/OT co-eval due to medical complexity, safety concerns, fall risk, significant assistance with mobility and/or cognitive-behavioral impairments.    (Please find full objective findings from PT assessment regarding body systems outlined above).      Assessment: Pt is a 69 y.o. male seen for PT evaluation s/p admission to [facility] on 11/10/2024 with Acute on chronic systolic heart failure (HCC).  Order placed for PT services.  Upon evaluation: Pt is presenting with impaired functional mobility due to decreased strength, decreased endurance, impaired balance, gait deviations, altered sensation, decreased safety awareness, impaired judgment, and fall risk requiring  moderate assistance for bed mobility, stand by to minimal assistance for transfers, and minimal assistance for ambulation with out AD . Pt's clinical presentation is currently unpredictable given the functional mobility deficits above, especially weakness, decreased skin integrity, decreased endurance, impaired balance, gait deviations, decreased activity tolerance, decreased functional mobility tolerance, altered sensation, decreased safety awareness, and impaired judgement, coupled with fall risks as indicated by AM-PAC 6-Clicks: 17/24 as well as impaired balance, polypharmacy, decreased safety awareness, and limited sensation/neuropathy and combined with medical complications of abnormal renal lab values, abnormal H&H, abnormal sodium values, need for input for mobility technique/safety, and Pulmonary edema with bilateral pleural effusions on chest xray, acute CHF, uncontrolled HTN on admission, DAVID now requiring HD, troponin elevated due to HTN and CHF per notes, elevated liver transaminase level, elevated d dimer .  Pt's PMHx and comorbidities that may affect physical performance and progress include: CKD and CHF with LVEF 20% with need for LifeVest on discharge, HTN, dilated cardiomyopathy, anemia, CAD, positive Rheumatoid factor, chronic bronchitis . Personal factors affecting pt at time of IE include: step(s) to enter environment, multi-level environment, inability to perform IADLs, inability to perform ADLs, inability to navigate level surfaces without external  assistance, tobacco use, and ETOH use . Pt will benefit from continued skilled PT services to address deficits as defined above and to maximize level of functional mobility to facilitate return toward PLOF and improved QOL. From PT/mobility standpoint, recommendation at time of d/c would be Level III (Minimum Resource Intensity), with family and/or caregiver support, with walker, and with cane in order to reduce fall risk and maximize pt's functional independence and consistency with mobility. Recommend trial with walker next 1-2 sessions, trial with cane next 1-2 sessions, and ther ex next 1-2 sessions.       The patient's AM-PAC Basic Mobility Inpatient Short Form Raw Score is 17. A Raw score of greater than 16 suggests the patient may benefit from discharge to home. Please also refer to the recommendation of the Physical Therapist for safe discharge planning.       Goals: Pt will: Perform bed mobility tasks with modified Independent to reposition in bed and prepare for transfers. Pt will perform transfers with modified Independent to decrease burden of care, decrease risk for falls, and improve activity tolerance and prepare for ambulation. Pt will ambulate with LRAD for >/= 300' with  modified Independent  to decrease burden of care, decrease risk for falls, improve activity tolerance, and improve gait quality and to access home environment. Pt will complete 1 step with LRAD and >/= 12 steps with unilateral handrail with modified Independent to decrease burden of care, return to home with BRODY, return to multilevel home, decrease risk for falls, and improve activity tolerance. Pt will participate in objective balance assessment to determine baseline fall risk. Pt will participate in SSWS assessment to determine level of mobility. Pt will increase B LE strength >/= 1/2 MMT grade to facilitate functional mobility.      Maral Morris, PT,DPT

## 2024-11-16 NOTE — ASSESSMENT & PLAN NOTE
Lab Results   Component Value Date    EGFR 9 11/16/2024    EGFR 7 11/15/2024    EGFR 8 11/14/2024    CREATININE 5.35 (H) 11/16/2024    CREATININE 6.92 (H) 11/15/2024    CREATININE 5.99 (H) 11/14/2024

## 2024-11-16 NOTE — PLAN OF CARE
Problem: CARDIOVASCULAR - ADULT  Goal: Maintains optimal cardiac output and hemodynamic stability  Description: INTERVENTIONS:  - Monitor I/O, vital signs and rhythm  - Monitor for S/S and trends of decreased cardiac output  - Administer and titrate ordered vasoactive medications to optimize hemodynamic stability  - Assess quality of pulses, skin color and temperature  - Assess for signs of decreased coronary artery perfusion  - Instruct patient to report change in severity of symptoms  Outcome: Progressing  Goal: Absence of cardiac dysrhythmias or at baseline rhythm  Description: INTERVENTIONS:  - Continuous cardiac monitoring, vital signs, obtain 12 lead EKG if ordered  - Administer antiarrhythmic and heart rate control medications as ordered  - Monitor electrolytes and administer replacement therapy as ordered  Outcome: Progressing     Problem: PAIN - ADULT  Goal: Verbalizes/displays adequate comfort level or baseline comfort level  Description: Interventions:  - Encourage patient to monitor pain and request assistance  - Assess pain using appropriate pain scale  - Administer analgesics based on type and severity of pain and evaluate response  - Implement non-pharmacological measures as appropriate and evaluate response  - Consider cultural and social influences on pain and pain management  - Notify physician/advanced practitioner if interventions unsuccessful or patient reports new pain  Outcome: Progressing     Problem: INFECTION - ADULT  Goal: Absence or prevention of progression during hospitalization  Description: INTERVENTIONS:  - Assess and monitor for signs and symptoms of infection  - Monitor lab/diagnostic results  - Monitor all insertion sites, i.e. indwelling lines, tubes, and drains  - Monitor endotracheal if appropriate and nasal secretions for changes in amount and color  - [place] appropriate cooling/warming therapies per order  - Administer medications as ordered  - Instruct and encourage  patient and family to use good hand hygiene technique  - Identify and instruct in appropriate isolation precautions for identified infection/condition  Outcome: Progressing     Problem: SAFETY ADULT  Goal: Patient will remain free of falls  Description: INTERVENTIONS:  - Educate patient/family on patient safety including physical limitations  - Instruct patient to call for assistance with activity   - Consult OT/PT to assist with strengthening/mobility   - Keep Call bell within reach  - Keep bed low and locked with side rails adjusted as appropriate  - Keep care items and personal belongings within reach  - Initiate and maintain comfort rounds  - Make Fall Risk Sign visible to staff  - Offer Toileting every 2 Hours, in advance of need  - Initiate/Maintain bed/chair alarm  - Obtain necessary fall risk management equipment:   - Apply yellow socks and bracelet for high fall risk patients  - Consider moving patient to room near nurses station  Outcome: Progressing  Goal: Maintain or return to baseline ADL function  Description: INTERVENTIONS:  -  Assess patient's ability to carry out ADLs; assess patient's baseline for ADL function and identify physical deficits which impact ability to perform ADLs (bathing, care of mouth/teeth, toileting, grooming, dressing, etc.)  - Assess/evaluate cause of self-care deficits   - Assess range of motion  - Assess patient's mobility; develop plan if impaired  - Assess patient's need for assistive devices and provide as appropriate  - Encourage maximum independence but intervene and supervise when necessary  - Involve family in performance of ADLs  - Assess for home care needs following discharge   - Consider OT consult to assist with ADL evaluation and planning for discharge  - Provide patient education as appropriate  Outcome: Progressing  Goal: Maintains/Returns to pre admission functional level  Description: INTERVENTIONS:  - Perform AM-PAC 6 Click Basic Mobility/ Daily Activity  assessment daily.  - Set and communicate daily mobility goal to care team and patient/family/caregiver.   - Collaborate with rehabilitation services on mobility goals if consulted  - Perform Range of Motion  times a day.  - Reposition patient .  - Dangle patient 4 times a day  - Stand patient 4 times a day  - Ambulate patient 4 times a day  - Out of bed to chair 3 times a day   - Out of bed for meals 3 times a day  - Out of bed for toileting  - Record patient progress and toleration of activity level   Outcome: Progressing     Problem: Knowledge Deficit  Goal: Patient/family/caregiver demonstrates understanding of disease process, treatment plan, medications, and discharge instructions  Description: Complete learning assessment and assess knowledge base.  Interventions:  - Provide teaching at level of understanding  - Provide teaching via preferred learning methods  Outcome: Progressing     Problem: GENITOURINARY - ADULT  Goal: Maintains or returns to baseline urinary function  Description: INTERVENTIONS:  - Assess urinary function  - Encourage oral fluids to ensure adequate hydration if ordered  - Administer IV fluids as ordered to ensure adequate hydration  - Administer ordered medications as needed  - Offer frequent toileting  - Follow urinary retention protocol if ordered  Outcome: Progressing  Goal: Absence of urinary retention  Description: INTERVENTIONS:  - Assess patient's ability to void and empty bladder  - Monitor I/O  - Bladder scan as needed  - Discuss with physician/AP medications to alleviate retention as needed  - Discuss catheterization for long term situations as appropriate  Outcome: Progressing     Problem: METABOLIC, FLUID AND ELECTROLYTES - ADULT  Goal: Electrolytes maintained within normal limits  Description: INTERVENTIONS:  - Monitor labs and assess patient for signs and symptoms of electrolyte imbalances  - Administer electrolyte replacement as ordered  - Monitor response to electrolyte  replacements, including repeat lab results as appropriate  - Instruct patient on fluid and nutrition as appropriate  Outcome: Progressing  Goal: Fluid balance maintained  Description: INTERVENTIONS:  - Monitor labs   - Monitor I/O and WT  - Instruct patient on fluid and nutrition as appropriate  - Assess for signs & symptoms of volume excess or deficit  Outcome: Progressing  Goal: Glucose maintained within target range  Description: INTERVENTIONS:  - Monitor Blood Glucose as ordered  - Assess for signs and symptoms of hyperglycemia and hypoglycemia  - Administer ordered medications to maintain glucose within target range  - Assess nutritional intake and initiate nutrition service referral as needed  Outcome: Progressing     Problem: Prexisting or High Potential for Compromised Skin Integrity  Goal: Skin integrity is maintained or improved  Description: INTERVENTIONS:  - Identify patients at risk for skin breakdown  - Assess and monitor skin integrity  - Assess and monitor nutrition and hydration status  - Monitor labs   - Assess for incontinence   - Turn and reposition patient  - Assist with mobility/ambulation  - Relieve pressure over bony prominences  - Avoid friction and shearing  - Provide appropriate hygiene as needed including keeping skin clean and dry  - Evaluate need for skin moisturizer/barrier cream  - Collaborate with interdisciplinary team   - Patient/family teaching  - Consider wound care consult   Outcome: Progressing     Problem: Nutrition/Hydration-ADULT  Goal: Nutrient/Hydration intake appropriate for improving, restoring or maintaining nutritional needs  Description: Monitor and assess patient's nutrition/hydration status for malnutrition. Collaborate with interdisciplinary team and initiate plan and interventions as ordered.  Monitor patient's weight and dietary intake as ordered or per policy. Utilize nutrition screening tool and intervene as necessary. Determine patient's food preferences and  provide high-protein, high-caloric foods as appropriate.     INTERVENTIONS:  - Monitor oral intake, urinary output, labs, and treatment plans  - Assess nutrition and hydration status and recommend course of action  - Evaluate amount of meals eaten  - Assist patient with eating if necessary   - Allow adequate time for meals  - Recommend/ encourage appropriate diets, oral nutritional supplements, and vitamin/mineral supplements  - Order, calculate, and assess calorie counts as needed  - Recommend, monitor, and adjust tube feedings and TPN/PPN based on assessed needs  - Assess need for intravenous fluids  - Provide specific nutrition/hydration education as appropriate  - Include patient/family/caregiver in decisions related to nutrition  Outcome: Progressing

## 2024-11-16 NOTE — ASSESSMENT & PLAN NOTE
Wt Readings from Last 3 Encounters:   11/15/24 56 kg (123 lb 6.4 oz)   09/03/24 55.3 kg (122 lb)   02/05/24 56 kg (123 lb 6.4 oz)   Repeat TTE-EF is 20 to 25% severe reduction of LV  Patient currently is not in cardiogenic shock.  He failed diuresis with oliguria with rising creatinine and he has been started on dialysis and now he is can IV on Monday Wednesday Friday as discussed with nephrology next today he still has evidence of pulmonary edema and pleural effusions for which more liters will be removed today if tolerable.  Also asked the critical care to evaluate for thoracocentesis if there is enough fluid.- pt declined   Per cardiology on Imdur aspirin and beta-blocker  He will need a LifeVest prior to discharge

## 2024-11-16 NOTE — PLAN OF CARE
Problem: PHYSICAL THERAPY ADULT  Goal: Performs mobility at highest level of function for planned discharge setting.  See evaluation for individualized goals.  Description: Treatment/Interventions: ADL retraining, Functional transfer training, LE strengthening/ROM, Elevations, Therapeutic exercise, Endurance training, Patient/family training, Equipment eval/education, Bed mobility, Gait training, Compensatory technique education, Spoke to nursing, Spoke to case management, OT  Equipment Recommended:  (walker-reports he can use his spouses, however if unable, would beenfit from RW upon discharge. pt provided cane and fit to patient's height for use should he defer use of RW as this is likely the case)       See flowsheet documentation for full assessment, interventions and recommendations.  Note: Prognosis: Good  Problem List: Decreased strength, Decreased endurance, Impaired balance, Decreased mobility, Impaired judgement, Decreased safety awareness  Assessment: Pt is a 69 y.o. male seen for PT evaluation s/p admission to [facility] on 11/10/2024 with Acute on chronic systolic heart failure (HCC).  Order placed for PT services.  Upon evaluation: Pt is presenting with impaired functional mobility due to decreased strength, decreased endurance, impaired balance, gait deviations, altered sensation, decreased safety awareness, impaired judgment, and fall risk requiring  moderate assistance for bed mobility, stand by to minimal assistance for transfers, and minimal assistance for ambulation with out AD . Pt's clinical presentation is currently unpredictable given the functional mobility deficits above, especially weakness, decreased skin integrity, decreased endurance, impaired balance, gait deviations, decreased activity tolerance, decreased functional mobility tolerance, altered sensation, decreased safety awareness, and impaired judgement, coupled with fall risks as indicated by AM-PAC 6-Clicks:  17/24 as well as impaired balance, polypharmacy, decreased safety awareness, and limited sensation/neuropathy and combined with medical complications of abnormal renal lab values, abnormal H&H, abnormal sodium values, need for input for mobility technique/safety, and Pulmonary edema with bilateral pleural effusions on chest xray, acute CHF, uncontrolled HTN on admission, DAVID now requiring HD, troponin elevated due to HTN and CHF per notes, elevated liver transaminase level, elevated d dimer .  Pt's PMHx and comorbidities that may affect physical performance and progress include: CKD and CHF with LVEF 20% with need for LifeVest on discharge, HTN, dilated cardiomyopathy, anemia, CAD, positive Rheumatoid factor, chronic bronchitis . Personal factors affecting pt at time of IE include: step(s) to enter environment, multi-level environment, inability to perform IADLs, inability to perform ADLs, inability to navigate level surfaces without external assistance, tobacco use, and ETOH use . Pt will benefit from continued skilled PT services to address deficits as defined above and to maximize level of functional mobility to facilitate return toward PLOF and improved QOL. From PT/mobility standpoint, recommendation at time of d/c would be Level III (Minimum Resource Intensity), with family and/or caregiver support, with walker, and with cane in order to reduce fall risk and maximize pt's functional independence and consistency with mobility. Recommend trial with walker next 1-2 sessions, trial with     Barriers to Discharge Comments: inc fall risk, requires assistance to complete mobility, 2SH with BRODY and steps to 2nd floor  Rehab Resource Intensity Level, PT: III (Minimum Resource Intensity) (OPPT services with handout provided for [facility] Pt aware of freedom of choice.)    See flowsheet documentation for full assessment.

## 2024-11-16 NOTE — OCCUPATIONAL THERAPY NOTE
Occupational Therapy Evaluation     Patient Name: Matthew Alvarez  Today's Date: 11/16/2024  Problem List  Principal Problem:    Acute on chronic systolic heart failure (HCC)  Active Problems:    Uncontrolled hypertension    Acute kidney injury superimposed on stage 3a chronic kidney disease (HCC)    Elevated troponin    Tobacco abuse    Elevated liver transaminase level    Elevated d-dimer    Dilated cardiomyopathy (HCC)    Hyponatremia    Oliguria    Encounter for hemodialysis for ESRD (HCC)    Acute hemodialysis patient (HCC)    Renal failure    Encounter for hemodialysis (HCC)    Anemia due to chronic kidney disease, on chronic dialysis (HCC)    Past Medical History  Past Medical History:   Diagnosis Date    CAD (coronary artery disease)     HFrEF (heart failure with reduced ejection fraction) (HCC) 09/2021    Hypertension     Rheumatoid factor positive 09/2021    Stage 3 chronic kidney disease (HCC)     Tobacco abuse      Past Surgical History  Past Surgical History:   Procedure Laterality Date    APPENDECTOMY  1965    CARDIAC CATHETERIZATION  9/16/2021    INGUINAL HERNIA REPAIR Right 1991    IR TUNNELED DIALYSIS CATHETER PLACEMENT  11/14/2024 11/16/24 0946   OT Last Visit   OT Visit Date 11/16/24   Note Type   Note type Evaluation  (and treatment)   Pain Assessment   Pain Assessment Tool 0-10   Pain Score No Pain   Restrictions/Precautions   Weight Bearing Precautions Per Order No   Other Precautions Chair Alarm;Bed Alarm;Fall Risk;Pain;Multiple lines  (LVEF 20%)   Home Living   Type of Home House   Home Layout Two level;1/2 bath on main level;Bed/bath upstairs  (1 BRODY)   Bathroom Shower/Tub Walk-in shower   Bathroom Toilet Standard   Bathroom Equipment Shower chair   Home Equipment Walker;Wheelchair-manual   Additional Comments Pt resides with spouse and son in a 2SH with 1 BRODY.   Prior Function   Level of [place] Independent with ADLs;Independent with functional mobility;Independent with  IADLS   Lives With Spouse   Receives Help From Family   IADLs Independent with driving;Independent with medication management;Family/Friend/Other provides meals   Falls in the last 6 months 0   Vocational Retired   Comments PTA, Pt reports being I with ADLs/IADLs/no AD/ +. Pt with supportive family able to assist upon DC.   Lifestyle   Autonomy I with ADLs/IADLs/no AD/ +   Reciprocal Relationships Supportive spouse and son   Service to Others Retired   Intrinsic Gratification Enjoys painting houses   ADL   Where Assessed Edge of bed   Eating Assistance 7  Independent   Grooming Assistance 5  Supervision/Setup   UB Bathing Assistance 5  Supervision/Setup   LB Bathing Assistance 4  Minimal Assistance   UB Dressing Assistance 5  Supervision/Setup   LB Dressing Assistance 4  Minimal Assistance   LB Dressing Deficit Don/doff L sock;Don/doff R sock   Toileting Assistance  5  Supervision/Setup   Functional Assistance 3  Moderate Assistance   Functional Deficit Increased time to complete;Supervision/safety;Setup   Bed Mobility   Supine to Sit 3  Moderate assistance   Additional items Assist x 1;Increased time required;Verbal cues   Sit to Supine Unable to assess   Additional Comments Pt greeted supine in bed.   Transfers   Sit to Stand 5  Supervision   Additional items Increased time required;Verbal cues   Stand to Sit 5  Supervision   Additional items Increased time required;Verbal cues   Stand pivot 4  Minimal assistance   Additional items Assist x 1   Additional Comments no AD   Functional Mobility   Functional Mobility 4  Minimal assistance   Additional Comments Pt completes functional mobility with no AD at min AX1 with HHA (short distances) and CGA with SPC with (longer distances).   Additional items SPC   Balance   Static Sitting Fair +   Dynamic Sitting Fair   Static Standing Fair -   Dynamic Standing Poor +   Ambulatory Poor +   Activity Tolerance   Activity Tolerance Patient tolerated treatment well    Medical Staff Made Aware Co-eval with ZACKERY Alicia 2* to Pt's medical complexity and decreased endurance.   Nurse Made Aware RN cleared/updated   RUE Assessment   RUE Assessment WFL   LUE Assessment   LUE Assessment WFL   Hand Function   Gross Motor Coordination Functional   Fine Motor Coordination Functional   Sensation   Light Touch No apparent deficits   Psychosocial   Psychosocial (WDL) WDL   Cognition   Overall Cognitive Status WFL   Arousal/Participation Alert;Cooperative   Attention Within functional limits   Orientation Level Oriented X4   Memory Within functional limits   Following Commands Follows one step commands without difficulty   Comments Pt pleasant and cooperative during OT session. Pt motivated to participate.   Assessment   Limitation Decreased ADL status;Decreased Safe judgement during ADL;Decreased UE strength;Decreased UE ROM;Decreased cognition;Decreased endurance;Decreased high-level ADLs;Decreased self-care trans   Prognosis Fair   Assessment Pt is a 69 y.o. male who [facility] on [facility]/10/[facility] with increasing SOB. Pt with diagnosis of acute on chronic systolic heart failure, SIRS, renal failure, and Elevated liver transaminase level. Pt is now on HD. Pt  has a past medical history of CAD (coronary artery disease), HFrEF (heart failure with reduced ejection fraction) (HCC) (09/2021), Hypertension, Rheumatoid factor positive (09/2021), Stage 3 chronic kidney disease (HCC), and Tobacco abuse. Pt greeted bedside for OT evaluation on 11/16/24. Pt resides with spouse and son in a 2SH with 1 BRODY. PTA, Pt reports being I with ADLs/IADLs/no AD/ +. Pt with supportive family able to assist upon DC. Pt demonstrating the following occupational performance levels: Supervision with UB ADLs, Min A with LB ADLs, Mod A  with bed mobility, Supervision with functional transfers, and Min A with functional mobility with cane.Limitations that impact functional performance include decreased ADL status,  safe judgement during ADLs, and high level ADLs. Occupational performance areas to address ADL retraining, endurance training, equipment evaluation/education, compensatory technique education, and activity engagement . Pt would benefit from continued skilled OT services while in hospital to maximize independence with ADLs. Will continue to follow Pt's progress. Pt would benefit from returning home with increased social support upon DC to maximize safety and independence with ADLs and functional tasks of choice.   Goals   Patient Goals To feel better   STG Time Frame 1-3   Short Term Goal #1 See goals listed below   Plan   Treatment Interventions ADL retraining;Endurance training;Patient/family training;Equipment evaluation/education   Goal Expiration Date 11/16/24  (DC SKILLED OT SERVICES S/P TODAYS TREATMENT)   OT Treatment Day 1   OT Frequency 1-2x/wk   Discharge Recommendation   Rehab Resource Intensity Level, OT No post-acute rehabilitation needs   Additional Comments  The patient's raw score on the -PAC Daily Activity Inpatient Short Form is 20. A raw score of greater than or equal to 19 suggests the patient may benefit from discharge to home. Please refer to the recommendation of the Occupational Therapist for safe discharge planning.   AM-PAC Daily Activity Inpatient   Lower Body Dressing 3   Bathing 3   Toileting 3   Upper Body Dressing 3   Grooming 4   Eating 4   Daily Activity Raw Score 20   Daily Activity Standardized Score (Calc for Raw Score >=11) 42.03   AM-PAC Applied Cognition Inpatient   Following a Speech/Presentation 4   Understanding Ordinary Conversation 4   Taking Medications 4   Remembering Where Things Are Placed or Put Away 4   Remembering List of 4-5 Errands 4   Taking Care of Complicated Tasks 4   Applied Cognition Raw Score 24   Applied Cognition Standardized Score 62.21   Additional Treatment Session   Start Time 1006   End Time 1021   Treatment Assessment Pt greeted for follow up  treatment within room. Pt agreeable to complete functional mobility with RW and Pt completes at S level longer household distances. Pt educated on RW safety. Pt engages in ADLs seated/standing: UB dressing S, and LB dressing at S level. Pt increasing in independence as session continues and plans to progress to mod I level upon DC home. Pt with supportive spouse able to assist upon DC. Pt demonstrates good compensatory techniques and denies any further acute OT concerns. Pt understanding of energy conservation education provided. DC skilled OT services.   Additional Treatment Day 1   End of Consult   Education Provided Yes   Patient Position at End of Consult Bedside chair;All needs within reach;Bed/Chair alarm activated   Nurse Communication Nurse aware of consult     GOALS:  Pt will complete UB ADLs with I in order to maximize participation with ADLs.   Pt will complete LB ADLs with I in order to maximize safety with ADLs.   Pt will complete toileting routine (transfer, hygiene, and clothing management) with I in order to return to prior level of function.  Pt will complete bed mobility with I in order to maximize participation with ADLs.   Pt will complete functional transfers at I level in order to increase participation with ADLs.  Pt will increase dynamic standing balance to F+ in order to increase safety with ADLs.  Pt will increase standing tolerance x10 min in order to increase participation with ADLs.   Pt will complete functional mobility with AD PRN for item retrieval task at [place] level in order to increase participation with ADLs.  Pt will complete IADL tasks/simulation of IADLs tasks with I in order to return to PLOF.  Pt will demonstrate G energy conservation techniques with ADLs/IADLs in order to reduce the risk of falls.  Pt will be attentive 100% of the time for ongoing functional/formal cognitive assessment to assist with safe dc planning prn.    Ines Yoder MS, OTR/L

## 2024-11-17 PROBLEM — D69.6 THROMBOCYTOPENIA (HCC): Status: ACTIVE | Noted: 2024-11-17

## 2024-11-17 LAB
ALBUMIN SERPL BCG-MCNC: 3.4 G/DL (ref 3.5–5)
ALP SERPL-CCNC: 98 U/L (ref 34–104)
ALT SERPL W P-5'-P-CCNC: 136 U/L (ref 7–52)
ANION GAP SERPL CALCULATED.3IONS-SCNC: 13 MMOL/L (ref 4–13)
AST SERPL W P-5'-P-CCNC: 53 U/L (ref 13–39)
BASOPHILS # BLD AUTO: 0.01 THOUSANDS/ÂΜL (ref 0–0.1)
BASOPHILS NFR BLD AUTO: 0 % (ref 0–1)
BILIRUB SERPL-MCNC: 2.16 MG/DL (ref 0.2–1)
BUN SERPL-MCNC: 78 MG/DL (ref 5–25)
CALCIUM ALBUM COR SERPL-MCNC: 9.1 MG/DL (ref 8.3–10.1)
CALCIUM SERPL-MCNC: 8.6 MG/DL (ref 8.4–10.2)
CHLORIDE SERPL-SCNC: 91 MMOL/L (ref 96–108)
CO2 SERPL-SCNC: 24 MMOL/L (ref 21–32)
CREAT SERPL-MCNC: 6.67 MG/DL (ref 0.6–1.3)
EOSINOPHIL # BLD AUTO: 0.11 THOUSAND/ÂΜL (ref 0–0.61)
EOSINOPHIL NFR BLD AUTO: 1 % (ref 0–6)
ERYTHROCYTE [DISTWIDTH] IN BLOOD BY AUTOMATED COUNT: 15.3 % (ref 11.6–15.1)
GFR SERPL CREATININE-BSD FRML MDRD: 7 ML/MIN/1.73SQ M
GLUCOSE SERPL-MCNC: 112 MG/DL (ref 65–140)
HCT VFR BLD AUTO: 28.4 % (ref 36.5–49.3)
HGB BLD-MCNC: 9.5 G/DL (ref 12–17)
IMM GRANULOCYTES # BLD AUTO: 0.05 THOUSAND/UL (ref 0–0.2)
IMM GRANULOCYTES NFR BLD AUTO: 1 % (ref 0–2)
LYMPHOCYTES # BLD AUTO: 0.6 THOUSANDS/ÂΜL (ref 0.6–4.47)
LYMPHOCYTES NFR BLD AUTO: 7 % (ref 14–44)
MCH RBC QN AUTO: 27.2 PG (ref 26.8–34.3)
MCHC RBC AUTO-ENTMCNC: 33.5 G/DL (ref 31.4–37.4)
MCV RBC AUTO: 81 FL (ref 82–98)
MONOCYTES # BLD AUTO: 0.91 THOUSAND/ÂΜL (ref 0.17–1.22)
MONOCYTES NFR BLD AUTO: 11 % (ref 4–12)
NEUTROPHILS # BLD AUTO: 6.63 THOUSANDS/ÂΜL (ref 1.85–7.62)
NEUTS SEG NFR BLD AUTO: 80 % (ref 43–75)
NRBC BLD AUTO-RTO: 0 /100 WBCS
PLATELET # BLD AUTO: 86 THOUSANDS/UL (ref 149–390)
PMV BLD AUTO: 12 FL (ref 8.9–12.7)
POTASSIUM SERPL-SCNC: 4.6 MMOL/L (ref 3.5–5.3)
PROT SERPL-MCNC: 6.5 G/DL (ref 6.4–8.4)
RBC # BLD AUTO: 3.49 MILLION/UL (ref 3.88–5.62)
SODIUM SERPL-SCNC: 128 MMOL/L (ref 135–147)
WBC # BLD AUTO: 8.31 THOUSAND/UL (ref 4.31–10.16)

## 2024-11-17 PROCEDURE — 80053 COMPREHEN METABOLIC PANEL: CPT | Performed by: FAMILY MEDICINE

## 2024-11-17 PROCEDURE — 99232 SBSQ HOSP IP/OBS MODERATE 35: CPT | Performed by: FAMILY MEDICINE

## 2024-11-17 PROCEDURE — 85025 COMPLETE CBC W/AUTO DIFF WBC: CPT | Performed by: FAMILY MEDICINE

## 2024-11-17 RX ADMIN — Medication 3 MG: at 21:16

## 2024-11-17 RX ADMIN — HYDRALAZINE HYDROCHLORIDE 50 MG: 25 TABLET ORAL at 21:16

## 2024-11-17 RX ADMIN — ISOSORBIDE MONONITRATE 120 MG: 30 TABLET, EXTENDED RELEASE ORAL at 08:11

## 2024-11-17 RX ADMIN — CARVEDILOL 12.5 MG: 12.5 TABLET, FILM COATED ORAL at 16:46

## 2024-11-17 RX ADMIN — ASPIRIN 81 MG: 81 TABLET, COATED ORAL at 08:11

## 2024-11-17 RX ADMIN — HYDRALAZINE HYDROCHLORIDE 50 MG: 25 TABLET ORAL at 05:44

## 2024-11-17 RX ADMIN — CARVEDILOL 12.5 MG: 12.5 TABLET, FILM COATED ORAL at 08:11

## 2024-11-17 RX ADMIN — ALPRAZOLAM 0.25 MG: 0.25 TABLET ORAL at 21:16

## 2024-11-17 RX ADMIN — HYDRALAZINE HYDROCHLORIDE 50 MG: 25 TABLET ORAL at 14:51

## 2024-11-17 NOTE — PLAN OF CARE
Problem: CARDIOVASCULAR - ADULT  Goal: Maintains optimal cardiac output and hemodynamic stability  Description: INTERVENTIONS:  - Monitor I/O, vital signs and rhythm  - Monitor for S/S and trends of decreased cardiac output  - Administer and titrate ordered vasoactive medications to optimize hemodynamic stability  - Assess quality of pulses, skin color and temperature  - Assess for signs of decreased coronary artery perfusion  - Instruct patient to report change in severity of symptoms  Outcome: Progressing  Goal: Absence of cardiac dysrhythmias or at baseline rhythm  Description: INTERVENTIONS:  - Continuous cardiac monitoring, vital signs, obtain 12 lead EKG if ordered  - Administer antiarrhythmic and heart rate control medications as ordered  - Monitor electrolytes and administer replacement therapy as ordered  Outcome: Progressing     Problem: PAIN - ADULT  Goal: Verbalizes/displays adequate comfort level or baseline comfort level  Description: Interventions:  - Encourage patient to monitor pain and request assistance  - Assess pain using appropriate pain scale  - Administer analgesics based on type and severity of pain and evaluate response  - Implement non-pharmacological measures as appropriate and evaluate response  - Consider cultural and social influences on pain and pain management  - Notify physician/advanced practitioner if interventions unsuccessful or patient reports new pain  Outcome: Progressing     Problem: INFECTION - ADULT  Goal: Absence or prevention of progression during hospitalization  Description: INTERVENTIONS:  - Assess and monitor for signs and symptoms of infection  - Monitor lab/diagnostic results  - Monitor all insertion sites, i.e. indwelling lines, tubes, and drains  - Monitor endotracheal if appropriate and nasal secretions for changes in amount and color  - [place] appropriate cooling/warming therapies per order  - Administer medications as ordered  - Instruct and encourage  patient and family to use good hand hygiene technique  - Identify and instruct in appropriate isolation precautions for identified infection/condition  Outcome: Progressing     Problem: SAFETY ADULT  Goal: Patient will remain free of falls  Description: INTERVENTIONS:  - Educate patient/family on patient safety including physical limitations  - Instruct patient to call for assistance with activity   - Consult OT/PT to assist with strengthening/mobility   - Keep Call bell within reach  - Keep bed low and locked with side rails adjusted as appropriate  - Keep care items and personal belongings within reach  - Initiate and maintain comfort rounds  - Make Fall Risk Sign visible to staff  - Offer Toileting every 2 Hours, in advance of need  - Initiate/Maintain bed/chair alarm  - Obtain necessary fall risk management equipment:   - Apply yellow socks and bracelet for high fall risk patients  - Consider moving patient to room near nurses station  Outcome: Progressing  Goal: Maintain or return to baseline ADL function  Description: INTERVENTIONS:  -  Assess patient's ability to carry out ADLs; assess patient's baseline for ADL function and identify physical deficits which impact ability to perform ADLs (bathing, care of mouth/teeth, toileting, grooming, dressing, etc.)  - Assess/evaluate cause of self-care deficits   - Assess range of motion  - Assess patient's mobility; develop plan if impaired  - Assess patient's need for assistive devices and provide as appropriate  - Encourage maximum independence but intervene and supervise when necessary  - Involve family in performance of ADLs  - Assess for home care needs following discharge   - Consider OT consult to assist with ADL evaluation and planning for discharge  - Provide patient education as appropriate  Outcome: Progressing  Goal: Maintains/Returns to pre admission functional level  Description: INTERVENTIONS:  - Perform AM-PAC 6 Click Basic Mobility/ Daily Activity  assessment daily.  - Set and communicate daily mobility goal to care team and patient/family/caregiver.   - Collaborate with rehabilitation services on mobility goals if consulted  - Perform Range of Motion  times a day.  - Reposition patient .  - Dangle patient 4 times a day  - Stand patient 4 times a day  - Ambulate patient 4 times a day  - Out of bed to chair 3 times a day   - Out of bed for meals 3 times a day  - Out of bed for toileting  - Record patient progress and toleration of activity level   Outcome: Progressing     Problem: Knowledge Deficit  Goal: Patient/family/caregiver demonstrates understanding of disease process, treatment plan, medications, and discharge instructions  Description: Complete learning assessment and assess knowledge base.  Interventions:  - Provide teaching at level of understanding  - Provide teaching via preferred learning methods  Outcome: Progressing     Problem: GENITOURINARY - ADULT  Goal: Maintains or returns to baseline urinary function  Description: INTERVENTIONS:  - Assess urinary function  - Encourage oral fluids to ensure adequate hydration if ordered  - Administer IV fluids as ordered to ensure adequate hydration  - Administer ordered medications as needed  - Offer frequent toileting  - Follow urinary retention protocol if ordered  Outcome: Progressing  Goal: Absence of urinary retention  Description: INTERVENTIONS:  - Assess patient's ability to void and empty bladder  - Monitor I/O  - Bladder scan as needed  - Discuss with physician/AP medications to alleviate retention as needed  - Discuss catheterization for long term situations as appropriate  Outcome: Progressing     Problem: METABOLIC, FLUID AND ELECTROLYTES - ADULT  Goal: Electrolytes maintained within normal limits  Description: INTERVENTIONS:  - Monitor labs and assess patient for signs and symptoms of electrolyte imbalances  - Administer electrolyte replacement as ordered  - Monitor response to electrolyte  replacements, including repeat lab results as appropriate  - Instruct patient on fluid and nutrition as appropriate  Outcome: Progressing  Goal: Fluid balance maintained  Description: INTERVENTIONS:  - Monitor labs   - Monitor I/O and WT  - Instruct patient on fluid and nutrition as appropriate  - Assess for signs & symptoms of volume excess or deficit  Outcome: Progressing  Goal: Glucose maintained within target range  Description: INTERVENTIONS:  - Monitor Blood Glucose as ordered  - Assess for signs and symptoms of hyperglycemia and hypoglycemia  - Administer ordered medications to maintain glucose within target range  - Assess nutritional intake and initiate nutrition service referral as needed  Outcome: Progressing     Problem: Prexisting or High Potential for Compromised Skin Integrity  Goal: Skin integrity is maintained or improved  Description: INTERVENTIONS:  - Identify patients at risk for skin breakdown  - Assess and monitor skin integrity  - Assess and monitor nutrition and hydration status  - Monitor labs   - Assess for incontinence   - Turn and reposition patient  - Assist with mobility/ambulation  - Relieve pressure over bony prominences  - Avoid friction and shearing  - Provide appropriate hygiene as needed including keeping skin clean and dry  - Evaluate need for skin moisturizer/barrier cream  - Collaborate with interdisciplinary team   - Patient/family teaching  - Consider wound care consult   Outcome: Progressing     Problem: Nutrition/Hydration-ADULT  Goal: Nutrient/Hydration intake appropriate for improving, restoring or maintaining nutritional needs  Description: Monitor and assess patient's nutrition/hydration status for malnutrition. Collaborate with interdisciplinary team and initiate plan and interventions as ordered.  Monitor patient's weight and dietary intake as ordered or per policy. Utilize nutrition screening tool and intervene as necessary. Determine patient's food preferences and  provide high-protein, high-caloric foods as appropriate.     INTERVENTIONS:  - Monitor oral intake, urinary output, labs, and treatment plans  - Assess nutrition and hydration status and recommend course of action  - Evaluate amount of meals eaten  - Assist patient with eating if necessary   - Allow adequate time for meals  - Recommend/ encourage appropriate diets, oral nutritional supplements, and vitamin/mineral supplements  - Order, calculate, and assess calorie counts as needed  - Recommend, monitor, and adjust tube feedings and TPN/PPN based on assessed needs  - Assess need for intravenous fluids  - Provide specific nutrition/hydration education as appropriate  - Include patient/family/caregiver in decisions related to nutrition  Outcome: Progressing

## 2024-11-17 NOTE — QUICK NOTE
Patient not seen and examined today. Chart reviewed. Sodium trending down, tighten up FR. Nephrology will see in AM, please reach out with question or concerns.

## 2024-11-17 NOTE — ASSESSMENT & PLAN NOTE
Lab Results   Component Value Date    EGFR 7 11/17/2024    EGFR 9 11/16/2024    EGFR 7 11/15/2024    CREATININE 6.67 (H) 11/17/2024    CREATININE 5.35 (H) 11/16/2024    CREATININE 6.92 (H) 11/15/2024

## 2024-11-17 NOTE — ASSESSMENT & PLAN NOTE
Lab Results   Component Value Date    EGFR 7 11/17/2024    EGFR 9 11/16/2024    EGFR 7 11/15/2024    CREATININE 6.67 (H) 11/17/2024    CREATININE 5.35 (H) 11/16/2024    CREATININE 6.92 (H) 11/15/2024   Hemoglobin is stable  Venofer by nephrology

## 2024-11-17 NOTE — ASSESSMENT & PLAN NOTE
Wt Readings from Last 3 Encounters:   11/17/24 55.8 kg (123 lb)   09/03/24 55.3 kg (122 lb)   02/05/24 56 kg (123 lb 6.4 oz)   Repeat TTE-EF is 20 to 25% severe reduction of LV  Patient currently is not in cardiogenic shock.  He failed diuresis with oliguria with rising creatinine and he has been started on dialysis and now he is can IV on Monday Wednesday Friday as discussed with nephrology next today he still has evidence of pulmonary edema and pleural effusions for which more liters will be removed today if tolerable.  Also asked the critical care to evaluate for thoracocentesis if there is enough fluid.- pt declined   Per cardiology on Imdur aspirin and beta-blocker  He will need a LifeVest prior to discharge

## 2024-11-17 NOTE — ASSESSMENT & PLAN NOTE
Uncontrolled today increased hydralazine to 50 mg p.o. every 8 hours, continue Coreg 12.5 mg p.o. twice daily and Imdur 120 mg p.o. daily  Bp stable

## 2024-11-17 NOTE — PLAN OF CARE
Problem: CARDIOVASCULAR - ADULT  Goal: Maintains optimal cardiac output and hemodynamic stability  Description: INTERVENTIONS:  - Monitor I/O, vital signs and rhythm  - Monitor for S/S and trends of decreased cardiac output  - Administer and titrate ordered vasoactive medications to optimize hemodynamic stability  - Assess quality of pulses, skin color and temperature  - Assess for signs of decreased coronary artery perfusion  - Instruct patient to report change in severity of symptoms  Outcome: Progressing  Goal: Absence of cardiac dysrhythmias or at baseline rhythm  Description: INTERVENTIONS:  - Continuous cardiac monitoring, vital signs, obtain 12 lead EKG if ordered  - Administer antiarrhythmic and heart rate control medications as ordered  - Monitor electrolytes and administer replacement therapy as ordered  Outcome: Progressing     Problem: GENITOURINARY - ADULT  Goal: Maintains or returns to baseline urinary function  Description: INTERVENTIONS:  - Assess urinary function  - Encourage oral fluids to ensure adequate hydration if ordered  - Administer IV fluids as ordered to ensure adequate hydration  - Administer ordered medications as needed  - Offer frequent toileting  - Follow urinary retention protocol if ordered  Outcome: Progressing  Goal: Absence of urinary retention  Description: INTERVENTIONS:  - Assess patient's ability to void and empty bladder  - Monitor I/O  - Bladder scan as needed  - Discuss with physician/AP medications to alleviate retention as needed  - Discuss catheterization for long term situations as appropriate  Outcome: Progressing     Problem: METABOLIC, FLUID AND ELECTROLYTES - ADULT  Goal: Electrolytes maintained within normal limits  Description: INTERVENTIONS:  - Monitor labs and assess patient for signs and symptoms of electrolyte imbalances  - Administer electrolyte replacement as ordered  - Monitor response to electrolyte replacements, including repeat lab results as  appropriate  - Instruct patient on fluid and nutrition as appropriate  Outcome: Progressing  Goal: Fluid balance maintained  Description: INTERVENTIONS:  - Monitor labs   - Monitor I/O and WT  - Instruct patient on fluid and nutrition as appropriate  - Assess for signs & symptoms of volume excess or deficit  Outcome: Progressing  Goal: Glucose maintained within target range  Description: INTERVENTIONS:  - Monitor Blood Glucose as ordered  - Assess for signs and symptoms of hyperglycemia and hypoglycemia  - Administer ordered medications to maintain glucose within target range  - Assess nutritional intake and initiate nutrition service referral as needed  Outcome: Progressing     Problem: INFECTION - ADULT  Goal: Absence or prevention of progression during hospitalization  Description: INTERVENTIONS:  - Assess and monitor for signs and symptoms of infection  - Monitor lab/diagnostic results  - Monitor all insertion sites, i.e. indwelling lines, tubes, and drains  - Monitor endotracheal if appropriate and nasal secretions for changes in amount and color  - [place] appropriate cooling/warming therapies per order  - Administer medications as ordered  - Instruct and encourage patient and family to use good hand hygiene technique  - Identify and instruct in appropriate isolation precautions for identified infection/condition  Outcome: Progressing     Problem: SAFETY ADULT  Goal: Patient will remain free of falls  Description: INTERVENTIONS:  - Educate patient/family on patient safety including physical limitations  - Instruct patient to call for assistance with activity   - Consult OT/PT to assist with strengthening/mobility   - Keep Call bell within reach  - Keep bed low and locked with side rails adjusted as appropriate  - Keep care items and personal belongings within reach  - Initiate and maintain comfort rounds  - Make Fall Risk Sign visible to staff  - Offer Toileting every 2 Hours, in advance of need  -  Initiate/Maintain bed/chair alarm  - Obtain necessary fall risk management equipment:   - Apply yellow socks and bracelet for high fall risk patients  - Consider moving patient to room near nurses station  Outcome: Progressing  Goal: Maintain or return to baseline ADL function  Description: INTERVENTIONS:  -  Assess patient's ability to carry out ADLs; assess patient's baseline for ADL function and identify physical deficits which impact ability to perform ADLs (bathing, care of mouth/teeth, toileting, grooming, dressing, etc.)  - Assess/evaluate cause of self-care deficits   - Assess range of motion  - Assess patient's mobility; develop plan if impaired  - Assess patient's need for assistive devices and provide as appropriate  - Encourage maximum independence but intervene and supervise when necessary  - Involve family in performance of ADLs  - Assess for home care needs following discharge   - Consider OT consult to assist with ADL evaluation and planning for discharge  - Provide patient education as appropriate  Outcome: Progressing  Goal: Maintains/Returns to pre admission functional level  Description: INTERVENTIONS:  - Perform AM-PAC 6 Click Basic Mobility/ Daily Activity assessment daily.  - Set and communicate daily mobility goal to care team and patient/family/caregiver.   - Collaborate with rehabilitation services on mobility goals if consulted  - Perform Range of Motion  times a day.  - Reposition patient .  - Dangle patient 4 times a day  - Stand patient 4 times a day  - Ambulate patient 4 times a day  - Out of bed to chair 3 times a day   - Out of bed for meals 3 times a day  - Out of bed for toileting  - Record patient progress and toleration of activity level   Outcome: Progressing     Problem: Knowledge Deficit  Goal: Patient/family/caregiver demonstrates understanding of disease process, treatment plan, medications, and discharge instructions  Description: Complete learning assessment and assess  knowledge base.  Interventions:  - Provide teaching at level of understanding  - Provide teaching via preferred learning methods  Outcome: Progressing     Problem: Prexisting or High Potential for Compromised Skin Integrity  Goal: Skin integrity is maintained or improved  Description: INTERVENTIONS:  - Identify patients at risk for skin breakdown  - Assess and monitor skin integrity  - Assess and monitor nutrition and hydration status  - Monitor labs   - Assess for incontinence   - Turn and reposition patient  - Assist with mobility/ambulation  - Relieve pressure over bony prominences  - Avoid friction and shearing  - Provide appropriate hygiene as needed including keeping skin clean and dry  - Evaluate need for skin moisturizer/barrier cream  - Collaborate with interdisciplinary team   - Patient/family teaching  - Consider wound care consult   Outcome: Progressing     Problem: Nutrition/Hydration-ADULT  Goal: Nutrient/Hydration intake appropriate for improving, restoring or maintaining nutritional needs  Description: Monitor and assess patient's nutrition/hydration status for malnutrition. Collaborate with interdisciplinary team and initiate plan and interventions as ordered.  Monitor patient's weight and dietary intake as ordered or per policy. Utilize nutrition screening tool and intervene as necessary. Determine patient's food preferences and provide high-protein, high-caloric foods as appropriate.     INTERVENTIONS:  - Monitor oral intake, urinary output, labs, and treatment plans  - Assess nutrition and hydration status and recommend course of action  - Evaluate amount of meals eaten  - Assist patient with eating if necessary   - Allow adequate time for meals  - Recommend/ encourage appropriate diets, oral nutritional supplements, and vitamin/mineral supplements  - Order, calculate, and assess calorie counts as needed  - Recommend, monitor, and adjust tube feedings and TPN/PPN based on assessed needs  - Assess  need for intravenous fluids  - Provide specific nutrition/hydration education as appropriate  - Include patient/family/caregiver in decisions related to nutrition  Outcome: Progressing

## 2024-11-17 NOTE — ASSESSMENT & PLAN NOTE
Lab Results   Component Value Date    EGFR 7 11/17/2024    EGFR 9 11/16/2024    EGFR 7 11/15/2024    CREATININE 6.67 (H) 11/17/2024    CREATININE 5.35 (H) 11/16/2024    CREATININE 6.92 (H) 11/15/2024   CKD stage III with a baseline creatinine of 1.9-2 his creatinine has risen up to 7 with oliguria he was started on dialysis he received his dialysis yesterday discussed with nephrology he will be on Monday Wednesday Friday  S/p permacath placement  He is on room air but still has Rales on exam chest x-ray with still pulmonary edema and pleural effusion reached out to nephrology if we could remove more fluid today they are in agreement they can be removed a couple of liters of tolerable.  Also reached out to critical care to evaluate with ultrasound if there is any evidence of effusions for thoracocentesis.- patient declined   Nephrology on board and following recommend serologies to rule out intrinsic disease  SPEP/UPEP, ANCA, JORGE LUIS, C3, C4, urine protein creatinine ratio

## 2024-11-17 NOTE — PROGRESS NOTES
Progress Note - Hospitalist   Name: Matthew Alvarez 69 y.o. male I MRN: [id number]  Unit/Bed#: -01 I Date of Admission: 11/10/2024   Date of Service: 11/17/2024 I Hospital Day: 7    Assessment & Plan  Acute on chronic systolic heart failure (HCC)  Wt Readings from Last 3 Encounters:   11/17/24 55.8 kg (123 lb)   09/03/24 55.3 kg (122 lb)   02/05/24 56 kg (123 lb 6.4 oz)   Repeat TTE-EF is 20 to 25% severe reduction of LV  Patient currently is not in cardiogenic shock.  He failed diuresis with oliguria with rising creatinine and he has been started on dialysis and now he is can IV on Monday Wednesday Friday as discussed with nephrology next today he still has evidence of pulmonary edema and pleural effusions for which more liters will be removed today if tolerable.  Also asked the critical care to evaluate for thoracocentesis if there is enough fluid.- pt declined   Per cardiology on Imdur aspirin and beta-blocker  He will need a LifeVest prior to discharge  Uncontrolled hypertension  Uncontrolled today increased hydralazine to 50 mg p.o. every 8 hours, continue Coreg 12.5 mg p.o. twice daily and Imdur 120 mg p.o. daily  Bp stable   Acute kidney injury superimposed on stage 3a chronic kidney disease (HCC)  Lab Results   Component Value Date    EGFR 7 11/17/2024    EGFR 9 11/16/2024    EGFR 7 11/15/2024    CREATININE 6.67 (H) 11/17/2024    CREATININE 5.35 (H) 11/16/2024    CREATININE 6.92 (H) 11/15/2024   CKD stage III with a baseline creatinine of 1.9-2 his creatinine has risen up to 7 with oliguria he was started on dialysis he received his dialysis yesterday discussed with nephrology he will be on Monday Wednesday Friday  S/p permacath placement  He is on room air but still has Rales on exam chest x-ray with still pulmonary edema and pleural effusion reached out to nephrology if we could remove more fluid today they are in agreement they can be removed a couple of liters of tolerable.  Also reached out to  critical care to evaluate with ultrasound if there is any evidence of effusions for thoracocentesis.- patient declined   Nephrology on board and following recommend serologies to rule out intrinsic disease  SPEP/UPEP, ANCA, JORGE LUIS, C3, C4, urine protein creatinine ratio  Elevated troponin  Elevated troponin upon presentation of 83, 2-hour troponin of 112  Patient is without chest pain currently  Nonischemic EKG  Likely elevated secondary to demand from accelerated hypertension and CHF exacerbation  Cardiology on board and following  Tte-EF is 20 to 24%  Tobacco abuse  Recommend complete cessation-patient endorses he stopped smoking 3 days ago  NRT offered  Elevated liver transaminase level  Patient presents with volume overload  No known history of liver disease, does have history of heavy alcohol use  Patient endorses initially no further use of alcohol, then states last drink was 2 weeks ago -unreliable historian   liver enzymes suspected secondary to congestive hepatopathy that started improving with dialysis Ultrasound of the right upper quadrant is negative  Elevated d-dimer  D-dimer elevated to 12.54  Patient without hemoptysis, pleuritic chest pain  Does have shortness of breath and mild tachycardia that is improved  Currently no oxygen requirements, saturating well on room air  Kidney function precludes CTA chest  Lower extremity venous duplex pending  TTE pending  Heparin subcu DVT prophylaxis  Dilated cardiomyopathy (HCC)  Chronic, continue aspirin, carvedilol Imdur management as per systolic heart failure  Hyponatremia  Worsened his FR adjusted to 1.5 liters by nephro am labs  Dialysis 11/18  Oliguria    Encounter for hemodialysis for ESRD ([facility])  Lab Results   Component Value Date    EGFR 7 11/17/2024    EGFR 9 11/16/2024    EGFR 7 11/15/2024    CREATININE 6.67 (H) 11/17/2024    CREATININE 5.35 (H) 11/16/2024    CREATININE 6.92 (H) 11/15/2024     Acute hemodialysis patient ([facility])    Renal failure    Encounter  for hemodialysis (HCC)    Anemia due to chronic kidney disease, on chronic dialysis (HCC)  Lab Results   Component Value Date    EGFR 7 11/17/2024    EGFR 9 11/16/2024    EGFR 7 11/15/2024    CREATININE 6.67 (H) 11/17/2024    CREATININE 5.35 (H) 11/16/2024    CREATININE 6.92 (H) 11/15/2024   Hemoglobin is stable  Venofer by nephrology  Thrombocytopenia (HCC)  Could be in light of dialysis start - improved     VTE Pharmacologic Prophylaxis: VTE Score: 2 Low Risk (Score 0-2) - Encourage Ambulation.    Mobility:   Basic Mobility Inpatient Raw Score: 17  JH-HLM Goal: 5: Stand one or more mins  JH-HLM Achieved: 6: Walk 10 steps or more  JH-HLM Goal achieved. Continue to encourage appropriate mobility.    Patient Centered Rounds: I performed bedside rounds with nursing staff today.   Discussions with Specialists or Other Care Team Provider: nephro note reviewed    Education and Discussions with Family / Patient: pt     Current Length of Stay: 7 day(s)  Current Patient Status: Inpatient   Certification Statement: The patient will continue to require additional inpatient hospital stay due to hyponatremia   Discharge Plan: Anticipate discharge in 24-48 hrs to home.    Code Status: Level 1 - Full Code    Subjective   Seen and examined no complaints    Objective :  Temp:  [97.5 °F (36.4 °C)-98.9 °F (37.2 °C)] 97.5 °F (36.4 °C)  HR:  [60-66] 64  BP: (132-150)/(74-89) 150/89  Resp:  [16-18] 18  SpO2:  [94 %-97 %] 96 %  O2 Device: None (Room air)  Nasal Cannula O2 Flow Rate (L/min):  [2 L/min] 2 L/min    Body mass index is 19.85 kg/m².     Input and Output Summary (last 24 hours):     Intake/Output Summary (Last 24 hours) at 11/17/2024 1214  Last data filed at 11/17/2024 0800  Gross per 24 hour   Intake 580 ml   Output --   Net 580 ml       Physical Exam  Vitals and nursing note reviewed.   Constitutional:       General: He is not in acute distress.     Appearance: He is well-developed.   HENT:      Head: Normocephalic and  atraumatic.   Eyes:      Conjunctiva/sclera: Conjunctivae normal.   Cardiovascular:      Rate and Rhythm: Normal rate and regular rhythm.      Heart sounds: No murmur heard.  Pulmonary:      Effort: Pulmonary effort is normal. No respiratory distress.      Breath sounds: Rales (b/l rales left > right) present. No wheezing.   Abdominal:      Palpations: Abdomen is soft.      Tenderness: There is no abdominal tenderness.   Musculoskeletal:         General: No swelling.      Cervical back: Neck supple.   Skin:     General: Skin is warm and dry.      Capillary Refill: Capillary refill takes less than 2 seconds.   Neurological:      Mental Status: He is alert and oriented to person, place, and time.   Psychiatric:         Mood and Affect: Mood normal.           Lines/Drains:  Lines/Drains/Airways       Active Status       Name Placement date Placement time Site Days    HD Permanent Double Catheter 11/14/24  1352  Internal jugular  2                            Lab Results: I have reviewed the following results:   Results from last 7 days   Lab Units 11/17/24  0540   WBC Thousand/uL 8.31   HEMOGLOBIN g/dL 9.5*   HEMATOCRIT % 28.4*   PLATELETS Thousands/uL 86*   SEGS PCT % 80*   LYMPHO PCT % 7*   MONO PCT % 11   EOS PCT % 1     Results from last 7 days   Lab Units 11/17/24  0540   SODIUM mmol/L 128*   POTASSIUM mmol/L 4.6   CHLORIDE mmol/L 91*   CO2 mmol/L 24   BUN mg/dL 78*   CREATININE mg/dL 6.67*   ANION GAP mmol/L 13   CALCIUM mg/dL 8.6   ALBUMIN g/dL 3.4*   TOTAL BILIRUBIN mg/dL 2.16*   ALK PHOS U/L 98   ALT U/L 136*   AST U/L 53*   GLUCOSE RANDOM mg/dL 112     Results from last 7 days   Lab Units 11/14/24  1344   INR  1.73*             Results from last 7 days   Lab Units 11/12/24  1051 11/10/24  2320 11/10/24  1853   LACTIC ACID mmol/L 1.3 4.6* 5.9*       Recent Cultures (last 7 days):         Imaging Results Review: No pertinent imaging studies reviewed.  Other Study Results Review: No additional pertinent studies  reviewed.    Last 24 Hours Medication List:     Current Facility-Administered Medications:     ALPRAZolam (XANAX) tablet 0.25 mg, BID PRN    aspirin (ECOTRIN LOW STRENGTH) EC tablet 81 mg, Daily    carvedilol (COREG) tablet 12.5 mg, BID With Meals    epoetin loida (EPOGEN,PROCRIT) injection 5,000 Units, Once per day on Monday Wednesday Friday    heparin (porcine) subcutaneous injection 5,000 Units, Q8H JEEVAN    hydrALAZINE (APRESOLINE) injection 10 mg, Q6H PRN    hydrALAZINE (APRESOLINE) tablet 50 mg, Q8H JEEVAN    [START ON 11/18/2024] iron sucrose (VENOFER) 100 mg in sodium chloride 0.9 % 100 mL IVPB, Once per day on Monday Wednesday Friday    isosorbide mononitrate (IMDUR) 24 hr tablet 120 mg, Daily    melatonin tablet 3 mg, HS    nicotine (NICODERM CQ) 14 mg/24hr TD 24 hr patch 1 patch, Daily    Administrative Statements   Today, Patient Was Seen By: Stacy Carson MD  I have spent a total time of >35 minutes in caring for this patient on the day of the visit/encounter including Documenting in the medical record, Reviewing / ordering tests, medicine, procedures  , and Communicating with other healthcare professionals .    **Please Note: This note may have been constructed using a voice recognition system.**

## 2024-11-18 ENCOUNTER — APPOINTMENT (INPATIENT)
Dept: DIALYSIS | Facility: HOSPITAL | Age: 69
DRG: 673 | End: 2024-11-18
Payer: COMMERCIAL

## 2024-11-18 LAB
ANION GAP SERPL CALCULATED.3IONS-SCNC: 14 MMOL/L (ref 4–13)
ASO AB TITR SER LA: NORMAL {TITER}
BUN SERPL-MCNC: 93 MG/DL (ref 5–25)
CALCIUM SERPL-MCNC: 8.9 MG/DL (ref 8.4–10.2)
CHLORIDE SERPL-SCNC: 89 MMOL/L (ref 96–108)
CO2 SERPL-SCNC: 23 MMOL/L (ref 21–32)
CREAT SERPL-MCNC: 7.76 MG/DL (ref 0.6–1.3)
GFR SERPL CREATININE-BSD FRML MDRD: 6 ML/MIN/1.73SQ M
GLUCOSE SERPL-MCNC: 108 MG/DL (ref 65–140)
POTASSIUM SERPL-SCNC: 5.1 MMOL/L (ref 3.5–5.3)
SODIUM SERPL-SCNC: 126 MMOL/L (ref 135–147)

## 2024-11-18 PROCEDURE — 86225 DNA ANTIBODY NATIVE: CPT | Performed by: INTERNAL MEDICINE

## 2024-11-18 PROCEDURE — 99232 SBSQ HOSP IP/OBS MODERATE 35: CPT | Performed by: INTERNAL MEDICINE

## 2024-11-18 PROCEDURE — 86063 ANTISTREPTOLYSIN O SCREEN: CPT | Performed by: INTERNAL MEDICINE

## 2024-11-18 PROCEDURE — 83520 IMMUNOASSAY QUANT NOS NONAB: CPT

## 2024-11-18 PROCEDURE — 80048 BASIC METABOLIC PNL TOTAL CA: CPT | Performed by: FAMILY MEDICINE

## 2024-11-18 PROCEDURE — 99232 SBSQ HOSP IP/OBS MODERATE 35: CPT | Performed by: FAMILY MEDICINE

## 2024-11-18 RX ORDER — HYDRALAZINE HYDROCHLORIDE 25 MG/1
100 TABLET, FILM COATED ORAL EVERY 8 HOURS SCHEDULED
Status: DISCONTINUED | OUTPATIENT
Start: 2024-11-18 | End: 2024-11-19

## 2024-11-18 RX ADMIN — Medication 3 MG: at 21:58

## 2024-11-18 RX ADMIN — CARVEDILOL 12.5 MG: 12.5 TABLET, FILM COATED ORAL at 16:30

## 2024-11-18 RX ADMIN — ISOSORBIDE MONONITRATE 120 MG: 30 TABLET, EXTENDED RELEASE ORAL at 09:04

## 2024-11-18 RX ADMIN — IRON SUCROSE 100 MG: 20 INJECTION, SOLUTION INTRAVENOUS at 09:20

## 2024-11-18 RX ADMIN — HYDRALAZINE HYDROCHLORIDE 10 MG: 20 INJECTION INTRAMUSCULAR; INTRAVENOUS at 18:17

## 2024-11-18 RX ADMIN — HYDRALAZINE HYDROCHLORIDE 50 MG: 25 TABLET ORAL at 05:31

## 2024-11-18 RX ADMIN — CARVEDILOL 12.5 MG: 12.5 TABLET, FILM COATED ORAL at 09:04

## 2024-11-18 RX ADMIN — EPOETIN ALFA 5000 UNITS: 4000 SOLUTION INTRAVENOUS; SUBCUTANEOUS at 09:04

## 2024-11-18 RX ADMIN — HYDRALAZINE HYDROCHLORIDE 100 MG: 25 TABLET ORAL at 13:22

## 2024-11-18 RX ADMIN — ALPRAZOLAM 0.25 MG: 0.25 TABLET ORAL at 20:48

## 2024-11-18 RX ADMIN — HYDRALAZINE HYDROCHLORIDE 100 MG: 25 TABLET ORAL at 21:58

## 2024-11-18 RX ADMIN — ASPIRIN 81 MG: 81 TABLET, COATED ORAL at 09:04

## 2024-11-18 NOTE — PLAN OF CARE
Problem: CARDIOVASCULAR - ADULT  Goal: Maintains optimal cardiac output and hemodynamic stability  Description: INTERVENTIONS:  - Monitor I/O, vital signs and rhythm  - Monitor for S/S and trends of decreased cardiac output  - Administer and titrate ordered vasoactive medications to optimize hemodynamic stability  - Assess quality of pulses, skin color and temperature  - Assess for signs of decreased coronary artery perfusion  - Instruct patient to report change in severity of symptoms  Outcome: Progressing  Goal: Absence of cardiac dysrhythmias or at baseline rhythm  Description: INTERVENTIONS:  - Continuous cardiac monitoring, vital signs, obtain 12 lead EKG if ordered  - Administer antiarrhythmic and heart rate control medications as ordered  - Monitor electrolytes and administer replacement therapy as ordered  Outcome: Progressing     Problem: GENITOURINARY - ADULT  Goal: Maintains or returns to baseline urinary function  Description: INTERVENTIONS:  - Assess urinary function  - Encourage oral fluids to ensure adequate hydration if ordered  - Administer IV fluids as ordered to ensure adequate hydration  - Administer ordered medications as needed  - Offer frequent toileting  - Follow urinary retention protocol if ordered  Outcome: Progressing  Goal: Absence of urinary retention  Description: INTERVENTIONS:  - Assess patient's ability to void and empty bladder  - Monitor I/O  - Bladder scan as needed  - Discuss with physician/AP medications to alleviate retention as needed  - Discuss catheterization for long term situations as appropriate  Outcome: Progressing     Problem: METABOLIC, FLUID AND ELECTROLYTES - ADULT  Goal: Electrolytes maintained within normal limits  Description: INTERVENTIONS:  - Monitor labs and assess patient for signs and symptoms of electrolyte imbalances  - Administer electrolyte replacement as ordered  - Monitor response to electrolyte replacements, including repeat lab results as  appropriate  - Instruct patient on fluid and nutrition as appropriate  Outcome: Progressing  Goal: Fluid balance maintained  Description: INTERVENTIONS:  - Monitor labs   - Monitor I/O and WT  - Instruct patient on fluid and nutrition as appropriate  - Assess for signs & symptoms of volume excess or deficit  Outcome: Progressing  Goal: Glucose maintained within target range  Description: INTERVENTIONS:  - Monitor Blood Glucose as ordered  - Assess for signs and symptoms of hyperglycemia and hypoglycemia  - Administer ordered medications to maintain glucose within target range  - Assess nutritional intake and initiate nutrition service referral as needed  Outcome: Progressing     Problem: PAIN - ADULT  Goal: Verbalizes/displays adequate comfort level or baseline comfort level  Description: Interventions:  - Encourage patient to monitor pain and request assistance  - Assess pain using appropriate pain scale  - Administer analgesics based on type and severity of pain and evaluate response  - Implement non-pharmacological measures as appropriate and evaluate response  - Consider cultural and social influences on pain and pain management  - Notify physician/advanced practitioner if interventions unsuccessful or patient reports new pain  Outcome: Progressing     Problem: INFECTION - ADULT  Goal: Absence or prevention of progression during hospitalization  Description: INTERVENTIONS:  - Assess and monitor for signs and symptoms of infection  - Monitor lab/diagnostic results  - Monitor all insertion sites, i.e. indwelling lines, tubes, and drains  - Monitor endotracheal if appropriate and nasal secretions for changes in amount and color  - [place] appropriate cooling/warming therapies per order  - Administer medications as ordered  - Instruct and encourage patient and family to use good hand hygiene technique  - Identify and instruct in appropriate isolation precautions for identified infection/condition  Outcome:  Progressing     Problem: SAFETY ADULT  Goal: Patient will remain free of falls  Description: INTERVENTIONS:  - Educate patient/family on patient safety including physical limitations  - Instruct patient to call for assistance with activity   - Consult OT/PT to assist with strengthening/mobility   - Keep Call bell within reach  - Keep bed low and locked with side rails adjusted as appropriate  - Keep care items and personal belongings within reach  - Initiate and maintain comfort rounds  - Make Fall Risk Sign visible to staff  - Offer Toileting every 2 Hours, in advance of need  - Initiate/Maintain bed/chair alarm  - Obtain necessary fall risk management equipment:   - Apply yellow socks and bracelet for high fall risk patients  - Consider moving patient to room near nurses station  Outcome: Progressing  Goal: Maintain or return to baseline ADL function  Description: INTERVENTIONS:  -  Assess patient's ability to carry out ADLs; assess patient's baseline for ADL function and identify physical deficits which impact ability to perform ADLs (bathing, care of mouth/teeth, toileting, grooming, dressing, etc.)  - Assess/evaluate cause of self-care deficits   - Assess range of motion  - Assess patient's mobility; develop plan if impaired  - Assess patient's need for assistive devices and provide as appropriate  - Encourage maximum independence but intervene and supervise when necessary  - Involve family in performance of ADLs  - Assess for home care needs following discharge   - Consider OT consult to assist with ADL evaluation and planning for discharge  - Provide patient education as appropriate  Outcome: Progressing  Goal: Maintains/Returns to pre admission functional level  Description: INTERVENTIONS:  - Perform AM-PAC 6 Click Basic Mobility/ Daily Activity assessment daily.  - Set and communicate daily mobility goal to care team and patient/family/caregiver.   - Collaborate with rehabilitation services on mobility  goals if consulted  - Perform Range of Motion  times a day.  - Reposition patient .  - Dangle patient 4 times a day  - Stand patient 4 times a day  - Ambulate patient 4 times a day  - Out of bed to chair 3 times a day   - Out of bed for meals 3 times a day  - Out of bed for toileting  - Record patient progress and toleration of activity level   Outcome: Progressing     Problem: Knowledge Deficit  Goal: Patient/family/caregiver demonstrates understanding of disease process, treatment plan, medications, and discharge instructions  Description: Complete learning assessment and assess knowledge base.  Interventions:  - Provide teaching at level of understanding  - Provide teaching via preferred learning methods  Outcome: Progressing     Problem: Prexisting or High Potential for Compromised Skin Integrity  Goal: Skin integrity is maintained or improved  Description: INTERVENTIONS:  - Identify patients at risk for skin breakdown  - Assess and monitor skin integrity  - Assess and monitor nutrition and hydration status  - Monitor labs   - Assess for incontinence   - Turn and reposition patient  - Assist with mobility/ambulation  - Relieve pressure over bony prominences  - Avoid friction and shearing  - Provide appropriate hygiene as needed including keeping skin clean and dry  - Evaluate need for skin moisturizer/barrier cream  - Collaborate with interdisciplinary team   - Patient/family teaching  - Consider wound care consult   Outcome: Progressing     Problem: Nutrition/Hydration-ADULT  Goal: Nutrient/Hydration intake appropriate for improving, restoring or maintaining nutritional needs  Description: Monitor and assess patient's nutrition/hydration status for malnutrition. Collaborate with interdisciplinary team and initiate plan and interventions as ordered.  Monitor patient's weight and dietary intake as ordered or per policy. Utilize nutrition screening tool and intervene as necessary. Determine patient's food  preferences and provide high-protein, high-caloric foods as appropriate.     INTERVENTIONS:  - Monitor oral intake, urinary output, labs, and treatment plans  - Assess nutrition and hydration status and recommend course of action  - Evaluate amount of meals eaten  - Assist patient with eating if necessary   - Allow adequate time for meals  - Recommend/ encourage appropriate diets, oral nutritional supplements, and vitamin/mineral supplements  - Order, calculate, and assess calorie counts as needed  - Recommend, monitor, and adjust tube feedings and TPN/PPN based on assessed needs  - Assess need for intravenous fluids  - Provide specific nutrition/hydration education as appropriate  - Include patient/family/caregiver in decisions related to nutrition  Outcome: Progressing

## 2024-11-18 NOTE — ASSESSMENT & PLAN NOTE
Blood pressures remain hypertensive.  Up-titration of GDMT.  Renal function limits options.  Continue coreg to 12.5 mg PO BID  Continue Imdur to 120 mg daily  Increase hydralazine to 100 mg PO TID and monitor response.

## 2024-11-18 NOTE — CASE MANAGEMENT
Case Management Discharge Planning Note    Patient name Matthew Alvarez  Location /-01 MRN [id number]  : 1955 Date 2024       Current Admission Date: 11/10/2024  Current Admission Diagnosis:Acute on chronic systolic heart failure (HCC)   Patient Active Problem List    Diagnosis Date Noted Date Diagnosed    Thrombocytopenia (HCC) 2024     Renal failure 11/15/2024     Encounter for hemodialysis ([facility]) 11/15/2024     Anemia due to chronic kidney disease, on chronic dialysis (HCC) 11/15/2024     Acute hemodialysis patient (HCC) 2024     Oliguria 2024     Encounter for hemodialysis for ESRD ([facility]) 2024     Hyponatremia 2024     Dilated cardiomyopathy (HCC) 2024     Elevated liver transaminase level 11/10/2024     Elevated d-dimer 11/10/2024     Acute on chronic systolic heart failure (HCC) 2024     Coronary artery disease involving native coronary artery of native heart without angina pectoris 10/08/2021     Rheumatoid factor positive 2021     Protein calorie malnutrition (HCC) 2021     Acute kidney injury superimposed on stage 3a chronic kidney disease (HCC) 2021     Elevated troponin 2021     Tobacco abuse 2021     Nonischemic cardiomyopathy (HCC) 2021     Uncontrolled hypertension 2021       LOS (days): 8  Geometric Mean LOS (GMLOS) (days): 4.4  Days to GMLOS:-3.1     OBJECTIVE:  Risk of Unplanned Readmission Score: 15.18         Current admission status: Inpatient   Preferred Pharmacy:   [facility] #[id number] - [place] - [address]  [address]  [place] [address]  Phone: [phone] Fax: [phone]    [facility] - [place] - [address]  [address]  [place] [address]  Phone: [phone] Fax: [phone]    Primary Care Provider: Olive Rodriguez MD    Primary Insurance: [employer]  Secondary Insurance:     DISCHARGE  DETAILS:    Discharge planning discussed with:: patient and spouse on the phone  Freedom of Choice: Yes  Comments - Freedom of Choice: 1. Recommendation was for OP PT,  however pt is declining  this, he was provided a cane. 2. Offer HHC, he declined this. 3: Reviewed he does not have a PCP as he plans on establishing care with [facility] as his spouse goes there, agreeable for CM to establish an appointment, requested a Tuesday appointment. Informaiton placed on the AVS. 4. Pt will be going home with Life Vest, which is in the room. 5. Updated [facility] that pt will not be starting HD there today, possible on Wednesday  CM contacted family/caregiver?: Yes  Were Treatment Team discharge recommendations reviewed with patient/caregiver?: Yes  Did patient/caregiver verbalize understanding of patient care needs?: Yes  Were patient/caregiver advised of the risks associated with not following Treatment Team discharge recommendations?: Yes    Contacts  Patient Contacts: wife Kellee Alvarez  Relationship to Patient:: Family  Contact Method: Phone  Phone Number: [phone]  Reason/Outcome: Discharge Planning    Requested Home Health Care         Is the patient interested in HHC at discharge?: No    DME Referral Provided  Referral made for DME?: No    Other Referral/Resources/Interventions Provided:  Interventions: PCP  Referral Comments: Established care with [facility]    Treatment Team Recommendation: Home  Discharge Destination Plan:: Home  Transport at Discharge : Family           IMM Given (Date):: 11/18/24  IMM Given to:: Patient        CM will continue to follow.  Dc plan is Home, established dialysis chair time, established PCP for dc

## 2024-11-18 NOTE — ASSESSMENT & PLAN NOTE
History of non-ischemic severe cardiomyopathy.  Admitted with heart failure in 2021 where EF was < 25%.  Cardiac catheterization 9/16/2021 showed only mild nonobstructive disease.  GDMT limited by advanced CKD.  On GDMT with Coreg, hydralazine and Imdur. Continuing to titrate during admission.  LifeVest in place.  Plan close outpatient follow up with our office.

## 2024-11-18 NOTE — ASSESSMENT & PLAN NOTE
Wt Readings from Last 3 Encounters:   11/18/24 59.2 kg (130 lb 8.2 oz)   09/03/24 55.3 kg (122 lb)   02/05/24 56 kg (123 lb 6.4 oz)   Repeat TTE-EF is 20 to 25% severe reduction of LV  Patient currently is not in cardiogenic shock.  He failed diuresis with oliguria with rising creatinine and he has been started on dialysis and now he is can IV on Monday Wednesday Friday as discussed with nephrology next today he still has evidence of pulmonary edema and pleural effusions for which more liters will be removed today if tolerable.  Also asked the critical care to evaluate for thoracocentesis if there is enough fluid.- pt declined   Per cardiology on Imdur aspirin and beta-blocker  He will need a LifeVest prior to discharge- is at his bedside

## 2024-11-18 NOTE — ASSESSMENT & PLAN NOTE
This has improved with dialysis and fluid restriction, current sodium is 126 and we we will do HD today and maintain fluid restriction but decrease to 1.2 L. Glucose is 108.

## 2024-11-18 NOTE — PROGRESS NOTES
NEPHROLOGY HOSPITAL PROGRESS NOTE   Matthew Alvarez 69 y.o. male MRN: [id number]  Unit/Bed#: -01 Encounter: [id number]  Reason for Consult: DAVID and  HD.   Assessment & Plan  Acute hemodialysis patient (HCC)  The patient dialysis dependent in the setting of acute kidney injury.  Etiology had been unclear may be related to history of dilated cardiomyopathy with an ejection fraction 20 to 24% query suspected cardiorenal syndrome.  The urinalysis was not felt to be impressive and the patient only had tubular range proteinuria.  Serologic workup that has been negative includes UPEP, SPEP, ANCA, hepatitis panel.  The patient is oliguric and dialysis dependent.  Anti-GBM is pending.  The patient is noted to have low complements both C3 and C4 blood protein creatinine ratio 0.6.  UA from November 11 shows 1+ protein small blood and 0-1 red blood cells per high-power field.  Patient had a baseline creatinine in the 2 range in 2023.  JORGE LUIS is noted to be normal.  For completeness sake given low complements will check ASO titer and anti-double-stranded DNA.  Plan for hemodialysis today on November 18.  He has a PermCath which again was placed on November 14.  Case management is working on placement.    Acute on chronic systolic heart failure (HCC)  Wt Readings from Last 3 Encounters:   11/18/24 59.2 kg (130 lb 8.2 oz)   09/03/24 55.3 kg (122 lb)   02/05/24 56 kg (123 lb 6.4 oz)   The patient examines to be euvolemic.  Plan for hemodialysis today.  Uncontrolled hypertension  The patient is on guideline directed medical therapy as per cardiology.  Patient is on hydralazine and carvedilol as well as Imdur.    Acute kidney injury superimposed on stage 3a chronic kidney disease (HCC)  Lab Results   Component Value Date    EGFR 6 11/18/2024    EGFR 7 11/17/2024    EGFR 9 11/16/2024    CREATININE 7.76 (H) 11/18/2024    CREATININE 6.67 (H) 11/17/2024    CREATININE 5.35 (H) 11/16/2024     Dilated cardiomyopathy (HCC)  Volume  status compensated at present. Continue UF with HD.  Continue GDMT with coreg,imdur and hydrazine.  The patient is to be on a LifeVest at discharge.  Hyponatremia  This has improved with dialysis and fluid restriction, current sodium is 126 and we we will do HD today and maintain fluid restriction but decrease to 1.2 L. Glucose is 108.   Anemia due to chronic kidney disease, on chronic dialysis ([facility])  Lab Results   Component Value Date    EGFR 6 11/18/2024    EGFR 7 11/17/2024    EGFR 9 11/16/2024    CREATININE 7.76 (H) 11/18/2024    CREATININE 6.67 (H) 11/17/2024    CREATININE 5.35 (H) 11/16/2024   The patient is noted to have iron saturation of 4% and ferritin is 70.  Last hemoglobin is 9.5.  Hgb 10.7, stable.  Patient is to be maintained on ALCIRA with hemodialysis.  Venofer is to be started for 10 doses at 100 mg with each hemodialysis.  Encounter for hemodialysis for ESRD ([facility])  Lab Results   Component Value Date    EGFR 6 11/18/2024    EGFR 7 11/17/2024    EGFR 9 11/16/2024    CREATININE 7.76 (H) 11/18/2024    CREATININE 6.67 (H) 11/17/2024    CREATININE 5.35 (H) 11/16/2024       VIRTUAL CARE DOCUMENTATION:     1. This service was provided via Telemedicine using [employer]     2. Parties in the room with patient during teleconsult Patient only    3. Confidentiality My office door was closed     4. Participants No one else was in the room    5. Patient acknowledged consent and understanding of privacy and security of the  Telemedicine consult. I informed the patient that I have reviewed their record in Epic and presented the opportunity for them to ask any questions regarding the visit today.  The patient agreed to participate.    6. Time spent including patient's record, documentation of medical decision making, interview with the patient, creation of plan of care, total time approximately 25 minutes.            SUBJECTIVE / 24H INTERVAL HISTORY:  Mr. Alvarez is seen for DAVID for which the patient is  hemodialysis dependent.  He denied any chest pressure or shortness of breath.  He is resting comfortably.  He is due for hemodialysis today.  Systolic blood pressure is noted to be 140-150s range over the past 24 hours and pulse oximetry is 96% on room air.  Patient's weight is 59 kg.    OBJECTIVE:  Current Weight: Weight - Scale: 59.2 kg (130 lb 8.2 oz)  Vitals:    11/17/24 1646 11/17/24 1925 11/18/24 0415 11/18/24 0540   BP:  151/74 156/86    BP Location:  Left arm Left arm    Pulse: 67 82 61    Resp:   20    Temp:  97.7 °F (36.5 °C) 98.3 °F (36.8 °C)    TempSrc:  Temporal Temporal    SpO2:  90% 96%    Weight:    59.2 kg (130 lb 8.2 oz)   Height:           Intake/Output Summary (Last 24 hours) at 11/18/2024 0818  Last data filed at 11/17/2024 2000  Gross per 24 hour   Intake 240 ml   Output --   Net 240 ml     General: Patient is not in any acute distress.  HEENT: Normocephalic atraumatic  Neck: Supple  Pulmonary: No accessory muscle use noted.  There is no conversational dyspnea noted.  Cardiac: Pulse ranges in the 60s  Extremities: No significant edema noted  Neurologic: No focal deficits.    Medications:    Current Facility-Administered Medications:     ALPRAZolam (XANAX) tablet 0.25 mg, 0.25 mg, Oral, BID PRN, David Meraz MD, 0.25 mg at 11/17/24 2116    aspirin (ECOTRIN LOW STRENGTH) EC tablet 81 mg, 81 mg, Oral, Daily, Jared Bateman PA-C, 81 mg at 11/17/24 0811    carvedilol (COREG) tablet 12.5 mg, 12.5 mg, Oral, BID With Meals, Kathleen Bui PA-C, 12.5 mg at 11/17/24 1646    epoetin loida (EPOGEN,PROCRIT) injection 5,000 Units, 5,000 Units, Subcutaneous, Once per day on Monday Wednesday Friday, Brandyn Ariza MD, 5,000 Units at 11/15/24 1054    heparin (porcine) subcutaneous injection 5,000 Units, 5,000 Units, Subcutaneous, Q8H JEEVAN, Jared Bateman PA-C, 5,000 Units at 11/12/24 1524    hydrALAZINE (APRESOLINE) injection 10 mg, 10 mg, Intravenous, Q6H PRN, Jared Bateman PA-C,  10 mg at 11/10/24 2320    hydrALAZINE (APRESOLINE) tablet 50 mg, 50 mg, Oral, Q8H JEEVAN, Kathleen Bui PA-C, 50 mg at 11/18/24 0531    iron sucrose (VENOFER) 100 mg in sodium chloride 0.9 % 100 mL IVPB, 100 mg, Intravenous, Once per day on Monday Wednesday Friday, Kathleen Mulligan DO    isosorbide mononitrate (IMDUR) 24 hr tablet 120 mg, 120 mg, Oral, Daily, Kathleen Bui PA-C, 120 mg at 11/17/24 0811    melatonin tablet 3 mg, 3 mg, Oral, HS, Jany Ambrosex, CRNP, 3 mg at 11/17/24 2116    nicotine (NICODERM CQ) 14 mg/24hr TD 24 hr patch 1 patch, 1 patch, Transdermal, Daily, Jared Bateman PA-C, 1 patch at 11/11/24 0803    Laboratory Results:  Results from last 7 days   Lab Units 11/18/24  0702 11/17/24  0540 11/16/24  0630 11/15/24  0640 11/15/24  0521 11/14/24  1342 11/14/24  0545 11/13/24  1241 11/13/24  0514 11/12/24  1051 11/12/24  0508   WBC Thousand/uL  --  8.31 7.19  --  9.35  --  10.17*  --  10.21*  --  10.93*   HEMOGLOBIN g/dL  --  9.5* 10.7*  --  10.2*  --  11.0*  --  11.1*  --  11.0*   I STAT HEMOGLOBIN g/dl  --   --   --   --   --  10.9*  --   --   --   --   --    HEMATOCRIT %  --  28.4* 32.3*  --  30.9*  --  32.8*  --  33.9*  --  32.9*   HEMATOCRIT, ISTAT %  --   --   --   --   --  32*  --   --   --   --   --    PLATELETS Thousands/uL  --  86* 79*  --  181  --  86*  --  102*  --  106*   POTASSIUM mmol/L 5.1 4.6 4.6 4.3  --   --  4.2 3.5 5.0   < > 4.8   CHLORIDE mmol/L 89* 91* 94* 91*  --   --  95* 94* 95*   < > 96   CO2 mmol/L 23 24 24 20*  --   --  22 24 17*   < > 19*   CO2, I-STAT mmol/L  --   --   --   --   --  26  --   --   --   --   --    BUN mg/dL 93* 78* 61* 96*  --   --  78* 53* 112*   < > 86*   CREATININE mg/dL 7.76* 6.67* 5.35* 6.92*  --   --  5.99* 4.22* 7.57*   < > 6.14*   CALCIUM mg/dL 8.9 8.6 9.2 8.8  --   --  9.1 8.9 8.3*   < > 8.4   MAGNESIUM mg/dL  --   --   --   --   --   --  2.2 2.0 2.3  --   --    PHOSPHORUS mg/dL  --   --   --   --   --   --  5.0* 3.9 6.3*  --    "--    GLUCOSE, ISTAT mg/dl  --   --   --   --   --  147*  --   --   --   --   --     < > = values in this interval not displayed.       Portions of the record may have been created with voice recognition software. Occasional wrong word or \"sound a like\" substitutions may have occurred due to the inherent limitations of voice recognition software. Read the chart carefully and recognize, using context, where substitutions have occurred.If you have any questions, please contact the dictating provider.   "

## 2024-11-18 NOTE — ASSESSMENT & PLAN NOTE
Lab Results   Component Value Date    EGFR 6 11/18/2024    EGFR 7 11/17/2024    EGFR 9 11/16/2024    CREATININE 7.76 (H) 11/18/2024    CREATININE 6.67 (H) 11/17/2024    CREATININE 5.35 (H) 11/16/2024

## 2024-11-18 NOTE — ASSESSMENT & PLAN NOTE
Cardiac catheterization 9/2021 with mild non-obstructive disease  No current anginal complaints with no acute ECG changes.  HS trop elevation consistent with non-ischemic myocardial injury in the setting of CHF, DAVID

## 2024-11-18 NOTE — PROGRESS NOTES
Progress Note - Hospitalist   Name: Matthew Alvarez 69 y.o. male I MRN: [id number]  Unit/Bed#: -01 I Date of Admission: 11/10/2024   Date of Service: 11/18/2024 I Hospital Day: 8    Assessment & Plan  Acute on chronic systolic heart failure (HCC)  Wt Readings from Last 3 Encounters:   11/18/24 59.2 kg (130 lb 8.2 oz)   09/03/24 55.3 kg (122 lb)   02/05/24 56 kg (123 lb 6.4 oz)   Repeat TTE-EF is 20 to 25% severe reduction of LV  Patient currently is not in cardiogenic shock.  He failed diuresis with oliguria with rising creatinine and he has been started on dialysis and now he is can IV on Monday Wednesday Friday as discussed with nephrology next today he still has evidence of pulmonary edema and pleural effusions for which more liters will be removed today if tolerable.  Also asked the critical care to evaluate for thoracocentesis if there is enough fluid.- pt declined   Per cardiology on Imdur aspirin and beta-blocker  He will need a LifeVest prior to discharge- is at his bedside   Uncontrolled hypertension  Uncontrolled today increased hydralazine to 50 mg p.o. every 8 hours, continue Coreg 12.5 mg p.o. twice daily and Imdur 120 mg p.o. daily  Bp stable   Acute kidney injury superimposed on stage 3a chronic kidney disease (HCC)  Lab Results   Component Value Date    EGFR 6 11/18/2024    EGFR 7 11/17/2024    EGFR 9 11/16/2024    CREATININE 7.76 (H) 11/18/2024    CREATININE 6.67 (H) 11/17/2024    CREATININE 5.35 (H) 11/16/2024   CKD stage III with a baseline creatinine of 1.9-2 his creatinine has risen up to 7 with oliguria he was started on dialysis he received his dialysis yesterday discussed with nephrology he will be on Monday Wednesday Friday  S/p permacath placement  He is on room air but still has Rales on exam chest x-ray with still pulmonary edema and pleural effusion reached out to nephrology if we could remove more fluid today they are in agreement they can be removed a couple of liters of  tolerable.  Also reached out to critical care to evaluate with ultrasound if there is any evidence of effusions for thoracocentesis.- patient declined   Nephrology on board and following recommend serologies to rule out intrinsic disease  SPEP/UPEP, ANCA, JORGE LUIS, C3, C4, urine protein creatinine ratio  Elevated troponin  Elevated troponin upon presentation of 83, 2-hour troponin of 112  Patient is without chest pain currently  Nonischemic EKG  Likely elevated secondary to demand from accelerated hypertension and CHF exacerbation  Cardiology on board and following  Tte-EF is 20 to 24%  Tobacco abuse  Recommend complete cessation-patient endorses he stopped smoking 3 days ago  NRT offered  Elevated liver transaminase level  Patient presents with volume overload  No known history of liver disease, does have history of heavy alcohol use  Patient endorses initially no further use of alcohol, then states last drink was 2 weeks ago -unreliable historian   liver enzymes suspected secondary to congestive hepatopathy that started improving with dialysis Ultrasound of the right upper quadrant is negative  Elevated d-dimer  D-dimer elevated to 12.54  Patient without hemoptysis, pleuritic chest pain  Does have shortness of breath and mild tachycardia that is improved  Currently no oxygen requirements, saturating well on room air  Kidney function precludes CTA chest  Lower extremity venous duplex pending  TTE pending  Heparin subcu DVT prophylaxis  Dilated cardiomyopathy (HCC)  Chronic, continue aspirin, carvedilol Imdur management as per systolic heart failure  Hyponatremia  Worsened his FR adjusted to 1.5 liters by nephro am labs  Dialysis 11/18 hope to improve with dialysis  Oliguria    Encounter for hemodialysis for ESRD (HCC)  Lab Results   Component Value Date    EGFR 6 11/18/2024    EGFR 7 11/17/2024    EGFR 9 11/16/2024    CREATININE 7.76 (H) 11/18/2024    CREATININE 6.67 (H) 11/17/2024    CREATININE 5.35 (H) 11/16/2024      Acute hemodialysis patient (HCC)    Renal failure    Encounter for hemodialysis ([facility])    Anemia due to chronic kidney disease, on chronic dialysis ([facility])  Lab Results   Component Value Date    EGFR 6 11/18/2024    EGFR 7 11/17/2024    EGFR 9 11/16/2024    CREATININE 7.76 (H) 11/18/2024    CREATININE 6.67 (H) 11/17/2024    CREATININE 5.35 (H) 11/16/2024   Hemoglobin is stable  Venofer by nephrology  Thrombocytopenia (HCC)  Could be in light of dialysis start - improved     VTE Pharmacologic Prophylaxis: VTE Score: 2 Low Risk (Score 0-2) - Encourage Ambulation.    Mobility:   Basic Mobility Inpatient Raw Score: 18  JH-HLM Goal: 6: Walk 10 steps or more  JH-HLM Achieved: 6: Walk 10 steps or more  JH-HLM Goal achieved. Continue to encourage appropriate mobility.    Patient Centered Rounds: I performed bedside rounds with nursing staff today.   Discussions with Specialists or Other Care Team Provider: nephrology     Education and Discussions with Family / Patient: pt will update wife     Current Length of Stay: 8 day(s)  Current Patient Status: Inpatient   Certification Statement: The patient will continue to require additional inpatient hospital stay due to hyponatremia   Discharge Plan: Anticipate discharge in 24-48 hrs to home.    Code Status: Level 1 - Full Code    Subjective   Seen and examined nos ob     Objective :  Temp:  [97.6 °F (36.4 °C)-98.3 °F (36.8 °C)] 97.9 °F (36.6 °C)  HR:  [61-82] 62  BP: (144-161)/(71-91) 161/91  Resp:  [18-20] 18  SpO2:  [90 %-96 %] 96 %  O2 Device: None (Room air)    Body mass index is 21.07 kg/m².     Input and Output Summary (last 24 hours):     Intake/Output Summary (Last 24 hours) at 11/18/2024 1034  Last data filed at 11/18/2024 0920  Gross per 24 hour   Intake 960 ml   Output --   Net 960 ml       Physical Exam  Vitals and nursing note reviewed.   Constitutional:       General: He is not in acute distress.     Appearance: He is well-developed.   HENT:      Head:  Normocephalic and atraumatic.   Eyes:      Conjunctiva/sclera: Conjunctivae normal.   Cardiovascular:      Rate and Rhythm: Normal rate and regular rhythm.      Heart sounds: No murmur heard.  Pulmonary:      Effort: Pulmonary effort is normal. No respiratory distress.      Breath sounds: Rales present.   Abdominal:      Palpations: Abdomen is soft.      Tenderness: There is no abdominal tenderness.   Musculoskeletal:         General: No swelling.      Cervical back: Neck supple.   Skin:     General: Skin is warm and dry.      Capillary Refill: Capillary refill takes less than 2 seconds.   Neurological:      Mental Status: He is alert and oriented to person, place, and time.   Psychiatric:         Mood and Affect: Mood normal.           Lines/Drains:  Lines/Drains/Airways       Active Status       Name Placement date Placement time Site Days    HD Permanent Double Catheter 11/14/24  1352  Internal jugular  3                            Lab Results: I have reviewed the following results:   Results from last 7 days   Lab Units 11/17/24  0540   WBC Thousand/uL 8.31   HEMOGLOBIN g/dL 9.5*   HEMATOCRIT % 28.4*   PLATELETS Thousands/uL 86*   SEGS PCT % 80*   LYMPHO PCT % 7*   MONO PCT % 11   EOS PCT % 1     Results from last 7 days   Lab Units 11/18/24  0702 11/17/24  0540   SODIUM mmol/L 126* 128*   POTASSIUM mmol/L 5.1 4.6   CHLORIDE mmol/L 89* 91*   CO2 mmol/L 23 24   BUN mg/dL 93* 78*   CREATININE mg/dL 7.76* 6.67*   ANION GAP mmol/L 14* 13   CALCIUM mg/dL 8.9 8.6   ALBUMIN g/dL  --  3.4*   TOTAL BILIRUBIN mg/dL  --  2.16*   ALK PHOS U/L  --  98   ALT U/L  --  136*   AST U/L  --  53*   GLUCOSE RANDOM mg/dL 108 112     Results from last 7 days   Lab Units 11/14/24  1344   INR  1.73*             Results from last 7 days   Lab Units 11/12/24  1051   LACTIC ACID mmol/L 1.3       Recent Cultures (last 7 days):         Imaging Results Review: No pertinent imaging studies reviewed.  Other Study Results Review: No additional  pertinent studies reviewed.    Last 24 Hours Medication List:     Current Facility-Administered Medications:     ALPRAZolam (XANAX) tablet 0.25 mg, BID PRN    aspirin (ECOTRIN LOW STRENGTH) EC tablet 81 mg, Daily    carvedilol (COREG) tablet 12.5 mg, BID With Meals    epoetin loida (EPOGEN,PROCRIT) injection 5,000 Units, Once per day on Monday Wednesday Friday    heparin (porcine) subcutaneous injection 5,000 Units, Q8H JEEVAN    hydrALAZINE (APRESOLINE) injection 10 mg, Q6H PRN    hydrALAZINE (APRESOLINE) tablet 50 mg, Q8H JEEVAN    iron sucrose (VENOFER) 100 mg in sodium chloride 0.9 % 100 mL IVPB, Once per day on Monday Wednesday Friday    isosorbide mononitrate (IMDUR) 24 hr tablet 120 mg, Daily    melatonin tablet 3 mg, HS    nicotine (NICODERM CQ) 14 mg/24hr TD 24 hr patch 1 patch, Daily    Administrative Statements   Today, Patient Was Seen By: Stacy Carson MD  I have spent a total time of >35 minutes in caring for this patient on the day of the visit/encounter including Documenting in the medical record, Reviewing / ordering tests, medicine, procedures  , and Communicating with other healthcare professionals .    **Please Note: This note may have been constructed using a voice recognition system.**

## 2024-11-18 NOTE — PLAN OF CARE
Problem: CARDIOVASCULAR - ADULT  Goal: Maintains optimal cardiac output and hemodynamic stability  Description: INTERVENTIONS:  - Monitor I/O, vital signs and rhythm  - Monitor for S/S and trends of decreased cardiac output  - Administer and titrate ordered vasoactive medications to optimize hemodynamic stability  - Assess quality of pulses, skin color and temperature  - Assess for signs of decreased coronary artery perfusion  - Instruct patient to report change in severity of symptoms  Outcome: Progressing  Goal: Absence of cardiac dysrhythmias or at baseline rhythm  Description: INTERVENTIONS:  - Continuous cardiac monitoring, vital signs, obtain 12 lead EKG if ordered  - Administer antiarrhythmic and heart rate control medications as ordered  - Monitor electrolytes and administer replacement therapy as ordered  Outcome: Progressing     Problem: GENITOURINARY - ADULT  Goal: Maintains or returns to baseline urinary function  Description: INTERVENTIONS:  - Assess urinary function  - Encourage oral fluids to ensure adequate hydration if ordered  - Administer IV fluids as ordered to ensure adequate hydration  - Administer ordered medications as needed  - Offer frequent toileting  - Follow urinary retention protocol if ordered  Outcome: Progressing  Goal: Absence of urinary retention  Description: INTERVENTIONS:  - Assess patient's ability to void and empty bladder  - Monitor I/O  - Bladder scan as needed  - Discuss with physician/AP medications to alleviate retention as needed  - Discuss catheterization for long term situations as appropriate  Outcome: Progressing

## 2024-11-18 NOTE — ASSESSMENT & PLAN NOTE
Worsened his FR adjusted to 1.5 liters by nephro am labs  Dialysis 11/18 hope to improve with dialysis

## 2024-11-18 NOTE — ASSESSMENT & PLAN NOTE
The patient is on guideline directed medical therapy as per cardiology.  Patient is on hydralazine and carvedilol as well as Imdur.

## 2024-11-18 NOTE — ASSESSMENT & PLAN NOTE
History of HFrEF.  Presented with volume overload.  Echo 11/11/2024 shows LVEF 20-24% with severe global hypokinesis and dilated LV which is unchanged from 2021  Oliguric since admission. Now on hemodialysis.  Appreciate nephrology management. Continues to appear mildly hypervolemic on exam today.  See discussion under cardiomyopathy.

## 2024-11-18 NOTE — ASSESSMENT & PLAN NOTE
Lab Results   Component Value Date    EGFR 6 11/18/2024    EGFR 7 11/17/2024    EGFR 9 11/16/2024    CREATININE 7.76 (H) 11/18/2024    CREATININE 6.67 (H) 11/17/2024    CREATININE 5.35 (H) 11/16/2024   CKD stage III with a baseline creatinine of 1.9-2 his creatinine has risen up to 7 with oliguria he was started on dialysis he received his dialysis yesterday discussed with nephrology he will be on Monday Wednesday Friday  S/p permacath placement  He is on room air but still has Rales on exam chest x-ray with still pulmonary edema and pleural effusion reached out to nephrology if we could remove more fluid today they are in agreement they can be removed a couple of liters of tolerable.  Also reached out to critical care to evaluate with ultrasound if there is any evidence of effusions for thoracocentesis.- patient declined   Nephrology on board and following recommend serologies to rule out intrinsic disease  SPEP/UPEP, ANCA, JORGE LUIS, C3, C4, urine protein creatinine ratio

## 2024-11-18 NOTE — PROGRESS NOTES
Progress Note - Cardiology   Name: Matthew Alvarez 69 y.o. male I MRN: [id number]  Unit/Bed#: -01 I Date of Admission: 11/10/2024   Date of Service: 11/18/2024 I Hospital Day: 8     Assessment & Plan  Acute on chronic systolic heart failure (HCC)  History of HFrEF.  Presented with volume overload.  Echo 11/11/2024 shows LVEF 20-24% with severe global hypokinesis and dilated LV which is unchanged from 2021  Oliguric since admission. Now on hemodialysis.  Appreciate nephrology management. Continues to appear mildly hypervolemic on exam today.  See discussion under cardiomyopathy.  Uncontrolled hypertension  Blood pressures remain hypertensive.  Up-titration of GDMT.  Renal function limits options.  Continue coreg to 12.5 mg PO BID  Continue Imdur to 120 mg daily  Increase hydralazine to 100 mg PO TID and monitor response.  Dilated cardiomyopathy (HCC)  History of non-ischemic severe cardiomyopathy.  Admitted with heart failure in 2021 where EF was < 25%.  Cardiac catheterization 9/16/2021 showed only mild nonobstructive disease.  GDMT limited by advanced CKD.  On GDMT with Coreg, hydralazine and Imdur. Continuing to titrate during admission.  LifeVest in place.  Plan close outpatient follow up with our office.  Acute kidney injury superimposed on stage 3a chronic kidney disease (HCC)  Lab Results   Component Value Date    EGFR 6 11/18/2024    EGFR 7 11/17/2024    EGFR 9 11/16/2024    CREATININE 7.76 (H) 11/18/2024    CREATININE 6.67 (H) 11/17/2024    CREATININE 5.35 (H) 11/16/2024     Started on hemodialysis this admission.  Management as per nephrology.  Elevated troponin  Cardiac catheterization 9/2021 with mild non-obstructive disease  No current anginal complaints with no acute ECG changes.  HS trop elevation consistent with non-ischemic myocardial injury in the setting of CHF, DAVID  Tobacco abuse  Urged cessation  Encounter for hemodialysis for ESRD (HCC)  Lab Results   Component Value Date    EGFR 6  "11/18/2024    EGFR 7 11/17/2024    EGFR 9 11/16/2024    CREATININE 7.76 (H) 11/18/2024    CREATININE 6.67 (H) 11/17/2024    CREATININE 5.35 (H) 11/16/2024     Acute hemodialysis patient (HCC)    Renal failure    Encounter for hemodialysis (HCC)    Anemia due to chronic kidney disease, on chronic dialysis (HCC)  Lab Results   Component Value Date    EGFR 6 11/18/2024    EGFR 7 11/17/2024    EGFR 9 11/16/2024    CREATININE 7.76 (H) 11/18/2024    CREATININE 6.67 (H) 11/17/2024    CREATININE 5.35 (H) 11/16/2024     Thrombocytopenia (HCC)  Platelet count 86.  Monitoring with use of aspirin.   Coronary artery disease involving native coronary artery of native heart without angina pectoris  Mild, non-obstructive CAD on cardiac cath 9/2021.  Continue aspirin 81 mg daily.  Should be on high intensity statin when liver function improves.    Subjective   Chief Complaint: shortness of breath, improving  Rafael is sitting up eating breakfast. He reports feeling \"terrible\" today. No chest pain or shortness of breath. He is breathing comfortably on room air. His sodium is 126 today. Nephrology has seen him this morning. He will be getting hemodialysis today.     Objective :  Temp:  [97.6 °F (36.4 °C)-98.3 °F (36.8 °C)] 97.9 °F (36.6 °C)  HR:  [61-82] 62  BP: (144-161)/(71-91) 161/91  Resp:  [18-20] 18  SpO2:  [90 %-96 %] 96 %  O2 Device: None (Room air)  Orthostatic Blood Pressures      Flowsheet Row Most Recent Value   Blood Pressure 161/91 filed at 11/18/2024 0816   Patient Position - Orthostatic VS Lying filed at 11/18/2024 0816          First Weight: Weight - Scale: 57.9 kg (127 lb 10.3 oz) (11/10/24 1847)  Vitals:    11/17/24 0545 11/18/24 0540   Weight: 55.8 kg (123 lb) 59.2 kg (130 lb 8.2 oz)     Physical Exam  Vitals and nursing note reviewed.   Constitutional:       General: He is not in acute distress.     Appearance: He is well-developed.   HENT:      Head: Normocephalic and atraumatic.   Eyes:      Conjunctiva/sclera: " Conjunctivae normal.   Neck:      Vascular: No JVD.   Cardiovascular:      Rate and Rhythm: Normal rate and regular rhythm.      Heart sounds: No murmur heard.  Pulmonary:      Effort: Pulmonary effort is normal. No respiratory distress.      Breath sounds: Rales present.   Abdominal:      Palpations: Abdomen is soft.      Tenderness: There is no abdominal tenderness.   Musculoskeletal:         General: No swelling.      Cervical back: Neck supple.      Right lower leg: No edema.      Left lower leg: No edema.   Skin:     General: Skin is warm and dry.      Capillary Refill: Capillary refill takes less than 2 seconds.   Neurological:      Mental Status: He is alert.   Psychiatric:         Mood and Affect: Mood normal.           Lab Results: I have reviewed the following results:CBC/BMP:   .     11/18/24  0702   SODIUM 126*   K 5.1   CL 89*   CO2 23   BUN 93*   CREATININE 7.76*   GLUC 108      Results from last 7 days   Lab Units 11/17/24  0540 11/16/24  0630 11/15/24  0521   WBC Thousand/uL 8.31 7.19 9.35   HEMOGLOBIN g/dL 9.5* 10.7* 10.2*   HEMATOCRIT % 28.4* 32.3* 30.9*   PLATELETS Thousands/uL 86* 79* 181     Results from last 7 days   Lab Units 11/18/24  0702 11/17/24  0540 11/16/24  0630 11/15/24  0640 11/14/24  1342   POTASSIUM mmol/L 5.1 4.6 4.6   < >  --    CHLORIDE mmol/L 89* 91* 94*   < >  --    CO2 mmol/L 23 24 24   < >  --    CO2, I-STAT mmol/L  --   --   --   --  26   BUN mg/dL 93* 78* 61*   < >  --    CREATININE mg/dL 7.76* 6.67* 5.35*   < >  --    GLUCOSE, ISTAT mg/dl  --   --   --   --  147*   CALCIUM mg/dL 8.9 8.6 9.2   < >  --     < > = values in this interval not displayed.     Results from last 7 days   Lab Units 11/14/24  1344   INR  1.73*     Lab Results   Component Value Date    HGBA1C 5.7 (H) 09/15/2021     Lab Results   Component Value Date    CKTOTAL 64 09/11/2021    TROPONINI 0.03 09/12/2021       Imaging Results Review: I reviewed radiology reports from this admission including: CT chest  and Echocardiogram.  Other Study Results Review: EKG was reviewed.

## 2024-11-18 NOTE — ASSESSMENT & PLAN NOTE
Lab Results   Component Value Date    EGFR 6 11/18/2024    EGFR 7 11/17/2024    EGFR 9 11/16/2024    CREATININE 7.76 (H) 11/18/2024    CREATININE 6.67 (H) 11/17/2024    CREATININE 5.35 (H) 11/16/2024     Started on hemodialysis this admission.  Management as per nephrology.

## 2024-11-18 NOTE — ASSESSMENT & PLAN NOTE
Wt Readings from Last 3 Encounters:   11/18/24 59.2 kg (130 lb 8.2 oz)   09/03/24 55.3 kg (122 lb)   02/05/24 56 kg (123 lb 6.4 oz)   The patient examines to be euvolemic.  Plan for hemodialysis today.

## 2024-11-18 NOTE — ASSESSMENT & PLAN NOTE
----- Message from Ac Smith MD sent at 12/26/2019  9:55 AM CST -----  Please call the patient regarding his abnormal result. Hold coumadin.  Recheck tomorrow. Any bleeding?        Left message. No answer    Finally spoke to Luis. He will hold his coumadin today and tomorrow and recheck on Thursday. I told him to resume his coumadin on Thursday at 7.5mg 4 days a week and 5mg the other 3 days each week. I believe when he was increased 25% last time and it put him to 7.5 mg every day was too much so he will resume his coumadin on Thursday at the lower dose. He voiced understanding of all.    Lab Results   Component Value Date    EGFR 6 11/18/2024    EGFR 7 11/17/2024    EGFR 9 11/16/2024    CREATININE 7.76 (H) 11/18/2024    CREATININE 6.67 (H) 11/17/2024    CREATININE 5.35 (H) 11/16/2024   The patient is noted to have iron saturation of 4% and ferritin is 70.  Last hemoglobin is 9.5.  Hgb 10.7, stable.  Patient is to be maintained on ALCIRA with hemodialysis.  Venofer is to be started for 10 doses at 100 mg with each hemodialysis.

## 2024-11-18 NOTE — ASSESSMENT & PLAN NOTE
Volume status compensated at present. Continue UF with HD.  Continue GDMT with coreg,imdur and hydrazine.  The patient is to be on a LifeVest at discharge.

## 2024-11-18 NOTE — ASSESSMENT & PLAN NOTE
Lab Results   Component Value Date    EGFR 6 11/18/2024    EGFR 7 11/17/2024    EGFR 9 11/16/2024    CREATININE 7.76 (H) 11/18/2024    CREATININE 6.67 (H) 11/17/2024    CREATININE 5.35 (H) 11/16/2024   Hemoglobin is stable  Venofer by nephrology

## 2024-11-18 NOTE — ASSESSMENT & PLAN NOTE
The patient dialysis dependent in the setting of acute kidney injury.  Etiology had been unclear may be related to history of dilated cardiomyopathy with an ejection fraction 20 to 24% query suspected cardiorenal syndrome.  The urinalysis was not felt to be impressive and the patient only had tubular range proteinuria.  Serologic workup that has been negative includes UPEP, SPEP, ANCA, hepatitis panel.  The patient is oliguric and dialysis dependent.  Anti-GBM is pending.  The patient is noted to have low complements both C3 and C4 blood protein creatinine ratio 0.6.  UA from November 11 shows 1+ protein small blood and 0-1 red blood cells per high-power field.  Patient had a baseline creatinine in the 2 range in 2023.  JORGE LUIS is noted to be normal.  For completeness sake given low complements will check ASO titer and anti-double-stranded DNA.  Plan for hemodialysis today on November 18.  He has a PermCath which again was placed on November 14.  Case management is working on placement.

## 2024-11-18 NOTE — ASSESSMENT & PLAN NOTE
Mild, non-obstructive CAD on cardiac cath 9/2021.  Continue aspirin 81 mg daily.  Should be on high intensity statin when liver function improves.

## 2024-11-18 NOTE — PLAN OF CARE
Target UF Goal  2.5  L as tolerated. Patient dialyzing for  3.5 HR  on 2 K bath for serum K of  5.1  per protocol. Treatment plan reviewed with Nephrology.     Post-Dialysis RN Treatment Note    Blood Pressure:  Pre 147/83 mm/Hg  Post 177/106 mmHg   EDW  TBD    Weight:  Pre 59.2 kg   Post 57.2 kg   Mode of weight measurement: Bed Scale   Volume Removed  2072 ml    Treatment duration  3.5HRS   NS given  No    Treatment shortened? No   Medications given during Rx None Reported   Estimated Kt/V  1.36   Access type: Permacath/TDC   Access Issues: No   Needle Gauge: N/A   Report called to primary nurse   Yes  Verbal     Problem: METABOLIC, FLUID AND ELECTROLYTES - ADULT  Goal: Electrolytes maintained within normal limits  Description: INTERVENTIONS:  - Monitor labs and assess patient for signs and symptoms of electrolyte imbalances  - Administer electrolyte replacement as ordered  - Monitor response to electrolyte replacements, including repeat lab results as appropriate  - Instruct patient on fluid and nutrition as appropriate  Outcome: Progressing     Problem: METABOLIC, FLUID AND ELECTROLYTES - ADULT  Goal: Fluid balance maintained  Description: INTERVENTIONS:  - Monitor labs   - Monitor I/O and WT  - Instruct patient on fluid and nutrition as appropriate  - Assess for signs & symptoms of volume excess or deficit  Outcome: Progressing

## 2024-11-19 ENCOUNTER — APPOINTMENT (INPATIENT)
Dept: RADIOLOGY | Facility: HOSPITAL | Age: 69
DRG: 673 | End: 2024-11-19
Payer: COMMERCIAL

## 2024-11-19 ENCOUNTER — APPOINTMENT (INPATIENT)
Dept: DIALYSIS | Facility: HOSPITAL | Age: 69
DRG: 673 | End: 2024-11-19
Payer: COMMERCIAL

## 2024-11-19 LAB
ANION GAP SERPL CALCULATED.3IONS-SCNC: 11 MMOL/L (ref 4–13)
BLD SMEAR INTERP: NORMAL
BUN SERPL-MCNC: 46 MG/DL (ref 5–25)
CALCIUM SERPL-MCNC: 8.8 MG/DL (ref 8.4–10.2)
CHLORIDE SERPL-SCNC: 92 MMOL/L (ref 96–108)
CO2 SERPL-SCNC: 26 MMOL/L (ref 21–32)
CREAT SERPL-MCNC: 5.15 MG/DL (ref 0.6–1.3)
DSDNA AB SER-ACNC: <1 IU/ML (ref 0–9)
GBM AB SER IA-ACNC: <0.2 UNITS (ref 0–0.9)
GFR SERPL CREATININE-BSD FRML MDRD: 10 ML/MIN/1.73SQ M
GLUCOSE SERPL-MCNC: 103 MG/DL (ref 65–140)
GLUCOSE SERPL-MCNC: 174 MG/DL (ref 65–140)
POTASSIUM SERPL-SCNC: 4.9 MMOL/L (ref 3.5–5.3)
SODIUM SERPL-SCNC: 129 MMOL/L (ref 135–147)

## 2024-11-19 PROCEDURE — 71045 X-RAY EXAM CHEST 1 VIEW: CPT

## 2024-11-19 PROCEDURE — 80048 BASIC METABOLIC PNL TOTAL CA: CPT | Performed by: FAMILY MEDICINE

## 2024-11-19 PROCEDURE — 99233 SBSQ HOSP IP/OBS HIGH 50: CPT | Performed by: INTERNAL MEDICINE

## 2024-11-19 PROCEDURE — 99232 SBSQ HOSP IP/OBS MODERATE 35: CPT | Performed by: STUDENT IN AN ORGANIZED HEALTH CARE EDUCATION/TRAINING PROGRAM

## 2024-11-19 PROCEDURE — 82948 REAGENT STRIP/BLOOD GLUCOSE: CPT

## 2024-11-19 PROCEDURE — 99232 SBSQ HOSP IP/OBS MODERATE 35: CPT | Performed by: INTERNAL MEDICINE

## 2024-11-19 RX ORDER — HYDRALAZINE HYDROCHLORIDE 25 MG/1
100 TABLET, FILM COATED ORAL EVERY 8 HOURS SCHEDULED
Status: DISCONTINUED | OUTPATIENT
Start: 2024-11-19 | End: 2024-11-20 | Stop reason: HOSPADM

## 2024-11-19 RX ADMIN — ISOSORBIDE MONONITRATE 120 MG: 30 TABLET, EXTENDED RELEASE ORAL at 09:35

## 2024-11-19 RX ADMIN — HYDRALAZINE HYDROCHLORIDE 100 MG: 25 TABLET ORAL at 06:22

## 2024-11-19 RX ADMIN — CARVEDILOL 12.5 MG: 12.5 TABLET, FILM COATED ORAL at 09:35

## 2024-11-19 RX ADMIN — ALPRAZOLAM 0.25 MG: 0.25 TABLET ORAL at 21:52

## 2024-11-19 RX ADMIN — ASPIRIN 81 MG: 81 TABLET, COATED ORAL at 09:36

## 2024-11-19 RX ADMIN — HYDRALAZINE HYDROCHLORIDE 100 MG: 25 TABLET ORAL at 21:49

## 2024-11-19 RX ADMIN — CARVEDILOL 12.5 MG: 12.5 TABLET, FILM COATED ORAL at 16:32

## 2024-11-19 RX ADMIN — Medication 3 MG: at 21:49

## 2024-11-19 NOTE — ASSESSMENT & PLAN NOTE
Lab Results   Component Value Date    EGFR 10 11/19/2024    EGFR 6 11/18/2024    EGFR 7 11/17/2024    CREATININE 5.15 (H) 11/19/2024    CREATININE 7.76 (H) 11/18/2024    CREATININE 6.67 (H) 11/17/2024   CKD stage III with a baseline creatinine of 1.9-2 his creatinine has risen up to 7 with oliguria he was started on dialysis he received his dialysis yesterday discussed with nephrology he will be on Monday Wednesday Friday  S/p permacath placement  He is on room air but still has Rales on exam chest x-ray with still pulmonary edema and pleural effusion reached out to nephrology if we could remove more fluid today they are in agreement they can be removed a couple of liters of tolerable.  Also reached out to critical care to evaluate with ultrasound if there is any evidence of effusions for thoracocentesis.- patient declined   Patient underwent extra HD/UF today  Nephrology on board and following recommend serologies to rule out intrinsic disease  SPEP/UPEP, ANCA - negative  C3 and C4 levels low  JORGE LUIS - Normal   urine protein creatinine ratio - 0.6  Anti-GBM is pending

## 2024-11-19 NOTE — ASSESSMENT & PLAN NOTE
-Hemolytic workup please see below  - Myeloma evaluation negative  - On intravenous iron  - On ALCIRA

## 2024-11-19 NOTE — PROGRESS NOTES
Progress Note - Nephrology   Name: Matthew Alvarez 69 y.o. male I MRN: [id number]  Unit/Bed#: -01 I Date of Admission: 11/10/2024   Date of Service: 11/19/2024 I Hospital Day: 9     Assessment & Plan  Acute hemodialysis patient (HCC)  The patient dialysis dependent in the setting of acute kidney injury.  Etiology had been unclear may be related to history of dilated cardiomyopathy with an ejection fraction 20 to 24% query suspected cardiorenal syndrome.  The urinalysis was not felt to be impressive and the patient only had tubular range proteinuria.  Serologic workup that has been negative includes UPEP, SPEP, ANCA, hepatitis panel.  The patient is oliguric and dialysis dependent.  Anti-GBM is pending.  The patient is noted to have low complements both C3 and C4 blood protein creatinine ratio 0.6.  UA from November 11 shows 1+ protein small blood and 0-1 red blood cells per high-power field.  Patient had a baseline creatinine in the 2 range in 2023.  JORGE LUIS is noted to be normal.  For completeness sake given low complements will check ASO titer and anti-double-stranded DNA.     Pending studies: Positive rheumatoid arthritis  - Anti-GBM/double-stranded DNA/ASO  As noted baseline creatinine in the 2 range  Currently HD dependent  Access: TDC working well  Chest x-ray today by my personal review demonstrates CHF I would recommend extra HD/UF today  Then maintain MWF schedule    Acute on chronic systolic heart failure (HCC)  -Chest x-ray demonstrates CHF please see above will continue to challenge target weight  Uncontrolled hypertension  -Continue carvedilol 12.5 mg twice a day/hydralazine 100 every 8 hours/Imdur 110 mg daily  - Challenge target weight  - Potential addition of amlodipine if needed  Acute kidney injury superimposed on stage 3a chronic kidney disease (HCC)  -Please see above baseline creatinine in the 2 range most compatible with CKD 3 but that was February 2024 please see above for full  discussion    Hyponatremia  -Secondary to DAVID/volume overload will place on fluid restriction along with HD  Anemia due to chronic kidney disease, on chronic dialysis (HCC)  -Hemolytic workup please see below  - Myeloma evaluation negative  - On intravenous iron  - On ALCIRA  Coronary artery disease involving native coronary artery of native heart without angina pectoris  -Per primary team  Elevated liver transaminase level  -Will follow-up per primary team  Thrombocytopenia (HCC)  -Appears somewhat chronic for completeness will order hemolytic workup to make sure no evidence of HUS/TTP    I have reviewed the nephrology recommendations including management of CHF with HD/UF, with SLIM and cardiology, and we are in agreement with renal plan including the information outlined above.     Subjective   Brief History of Admission -we are seeing for DAVID now HD dependent    Patient complains of shortness of breath little bit of a cough  No nausea vomiting or diarrhea  No chest pain  Urinating minimally    Objective :  Temp:  [97.7 °F (36.5 °C)-100 °F (37.8 °C)] 100 °F (37.8 °C)  HR:  [60-80] 63  BP: (131-200)/() 161/87  Resp:  [18-20] 18  SpO2:  [93 %-95 %] 93 %  O2 Device: None (Room air)    Current Weight: Weight - Scale: 59.2 kg (130 lb 8.2 oz)  First Weight: Weight - Scale: 57.9 kg (127 lb 10.3 oz)  I/O         11/17 0701  11/18 0700 11/18 0701  11/19 0700 11/19 0701  11/20 0700    P.O. 480 840     I.V. (mL/kg)  430 (7.3)     Other  200     IV Piggyback  100     Total Intake(mL/kg) 480 (8.1) 1570 (26.5)     Urine (mL/kg/hr)  300 (0.2)     Total Output  300     Net +480 +1270                  Physical Exam  Physical Exam: General: Complains of shortness of breath/weak appearing  Skin:  No acute rash  Eyes:  No scleral icterus and noninjected  ENT:  Moist mucous membranes  Neck:  Supple, no jugular venous distention, trachea midline, overall appearance is normal  Chest: Inspiratory and expiratory rhonchi  CVS:  Regular  rate and rhythm, without a rub or gallops  Abdomen:  Normal bowel sounds, soft and nontender and nondistended  Extremities: 1+ lower extremity edema two thirds of at the pretibial region, and no cyanosis, no significant arthritic changes  Neuro:  No gross focality  Psych:  Alert and oriented and appropriate   Medications:    Current Facility-Administered Medications:     ALPRAZolam (XANAX) tablet 0.25 mg, 0.25 mg, Oral, BID PRN, David Meraz MD, 0.25 mg at 11/18/24 2048    aspirin (ECOTRIN LOW STRENGTH) EC tablet 81 mg, 81 mg, Oral, Daily, Jared Bateman PA-C, 81 mg at 11/19/24 0936    carvedilol (COREG) tablet 12.5 mg, 12.5 mg, Oral, BID With Meals, Kathleen Bui PA-C, 12.5 mg at 11/19/24 0935    epoetin loida (EPOGEN,PROCRIT) injection 5,000 Units, 5,000 Units, Subcutaneous, Once per day on Monday Wednesday Friday, Brandyn Ariza MD, 5,000 Units at 11/18/24 0904    heparin (porcine) subcutaneous injection 5,000 Units, 5,000 Units, Subcutaneous, Q8H JEEVAN, Jared Bateman PA-C, 5,000 Units at 11/12/24 1524    hydrALAZINE (APRESOLINE) injection 10 mg, 10 mg, Intravenous, Q6H PRN, Jared Bateman PA-C, 10 mg at 11/18/24 1817    hydrALAZINE (APRESOLINE) tablet 100 mg, 100 mg, Oral, Q8H [facility], YUE Griffin, 100 mg at 11/19/24 0622    iron sucrose (VENOFER) 100 mg in sodium chloride 0.9 % 100 mL IVPB, 100 mg, Intravenous, Once per day on Monday Wednesday Friday, Kathleen Mulligan DO, 100 mg at 11/18/24 0920    isosorbide mononitrate (IMDUR) 24 hr tablet 120 mg, 120 mg, Oral, Daily, Kathleen Bui PA-C, 120 mg at 11/19/24 0935    melatonin tablet 3 mg, 3 mg, Oral, HS, Jany S Jake, CRNP, 3 mg at 11/18/24 5569    nicotine (NICODERM CQ) 14 mg/24hr TD 24 hr patch 1 patch, 1 patch, Transdermal, Daily, Jared Bateman PA-C, 1 patch at 11/11/24 0803      Lab Results: I have reviewed the following results:  Results from last 7 days   Lab Units 11/19/24  0616 11/18/24  0702  "11/17/24  0540 11/16/24  0630 11/15/24  0640 11/15/24  0521 11/14/24  1342 11/14/24  0545 11/13/24  1241 11/13/24  0514 11/12/24  1051   WBC Thousand/uL  --   --  8.31 7.19  --  9.35  --  10.17*  --  10.21*  --    HEMOGLOBIN g/dL  --   --  9.5* 10.7*  --  10.2*  --  11.0*  --  11.1*  --    I STAT HEMOGLOBIN g/dl  --   --   --   --   --   --  10.9*  --   --   --   --    HEMATOCRIT %  --   --  28.4* 32.3*  --  30.9*  --  32.8*  --  33.9*  --    HEMATOCRIT, ISTAT %  --   --   --   --   --   --  32*  --   --   --   --    PLATELETS Thousands/uL  --   --  86* 79*  --  181  --  86*  --  102*  --    POTASSIUM mmol/L 4.9 5.1 4.6 4.6 4.3  --   --  4.2 3.5 5.0 4.8   CHLORIDE mmol/L 92* 89* 91* 94* 91*  --   --  95* 94* 95* 95*   CO2 mmol/L 26 23 24 24 20*  --   --  22 24 17* 19*   CO2, I-STAT mmol/L  --   --   --   --   --   --  26  --   --   --   --    BUN mg/dL 46* 93* 78* 61* 96*  --   --  78* 53* 112* 97*   CREATININE mg/dL 5.15* 7.76* 6.67* 5.35* 6.92*  --   --  5.99* 4.22* 7.57* 6.62*   CALCIUM mg/dL 8.8 8.9 8.6 9.2 8.8  --   --  9.1 8.9 8.3* 8.5   MAGNESIUM mg/dL  --   --   --   --   --   --   --  2.2 2.0 2.3  --    PHOSPHORUS mg/dL  --   --   --   --   --   --   --  5.0* 3.9 6.3*  --    ALBUMIN g/dL  --   --  3.4* 3.6 3.6  --   --  3.8  --  3.6 3.8   GLUCOSE, ISTAT mg/dl  --   --   --   --   --   --  147*  --   --   --   --        Administrative Statements     Portions of the record may have been created with voice recognition software. Occasional wrong word or \"sound a like\" substitutions may have occurred due to the inherent limitations of voice recognition software. Read the chart carefully and recognize, using context, where substitutions have occurred.If you have any questions, please contact the dictating provider.  "

## 2024-11-19 NOTE — PROGRESS NOTES
Progress Note - Cardiology   Name: Matthew Alvarez 69 y.o. male I MRN: [id number]  Unit/Bed#: -01 I Date of Admission: 11/10/2024   Date of Service: 11/19/2024 I Hospital Day: 9     Assessment & Plan  Acute on chronic systolic heart failure (HCC)  History of HFrEF.  Presented with volume overload.  Echo 11/11/2024 shows LVEF 20-24% with severe global hypokinesis and dilated LV which is unchanged from 2021  Oliguric since admission. Now on hemodialysis.  Appreciate nephrology management.   Chest xray today to assess volume status given rales on exam.   See discussion under cardiomyopathy.  Uncontrolled hypertension  Blood pressures remain hypertensive.  Up-titration of GDMT.  Renal function limits options.  BP is improved today.  Continue coreg to 12.5 mg PO BID, Imdur to 120 mg daily, and hydralazine 100 mg PO TID  Dilated cardiomyopathy (HCC)  History of non-ischemic severe cardiomyopathy.  Admitted with heart failure in 2021 where EF was < 25%.  Cardiac catheterization 9/16/2021 showed only mild nonobstructive disease.  GDMT limited by advanced CKD.  On GDMT with Coreg, hydralazine and Imdur. Continuing to titrate during admission.  LifeVest in place.  Plan close outpatient follow up with our office.  Acute kidney injury superimposed on stage 3a chronic kidney disease (HCC)  Lab Results   Component Value Date    EGFR 10 11/19/2024    EGFR 6 11/18/2024    EGFR 7 11/17/2024    CREATININE 5.15 (H) 11/19/2024    CREATININE 7.76 (H) 11/18/2024    CREATININE 6.67 (H) 11/17/2024     Started on hemodialysis this admission.  Management as per nephrology.  Elevated troponin  Cardiac catheterization 9/2021 with mild non-obstructive disease  No current anginal complaints with no acute ECG changes.  HS trop elevation consistent with non-ischemic myocardial injury in the setting of CHF, DAVID  Tobacco abuse  Urged cessation  Encounter for hemodialysis for ESRD (HCC)  Lab Results   Component Value Date    EGFR 10 11/19/2024  "   EGFR 6 11/18/2024    EGFR 7 11/17/2024    CREATININE 5.15 (H) 11/19/2024    CREATININE 7.76 (H) 11/18/2024    CREATININE 6.67 (H) 11/17/2024     Acute hemodialysis patient (HCC)    Renal failure    Encounter for hemodialysis (HCC)    Anemia due to chronic kidney disease, on chronic dialysis (HCC)  Lab Results   Component Value Date    EGFR 10 11/19/2024    EGFR 6 11/18/2024    EGFR 7 11/17/2024    CREATININE 5.15 (H) 11/19/2024    CREATININE 7.76 (H) 11/18/2024    CREATININE 6.67 (H) 11/17/2024     Thrombocytopenia (HCC)  Platelet count 86.  Monitoring with use of aspirin.   Coronary artery disease involving native coronary artery of native heart without angina pectoris  Mild, non-obstructive CAD on cardiac cath 9/2021.  Continue aspirin 81 mg daily.  Should be on high intensity statin when liver function improves.    Subjective   Chief Complaint: shortness of breath, improving  Rafael reports he feels \"ok\". He has a \"wet\" cough this morning and rales on exam. Mild lower leg edema with tenderness. BP is improved.    Objective :  Temp:  [97.7 °F (36.5 °C)-100 °F (37.8 °C)] 100 °F (37.8 °C)  HR:  [60-80] 61  BP: (131-200)/() 137/67  Resp:  [18-20] 18  SpO2:  [93 %-95 %] 93 %  O2 Device: None (Room air)  Orthostatic Blood Pressures      Flowsheet Row Most Recent Value   Blood Pressure 137/67 filed at 11/19/2024 0700   Patient Position - Orthostatic VS Lying filed at 11/19/2024 0700          First Weight: Weight - Scale: 57.9 kg (127 lb 10.3 oz) (11/10/24 1847)  Vitals:    11/17/24 0545 11/18/24 0540   Weight: 55.8 kg (123 lb) 59.2 kg (130 lb 8.2 oz)     Physical Exam  Vitals and nursing note reviewed.   Constitutional:       General: He is not in acute distress.     Appearance: He is well-developed. He is ill-appearing.   HENT:      Head: Normocephalic and atraumatic.   Eyes:      Conjunctiva/sclera: Conjunctivae normal.   Neck:      Vascular: No JVD.   Cardiovascular:      Rate and Rhythm: Normal rate and " regular rhythm.   Pulmonary:      Effort: Pulmonary effort is normal. No respiratory distress.      Breath sounds: Rales present.      Comments: Breathing comfortably on room air  Abdominal:      Palpations: Abdomen is soft.      Tenderness: There is no abdominal tenderness.   Musculoskeletal:         General: No swelling.      Cervical back: Neck supple.      Right lower le+ Edema present.      Left lower le+ Edema present.   Skin:     General: Skin is warm and dry.      Capillary Refill: Capillary refill takes less than 2 seconds.   Neurological:      Mental Status: He is alert.   Psychiatric:         Mood and Affect: Mood normal.           Lab Results: I have reviewed the following results:CBC/BMP:   .     24  0616   SODIUM 129*   K 4.9   CL 92*   CO2 26   BUN 46*   CREATININE 5.15*   GLUC 103      Results from last 7 days   Lab Units 24  0540 24  0630 11/15/24  0521   WBC Thousand/uL 8.31 7.19 9.35   HEMOGLOBIN g/dL 9.5* 10.7* 10.2*   HEMATOCRIT % 28.4* 32.3* 30.9*   PLATELETS Thousands/uL 86* 79* 181     Results from last 7 days   Lab Units 24  0616 24  0702 24  0540 11/15/24  0640 24  1342   POTASSIUM mmol/L 4.9 5.1 4.6   < >  --    CHLORIDE mmol/L 92* 89* 91*   < >  --    CO2 mmol/L 26 23 24   < >  --    CO2, I-STAT mmol/L  --   --   --   --  26   BUN mg/dL 46* 93* 78*   < >  --    CREATININE mg/dL 5.15* 7.76* 6.67*   < >  --    GLUCOSE, ISTAT mg/dl  --   --   --   --  147*   CALCIUM mg/dL 8.8 8.9 8.6   < >  --     < > = values in this interval not displayed.     Results from last 7 days   Lab Units 24  1344   INR  1.73*     Lab Results   Component Value Date    HGBA1C 5.7 (H) 09/15/2021     Lab Results   Component Value Date    CKTOTAL 64 2021    TROPONINI 0.03 2021       Imaging Results Review: I reviewed radiology reports from this admission including: chest xray and Echocardiogram.  Other Study Results Review: EKG was reviewed.

## 2024-11-19 NOTE — PLAN OF CARE
Problem: CARDIOVASCULAR - ADULT  Goal: Maintains optimal cardiac output and hemodynamic stability  Description: INTERVENTIONS:  - Monitor I/O, vital signs and rhythm  - Monitor for S/S and trends of decreased cardiac output  - Administer and titrate ordered vasoactive medications to optimize hemodynamic stability  - Assess quality of pulses, skin color and temperature  - Assess for signs of decreased coronary artery perfusion  - Instruct patient to report change in severity of symptoms  11/19/2024 1225 by Tone Khanna RN  Outcome: Not Progressing  11/19/2024 1225 by Tone Khanna RN  Outcome: Not Progressing  Goal: Absence of cardiac dysrhythmias or at baseline rhythm  Description: INTERVENTIONS:  - Continuous cardiac monitoring, vital signs, obtain 12 lead EKG if ordered  - Administer antiarrhythmic and heart rate control medications as ordered  - Monitor electrolytes and administer replacement therapy as ordered  11/19/2024 1225 by Tone Khanna RN  Outcome: Not Progressing  11/19/2024 1225 by Tone Khanna RN  Outcome: Not Progressing     Problem: PAIN - ADULT  Goal: Verbalizes/displays adequate comfort level or baseline comfort level  Description: Interventions:  - Encourage patient to monitor pain and request assistance  - Assess pain using appropriate pain scale  - Administer analgesics based on type and severity of pain and evaluate response  - Implement non-pharmacological measures as appropriate and evaluate response  - Consider cultural and social influences on pain and pain management  - Notify physician/advanced practitioner if interventions unsuccessful or patient reports new pain  11/19/2024 1225 by Tone Khanna RN  Outcome: Not Progressing  11/19/2024 1225 by Tone Khanna RN  Outcome: Not Progressing     Problem: INFECTION - ADULT  Goal: Absence or prevention of progression during hospitalization  Description: INTERVENTIONS:  - Assess and monitor for signs and symptoms of infection  - Monitor  lab/diagnostic results  - Monitor all insertion sites, i.e. indwelling lines, tubes, and drains  - Monitor endotracheal if appropriate and nasal secretions for changes in amount and color  - [place] appropriate cooling/warming therapies per order  - Administer medications as ordered  - Instruct and encourage patient and family to use good hand hygiene technique  - Identify and instruct in appropriate isolation precautions for identified infection/condition  11/19/2024 1225 by Tone Khanna RN  Outcome: Not Progressing  11/19/2024 1225 by Tone Khanna RN  Outcome: Not Progressing     Problem: SAFETY ADULT  Goal: Patient will remain free of falls  Description: INTERVENTIONS:  - Educate patient/family on patient safety including physical limitations  - Instruct patient to call for assistance with activity   - Consult OT/PT to assist with strengthening/mobility   - Keep Call bell within reach  - Keep bed low and locked with side rails adjusted as appropriate  - Keep care items and personal belongings within reach  - Initiate and maintain comfort rounds  - Make Fall Risk Sign visible to staff  - Offer Toileting every 2 Hours, in advance of need  - Initiate/Maintain bed/chair alarm  - Obtain necessary fall risk management equipment:   - Apply yellow socks and bracelet for high fall risk patients  - Consider moving patient to room near nurses station  11/19/2024 1225 by Tone Khanna RN  Outcome: Not Progressing  11/19/2024 1225 by Tone Khanna RN  Outcome: Not Progressing  Goal: Maintain or return to baseline ADL function  Description: INTERVENTIONS:  -  Assess patient's ability to carry out ADLs; assess patient's baseline for ADL function and identify physical deficits which impact ability to perform ADLs (bathing, care of mouth/teeth, toileting, grooming, dressing, etc.)  - Assess/evaluate cause of self-care deficits   - Assess range of motion  - Assess patient's mobility; develop plan if impaired  - Assess  patient's need for assistive devices and provide as appropriate  - Encourage maximum independence but intervene and supervise when necessary  - Involve family in performance of ADLs  - Assess for home care needs following discharge   - Consider OT consult to assist with ADL evaluation and planning for discharge  - Provide patient education as appropriate  11/19/2024 1225 by Tone Khanna RN  Outcome: Not Progressing  11/19/2024 1225 by Tone Khanna RN  Outcome: Not Progressing  Goal: Maintains/Returns to pre admission functional level  Description: INTERVENTIONS:  - Perform AM-PAC 6 Click Basic Mobility/ Daily Activity assessment daily.  - Set and communicate daily mobility goal to care team and patient/family/caregiver.   - Collaborate with rehabilitation services on mobility goals if consulted  - Perform Range of Motion  times a day.  - Reposition patient .  - Dangle patient 4 times a day  - Stand patient 4 times a day  - Ambulate patient 4 times a day  - Out of bed to chair 3 times a day   - Out of bed for meals 3 times a day  - Out of bed for toileting  - Record patient progress and toleration of activity level   11/19/2024 1225 by Tone Khanna RN  Outcome: Not Progressing  11/19/2024 1225 by Tone Khanna RN  Outcome: Not Progressing     Problem: Knowledge Deficit  Goal: Patient/family/caregiver demonstrates understanding of disease process, treatment plan, medications, and discharge instructions  Description: Complete learning assessment and assess knowledge base.  Interventions:  - Provide teaching at level of understanding  - Provide teaching via preferred learning methods  11/19/2024 1225 by Tone Khanna RN  Outcome: Not Progressing  11/19/2024 1225 by Tone Khanna RN  Outcome: Not Progressing     Problem: GENITOURINARY - ADULT  Goal: Maintains or returns to baseline urinary function  Description: INTERVENTIONS:  - Assess urinary function  - Encourage oral fluids to ensure adequate hydration if  ordered  - Administer IV fluids as ordered to ensure adequate hydration  - Administer ordered medications as needed  - Offer frequent toileting  - Follow urinary retention protocol if ordered  11/19/2024 1225 by Tone Khanna RN  Outcome: Not Progressing  11/19/2024 1225 by Tone Khanna RN  Outcome: Not Progressing  Goal: Absence of urinary retention  Description: INTERVENTIONS:  - Assess patient's ability to void and empty bladder  - Monitor I/O  - Bladder scan as needed  - Discuss with physician/AP medications to alleviate retention as needed  - Discuss catheterization for long term situations as appropriate  11/19/2024 1225 by Tone Khanna RN  Outcome: Not Progressing  11/19/2024 1225 by Tone Khanna RN  Outcome: Not Progressing     Problem: METABOLIC, FLUID AND ELECTROLYTES - ADULT  Goal: Electrolytes maintained within normal limits  Description: INTERVENTIONS:  - Monitor labs and assess patient for signs and symptoms of electrolyte imbalances  - Administer electrolyte replacement as ordered  - Monitor response to electrolyte replacements, including repeat lab results as appropriate  - Instruct patient on fluid and nutrition as appropriate  11/19/2024 1225 by Tone Khanna RN  Outcome: Not Progressing  11/19/2024 1225 by Tone Khanna RN  Outcome: Not Progressing  Goal: Fluid balance maintained  Description: INTERVENTIONS:  - Monitor labs   - Monitor I/O and WT  - Instruct patient on fluid and nutrition as appropriate  - Assess for signs & symptoms of volume excess or deficit  11/19/2024 1225 by Tone Khanna RN  Outcome: Not Progressing  11/19/2024 1225 by Tone Khanna RN  Outcome: Not Progressing  Goal: Glucose maintained within target range  Description: INTERVENTIONS:  - Monitor Blood Glucose as ordered  - Assess for signs and symptoms of hyperglycemia and hypoglycemia  - Administer ordered medications to maintain glucose within target range  - Assess nutritional intake and initiate nutrition service  referral as needed  11/19/2024 1225 by Tone Khanna RN  Outcome: Not Progressing  11/19/2024 1225 by Tone Khanna RN  Outcome: Not Progressing     Problem: Prexisting or High Potential for Compromised Skin Integrity  Goal: Skin integrity is maintained or improved  Description: INTERVENTIONS:  - Identify patients at risk for skin breakdown  - Assess and monitor skin integrity  - Assess and monitor nutrition and hydration status  - Monitor labs   - Assess for incontinence   - Turn and reposition patient  - Assist with mobility/ambulation  - Relieve pressure over bony prominences  - Avoid friction and shearing  - Provide appropriate hygiene as needed including keeping skin clean and dry  - Evaluate need for skin moisturizer/barrier cream  - Collaborate with interdisciplinary team   - Patient/family teaching  - Consider wound care consult   11/19/2024 1225 by Tone Khanna RN  Outcome: Not Progressing  11/19/2024 1225 by Tone Khanna RN  Outcome: Not Progressing     Problem: Nutrition/Hydration-ADULT  Goal: Nutrient/Hydration intake appropriate for improving, restoring or maintaining nutritional needs  Description: Monitor and assess patient's nutrition/hydration status for malnutrition. Collaborate with interdisciplinary team and initiate plan and interventions as ordered.  Monitor patient's weight and dietary intake as ordered or per policy. Utilize nutrition screening tool and intervene as necessary. Determine patient's food preferences and provide high-protein, high-caloric foods as appropriate.     INTERVENTIONS:  - Monitor oral intake, urinary output, labs, and treatment plans  - Assess nutrition and hydration status and recommend course of action  - Evaluate amount of meals eaten  - Assist patient with eating if necessary   - Allow adequate time for meals  - Recommend/ encourage appropriate diets, oral nutritional supplements, and vitamin/mineral supplements  - Order, calculate, and assess calorie counts as  needed  - Recommend, monitor, and adjust tube feedings and TPN/PPN based on assessed needs  - Assess need for intravenous fluids  - Provide specific nutrition/hydration education as appropriate  - Include patient/family/caregiver in decisions related to nutrition  11/19/2024 1225 by Tone Khanna RN  Outcome: Not Progressing  11/19/2024 1225 by Tone Khanna RN  Outcome: Not Progressing

## 2024-11-19 NOTE — ASSESSMENT & PLAN NOTE
D-dimer elevated to 12.54  Patient without hemoptysis, pleuritic chest pain  Does have shortness of breath and mild tachycardia that is improved  Currently no oxygen requirements, saturating well on room air  Kidney function precludes CTA chest  Lower extremity venous duplex negative  TTE shows LVEF of 20-24%.  Systolic function is severely reduced.  There is severe global hypokinesis.  Heparin subcu DVT prophylaxis

## 2024-11-19 NOTE — ASSESSMENT & PLAN NOTE
BP remains hypertensive  Limited options due to renal function  Uncontrolled today increased hydralazine to 100 mg p.o. every 8 hours, continue Coreg 12.5 mg p.o. twice daily and Imdur 120 mg p.o. daily  May consider addition of amlodipine if needed

## 2024-11-19 NOTE — ASSESSMENT & PLAN NOTE
Blood pressures remain hypertensive.  Up-titration of GDMT.  Renal function limits options.  BP is improved today.  Continue coreg to 12.5 mg PO BID, Imdur to 120 mg daily, and hydralazine 100 mg PO TID

## 2024-11-19 NOTE — ASSESSMENT & PLAN NOTE
Lab Results   Component Value Date    EGFR 10 11/19/2024    EGFR 6 11/18/2024    EGFR 7 11/17/2024    CREATININE 5.15 (H) 11/19/2024    CREATININE 7.76 (H) 11/18/2024    CREATININE 6.67 (H) 11/17/2024     Started on hemodialysis this admission.  Management as per nephrology.   .

## 2024-11-19 NOTE — NURSING NOTE
"RN noted Nepro drink to be at bedside untouched. RN asked patient if he wants a fresh drink to which he replied, \"I don't like them.\"  "

## 2024-11-19 NOTE — ASSESSMENT & PLAN NOTE
History of HFrEF.  Presented with volume overload.  Echo 11/11/2024 shows LVEF 20-24% with severe global hypokinesis and dilated LV which is unchanged from 2021  Oliguric since admission. Now on hemodialysis.  Appreciate nephrology management.   Chest xray today to assess volume status given rales on exam.   See discussion under cardiomyopathy.

## 2024-11-19 NOTE — CASE MANAGEMENT
Case Management Discharge Planning Note    Patient name Matthew Alvarez  Location /-01 MRN [id number]  : 1955 Date 2024       Current Admission Date: 11/10/2024  Current Admission Diagnosis:Acute on chronic systolic heart failure (HCC)   Patient Active Problem List    Diagnosis Date Noted Date Diagnosed    Thrombocytopenia (HCC) 2024     Renal failure 11/15/2024     Encounter for hemodialysis (Former[facility]) 11/15/2024     Anemia due to chronic kidney disease, on chronic dialysis (HCC) 11/15/2024     Acute hemodialysis patient (HCC) 2024     Oliguria 2024     Encounter for hemodialysis for ESRD (Formerly [facility]) 2024     Hyponatremia 2024     Dilated cardiomyopathy (HCC) 2024     Elevated liver transaminase level 11/10/2024     Elevated d-dimer 11/10/2024     Acute on chronic systolic heart failure (HCC) 2024     Coronary artery disease involving native coronary artery of native heart without angina pectoris 10/08/2021     Rheumatoid factor positive 2021     Protein calorie malnutrition (HCC) 2021     Acute kidney injury superimposed on stage 3a chronic kidney disease (HCC) 2021     Elevated troponin 2021     Tobacco abuse 2021     Nonischemic cardiomyopathy (HCC) 2021     Uncontrolled hypertension 2021       LOS (days): 9  Geometric Mean LOS (GMLOS) (days): 4.4  Days to GMLOS:-4.3     OBJECTIVE:  Risk of Unplanned Readmission Score: 15.45         Current admission status: Inpatient   Preferred Pharmacy:   [facility]/pharmacy #[id number] - [place] - [address] [facility]  [address]  [facility] [place] [address]  Phone: [phone] Fax: [phone]    [facility] - [place] - [address]  [address]  [place] [address]  Phone: [phone] Fax: [phone]    Primary Care Provider: Olive Rodriguez MD    Primary Insurance: [employer] REP  Secondary Insurance:     DISCHARGE  DETAILS:     Patient is not medically stable for dc.  Notified Brit at [facility] that pt is not cleared for dc.  CM will continue to follow.  Current dc plan remains home.

## 2024-11-19 NOTE — ASSESSMENT & PLAN NOTE
-Please see above baseline creatinine in the 2 range most compatible with CKD 3 but that was February 2024 please see above for full discussion

## 2024-11-19 NOTE — PLAN OF CARE
Target UF Goal  2.3  L as tolerated. Patient dialyzing for 2 hours on 2 K bath for serum K of  4.9  per protocol. Treatment plan reviewed with Nephrology.       Post-Dialysis RN Treatment Note    Blood Pressure:  Pre 131/79 mm/Hg  Post 158/79 mmHg   EDW  TBD kg    Weight:  Pre 59.2 kg   Post 57.1 kg   Mode of weight measurement: Bed Scale   Volume Removed  2100 ml    Treatment duration  2 hrs and 15 mins   NS given  No    Treatment shortened? Yes, describe: 45 mins due to patient needs/staffing   Medications given during Rx Not Applicable   Estimated Kt/V  N/A   Access type: Permacath/TDC   Access Issues: No   Needle Gauge: N/A   Report called to primary nurse   Yes, Ana Paula RN via secure chat      Problem: METABOLIC, FLUID AND ELECTROLYTES - ADULT  Goal: Electrolytes maintained within normal limits  Description: INTERVENTIONS:  - Monitor labs and assess patient for signs and symptoms of electrolyte imbalances  - Administer electrolyte replacement as ordered  - Monitor response to electrolyte replacements, including repeat lab results as appropriate  - Instruct patient on fluid and nutrition as appropriate  Outcome: Progressing  Goal: Fluid balance maintained  Description: INTERVENTIONS:  - Monitor labs   - Monitor I/O and WT  - Instruct patient on fluid and nutrition as appropriate  - Assess for signs & symptoms of volume excess or deficit  Outcome: Progressing

## 2024-11-19 NOTE — PROGRESS NOTES
Progress Note - Hospitalist   Name: Matthew Alvarez 69 y.o. male I MRN: [id number]  Unit/Bed#: -01 I Date of Admission: 11/10/2024   Date of Service: 11/19/2024 I Hospital Day: 9    Assessment & Plan  Acute on chronic systolic heart failure (HCC)  Wt Readings from Last 3 Encounters:   11/18/24 59.2 kg (130 lb 8.2 oz)   09/03/24 55.3 kg (122 lb)   02/05/24 56 kg (123 lb 6.4 oz)   Repeat TTE-EF is 20 to 25% severe reduction of LV  Patient currently is not in cardiogenic shock.  He failed diuresis with oliguria with rising creatinine and he has been started on dialysis and now he is can IV on Monday Wednesday Friday as discussed with nephrology next today he still has evidence of pulmonary edema and pleural effusions for which more liters will be removed today if tolerable.  Also asked the critical care to evaluate for thoracocentesis if there is enough fluid.- pt declined   Per cardiology on Imdur aspirin and beta-blocker  He will need a LifeVest prior to discharge- is at his bedside   Uncontrolled hypertension  BP remains hypertensive  Limited options due to renal function  Uncontrolled today increased hydralazine to 100 mg p.o. every 8 hours, continue Coreg 12.5 mg p.o. twice daily and Imdur 120 mg p.o. daily  May consider addition of amlodipine if needed  Acute kidney injury superimposed on stage 3a chronic kidney disease (HCC)  Lab Results   Component Value Date    EGFR 10 11/19/2024    EGFR 6 11/18/2024    EGFR 7 11/17/2024    CREATININE 5.15 (H) 11/19/2024    CREATININE 7.76 (H) 11/18/2024    CREATININE 6.67 (H) 11/17/2024   CKD stage III with a baseline creatinine of 1.9-2 his creatinine has risen up to 7 with oliguria he was started on dialysis he received his dialysis yesterday discussed with nephrology he will be on Monday Wednesday Friday  S/p permacath placement  He is on room air but still has Rales on exam chest x-ray with still pulmonary edema and pleural effusion reached out to nephrology if we  could remove more fluid today they are in agreement they can be removed a couple of liters of tolerable.  Also reached out to critical care to evaluate with ultrasound if there is any evidence of effusions for thoracocentesis.- patient declined   Patient underwent extra HD/UF today  Nephrology on board and following recommend serologies to rule out intrinsic disease  SPEP/UPEP, ANCA - negative  C3 and C4 levels low  JORGE LUIS - Normal   urine protein creatinine ratio - 0.6  Anti-GBM is pending   Elevated troponin  Elevated troponin upon presentation of 83, 2-hour troponin of 112  Patient is without chest pain currently  Nonischemic EKG  Likely elevated secondary to demand from accelerated hypertension and CHF exacerbation  Cardiology on board and following  TTE-EF is 20 to 24%  Tobacco abuse  Recommend complete cessation-patient endorses he stopped smoking 3 days ago  NRT offered  Elevated liver transaminase level  Patient presents with volume overload  No known history of liver disease, does have history of heavy alcohol use  Patient endorses initially no further use of alcohol, then states last drink was 2 weeks ago -unreliable historian   liver enzymes suspected secondary to congestive hepatopathy that started improving with dialysis Ultrasound of the right upper quadrant is negative  Elevated d-dimer  D-dimer elevated to 12.54  Patient without hemoptysis, pleuritic chest pain  Does have shortness of breath and mild tachycardia that is improved  Currently no oxygen requirements, saturating well on room air  Kidney function precludes CTA chest  Lower extremity venous duplex negative  TTE shows LVEF of 20-24%.  Systolic function is severely reduced.  There is severe global hypokinesis.  Heparin subcu DVT prophylaxis  Dilated cardiomyopathy (HCC)  Chronic, she of nonischemic severe cardiomyopathy   continue aspirin, carvedilol Imdur management as per systolic heart failure  LifeVest in place close cardiology follow-up  outpatient    Hyponatremia  Worsened his FR adjusted to 1.5 liters by nephro am labs  Dialysis 11/18 hope to improve with dialysis  Anemia due to chronic kidney disease, on chronic dialysis ([facility])  Lab Results   Component Value Date    EGFR 10 11/19/2024    EGFR 6 11/18/2024    EGFR 7 11/17/2024    CREATININE 5.15 (H) 11/19/2024    CREATININE 7.76 (H) 11/18/2024    CREATININE 6.67 (H) 11/17/2024   Hemoglobin is stable  Venofer by nephrology  Thrombocytopenia (HCC)  Could be in light of dialysis start - improved     VTE Pharmacologic Prophylaxis: VTE Score: 2 Low Risk (Score 0-2) - Encourage Ambulation.    Mobility:   Basic Mobility Inpatient Raw Score: 17  JH-HLM Goal: 5: Stand one or more mins  JH-HLM Achieved: 1: Laying in bed  JH-HLM Goal NOT achieved. Continue with multidisciplinary rounding and encourage appropriate mobility to improve upon JH-HLM goals.    Patient Centered Rounds: I performed bedside rounds with nursing staff today.   Discussions with Specialists or Other Care Team Provider: Nephrology and cardiology    Education and Discussions with Family / Patient: Patient declined call to .     Current Length of Stay: 9 day(s)  Current Patient Status: Inpatient   Certification Statement: The patient will continue to require additional inpatient hospital stay due to ongoing hemodialysis  Discharge Plan: Anticipate discharge in 48-72 hrs to to be determined    Code Status: Level 1 - Full Code    Subjective   Patient seen and examined at bedside.  No acute events overnight.  Nursing staff reports that patient has not been drinking nephrology supplements.  Patient was also refusing heparin subcu injections due to pain.  Nursing staff reports that patient has been unwilling to wear SCDs, discussed with patient and he is agreeable to SCDs.  BP is improved.  Patient does complain of shortness of breath with slight cough.  No other acute complaints.    Objective :  Temp:  [97.7 °F (36.5 °C)-100 °F  (37.8 °C)] 100 °F (37.8 °C)  HR:  [56-80] 63  BP: (131-200)/() 149/77  Resp:  [18-20] 18  SpO2:  [93 %-95 %] 93 %  O2 Device: None (Room air)    Body mass index is 21.07 kg/m².     Input and Output Summary (last 24 hours):     Intake/Output Summary (Last 24 hours) at 11/19/2024 1455  Last data filed at 11/19/2024 1348  Gross per 24 hour   Intake 1040 ml   Output 300 ml   Net 740 ml       Physical Exam  Constitutional:       Appearance: Normal appearance.   HENT:      Head: Normocephalic and atraumatic.   Eyes:      Extraocular Movements: Extraocular movements intact.      Pupils: Pupils are equal, round, and reactive to light.   Cardiovascular:      Rate and Rhythm: Normal rate and regular rhythm.   Pulmonary:      Effort: Pulmonary effort is normal.      Breath sounds: Rales present.      Comments: Breathing comfortably on room air  Musculoskeletal:      Right lower leg: Edema present.      Left lower leg: Edema present.      Comments: +1 bilateral lower extremity pitting edema   Neurological:      General: No focal deficit present.      Mental Status: He is alert and oriented to person, place, and time. Mental status is at baseline.           Lines/Drains:  Lines/Drains/Airways       Active Status       Name Placement date Placement time Site Days    HD Permanent Double Catheter 11/14/24  1352  Internal jugular  5                            Lab Results: I have reviewed the following results:   Results from last 7 days   Lab Units 11/17/24  0540   WBC Thousand/uL 8.31   HEMOGLOBIN g/dL 9.5*   HEMATOCRIT % 28.4*   PLATELETS Thousands/uL 86*   SEGS PCT % 80*   LYMPHO PCT % 7*   MONO PCT % 11   EOS PCT % 1     Results from last 7 days   Lab Units 11/19/24  0616 11/18/24  0702 11/17/24  0540   SODIUM mmol/L 129*   < > 128*   POTASSIUM mmol/L 4.9   < > 4.6   CHLORIDE mmol/L 92*   < > 91*   CO2 mmol/L 26   < > 24   BUN mg/dL 46*   < > 78*   CREATININE mg/dL 5.15*   < > 6.67*   ANION GAP mmol/L 11   < > 13   CALCIUM  mg/dL 8.8   < > 8.6   ALBUMIN g/dL  --   --  3.4*   TOTAL BILIRUBIN mg/dL  --   --  2.16*   ALK PHOS U/L  --   --  98   ALT U/L  --   --  136*   AST U/L  --   --  53*   GLUCOSE RANDOM mg/dL 103   < > 112    < > = values in this interval not displayed.     Results from last 7 days   Lab Units 11/14/24  1344   INR  1.73*     Results from last 7 days   Lab Units 11/19/24  1111   POC GLUCOSE mg/dl 174*               Recent Cultures (last 7 days):               Last 24 Hours Medication List:     Current Facility-Administered Medications:     ALPRAZolam (XANAX) tablet 0.25 mg, BID PRN    aspirin (ECOTRIN LOW STRENGTH) EC tablet 81 mg, Daily    carvedilol (COREG) tablet 12.5 mg, BID With Meals    epoetin loida (EPOGEN,PROCRIT) injection 5,000 Units, Once per day on Monday Wednesday Friday    heparin (porcine) subcutaneous injection 5,000 Units, Q8H JEEVAN    hydrALAZINE (APRESOLINE) injection 10 mg, Q6H PRN    hydrALAZINE (APRESOLINE) tablet 100 mg, Q8H JEEVAN    iron sucrose (VENOFER) 100 mg in sodium chloride 0.9 % 100 mL IVPB, Once per day on Monday Wednesday Friday    isosorbide mononitrate (IMDUR) 24 hr tablet 120 mg, Daily    melatonin tablet 3 mg, HS    nicotine (NICODERM CQ) 14 mg/24hr TD 24 hr patch 1 patch, Daily    Administrative Statements   Today, Patient Was Seen By: Amy Rodriguez MD      **Please Note: This note may have been constructed using a voice recognition system.**

## 2024-11-19 NOTE — ASSESSMENT & PLAN NOTE
Chronic, she of nonischemic severe cardiomyopathy   continue aspirin, carvedilol Imdur management as per systolic heart failure  LifeVest in place close cardiology follow-up outpatient

## 2024-11-19 NOTE — NURSING NOTE
"Patient continues to decline heparin injections, stating they hurt and also refusing SCD's. Patient educated on the risk for blood clots and is agreeable to wear SCD's at this time. RN noted multiple bottles of Nepro supplement in the room and educated the patient on benefits of drinking supplements to which he replied, \"I don't like them.\" Life vest alarming low battery. RN informed patient that this RN would like him to demonstrate changing the battery which he was albe to do. Education provided on the importance of the Lifevest as patient states he does not like vest. Provider at bedside reviewed Nepro supplements and patient states he will try them. RN provided Nepro over ice. Patient took a sip and made a grimacing face, stating \"I don't like them.\" RN educated the patient on the benefits of ambulating and getting out to the chair as he is weak and concerns for safety at home are present and patient is presently refusing to get out of bed. The patient states he will \"get up and get ready like always\" when he is home. RN reminded the patient that he is extremely weak and it would be beneficial to get out of bed while help is available to improve strength and assess level of functionability, but the patient continues to refuse. This RN reminded the patient that he told this RN his spouse is disabled and may not be able to help him at home. The patient states he will be fine on discharge. Provider aware.   "

## 2024-11-19 NOTE — ASSESSMENT & PLAN NOTE
Lab Results   Component Value Date    EGFR 10 11/19/2024    EGFR 6 11/18/2024    EGFR 7 11/17/2024    CREATININE 5.15 (H) 11/19/2024    CREATININE 7.76 (H) 11/18/2024    CREATININE 6.67 (H) 11/17/2024

## 2024-11-19 NOTE — ASSESSMENT & PLAN NOTE
The patient dialysis dependent in the setting of acute kidney injury.  Etiology had been unclear may be related to history of dilated cardiomyopathy with an ejection fraction 20 to 24% query suspected cardiorenal syndrome.  The urinalysis was not felt to be impressive and the patient only had tubular range proteinuria.  Serologic workup that has been negative includes UPEP, SPEP, ANCA, hepatitis panel.  The patient is oliguric and dialysis dependent.  Anti-GBM is pending.  The patient is noted to have low complements both C3 and C4 blood protein creatinine ratio 0.6.  UA from November 11 shows 1+ protein small blood and 0-1 red blood cells per high-power field.  Patient had a baseline creatinine in the 2 range in 2023.  JORGE LUIS is noted to be normal.  For completeness sake given low complements will check ASO titer and anti-double-stranded DNA.     Pending studies: Positive rheumatoid arthritis  - Anti-GBM/double-stranded DNA/ASO  As noted baseline creatinine in the 2 range  Currently HD dependent  Access: TDC working well  Chest x-ray today by my personal review demonstrates CHF I would recommend extra HD/UF today  Then maintain MWF schedule

## 2024-11-19 NOTE — ASSESSMENT & PLAN NOTE
-Appears somewhat chronic for completeness will order hemolytic workup to make sure no evidence of HUS/TTP

## 2024-11-19 NOTE — ASSESSMENT & PLAN NOTE
-Continue carvedilol 12.5 mg twice a day/hydralazine 100 every 8 hours/Imdur 110 mg daily  - Challenge target weight  - Potential addition of amlodipine if needed

## 2024-11-19 NOTE — ASSESSMENT & PLAN NOTE
Lab Results   Component Value Date    EGFR 10 11/19/2024    EGFR 6 11/18/2024    EGFR 7 11/17/2024    CREATININE 5.15 (H) 11/19/2024    CREATININE 7.76 (H) 11/18/2024    CREATININE 6.67 (H) 11/17/2024   Hemoglobin is stable  Venofer by nephrology

## 2024-11-19 NOTE — PLAN OF CARE
Problem: CARDIOVASCULAR - ADULT  Goal: Maintains optimal cardiac output and hemodynamic stability  Description: INTERVENTIONS:  - Monitor I/O, vital signs and rhythm  - Monitor for S/S and trends of decreased cardiac output  - Administer and titrate ordered vasoactive medications to optimize hemodynamic stability  - Assess quality of pulses, skin color and temperature  - Assess for signs of decreased coronary artery perfusion  - Instruct patient to report change in severity of symptoms  Outcome: Progressing  Goal: Absence of cardiac dysrhythmias or at baseline rhythm  Description: INTERVENTIONS:  - Continuous cardiac monitoring, vital signs, obtain 12 lead EKG if ordered  - Administer antiarrhythmic and heart rate control medications as ordered  - Monitor electrolytes and administer replacement therapy as ordered  Outcome: Progressing     Problem: GENITOURINARY - ADULT  Goal: Maintains or returns to baseline urinary function  Description: INTERVENTIONS:  - Assess urinary function  - Encourage oral fluids to ensure adequate hydration if ordered  - Administer IV fluids as ordered to ensure adequate hydration  - Administer ordered medications as needed  - Offer frequent toileting  - Follow urinary retention protocol if ordered  Outcome: Progressing  Goal: Absence of urinary retention  Description: INTERVENTIONS:  - Assess patient's ability to void and empty bladder  - Monitor I/O  - Bladder scan as needed  - Discuss with physician/AP medications to alleviate retention as needed  - Discuss catheterization for long term situations as appropriate  Outcome: Progressing     Problem: METABOLIC, FLUID AND ELECTROLYTES - ADULT  Goal: Electrolytes maintained within normal limits  Description: INTERVENTIONS:  - Monitor labs and assess patient for signs and symptoms of electrolyte imbalances  - Administer electrolyte replacement as ordered  - Monitor response to electrolyte replacements, including repeat lab results as  appropriate  - Instruct patient on fluid and nutrition as appropriate  Outcome: Progressing  Goal: Fluid balance maintained  Description: INTERVENTIONS:  - Monitor labs   - Monitor I/O and WT  - Instruct patient on fluid and nutrition as appropriate  - Assess for signs & symptoms of volume excess or deficit  Outcome: Progressing  Goal: Glucose maintained within target range  Description: INTERVENTIONS:  - Monitor Blood Glucose as ordered  - Assess for signs and symptoms of hyperglycemia and hypoglycemia  - Administer ordered medications to maintain glucose within target range  - Assess nutritional intake and initiate nutrition service referral as needed  Outcome: Progressing     Problem: PAIN - ADULT  Goal: Verbalizes/displays adequate comfort level or baseline comfort level  Description: Interventions:  - Encourage patient to monitor pain and request assistance  - Assess pain using appropriate pain scale  - Administer analgesics based on type and severity of pain and evaluate response  - Implement non-pharmacological measures as appropriate and evaluate response  - Consider cultural and social influences on pain and pain management  - Notify physician/advanced practitioner if interventions unsuccessful or patient reports new pain  Outcome: Progressing     Problem: INFECTION - ADULT  Goal: Absence or prevention of progression during hospitalization  Description: INTERVENTIONS:  - Assess and monitor for signs and symptoms of infection  - Monitor lab/diagnostic results  - Monitor all insertion sites, i.e. indwelling lines, tubes, and drains  - Monitor endotracheal if appropriate and nasal secretions for changes in amount and color  - [place] appropriate cooling/warming therapies per order  - Administer medications as ordered  - Instruct and encourage patient and family to use good hand hygiene technique  - Identify and instruct in appropriate isolation precautions for identified infection/condition  Outcome:  Progressing     Problem: SAFETY ADULT  Goal: Patient will remain free of falls  Description: INTERVENTIONS:  - Educate patient/family on patient safety including physical limitations  - Instruct patient to call for assistance with activity   - Consult OT/PT to assist with strengthening/mobility   - Keep Call bell within reach  - Keep bed low and locked with side rails adjusted as appropriate  - Keep care items and personal belongings within reach  - Initiate and maintain comfort rounds  - Make Fall Risk Sign visible to staff  - Offer Toileting every 2 Hours, in advance of need  - Initiate/Maintain bed/chair alarm  - Obtain necessary fall risk management equipment:   - Apply yellow socks and bracelet for high fall risk patients  - Consider moving patient to room near nurses station  Outcome: Progressing     Problem: SAFETY ADULT  Goal: Maintain or return to baseline ADL function  Description: INTERVENTIONS:  -  Assess patient's ability to carry out ADLs; assess patient's baseline for ADL function and identify physical deficits which impact ability to perform ADLs (bathing, care of mouth/teeth, toileting, grooming, dressing, etc.)  - Assess/evaluate cause of self-care deficits   - Assess range of motion  - Assess patient's mobility; develop plan if impaired  - Assess patient's need for assistive devices and provide as appropriate  - Encourage maximum independence but intervene and supervise when necessary  - Involve family in performance of ADLs  - Assess for home care needs following discharge   - Consider OT consult to assist with ADL evaluation and planning for discharge  - Provide patient education as appropriate  Outcome: Progressing     Problem: SAFETY ADULT  Goal: Maintains/Returns to pre admission functional level  Description: INTERVENTIONS:  - Perform AM-PAC 6 Click Basic Mobility/ Daily Activity assessment daily.  - Set and communicate daily mobility goal to care team and patient/family/caregiver.   -  Collaborate with rehabilitation services on mobility goals if consulted  - Perform Range of Motion  times a day.  - Reposition patient .  - Dangle patient 4 times a day  - Stand patient 4 times a day  - Ambulate patient 4 times a day  - Out of bed to chair 3 times a day   - Out of bed for meals 3 times a day  - Out of bed for toileting  - Record patient progress and toleration of activity level   Outcome: Progressing     Problem: Knowledge Deficit  Goal: Patient/family/caregiver demonstrates understanding of disease process, treatment plan, medications, and discharge instructions  Description: Complete learning assessment and assess knowledge base.  Interventions:  - Provide teaching at level of understanding  - Provide teaching via preferred learning methods  Outcome: Progressing     Problem: Prexisting or High Potential for Compromised Skin Integrity  Goal: Skin integrity is maintained or improved  Description: INTERVENTIONS:  - Identify patients at risk for skin breakdown  - Assess and monitor skin integrity  - Assess and monitor nutrition and hydration status  - Monitor labs   - Assess for incontinence   - Turn and reposition patient  - Assist with mobility/ambulation  - Relieve pressure over bony prominences  - Avoid friction and shearing  - Provide appropriate hygiene as needed including keeping skin clean and dry  - Evaluate need for skin moisturizer/barrier cream  - Collaborate with interdisciplinary team   - Patient/family teaching  - Consider wound care consult   Outcome: Progressing     Problem: Nutrition/Hydration-ADULT  Goal: Nutrient/Hydration intake appropriate for improving, restoring or maintaining nutritional needs  Description: Monitor and assess patient's nutrition/hydration status for malnutrition. Collaborate with interdisciplinary team and initiate plan and interventions as ordered.  Monitor patient's weight and dietary intake as ordered or per policy. Utilize nutrition screening tool and  intervene as necessary. Determine patient's food preferences and provide high-protein, high-caloric foods as appropriate.     INTERVENTIONS:  - Monitor oral intake, urinary output, labs, and treatment plans  - Assess nutrition and hydration status and recommend course of action  - Evaluate amount of meals eaten  - Assist patient with eating if necessary   - Allow adequate time for meals  - Recommend/ encourage appropriate diets, oral nutritional supplements, and vitamin/mineral supplements  - Order, calculate, and assess calorie counts as needed  - Recommend, monitor, and adjust tube feedings and TPN/PPN based on assessed needs  - Assess need for intravenous fluids  - Provide specific nutrition/hydration education as appropriate  - Include patient/family/caregiver in decisions related to nutrition  Outcome: Progressing

## 2024-11-20 ENCOUNTER — TELEPHONE (OUTPATIENT)
Dept: CARDIOLOGY CLINIC | Facility: CLINIC | Age: 69
End: 2024-11-20

## 2024-11-20 ENCOUNTER — APPOINTMENT (INPATIENT)
Dept: DIALYSIS | Facility: HOSPITAL | Age: 69
DRG: 673 | End: 2024-11-20
Payer: COMMERCIAL

## 2024-11-20 VITALS
BODY MASS INDEX: 19.58 KG/M2 | HEIGHT: 66 IN | WEIGHT: 121.8 LBS | RESPIRATION RATE: 20 BRPM | DIASTOLIC BLOOD PRESSURE: 83 MMHG | HEART RATE: 66 BPM | TEMPERATURE: 97.6 F | OXYGEN SATURATION: 95 % | SYSTOLIC BLOOD PRESSURE: 169 MMHG

## 2024-11-20 LAB
ANION GAP SERPL CALCULATED.3IONS-SCNC: 11 MMOL/L (ref 4–13)
BASOPHILS # BLD AUTO: 0.01 THOUSANDS/ÂΜL (ref 0–0.1)
BASOPHILS NFR BLD AUTO: 0 % (ref 0–1)
BUN SERPL-MCNC: 39 MG/DL (ref 5–25)
CALCIUM SERPL-MCNC: 8.9 MG/DL (ref 8.4–10.2)
CHLORIDE SERPL-SCNC: 90 MMOL/L (ref 96–108)
CO2 SERPL-SCNC: 26 MMOL/L (ref 21–32)
CREAT SERPL-MCNC: 4.69 MG/DL (ref 0.6–1.3)
EOSINOPHIL # BLD AUTO: 0.13 THOUSAND/ÂΜL (ref 0–0.61)
EOSINOPHIL NFR BLD AUTO: 1 % (ref 0–6)
ERYTHROCYTE [DISTWIDTH] IN BLOOD BY AUTOMATED COUNT: 15.1 % (ref 11.6–15.1)
GFR SERPL CREATININE-BSD FRML MDRD: 11 ML/MIN/1.73SQ M
GLUCOSE SERPL-MCNC: 112 MG/DL (ref 65–140)
HCT VFR BLD AUTO: 29.7 % (ref 36.5–49.3)
HGB BLD-MCNC: 9.9 G/DL (ref 12–17)
IMM GRANULOCYTES # BLD AUTO: 0.09 THOUSAND/UL (ref 0–0.2)
IMM GRANULOCYTES NFR BLD AUTO: 1 % (ref 0–2)
LDH SERPL-CCNC: 171 U/L (ref 140–271)
LYMPHOCYTES # BLD AUTO: 0.57 THOUSANDS/ÂΜL (ref 0.6–4.47)
LYMPHOCYTES NFR BLD AUTO: 5 % (ref 14–44)
MAGNESIUM SERPL-MCNC: 2.1 MG/DL (ref 1.9–2.7)
MCH RBC QN AUTO: 27.2 PG (ref 26.8–34.3)
MCHC RBC AUTO-ENTMCNC: 33.3 G/DL (ref 31.4–37.4)
MCV RBC AUTO: 82 FL (ref 82–98)
MONOCYTES # BLD AUTO: 1.25 THOUSAND/ÂΜL (ref 0.17–1.22)
MONOCYTES NFR BLD AUTO: 10 % (ref 4–12)
NEUTROPHILS # BLD AUTO: 10.54 THOUSANDS/ÂΜL (ref 1.85–7.62)
NEUTS SEG NFR BLD AUTO: 83 % (ref 43–75)
NRBC BLD AUTO-RTO: 0 /100 WBCS
PHOSPHATE SERPL-MCNC: 5 MG/DL (ref 2.3–4.1)
PLATELET # BLD AUTO: 109 THOUSANDS/UL (ref 149–390)
PMV BLD AUTO: 11.9 FL (ref 8.9–12.7)
POTASSIUM SERPL-SCNC: 4.4 MMOL/L (ref 3.5–5.3)
RBC # BLD AUTO: 3.64 MILLION/UL (ref 3.88–5.62)
SODIUM SERPL-SCNC: 127 MMOL/L (ref 135–147)
WBC # BLD AUTO: 12.59 THOUSAND/UL (ref 4.31–10.16)

## 2024-11-20 PROCEDURE — 80048 BASIC METABOLIC PNL TOTAL CA: CPT | Performed by: INTERNAL MEDICINE

## 2024-11-20 PROCEDURE — 83615 LACTATE (LD) (LDH) ENZYME: CPT | Performed by: INTERNAL MEDICINE

## 2024-11-20 PROCEDURE — 83010 ASSAY OF HAPTOGLOBIN QUANT: CPT | Performed by: INTERNAL MEDICINE

## 2024-11-20 PROCEDURE — 99232 SBSQ HOSP IP/OBS MODERATE 35: CPT | Performed by: INTERNAL MEDICINE

## 2024-11-20 PROCEDURE — 83735 ASSAY OF MAGNESIUM: CPT | Performed by: INTERNAL MEDICINE

## 2024-11-20 PROCEDURE — 85025 COMPLETE CBC W/AUTO DIFF WBC: CPT | Performed by: INTERNAL MEDICINE

## 2024-11-20 PROCEDURE — 84100 ASSAY OF PHOSPHORUS: CPT | Performed by: INTERNAL MEDICINE

## 2024-11-20 PROCEDURE — 99239 HOSP IP/OBS DSCHRG MGMT >30: CPT | Performed by: STUDENT IN AN ORGANIZED HEALTH CARE EDUCATION/TRAINING PROGRAM

## 2024-11-20 RX ORDER — CARVEDILOL 12.5 MG/1
12.5 TABLET ORAL 2 TIMES DAILY WITH MEALS
Qty: 60 TABLET | Refills: 0 | Status: SHIPPED | OUTPATIENT
Start: 2024-11-20

## 2024-11-20 RX ORDER — NICOTINE 21 MG/24HR
1 PATCH, TRANSDERMAL 24 HOURS TRANSDERMAL DAILY
Qty: 28 PATCH | Refills: 0 | Status: SHIPPED | OUTPATIENT
Start: 2024-11-21

## 2024-11-20 RX ORDER — ISOSORBIDE MONONITRATE 120 MG/1
120 TABLET, EXTENDED RELEASE ORAL DAILY
Qty: 30 TABLET | Refills: 0 | Status: SHIPPED | OUTPATIENT
Start: 2024-11-20

## 2024-11-20 RX ORDER — ALPRAZOLAM 0.25 MG/1
0.25 TABLET ORAL
Qty: 5 TABLET | Refills: 0 | Status: SHIPPED | OUTPATIENT
Start: 2024-11-20 | End: 2024-11-25

## 2024-11-20 RX ORDER — ASPIRIN 81 MG/1
81 TABLET ORAL DAILY
Qty: 30 TABLET | Refills: 0 | Status: SHIPPED | OUTPATIENT
Start: 2024-11-20

## 2024-11-20 RX ORDER — HYDRALAZINE HYDROCHLORIDE 100 MG/1
100 TABLET, FILM COATED ORAL EVERY 8 HOURS SCHEDULED
Qty: 90 TABLET | Refills: 0 | Status: SHIPPED | OUTPATIENT
Start: 2024-11-20

## 2024-11-20 RX ADMIN — IRON SUCROSE 100 MG: 20 INJECTION, SOLUTION INTRAVENOUS at 09:22

## 2024-11-20 RX ADMIN — EPOETIN ALFA 5000 UNITS: 4000 SOLUTION INTRAVENOUS; SUBCUTANEOUS at 09:20

## 2024-11-20 RX ADMIN — HYDRALAZINE HYDROCHLORIDE 100 MG: 25 TABLET ORAL at 05:23

## 2024-11-20 NOTE — ASSESSMENT & PLAN NOTE
-Hemolytic workup thus far negative awaiting haptoglobin  - Myeloma evaluation negative  - On intravenous iron  - On ALCIRA

## 2024-11-20 NOTE — NURSING NOTE
Patient, along with his belongings, paperwork, and lifevest supplies, transported to the main entrance where he was transferred to private vehicle driven by his brother in law. This RN handed him the patient's lifevest supplies in the box and stressed the importance of keeping spare battery charged to which he verbalized understanding.

## 2024-11-20 NOTE — DISCHARGE SUMMARY
Discharge Summary - Hospitalist   Name: Matthew Alvarez 69 y.o. male I MRN: [id number]  Unit/Bed#: -01 I Date of Admission: 11/10/2024   Date of Service: 11/20/2024 I Hospital Day: 10     Assessment & Plan  Acute on chronic systolic heart failure (HCC)  Wt Readings from Last 3 Encounters:   11/20/24 55.2 kg (121 lb 12.8 oz)   09/03/24 55.3 kg (122 lb)   02/05/24 56 kg (123 lb 6.4 oz)   Repeat TTE-EF is 20 to 25% severe reduction of LV  Patient currently is not in cardiogenic shock.  He failed diuresis with oliguria with rising creatinine and he has been started on dialysis and now he is can IV on Monday Wednesday Friday as discussed with nephrology next today he still has evidence of pulmonary edema and pleural effusions for which more liters will be removed today if tolerable.  Also asked the critical care to evaluate for thoracocentesis if there is enough fluid.- pt declined   Per cardiology on Imdur aspirin and beta-blocker  He will need a LifeVest prior to discharge- is at his bedside   Recommend close outpatient cardiology follow up  Uncontrolled hypertension  BP improving with up titration of GDMT  Limited options due to renal function  Uncontrolled today increased hydralazine to 100 mg p.o. every 8 hours, continue Coreg 12.5 mg p.o. twice daily and Imdur 120 mg p.o. daily  May consider addition of amlodipine if needed  Acute kidney injury superimposed on stage 3a chronic kidney disease (HCC)  Lab Results   Component Value Date    EGFR 11 11/20/2024    EGFR 10 11/19/2024    EGFR 6 11/18/2024    CREATININE 4.69 (H) 11/20/2024    CREATININE 5.15 (H) 11/19/2024    CREATININE 7.76 (H) 11/18/2024   CKD stage III with a baseline creatinine of 1.9-2 his creatinine has risen up to 7 with oliguria he was started on dialysis he received his dialysis. Discussed with nephrology he will be on Monday Wednesday Friday  S/p permacath placement  He is on room air but still has Rales on exam chest x-ray with still  pulmonary edema and pleural effusion reached out to nephrology if we could remove more fluid, they are in agreement they can be removed a couple of liters of tolerable.  Also reached out to critical care to evaluate with ultrasound if there is any evidence of effusions for thoracocentesis.- patient declined   Patient underwent extra HD/UF   Nephrology on board and following recommend serologies to rule out intrinsic disease  SPEP/UPEP, ANCA - negative  C3 and C4 levels low  JORGE LUIS - Normal   urine protein creatinine ratio - 0.6  Anti-GBM is pending   Elevated troponin  Elevated troponin upon presentation of 83, 2-hour troponin of 112  Patient is without chest pain currently  Nonischemic EKG  Likely elevated secondary to demand from accelerated hypertension and CHF exacerbation  Cardiology on board and following  TTE-EF is 20 to 24%  Tobacco abuse  Recommend complete cessation-patient endorses he stopped smoking 3 days ago  NRT ordered  Elevated liver transaminase level  Patient presents with volume overload  No known history of liver disease, does have history of heavy alcohol use  Patient endorses initially no further use of alcohol, then states last drink was 2 weeks ago -unreliable historian   liver enzymes suspected secondary to congestive hepatopathy that started improving with dialysis Ultrasound of the right upper quadrant is negative  Elevated d-dimer  D-dimer elevated to 12.54  Patient without hemoptysis, pleuritic chest pain  Does have shortness of breath and mild tachycardia that is improved  Currently no oxygen requirements, saturating well on room air  Kidney function precludes CTA chest  Lower extremity venous duplex negative  TTE shows LVEF of 20-24%.  Systolic function is severely reduced.  There is severe global hypokinesis.  LifeVest in place   requires close outpatient follow-up with cardiology  Dilated cardiomyopathy (HCC)  Chronic, history of nonischemic severe cardiomyopathy   continue aspirin,  carvedilol Imdur management as per systolic heart failure  LifeVest in place close cardiology follow-up outpatient    Hyponatremia  Remained stable throughout hospitalization  Continue to monitor outpatient  Anemia due to chronic kidney disease, on chronic dialysis ([facility])  Lab Results   Component Value Date    EGFR 11 11/20/2024    EGFR 10 11/19/2024    EGFR 6 11/18/2024    CREATININE 4.69 (H) 11/20/2024    CREATININE 5.15 (H) 11/19/2024    CREATININE 7.76 (H) 11/18/2024   Hemoglobin is stable  Venofer by nephrology  Thrombocytopenia (HCC)  Appears somewhat chronic  LDH and hemolysis smear negative  Does not appear to be hemolytic process  Could be in light of dialysis - improved   Coronary artery disease involving native coronary artery of native heart without angina pectoris  Mild, non-obstructive CAD on cardiac cath 9/2021.  Continue aspirin 81 mg daily.  Should be on high intensity statin when liver function improves.     Medical Problems       Resolved Problems  Date Reviewed: 11/18/2024          Resolved    SIRS (systemic inflammatory response syndrome) ([facility]) 11/14/2024     Resolved by  Stacy Carson MD    Hyperkalemia 11/14/2024     Resolved by  Stacy Carson MD          MESSAGE TO PCP (Olive Rodriguez MD) FOR FOLLOW UP:   Thank you for allowing us to participate in the care of your patient, Matthew Alvarez, who was hospitalized from 11/10/2024 through 11/20/24 with the admitting diagnosis of acute CHF exacerbation and acute renal failure.      Medication Changes:  Coreg 12.5 mg BID  Imdur 120 mg daily  Hydralazine 100 mg TID  Outpatient testing recommended:  CMP in 1-2 weeks  If you have any additional questions or would like to discuss further, please feel free to contact me.  Amy Rodriguez MD  [facility] Internal Medicine, Hospitalist, [phone]     Admission Date:   Admission Orders (From admission, onward)       Ordered        11/10/24 2016  INPATIENT ADMISSION  Once                           Discharge Date: 11/20/24    Consultations During Hospital Stay:  Cardio, Nephro, PT/OT    Procedures Performed:   PermaCath placement    Significant Findings / Test Results:   XR chest portable   Final Result by Nura Catherine DO (11/19 1057)      Residual but improving pulmonary vascular congestion with residual bilateral pleural effusions and left retrocardiac atelectasis.            Workstation performed: [id number]         XR chest portable   Final Result by Pietro Elaine MD (11/15 1045)      Pulmonary edema with bilateral pleural effusions. No focal consolidation.            Resident: ARTI Castañeda I, the attending radiologist, have reviewed the images and agree with the final report above.      Workstation performed: [id number]         IR tunneled dialysis catheter placement   Final Result by Juan Alberto Collins MD (11/14 1539)   Impression:   1. Successful ultrasound and fluoroscopically guided placement of a 28cm Titan cath via the right internal jugular vein. The tip of the catheter is in the right atrium and may be used immediately.      2. Existing temporary dialysis catheter was removed. Existing sutures were left in place.               Workstation performed: [id number]         XR chest portable ICU   Final Result by Ang Coffey MD (11/13 1024)   Interval placement of right IJ HD catheter with tip projecting over the SVC. No pneumothorax      Pulmonary edema with bilateral pleural effusions are similar to prior study      ,      Resident: Emiliano Nova I, the attending radiologist, have reviewed the images and agree with the final report above.      Workstation performed: [id number]         CT abdomen pelvis wo contrast   Final Result by Yanci Starr MD (11/12 1500)      Similar cardiomegaly with anasarca suggestive of fluid overload.      Resident: Alberto Gomez I, the attending radiologist, have reviewed the images and agree with the  final report above.      Workstation performed: [id number]         US right upper quadrant   Final Result by Ciaran Gutierrez MD (11/11 1451)      No acute findings.            Workstation performed: [id number]         US kidney and bladder   Final Result by Ciaran Gutierrez MD (11/11 1451)      No acute findings.            Workstation performed: [id number]          VAS VENOUS DUPLEX - LOWER LIMB BILATERAL   Final Result by Kristyn Xiao DO (11/11 1814)      CT chest abdomen pelvis wo contrast   Final Result by Fabián Singh DO (11/11 0039)      Groundglass opacities at the left lower lobe which could be on the basis of pulmonary edema. Atypical pneumonia may have a similar appearance. Correlate clinically.      Bilateral mild to moderate pleural effusions with overlying compressive atelectasis      Mild degree of ascites within the gallbladder fossa versus pericholecystic fluid. Evaluation for gallbladder wall thickening is limited secondary to noncontrast examination and patient motion. Recommend dedicated ultrasound for further evaluation.      There is thickening of the bladder wall which could be due to chronic bladder outlet obstruction. However, possibility of cystitis is not excluded. Correlate clinically and with urinalysis.            Workstation performed: [id number]         XR chest 1 view portable   ED Interpretation by Devyn Wilkes MD (11/10 1909)   CHF with effusions.      Final Result by Luis Almeida MD (11/11 0910)      Findings suggest CHF with mild edema and bilateral left greater than right small to moderate pleural effusions. Findings are concordant with ER interpretation            Workstation performed: [id number]           TTE:   Left Ventricle: Left ventricular cavity size is severely dilated. There is mild concentric hypertrophy. The left ventricular ejection fraction is 20-24 %. Systolic function is severely reduced. There is severe  global hypokinesis.    Left Atrium: The atrium is mildly dilated.    Mitral Valve: There is mild regurgitation.    Tricuspid Valve: There is mild regurgitation.    Pericardium: There is a moderately sized left pleural effusion.     Venous Duplex : Impression:  RIGHT LOWER LIMB:  No evidence of acute or chronic deep vein thrombosis.  No evidence of superficial thrombophlebitis noted.  Doppler evaluation shows a normal response to augmentation maneuvers.  Popliteal, posterior tibial and anterior tibial arterial Doppler waveforms are  monophasic.     LEFT LOWER LIMB:  No evidence of acute or chronic deep vein thrombosis.  No evidence of superficial thrombophlebitis noted.  Doppler evaluation shows a normal response to augmentation maneuvers.  Popliteal, posterior tibial and anterior tibial arterial Doppler waveforms are  monophasic.    Incidental Findings:   none       Test Results Pending at Discharge (will require follow up):   None     Complications: None    Reason for Admission: Shortness of breath    Hospital Course:   Matthew Alvarez is a 69 y.o. male patient who originally presented to the hospital on 11/10/2024 due to shortness of breath.  On admission patient complaining of bloating, with orthopnea and dyspnea on exertion, with associated lower extremity swelling.  Patient did report compliance with diuretic regimen, however did report that he has not been taking all of his blood pressure medications.  Patient also reported that he is not taking aspirin daily for his CAD.  On admission patient was found to be in acute on chronic heart failure exacerbation, and acute renal failure.  Patient was admitted under the hospitalist service for further management with consults to cardiology and nephrology.  Initially it was thought that patient had acute renal injury secondary to diuretic, however patient's creatinine function is worsened even with discontinuation of diuretics.  It was ultimately decided that patient  "would undergo hemodialysis as he was oligoanuric.  It is unclear the etiology behind his acute renal failure, although it is suspected it may be cardiorenal syndrome, given history of dilated cardiomyopathy will LVEF of 20 to 24%.  Serologic workup has been negative for UPEP, SPEP, ANCA, and hepatitis panel.  Patient's fluid status and condition improved with HD.  Patient will require hemodialysis Monday Wednesdays and Fridays, this has been set up for him outpatient.  Patient also was seen by cardiology who recommended goal directed medical management with Coreg, hydralazine, and Imdur.  Patient was not started on diuretic therapy at discharge, as he is being managed with hemodialysis for his fluid status.  Recommend that patient should be started on high intensity statin when liver function improves.  He should continue aspirin 81 mg daily.  Patient was medically stable at time of discharge.          Please see above list of diagnoses and related plan for additional information.     Condition at Discharge: stable    Discharge Day Visit / Exam:   Subjective: Patient seen and examined at bedside.  No acute events overnight.  Patient is eager to be discharged.  He reports improvement in symptoms compared to admission, reports \"feels 110% better\".  He denies any shortness of breath, or chest tightness.  He has no other acute complaints at this time.  Vitals: Blood Pressure: 169/83 (11/20/24 1205)  Pulse: 66 (11/20/24 1205)  Temperature: 97.6 °F (36.4 °C) (11/20/24 1205)  Temp Source: Temporal (11/20/24 1205)  Respirations: 20 (11/20/24 1205)  Height: 5' 6\" (167.6 cm) (11/15/24 0909)  Weight - Scale: 55.2 kg (121 lb 12.8 oz) (11/20/24 0541)  SpO2: 95 % (11/20/24 1100)  Physical Exam   Constitutional:       General: He is not in acute distress.     Appearance: He is well-developed.      Comments: Appears chronically ill   HENT:      Head: Normocephalic and atraumatic.   Eyes:      Conjunctiva/sclera: Conjunctivae " normal.   Neck:      Vascular: No JVD.   Cardiovascular:      Rate and Rhythm: Normal rate and regular rhythm.      Heart sounds: No murmur heard.     Comments: Compression socks in place  Pulmonary:      Effort: Pulmonary effort is normal. No respiratory distress.      Breath sounds: Rhonchi (improving) present.   Abdominal:      Palpations: Abdomen is soft.      Tenderness: There is no abdominal tenderness.   Musculoskeletal:         General: No swelling.      Cervical back: Neck supple.      Right lower leg: No edema.      Left lower leg: No edema.   Skin:     General: Skin is warm and dry.      Capillary Refill: Capillary refill takes less than 2 seconds.   Neurological:      Mental Status: He is alert.   Psychiatric:         Mood and Affect: Mood normal.     Discussion with Family: Updated  (wife) via phone.    Discharge instructions/Information to patient and family:   See after visit summary for information provided to patient and family.      Provisions for Follow-Up Care:  See after visit summary for information related to follow-up care and any pertinent home health orders.      Mobility at time of Discharge:   Basic Mobility Inpatient Raw Score: 17  JH-HLM Goal: 5: Stand one or more mins  JH-HLM Achieved: 2: Bed activities/Dependent transfer  HLM Goal NOT achieved. Continue to encourage mobility in post discharge setting.     Disposition:   Home    Planned Readmission: No    Discharge Medications:  See after visit summary for reconciled discharge medications provided to patient and/or family.      Administrative Statements       **Please Note: This note may have been constructed using a voice recognition system**

## 2024-11-20 NOTE — ASSESSMENT & PLAN NOTE
History of HFrEF.  Presented with volume overload.  Echo 11/11/2024 shows LVEF 20-24% with severe global hypokinesis and dilated LV which is unchanged from 2021  Oliguric since admission. Now on hemodialysis.  Appreciate nephrology management.   See discussion under cardiomyopathy.

## 2024-11-20 NOTE — PLAN OF CARE
Problem: CARDIOVASCULAR - ADULT  Goal: Maintains optimal cardiac output and hemodynamic stability  Description: INTERVENTIONS:  - Monitor I/O, vital signs and rhythm  - Monitor for S/S and trends of decreased cardiac output  - Administer and titrate ordered vasoactive medications to optimize hemodynamic stability  - Assess quality of pulses, skin color and temperature  - Assess for signs of decreased coronary artery perfusion  - Instruct patient to report change in severity of symptoms  Outcome: Progressing  Goal: Absence of cardiac dysrhythmias or at baseline rhythm  Description: INTERVENTIONS:  - Continuous cardiac monitoring, vital signs, obtain 12 lead EKG if ordered  - Administer antiarrhythmic and heart rate control medications as ordered  - Monitor electrolytes and administer replacement therapy as ordered  Outcome: Progressing     Problem: GENITOURINARY - ADULT  Goal: Maintains or returns to baseline urinary function  Description: INTERVENTIONS:  - Assess urinary function  - Encourage oral fluids to ensure adequate hydration if ordered  - Administer IV fluids as ordered to ensure adequate hydration  - Administer ordered medications as needed  - Offer frequent toileting  - Follow urinary retention protocol if ordered  Outcome: Progressing  Goal: Absence of urinary retention  Description: INTERVENTIONS:  - Assess patient's ability to void and empty bladder  - Monitor I/O  - Bladder scan as needed  - Discuss with physician/AP medications to alleviate retention as needed  - Discuss catheterization for long term situations as appropriate  Outcome: Progressing     Problem: METABOLIC, FLUID AND ELECTROLYTES - ADULT  Goal: Electrolytes maintained within normal limits  Description: INTERVENTIONS:  - Monitor labs and assess patient for signs and symptoms of electrolyte imbalances  - Administer electrolyte replacement as ordered  - Monitor response to electrolyte replacements, including repeat lab results as  PA requested  Will send to PA pool for review   appropriate  - Instruct patient on fluid and nutrition as appropriate  Outcome: Progressing  Goal: Fluid balance maintained  Description: INTERVENTIONS:  - Monitor labs   - Monitor I/O and WT  - Instruct patient on fluid and nutrition as appropriate  - Assess for signs & symptoms of volume excess or deficit  Outcome: Progressing  Goal: Glucose maintained within target range  Description: INTERVENTIONS:  - Monitor Blood Glucose as ordered  - Assess for signs and symptoms of hyperglycemia and hypoglycemia  - Administer ordered medications to maintain glucose within target range  - Assess nutritional intake and initiate nutrition service referral as needed  Outcome: Progressing     Problem: PAIN - ADULT  Goal: Verbalizes/displays adequate comfort level or baseline comfort level  Description: Interventions:  - Encourage patient to monitor pain and request assistance  - Assess pain using appropriate pain scale  - Administer analgesics based on type and severity of pain and evaluate response  - Implement non-pharmacological measures as appropriate and evaluate response  - Consider cultural and social influences on pain and pain management  - Notify physician/advanced practitioner if interventions unsuccessful or patient reports new pain  Outcome: Progressing     Problem: INFECTION - ADULT  Goal: Absence or prevention of progression during hospitalization  Description: INTERVENTIONS:  - Assess and monitor for signs and symptoms of infection  - Monitor lab/diagnostic results  - Monitor all insertion sites, i.e. indwelling lines, tubes, and drains  - Monitor endotracheal if appropriate and nasal secretions for changes in amount and color  - [place] appropriate cooling/warming therapies per order  - Administer medications as ordered  - Instruct and encourage patient and family to use good hand hygiene technique  - Identify and instruct in appropriate isolation precautions for identified infection/condition  Outcome:  Progressing

## 2024-11-20 NOTE — ASSESSMENT & PLAN NOTE
Lab Results   Component Value Date    EGFR 11 11/20/2024    EGFR 10 11/19/2024    EGFR 6 11/18/2024    CREATININE 4.69 (H) 11/20/2024    CREATININE 5.15 (H) 11/19/2024    CREATININE 7.76 (H) 11/18/2024      Lives at home with wife and two daughters.  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 Reports alcohol, tobacco, and marijuana use in the past but nothing over the last year.

## 2024-11-20 NOTE — ASSESSMENT & PLAN NOTE
Wt Readings from Last 3 Encounters:   11/20/24 55.2 kg (121 lb 12.8 oz)   09/03/24 55.3 kg (122 lb)   02/05/24 56 kg (123 lb 6.4 oz)   Repeat TTE-EF is 20 to 25% severe reduction of LV  Patient currently is not in cardiogenic shock.  He failed diuresis with oliguria with rising creatinine and he has been started on dialysis and now he is can IV on Monday Wednesday Friday as discussed with nephrology next today he still has evidence of pulmonary edema and pleural effusions for which more liters will be removed today if tolerable.  Also asked the critical care to evaluate for thoracocentesis if there is enough fluid.- pt declined   Per cardiology on Imdur aspirin and beta-blocker  He will need a LifeVest prior to discharge- is at his bedside   Recommend close outpatient cardiology follow up

## 2024-11-20 NOTE — HEMODIALYSIS
Post-Dialysis RN Treatment Note    Blood Pressure:  Pre 175/91 mm/Hg  Post 169/83 mmHg   EDW:  tbd     Weight:  Pre 58.8 kg bed weight pre tx, pt declined to stand    Post 53.2 kg standing   Mode of weight measurement: Standing Scale   Volume Removed:  2661 ml    Treatment duration: 190 minutes    NS given:  No    Treatment shortened Yes, describe: needed to have BM, declined bedpan   Medications given during Rx: Epogen 5000 units, Venofer 100 mg   Estimated Kt/V:  1.27   Access type: Permacath/TDC   Needle Gauge:  n/a   Access Issues: No    Report called to primary nurse:   Yes Suzanne Strange RN

## 2024-11-20 NOTE — PLAN OF CARE
Target UF Goal 3 L as tolerated. Patient dialyzing for  3.5hr  on 3 K bath for serum K of  4.4  per protocol. Treatment plan reviewed with Nephrology.     Problem: METABOLIC, FLUID AND ELECTROLYTES - ADULT  Goal: Electrolytes maintained within normal limits  Description: INTERVENTIONS:  - Monitor labs and assess patient for signs and symptoms of electrolyte imbalances  - Administer electrolyte replacement as ordered  - Monitor response to electrolyte replacements, including repeat lab results as appropriate  - Instruct patient on fluid and nutrition as appropriate  Outcome: Progressing     Problem: METABOLIC, FLUID AND ELECTROLYTES - ADULT  Goal: Fluid balance maintained  Description: INTERVENTIONS:  - Monitor labs   - Monitor I/O and WT  - Instruct patient on fluid and nutrition as appropriate  - Assess for signs & symptoms of volume excess or deficit  Outcome: Progressing

## 2024-11-20 NOTE — ASSESSMENT & PLAN NOTE
Lab Results   Component Value Date    EGFR 11 11/20/2024    EGFR 10 11/19/2024    EGFR 6 11/18/2024    CREATININE 4.69 (H) 11/20/2024    CREATININE 5.15 (H) 11/19/2024    CREATININE 7.76 (H) 11/18/2024

## 2024-11-20 NOTE — PLAN OF CARE
Problem: CARDIOVASCULAR - ADULT  Goal: Maintains optimal cardiac output and hemodynamic stability  Description: INTERVENTIONS:  - Monitor I/O, vital signs and rhythm  - Monitor for S/S and trends of decreased cardiac output  - Administer and titrate ordered vasoactive medications to optimize hemodynamic stability  - Assess quality of pulses, skin color and temperature  - Assess for signs of decreased coronary artery perfusion  - Instruct patient to report change in severity of symptoms  11/20/2024 1323 by Suzanne Strange RN  Outcome: Adequate for Discharge  11/20/2024 1006 by Suzanne Strange RN  Outcome: Progressing  Goal: Absence of cardiac dysrhythmias or at baseline rhythm  Description: INTERVENTIONS:  - Continuous cardiac monitoring, vital signs, obtain 12 lead EKG if ordered  - Administer antiarrhythmic and heart rate control medications as ordered  - Monitor electrolytes and administer replacement therapy as ordered  11/20/2024 1323 by Suzanne Strange RN  Outcome: Adequate for Discharge  11/20/2024 1006 by Suzanne Strange RN  Outcome: Progressing     Problem: GENITOURINARY - ADULT  Goal: Maintains or returns to baseline urinary function  Description: INTERVENTIONS:  - Assess urinary function  - Encourage oral fluids to ensure adequate hydration if ordered  - Administer IV fluids as ordered to ensure adequate hydration  - Administer ordered medications as needed  - Offer frequent toileting  - Follow urinary retention protocol if ordered  11/20/2024 1323 by Suzanne Strange RN  Outcome: Adequate for Discharge  11/20/2024 1006 by Suzanne Strange RN  Outcome: Progressing  Goal: Absence of urinary retention  Description: INTERVENTIONS:  - Assess patient's ability to void and empty bladder  - Monitor I/O  - Bladder scan as needed  - Discuss with physician/AP medications to alleviate retention as needed  - Discuss catheterization for long term situations as appropriate  11/20/2024 1323 by Suzanne Strange  RN  Outcome: Adequate for Discharge  11/20/2024 1006 by Suzanne Strange RN  Outcome: Progressing     Problem: METABOLIC, FLUID AND ELECTROLYTES - ADULT  Goal: Electrolytes maintained within normal limits  Description: INTERVENTIONS:  - Monitor labs and assess patient for signs and symptoms of electrolyte imbalances  - Administer electrolyte replacement as ordered  - Monitor response to electrolyte replacements, including repeat lab results as appropriate  - Instruct patient on fluid and nutrition as appropriate  11/20/2024 1323 by Suzanne Strange RN  Outcome: Adequate for Discharge  11/20/2024 1006 by Suzanne Strange RN  Outcome: Progressing  Goal: Fluid balance maintained  Description: INTERVENTIONS:  - Monitor labs   - Monitor I/O and WT  - Instruct patient on fluid and nutrition as appropriate  - Assess for signs & symptoms of volume excess or deficit  11/20/2024 1323 by Suzanne Strange RN  Outcome: Adequate for Discharge  11/20/2024 1006 by Suzanne Strange RN  Outcome: Progressing  Goal: Glucose maintained within target range  Description: INTERVENTIONS:  - Monitor Blood Glucose as ordered  - Assess for signs and symptoms of hyperglycemia and hypoglycemia  - Administer ordered medications to maintain glucose within target range  - Assess nutritional intake and initiate nutrition service referral as needed  11/20/2024 1323 by Suzanne Strange RN  Outcome: Adequate for Discharge  11/20/2024 1006 by Suznane Strange RN  Outcome: Progressing     Problem: PAIN - ADULT  Goal: Verbalizes/displays adequate comfort level or baseline comfort level  Description: Interventions:  - Encourage patient to monitor pain and request assistance  - Assess pain using appropriate pain scale  - Administer analgesics based on type and severity of pain and evaluate response  - Implement non-pharmacological measures as appropriate and evaluate response  - Consider cultural and social influences on pain and pain management  - Notify  physician/advanced practitioner if interventions unsuccessful or patient reports new pain  11/20/2024 1323 by Suzanne Strange RN  Outcome: Adequate for Discharge  11/20/2024 1006 by Suzanne Strange RN  Outcome: Progressing     Problem: INFECTION - ADULT  Goal: Absence or prevention of progression during hospitalization  Description: INTERVENTIONS:  - Assess and monitor for signs and symptoms of infection  - Monitor lab/diagnostic results  - Monitor all insertion sites, i.e. indwelling lines, tubes, and drains  - Monitor endotracheal if appropriate and nasal secretions for changes in amount and color  - Vaughn appropriate cooling/warming therapies per order  - Administer medications as ordered  - Instruct and encourage patient and family to use good hand hygiene technique  - Identify and instruct in appropriate isolation precautions for identified infection/condition  11/20/2024 1323 by Suzanne Strange RN  Outcome: Adequate for Discharge  11/20/2024 1006 by Suzanne Strange RN  Outcome: Progressing     Problem: SAFETY ADULT  Goal: Patient will remain free of falls  Description: INTERVENTIONS:  - Educate patient/family on patient safety including physical limitations  - Instruct patient to call for assistance with activity   - Consult OT/PT to assist with strengthening/mobility   - Keep Call bell within reach  - Keep bed low and locked with side rails adjusted as appropriate  - Keep care items and personal belongings within reach  - Initiate and maintain comfort rounds  - Make Fall Risk Sign visible to staff  - Offer Toileting every 2 Hours, in advance of need  - Initiate/Maintain bed/chair alarm  - Obtain necessary fall risk management equipment:   - Apply yellow socks and bracelet for high fall risk patients  - Consider moving patient to room near nurses station  11/20/2024 1323 by Suzanne Strange RN  Outcome: Adequate for Discharge  11/20/2024 1006 by Suzanne Strange RN  Outcome: Progressing  Goal: Maintain  or return to baseline ADL function  Description: INTERVENTIONS:  -  Assess patient's ability to carry out ADLs; assess patient's baseline for ADL function and identify physical deficits which impact ability to perform ADLs (bathing, care of mouth/teeth, toileting, grooming, dressing, etc.)  - Assess/evaluate cause of self-care deficits   - Assess range of motion  - Assess patient's mobility; develop plan if impaired  - Assess patient's need for assistive devices and provide as appropriate  - Encourage maximum independence but intervene and supervise when necessary  - Involve family in performance of ADLs  - Assess for home care needs following discharge   - Consider OT consult to assist with ADL evaluation and planning for discharge  - Provide patient education as appropriate  11/20/2024 1323 by Suzanne Strange RN  Outcome: Adequate for Discharge  11/20/2024 1006 by Suzanne Strange RN  Outcome: Progressing  Goal: Maintains/Returns to pre admission functional level  Description: INTERVENTIONS:  - Perform AM-PAC 6 Click Basic Mobility/ Daily Activity assessment daily.  - Set and communicate daily mobility goal to care team and patient/family/caregiver.   - Collaborate with rehabilitation services on mobility goals if consulted  - Perform Range of Motion  times a day.  - Reposition patient .  - Dangle patient 4 times a day  - Stand patient 4 times a day  - Ambulate patient 4 times a day  - Out of bed to chair 3 times a day   - Out of bed for meals 3 times a day  - Out of bed for toileting  - Record patient progress and toleration of activity level   11/20/2024 1323 by Suzanne Strange RN  Outcome: Adequate for Discharge  11/20/2024 1006 by Suzanne Strange RN  Outcome: Progressing     Problem: Knowledge Deficit  Goal: Patient/family/caregiver demonstrates understanding of disease process, treatment plan, medications, and discharge instructions  Description: Complete learning assessment and assess knowledge  base.  Interventions:  - Provide teaching at level of understanding  - Provide teaching via preferred learning methods  11/20/2024 1323 by Suzanne Strange RN  Outcome: Adequate for Discharge  11/20/2024 1006 by Suzanne Strange RN  Outcome: Progressing     Problem: Prexisting or High Potential for Compromised Skin Integrity  Goal: Skin integrity is maintained or improved  Description: INTERVENTIONS:  - Identify patients at risk for skin breakdown  - Assess and monitor skin integrity  - Assess and monitor nutrition and hydration status  - Monitor labs   - Assess for incontinence   - Turn and reposition patient  - Assist with mobility/ambulation  - Relieve pressure over bony prominences  - Avoid friction and shearing  - Provide appropriate hygiene as needed including keeping skin clean and dry  - Evaluate need for skin moisturizer/barrier cream  - Collaborate with interdisciplinary team   - Patient/family teaching  - Consider wound care consult   11/20/2024 1323 by Suzanne Strange RN  Outcome: Adequate for Discharge  11/20/2024 1006 by Suzanne Strange RN  Outcome: Progressing     Problem: Nutrition/Hydration-ADULT  Goal: Nutrient/Hydration intake appropriate for improving, restoring or maintaining nutritional needs  Description: Monitor and assess patient's nutrition/hydration status for malnutrition. Collaborate with interdisciplinary team and initiate plan and interventions as ordered.  Monitor patient's weight and dietary intake as ordered or per policy. Utilize nutrition screening tool and intervene as necessary. Determine patient's food preferences and provide high-protein, high-caloric foods as appropriate.     INTERVENTIONS:  - Monitor oral intake, urinary output, labs, and treatment plans  - Assess nutrition and hydration status and recommend course of action  - Evaluate amount of meals eaten  - Assist patient with eating if necessary   - Allow adequate time for meals  - Recommend/ encourage appropriate  diets, oral nutritional supplements, and vitamin/mineral supplements  - Order, calculate, and assess calorie counts as needed  - Recommend, monitor, and adjust tube feedings and TPN/PPN based on assessed needs  - Assess need for intravenous fluids  - Provide specific nutrition/hydration education as appropriate  - Include patient/family/caregiver in decisions related to nutrition  11/20/2024 1323 by Suzanne Strange RN  Outcome: Adequate for Discharge  11/20/2024 1006 by Suzanne Strange RN  Outcome: Progressing

## 2024-11-20 NOTE — PROGRESS NOTES
Progress Note - Nephrology   Name: Matthew Alvarez 69 y.o. male I MRN: [id number]  Unit/Bed#: -01 I Date of Admission: 11/10/2024   Date of Service: 11/20/2024 I Hospital Day: 10     Assessment & Plan  Acute hemodialysis patient (HCC)  The patient dialysis dependent in the setting of acute kidney injury.  Etiology had been unclear may be related to history of dilated cardiomyopathy with an ejection fraction 20 to 24% query suspected cardiorenal syndrome.  The urinalysis was not felt to be impressive and the patient only had tubular range proteinuria.  Serologic workup that has been negative includes UPEP, SPEP, ANCA, hepatitis panel.  The patient is oliguric and dialysis dependent.  Anti-GBM is pending.  The patient is noted to have low complements both C3 and C4 blood protein creatinine ratio 0.6.  UA from November 11 shows 1+ protein small blood and 0-1 red blood cells per high-power field.  Patient had a baseline creatinine in the 2 range in 2023.  JORGE LUIS is noted to be normal/double-stranded DNA negative/ASO negative/anti-GBM negative.  For completeness sake given low complements will check ASO titer and anti-double-stranded DNA.     Serologies negative  As noted baseline creatinine in the 2 range  Currently HD dependent  Access: TDC working well  Patient challenging target weight yesterday with marked improvement in symptoms  HD today continue to challenge target weight    Acute on chronic systolic heart failure (HCC)  - Clinically euvolemic after UF yesterday continue to challenge target weight  - EF less than 25%  - LifeVest  - Nonobstructive disease  - Will need close outpatient follow-up with cardiology  Uncontrolled hypertension  -Continue carvedilol 12.5 mg twice a day/hydralazine 100 every 8 hours/Imdur 110 mg daily  - Challenge target weight this should improve blood pressure which is continuing to improve  - Potential addition of amlodipine if needed  Acute kidney injury superimposed on stage 3a  chronic kidney disease (HCC)  -Please see above baseline creatinine in the 2 range most compatible with CKD 3 but that was February 2024 please see above for full discussion    Hyponatremia  -Secondary to DAVID/volume overload will place on fluid restriction along with HD  Anemia due to chronic kidney disease, on chronic dialysis (HCC)  -Hemolytic workup thus far negative awaiting haptoglobin  - Myeloma evaluation negative  - On intravenous iron  - On ALCIRA  Coronary artery disease involving native coronary artery of native heart without angina pectoris  -Per primary team  Elevated liver transaminase level  -Will follow-up per primary team  Thrombocytopenia (HCC)  -Appears somewhat chronic  - LDH and hemolysis smear negative haptoglobin pending but does not appear to be hemolytic process    Notified outpatient HD  Most likely discharge after HD today    I have reviewed the nephrology recommendations including HD management for now challenging target, with SLIM, and we are in agreement with renal plan including the information outlined above.     Reviewed with [facility] dialysis facility [facility]    Subjective   Brief History of Admission -we are seeing for DAVID on HD    Feels better today no shortness of breath  No chest pain  No nausea vomiting or diarrhea  No urinary symptoms    Objective :  Temp:  [97.5 °F (36.4 °C)-98.8 °F (37.1 °C)] 97.7 °F (36.5 °C)  HR:  [56-69] 63  BP: (131-175)/(73-91) 169/82  Resp:  [18-20] 20  SpO2:  [93 %-97 %] 95 %  O2 Device: None (Room air)    Current Weight: Weight - Scale: 55.2 kg (121 lb 12.8 oz)  First Weight: Weight - Scale: 57.9 kg (127 lb 10.3 oz)  I/O         11/18 0701  11/19 0700 11/19 0701  11/20 0700 11/20 0701  11/21 0700    P.O. 840 1260     I.V. (mL/kg) 430 (7.3) 440 (8) 200 (3.6)    Other 200      IV Piggyback 100      Total Intake(mL/kg) 1570 (26.5) 1700 (30.8) 200 (3.6)    Urine (mL/kg/hr) 300 (0.2)      Other  2500     Total Output 300 2500     Net +1270 -800  +200                 Physical Exam  Physical Exam: General:  No acute distress  Skin:  No acute rash  Eyes:  No scleral icterus and noninjected  ENT:  Moist mucous membranes  Neck:  Supple, positive jugular venous distention, trachea midline, overall appearance is normal  Chest:  Clear to auscultation today/LifeVest present; right TDC clean and dry  CVS:  Regular rate and rhythm, without a rub or gallops; grade 2/6 systolic ejection type murmur heard throughout the precordium  Abdomen:  Normal bowel sounds, soft and nontender and nondistended  Extremities:  No edema, and no cyanosis, no significant arthritic changes  Neuro:  No gross focality  Psych:  Alert and oriented and appropriate    Medications:    Current Facility-Administered Medications:     ALPRAZolam (XANAX) tablet 0.25 mg, 0.25 mg, Oral, BID PRN, David Meraz MD, 0.25 mg at 11/19/24 2152    aspirin (ECOTRIN LOW STRENGTH) EC tablet 81 mg, 81 mg, Oral, Daily, Jared Bateman PA-C, 81 mg at 11/19/24 0936    carvedilol (COREG) tablet 12.5 mg, 12.5 mg, Oral, BID With Meals, Kathleen Bui PA-C, 12.5 mg at 11/19/24 1632    epoetin loida (EPOGEN,PROCRIT) injection 5,000 Units, 5,000 Units, Subcutaneous, Once per day on Monday Wednesday Friday, Brandyn Ariza MD, 5,000 Units at 11/20/24 0920    heparin (porcine) subcutaneous injection 5,000 Units, 5,000 Units, Subcutaneous, Q8H [facility], Jared Bateman PA-C, 5,000 Units at 11/12/24 1524    hydrALAZINE (APRESOLINE) injection 10 mg, 10 mg, Intravenous, Q6H PRN, Jared Bateman PA-C, 10 mg at 11/18/24 1817    hydrALAZINE (APRESOLINE) tablet 100 mg, 100 mg, Oral, Q8H [facility], Kendall Guerra MD, 100 mg at 11/20/24 0523    iron sucrose (VENOFER) 100 mg in sodium chloride 0.9 % 100 mL IVPB, 100 mg, Intravenous, Once per day on Monday Wednesday Friday, Kathleen Mulligan DO, Last Rate: 100 mL/hr at 11/20/24 0922, 100 mg at 11/20/24 0922    isosorbide mononitrate (IMDUR) 24 hr tablet 120 mg, 120 mg,  "Oral, Daily, Kathleen Bui PA-C, 120 mg at 11/19/24 0935    melatonin tablet 3 mg, 3 mg, Oral, HS, Jany PIKE Jake, STEVENNP, 3 mg at 11/19/24 2149    nicotine (NICODERM CQ) 14 mg/24hr TD 24 hr patch 1 patch, 1 patch, Transdermal, Daily, Jared Bateman PA-C, 1 patch at 11/11/24 0803      Lab Results: I have reviewed the following results:  Results from last 7 days   Lab Units 11/20/24  0535 11/19/24  0616 11/18/24  0702 11/17/24  0540 11/16/24  0630 11/15/24  0640 11/15/24  0521 11/14/24  1342 11/14/24  0545 11/13/24  1241   WBC Thousand/uL 12.59*  --   --  8.31 7.19  --  9.35  --  10.17*  --    HEMOGLOBIN g/dL 9.9*  --   --  9.5* 10.7*  --  10.2*  --  11.0*  --    I STAT HEMOGLOBIN g/dl  --   --   --   --   --   --   --  10.9*  --   --    HEMATOCRIT % 29.7*  --   --  28.4* 32.3*  --  30.9*  --  32.8*  --    HEMATOCRIT, ISTAT %  --   --   --   --   --   --   --  32*  --   --    PLATELETS Thousands/uL 109*  --   --  86* 79*  --  181  --  86*  --    POTASSIUM mmol/L 4.4 4.9 5.1 4.6 4.6 4.3  --   --  4.2 3.5   CHLORIDE mmol/L 90* 92* 89* 91* 94* 91*  --   --  95* 94*   CO2 mmol/L 26 26 23 24 24 20*  --   --  22 24   CO2, I-STAT mmol/L  --   --   --   --   --   --   --  26  --   --    BUN mg/dL 39* 46* 93* 78* 61* 96*  --   --  78* 53*   CREATININE mg/dL 4.69* 5.15* 7.76* 6.67* 5.35* 6.92*  --   --  5.99* 4.22*   CALCIUM mg/dL 8.9 8.8 8.9 8.6 9.2 8.8  --   --  9.1 8.9   MAGNESIUM mg/dL 2.1  --   --   --   --   --   --   --  2.2 2.0   PHOSPHORUS mg/dL 5.0*  --   --   --   --   --   --   --  5.0* 3.9   ALBUMIN g/dL  --   --   --  3.4* 3.6 3.6  --   --  3.8  --    GLUCOSE, ISTAT mg/dl  --   --   --   --   --   --   --  147*  --   --        Administrative Statements     Portions of the record may have been created with voice recognition software. Occasional wrong word or \"sound a like\" substitutions may have occurred due to the inherent limitations of voice recognition software. Read the chart carefully and " recognize, using context, where substitutions have occurred.If you have any questions, please contact the dictating provider.

## 2024-11-20 NOTE — ASSESSMENT & PLAN NOTE
The patient dialysis dependent in the setting of acute kidney injury.  Etiology had been unclear may be related to history of dilated cardiomyopathy with an ejection fraction 20 to 24% query suspected cardiorenal syndrome.  The urinalysis was not felt to be impressive and the patient only had tubular range proteinuria.  Serologic workup that has been negative includes UPEP, SPEP, ANCA, hepatitis panel.  The patient is oliguric and dialysis dependent.  Anti-GBM is pending.  The patient is noted to have low complements both C3 and C4 blood protein creatinine ratio 0.6.  UA from November 11 shows 1+ protein small blood and 0-1 red blood cells per high-power field.  Patient had a baseline creatinine in the 2 range in 2023.  JORGE LUIS is noted to be normal/double-stranded DNA negative/ASO negative/anti-GBM negative.  For completeness sake given low complements will check ASO titer and anti-double-stranded DNA.     Serologies negative  As noted baseline creatinine in the 2 range  Currently HD dependent  Access: TDC working well  Patient challenging target weight yesterday with marked improvement in symptoms  HD today continue to challenge target weight

## 2024-11-20 NOTE — CASE MANAGEMENT
Case Management Discharge Planning Note    Patient name Matthew Alvarez  Location /-01 MRN [id number]  : 1955 Date 2024       Current Admission Date: 11/10/2024  Current Admission Diagnosis:Acute on chronic systolic heart failure (HCC)   Patient Active Problem List    Diagnosis Date Noted Date Diagnosed    Thrombocytopenia (HCC) 2024     Renal failure 11/15/2024     Encounter for hemodialysis ([facility]) 11/15/2024     Anemia due to chronic kidney disease, on chronic dialysis (HCC) 11/15/2024     Acute hemodialysis patient (HCC) 2024     Oliguria 2024     Encounter for hemodialysis for ESRD ([facility]) 2024     Hyponatremia 2024     Dilated cardiomyopathy (HCC) 2024     Elevated liver transaminase level 11/10/2024     Elevated d-dimer 11/10/2024     Acute on chronic systolic heart failure (HCC) 2024     Coronary artery disease involving native coronary artery of native heart without angina pectoris 10/08/2021     Rheumatoid factor positive 2021     Protein calorie malnutrition (HCC) 2021     Acute kidney injury superimposed on stage 3a chronic kidney disease (HCC) 2021     Elevated troponin 2021     Tobacco abuse 2021     Nonischemic cardiomyopathy (HCC) 2021     Uncontrolled hypertension 2021       LOS (days): 10  Geometric Mean LOS (GMLOS) (days): 4.4  Days to GMLOS:-5.2     OBJECTIVE:  Risk of Unplanned Readmission Score: 17.12         Current admission status: Inpatient   Preferred Pharmacy:   [facility]/pharmacy #[id number] - [place] - [address] [facility]  [address]  [place] [place] [address]  Phone: [phone] Fax: [phone]    [facility] - [place] - [address]  [address]  [place] [address]  Phone: [phone] Fax: [phone]    Primary Care Provider: Olive Rodriguez MD    Primary Insurance: [employer]  Secondary Insurance:     DISCHARGE  DETAILS:     Per MD pt is medically cleared for dc, after HD today.  Called [facility] In [place], spoke to Chris, informed him, of dc and pt will be SOC on Friday.         1300 Received a message from MD that wife is concern about transport home, as she does not drive. BAKARI spoke to patient, and he said his sister is coming for him upon dc.  Update Mrs Alvarez that [place] is coming for him.   Mrs Alvarez is aware of the PCP appointment that was made for Rafael and understands if she feels pt needs HHC she may ask for a referral for this, she verbalized understanding.    [place] arrived at the bedside, reviewed the HD times with her, she is going to transport him to HD and work on someone to take him on Wednesday.      DC plan is home.

## 2024-11-20 NOTE — ASSESSMENT & PLAN NOTE
Lab Results   Component Value Date    EGFR 11 11/20/2024    EGFR 10 11/19/2024    EGFR 6 11/18/2024    CREATININE 4.69 (H) 11/20/2024    CREATININE 5.15 (H) 11/19/2024    CREATININE 7.76 (H) 11/18/2024     Started on hemodialysis this admission.  Management as per nephrology.

## 2024-11-20 NOTE — ASSESSMENT & PLAN NOTE
-Appears somewhat chronic  - LDH and hemolysis smear negative haptoglobin pending but does not appear to be hemolytic process    Notified outpatient HD  Most likely discharge after HD today

## 2024-11-20 NOTE — ASSESSMENT & PLAN NOTE
- Clinically euvolemic after UF yesterday continue to challenge target weight  - EF less than 25%  - LifeVest  - Nonobstructive disease  - Will need close outpatient follow-up with cardiology

## 2024-11-20 NOTE — PLAN OF CARE
Problem: CARDIOVASCULAR - ADULT  Goal: Maintains optimal cardiac output and hemodynamic stability  Description: INTERVENTIONS:  - Monitor I/O, vital signs and rhythm  - Monitor for S/S and trends of decreased cardiac output  - Administer and titrate ordered vasoactive medications to optimize hemodynamic stability  - Assess quality of pulses, skin color and temperature  - Assess for signs of decreased coronary artery perfusion  - Instruct patient to report change in severity of symptoms  Outcome: Progressing  Goal: Absence of cardiac dysrhythmias or at baseline rhythm  Description: INTERVENTIONS:  - Continuous cardiac monitoring, vital signs, obtain 12 lead EKG if ordered  - Administer antiarrhythmic and heart rate control medications as ordered  - Monitor electrolytes and administer replacement therapy as ordered  Outcome: Progressing     Problem: GENITOURINARY - ADULT  Goal: Maintains or returns to baseline urinary function  Description: INTERVENTIONS:  - Assess urinary function  - Encourage oral fluids to ensure adequate hydration if ordered  - Administer IV fluids as ordered to ensure adequate hydration  - Administer ordered medications as needed  - Offer frequent toileting  - Follow urinary retention protocol if ordered  Outcome: Progressing  Goal: Absence of urinary retention  Description: INTERVENTIONS:  - Assess patient's ability to void and empty bladder  - Monitor I/O  - Bladder scan as needed  - Discuss with physician/AP medications to alleviate retention as needed  - Discuss catheterization for long term situations as appropriate  Outcome: Progressing     Problem: METABOLIC, FLUID AND ELECTROLYTES - ADULT  Goal: Electrolytes maintained within normal limits  Description: INTERVENTIONS:  - Monitor labs and assess patient for signs and symptoms of electrolyte imbalances  - Administer electrolyte replacement as ordered  - Monitor response to electrolyte replacements, including repeat lab results as  appropriate  - Instruct patient on fluid and nutrition as appropriate  Outcome: Progressing  Goal: Fluid balance maintained  Description: INTERVENTIONS:  - Monitor labs   - Monitor I/O and WT  - Instruct patient on fluid and nutrition as appropriate  - Assess for signs & symptoms of volume excess or deficit  Outcome: Progressing  Goal: Glucose maintained within target range  Description: INTERVENTIONS:  - Monitor Blood Glucose as ordered  - Assess for signs and symptoms of hyperglycemia and hypoglycemia  - Administer ordered medications to maintain glucose within target range  - Assess nutritional intake and initiate nutrition service referral as needed  Outcome: Progressing     Problem: PAIN - ADULT  Goal: Verbalizes/displays adequate comfort level or baseline comfort level  Description: Interventions:  - Encourage patient to monitor pain and request assistance  - Assess pain using appropriate pain scale  - Administer analgesics based on type and severity of pain and evaluate response  - Implement non-pharmacological measures as appropriate and evaluate response  - Consider cultural and social influences on pain and pain management  - Notify physician/advanced practitioner if interventions unsuccessful or patient reports new pain  Outcome: Progressing     Problem: INFECTION - ADULT  Goal: Absence or prevention of progression during hospitalization  Description: INTERVENTIONS:  - Assess and monitor for signs and symptoms of infection  - Monitor lab/diagnostic results  - Monitor all insertion sites, i.e. indwelling lines, tubes, and drains  - Monitor endotracheal if appropriate and nasal secretions for changes in amount and color  - [place] appropriate cooling/warming therapies per order  - Administer medications as ordered  - Instruct and encourage patient and family to use good hand hygiene technique  - Identify and instruct in appropriate isolation precautions for identified infection/condition  Outcome:  Progressing     Problem: SAFETY ADULT  Goal: Patient will remain free of falls  Description: INTERVENTIONS:  - Educate patient/family on patient safety including physical limitations  - Instruct patient to call for assistance with activity   - Consult OT/PT to assist with strengthening/mobility   - Keep Call bell within reach  - Keep bed low and locked with side rails adjusted as appropriate  - Keep care items and personal belongings within reach  - Initiate and maintain comfort rounds  - Make Fall Risk Sign visible to staff  - Offer Toileting every 2 Hours, in advance of need  - Initiate/Maintain bed/chair alarm  - Obtain necessary fall risk management equipment:   - Apply yellow socks and bracelet for high fall risk patients  - Consider moving patient to room near nurses station  Outcome: Progressing  Goal: Maintain or return to baseline ADL function  Description: INTERVENTIONS:  -  Assess patient's ability to carry out ADLs; assess patient's baseline for ADL function and identify physical deficits which impact ability to perform ADLs (bathing, care of mouth/teeth, toileting, grooming, dressing, etc.)  - Assess/evaluate cause of self-care deficits   - Assess range of motion  - Assess patient's mobility; develop plan if impaired  - Assess patient's need for assistive devices and provide as appropriate  - Encourage maximum independence but intervene and supervise when necessary  - Involve family in performance of ADLs  - Assess for home care needs following discharge   - Consider OT consult to assist with ADL evaluation and planning for discharge  - Provide patient education as appropriate  Outcome: Progressing  Goal: Maintains/Returns to pre admission functional level  Description: INTERVENTIONS:  - Perform AM-PAC 6 Click Basic Mobility/ Daily Activity assessment daily.  - Set and communicate daily mobility goal to care team and patient/family/caregiver.   - Collaborate with rehabilitation services on mobility  goals if consulted  - Perform Range of Motion  times a day.  - Reposition patient .  - Dangle patient 4 times a day  - Stand patient 4 times a day  - Ambulate patient 4 times a day  - Out of bed to chair 3 times a day   - Out of bed for meals 3 times a day  - Out of bed for toileting  - Record patient progress and toleration of activity level   Outcome: Progressing     Problem: Knowledge Deficit  Goal: Patient/family/caregiver demonstrates understanding of disease process, treatment plan, medications, and discharge instructions  Description: Complete learning assessment and assess knowledge base.  Interventions:  - Provide teaching at level of understanding  - Provide teaching via preferred learning methods  Outcome: Progressing     Problem: Prexisting or High Potential for Compromised Skin Integrity  Goal: Skin integrity is maintained or improved  Description: INTERVENTIONS:  - Identify patients at risk for skin breakdown  - Assess and monitor skin integrity  - Assess and monitor nutrition and hydration status  - Monitor labs   - Assess for incontinence   - Turn and reposition patient  - Assist with mobility/ambulation  - Relieve pressure over bony prominences  - Avoid friction and shearing  - Provide appropriate hygiene as needed including keeping skin clean and dry  - Evaluate need for skin moisturizer/barrier cream  - Collaborate with interdisciplinary team   - Patient/family teaching  - Consider wound care consult   Outcome: Progressing     Problem: Nutrition/Hydration-ADULT  Goal: Nutrient/Hydration intake appropriate for improving, restoring or maintaining nutritional needs  Description: Monitor and assess patient's nutrition/hydration status for malnutrition. Collaborate with interdisciplinary team and initiate plan and interventions as ordered.  Monitor patient's weight and dietary intake as ordered or per policy. Utilize nutrition screening tool and intervene as necessary. Determine patient's food  preferences and provide high-protein, high-caloric foods as appropriate.     INTERVENTIONS:  - Monitor oral intake, urinary output, labs, and treatment plans  - Assess nutrition and hydration status and recommend course of action  - Evaluate amount of meals eaten  - Assist patient with eating if necessary   - Allow adequate time for meals  - Recommend/ encourage appropriate diets, oral nutritional supplements, and vitamin/mineral supplements  - Order, calculate, and assess calorie counts as needed  - Recommend, monitor, and adjust tube feedings and TPN/PPN based on assessed needs  - Assess need for intravenous fluids  - Provide specific nutrition/hydration education as appropriate  - Include patient/family/caregiver in decisions related to nutrition  Outcome: Progressing      ,DirectAddress_Unknown

## 2024-11-20 NOTE — PHYSICAL THERAPY NOTE
11/20/24 1100   PT Last Visit   PT Visit Date 11/20/24   Note Type   Note Type Cancelled Session   Cancel Reasons Other                                          Physical Therapy Cancellation Note     PT treatment attempted this morning but patient currently unavailable due to receiving dialysis sitting EOB.  Will continue to offer PT treatment as ordered and progress as able when patient is available to participate.        Brooke Bains, PTA

## 2024-11-20 NOTE — ASSESSMENT & PLAN NOTE
D-dimer elevated to 12.54  Patient without hemoptysis, pleuritic chest pain  Does have shortness of breath and mild tachycardia that is improved  Currently no oxygen requirements, saturating well on room air  Kidney function precludes CTA chest  Lower extremity venous duplex negative  TTE shows LVEF of 20-24%.  Systolic function is severely reduced.  There is severe global hypokinesis.  LifeVest in place   requires close outpatient follow-up with cardiology

## 2024-11-20 NOTE — TELEPHONE ENCOUNTER
This patient is discharging today from [facility]: nonischemic cardiomyopathy and HFrEF  Please schedule follow up with me or Dr. To within 2 weeks.

## 2024-11-20 NOTE — NURSING NOTE
"During med pass patient expressed to this nurse how he wanted his life vest off. He said \"I can't stand this thing, I want it off\". This nurse explained to patient how he needs to keep the life vest on at all times for his heart. Patient nodded and laid back down in bed. Patient continued to wear life vest throughout the night.  "

## 2024-11-20 NOTE — ASSESSMENT & PLAN NOTE
-Continue carvedilol 12.5 mg twice a day/hydralazine 100 every 8 hours/Imdur 110 mg daily  - Challenge target weight this should improve blood pressure which is continuing to improve  - Potential addition of amlodipine if needed

## 2024-11-20 NOTE — PROGRESS NOTES
Progress Note - Cardiology   Name: Matthew Alvarez 69 y.o. male I MRN: [id number]  Unit/Bed#: -01 I Date of Admission: 11/10/2024   Date of Service: 11/20/2024 I Hospital Day: 10     Assessment & Plan  Acute on chronic systolic heart failure (HCC)  History of HFrEF.  Presented with volume overload.  Echo 11/11/2024 shows LVEF 20-24% with severe global hypokinesis and dilated LV which is unchanged from 2021  Oliguric since admission. Now on hemodialysis.  Appreciate nephrology management.   See discussion under cardiomyopathy.  Uncontrolled hypertension  Blood pressures improving with up-titration of GDMT.   Continue current regimen.   Renal function limits options.  Continue coreg to 12.5 mg PO BID, Imdur to 120 mg daily, and hydralazine 100 mg PO TID  Dilated cardiomyopathy (HCC)  History of non-ischemic severe cardiomyopathy.  Admitted with heart failure in 2021 where EF was < 25%.  Cardiac catheterization 9/16/2021 showed only mild nonobstructive disease.  GDMT limited by advanced CKD.  On GDMT with Coreg, hydralazine and Imdur. Continuing to titrate during admission.  LifeVest in place.  Plan close outpatient follow up with our office.  Acute kidney injury superimposed on stage 3a chronic kidney disease (HCC)  Lab Results   Component Value Date    EGFR 11 11/20/2024    EGFR 10 11/19/2024    EGFR 6 11/18/2024    CREATININE 4.69 (H) 11/20/2024    CREATININE 5.15 (H) 11/19/2024    CREATININE 7.76 (H) 11/18/2024     Started on hemodialysis this admission.  Management as per nephrology.  Elevated troponin  Cardiac catheterization 9/2021 with mild non-obstructive disease  No current anginal complaints with no acute ECG changes.  HS trop elevation consistent with non-ischemic myocardial injury in the setting of CHF, DAVID  Tobacco abuse  Urged cessation  Encounter for hemodialysis for ESRD (HCC)  Lab Results   Component Value Date    EGFR 11 11/20/2024    EGFR 10 11/19/2024    EGFR 6 11/18/2024    CREATININE 4.69  (H) 11/20/2024    CREATININE 5.15 (H) 11/19/2024    CREATININE 7.76 (H) 11/18/2024     Acute hemodialysis patient (HCC)    Renal failure    Encounter for hemodialysis (HCC)    Anemia due to chronic kidney disease, on chronic dialysis (HCC)  Lab Results   Component Value Date    EGFR 11 11/20/2024    EGFR 10 11/19/2024    EGFR 6 11/18/2024    CREATININE 4.69 (H) 11/20/2024    CREATININE 5.15 (H) 11/19/2024    CREATININE 7.76 (H) 11/18/2024     Thrombocytopenia (HCC)  Monitoring with use of aspirin.   Coronary artery disease involving native coronary artery of native heart without angina pectoris  Mild, non-obstructive CAD on cardiac cath 9/2021.  Continue aspirin 81 mg daily.  Should be on high intensity statin when liver function improves.    Subjective   Chief Complaint: shortness of breath, resolved  Rafael is resting in bed with the dialysis nurse at bedside. He denies complaints today and says he wants to go home.     Objective :  Temp:  [97.5 °F (36.4 °C)-98.8 °F (37.1 °C)] 97.7 °F (36.5 °C)  HR:  [56-69] 63  BP: (131-175)/(73-91) 169/82  Resp:  [18-20] 18  SpO2:  [93 %-97 %] 95 %  O2 Device: None (Room air)  Orthostatic Blood Pressures      Flowsheet Row Most Recent Value   Blood Pressure 169/82 filed at 11/20/2024 0930   Patient Position - Orthostatic VS Lying filed at 11/20/2024 0845          First Weight: Weight - Scale: 57.9 kg (127 lb 10.3 oz) (11/10/24 1847)  Vitals:    11/18/24 0540 11/20/24 0541   Weight: 59.2 kg (130 lb 8.2 oz) 55.2 kg (121 lb 12.8 oz)     Physical Exam  Vitals and nursing note reviewed.   Constitutional:       General: He is not in acute distress.     Appearance: He is well-developed.      Comments: Appears chronically ill   HENT:      Head: Normocephalic and atraumatic.   Eyes:      Conjunctiva/sclera: Conjunctivae normal.   Neck:      Vascular: No JVD.   Cardiovascular:      Rate and Rhythm: Normal rate and regular rhythm.      Heart sounds: No murmur heard.     Comments: Compression  socks in place  Pulmonary:      Effort: Pulmonary effort is normal. No respiratory distress.      Breath sounds: Rhonchi (improving) present.   Abdominal:      Palpations: Abdomen is soft.      Tenderness: There is no abdominal tenderness.   Musculoskeletal:         General: No swelling.      Cervical back: Neck supple.      Right lower leg: No edema.      Left lower leg: No edema.   Skin:     General: Skin is warm and dry.      Capillary Refill: Capillary refill takes less than 2 seconds.   Neurological:      Mental Status: He is alert.   Psychiatric:         Mood and Affect: Mood normal.           Lab Results: I have reviewed the following results:CBC/BMP:   .     11/20/24  0535   WBC 12.59*   HGB 9.9*   HCT 29.7*   *   SODIUM 127*   K 4.4   CL 90*   CO2 26   BUN 39*   CREATININE 4.69*   GLUC 112   MG 2.1   PHOS 5.0*      Results from last 7 days   Lab Units 11/20/24  0535 11/17/24  0540 11/16/24  0630   WBC Thousand/uL 12.59* 8.31 7.19   HEMOGLOBIN g/dL 9.9* 9.5* 10.7*   HEMATOCRIT % 29.7* 28.4* 32.3*   PLATELETS Thousands/uL 109* 86* 79*     Results from last 7 days   Lab Units 11/20/24  0535 11/19/24  0616 11/18/24  0702 11/15/24  0640 11/14/24  1342   POTASSIUM mmol/L 4.4 4.9 5.1   < >  --    CHLORIDE mmol/L 90* 92* 89*   < >  --    CO2 mmol/L 26 26 23   < >  --    CO2, I-STAT mmol/L  --   --   --   --  26   BUN mg/dL 39* 46* 93*   < >  --    CREATININE mg/dL 4.69* 5.15* 7.76*   < >  --    GLUCOSE, ISTAT mg/dl  --   --   --   --  147*   CALCIUM mg/dL 8.9 8.8 8.9   < >  --     < > = values in this interval not displayed.     Results from last 7 days   Lab Units 11/14/24  1344   INR  1.73*     Lab Results   Component Value Date    HGBA1C 5.7 (H) 09/15/2021     Lab Results   Component Value Date    CKTOTAL 64 09/11/2021    TROPONINI 0.03 09/12/2021       Imaging Results Review: I reviewed radiology reports from this admission including: chest xray and Echocardiogram.  Other Study Results Review: EKG was  reviewed.

## 2024-11-20 NOTE — ASSESSMENT & PLAN NOTE
Appears somewhat chronic  LDH and hemolysis smear negative  Does not appear to be hemolytic process  Could be in light of dialysis - improved

## 2024-11-20 NOTE — ASSESSMENT & PLAN NOTE
Chronic, history of nonischemic severe cardiomyopathy   continue aspirin, carvedilol Imdur management as per systolic heart failure  LifeVest in place close cardiology follow-up outpatient

## 2024-11-20 NOTE — ASSESSMENT & PLAN NOTE
Lab Results   Component Value Date    EGFR 11 11/20/2024    EGFR 10 11/19/2024    EGFR 6 11/18/2024    CREATININE 4.69 (H) 11/20/2024    CREATININE 5.15 (H) 11/19/2024    CREATININE 7.76 (H) 11/18/2024   CKD stage III with a baseline creatinine of 1.9-2 his creatinine has risen up to 7 with oliguria he was started on dialysis he received his dialysis. Discussed with nephrology he will be on Monday Wednesday Friday  S/p permacath placement  He is on room air but still has Rales on exam chest x-ray with still pulmonary edema and pleural effusion reached out to nephrology if we could remove more fluid, they are in agreement they can be removed a couple of liters of tolerable.  Also reached out to critical care to evaluate with ultrasound if there is any evidence of effusions for thoracocentesis.- patient declined   Patient underwent extra HD/UF   Nephrology on board and following recommend serologies to rule out intrinsic disease  SPEP/UPEP, ANCA - negative  C3 and C4 levels low  JORGE LUIS - Normal   urine protein creatinine ratio - 0.6  Anti-GBM is pending

## 2024-11-20 NOTE — ASSESSMENT & PLAN NOTE
BP improving with up titration of GDMT  Limited options due to renal function  Uncontrolled today increased hydralazine to 100 mg p.o. every 8 hours, continue Coreg 12.5 mg p.o. twice daily and Imdur 120 mg p.o. daily  May consider addition of amlodipine if needed

## 2024-11-20 NOTE — ASSESSMENT & PLAN NOTE
Lab Results   Component Value Date    EGFR 11 11/20/2024    EGFR 10 11/19/2024    EGFR 6 11/18/2024    CREATININE 4.69 (H) 11/20/2024    CREATININE 5.15 (H) 11/19/2024    CREATININE 7.76 (H) 11/18/2024   Hemoglobin is stable  Venofer by nephrology

## 2024-11-20 NOTE — ASSESSMENT & PLAN NOTE
Blood pressures improving with up-titration of GDMT.   Continue current regimen.   Renal function limits options.  Continue coreg to 12.5 mg PO BID, Imdur to 120 mg daily, and hydralazine 100 mg PO TID

## 2024-11-21 LAB — HAPTOGLOB SERPL-MCNC: 79 MG/DL (ref 32–363)

## 2024-11-21 NOTE — UTILIZATION REVIEW
NOTIFICATION OF ADMISSION DISCHARGE   This is a Notification of Discharge from [facility]. Please be advised that this patient has been discharge from our facility. Below you will find the admission and discharge date and time including the patient’s disposition.   UTILIZATION REVIEW CONTACT:  Leslye Qiu  Utilization   Network Utilization Review Department  Phone: [phone] x carefully listen to the prompts. All voicemails are confidential.  Email: [email or web address]     ADMISSION INFORMATION  PRESENTATION DATE: 11/10/2024  6:45 PM  OBERVATION ADMISSION DATE: N/A  INPATIENT ADMISSION DATE: 11/10/24  8:16 PM   DISCHARGE DATE: 11/20/2024  1:43 PM   DISPOSITION:Home/Self Care    Network Utilization Review Department  ATTENTION: Please call with any questions or concerns to [phone] and carefully listen to the prompts so that you are directed to the right person. All voicemails are confidential.   For Discharge needs, contact Care Management DC Support Team at [phone] opt. 2  Send all requests for admission clinical reviews, approved or denied determinations and any other requests to dedicated fax number below belonging to the campus where the patient is receiving treatment. List of dedicated fax numbers for the Facilities:  FACILITY NAME UR FAX NUMBER   ADMISSION DENIALS (Administrative/Medical Necessity) [phone]   DISCHARGE SUPPORT TEAM ([facility]) [phone]   PARENT CHILD HEALTH (Maternity/NICU/Pediatrics) [phone]   [facility] [phone]   [facility] [phone]   [facility] [phone]   [facility] [phone]   [facility] [phone]   [facility] [phone]   [facility] [phone]   [facility]  [phone]   [facility] [phone]   [facility] [phone]   [facility] [phone]   [facility] [phone]

## 2024-12-10 ENCOUNTER — TELEPHONE (OUTPATIENT)
Dept: CARDIOLOGY CLINIC | Facility: CLINIC | Age: 69
End: 2024-12-10

## 2024-12-10 DIAGNOSIS — I50.9 CHF (CONGESTIVE HEART FAILURE) (HCC): ICD-10-CM

## 2024-12-10 DIAGNOSIS — I42.0 DILATED CARDIOMYOPATHY (HCC): ICD-10-CM

## 2024-12-10 DIAGNOSIS — I50.23 ACUTE ON CHRONIC SYSTOLIC HEART FAILURE (HCC): ICD-10-CM

## 2024-12-10 DIAGNOSIS — I42.8 NONISCHEMIC CARDIOMYOPATHY (HCC): ICD-10-CM

## 2024-12-10 RX ORDER — HYDRALAZINE HYDROCHLORIDE 100 MG/1
100 TABLET, FILM COATED ORAL EVERY 8 HOURS SCHEDULED
Qty: 90 TABLET | Refills: 0 | Status: ON HOLD | OUTPATIENT
Start: 2024-12-10

## 2024-12-10 RX ORDER — ISOSORBIDE MONONITRATE 120 MG/1
120 TABLET, EXTENDED RELEASE ORAL DAILY
Qty: 30 TABLET | Refills: 0 | Status: ON HOLD | OUTPATIENT
Start: 2024-12-10

## 2024-12-10 RX ORDER — CARVEDILOL 12.5 MG/1
12.5 TABLET ORAL 2 TIMES DAILY WITH MEALS
Qty: 60 TABLET | Refills: 0 | Status: ON HOLD | OUTPATIENT
Start: 2024-12-10

## 2024-12-10 RX ORDER — ASPIRIN 81 MG/1
81 TABLET ORAL DAILY
Qty: 30 TABLET | Refills: 0 | Status: ON HOLD | OUTPATIENT
Start: 2024-12-10

## 2024-12-10 NOTE — TELEPHONE ENCOUNTER
Patient's wife called and states they need to reschedule her 's appointment for tomorrow because he is not driving right now and has no transportation to get there. She states she will give a phone call once he is able to start driving again.     Please cancel tomorrow's appointment and contact patient if need be.

## 2024-12-10 NOTE — TELEPHONE ENCOUNTER
Patient needs refills on these medications. They were previously provided to him in the hospital. Please review and refill if possible. Patient would like for Cardiologist to take over filling them.    Reason for call:   [x] Refill   [] Prior Auth  [] Other:     Office:   [] PCP/Provider -   [x] Specialty/Provider - Cardiology     Medication:     Isosorbide Mononitrate 120 mg 24 hr tablet taken by mouth once daily #90 tabs     Hydralazine 100 mg tablet taken by mouth every 8 hours #90 tabs     Carvedilol 12.5 mg tablet taken by mouth 2x daily with meals #180 tabs     Aspirin 81 mg EC tablet taken by mouth once daily #90 tabs     Pharmacy: [facility]/pharmacy #[id number] - [place] - [address] [facility] [phone]     Does the patient have enough for 3 days?   [] Yes   [x] No - Send as HP to POD

## 2024-12-14 ENCOUNTER — APPOINTMENT (EMERGENCY)
Dept: CT IMAGING | Facility: HOSPITAL | Age: 69
DRG: 291 | End: 2024-12-14
Payer: COMMERCIAL

## 2024-12-14 ENCOUNTER — APPOINTMENT (EMERGENCY)
Dept: RADIOLOGY | Facility: HOSPITAL | Age: 69
DRG: 291 | End: 2024-12-14
Payer: COMMERCIAL

## 2024-12-14 ENCOUNTER — HOSPITAL ENCOUNTER (INPATIENT)
Facility: HOSPITAL | Age: 69
LOS: 13 days | Discharge: HOME | DRG: 291 | End: 2024-12-27
Attending: EMERGENCY MEDICINE | Admitting: FAMILY MEDICINE
Payer: COMMERCIAL

## 2024-12-14 DIAGNOSIS — I50.23 ACUTE ON CHRONIC SYSTOLIC HEART FAILURE (HCC): ICD-10-CM

## 2024-12-14 DIAGNOSIS — Z99.2 STAGE 5 CHRONIC KIDNEY DISEASE ON CHRONIC DIALYSIS (HCC): ICD-10-CM

## 2024-12-14 DIAGNOSIS — I50.22 CHRONIC SYSTOLIC (CONGESTIVE) HEART FAILURE (HCC): Primary | ICD-10-CM

## 2024-12-14 DIAGNOSIS — N18.6 STAGE 5 CHRONIC KIDNEY DISEASE ON CHRONIC DIALYSIS (HCC): ICD-10-CM

## 2024-12-14 PROBLEM — E55.9 VITAMIN D DEFICIENCY, UNSPECIFIED: Status: ACTIVE | Noted: 2024-11-15

## 2024-12-14 PROBLEM — N18.31 ACUTE KIDNEY INJURY SUPERIMPOSED ON STAGE 3A CHRONIC KIDNEY DISEASE (HCC): Status: RESOLVED | Noted: 2021-09-12 | Resolved: 2024-12-14

## 2024-12-14 PROBLEM — N18.9 ANEMIA IN CHRONIC KIDNEY DISEASE: Status: ACTIVE | Noted: 2024-11-15

## 2024-12-14 PROBLEM — R26.2 AMBULATORY DYSFUNCTION: Status: ACTIVE | Noted: 2024-12-14

## 2024-12-14 PROBLEM — N17.9 ACUTE KIDNEY INJURY SUPERIMPOSED ON STAGE 3A CHRONIC KIDNEY DISEASE (HCC): Status: RESOLVED | Noted: 2021-09-12 | Resolved: 2024-12-14

## 2024-12-14 PROBLEM — D63.1 ANEMIA IN CHRONIC KIDNEY DISEASE: Status: ACTIVE | Noted: 2024-11-15

## 2024-12-14 PROBLEM — R79.89 ELEVATED D-DIMER: Status: RESOLVED | Noted: 2024-11-10 | Resolved: 2024-12-14

## 2024-12-14 LAB
2HR DELTA HS TROPONIN: -12 NG/L
4HR DELTA HS TROPONIN: -7 NG/L
ABO GROUP BLD: NORMAL
ABO GROUP BLD: NORMAL
ACANTHOCYTES BLD QL SMEAR: PRESENT
ALBUMIN SERPL BCG-MCNC: 3.2 G/DL (ref 3.5–5)
ALP SERPL-CCNC: 119 U/L (ref 34–104)
ALT SERPL W P-5'-P-CCNC: 9 U/L (ref 7–52)
ANION GAP SERPL CALCULATED.3IONS-SCNC: 11 MMOL/L (ref 4–13)
ANISOCYTOSIS BLD QL SMEAR: PRESENT
APTT PPP: 30 SECONDS (ref 23–34)
AST SERPL W P-5'-P-CCNC: 21 U/L (ref 13–39)
BASE EX.OXY STD BLDV CALC-SCNC: 76.5 % (ref 60–80)
BASE EXCESS BLDV CALC-SCNC: -0.4 MMOL/L
BASOPHILS # BLD AUTO: 0.01 THOUSANDS/ÂΜL (ref 0–0.1)
BASOPHILS NFR BLD AUTO: 0 % (ref 0–1)
BILIRUB SERPL-MCNC: 1.36 MG/DL (ref 0.2–1)
BLD GP AB SCN SERPL QL: NEGATIVE
BNP SERPL-MCNC: >4700 PG/ML (ref 0–100)
BUN SERPL-MCNC: 44 MG/DL (ref 5–25)
CALCIUM ALBUM COR SERPL-MCNC: 9 MG/DL (ref 8.3–10.1)
CALCIUM SERPL-MCNC: 8.4 MG/DL (ref 8.4–10.2)
CARDIAC TROPONIN I PNL SERPL HS: 50 NG/L (ref ?–50)
CARDIAC TROPONIN I PNL SERPL HS: 55 NG/L (ref ?–50)
CARDIAC TROPONIN I PNL SERPL HS: 62 NG/L (ref ?–50)
CHLORIDE SERPL-SCNC: 89 MMOL/L (ref 96–108)
CK SERPL-CCNC: 57 U/L (ref 39–308)
CO2 SERPL-SCNC: 27 MMOL/L (ref 21–32)
CREAT SERPL-MCNC: 5.3 MG/DL (ref 0.6–1.3)
EOSINOPHIL # BLD AUTO: 0.04 THOUSAND/ÂΜL (ref 0–0.61)
EOSINOPHIL NFR BLD AUTO: 1 % (ref 0–6)
ERYTHROCYTE [DISTWIDTH] IN BLOOD BY AUTOMATED COUNT: 17.2 % (ref 11.6–15.1)
GFR SERPL CREATININE-BSD FRML MDRD: 10 ML/MIN/1.73SQ M
GLUCOSE SERPL-MCNC: 99 MG/DL (ref 65–140)
HCO3 BLDV-SCNC: 25.3 MMOL/L (ref 24–30)
HCT VFR BLD AUTO: 29.7 % (ref 36.5–49.3)
HGB BLD-MCNC: 9.6 G/DL (ref 12–17)
IMM GRANULOCYTES # BLD AUTO: 0.03 THOUSAND/UL (ref 0–0.2)
IMM GRANULOCYTES NFR BLD AUTO: 1 % (ref 0–2)
INR PPP: 1.27 (ref 0.85–1.19)
LYMPHOCYTES # BLD AUTO: 0.46 THOUSANDS/ÂΜL (ref 0.6–4.47)
LYMPHOCYTES NFR BLD AUTO: 10 % (ref 14–44)
MCH RBC QN AUTO: 26.9 PG (ref 26.8–34.3)
MCHC RBC AUTO-ENTMCNC: 32.3 G/DL (ref 31.4–37.4)
MCV RBC AUTO: 83 FL (ref 82–98)
MICROCYTES BLD QL AUTO: PRESENT
MONOCYTES # BLD AUTO: 0.4 THOUSAND/ÂΜL (ref 0.17–1.22)
MONOCYTES NFR BLD AUTO: 8 % (ref 4–12)
NEUTROPHILS # BLD AUTO: 3.86 THOUSANDS/ÂΜL (ref 1.85–7.62)
NEUTS SEG NFR BLD AUTO: 80 % (ref 43–75)
NRBC BLD AUTO-RTO: 0 /100 WBCS
O2 CT BLDV-SCNC: 11.8 ML/DL
OVALOCYTES BLD QL SMEAR: PRESENT
PCO2 BLDV: 45.9 MM HG (ref 42–50)
PH BLDV: 7.36 [PH] (ref 7.3–7.4)
PLATELET # BLD AUTO: 80 THOUSANDS/UL (ref 149–390)
PLATELET BLD QL SMEAR: ABNORMAL
PMV BLD AUTO: 10.7 FL (ref 8.9–12.7)
PO2 BLDV: 45.6 MM HG (ref 35–45)
POIKILOCYTOSIS BLD QL SMEAR: PRESENT
POTASSIUM SERPL-SCNC: 4.2 MMOL/L (ref 3.5–5.3)
PROT SERPL-MCNC: 6.8 G/DL (ref 6.4–8.4)
PROTHROMBIN TIME: 16.2 SECONDS (ref 12.3–15)
RBC # BLD AUTO: 3.57 MILLION/UL (ref 3.88–5.62)
RBC MORPH BLD: PRESENT
RH BLD: POSITIVE
RH BLD: POSITIVE
SODIUM SERPL-SCNC: 127 MMOL/L (ref 135–147)
SPECIMEN EXPIRATION DATE: NORMAL
WBC # BLD AUTO: 4.8 THOUSAND/UL (ref 4.31–10.16)

## 2024-12-14 PROCEDURE — 74177 CT ABD & PELVIS W/CONTRAST: CPT

## 2024-12-14 PROCEDURE — 85610 PROTHROMBIN TIME: CPT | Performed by: EMERGENCY MEDICINE

## 2024-12-14 PROCEDURE — 93005 ELECTROCARDIOGRAM TRACING: CPT

## 2024-12-14 PROCEDURE — 70486 CT MAXILLOFACIAL W/O DYE: CPT

## 2024-12-14 PROCEDURE — 86850 RBC ANTIBODY SCREEN: CPT | Performed by: EMERGENCY MEDICINE

## 2024-12-14 PROCEDURE — 82550 ASSAY OF CK (CPK): CPT | Performed by: EMERGENCY MEDICINE

## 2024-12-14 PROCEDURE — 86900 BLOOD TYPING SEROLOGIC ABO: CPT | Performed by: EMERGENCY MEDICINE

## 2024-12-14 PROCEDURE — 72170 X-RAY EXAM OF PELVIS: CPT

## 2024-12-14 PROCEDURE — 71045 X-RAY EXAM CHEST 1 VIEW: CPT

## 2024-12-14 PROCEDURE — 84484 ASSAY OF TROPONIN QUANT: CPT | Performed by: EMERGENCY MEDICINE

## 2024-12-14 PROCEDURE — 83880 ASSAY OF NATRIURETIC PEPTIDE: CPT | Performed by: EMERGENCY MEDICINE

## 2024-12-14 PROCEDURE — 36415 COLL VENOUS BLD VENIPUNCTURE: CPT | Performed by: EMERGENCY MEDICINE

## 2024-12-14 PROCEDURE — 86901 BLOOD TYPING SEROLOGIC RH(D): CPT | Performed by: EMERGENCY MEDICINE

## 2024-12-14 PROCEDURE — 99223 1ST HOSP IP/OBS HIGH 75: CPT | Performed by: FAMILY MEDICINE

## 2024-12-14 PROCEDURE — 70450 CT HEAD/BRAIN W/O DYE: CPT

## 2024-12-14 PROCEDURE — 85025 COMPLETE CBC W/AUTO DIFF WBC: CPT | Performed by: EMERGENCY MEDICINE

## 2024-12-14 PROCEDURE — 96374 THER/PROPH/DIAG INJ IV PUSH: CPT

## 2024-12-14 PROCEDURE — 99285 EMERGENCY DEPT VISIT HI MDM: CPT | Performed by: EMERGENCY MEDICINE

## 2024-12-14 PROCEDURE — 85730 THROMBOPLASTIN TIME PARTIAL: CPT | Performed by: EMERGENCY MEDICINE

## 2024-12-14 PROCEDURE — 71260 CT THORAX DX C+: CPT

## 2024-12-14 PROCEDURE — 82805 BLOOD GASES W/O2 SATURATION: CPT | Performed by: EMERGENCY MEDICINE

## 2024-12-14 PROCEDURE — 72125 CT NECK SPINE W/O DYE: CPT

## 2024-12-14 PROCEDURE — 99285 EMERGENCY DEPT VISIT HI MDM: CPT

## 2024-12-14 PROCEDURE — 80053 COMPREHEN METABOLIC PANEL: CPT | Performed by: EMERGENCY MEDICINE

## 2024-12-14 RX ORDER — HYDRALAZINE HYDROCHLORIDE 25 MG/1
100 TABLET, FILM COATED ORAL EVERY 8 HOURS SCHEDULED
Status: DISCONTINUED | OUTPATIENT
Start: 2024-12-14 | End: 2024-12-27 | Stop reason: HOSPADM

## 2024-12-14 RX ORDER — LORAZEPAM 2 MG/ML
INJECTION INTRAMUSCULAR
Status: COMPLETED
Start: 2024-12-14 | End: 2024-12-14

## 2024-12-14 RX ORDER — HEPARIN SODIUM 5000 [USP'U]/ML
5000 INJECTION, SOLUTION INTRAVENOUS; SUBCUTANEOUS EVERY 8 HOURS SCHEDULED
Status: DISCONTINUED | OUTPATIENT
Start: 2024-12-14 | End: 2024-12-27 | Stop reason: HOSPADM

## 2024-12-14 RX ORDER — ACETAMINOPHEN 325 MG/1
650 TABLET ORAL EVERY 4 HOURS PRN
Status: DISCONTINUED | OUTPATIENT
Start: 2024-12-14 | End: 2024-12-27 | Stop reason: HOSPADM

## 2024-12-14 RX ORDER — CARVEDILOL 12.5 MG/1
12.5 TABLET ORAL 2 TIMES DAILY WITH MEALS
Status: DISCONTINUED | OUTPATIENT
Start: 2024-12-15 | End: 2024-12-14

## 2024-12-14 RX ORDER — LORAZEPAM 2 MG/ML
1 INJECTION INTRAMUSCULAR ONCE
Status: COMPLETED | OUTPATIENT
Start: 2024-12-14 | End: 2024-12-14

## 2024-12-14 RX ORDER — ISOSORBIDE MONONITRATE 60 MG/1
120 TABLET, EXTENDED RELEASE ORAL DAILY
Status: DISCONTINUED | OUTPATIENT
Start: 2024-12-14 | End: 2024-12-27 | Stop reason: HOSPADM

## 2024-12-14 RX ORDER — CARVEDILOL 12.5 MG/1
12.5 TABLET ORAL 2 TIMES DAILY WITH MEALS
Status: DISCONTINUED | OUTPATIENT
Start: 2024-12-14 | End: 2024-12-23

## 2024-12-14 RX ORDER — ASPIRIN 81 MG/1
81 TABLET ORAL DAILY
Status: DISCONTINUED | OUTPATIENT
Start: 2024-12-15 | End: 2024-12-27 | Stop reason: HOSPADM

## 2024-12-14 RX ORDER — HYDRALAZINE HYDROCHLORIDE 20 MG/ML
10 INJECTION INTRAMUSCULAR; INTRAVENOUS EVERY 6 HOURS PRN
Status: DISCONTINUED | OUTPATIENT
Start: 2024-12-14 | End: 2024-12-27 | Stop reason: HOSPADM

## 2024-12-14 RX ORDER — ISOSORBIDE MONONITRATE 60 MG/1
120 TABLET, EXTENDED RELEASE ORAL DAILY
Status: DISCONTINUED | OUTPATIENT
Start: 2024-12-15 | End: 2024-12-14

## 2024-12-14 RX ADMIN — ISOSORBIDE MONONITRATE 120 MG: 60 TABLET, EXTENDED RELEASE ORAL at 20:39

## 2024-12-14 RX ADMIN — Medication 3 MG: at 21:18

## 2024-12-14 RX ADMIN — IOHEXOL 85 ML: 350 INJECTION, SOLUTION INTRAVENOUS at 17:09

## 2024-12-14 RX ADMIN — LORAZEPAM 1 MG: 2 INJECTION INTRAMUSCULAR at 16:45

## 2024-12-14 RX ADMIN — LORAZEPAM 1 MG: 2 INJECTION INTRAMUSCULAR; INTRAVENOUS at 16:45

## 2024-12-14 RX ADMIN — CARVEDILOL 12.5 MG: 12.5 TABLET, FILM COATED ORAL at 20:39

## 2024-12-14 RX ADMIN — HYDRALAZINE HYDROCHLORIDE 100 MG: 25 TABLET ORAL at 21:18

## 2024-12-14 NOTE — TRAUMA DOCUMENTATION
Pt wearing life vest. States he only wears it when he's 'out'.     Pt also added he missed dialysis yesterday.

## 2024-12-14 NOTE — ED PROVIDER NOTES
Emergency Department Trauma Note  Matthew Alvarez 69 y.o. male MRN: [id number]  Unit/Bed#: /-01 Encounter: [id number]      Trauma Alert: Trauma Acuity: Trauma Evaluation  Model of Arrival:   via    Trauma Team: Current Providers  Attending Provider: Shauna Mohan DO  Attending Provider: David Meraz MD  Registered Nurse: Jonah Platt RN  Consulting Physician: Kathleen Mulligan DO  Registered Nurse: Mary Rogers RN  Patient Care Assistant: Rochelle Weaver  Consultants:     None      History of Present Illness     Chief Complaint:   Chief Complaint   Patient presents with    Fall     Pt arrives with c/o fall 2 days ago while going to the bathroom in the middle of the night. HS on hardwood floor. +asa. No loc.      HPI:  Matthew Alvarez is a 69 y.o. male who presents with mechanical fall.  Mechanism:Details of Incident: pt tripped and fell while walking to bathroom 2 days ago. Injury Date: 12/12/24        This is a 69-year-old male presenting to the ED as a trauma evaluation.  Patient is complaining of fall 2 days ago.  Patient states that he was ambulating to the bathroom in the middle of the night and due to underlying gait dysfunction lost his footing and fell and hit his head on the hardwood floor.  Patient states that he had no loss of consciousness.  He is on chronic hemodialysis and did not have hemodialysis on Wednesday.  Patient went to dialysis today and was instructed to come to the ER because of the traumatic injury and bruising to his face.  Patient is wearing a life vest but states he wears it when he wants to.      Review of Systems   Constitutional:  Negative for chills and fever.   HENT:  Negative for ear pain and sore throat.    Eyes:  Negative for pain and visual disturbance.   Respiratory:  Negative for cough and shortness of breath.    Cardiovascular:  Negative for chest pain and palpitations.   Gastrointestinal:  Negative for abdominal pain and vomiting.  "  Genitourinary:  Negative for dysuria and hematuria.   Musculoskeletal:  Positive for gait problem. Negative for arthralgias and back pain.   Skin:  Negative for color change and rash.   Neurological:  Positive for headaches. Negative for seizures and syncope.   All other systems reviewed and are negative.      Historical Information     Immunizations:   There is no immunization history on file for this patient.    Past Medical History:   Diagnosis Date    CAD (coronary artery disease)     HFrEF (heart failure with reduced ejection fraction) ([facility]) 09/2021    Hypertension     Rheumatoid factor positive 09/2021    Stage 3 chronic kidney disease ([facility])     Tobacco abuse        Family History   Problem Relation Age of Onset    Heart failure Mother     Other Father         \"blood clot\"    COPD Sister     Heart failure Brother     Valvular heart disease Brother     Heart attack Son     No Known Problems Son      Past Surgical History:   Procedure Laterality Date    APPENDECTOMY  1965    CARDIAC CATHETERIZATION  9/16/2021    INGUINAL HERNIA REPAIR Right 1991    IR TUNNELED DIALYSIS CATHETER PLACEMENT  11/14/2024     Social History     Tobacco Use    Smoking status: Every Day     Current packs/day: 0.50     Average packs/day: 0.5 packs/day for 51.0 years (25.5 ttl pk-yrs)     Types: Cigarettes     Start date: 1974    Smokeless tobacco: Never    Tobacco comments:     Began smoking in his early 20s, on average smoking 1+ packs daily. Quit in 08/2021 (Updated 09/15/2021).    Vaping Use    Vaping status: Never Used   Substance Use Topics    Alcohol use: Yes     Alcohol/week: 6.0 standard drinks of alcohol     Types: 6 Shots of liquor per week     Comment: rare    Drug use: Never     E-Cigarette/Vaping    E-Cigarette Use Never User      E-Cigarette/Vaping Substances       Family History: non-contributory    Meds/Allergies   Prior to Admission Medications   Prescriptions Last Dose Informant Patient Reported? Taking?   ALPRAZolam " (XANAX) 0.25 mg tablet   No No   Sig: Take 1 tablet (0.25 mg total) by mouth daily at bedtime as needed for anxiety for up to 5 days   aspirin (ECOTRIN LOW STRENGTH) 81 mg EC tablet 12/14/2024  No Yes   Sig: Take 1 tablet (81 mg total) by mouth daily   carvedilol (COREG) 12.5 mg tablet 12/14/2024  No Yes   Sig: Take 1 tablet (12.5 mg total) by mouth 2 (two) times a day with meals   hydrALAZINE (APRESOLINE) 100 MG tablet 12/14/2024  No Yes   Sig: Take 1 tablet (100 mg total) by mouth every 8 (eight) hours   isosorbide mononitrate (IMDUR) 120 mg 24 hr tablet 12/14/2024  No Yes   Sig: Take 1 tablet (120 mg total) by mouth daily   melatonin 3 mg 12/13/2024 Bedtime  No Yes   Sig: Take 1 tablet (3 mg total) by mouth daily at bedtime   nicotine (NICODERM CQ) 14 mg/24hr TD 24 hr patch Unknown  No No   Sig: Place 1 patch on the skin over 24 hours daily      Facility-Administered Medications: None       No Known Allergies    PHYSICAL EXAM    PE limited by: No limitations    Objective   Vitals:   First set: Temperature: 97.7 °F (36.5 °C) (12/14/24 1620)  Pulse: 73 (12/14/24 1620)  Respirations: 18 (12/14/24 1620)  Blood Pressure: 165/91 (12/14/24 1620)  SpO2: 96 % (12/14/24 1620)    Primary Survey:   (A) Airway: Patent  (B) Breathing: Clear to auscultation bilaterally  (C) Circulation: Pulses:   normal  (D) Disabliity:  GCS Total:  15  (E) Expose:  Completed    Secondary Survey: (Click on Physical Exam tab above)  Physical Exam  Vitals and nursing note reviewed.   Constitutional:       General: He is not in acute distress.     Appearance: Normal appearance. He is well-developed. He is ill-appearing.   HENT:      Head: Normocephalic.      Comments: Ecchymosis to right face, left periorbital ecchymosis     Right Ear: External ear normal.      Left Ear: External ear normal.      Nose: Nose normal.      Mouth/Throat:      Mouth: Mucous membranes are moist.   Eyes:      Extraocular Movements: Extraocular movements intact.       Conjunctiva/sclera: Conjunctivae normal.   Cardiovascular:      Rate and Rhythm: Normal rate and regular rhythm.      Heart sounds: No murmur heard.  Pulmonary:      Effort: Pulmonary effort is normal. No respiratory distress.      Breath sounds: No wheezing, rhonchi or rales.      Comments: + Tachypnea  Chest:      Chest wall: No tenderness.   Abdominal:      General: Abdomen is flat. Bowel sounds are normal. There is no distension.      Palpations: Abdomen is soft. There is no mass.      Tenderness: There is no abdominal tenderness. There is no right CVA tenderness, left CVA tenderness, guarding or rebound.      Hernia: No hernia is present.   Musculoskeletal:         General: No swelling, tenderness, deformity or signs of injury. Normal range of motion.      Cervical back: Normal range of motion and neck supple. No rigidity.      Right lower leg: No edema.      Left lower leg: No edema.   Skin:     General: Skin is warm and dry.      Capillary Refill: Capillary refill takes less than 2 seconds.      Findings: Bruising present.      Comments: Ecchymosis to face and to smaller extent torso and extremities presumed from falling   Neurological:      General: No focal deficit present.      Mental Status: He is alert and oriented to person, place, and time. Mental status is at baseline.   Psychiatric:         Mood and Affect: Mood normal.         Cervical spine cleared by clinical criteria? Yes     Invasive Devices       Peripheral Intravenous Line  Duration             Peripheral IV 12/14/24 Left Antecubital <1 day              Hemodialysis Catheter  Duration             HD Permanent Double Catheter 30 days                    Lab Results:   Results Reviewed       Procedure Component Value Units Date/Time    HS Troponin I 4hr [[id number]] Collected: 12/14/24 2056    Lab Status: In process Specimen: Blood from Arm, Right Updated: 12/14/24 2101    HS Troponin I 2hr [[id number]]  (Abnormal) Collected: 12/14/24 1846    Lab  Status: Final result Specimen: Blood from Arm, Left Updated: 12/14/24 1916     hs TnI 2hr 50 ng/L      Delta 2hr hsTnI -12 ng/L     B-Type Natriuretic Peptide(BNP) [[id number]]  (Abnormal) Collected: 12/14/24 1624    Lab Status: Final result Specimen: Blood from Arm, Left Updated: 12/14/24 1715     BNP >4,700 pg/mL     CBC and differential [[id number]]  (Abnormal) Collected: 12/14/24 1624    Lab Status: Final result Specimen: Blood from Arm, Left Updated: 12/14/24 1703     WBC 4.80 Thousand/uL      RBC 3.57 Million/uL      Hemoglobin 9.6 g/dL      Hematocrit 29.7 %      MCV 83 fL      MCH 26.9 pg      MCHC 32.3 g/dL      RDW 17.2 %      MPV 10.7 fL      Platelets 80 Thousands/uL      nRBC 0 /100 WBCs      Segmented % 80 %      Immature Grans % 1 %      Lymphocytes % 10 %      Monocytes % 8 %      Eosinophils Relative 1 %      Basophils Relative 0 %      Absolute Neutrophils 3.86 Thousands/µL      Absolute Immature Grans 0.03 Thousand/uL      Absolute Lymphocytes 0.46 Thousands/µL      Absolute Monocytes 0.40 Thousand/µL      Eosinophils Absolute 0.04 Thousand/µL      Basophils Absolute 0.01 Thousands/µL     Narrative:      This is an appended report.  These results have been appended to a previously verified report.    Smear Review(Phlebs Do Not Order) [[id number]]  (Abnormal) Collected: 12/14/24 1624    Lab Status: Final result Specimen: Blood from Arm, Left Updated: 12/14/24 1703     RBC Morphology Present     Platelet Estimate Decreased     Acanthocytes Present     Anisocytosis Present     Microcytes Present     Ovalocytes Present     Poikilocytes Present    HS Troponin 0hr (reflex protocol) [[id number]]  (Abnormal) Collected: 12/14/24 1624    Lab Status: Final result Specimen: Blood from Arm, Left Updated: 12/14/24 1700     hs TnI 0hr 62 ng/L     CK [[id number]]  (Normal) Collected: 12/14/24 1624    Lab Status: Final result Specimen: Blood from Arm, Left Updated: 12/14/24 1655     Total CK 57 U/L     Comprehensive  metabolic panel [[id number]]  (Abnormal) Collected: 12/14/24 1624    Lab Status: Final result Specimen: Blood from Arm, Left Updated: 12/14/24 1655     Sodium 127 mmol/L      Potassium 4.2 mmol/L      Chloride 89 mmol/L      CO2 27 mmol/L      ANION GAP 11 mmol/L      BUN 44 mg/dL      Creatinine 5.30 mg/dL      Glucose 99 mg/dL      Calcium 8.4 mg/dL      Corrected Calcium 9.0 mg/dL      AST 21 U/L      ALT 9 U/L      Alkaline Phosphatase 119 U/L      Total Protein 6.8 g/dL      Albumin 3.2 g/dL      Total Bilirubin 1.36 mg/dL      eGFR 10 ml/min/1.73sq m     Narrative:      [employer] guidelines for Chronic Kidney Disease (CKD):     Stage 1 with normal or high GFR (GFR > 90 mL/min/1.73 square meters)    Stage 2 Mild CKD (GFR = 60-89 mL/min/1.73 square meters)    Stage 3A Moderate CKD (GFR = 45-59 mL/min/1.73 square meters)    Stage 3B Moderate CKD (GFR = 30-44 mL/min/1.73 square meters)    Stage 4 Severe CKD (GFR = 15-29 mL/min/1.73 square meters)    Stage 5 End Stage CKD (GFR <15 mL/min/1.73 square meters)  Note: GFR calculation is accurate only with a steady state creatinine    Protime-INR [[id number]]  (Abnormal) Collected: 12/14/24 1624    Lab Status: Final result Specimen: Blood from Arm, Left Updated: 12/14/24 1654     Protime 16.2 seconds      INR 1.27    Narrative:      INR Therapeutic Range    Indication                                             INR Range      Atrial Fibrillation                                               2.0-3.0  Hypercoagulable State                                    2.0.2.3  Left Ventricular Asist Device                            2.0-3.0  Mechanical Heart Valve                                  -    Aortic(with afib, MI, embolism, HF, LA enlargement,    and/or coagulopathy)                                     2.0-3.0 (2.5-3.5)     Mitral                                                             2.5-3.5  Prosthetic/Bioprosthetic Heart Valve                2.0-3.0  Venous thromboembolism (VTE: VT, PE        2.0-3.0    APTT [[id number]]  (Normal) Collected: 12/14/24 1624    Lab Status: Final result Specimen: Blood from Arm, Left Updated: 12/14/24 1654     PTT 30 seconds     Blood gas, venous [[id number]]  (Abnormal) Collected: 12/14/24 1624    Lab Status: Final result Specimen: Blood from Arm, Left Updated: 12/14/24 1651     pH, Con 7.359     pCO2, Con 45.9 mm Hg      pO2, Con 45.6 mm Hg      HCO3, Con 25.3 mmol/L      Base Excess, Con -0.4 mmol/L      O2 Content, Con 11.8 ml/dL      O2 HGB, VENOUS 76.5 %                    Imaging Studies:   Direct to CT: Yes  TRAUMA - CT chest abdomen pelvis w contrast   Final Result by Galen Griffith MD (12/14 1740)      No findings of acute traumatic injury in the chest, abdomen or pelvis.      Moderate to large bilateral pleural effusions. Cardiomegaly.      Small ascites and diffuse body wall edema consistent with fluid overload/3rd spacing of fluids.      Chronic abdominal aortoiliac occlusion noted.      The study was marked in EPIC for immediate notification.         Workstation performed: [id number]         TRAUMA - CT head wo contrast   Final Result by Galen Griffith MD (12/14 1718)      No acute intracranial abnormality.      Left periorbital soft tissue swelling. See  CT facial bone study for further details.      The study was marked in EPIC for immediate notification.                  Workstation performed: [id number]         TRAUMA - CT spine cervical wo contrast   Final Result by Galen Griffith MD (12/14 1720)      No cervical spine fracture or traumatic malalignment.      Partially imaged bilateral pleural effusions.      The study was marked in EPIC for immediate notification.            Workstation performed: [id number]         TRAUMA - CT facial bones wo contrast   Final Result by Galen Griffith MD (12/14 9693)      Diffuse left periorbital soft tissue swelling. No evidence of facial  bone fracture.      The study was marked in EPIC for immediate notification.               Workstation performed: [id number]         XR Trauma chest portable   Final Result by Galen Griffith MD (12/14 1741)      Vascular congestion and moderate bilateral pleural effusions.            Workstation performed: [id number]         XR Trauma pelvis ap only 1 or 2 vw   Final Result by Galen Griffith MD (12/14 1742)      No acute osseous abnormality.         Computerized Assisted Algorithm (CAA) may have been used to analyze all applicable images.         Workstation performed: [id number]               Procedures  Procedures         ED Course  ED Course as of 12/14/24 2120   Sat Dec 14, 2024   1904 Case was discussed with the hospitalist as well as nephrologist.  Patient will be dialyzed tomorrow.           Medical Decision Making  Differential diagnosis includes but not limited to: Intracranial bleed, skull fracture, facial fracture, concussion, contusion, chronic renal failure, fluid overload    Amount and/or Complexity of Data Reviewed  Labs: ordered.  Radiology: ordered.    Risk  Prescription drug management.  Decision regarding hospitalization.                Disposition  [facility] Transfer: No  Final diagnoses:   Stage 5 chronic kidney disease on chronic dialysis (HCC)     Time reflects when diagnosis was documented in both MDM as applicable and the Disposition within this note       Time User Action Codes Description Comment    12/14/2024  7:03 PM Shauna Mohan Add [N18.6,  Z99.2] Stage 5 chronic kidney disease on chronic dialysis (HCC)     12/14/2024  7:50 PM David Meraz Add [I50.23] Acute on chronic systolic heart failure (HCC)           ED Disposition       ED Disposition   Admit    Condition   Stable    Date/Time   Sat Dec 14, 2024  7:04 PM    Comment                  Follow-up Information    None       Current Discharge Medication List        CONTINUE these medications which have NOT CHANGED     Details   aspirin (ECOTRIN LOW STRENGTH) 81 mg EC tablet Take 1 tablet (81 mg total) by mouth daily  Qty: 30 tablet, Refills: 0    Associated Diagnoses: CHF (congestive heart failure) (HCC); Dilated cardiomyopathy (HCC); Acute on chronic systolic heart failure (HCC); Nonischemic cardiomyopathy (HCC)      carvedilol (COREG) 12.5 mg tablet Take 1 tablet (12.5 mg total) by mouth 2 (two) times a day with meals  Qty: 60 tablet, Refills: 0    Associated Diagnoses: CHF (congestive heart failure) (HCC); Dilated cardiomyopathy (HCC); Acute on chronic systolic heart failure (HCC); Nonischemic cardiomyopathy (HCC)      hydrALAZINE (APRESOLINE) 100 MG tablet Take 1 tablet (100 mg total) by mouth every 8 (eight) hours  Qty: 90 tablet, Refills: 0    Associated Diagnoses: CHF (congestive heart failure) (HCC); Dilated cardiomyopathy (HCC); Acute on chronic systolic heart failure (HCC); Nonischemic cardiomyopathy (HCC)      isosorbide mononitrate (IMDUR) 120 mg 24 hr tablet Take 1 tablet (120 mg total) by mouth daily  Qty: 30 tablet, Refills: 0    Associated Diagnoses: CHF (congestive heart failure) (HCC); Dilated cardiomyopathy (HCC); Acute on chronic systolic heart failure (HCC); Nonischemic cardiomyopathy (HCC)      melatonin 3 mg Take 1 tablet (3 mg total) by mouth daily at bedtime  Qty: 30 tablet, Refills: 0    Associated Diagnoses: Anxiety      ALPRAZolam (XANAX) 0.25 mg tablet Take 1 tablet (0.25 mg total) by mouth daily at bedtime as needed for anxiety for up to 5 days  Qty: 5 tablet, Refills: 0    Associated Diagnoses: Anxiety      nicotine (NICODERM CQ) 14 mg/24hr TD 24 hr patch Place 1 patch on the skin over 24 hours daily  Qty: 28 patch, Refills: 0    Associated Diagnoses: Tobacco abuse           No discharge procedures on file.    PDMP Review       None            ED Provider  Electronically Signed by           Shauna Mohan DO  12/14/24 [id number]

## 2024-12-15 ENCOUNTER — APPOINTMENT (INPATIENT)
Dept: DIALYSIS | Facility: HOSPITAL | Age: 69
DRG: 291 | End: 2024-12-15
Attending: INTERNAL MEDICINE
Payer: COMMERCIAL

## 2024-12-15 PROBLEM — N25.81 SECONDARY HYPERPARATHYROIDISM OF RENAL ORIGIN (HCC): Status: ACTIVE | Noted: 2024-12-15

## 2024-12-15 LAB
ANION GAP SERPL CALCULATED.3IONS-SCNC: 10 MMOL/L (ref 4–13)
ATRIAL RATE: 83 BPM
BUN SERPL-MCNC: 49 MG/DL (ref 5–25)
CALCIUM SERPL-MCNC: 8.4 MG/DL (ref 8.4–10.2)
CHLORIDE SERPL-SCNC: 89 MMOL/L (ref 96–108)
CO2 SERPL-SCNC: 27 MMOL/L (ref 21–32)
CREAT SERPL-MCNC: 5.7 MG/DL (ref 0.6–1.3)
ERYTHROCYTE [DISTWIDTH] IN BLOOD BY AUTOMATED COUNT: 17.2 % (ref 11.6–15.1)
GFR SERPL CREATININE-BSD FRML MDRD: 9 ML/MIN/1.73SQ M
GLUCOSE SERPL-MCNC: 102 MG/DL (ref 65–140)
HCT VFR BLD AUTO: 27 % (ref 36.5–49.3)
HGB BLD-MCNC: 8.8 G/DL (ref 12–17)
MAGNESIUM SERPL-MCNC: 2 MG/DL (ref 1.9–2.7)
MCH RBC QN AUTO: 27.1 PG (ref 26.8–34.3)
MCHC RBC AUTO-ENTMCNC: 32.6 G/DL (ref 31.4–37.4)
MCV RBC AUTO: 83 FL (ref 82–98)
P AXIS: 31 DEGREES
PLATELET # BLD AUTO: 82 THOUSANDS/UL (ref 149–390)
PMV BLD AUTO: 10.7 FL (ref 8.9–12.7)
POTASSIUM SERPL-SCNC: 4.5 MMOL/L (ref 3.5–5.3)
PR INTERVAL: 110 MS
QRS AXIS: -29 DEGREES
QRSD INTERVAL: 100 MS
QT INTERVAL: 418 MS
QTC INTERVAL: 491 MS
RBC # BLD AUTO: 3.25 MILLION/UL (ref 3.88–5.62)
SODIUM SERPL-SCNC: 126 MMOL/L (ref 135–147)
T WAVE AXIS: 108 DEGREES
VENTRICULAR RATE: 83 BPM
WBC # BLD AUTO: 6.01 THOUSAND/UL (ref 4.31–10.16)

## 2024-12-15 PROCEDURE — 5A1D70Z PERFORMANCE OF URINARY FILTRATION, INTERMITTENT, LESS THAN 6 HOURS PER DAY: ICD-10-PCS | Performed by: INTERNAL MEDICINE

## 2024-12-15 PROCEDURE — 99223 1ST HOSP IP/OBS HIGH 75: CPT | Performed by: INTERNAL MEDICINE

## 2024-12-15 PROCEDURE — 85027 COMPLETE CBC AUTOMATED: CPT | Performed by: FAMILY MEDICINE

## 2024-12-15 PROCEDURE — 99232 SBSQ HOSP IP/OBS MODERATE 35: CPT | Performed by: FAMILY MEDICINE

## 2024-12-15 PROCEDURE — 87081 CULTURE SCREEN ONLY: CPT | Performed by: FAMILY MEDICINE

## 2024-12-15 PROCEDURE — 93010 ELECTROCARDIOGRAM REPORT: CPT | Performed by: INTERNAL MEDICINE

## 2024-12-15 PROCEDURE — 90935 HEMODIALYSIS ONE EVALUATION: CPT | Performed by: INTERNAL MEDICINE

## 2024-12-15 PROCEDURE — 97163 PT EVAL HIGH COMPLEX 45 MIN: CPT

## 2024-12-15 PROCEDURE — 80048 BASIC METABOLIC PNL TOTAL CA: CPT | Performed by: FAMILY MEDICINE

## 2024-12-15 PROCEDURE — 83735 ASSAY OF MAGNESIUM: CPT | Performed by: FAMILY MEDICINE

## 2024-12-15 RX ORDER — HYDRALAZINE HYDROCHLORIDE 20 MG/ML
10 INJECTION INTRAMUSCULAR; INTRAVENOUS EVERY 6 HOURS PRN
Status: DISCONTINUED | OUTPATIENT
Start: 2024-12-15 | End: 2024-12-15

## 2024-12-15 RX ADMIN — HYDRALAZINE HYDROCHLORIDE 100 MG: 25 TABLET ORAL at 05:48

## 2024-12-15 RX ADMIN — CARVEDILOL 12.5 MG: 12.5 TABLET, FILM COATED ORAL at 10:25

## 2024-12-15 RX ADMIN — Medication 3 MG: at 21:07

## 2024-12-15 RX ADMIN — HYDRALAZINE HYDROCHLORIDE 100 MG: 25 TABLET ORAL at 16:49

## 2024-12-15 RX ADMIN — CARVEDILOL 12.5 MG: 12.5 TABLET, FILM COATED ORAL at 16:49

## 2024-12-15 RX ADMIN — ASPIRIN 81 MG: 81 TABLET, COATED ORAL at 10:25

## 2024-12-15 RX ADMIN — HYDRALAZINE HYDROCHLORIDE 10 MG: 20 INJECTION INTRAMUSCULAR; INTRAVENOUS at 04:49

## 2024-12-15 RX ADMIN — ISOSORBIDE MONONITRATE 120 MG: 60 TABLET, EXTENDED RELEASE ORAL at 10:25

## 2024-12-15 RX ADMIN — HYDRALAZINE HYDROCHLORIDE 100 MG: 25 TABLET ORAL at 21:08

## 2024-12-15 RX ADMIN — FUROSEMIDE 160 MG: 10 INJECTION, SOLUTION INTRAMUSCULAR; INTRAVENOUS at 03:50

## 2024-12-15 RX ADMIN — HYDRALAZINE HYDROCHLORIDE 10 MG: 20 INJECTION INTRAMUSCULAR; INTRAVENOUS at 10:32

## 2024-12-15 NOTE — QUICK NOTE
Discussed care with ER. Patient last HD noted to be on 12/12 from outpatient records. Records show high weight gain and being significantly above TW. Not given diuretic in outpatient setting due to little UOP. Patient noncompliant with FR/Na restriction. He does have reduced EF.  Patient currently on room air. Discussed with ER provider who feels patient stable for HD in AM. Will arrange HD in early AM, discussed with HD RN.     If patient has worsening respiratory status, please contact nephrology service.

## 2024-12-15 NOTE — ASSESSMENT & PLAN NOTE
Wt Readings from Last 3 Encounters:   12/15/24 73.9 kg (162 lb 14.7 oz)   11/20/24 55.2 kg (121 lb 12.8 oz)   09/03/24 55.3 kg (122 lb)     Continue Ultrafiltration with HD.Need cardiology follow up in outpatient setting.

## 2024-12-15 NOTE — ASSESSMENT & PLAN NOTE
Patient states he fell at home, has bruising on his face  Will consult PT/OT/case management  He states he sometimes walks with an assistive device  Will likely require short-term rehab

## 2024-12-15 NOTE — PHYSICAL THERAPY NOTE
PHYSICAL THERAPY EVALUATION  NAME:  Matthew Alvarez  DATE: 12/15/24    AGE:   69 y.o.  Mrn:   [id number]  ADMIT DX:  Acute on chronic systolic heart failure (HCC) [I50.23]  Stage 5 chronic kidney disease on chronic dialysis ([facility]) [N18.6, Z99.2]  Unspecified multiple injuries, initial encounter [T07.XXXA]    Past Medical History:   Diagnosis Date    CAD (coronary artery disease)     HFrEF (heart failure with reduced ejection fraction) ([facility]) 09/2021    Hypertension     Rheumatoid factor positive 09/2021    Stage 3 chronic kidney disease ([facility])     Tobacco abuse      Length Of Stay: 1  Performed at least 2 patient identifiers during session: Name and Birthday  PHYSICAL THERAPY EVALUATION :    12/15/24 1122   PT Last Visit   PT Visit Date 12/15/24   Note Type   Note type Evaluation   Pain Assessment   Pain Assessment Tool 0-10   Pain Score No Pain   Restrictions/Precautions   Other Precautions Chair Alarm;Bed Alarm;Fall Risk  (Life Vest)   Home Living   Type of Home House  (1 BRODY)   Home Layout Two level;Bed/bath upstairs;1/2 bath on main level;Performs ADLs on one level  (FF L HR)   Bathroom Shower/Tub Walk-in shower  (has been sponge bathing. hasn't been upstairs for 2 weeks)   Bathroom Toilet Standard   Bathroom Equipment Shower chair   Home Equipment   (spouse has a walker and a wheelchair-pt reports was using spouses RW PTA.)   Additional Comments pt reports living in a 2  with 1 BRODY and sleeping on 1st floor, sponge bathing. Reports he has been using spouses RW PTA for last 2 weeks but prior wasn't using an AD. Pt is questionable historian. Per CM who spoke with family, Patient lives with son and spouse in 2 story home. Patient sleeps on sofa or in WC on first floor of home. 1/2 bath located on first floor. Patient requires son's assistance to go up stairs for shower and this does not happen often.  Son reports his uncle, [employer], provides transport for patient to and from dialysis M,W,F.   Prior  "Function   Level of [place] Independent with ADLs;Independent with functional mobility;Independent with IADLS   Lives With Spouse  (son)   Receives Help From Family   IADLs Independent with driving;Independent with medication management;Family/Friend/Other provides meals   Falls in the last 6 months 1 to 4  (2 falls)   Comments Reports being independent with mobility, ADLs and IADLs.   General   Additional Pertinent History Pt was recently admitted 11/10/24 to 11/20/24 with acute on chronic heart failure, uncontrolled HTN, DAVID on CKD with new HD last admission. Pt now returns post fall. Per notes, wears life vest \"when he wants to\". Pt with significant B LE edema, Moderate to large bilateral pleural effusions and Small ascites and diffuse body wall edema consistent with fluid overload/3rd spacing of fluids  and Chronic abdominal aortoiliac occlusion on CT chest. Pt with significant echhymosis face and neck.   Cognition   Arousal/Participation Lethargic  (arousable)   Orientation Level Oriented X4   Following Commands Follows one step commands with increased time or repetition   Comments pt lethargic lying supine, but answering questions after repetition. sitting EOB with increased alertness and response to questions.   Subjective   Subjective \"I am tired.\"   RLE Assessment   RLE Assessment   (WFL except hip flexion to 90 deg, strength ankle 3+/5, knee 3-/5, hip 2/5)   LLE Assessment   LLE Assessment   (WFL except hip flexion to 90 deg, strength ankle 3+/5, knee 3-/5, hip 2/5)   Light Touch   RLE Light Touch Impaired   RLE Light Touch Comments absent distally midcalf and down   LLE Light Touch Impaired   LLE Light Touch Comments absent distally midcalf and down   Bed Mobility   Supine to Sit 4  Minimal assistance   Additional items Assist x 1;Increased time required;Verbal cues  (trunk management)   Additional Comments HOB elevated 30 degrees. minAx1 to ahcieve sitting on EOB with manual cues for trunk " "management.   Transfers   Sit to Stand   (steadying assistance)   Additional items Assist x 1;Increased time required;Verbal cues   Stand to Sit   (steadying assistance)   Additional items Increased time required;Verbal cues   Stand pivot 4  Minimal assistance   Additional items Assist x 1;Increased time required;Verbal cues   Additional Comments RW. pt pulling from RW despite verbal cues for hand placement for safety. sit<>stand with steadying assistance and spt wiht minAx1 for balance and safe completion.   Ambulation/Elevation   Gait pattern Wide MELISSA;Improper Weight shift;Decreased foot clearance;Short stride;Excessively slow   Gait Assistance 4  Minimal assist  (to steadying assistance at best)   Additional items Assist x 1;Verbal cues   Assistive Device Rolling walker   Distance ambulated 14'x1 with RW with minimal to steadying assistance at best with manual cues for balance and verbal cues for inc step length and staying wihtin MELISSA of RW. pt declined further ambulation due to fatigue.   Balance   Static Sitting Fair +   Dynamic Sitting Fair   Static Standing Fair -   Dynamic Standing Poor +   Ambulatory Poor +   Endurance Deficit   Endurance Deficit Yes   Endurance Deficit Description quick onset fatigue   Activity Tolerance   Activity Tolerance Patient limited by fatigue   Nurse Made Aware Kia CHACON. pt with life vest present (battery appeared off). Requested RN to assess Life Vest. Rn changed battery and turned life vest on. RN reaching out to physician to determine whether Life Vest is to be on or off during admission. pt is on telemetry.   Assessment   Prognosis Good   Problem List Decreased strength;Decreased endurance;Impaired balance;Decreased mobility;Impaired judgement;Decreased safety awareness;Decreased skin integrity   Barriers to Discharge Decreased caregiver support;Inaccessible home environment   Goals   Patient Goals \"Go home\"   [facility] Expiration Date 12/29/24   PT Treatment Day 0   Plan "   Treatment/Interventions ADL retraining;Functional transfer training;LE strengthening/ROM;Elevations;Therapeutic exercise;Endurance training;Patient/family training;Equipment eval/education;Gait training;Bed mobility;Compensatory technique education;Spoke to nursing;Spoke to case management;OT   PT Frequency 3-5x/wk   Discharge Recommendation   Rehab Resource Intensity Level, PT II (Moderate Resource Intensity)  (PAR)   Additional Comments discussed recommendation for PAR. patient isn't interested however did verbalize understanding after education.   AM-PAC Basic Mobility Inpatient   Turning in Flat Bed Without Bedrails 3   Lying on Back to Sitting on Edge of Flat Bed Without Bedrails 2   Moving Bed to Chair 3   Standing Up From Chair Using Arms 3   Walk in Room 3   Climb 3-5 Stairs With Railing 1   Basic Mobility Inpatient Raw Score 15   Basic Mobility Standardized Score 36.97   [facility] Level Of Mobility   -HL Goal 4: Move to chair/commode   -HL Achieved 6: Walk 10 steps or more   End of Consult   Patient Position at End of Consult Bedside chair;Bed/Chair alarm activated;All needs within reach       (Please find full objective findings from PT assessment regarding body systems outlined above).     Assessment: Pt is a 69 y.o. male seen for PT evaluation s/p admission to [facility] on 12/14/2024 with Acute on chronic systolic heart failure (HCC).  Order placed for PT services.  Upon evaluation: Pt is presenting with impaired functional mobility due to decreased strength, decreased endurance, impaired balance, gait deviations, decreased safety awareness, impaired judgment, fall risk, LE edema, and impaired skin integrity requiring  minimal assistance for bed mobility, steadying to minimal assistance for transfers, and minimal assistance for ambulation with RW . Pt's clinical presentation is currently unpredictable given the functional mobility deficits above, especially  weakness, edema of extremities, decreased skin integrity, decreased endurance, impaired balance, gait deviations, decreased activity tolerance, decreased functional mobility tolerance, and decreased safety awareness, coupled with fall risks as indicated by AM-PAC 6-Clicks: 15/24 as well as hx of falls, impaired balance, polypharmacy, decreased safety awareness, and limited sensation/neuropathy and combined with medical complications of abnormal renal lab values, abnormal H&H, abnormal sodium values, multiple readmissions, need for input for mobility technique/safety, and abnormal platelets, abnormal BNP, acute CHF, uncontrolled HTN on admission .  Pt's PMHx and comorbidities that may affect physical performance and progress include: CAD, HTN, and nonischemic cardiomyopathy with EF 20%, ESRD on HD, CHF, Rheumatoid factor positive . Personal factors affecting pt at time of IE include: hx of non-compliance, inaccessible home environment, step(s) to enter environment, multi-level environment, inability to perform IADLs, inability to perform ADLs, inability to navigate level surfaces without external assistance, inability to ambulate household distances, limited insight into impairments, and recent fall(s)/fall history. Pt will benefit from continued skilled PT services to address deficits as defined above and to maximize level of functional mobility to facilitate return toward PLOF and improved QOL. From PT/mobility standpoint, recommendation at time of d/c would be Level II (Moderate Resource Intensity in order to reduce fall risk and maximize pt's functional independence and consistency with mobility. Recommend trial with walker next 1-2 sessions and ther ex next 1-2 sessions.       The patient's AM-[facility] Basic Mobility Inpatient Short Form Raw Score is 15. A Raw score of less than or equal to 16 suggests the patient may benefit from discharge to post-acute rehabilitation services. Please also refer to the  recommendation of the Physical Therapist for safe discharge planning.       Goals: Pt will: Perform bed mobility tasks with modified Independent to reposition in bed and prepare for transfers. Pt will perform transfers with modified Independent to decrease burden of care, decrease risk for falls, and improve activity tolerance and prepare for ambulation. Pt will ambulate with RW or LRAD for >/= 75' with  modified Independent  to decrease burden of care, decrease risk for falls, improve activity tolerance, and improve gait quality and to access home environment. Pt will complete 1 step with LRAD and >/= 12 steps with unilateral handrail with stand by assistance to return to home with BRODY, return to multilevel home, decrease risk for falls, and improve activity tolerance. Pt will participate in objective balance assessment to determine baseline fall risk. Pt will participate in SSWS assessment to determine level of mobility. Pt will increase B LE strength >/= 1/2 MMT grade to facilitate functional mobility.      Maral Morris, PT,DPT

## 2024-12-15 NOTE — NURSING NOTE
"Pt refusing to stand for morning weights. Educated pt on importance of having accurate weight for medical treatment, pt still refusing stating \"I just don't want too\"  "

## 2024-12-15 NOTE — ASSESSMENT & PLAN NOTE
Noncompliant with his home medications  Restarted oral hydralazine, Coreg  As needed intravenous hydralazine/labetalol as needed  Patient also significantly volume overloaded upon presentation which is likely a significant contributing factor

## 2024-12-15 NOTE — ASSESSMENT & PLAN NOTE
Severe volume overload   Will need dialysis tomorrow  Does not make urine     Wt Readings from Last 3 Encounters:   11/20/24 55.2 kg (121 lb 12.8 oz)   09/03/24 55.3 kg (122 lb)   02/05/24 56 kg (123 lb 6.4 oz)

## 2024-12-15 NOTE — H&P
H&P - Hospitalist   Name: Matthew Alvarez 69 y.o. male I MRN: [id number]  Unit/Bed#: ED 12 I Date of Admission: 12/14/2024   Date of Service: 12/14/2024 I Hospital Day: 0     Assessment & Plan  Acute on chronic systolic heart failure (HCC)  Severe volume overload   Will need dialysis tomorrow  Does not make urine     Wt Readings from Last 3 Encounters:   11/20/24 55.2 kg (121 lb 12.8 oz)   09/03/24 55.3 kg (122 lb)   02/05/24 56 kg (123 lb 6.4 oz)     ESRD (end stage renal disease) on dialysis (HCC)  Dialysis, Monday, Wednesday, Friday.  Last dialysis, December 12, 2024.  Nephrology consulted, dialysis tomorrow      Lab Results   Component Value Date    EGFR 10 12/14/2024    EGFR 11 11/20/2024    EGFR 10 11/19/2024    CREATININE 5.30 (H) 12/14/2024    CREATININE 4.69 (H) 11/20/2024    CREATININE 5.15 (H) 11/19/2024     Nonischemic cardiomyopathy (HCC)  November 11, 2024: Echo, EF of 20-24%  Noncompliant with medications  Uncontrolled hypertension  Noncompliant with his home medications  Restarted hydralazine, Coreg  Ambulatory dysfunction  Patient states he fell at home, has bruising on his face  Will consult PT/OT/case management  He states he sometimes walks with an assistive device  Coronary artery disease involving native coronary artery of native heart without angina pectoris  Continue aspirin      Disposition  #1  Restart blood pressure medications  #2  Consulted nephrology for hemodialysis  #3  PT/OT/case management consultation  #4  Repeat labs in the morning        VTE Pharmacologic Prophylaxis: VTE Score: 5 High Risk (Score >/= 5) - Pharmacological DVT Prophylaxis Ordered: heparin. Sequential Compression Devices Ordered.  Code Status: Level 3 - DNAR and DNI       Anticipated Length of Stay: Patient will be admitted on an inpatient basis with an anticipated length of stay of greater than 2 midnights secondary to acute on chronic CHF.    History of Present Illness   Chief Complaint: Nellie Alvarez is  a 69 y.o. male with a PMH of end-stage renal disease on dialysis Monday, Wednesday, Friday, nonischemic cardiomyopathy with EF 20%, uncontrolled hypertension who presents with fall 2 days ago while trying out of the bathroom and melanite.  Patient went to the dialysis center today and was encouraged to come to the ER due to traumatic injury and bruising on his face.  He also has severe edema all the way up to his thighs.  Patient is not compliant with his blood pressure medications, and his diet.  Patient has been having trouble at home.  He states that he has no help as his wife has a history of strokes.    Review of Systems   Constitutional:  Positive for fatigue.   HENT: Negative.     Respiratory:  Positive for shortness of breath.    Cardiovascular:  Positive for leg swelling.   Gastrointestinal: Negative.    Musculoskeletal:  Positive for arthralgias and gait problem.   Neurological:  Positive for weakness.       Historical Information   Past Medical History:   Diagnosis Date    CAD (coronary artery disease)     HFrEF (heart failure with reduced ejection fraction) ([facility]) 09/2021    Hypertension     Rheumatoid factor positive 09/2021    Stage 3 chronic kidney disease (HCC)     Tobacco abuse      Past Surgical History:   Procedure Laterality Date    APPENDECTOMY  1965    CARDIAC CATHETERIZATION  9/16/2021    INGUINAL HERNIA REPAIR Right 1991    IR TUNNELED DIALYSIS CATHETER PLACEMENT  11/14/2024     Social History     Tobacco Use    Smoking status: Every Day     Current packs/day: 0.50     Average packs/day: 0.5 packs/day for 51.0 years (25.5 ttl pk-yrs)     Types: Cigarettes     Start date: 1974    Smokeless tobacco: Never    Tobacco comments:     Began smoking in his early 20s, on average smoking 1+ packs daily. Quit in 08/2021 (Updated 09/15/2021).    Vaping Use    Vaping status: Never Used   Substance and Sexual Activity    Alcohol use: Yes     Alcohol/week: 6.0 standard drinks of alcohol     Types: 6 Shots of  liquor per week     Comment: rare    Drug use: Never    Sexual activity: Not on file     Comment: defer     E-Cigarette/Vaping    E-Cigarette Use Never User      E-Cigarette/Vaping Substances     Family history non-contributory  Social History:  Marital Status: /Civil Union   Occupation: Retired  Patient Pre-hospital Living Situation: Home  Patient Pre-hospital Level of Mobility: walks with walker  Patient Pre-hospital Diet Restrictions: Sodium restriction    Meds/Allergies   I have reviewed home medications with patient personally.  Prior to Admission medications    Medication Sig Start Date End Date Taking? Authorizing Provider   ALPRAZolam (XANAX) 0.25 mg tablet Take 1 tablet (0.25 mg total) by mouth daily at bedtime as needed for anxiety for up to 5 days 11/20/24 11/25/24  Amy Rodriguez MD   aspirin (ECOTRIN LOW STRENGTH) 81 mg EC tablet Take 1 tablet (81 mg total) by mouth daily 12/10/24   YUE Griffin   carvedilol (COREG) 12.5 mg tablet Take 1 tablet (12.5 mg total) by mouth 2 (two) times a day with meals 12/10/24   YUE Griffin   hydrALAZINE (APRESOLINE) 100 MG tablet Take 1 tablet (100 mg total) by mouth every 8 (eight) hours 12/10/24   YUE Griffin   isosorbide mononitrate (IMDUR) 120 mg 24 hr tablet Take 1 tablet (120 mg total) by mouth daily 12/10/24   YUE Griffin   melatonin 3 mg Take 1 tablet (3 mg total) by mouth daily at bedtime 11/20/24   Amy Rodriguez MD   nicotine (NICODERM CQ) 14 mg/24hr TD 24 hr patch Place 1 patch on the skin over 24 hours daily 11/21/24   Amy Rodriguez MD     No Known Allergies    Objective :  Temp:  [97.7 °F (36.5 °C)] 97.7 °F (36.5 °C)  HR:  [73-82] 80  BP: (165-188)/() 188/109  Resp:  [16-18] 16  SpO2:  [95 %-96 %] 95 %  O2 Device: None (Room air)    Physical Exam  Constitutional:       Appearance: He is ill-appearing.   HENT:      Head: Normocephalic and atraumatic.      Nose: Nose normal.      Mouth/Throat:      Mouth:  Mucous membranes are dry.      Pharynx: Oropharynx is clear.   Eyes:      Extraocular Movements: Extraocular movements intact.      Conjunctiva/sclera: Conjunctivae normal.   Cardiovascular:      Rate and Rhythm: Normal rate and regular rhythm.      Pulses: Normal pulses.      Heart sounds: Normal heart sounds.   Pulmonary:      Breath sounds: Rhonchi present.   Abdominal:      General: There is no distension.      Palpations: Abdomen is soft.      Tenderness: There is no abdominal tenderness.   Musculoskeletal:         General: Swelling present.      Right lower leg: Edema present.      Left lower leg: Edema present.   Skin:     Findings: Bruising present.      Comments: Bruising on face   Neurological:      General: No focal deficit present.      Mental Status: He is alert and oriented to person, place, and time. Mental status is at baseline.   Psychiatric:         Mood and Affect: Mood normal.         Behavior: Behavior normal.         Thought Content: Thought content normal.                 Lab Results: I have reviewed the following results:  Results from last 7 days   Lab Units 12/14/24  1624   WBC Thousand/uL 4.80   HEMOGLOBIN g/dL 9.6*   HEMATOCRIT % 29.7*   PLATELETS Thousands/uL 80*   SEGS PCT % 80*   LYMPHO PCT % 10*   MONO PCT % 8   EOS PCT % 1     Results from last 7 days   Lab Units 12/14/24  1624   SODIUM mmol/L 127*   POTASSIUM mmol/L 4.2   CHLORIDE mmol/L 89*   CO2 mmol/L 27   BUN mg/dL 44*   CREATININE mg/dL 5.30*   ANION GAP mmol/L 11   CALCIUM mg/dL 8.4   ALBUMIN g/dL 3.2*   TOTAL BILIRUBIN mg/dL 1.36*   ALK PHOS U/L 119*   ALT U/L 9   AST U/L 21   GLUCOSE RANDOM mg/dL 99     Results from last 7 days   Lab Units 12/14/24  1624   INR  1.27*         Lab Results   Component Value Date    HGBA1C 5.7 (H) 09/15/2021           Imaging Results Review: I reviewed radiology reports from this admission including: CT chest, CT abdomen/pelvis, CT head, and CT C-spine.  Other Study Results Review: No additional  pertinent studies reviewed.    Administrative Statements     Medical decision making: High  Diagnosis addressed: Severe exacerbation of chronic systolic heart failure  Data:   Reviewed  CBC, CMP, VBG, troponin, BNP, INR, CT of the head, CT of the C-spine, CT of the chest, CT of the abdomen  Ordered CBC, BMP,  Reviewed external notes from previous admission and cardiology  Discussion of management with ER provider: Given Ativan, does not make urine           Risk:  Prescription drug management: Restart high blood pressure medications,  Discussion for hospitalization with ER provider/nephrology: Patient does not make urine, will need dialysis tomorrow  CODE STATUS: Discussed with patient, he would like to be a DNR    ** Please Note: This note has been constructed using a voice recognition system. **

## 2024-12-15 NOTE — PLAN OF CARE
Problem: CARDIOVASCULAR - ADULT  Goal: Maintains optimal cardiac output and hemodynamic stability  Description: INTERVENTIONS:  - Monitor I/O, vital signs and rhythm  - Monitor for S/S and trends of decreased cardiac output  - Administer and titrate ordered vasoactive medications to optimize hemodynamic stability  - Assess quality of pulses, skin color and temperature  - Assess for signs of decreased coronary artery perfusion  - Instruct patient to report change in severity of symptoms  Reactivated     Problem: RESPIRATORY - ADULT  Goal: Achieves optimal ventilation and oxygenation  Description: INTERVENTIONS:  - Assess for changes in respiratory status  - Assess for changes in mentation and behavior  - Position to facilitate oxygenation and minimize respiratory effort  - Oxygen administered by appropriate delivery if ordered  - Initiate smoking cessation education as indicated  - Encourage broncho-pulmonary hygiene including cough, deep breathe, Incentive Spirometry  - Assess the need for suctioning and aspirate as needed  - Assess and instruct to report SOB or any respiratory difficulty  - Respiratory Therapy support as indicated  Reactivated     Problem: MUSCULOSKELETAL - ADULT  Goal: Maintain or return mobility to safest level of function  Description: INTERVENTIONS:  - Assess patient's ability to carry out ADLs; assess patient's baseline for ADL function and identify physical deficits which impact ability to perform ADLs (bathing, care of mouth/teeth, toileting, grooming, dressing, etc.)  - Assess/evaluate cause of self-care deficits   - Assess range of motion  - Assess patient's mobility  - Assess patient's need for assistive devices and provide as appropriate  - Encourage maximum independence but intervene and supervise when necessary  - Involve family in performance of ADLs  - Assess for home care needs following discharge   - Consider OT consult to assist with ADL evaluation and planning for discharge  -  Provide patient education as appropriate  Reactivated

## 2024-12-15 NOTE — ASSESSMENT & PLAN NOTE
Currently dialyzing MWF at [facility] under care of Dr Redman.Etiology of DAVID 2/2 cardiorenal syndrome. First dialysis start date as inpatient 11/13.  Access [facility], no evidence of infection.    Last HD 12/12 as outpatient.    Will arrange for HD today, UF 3 L. High weight gain noted. EDW 60.0. Weight 73.9 KG on admission. Appears severely hypervolemic.   Plan for daily HD to manage volume status.  Need dietary restriction to minimize weight gain. UOP minimal so diuretic likely futile.

## 2024-12-15 NOTE — ASSESSMENT & PLAN NOTE
Dialysis, Monday, Wednesday, Friday.  Last dialysis, December 12, 2024.  Nephrology consulted, dialysis tomorrow      Lab Results   Component Value Date    EGFR 10 12/14/2024    EGFR 11 11/20/2024    EGFR 10 11/19/2024    CREATININE 5.30 (H) 12/14/2024    CREATININE 4.69 (H) 11/20/2024    CREATININE 5.15 (H) 11/19/2024

## 2024-12-15 NOTE — CONSULTS
NEPHROLOGY HOSPITAL CONSULTATION   Matthew Alvarez 69 y.o. male MRN: [id number]  Unit/Bed#: -01 Encounter: [id number]    Brief History of Admission - 70 yo male  with DAVID now dialysis dependent new start in November, non ischemic cardiomyopathy with reduced EF 20% and uncontrolled HTN presented sp fall 2 days ago while attempting to get out of bathroom. He said the room was dark and lead to fall. Last HD tx 12/12. Outpatient dialysis records show large fluid gains.    Assessment & Plan  Acute hemodialysis patient (HCC)  Currently dialyzing MWF at [facility] under care of Dr Redman.Etiology of DAVID 2/2 cardiorenal syndrome. First dialysis start date as inpatient 11/13.  Access [facility], no evidence of infection.    Last HD 12/12 as outpatient.    Will arrange for HD today, UF 3 L. High weight gain noted. EDW 60.0. Weight 73.9 KG on admission. Appears severely hypervolemic.   Plan for daily HD to manage volume status.  Need dietary restriction to minimize weight gain. UOP minimal so diuretic likely futile.    Acute on chronic systolic heart failure (HCC)  Wt Readings from Last 3 Encounters:   12/15/24 73.9 kg (162 lb 14.7 oz)   11/20/24 55.2 kg (121 lb 12.8 oz)   09/03/24 55.3 kg (122 lb)     Continue Ultrafiltration with HD.Need cardiology follow up in outpatient setting.    Nonischemic cardiomyopathy (HCC)  Need outpatient cardiology fu.  Uncontrolled hypertension  BP trends elevated but follow with ultrafiltration, resume outpatient antihypertensive.  Ambulatory dysfunction  PT/OT eval.   Anemia of renal disease  Hgb 8.8, likely significant dilution. Follow need for ALCIRA.   Repeat iron studies. 11/15/24.   Hyponatremia  UF with HD.   FR.Na restriction.  Hypervolemic, etiology 2/2 CHF/DAVID with excess free water intake.  Secondary hyperparathyroidism of renal origin (HCC)  Follow PTH as outpatient. Check phos in AM.    I have reviewed the nephrology recommendations including dialysis management, with primary,  and we are in agreement with renal plan including the information outlined above.    HISTORY OF PRESENT ILLNESS:  Requesting Physician: Chris Nolasco DO  Reason for Consult: ESRD    Matthew Alvarez is a 69 y.o. male who was admitted to [facility] after presenting with a mechanical fall while getting out of the bathroom. A renal consultation is requested today for assistance in the management of ESRD. Patient has DAVID and is HD dependent, started in November at [facility]. Patient went to outpatient center 12/14 and advised to come to hospital for evaluation. Patient found to have evidence of anasarca with BL pleural effusions and pulmonary edema.    PAST MEDICAL HISTORY:  Past Medical History:   Diagnosis Date    Acute hemodialysis patient ([facility]) 12/14/2024    CAD (coronary artery disease)     HFrEF (heart failure with reduced ejection fraction) ([facility]) 09/2021    Hypertension     Rheumatoid factor positive 09/2021    Stage 3 chronic kidney disease ([facility])     Tobacco abuse        PAST SURGICAL HISTORY:  Past Surgical History:   Procedure Laterality Date    APPENDECTOMY  1965    CARDIAC CATHETERIZATION  9/16/2021    INGUINAL HERNIA REPAIR Right 1991    IR TUNNELED DIALYSIS CATHETER PLACEMENT  11/14/2024       ALLERGIES:  No Known Allergies    SOCIAL HISTORY:  Social History     Substance and Sexual Activity   Alcohol Use Yes    Alcohol/week: 6.0 standard drinks of alcohol    Types: 6 Shots of liquor per week    Comment: rare     Social History     Substance and Sexual Activity   Drug Use Never     Social History     Tobacco Use   Smoking Status Every Day    Current packs/day: 0.50    Average packs/day: 0.5 packs/day for 51.0 years (25.5 ttl pk-yrs)    Types: Cigarettes    Start date: 1974   Smokeless Tobacco Never   Tobacco Comments    Began smoking in his early 20s, on average smoking 1+ packs daily. Quit in 08/2021 (Updated 09/15/2021).        FAMILY HISTORY:  Family History   Problem Relation Age of Onset    Heart failure  "Mother     Other Father         \"blood clot\"    COPD Sister     Heart failure Brother     Valvular heart disease Brother     Heart attack Son     No Known Problems Son        MEDICATIONS:    Current Facility-Administered Medications:     acetaminophen (TYLENOL) tablet 650 mg, 650 mg, Oral, Q4H PRN, David Meraz MD    aspirin (ECOTRIN LOW STRENGTH) EC tablet 81 mg, 81 mg, Oral, Daily, David Meraz MD, 81 mg at 12/15/24 1025    carvedilol (COREG) tablet 12.5 mg, 12.5 mg, Oral, BID With Meals, David Meraz MD, 12.5 mg at 12/15/24 1025    heparin (porcine) subcutaneous injection 5,000 Units, 5,000 Units, Subcutaneous, Q8H JEEVAN, David Meraz MD    hydrALAZINE (APRESOLINE) injection 10 mg, 10 mg, Intravenous, Q6H PRN, Chris Nolasco, , 10 mg at 12/15/24 1032    hydrALAZINE (APRESOLINE) tablet 100 mg, 100 mg, Oral, Q8H JEEVAN, David Meraz MD, 100 mg at 12/15/24 0548    isosorbide mononitrate (IMDUR) 24 hr tablet 120 mg, 120 mg, Oral, Daily, David Meraz MD, 120 mg at 12/15/24 1025    melatonin tablet 3 mg, 3 mg, Oral, HS, David Meraz MD, 3 mg at 12/14/24 2118    REVIEW OF SYSTEMS:  Constitutional: Negative for fatigue, anorexia, fever, chills, diaphoresis  HENT: Negative for postnasal drip  Eyes: Negative for visual disturbance.   Respiratory: +SOB  Cardiovascular:+Leg swelling  Gastrointestinal: Negative for abdominal pain, constipation, diarrhea, nausea and vomiting.   Genitourinary: minimal UOP  Endocrine:minimal UOP  Musculoskeletal: Negative for arthralgias, back pain and joint swelling.   Skin: +facial bruising  Neurological: Negative for focal weakness, headaches, dizziness.  Hematological: Negative for easy bruising or bleeding.  Psychiatric/Behavioral: Negative for confusion and sleep disturbance.   All the systems were reviewed and were negative except as documented on the HPI.    PHYSICAL EXAM:  Current Weight: Weight - Scale: 73.9 kg (162 lb 14.7 oz) " (resfused standing weight)  First Weight: Weight - Scale: 73.9 kg (162 lb 14.7 oz) (Patient refused standing weight)  Vitals:    12/15/24 0900 12/15/24 0926 12/15/24 0930 12/15/24 1129   BP: (!) 178/107 (!) 181/100 (!) 171/100 135/69   BP Location: Right arm Right arm Right arm    Pulse: 73 69 80    Resp: 18 18 18    Temp:       TempSrc:  Temporal     SpO2:       Weight:           Intake/Output Summary (Last 24 hours) at 12/15/2024 1455  Last data filed at 12/15/2024 0926  Gross per 24 hour   Intake 1071 ml   Output 3502 ml   Net -2431 ml     Physical Exam  Vitals reviewed.   Constitutional:       General: He is not in acute distress.     Appearance: He is not ill-appearing.   HENT:      Head: Normocephalic and atraumatic.      Nose: Nose normal.      Mouth/Throat:      Mouth: Mucous membranes are moist.      Pharynx: Oropharynx is clear.   Cardiovascular:      Rate and Rhythm: Normal rate and regular rhythm.      Heart sounds:      No friction rub.   Pulmonary:      Breath sounds: No rhonchi or rales.      Comments: Diffuse rales  Abdominal:      General: There is distension.      Palpations: Abdomen is soft.      Tenderness: There is no abdominal tenderness. There is no guarding.   Musculoskeletal:      Right lower leg: Edema present.      Left lower leg: Edema present.      Comments: +3 BL LE edema   Skin:     Coloration: Skin is not jaundiced.      Findings: No bruising.      Comments: Bruising left eye   Neurological:      General: No focal deficit present.      Mental Status: He is alert and oriented to person, place, and time.      Cranial Nerves: No cranial nerve deficit.   Psychiatric:         Mood and Affect: Mood normal.         Behavior: Behavior normal.           Invasive Devices:      Lab Results:   Results from last 7 days   Lab Units 12/15/24  0619 12/14/24  1624   WBC Thousand/uL 6.01 4.80   HEMOGLOBIN g/dL 8.8* 9.6*   HEMATOCRIT % 27.0* 29.7*   PLATELETS Thousands/uL 82* 80*   POTASSIUM mmol/L 4.5  "4.2   CHLORIDE mmol/L 89* 89*   CO2 mmol/L 27 27   BUN mg/dL 49* 44*   CREATININE mg/dL 5.70* 5.30*   CALCIUM mg/dL 8.4 8.4   MAGNESIUM mg/dL 2.0  --    ALK PHOS U/L  --  119*   ALT U/L  --  9   AST U/L  --  21         Portions of the record may have been created with voice recognition software. Occasional wrong word or \"sound a like\" substitutions may have occurred due to the inherent limitations of voice recognition software. Read the chart carefully and recognize, using context, where substitutions have occurred.If you have any questions, please contact the dictating provider.   "

## 2024-12-15 NOTE — PLAN OF CARE
Problem: PHYSICAL THERAPY ADULT  Goal: Performs mobility at highest level of function for planned discharge setting.  See evaluation for individualized goals.  Description: Treatment/Interventions: ADL retraining, Functional transfer training, LE strengthening/ROM, Elevations, Therapeutic exercise, Endurance training, Patient/family training, Equipment eval/education, Gait training, Bed mobility, Compensatory technique education, Spoke to nursing, Spoke to case management, OT          See flowsheet documentation for full assessment, interventions and recommendations.  Note: Prognosis: Good  Problem List: Decreased strength, Decreased endurance, Impaired balance, Decreased mobility, Impaired judgement, Decreased safety awareness, Decreased skin integrity  Assessment: Pt is a 69 y.o. male seen for PT evaluation s/p admission to [facility] on 12/14/2024 with Acute on chronic systolic heart failure (HCC).  Order placed for PT services.  Upon evaluation: Pt is presenting with impaired functional mobility due to decreased strength, decreased endurance, impaired balance, gait deviations, decreased safety awareness, impaired judgment, fall risk, LE edema, and impaired skin integrity requiring  minimal assistance for bed mobility, steadying to minimal assistance for transfers, and minimal assistance for ambulation with RW . Pt's clinical presentation is currently unpredictable given the functional mobility deficits above, especially weakness, edema of extremities, decreased skin integrity, decreased endurance, impaired balance, gait deviations, decreased activity tolerance, decreased functional mobility tolerance, and decreased safety awareness, coupled with fall risks as indicated by AM-PAC 6-Clicks: 15/24 as well as hx of falls, impaired balance, polypharmacy, decreased safety awareness, and limited sensation/neuropathy and combined with medical complications of abnormal renal lab values, abnormal H&H,  abnormal sodium values, multiple readmissions, need for input for mobility technique/safety, and abnormal platelets, abnormal BNP, acute CHF, uncontrolled HTN on admission .  Pt's PMHx and comorbidities that may affect physical performance and progress include: CAD, HTN, and nonischemic cardiomyopathy with EF 20%, ESRD on HD, CHF, Rheumatoid factor positive . Personal factors affecting pt at time of IE include: hx of non-compliance, inaccessible home environment, step(s) to enter environment, multi-level environment, inability to perform IADLs, inability to perform ADLs, inability to navigate level surfaces without external assistance, inability to ambulate household distances, limited insight into impairments, and recent fall(s)/fall history. Pt will benefit from continued skilled PT services to address deficits as defined above and to maximize level of functional mobility to facilitate return toward PLOF and improved QOL. From PT/mobility standpoint, recommendation at time of d/c would be Level II (Moderate Resource Intensity in order to reduce fall risk and maximize pt's functional independence and consistency with mobility. Recommend trial with walker next 1-2 sessions and ther ex next 1-2 sessions.  Barriers to Discharge: Decreased caregiver support, Inaccessible home environment     Rehab Resource Intensity Level, PT: II (Moderate Resource Intensity) (PAR)    See flowsheet documentation for full assessment.

## 2024-12-15 NOTE — ASSESSMENT & PLAN NOTE
UF with HD.   FR.Na restriction.  Hypervolemic, etiology 2/2 CHF/DAVID with excess free water intake.

## 2024-12-15 NOTE — PLAN OF CARE
Problem: METABOLIC, FLUID AND ELECTROLYTES - ADULT  Goal: Fluid balance maintained  Description: INTERVENTIONS:  - Monitor labs   - Monitor I/O and WT  - Instruct patient on fluid and nutrition as appropriate  - Assess for signs & symptoms of volume excess or deficit  Outcome: Progressing     Problem: CARDIOVASCULAR - ADULT  Goal: Absence of cardiac dysrhythmias or at baseline rhythm  Description: INTERVENTIONS:  - Continuous cardiac monitoring, vital signs, obtain 12 lead EKG if ordered  - Administer antiarrhythmic and heart rate control medications as ordered  - Monitor electrolytes and administer replacement therapy as ordered  Outcome: Progressing        Problem: RESPIRATORY - ADULT  Goal: Achieves optimal ventilation and oxygenation  Description: INTERVENTIONS:  - Assess for changes in respiratory status  - Assess for changes in mentation and behavior  - Position to facilitate oxygenation and minimize respiratory effort  - Oxygen administered by appropriate delivery if ordered  - Initiate smoking cessation education as indicated  - Encourage broncho-pulmonary hygiene including cough, deep breathe, Incentive Spirometry  - Assess the need for suctioning and aspirate as needed  - Assess and instruct to report SOB or any respiratory difficulty  - Respiratory Therapy support as indicated  Outcome: Progressing

## 2024-12-15 NOTE — ASSESSMENT & PLAN NOTE
Dialysis, Monday, Wednesday, Friday.  HD today (12/15) and tomorrow (12/16)-goal for 3 L volume removal today (12/15)-nephrology closely following  An uric at baseline    Lab Results   Component Value Date    EGFR 9 12/15/2024    EGFR 10 12/14/2024    EGFR 11 11/20/2024    CREATININE 5.70 (H) 12/15/2024    CREATININE 5.30 (H) 12/14/2024    CREATININE 4.69 (H) 11/20/2024

## 2024-12-15 NOTE — PLAN OF CARE
Target UF Goal  3.5  L as tolerated. Patient dialyzing for 3 hours on 2 K bath for serum K of  4.5  per protocol. Treatment plan reviewed with Nephrology.       Post-Dialysis RN Treatment Note    Blood Pressure:  Pre 169/99 mm/Hg  Post 171/101 mmHg   EDW  60 kg (per outpatient records)   Weight:  Pre 72 kg   Post 69.2 kg   Mode of weight measurement: Bed Scale (pt was intolerant of laying the bed flat to obtain more accurate weight)   Volume Removed  3000 ml    Treatment duration 3 hours   NS given  No    Treatment shortened? No   Medications given during Rx None Reported   Estimated Kt/V  0.95   Access type: Permacath/TDC   Access Issues: No   Needle Gauge: N/A   Report called to primary nurse   Yes, Kia RN at bedside      Problem: METABOLIC, FLUID AND ELECTROLYTES - ADULT  Goal: Electrolytes maintained within normal limits  Description: INTERVENTIONS:  - Monitor labs and assess patient for signs and symptoms of electrolyte imbalances  - Administer electrolyte replacement as ordered  - Monitor response to electrolyte replacements, including repeat lab results as appropriate  - Instruct patient on fluid and nutrition as appropriate  Outcome: Progressing  Goal: Fluid balance maintained  Description: INTERVENTIONS:  - Monitor labs   - Monitor I/O and WT  - Instruct patient on fluid and nutrition as appropriate  - Assess for signs & symptoms of volume excess or deficit  Outcome: Progressing

## 2024-12-15 NOTE — ASSESSMENT & PLAN NOTE
"November 11, 2024: Echo, EF of 20-24%  Noncompliant with all medications in the outpatient setting-unclear if taking any  Also, set up with LifeVest which patient states he wears \"as needed\"  "

## 2024-12-15 NOTE — ASSESSMENT & PLAN NOTE
Patient states he fell at home, has bruising on his face  Will consult PT/OT/case management  He states he sometimes walks with an assistive device

## 2024-12-15 NOTE — PROGRESS NOTES
Progress Note - Hospitalist   Name: Matthew Alvarez 69 y.o. male I MRN: [id number]  Unit/Bed#: -01 I Date of Admission: 12/14/2024   Date of Service: 12/15/2024 I Hospital Day: 1    Assessment & Plan  Acute on chronic systolic heart failure (HCC)  Patient presented last evening (12/14) after sustaining a mechanical fall 2 to 3 days ago.    Patient states he was ambulating and felt weak and fell to the floor however did not present for evaluation  Patient states that he missed dialysis on Wednesday and presented for regularly scheduled session on Friday where he was noted with significant bruising and referred to the ED  Patient presented to the ED yesterday (12/14) secondary to above    Evidence of severe volume overload with weight significantly up from dry weight  Without oxygen requirement at this juncture but significantly volume overloaded  ED provider verified with nephrology that patient will undergo HD    Notably, patient with gross noncompliance with outpatient medical regimen    Will undergo HD with UF today-goal for 3 L removal  Plan for repeat session tomorrow (12/16) secondary to gross volume overload  Nephrology closely following  Monitor for electrolyte abnormality    Wt Readings from Last 3 Encounters:   12/15/24 73.9 kg (162 lb 14.7 oz)   11/20/24 55.2 kg (121 lb 12.8 oz)   09/03/24 55.3 kg (122 lb)     ESRD (end stage renal disease) on dialysis (HCC)  Dialysis, Monday, Wednesday, Friday.  HD today (12/15) and tomorrow (12/16)-goal for 3 L volume removal today (12/15)-nephrology closely following  An uric at baseline    Lab Results   Component Value Date    EGFR 9 12/15/2024    EGFR 10 12/14/2024    EGFR 11 11/20/2024    CREATININE 5.70 (H) 12/15/2024    CREATININE 5.30 (H) 12/14/2024    CREATININE 4.69 (H) 11/20/2024     Nonischemic cardiomyopathy (HCC)  November 11, 2024: Echo, EF of 20-24%  Noncompliant with all medications in the outpatient setting-unclear if taking any  Also, set up with  "LifeVest which patient states he wears \"as needed\"  Uncontrolled hypertension  Noncompliant with his home medications  Restarted oral hydralazine, Coreg  As needed intravenous hydralazine/labetalol as needed  Patient also significantly volume overloaded upon presentation which is likely a significant contributing factor  Ambulatory dysfunction  Patient states he fell at home, has bruising on his face  Will consult PT/OT/case management  He states he sometimes walks with an assistive device  Will likely require short-term rehab  Coronary artery disease involving native coronary artery of native heart without angina pectoris  Continue aspirin    VTE Pharmacologic Prophylaxis: VTE Score: 5 High Risk (Score >/= 5) - Pharmacological DVT Prophylaxis Ordered: heparin. Sequential Compression Devices Ordered.    Mobility:   Basic Mobility Inpatient Raw Score: 15  JH-HLM Goal: 4: Move to chair/commode  JH-HLM Achieved: 3: Sit at edge of bed  JH-HLM Goal achieved. Continue to encourage appropriate mobility.    Patient Centered Rounds: I performed bedside rounds with nursing staff today.   Discussions with Specialists or Other Care Team Provider: Nephrology    Education and Discussions with Family / Patient: Attempted to update  (son) via phone. Unable to contact.    Current Length of Stay: 1 day(s)  Current Patient Status: Inpatient   Certification Statement: The patient will continue to require additional inpatient hospital stay due to gross volume overload  Discharge Plan: Anticipate discharge in 48-72 hrs to rehab facility.    Code Status: Level 3 - DNAR and DNI    Subjective   Examined at bedside.  Undergoing HD.  Without complaint.    Objective :  Temp:  [97.7 °F (36.5 °C)] 97.7 °F (36.5 °C)  HR:  [69-82] 80  BP: (157-192)/() 171/100  Resp:  [16-20] 18  SpO2:  [93 %-96 %] 93 %  O2 Device: None (Room air)    Body mass index is 26.3 kg/m².     Input and Output Summary (last 24 hours):     Intake/Output " Summary (Last 24 hours) at 12/15/2024 1112  Last data filed at 12/15/2024 0926  Gross per 24 hour   Intake 1071 ml   Output 3502 ml   Net -2431 ml       Physical Exam  Constitutional:       General: He is not in acute distress.     Appearance: He is underweight. He is ill-appearing (Chronically). He is not toxic-appearing or diaphoretic.   HENT:      Right Ear: External ear normal.      Left Ear: External ear normal.      Nose: Nose normal.      Mouth/Throat:      Mouth: Mucous membranes are dry.      Pharynx: Oropharynx is clear.   Eyes:      General: No scleral icterus.     Conjunctiva/sclera: Conjunctivae normal.   Cardiovascular:      Rate and Rhythm: Normal rate and regular rhythm.      Heart sounds: Murmur heard.      No friction rub. No gallop.   Pulmonary:      Breath sounds: Rales present.   Abdominal:      General: Abdomen is flat. There is distension.      Tenderness: There is no abdominal tenderness.   Musculoskeletal:         General: Tenderness present. Normal range of motion.      Cervical back: Normal range of motion.      Right lower leg: Edema present.      Left lower leg: Edema present.   Skin:     Capillary Refill: Capillary refill takes less than 2 seconds.      Coloration: Skin is pale.      Findings: Bruising and erythema present.   Neurological:      General: No focal deficit present.      Mental Status: He is alert and oriented to person, place, and time. Mental status is at baseline.      Cranial Nerves: No cranial nerve deficit.      Sensory: No sensory deficit.      Motor: Weakness present.   Psychiatric:         Mood and Affect: Mood normal.         Behavior: Behavior normal.         Thought Content: Thought content normal.         Judgment: Judgment normal.           Lines/Drains:  Lines/Drains/Airways       Active Status       Name Placement date Placement time Site Days    HD Permanent Double Catheter 11/14/24  1352  Internal jugular  30                      Telemetry:  Telemetry  Orders (From admission, onward)               24 Hour Telemetry Monitoring  Continuous x 24 Hours (Telem)        Question:  Reason for 24 Hour Telemetry  Answer:  Decompensated CHF- and any one of the following: continuous diuretic infusion or total diuretic dose >200 mg daily, associated electrolyte derangement (I.e. K < 3.0), inotropic drip (continuous infusion), hx of ventricular arrhythmia, or new EF < 35%                     Telemetry Reviewed: Normal Sinus Rhythm  Indication for Continued Telemetry Use: Acute CHF on >200 mg lasix/day or equivalent dose or with new reduced EF.                Lab Results: I have reviewed the following results:   Results from last 7 days   Lab Units 12/15/24  0619 12/14/24  1624   WBC Thousand/uL 6.01 4.80   HEMOGLOBIN g/dL 8.8* 9.6*   HEMATOCRIT % 27.0* 29.7*   PLATELETS Thousands/uL 82* 80*   SEGS PCT %  --  80*   LYMPHO PCT %  --  10*   MONO PCT %  --  8   EOS PCT %  --  1     Results from last 7 days   Lab Units 12/15/24  0619 12/14/24  1624   SODIUM mmol/L 126* 127*   POTASSIUM mmol/L 4.5 4.2   CHLORIDE mmol/L 89* 89*   CO2 mmol/L 27 27   BUN mg/dL 49* 44*   CREATININE mg/dL 5.70* 5.30*   ANION GAP mmol/L 10 11   CALCIUM mg/dL 8.4 8.4   ALBUMIN g/dL  --  3.2*   TOTAL BILIRUBIN mg/dL  --  1.36*   ALK PHOS U/L  --  119*   ALT U/L  --  9   AST U/L  --  21   GLUCOSE RANDOM mg/dL 102 99     Results from last 7 days   Lab Units 12/14/24  1624   INR  1.27*                   Recent Cultures (last 7 days):         Imaging Results Review: No pertinent imaging studies reviewed.  Other Study Results Review: No additional pertinent studies reviewed.    Last 24 Hours Medication List:     Current Facility-Administered Medications:     acetaminophen (TYLENOL) tablet 650 mg, Q4H PRN    aspirin (ECOTRIN LOW STRENGTH) EC tablet 81 mg, Daily    carvedilol (COREG) tablet 12.5 mg, BID With Meals    heparin (porcine) subcutaneous injection 5,000 Units, Q8H JEEVAN    hydrALAZINE (APRESOLINE) injection  10 mg, Q6H PRN    hydrALAZINE (APRESOLINE) tablet 100 mg, Q8H JEEVAN    isosorbide mononitrate (IMDUR) 24 hr tablet 120 mg, Daily    melatonin tablet 3 mg, HS    Administrative Statements   Today, Patient Was Seen By: Chris Nolasco, DO  I have spent a total time of 44 minutes in caring for this patient on the day of the visit/encounter including Prognosis, Risks and benefits of tx options, Patient and family education, Importance of tx compliance, Counseling / Coordination of care, Documenting in the medical record, and Reviewing / ordering tests, medicine, procedures  .    **Please Note: This note may have been constructed using a voice recognition system.**

## 2024-12-15 NOTE — ASSESSMENT & PLAN NOTE
Patient presented last evening (12/14) after sustaining a mechanical fall 2 to 3 days ago.    Patient states he was ambulating and felt weak and fell to the floor however did not present for evaluation  Patient states that he missed dialysis on Wednesday and presented for regularly scheduled session on Friday where he was noted with significant bruising and referred to the ED  Patient presented to the ED yesterday (12/14) secondary to above    Evidence of severe volume overload with weight significantly up from dry weight  Without oxygen requirement at this juncture but significantly volume overloaded  ED provider verified with nephrology that patient will undergo HD    Notably, patient with gross noncompliance with outpatient medical regimen    Will undergo HD with UF today-goal for 3 L removal  Plan for repeat session tomorrow (12/16) secondary to gross volume overload  Nephrology closely following  Monitor for electrolyte abnormality    Wt Readings from Last 3 Encounters:   12/15/24 73.9 kg (162 lb 14.7 oz)   11/20/24 55.2 kg (121 lb 12.8 oz)   09/03/24 55.3 kg (122 lb)

## 2024-12-15 NOTE — QUICK NOTE
HEMODIALYSIS PROCEDURE NOTE  The patient was seen and examined on hemodialysis.Patient severely volume overloaded with diffuse rales and decrease BS.3+ BL LE edema.   Time: 3 hours  Sodium: 135 Blood flow: 350   Dialyzer: F160 Potassium: 3 Dialysate flow: 1.5 x   Access: RIJ PC Bicarbonate: 30 Ultrafiltration goal: 3 L   Medications on HD: none

## 2024-12-16 ENCOUNTER — APPOINTMENT (INPATIENT)
Dept: DIALYSIS | Facility: HOSPITAL | Age: 69
DRG: 291 | End: 2024-12-16
Attending: INTERNAL MEDICINE
Payer: COMMERCIAL

## 2024-12-16 LAB
ALBUMIN SERPL BCG-MCNC: 2.9 G/DL (ref 3.5–5)
ALP SERPL-CCNC: 100 U/L (ref 34–104)
ALT SERPL W P-5'-P-CCNC: 8 U/L (ref 7–52)
ANION GAP SERPL CALCULATED.3IONS-SCNC: 8 MMOL/L (ref 4–13)
AST SERPL W P-5'-P-CCNC: 18 U/L (ref 13–39)
BILIRUB SERPL-MCNC: 1.29 MG/DL (ref 0.2–1)
BUN SERPL-MCNC: 41 MG/DL (ref 5–25)
CALCIUM ALBUM COR SERPL-MCNC: 8.8 MG/DL (ref 8.3–10.1)
CALCIUM SERPL-MCNC: 7.9 MG/DL (ref 8.4–10.2)
CHLORIDE SERPL-SCNC: 92 MMOL/L (ref 96–108)
CO2 SERPL-SCNC: 26 MMOL/L (ref 21–32)
CREAT SERPL-MCNC: 4.55 MG/DL (ref 0.6–1.3)
ERYTHROCYTE [DISTWIDTH] IN BLOOD BY AUTOMATED COUNT: 17.5 % (ref 11.6–15.1)
FERRITIN SERPL-MCNC: 109 NG/ML (ref 24–336)
FOLATE SERPL-MCNC: 8.4 NG/ML
GFR SERPL CREATININE-BSD FRML MDRD: 12 ML/MIN/1.73SQ M
GLUCOSE SERPL-MCNC: 105 MG/DL (ref 65–140)
HCT VFR BLD AUTO: 26.8 % (ref 36.5–49.3)
HGB BLD-MCNC: 8.4 G/DL (ref 12–17)
MAGNESIUM SERPL-MCNC: 2 MG/DL (ref 1.9–2.7)
MCH RBC QN AUTO: 26.3 PG (ref 26.8–34.3)
MCHC RBC AUTO-ENTMCNC: 31.3 G/DL (ref 31.4–37.4)
MCV RBC AUTO: 84 FL (ref 82–98)
MRSA NOSE QL CULT: NORMAL
PLATELET # BLD AUTO: 64 THOUSANDS/UL (ref 149–390)
PMV BLD AUTO: 11.9 FL (ref 8.9–12.7)
POTASSIUM SERPL-SCNC: 4.6 MMOL/L (ref 3.5–5.3)
PROT SERPL-MCNC: 6.1 G/DL (ref 6.4–8.4)
RBC # BLD AUTO: 3.19 MILLION/UL (ref 3.88–5.62)
SODIUM SERPL-SCNC: 126 MMOL/L (ref 135–147)
VIT B12 SERPL-MCNC: 647 PG/ML (ref 180–914)
WBC # BLD AUTO: 5.39 THOUSAND/UL (ref 4.31–10.16)

## 2024-12-16 PROCEDURE — 82746 ASSAY OF FOLIC ACID SERUM: CPT | Performed by: INTERNAL MEDICINE

## 2024-12-16 PROCEDURE — 82607 VITAMIN B-12: CPT | Performed by: INTERNAL MEDICINE

## 2024-12-16 PROCEDURE — 83735 ASSAY OF MAGNESIUM: CPT | Performed by: FAMILY MEDICINE

## 2024-12-16 PROCEDURE — 85027 COMPLETE CBC AUTOMATED: CPT | Performed by: FAMILY MEDICINE

## 2024-12-16 PROCEDURE — 83540 ASSAY OF IRON: CPT | Performed by: INTERNAL MEDICINE

## 2024-12-16 PROCEDURE — 99232 SBSQ HOSP IP/OBS MODERATE 35: CPT | Performed by: INTERNAL MEDICINE

## 2024-12-16 PROCEDURE — 5A1D70Z PERFORMANCE OF URINARY FILTRATION, INTERMITTENT, LESS THAN 6 HOURS PER DAY: ICD-10-PCS | Performed by: INTERNAL MEDICINE

## 2024-12-16 PROCEDURE — 80053 COMPREHEN METABOLIC PANEL: CPT | Performed by: FAMILY MEDICINE

## 2024-12-16 PROCEDURE — 97167 OT EVAL HIGH COMPLEX 60 MIN: CPT

## 2024-12-16 PROCEDURE — 97116 GAIT TRAINING THERAPY: CPT

## 2024-12-16 PROCEDURE — 83550 IRON BINDING TEST: CPT | Performed by: INTERNAL MEDICINE

## 2024-12-16 PROCEDURE — 82728 ASSAY OF FERRITIN: CPT | Performed by: INTERNAL MEDICINE

## 2024-12-16 RX ORDER — TORSEMIDE 100 MG/1
100 TABLET ORAL DAILY
Status: DISCONTINUED | OUTPATIENT
Start: 2024-12-16 | End: 2024-12-27 | Stop reason: HOSPADM

## 2024-12-16 RX ADMIN — ISOSORBIDE MONONITRATE 120 MG: 60 TABLET, EXTENDED RELEASE ORAL at 14:20

## 2024-12-16 RX ADMIN — CARVEDILOL 12.5 MG: 12.5 TABLET, FILM COATED ORAL at 08:00

## 2024-12-16 RX ADMIN — HYDRALAZINE HYDROCHLORIDE 100 MG: 25 TABLET ORAL at 14:20

## 2024-12-16 RX ADMIN — Medication 3 MG: at 21:59

## 2024-12-16 RX ADMIN — IRON SUCROSE 100 MG: 20 INJECTION, SOLUTION INTRAVENOUS at 14:19

## 2024-12-16 RX ADMIN — TORSEMIDE 100 MG: 100 TABLET ORAL at 14:20

## 2024-12-16 RX ADMIN — ASPIRIN 81 MG: 81 TABLET, COATED ORAL at 14:20

## 2024-12-16 RX ADMIN — HYDRALAZINE HYDROCHLORIDE 100 MG: 25 TABLET ORAL at 05:18

## 2024-12-16 RX ADMIN — HYDRALAZINE HYDROCHLORIDE 100 MG: 25 TABLET ORAL at 21:59

## 2024-12-16 RX ADMIN — CARVEDILOL 12.5 MG: 12.5 TABLET, FILM COATED ORAL at 16:29

## 2024-12-16 NOTE — ASSESSMENT & PLAN NOTE
It is noted the patient has an ejection fraction of 20 to 25%.  The patient is on hydralazine, isosorbide and carvedilol terms of guideline directed medical therapy.

## 2024-12-16 NOTE — PLAN OF CARE
Problem: PHYSICAL THERAPY ADULT  Goal: Performs mobility at highest level of function for planned discharge setting.  See evaluation for individualized goals.  Description: Treatment/Interventions: ADL retraining, Functional transfer training, LE strengthening/ROM, Elevations, Therapeutic exercise, Endurance training, Patient/family training, Equipment eval/education, Gait training, Bed mobility, Compensatory technique education, Spoke to nursing, Spoke to case management, OT          See flowsheet documentation for full assessment, interventions and recommendations.  Outcome: Progressing  Note: Prognosis: Good  Problem List: Decreased strength, Decreased endurance, Impaired balance, Decreased mobility, Impaired judgement, Decreased safety awareness, Decreased skin integrity  Assessment: Pt tolerates tx session fairly.  He reports being significantly fatigued from just finishing up with dialysis, requesting to return to bed post therapy session.  He declines any dizziness or lightheadedness.  SpO2 on RA for exertion remains in mid 90s however pt requests O2 to be re-donned when returning to bed.  Barriers to Discharge: Other (Comment)  Barriers to Discharge Comments: BRODY; impaired activity tolerance  Rehab Resource Intensity Level, PT: II (Moderate Resource Intensity)    See flowsheet documentation for full assessment.

## 2024-12-16 NOTE — OCCUPATIONAL THERAPY NOTE
Occupational Therapy Evaluation     Patient Name: Matthew Alvarez  Today's Date: 12/16/2024  Problem List  Principal Problem:    Acute on chronic systolic heart failure (HCC)  Active Problems:    Nonischemic cardiomyopathy (HCC)    Uncontrolled hypertension    Coronary artery disease involving native coronary artery of native heart without angina pectoris    Hyponatremia    Acute hemodialysis patient ([facility])    Anemia of renal disease    ESRD (end stage renal disease) on dialysis ([facility])    Ambulatory dysfunction    Secondary hyperparathyroidism of renal origin ([facility])    Past Medical History  Past Medical History:   Diagnosis Date    Acute hemodialysis patient ([facility]) 12/14/2024    CAD (coronary artery disease)     HFrEF (heart failure with reduced ejection fraction) ([facility]) 09/2021    Hypertension     Rheumatoid factor positive 09/2021    Stage 3 chronic kidney disease ([facility])     Tobacco abuse      Past Surgical History  Past Surgical History:   Procedure Laterality Date    APPENDECTOMY  1965    CARDIAC CATHETERIZATION  9/16/2021    INGUINAL HERNIA REPAIR Right 1991    IR TUNNELED DIALYSIS CATHETER PLACEMENT  11/14/2024 12/16/24 1345   Note Type   Note type Evaluation   Pain Assessment   Pain Assessment Tool 0-10   Pain Score No Pain   Restrictions/Precautions   Other Precautions Chair Alarm;Bed Alarm;Fall Risk;O2;Telemetry  (2 L O2 NC)   Home Living   Type of Home House  (1 BRODY L HR)   Home Layout Multi-level;Performs ADLs on one level;Able to live on main level with bedroom/bathroom   Bathroom Shower/Tub Tub/shower unit  (Pt sponge bathes)   Bathroom Toilet Standard   Bathroom Equipment Shower chair   Home Equipment Walker;Cane;Wheelchair-manual   Additional Comments Pt reports living in a multi level home with 1 BRODY and has been staying on the first floor. He reports not using AD for functional mobility PTA.   Prior Function   Level of [place] Independent with ADLs;Independent with functional mobility   Lives  With Spouse;Son   Receives Help From Family   IADLs Family/Friend/Other provides transportation;Family/Friend/Other provides medication management;Family/Friend/Other provides meals  (Sister drives)   Falls in the last 6 months 1 to 4   Comments PTA, pt reports independence with ADLs and functional mobility, and has assistance for IADLs. He does admit the past few weeks have been more difficult in regards to his ability to complete ADLs and functional mobility.   General   Additional Pertinent History Dialysis days are Monday, Wednesday, Friday   ADL   UB Dressing Assistance 5  Supervision/Setup   UB Dressing Deficit Verbal cueing;Supervision/safety;Increased time to complete   LB Dressing Assistance 2  Maximal Assistance   LB Dressing Deficit Don/doff R sock;Don/doff L sock   Additional Comments Pt requiring maximal assistance to don B socks while seated EOB. Overall, anticipate the pt would require maximal assistance at this time due to fatigue. UB ADLs can be completed with setup and increased time.   Bed Mobility   Sit to Supine 3  Moderate assistance   Additional items Assist x 1;HOB elevated;Increased time required;Verbal cues;LE management   Additional Comments Pt received sitting at EOB upon start of session. At end of session, pt requiring mod assist x 1 for LE management.   Transfers   Sit to Stand   (SBA)   Additional items Increased time required;Verbal cues   Stand to Sit   (SBA)   Additional items Increased time required;Verbal cues   Stand pivot   (SBA)   Additional items Increased time required;Verbal cues   Additional Comments All functional transfers with RW.   Functional Mobility   Functional Mobility   (SBA)   Additional Comments Pt participated in short functional distance in room with SBA and RW. Further functional mobility deferred due to fatigue.   Balance   Static Sitting Good   Dynamic Sitting Fair   Static Standing Fair -   Dynamic Standing Fair -   Activity Tolerance   Activity Tolerance  Patient limited by fatigue   Medical Staff Made Aware PT, Jamil   Nurse Made Aware RN, Yessy ORNELAS Assessment   RUE Assessment WFL  (Strength grossly 3-/5)   LUE Assessment   LUE Assessment WFL  (Strength grossly 3-/5)   Hand Function   Gross Motor Coordination Functional   Fine Motor Coordination Functional   Hand Function Comments Pt is R hand dominant.   Sensation   Light Touch No apparent deficits   Cognition   Overall Cognitive Status WFL   Arousal/Participation Alert;Cooperative   Attention Within functional limits   Orientation Level Oriented X4  (Partially to situation)   Memory Within functional limits   Following Commands Follows one step commands without difficulty   Comments Pt is a questionable historian, noted to provide differing home setup/PLOF as compared to PT eval. Will continue to assess cognition as able   Assessment   Limitation Decreased ADL status;Decreased UE strength;Decreased Safe judgement during ADL;Decreased endurance;Decreased self-care trans;Decreased high-level ADLs   Prognosis Fair   Assessment Pt is a 69 y.o. male, admitted to [facility] 12/14/2024 d/t experiencing fall. Dx: Acute on chronic systolic heart failure. Pt with PMHx impacting their performance during ADL tasks, including: ESRD on dialysis, nonischemic cardiomyopathy EF 20%, uncontrolled HTN, ambulatory dysfunction, CAD, rheumatoid factor positive, tobacco abuse, CKD 3, acute hemodialysis patient. Prior to admission to the hospital Pt was performing ADLs without physical assistance. IADLs without physical assistance. Functional transfers/ambulation without physical assistance. Cognitive status PTA was WFL. OT order placed to assess Pt's ADLs, cognitive status, and performance during functional tasks in order to maximize safety and independence while making most appropriate d/c recommendations. Pt's clinical presentation is currently unstable/unpredictable given new onset deficits that affect Pt's occupational performance and  ability to safely return to PLOF including decrease activity tolerance, decrease standing balance, decrease performance during ADL tasks, decrease cognition, decrease UB MS, decrease generalized strength, decrease activity engagement, decrease performance during functional transfers, steps to enter home, limited family support, high fall risk, and limited insight to deficits combined with medical complications of abnormal renal lab values, abnormal H&H, abnormal CBC, abnormal sodium values, new onset O2 use, and need for input for mobility technique/safety. Personal factors affecting Pt at time of initial evaluation include: step(s) to enter environment, multi-level environment, inability to perform IADLs, inability to perform ADLs, inability to ambulate household distances, inability to navigate community distances, limited insight into impairments, decreased initiation and engagement, and recent fall(s)/fall history. Pt will benefit from continued skilled OT services to address deficits as defined above and to maximize level independence/participation during ADLs and functional tasks to facilitate return toward PLOF and improved quality of life. From an occupational therapy standpoint, Level II (Moderate Resource Intensity is recommended upon d/c.   Plan   Treatment Interventions ADL retraining;Functional transfer training;UE strengthening/ROM;Endurance training;Patient/family training;Equipment evaluation/education;Cognitive reorientation;Compensatory technique education;Cardiac education;Continued evaluation;Activityengagement;Energy conservation   Goal Expiration Date 12/30/24   OT Frequency 3-5x/wk   Discharge Recommendation   Rehab Resource Intensity Level, OT II (Moderate Resource Intensity)   AM-PAC Daily Activity Inpatient   Lower Body Dressing 2   Bathing 2   Toileting 2   Upper Body Dressing 3   Grooming 3   Eating 4   Daily Activity Raw Score 16   Daily Activity Standardized Score (Calc for Raw Score  >=11) 35.96   AM-PAC Applied Cognition Inpatient   Following a Speech/Presentation 3   Understanding Ordinary Conversation 4   Taking Medications 3   Remembering Where Things Are Placed or Put Away 3   Remembering List of 4-5 Errands 3   Taking Care of Complicated Tasks 3   Applied Cognition Raw Score 19   Applied Cognition Standardized Score 39.77   End of Consult   Patient Position at End of Consult Supine;Bed/Chair alarm activated;All needs within reach     The patient's raw score on the AM-PAC Daily Activity Inpatient Short Form is 16. A raw score of less than 19 suggests the patient may benefit from discharge to post-acute rehabilitation services. Please refer to the recommendation of the Occupational Therapist for safe discharge planning.    Pt goals to be met by 12/30/2024:    Pt will demonstrate ability to complete grooming/hygiene tasks @ mod I after set-up.  Pt will demonstrate ability to complete supine<>sit @ mod I in order to increase safety and independence during ADL tasks.  Pt will demonstrate ability to complete UB ADLs including washing/dressing @ mod I in order to increase performance and participation during meaningful tasks  Pt will demonstrate ability to complete LB dressing @ mod I in order to increase safety and independence during meaningful tasks.   Pt will demonstrate ability to tyler/doff socks/shoes while sitting EOB/chair @ mod I in order to increase safety and independence during meaningful tasks.   Pt will demonstrate ability to complete toileting tasks including CM and pericare @ mod I in order to increase safety and independence during meaningful tasks.  Pt will demonstrate ability to complete EOB, chair, toilet/commode transfers @ mod I in order to increase performance and participation during functional tasks.  Pt will demonstrate ability to stand for 10 minutes while maintaining F+ balance with use of RW for UB support PRN.  Pt will demonstrate ability to tolerate 30 minute OT  session with no vc'ing for deep breathing or use of energy conservation techniques in order to increase activity tolerance during functional tasks.   Pt will demonstrate Good carryover of use of energy conservation/compensatory strategies during ADLs and functional tasks in order to increase safety and reduce risk for falls.   Pt will demonstrate Good attention and participation in continued evaluation of functional ambulation house hold distances in order to assist with safe d/c planning.  Pt will attend to continued cognitive assessments 100% of the time in order to provide most appropriate d/c recommendations.   Pt will follow 100% simple 2-step commands and be A&O x4 consistently with environmental cues to increase participation in functional activities.   Pt will identify 3 areas of interest/hobbies and 1 intervention on how to incorporate into daily life in order to increase interaction with environment and peers as well as increase participation in meaningful tasks.   Pt will demonstrate 100% carryover of BUE HEP in order to increase BUE MS and increase performance during functional tasks upon d/c home.    Hal Gonzáles MS, OTR/L

## 2024-12-16 NOTE — ASSESSMENT & PLAN NOTE
Noncompliant with his home medications  Restarted oral hydralazine, Coreg  As needed intravenous hydralazine/labetalol as needed  Patient also significantly volume overloaded upon presentation which is likely a significant contributing factor.  Undergoing hemodialysis today.

## 2024-12-16 NOTE — CASE MANAGEMENT
Case Management Discharge Planning Note    Patient name Matthew Alvarez  Location /-01 MRN [id number]  : 1955 Date 2024       Current Admission Date: 2024  Current Admission Diagnosis:Acute on chronic systolic heart failure (HCC)   Patient Active Problem List    Diagnosis Date Noted Date Diagnosed    Secondary hyperparathyroidism of renal origin ([facility]) 12/15/2024     ESRD (end stage renal disease) on dialysis ([facility]) 2024     Ambulatory dysfunction 2024     Thrombocytopenia ([facility]) 2024     Renal failure 11/15/2024     Encounter for hemodialysis ([facility]) 11/15/2024     Anemia due to chronic kidney disease, on chronic dialysis ([facility]) 11/15/2024     Anemia of renal disease 11/15/2024     Vitamin D deficiency, unspecified 11/15/2024     Acute hemodialysis patient ([facility]) 2024     Chronic systolic (congestive) heart failure ([facility]) 2024     Oliguria 2024     Encounter for hemodialysis for ESRD ([facility]) 2024     Hyponatremia 2024     Dilated cardiomyopathy ([facility]) 2024     Elevated liver transaminase level 11/10/2024     Acute on chronic systolic heart failure ([facility]) 2024     Coronary artery disease involving native coronary artery of native heart without angina pectoris 10/08/2021     Rheumatoid factor positive 2021     Protein calorie malnutrition (HCC) 2021     Elevated troponin 2021     Tobacco abuse 2021     Nonischemic cardiomyopathy ([facility]) 2021     Uncontrolled hypertension 2021       LOS (days): 2  Geometric Mean LOS (GMLOS) (days): 3.9  Days to GMLOS:2.1     OBJECTIVE:  Risk of Unplanned Readmission Score: 21.68         Current admission status: Inpatient   Preferred Pharmacy:   [facility]/pharmacy #[id number] - [place] - [address]  [address] [place] [address]  Phone: [phone] Fax: [phone]    [facility] - [place], [place] - [address] [address] [address]  "[facility] [address]  [place] [address]  Phone: [phone] Fax: [phone]    Primary Care Provider: Olive Rodriguez MD    Primary Insurance: [facility]  Secondary Insurance:     DISCHARGE DETAILS:    Discharge planning discussed with:: patient, son Rafael and spouse Joseph  Freedom of Choice: Yes  Comments - Freedom of Choice: discussed STR options , pt is refusing STR, pt stated \"I'm going home\" pt is agreeable to HHC - blanket referral placed in aidin for HHC.      CM contacted family/caregiver?: Yes (Rafael Alvarez- son)  Were Treatment Team discharge recommendations reviewed with patient/caregiver?: Yes  Did patient/caregiver verbalize understanding of patient care needs?: Yes  Were patient/caregiver advised of the risks associated with not following Treatment Team discharge recommendations?: Yes    Contacts  Patient Contacts: cristal Tomlinson  Relationship to Patient:: Family  Contact Method: Phone  Phone Number: [phone]  Reason/Outcome: Discharge Planning    Requested Home Health Care         Is the patient interested in HHC at discharge?: Yes  Home Health Discipline requested:: Nursing, Occupational Therapy, Physical Therapy, Medical Social Work  Home Health Follow-Up Provider:: PCP  Home Health Services Needed:: Evaluate Functional Status and Safety, Gait/ADL Training, Heart Failure Management, Oxygen Via Nasal Cannula, Strengthening/Theraputic Exercises to Improve Function  Oxygen LPM Ordered (if applicable based on home health services needed):: 2 LPM  Homebound Criteria Met:: Requires the Assistance of Another Person for Safe Ambulation or to Leave the Home, Requires Medical Transportation  Supporting Clincal Findings:: Limited Endurance, Requires Oxygen         Other Referral/Resources/Interventions Provided:  Interventions: HHC         Treatment Team Recommendation: Short Term Rehab  Discharge Destination Plan:: Home with Home Health Care  Transport at Discharge : Family         1500- " discharge planning discussed with spouse Mirela and son Rafael Alvarez made aware of pt refusing STR, discussed CM to order a hospital bed for home, pt, spouse and son agreeable to order hospital bed thorough [facility]. Pts spouse declined transport wc, stated they have a wc and walker at home  that the pt already uses and works fine.     1504- order for hospital bed placed through [facility] pending approval.

## 2024-12-16 NOTE — ASSESSMENT & PLAN NOTE
Hgb 8.4 likely dilutional effect.  Iron studies show iron saturation of 4 and ferritin of 70.  Start IV iron.  There is evidence of iron deficiency.  Need to get IV iron levels up before starting ALCIRA.  Check B12 and folate levels.

## 2024-12-16 NOTE — ASSESSMENT & PLAN NOTE
This is likely a combination of hypovolemic hyponatremia as well as CHF with decreased ejection fraction and DAVID affecting free water excretion.  Plan on doing hemodialysis with ultrafiltration daily for the next few days to get the patient's fluid weight down.

## 2024-12-16 NOTE — HEMODIALYSIS
Post-Dialysis RN Treatment Note    Blood Pressure:  Pre 170/77 mm/Hg  Post 170/78 mmHg   EDW:  tbd    Weight:  Pre 69.3 kg   Post 65.8 kg   Mode of weight measurement: Standing Scale   Volume Removed:  3500 ml    Treatment duration: 240 minutes    NS given:  No    Treatment shortened No   Medications given during Rx: None Reported   Estimated Kt/V:  1.22   Access type: Permacath/TDC   Needle Gauge:  n/a   Access Issues: No    Report called to primary nurse:   Yes Yessy Mccarthy RN

## 2024-12-16 NOTE — ASSESSMENT & PLAN NOTE
Wt Readings from Last 3 Encounters:   12/16/24 73.8 kg (162 lb 11.2 oz)   11/20/24 55.2 kg (121 lb 12.8 oz)   09/03/24 55.3 kg (122 lb)     Maintaining hemodialysis and ultrafiltration.

## 2024-12-16 NOTE — ASSESSMENT & PLAN NOTE
Patient presented  on (12/14) after sustaining a mechanical fall 2 to 3 days ago.    Patient states he was ambulating and felt weak and fell to the floor however did not present for evaluation  Patient states that he missed dialysis on Wednesday and presented for regularly scheduled session on Friday where he was noted with significant bruising and referred to the ED  Patient presented to the ED yesterday (12/14) secondary to above    Evidence of severe volume overload with weight significantly up from dry weight  Notably, patient with gross noncompliance with outpatient medical regimen    Will undergo HD with UF today-goal for 3 L removal  Further repeated sessions per nephrology.    Nephrology closely following  Monitor for electrolyte abnormality    Wt Readings from Last 3 Encounters:   12/16/24 65.8 kg (145 lb 1 oz)   11/20/24 55.2 kg (121 lb 12.8 oz)   09/03/24 55.3 kg (122 lb)

## 2024-12-16 NOTE — PROGRESS NOTES
NEPHROLOGY HOSPITAL PROGRESS NOTE   Matthew Alvarez 69 y.o. male MRN: [id number]  Unit/Bed#: -01 Encounter: [id number]  Reason for Consult: Acute kidney injury requiring hemodialysis.    Brief History of Admission -  70 yo male  with DAVID now dialysis dependent new start in November, non ischemic cardiomyopathy with reduced EF 20% and uncontrolled HTN presented sp fall 2 days ago while attempting to get out of bathroom. He said the room was dark and lead to fall. Last HD tx 12/12. Outpatient dialysis records show large fluid gains.     Assessment & Plan  Acute hemodialysis patient (HCC)  Currently dialyzing MWF at [facility] under care of Dr Redman.Etiology of DAVID 2/2 cardiorenal syndrome. First dialysis start date as inpatient 11/13.  Access [facility], no evidence of infection.  The patient underwent hemodialysis on December 16 and 3 kg was removed.  Plan for hemodialysis today as well as tomorrow to try to get the patient's weight down closer to dry weight given significant fluid overload and edema.  The patient's last hemodialysis was December 12 prior to admission.  Try to utilize diuretic as much as possible to minimize weight gain as well.  Also maintain fluid restriction.  Add torsemide 100 mg daily.    Acute on chronic systolic heart failure (HCC)  Wt Readings from Last 3 Encounters:   12/16/24 73.8 kg (162 lb 11.2 oz)   11/20/24 55.2 kg (121 lb 12.8 oz)   09/03/24 55.3 kg (122 lb)     Maintaining hemodialysis and ultrafiltration.    Nonischemic cardiomyopathy (HCC)  It is noted the patient has an ejection fraction of 20 to 25%.  The patient is on hydralazine, isosorbide and carvedilol terms of guideline directed medical therapy.  Uncontrolled hypertension  BP trends elevated but follow with ultrafiltration, resume outpatient antihypertensive.  Ambulatory dysfunction  PT/OT eval.   Anemia of renal disease  Hgb 8.4 likely dilutional effect.  Iron studies show iron saturation of 4 and ferritin of 70.   Start IV iron.  There is evidence of iron deficiency.  Need to get IV iron levels up before starting ALCIRA.  Check B12 and folate levels.  Hyponatremia  This is likely a combination of hypovolemic hyponatremia as well as CHF with decreased ejection fraction and DAVID affecting free water excretion.  Plan on doing hemodialysis with ultrafiltration daily for the next few days to get the patient's fluid weight down.    Secondary hyperparathyroidism of renal origin (HCC)  Follow PTH as outpatient.  Calcium is 8.8 with a calcium of 7.9 and albumin of 2.9.  Check phosphorus level.    Administrative Statements   VIRTUAL CARE DOCUMENTATION:     1. This service was provided via Telemedicine using [employer]     2. Parties in the room with patient during teleconsult RN    3. Confidentiality My office door was closed     4. Participants No one else was in the room    5. Patient acknowledged consent and understanding of privacy and security of the  Telemedicine consult. I informed the patient that I have reviewed their record in Epic and presented the opportunity for them to ask any questions regarding the visit today.  The patient agreed to participate.    6. I have spent a total time of 35 minutes in caring for this patient on the day of the visit/encounter including review of the medical record, discussion with the patient's nurse via [employer] secure chat as well as at bedside, documentation of medical complexity of care, documentation of plan of care and creation of plan of care, not including the time spent for establishing the audio/video connection.        SUBJECTIVE / 24H INTERVAL HISTORY:  Mr. Alvarez is seen this morning for end-stage renal disease on hemodialysis and fluid overload.  I had communicated with the patient's overnight nurse via [employer] secure chat this morning and she also was present at the patient's bedside this morning during rounds.  The patient had been placed on O2 overnight as he had complained of  shortness of breath.  The patient had hemodialysis on December 15 and 3 kg was removed and post weight was 69.2 kg.  He has an estimated dry weight of 60 kg.  When I spoke to the patient this morning he was currently on 2 L nasal cannula and pulse oximetry is 98%.  Last respirations are 18/min.  Systolic blood pressure yesterday range has been anywhere from 150-190 systolic and diastolics .  This did improve post hemodialysis and last 2 blood pressures were in 150s systolic and diastolic in the 70s.  Plan for hemodialysis today.    OBJECTIVE:  Current Weight: Weight - Scale: 73.8 kg (162 lb 11.2 oz) (Patient refused standing weight)  Vitals:    12/16/24 0028 12/16/24 0300 12/16/24 0517 12/16/24 0600   BP:   159/79    BP Location:       Pulse: 78  74    Resp:       Temp:       TempSrc:       SpO2: 99%  98%    Weight:  73.8 kg (162 lb 11.2 oz)  73.8 kg (162 lb 11.2 oz)       Intake/Output Summary (Last 24 hours) at 12/16/2024 0735  Last data filed at 12/16/2024 0601  Gross per 24 hour   Intake 910 ml   Output 3502 ml   Net -2592 ml     General: Patient is resting comfortably no acute distress.  HEENT: Normocephalic atraumatic there is bruising of his eye noted.  Neck: Appears to be supple  Pulmonary: No accessory muscle use noted no conversational dyspnea  Cardiac:  Extremities: The patient has significant lower extremity edema 2-3+ which appears to be as high as the upper thighs.  Neurologic: No focal deficit the patient is able to answer questions for me.  Again the patient's nurse was at bedside.  Medications:    Current Facility-Administered Medications:     acetaminophen (TYLENOL) tablet 650 mg, 650 mg, Oral, Q4H PRN, David Meraz MD    aspirin (ECOTRIN LOW STRENGTH) EC tablet 81 mg, 81 mg, Oral, Daily, David Meraz MD, 81 mg at 12/15/24 1025    carvedilol (COREG) tablet 12.5 mg, 12.5 mg, Oral, BID With Meals, David Meraz MD, 12.5 mg at 12/15/24 3951    heparin (porcine)  "subcutaneous injection 5,000 Units, 5,000 Units, Subcutaneous, Q8H [facility], David Meraz MD    hydrALAZINE (APRESOLINE) injection 10 mg, 10 mg, Intravenous, Q6H PRN, Chris Nolasco, , 10 mg at 12/15/24 1032    hydrALAZINE (APRESOLINE) tablet 100 mg, 100 mg, Oral, Q8H JEEVAN, David Meraz MD, 100 mg at 12/16/24 0518    isosorbide mononitrate (IMDUR) 24 hr tablet 120 mg, 120 mg, Oral, Daily, David Meraz MD, 120 mg at 12/15/24 1025    melatonin tablet 3 mg, 3 mg, Oral, HS, David Meraz MD, 3 mg at 12/15/24 2107    Laboratory Results:  Results from last 7 days   Lab Units 12/16/24  0448 12/15/24  0619 12/14/24  1624   WBC Thousand/uL 5.39 6.01 4.80   HEMOGLOBIN g/dL 8.4* 8.8* 9.6*   HEMATOCRIT % 26.8* 27.0* 29.7*   PLATELETS Thousands/uL 64* 82* 80*   POTASSIUM mmol/L 4.6 4.5 4.2   CHLORIDE mmol/L 92* 89* 89*   CO2 mmol/L 26 27 27   BUN mg/dL 41* 49* 44*   CREATININE mg/dL 4.55* 5.70* 5.30*   CALCIUM mg/dL 7.9* 8.4 8.4   MAGNESIUM mg/dL 2.0 2.0  --        Portions of the record may have been created with voice recognition software. Occasional wrong word or \"sound a like\" substitutions may have occurred due to the inherent limitations of voice recognition software. Read the chart carefully and recognize, using context, where substitutions have occurred.If you have any questions, please contact the dictating provider.   "

## 2024-12-16 NOTE — ASSESSMENT & PLAN NOTE
Dialysis, Monday, Wednesday, Friday.  HD today (12/15) and tomorrow (12/16)-goal for 3 L volume removal today (12/15)-nephrology closely following  An uric at baseline    Lab Results   Component Value Date    EGFR 12 12/16/2024    EGFR 9 12/15/2024    EGFR 10 12/14/2024    CREATININE 4.55 (H) 12/16/2024    CREATININE 5.70 (H) 12/15/2024    CREATININE 5.30 (H) 12/14/2024

## 2024-12-16 NOTE — PLAN OF CARE
Target UF Goal  3-4  L as tolerated. Patient dialyzing for 4 hours on 2 K bath for serum K of  4.6  per protocol. Treatment plan reviewed with Nephrology.     Problem: METABOLIC, FLUID AND ELECTROLYTES - ADULT  Goal: Electrolytes maintained within normal limits  Description: INTERVENTIONS:  - Monitor labs and assess patient for signs and symptoms of electrolyte imbalances  - Administer electrolyte replacement as ordered  - Monitor response to electrolyte replacements, including repeat lab results as appropriate  - Instruct patient on fluid and nutrition as appropriate  Outcome: Progressing     Problem: METABOLIC, FLUID AND ELECTROLYTES - ADULT  Goal: Fluid balance maintained  Description: INTERVENTIONS:  - Monitor labs   - Monitor I/O and WT  - Instruct patient on fluid and nutrition as appropriate  - Assess for signs & symptoms of volume excess or deficit  Outcome: Progressing

## 2024-12-16 NOTE — PROGRESS NOTES
"Progress Note - Hospitalist   Name: Matthew Alvarez 69 y.o. male I MRN: [id number]  Unit/Bed#: -01 I Date of Admission: 12/14/2024   Date of Service: 12/16/2024 I Hospital Day: 2    Assessment & Plan  Acute on chronic systolic heart failure (HCC)  Patient presented  on (12/14) after sustaining a mechanical fall 2 to 3 days ago.    Patient states he was ambulating and felt weak and fell to the floor however did not present for evaluation  Patient states that he missed dialysis on Wednesday and presented for regularly scheduled session on Friday where he was noted with significant bruising and referred to the ED  Patient presented to the ED yesterday (12/14) secondary to above    Evidence of severe volume overload with weight significantly up from dry weight  Notably, patient with gross noncompliance with outpatient medical regimen    Will undergo HD with UF today-goal for 3 L removal  Further repeated sessions per nephrology.    Nephrology closely following  Monitor for electrolyte abnormality    Wt Readings from Last 3 Encounters:   12/16/24 65.8 kg (145 lb 1 oz)   11/20/24 55.2 kg (121 lb 12.8 oz)   09/03/24 55.3 kg (122 lb)     ESRD (end stage renal disease) on dialysis (HCC)  Dialysis, Monday, Wednesday, Friday.  HD today (12/15) and tomorrow (12/16)-goal for 3 L volume removal today (12/15)-nephrology closely following  An uric at baseline    Lab Results   Component Value Date    EGFR 12 12/16/2024    EGFR 9 12/15/2024    EGFR 10 12/14/2024    CREATININE 4.55 (H) 12/16/2024    CREATININE 5.70 (H) 12/15/2024    CREATININE 5.30 (H) 12/14/2024     Nonischemic cardiomyopathy (HCC)  November 11, 2024: Echo, EF of 20-24%  Noncompliant with all medications in the outpatient setting-unclear if taking any  Also, set up with LifeVest which patient states he wears \"as needed\"  Uncontrolled hypertension  Noncompliant with his home medications  Restarted oral hydralazine, Coreg  As needed intravenous " hydralazine/labetalol as needed  Patient also significantly volume overloaded upon presentation which is likely a significant contributing factor.  Undergoing hemodialysis today.  Ambulatory dysfunction  Patient states he fell at home, has bruising on his face  Will consult PT/OT/case management  He states he sometimes walks with an assistive device  Will likely require short-term rehab  Coronary artery disease involving native coronary artery of native heart without angina pectoris  Continue aspirin  Hyponatremia  Volume control with dialysis today.  Acute hemodialysis patient ([facility])    Anemia of renal disease  Follow-up on iron, vitamin B12 and folate.  Plan to start IV iron  Secondary hyperparathyroidism of renal origin ([facility])  Nephrology on board.  Follow-up phosphorus level    VTE Pharmacologic Prophylaxis: VTE Score: 5 High Risk (Score >/= 5) - Pharmacological DVT Prophylaxis Ordered: heparin. Sequential Compression Devices Ordered.    Mobility:   Basic Mobility Inpatient Raw Score: 17  JH-HLM Goal: 5: Stand one or more mins  JH-HLM Achieved: 6: Walk 10 steps or more  JH-HLM Goal achieved. Continue to encourage appropriate mobility.    Patient Centered Rounds: I performed bedside rounds with nursing staff today.   Discussions with Specialists or Other Care Team Provider: Discussed with nursing at bedside    Education and Discussions with Family / Patient: Patient declined call to .     Current Length of Stay: 2 day(s)  Current Patient Status: Inpatient   Certification Statement: The patient will continue to require additional inpatient hospital stay due to ongoing volume control with dialysis  Discharge Plan: Anticipate discharge in 48 hrs to rehab facility.    Code Status: Level 3 - DNAR and DNI    Subjective   Seen and examined at bedside during dialysis.  Does not offer any complaints.  No acute events overnight.    Objective :  Temp:  [97.9 °F (36.6 °C)-99.1 °F (37.3 °C)] 97.9 °F (36.6 °C)  HR:   [61-79] 66  BP: (129-170)/(66-80) 170/78  Resp:  [16-18] 18  SpO2:  [95 %-99 %] 97 %  O2 Device: Nasal cannula  Nasal Cannula O2 Flow Rate (L/min):  [2 L/min] 2 L/min    Body mass index is 23.41 kg/m².     Input and Output Summary (last 24 hours):     Intake/Output Summary (Last 24 hours) at 12/16/2024 1342  Last data filed at 12/16/2024 1320  Gross per 24 hour   Intake 780 ml   Output 4000 ml   Net -3220 ml       Physical Exam  Constitutional:       General: He is not in acute distress.  HENT:      Head:      Comments: Bruising of left periorbital region noted.     Mouth/Throat:      Mouth: Mucous membranes are moist.   Eyes:      Pupils: Pupils are equal, round, and reactive to light.   Cardiovascular:      Rate and Rhythm: Normal rate and regular rhythm.   Pulmonary:      Effort: Pulmonary effort is normal.      Breath sounds: Rales present. No wheezing.   Abdominal:      General: Bowel sounds are normal.      Palpations: Abdomen is soft.   Musculoskeletal:      Right lower leg: No edema.      Left lower leg: No edema.   Skin:     General: Skin is warm.   Neurological:      Mental Status: He is alert and oriented to person, place, and time. Mental status is at baseline.   Psychiatric:         Mood and Affect: Mood normal.           Lines/Drains:  Lines/Drains/Airways       Active Status       Name Placement date Placement time Site Days    HD Permanent Double Catheter 11/14/24  1352  Internal jugular  31                      Telemetry:  Telemetry Orders (From admission, onward)               24 Hour Telemetry Monitoring  Continuous x 24 Hours (Telem)        Question:  Reason for 24 Hour Telemetry  Answer:  Decompensated CHF- and any one of the following: continuous diuretic infusion or total diuretic dose >200 mg daily, associated electrolyte derangement (I.e. K < 3.0), inotropic drip (continuous infusion), hx of ventricular arrhythmia, or new EF < 35%                     Telemetry Reviewed: Normal Sinus  Rhythm  Indication for Continued Telemetry Use: No indication for continued use. Will discontinue.                Lab Results: I have reviewed the following results:   Results from last 7 days   Lab Units 12/16/24  0448 12/15/24  0619 12/14/24  1624   WBC Thousand/uL 5.39   < > 4.80   HEMOGLOBIN g/dL 8.4*   < > 9.6*   HEMATOCRIT % 26.8*   < > 29.7*   PLATELETS Thousands/uL 64*   < > 80*   SEGS PCT %  --   --  80*   LYMPHO PCT %  --   --  10*   MONO PCT %  --   --  8   EOS PCT %  --   --  1    < > = values in this interval not displayed.     Results from last 7 days   Lab Units 12/16/24  0448   SODIUM mmol/L 126*   POTASSIUM mmol/L 4.6   CHLORIDE mmol/L 92*   CO2 mmol/L 26   BUN mg/dL 41*   CREATININE mg/dL 4.55*   ANION GAP mmol/L 8   CALCIUM mg/dL 7.9*   ALBUMIN g/dL 2.9*   TOTAL BILIRUBIN mg/dL 1.29*   ALK PHOS U/L 100   ALT U/L 8   AST U/L 18   GLUCOSE RANDOM mg/dL 105     Results from last 7 days   Lab Units 12/14/24  1624   INR  1.27*                   Recent Cultures (last 7 days):               Last 24 Hours Medication List:     Current Facility-Administered Medications:     acetaminophen (TYLENOL) tablet 650 mg, Q4H PRN    aspirin (ECOTRIN LOW STRENGTH) EC tablet 81 mg, Daily    carvedilol (COREG) tablet 12.5 mg, BID With Meals    heparin (porcine) subcutaneous injection 5,000 Units, Q8H JEEVAN    hydrALAZINE (APRESOLINE) injection 10 mg, Q6H PRN    hydrALAZINE (APRESOLINE) tablet 100 mg, Q8H JEEVAN    iron sucrose (VENOFER) 100 mg in sodium chloride 0.9 % 50 mL IVPB, Once    isosorbide mononitrate (IMDUR) 24 hr tablet 120 mg, Daily    melatonin tablet 3 mg, HS    torsemide (DEMADEX) tablet 100 mg, Daily    Administrative Statements   Today, Patient Was Seen By: Shari Arceo MD      **Please Note: This note may have been constructed using a voice recognition system.**

## 2024-12-16 NOTE — ASSESSMENT & PLAN NOTE
Follow PTH as outpatient.  Calcium is 8.8 with a calcium of 7.9 and albumin of 2.9.  Check phosphorus level.    Administrative Statements   VIRTUAL CARE DOCUMENTATION:     1. This service was provided via Telemedicine using [employer]     2. Parties in the room with patient during teleconsult RN    3. Confidentiality My office door was closed     4. Participants No one else was in the room    5. Patient acknowledged consent and understanding of privacy and security of the  Telemedicine consult. I informed the patient that I have reviewed their record in Epic and presented the opportunity for them to ask any questions regarding the visit today.  The patient agreed to participate.    6. I have spent a total time of 35 minutes in caring for this patient on the day of the visit/encounter including review of the medical record, discussion with the patient's nurse via [employer] secure chat as well as at bedside, documentation of medical complexity of care, documentation of plan of care and creation of plan of care, not including the time spent for establishing the audio/video connection.

## 2024-12-16 NOTE — UTILIZATION REVIEW
Initial Clinical Review    Admission: Date/Time/Statement:   Admission Orders (From admission, onward)       Ordered        12/14/24 1904  INPATIENT ADMISSION  Once                          Orders Placed This Encounter   Procedures    INPATIENT ADMISSION     Standing Status:   Standing     Number of Occurrences:   1     Level of Care:   Med Surg [16]     Estimated length of stay:   More than 2 Midnights     Certification:   I certify that inpatient services are medically necessary for this patient for a duration of greater than two midnights. See H&P and MD Progress Notes for additional information about the patient's course of treatment.     ED Arrival Information       Expected   -    Arrival   12/14/2024 16:07    Acuity   Emergent              Means of arrival   Wheelchair    Escorted by   Family Member    Service   Hospitalist    Admission type   Emergency              Arrival complaint   Fall 2 days ago - Headstrike on bt             Chief Complaint   Patient presents with    Fall     Pt arrives with c/o fall 2 days ago while going to the bathroom in the middle of the night. HS on hardwood floor. +asa. No loc.        Initial Presentation: 69 y.o. male to ED from home w/ PMH of end-stage renal disease on dialysis Monday, Wednesday, Friday, nonischemic cardiomyopathy with EF 20%, uncontrolled hypertension who presents with fall 2 days ago while trying out of the bathroom and melanite.  Patient went to the dialysis center today and was encouraged to come to the ER due to traumatic injury and bruising on his face.  He also has severe edema all the way up to his thighs.  Patient is not compliant with his blood pressure medications, and his diet. Admitted IP status w/ acute on chronic HF , plan for dialysis in am , does not make urine . ESRD HD MWF , nephrology consulted . HTN restarted hydralazine and coreg . Amb dysfunction consult PT OT . CAD cont asa .     Anticipated Length of Stay/Certification Statement:  Patient will be admitted on an inpatient basis with an anticipated length of stay of greater than 2 midnights secondary to acute on chronic CHF.       Date:  12/15  Day 2: undergoing HD w/o c/o . cont stay for gross volume overload . Anticipate discharge in 48-72 hrs to rehab facility.   + rales, abd distention , BLE edema .     12/15 Nephrology  Consult   Last HD 12/12 as OP . Will arrange for HD today, UF 3 L. High weight gain noted. EDW 60.0. Weight 73.9 KG on admission. Appears severely hypervolemic.   Plan for daily HD to manage volume status.  Need dietary restriction to minimize weight gain. UOP minimal so diuretic likely futile.  Resume OP antihypertensives .     Date: 12/16  Day 3: Has surpassed a 2nd midnight with active treatments and services. Cont stay for volume control w/ dialysis . Plan for dialysis today .Follow-up on iron, vitamin B12 and folate. Plan to start IV iron . + rales .  -3220 ml net output last 24 hrs . Anticipate discharge in 48 hrs to rehab facility.         ED Treatment-Medication Administration from 12/14/2024 1606 to 12/14/2024 2007         Date/Time Order Dose Route Action     12/14/2024 1709 iohexol (OMNIPAQUE) 350 MG/ML injection (MULTI-DOSE) 85 mL 85 mL Intravenous Given     12/14/2024 1645 LORazepam (ATIVAN) injection 1 mg 1 mg Intravenous Given            Scheduled Medications:  aspirin, 81 mg, Oral, Daily  carvedilol, 12.5 mg, Oral, BID With Meals  heparin (porcine), 5,000 Units, Subcutaneous, Q8H JEEVAN  hydrALAZINE, 100 mg, Oral, Q8H JEEVAN  isosorbide mononitrate, 120 mg, Oral, Daily  melatonin, 3 mg, Oral, HS      Continuous IV Infusions:     PRN Meds:  acetaminophen, 650 mg, Oral, Q4H PRN  hydrALAZINE, 10 mg, Intravenous, Q6H PRN      ED Triage Vitals   Temperature Pulse Respirations Blood Pressure SpO2 Pain Score   12/14/24 1620 12/14/24 1620 12/14/24 1620 12/14/24 1620 12/14/24 1620 12/14/24 2021   97.7 °F (36.5 °C) 73 18 165/91 96 % No Pain     Weight (last 2 days)        Date/Time Weight    12/16/24 0600 73.8 (162.7)     Weight: Patient refused standing weight at 12/16/24 0600    12/16/24 0300 73.8 (162.7)    12/15/24 0553 73.9 (162.92)     Weight: resfused standing weight at 12/15/24 0553    12/14/24 1951 73.9 (162.92)     Weight: Patient refused standing weight at 12/14/24 1951            Vital Signs (last 3 days)       Date/Time Temp Pulse Resp BP MAP (mmHg) SpO2 Calculated FIO2 (%) - Nasal Cannula Nasal Cannula O2 Flow Rate (L/min) O2 Device Patient Position - Orthostatic VS Axis Coma Scale Score Pain    12/16/24 05:17:17 -- 74 -- 159/79 106 98 % -- -- -- -- -- --    12/16/24 0028 -- 78 -- -- -- 99 % 28 2 L/min Nasal cannula -- -- --    12/15/24 21:07:31 99.1 °F (37.3 °C) 68 18 151/75 100 96 % -- -- None (Room air) Sitting -- No Pain    12/15/24 2046 -- -- -- -- -- 95 % -- -- None (Room air) -- 15 No Pain    12/15/24 15:15:30 97.9 °F (36.6 °C) 68 18 163/73 103 97 % -- -- -- -- -- --    12/15/24 11:29:23 -- -- -- 135/69 91 -- -- -- -- -- -- --    12/15/24 1122 -- -- -- -- -- -- -- -- -- -- -- No Pain    12/15/24 1025 -- -- -- -- -- -- -- -- -- -- 15 No Pain    12/15/24 0930 -- 80 18 171/100 123 -- -- -- -- Lying -- --    12/15/24 0926 -- 69 18 181/100 126 -- -- -- -- Lying -- --    12/15/24 0900 -- 73 18 178/107 130 -- -- -- -- Lying -- --    12/15/24 0830 -- 71 18 176/111 131 -- -- -- -- Lying -- --    12/15/24 0800 -- 72 18 170/103 124 -- -- -- -- Lying -- --    12/15/24 0730 -- 72 18 162/109 125 -- -- -- -- Lying -- --    12/15/24 0700 -- 70 18 174/101 124 -- -- -- -- Lying -- --    12/15/24 0630 -- 70 20 157/97 116 -- -- -- -- Lying -- --    12/15/24 0623 97.7 °F (36.5 °C) 72 20 169/99 121 -- -- -- -- Lying -- --    12/15/24 0548 -- -- -- 158/98 -- -- -- -- -- -- -- --    12/15/24 0512 -- -- -- 162/80 -- -- -- -- -- -- -- --    12/15/24 0449 -- -- -- 192/90 -- -- -- -- -- Lying -- --    12/15/24 0343 -- -- -- 170/102 -- -- -- -- -- Lying -- --    12/15/24 03:42:11 -- 79 --  181/91 121 93 % -- -- -- Lying -- --    12/14/24 2118 -- -- -- 158/90 -- -- -- -- -- Lying -- --    12/14/24 2023 -- -- -- 192/100 -- -- -- -- -- Lying -- --    12/14/24 20:21:03 -- 82 -- 183/112 136 94 % -- -- -- Lying 15 No Pain    12/14/24 1920 -- 80 16 188/109 -- 95 % -- -- None (Room air) Sitting 15 --    12/14/24 1820 -- 78 16 184/106 -- 95 % -- -- None (Room air) Sitting 15 --    12/14/24 1720 -- 82 18 169/97 -- 96 % -- -- None (Room air) Sitting 15 --    12/14/24 16:20:09 97.7 °F (36.5 °C) 73 18 165/91 -- 96 % -- -- None (Room air) -- 15 --              Pertinent Labs/Diagnostic Test Results:   Radiology:  TRAUMA - CT chest abdomen pelvis w contrast   Final Interpretation by Galen Griffith MD (12/14 1740)      No findings of acute traumatic injury in the chest, abdomen or pelvis.      Moderate to large bilateral pleural effusions. Cardiomegaly.      Small ascites and diffuse body wall edema consistent with fluid overload/3rd spacing of fluids.      Chronic abdominal aortoiliac occlusion noted.      The study was marked in EPIC for immediate notification.         Workstation performed: [id number]         TRAUMA - CT head wo contrast   Final Interpretation by Galen Griffith MD (12/14 1718)      No acute intracranial abnormality.      Left periorbital soft tissue swelling. See  CT facial bone study for further details.      The study was marked in EPIC for immediate notification.                  Workstation performed: [id number]         TRAUMA - CT spine cervical wo contrast   Final Interpretation by Galen Griffith MD (12/14 1720)      No cervical spine fracture or traumatic malalignment.      Partially imaged bilateral pleural effusions.      The study was marked in EPIC for immediate notification.            Workstation performed: [id number]         TRAUMA - CT facial bones wo contrast   Final Interpretation by Galen Griffith MD (12/14 1723)      Diffuse left periorbital soft  tissue swelling. No evidence of facial bone fracture.      The study was marked in EPIC for immediate notification.               Workstation performed: [id number]         XR Trauma chest portable   Final Interpretation by Galen Griffith MD (12/14 1741)      Vascular congestion and moderate bilateral pleural effusions.            Workstation performed: [id number]         XR Trauma pelvis ap only 1 or 2 vw   Final Interpretation by Galen Griffith MD (12/14 1742)      No acute osseous abnormality.         Computerized Assisted Algorithm (CAA) may have been used to analyze all applicable images.         Workstation performed: [id number]           Cardiology:  ECG 12 lead   Final Result by Carlos To MD (12/15 1756)   Sinus rhythm with short WY   ST & T wave abnormality, consider lateral ischemia   Abnormal ECG   When compared with ECG of 10-Nov-2024 19:00,   WY interval has decreased   Confirmed by Carlos To ([id number]) on 12/15/2024 5:56:30 PM        GI:  No orders to display           Results from last 7 days   Lab Units 12/16/24  0448 12/15/24  0619 12/14/24  1624   WBC Thousand/uL 5.39 6.01 4.80   HEMOGLOBIN g/dL 8.4* 8.8* 9.6*   HEMATOCRIT % 26.8* 27.0* 29.7*   PLATELETS Thousands/uL 64* 82* 80*   TOTAL NEUT ABS Thousands/µL  --   --  3.86         Results from last 7 days   Lab Units 12/16/24  0448 12/15/24  0619 12/14/24  1624   SODIUM mmol/L 126* 126* 127*   POTASSIUM mmol/L 4.6 4.5 4.2   CHLORIDE mmol/L 92* 89* 89*   CO2 mmol/L 26 27 27   ANION GAP mmol/L 8 10 11   BUN mg/dL 41* 49* 44*   CREATININE mg/dL 4.55* 5.70* 5.30*   EGFR ml/min/1.73sq m 12 9 10   CALCIUM mg/dL 7.9* 8.4 8.4   MAGNESIUM mg/dL 2.0 2.0  --      Results from last 7 days   Lab Units 12/16/24  0448 12/14/24  1624   AST U/L 18 21   ALT U/L 8 9   ALK PHOS U/L 100 119*   TOTAL PROTEIN g/dL 6.1* 6.8   ALBUMIN g/dL 2.9* 3.2*   TOTAL BILIRUBIN mg/dL 1.29* 1.36*         Results from last 7 days   Lab Units 12/16/24  0448 12/15/24  0619  12/14/24  1624   GLUCOSE RANDOM mg/dL 105 102 99         Results from last 7 days   Lab Units 12/14/24  1624   PH ROSA MARIA  7.359   PCO2 ROSA MARIA mm Hg 45.9   PO2 ROSA MARIA mm Hg 45.6*   HCO3 ROSA MARIA mmol/L 25.3   BASE EXC ROSA MARIA mmol/L -0.4   O2 CONTENT ROSA MARIA ml/dL 11.8   O2 HGB, VENOUS % 76.5       Results from last 7 days   Lab Units 12/14/24  1624   CK TOTAL U/L 57     Results from last 7 days   Lab Units 12/14/24  2056 12/14/24  1846 12/14/24  1624   HS TNI 0HR ng/L  --   --  62*   HS TNI 2HR ng/L  --  50*  --    HSTNI D2 ng/L  --  -12  --    HS TNI 4HR ng/L 55*  --   --    HSTNI D4 ng/L -7  --   --        Results from last 7 days   Lab Units 12/14/24  1624   PROTIME seconds 16.2*   INR  1.27*   PTT seconds 30     Results from last 7 days   Lab Units 12/14/24  1624   BNP pg/mL >4,700*     Past Medical History:   Diagnosis Date    Acute hemodialysis patient ([facility]) 12/14/2024    CAD (coronary artery disease)     HFrEF (heart failure with reduced ejection fraction) ([facility]) 09/2021    Hypertension     Rheumatoid factor positive 09/2021    Stage 3 chronic kidney disease ([facility])     Tobacco abuse      Present on Admission:   Nonischemic cardiomyopathy ([facility])   Coronary artery disease involving native coronary artery of native heart without angina pectoris   Acute on chronic systolic heart failure ([facility])   Uncontrolled hypertension   Ambulatory dysfunction   Anemia of renal disease   Hyponatremia      Admitting Diagnosis: Acute on chronic systolic heart failure ([facility]) [I50.23]  Stage 5 chronic kidney disease on chronic dialysis ([facility]) [N18.6, Z99.2]  Unspecified multiple injuries, initial encounter [T07.XXXA]  Age/Sex: 69 y.o. male    Network Utilization Review Department  ATTENTION: Please call with any questions or concerns to [phone] and carefully listen to the prompts so that you are directed to the right person. All voicemails are confidential.   For Discharge needs, contact Care Management DC Support Team at [phone] opt. [phone]  Send all  requests for admission clinical reviews, approved or denied determinations and any other requests to dedicated fax number below belonging to the campus where the patient is receiving treatment. List of dedicated fax numbers for the Facilities:  FACILITY NAME UR FAX NUMBER   ADMISSION DENIALS (Administrative/Medical Necessity) [phone]   DISCHARGE SUPPORT TEAM (NETWORK) [phone]   PARENT CHILD HEALTH (Maternity/NICU/Pediatrics) [phone]   [facility] [phone]   [facility] [phone]   [facility] [phone]   [facility] [phone]   [facility] [phone]   [facility] [phone]   [facility] [phone]   [facility] [phone]   [facility] [phone]   [facility] [phone]   [facility] [phone]   [facility] [phone]

## 2024-12-16 NOTE — PHYSICAL THERAPY NOTE
PHYSICAL THERAPY TREATMENT NOTE        NAME:  Matthew Alvarez  DATE: 12/16/24    Length Of Stay: 2  Performed at least 2 patient identifiers during session: Name and Birthday        TREATMENT FLOW SHEET:       12/16/24 1347   PT Last Visit   PT Visit Date 12/16/24   Note Type   Note Type Treatment   Pain Assessment   Pain Assessment Tool 0-10   Pain Score No Pain   Restrictions/Precautions   Weight Bearing Precautions Per Order No   Other Precautions Chair Alarm;Bed Alarm;Fall Risk;O2;Telemetry  (2L O2)   General   Chart Reviewed Yes   Response to Previous Treatment Patient reporting fatigue but able to participate.   Family/Caregiver Present No   Cognition   Overall Cognitive Status WFL   Arousal/Participation Alert   Orientation Level Oriented X4  (50% to situation)   Bed Mobility   Sit to Supine 3  Moderate assistance   Additional items Assist x 1;HOB elevated;Verbal cues;LE management   Transfers   Sit to Stand   (SBA)   Additional items Verbal cues   Stand to Sit   (SBA)   Additional items Verbal cues   Stand pivot   (SBA)   Additional items Verbal cues   Additional Comments RW   Ambulation/Elevation   Gait pattern Improper Weight shift;Inconsistent jennifer;Decreased hip extension   Gait Assistance   (SBA)   Additional items Verbal cues   Assistive Device Rolling walker   Distance 17 ft   Balance   Static Sitting Good   Dynamic Sitting Fair   Static Standing Fair   Dynamic Standing Fair -   Ambulatory Fair   Endurance Deficit   Endurance Deficit Yes   Activity Tolerance   Activity Tolerance Patient limited by fatigue   Assessment   Prognosis Good   Problem List Decreased strength;Decreased endurance;Impaired balance;Decreased mobility;Impaired judgement;Decreased safety awareness;Decreased skin integrity   Assessment Pt tolerates tx session fairly.  He reports being significantly fatigued from just finishing up with dialysis, requesting to return to bed post therapy session.  He declines any dizziness or  lightheadedness.  SpO2 on RA for exertion remains in mid 90s however pt requests O2 to be re-donned when returning to bed.   Barriers to Discharge Other (Comment)   Barriers to Discharge Comments BRODY; impaired activity tolerance   Goals   STG Expiration Date 12/29/24   PT Treatment Day 1   Plan   Progress Slow progress, decreased activity tolerance   PT Frequency 3-5x/wk   Discharge Recommendation   Rehab Resource Intensity Level, PT II (Moderate Resource Intensity)   Additional Comments IF pt were to decline PAR, it is recommended that he obtain a hospital bed for 1st flr use instead of sleeping on couch or in manual W/C.  Would not be a bad idea to obtain a transport W/C for community mobility to make it easier transporation wise instead of standard manual W/C.   AM-PAC Basic Mobility Inpatient   Turning in Flat Bed Without Bedrails 3   Lying on Back to Sitting on Edge of Flat Bed Without Bedrails 2   Moving Bed to Chair 3   Standing Up From Chair Using Arms 3   Walk in Room 3   Climb 3-5 Stairs With Railing 1   Basic Mobility Inpatient Raw Score 15   Basic Mobility Standardized Score 36.97   [facility] Highest Level Of Mobility   -HL Goal 4: Move to chair/commode   -HL Achieved 6: Walk 10 steps or more   Education   Education Provided Mobility training;Assistive device   Patient Demonstrates verbal understanding   End of Consult   Patient Position at End of Consult Supine;Bed/Chair alarm activated;All needs within reach       The patient's AM-PAC Basic Mobility Inpatient Short Form Raw Score is 15. A Raw score of less than or equal to 16 suggests the patient may benefit from discharge to post-acute rehabilitation services. Please also refer to the recommendation of the Physical Therapist for safe discharge planning.         Jamil Ramirez, PT,DPT

## 2024-12-16 NOTE — ASSESSMENT & PLAN NOTE
Currently dialyzing MWF at [facility] under care of Dr Redman.Etiology of DAVID 2/2 cardiorenal syndrome. First dialysis start date as inpatient 11/13.  Access [facility], no evidence of infection.  The patient underwent hemodialysis on December 16 and 3 kg was removed.  Plan for hemodialysis today as well as tomorrow to try to get the patient's weight down closer to dry weight given significant fluid overload and edema.  The patient's last hemodialysis was December 12 prior to admission.  Try to utilize diuretic as much as possible to minimize weight gain as well.  Also maintain fluid restriction.  Add torsemide 100 mg daily.

## 2024-12-17 ENCOUNTER — APPOINTMENT (INPATIENT)
Dept: RADIOLOGY | Facility: HOSPITAL | Age: 69
DRG: 291 | End: 2024-12-17
Payer: COMMERCIAL

## 2024-12-17 ENCOUNTER — APPOINTMENT (INPATIENT)
Dept: DIALYSIS | Facility: HOSPITAL | Age: 69
DRG: 291 | End: 2024-12-17
Payer: COMMERCIAL

## 2024-12-17 ENCOUNTER — LAB REQUISITION (OUTPATIENT)
Dept: LAB | Facility: HOSPITAL | Age: 69
End: 2024-12-17
Payer: COMMERCIAL

## 2024-12-17 DIAGNOSIS — I50.22 CHRONIC SYSTOLIC (CONGESTIVE) HEART FAILURE (HCC): ICD-10-CM

## 2024-12-17 LAB
ALBUMIN SERPL BCG-MCNC: 3 G/DL (ref 3.5–5)
ALP SERPL-CCNC: 109 U/L (ref 34–104)
ALT SERPL W P-5'-P-CCNC: 9 U/L (ref 7–52)
ANION GAP SERPL CALCULATED.3IONS-SCNC: 6 MMOL/L (ref 4–13)
AST SERPL W P-5'-P-CCNC: 19 U/L (ref 13–39)
BASOPHILS # BLD AUTO: 0.01 THOUSANDS/ÂΜL (ref 0–0.1)
BASOPHILS NFR BLD AUTO: 0 % (ref 0–1)
BILIRUB SERPL-MCNC: 1.2 MG/DL (ref 0.2–1)
BNP SERPL-MCNC: >4700 PG/ML (ref 0–100)
BUN SERPL-MCNC: 33 MG/DL (ref 5–25)
CALCIUM ALBUM COR SERPL-MCNC: 8.9 MG/DL (ref 8.3–10.1)
CALCIUM SERPL-MCNC: 8.1 MG/DL (ref 8.4–10.2)
CHLORIDE SERPL-SCNC: 92 MMOL/L (ref 96–108)
CO2 SERPL-SCNC: 27 MMOL/L (ref 21–32)
CREAT SERPL-MCNC: 4.09 MG/DL (ref 0.6–1.3)
EOSINOPHIL # BLD AUTO: 0.04 THOUSAND/ÂΜL (ref 0–0.61)
EOSINOPHIL NFR BLD AUTO: 1 % (ref 0–6)
ERYTHROCYTE [DISTWIDTH] IN BLOOD BY AUTOMATED COUNT: 17.3 % (ref 11.6–15.1)
GFR SERPL CREATININE-BSD FRML MDRD: 13 ML/MIN/1.73SQ M
GLUCOSE SERPL-MCNC: 143 MG/DL (ref 65–140)
HCT VFR BLD AUTO: 25.8 % (ref 36.5–49.3)
HGB BLD-MCNC: 8.3 G/DL (ref 12–17)
IMM GRANULOCYTES # BLD AUTO: 0.01 THOUSAND/UL (ref 0–0.2)
IMM GRANULOCYTES NFR BLD AUTO: 0 % (ref 0–2)
IRON SATN MFR SERPL: 10 % (ref 15–50)
IRON SERPL-MCNC: 37 UG/DL (ref 50–212)
LYMPHOCYTES # BLD AUTO: 0.5 THOUSANDS/ÂΜL (ref 0.6–4.47)
LYMPHOCYTES NFR BLD AUTO: 11 % (ref 14–44)
MCH RBC QN AUTO: 26.9 PG (ref 26.8–34.3)
MCHC RBC AUTO-ENTMCNC: 32.2 G/DL (ref 31.4–37.4)
MCV RBC AUTO: 84 FL (ref 82–98)
MONOCYTES # BLD AUTO: 0.34 THOUSAND/ÂΜL (ref 0.17–1.22)
MONOCYTES NFR BLD AUTO: 8 % (ref 4–12)
NEUTROPHILS # BLD AUTO: 3.54 THOUSANDS/ÂΜL (ref 1.85–7.62)
NEUTS SEG NFR BLD AUTO: 80 % (ref 43–75)
NRBC BLD AUTO-RTO: 0 /100 WBCS
PLATELET # BLD AUTO: 52 THOUSANDS/UL (ref 149–390)
PMV BLD AUTO: 11.3 FL (ref 8.9–12.7)
POTASSIUM SERPL-SCNC: 4.4 MMOL/L (ref 3.5–5.3)
PROT SERPL-MCNC: 6.2 G/DL (ref 6.4–8.4)
RBC # BLD AUTO: 3.08 MILLION/UL (ref 3.88–5.62)
SODIUM SERPL-SCNC: 125 MMOL/L (ref 135–147)
TIBC SERPL-MCNC: 359.8 UG/DL (ref 250–450)
TRANSFERRIN SERPL-MCNC: 257 MG/DL (ref 203–362)
UIBC SERPL-MCNC: 323 UG/DL (ref 155–355)
WBC # BLD AUTO: 4.44 THOUSAND/UL (ref 4.31–10.16)

## 2024-12-17 PROCEDURE — 71045 X-RAY EXAM CHEST 1 VIEW: CPT

## 2024-12-17 PROCEDURE — 85025 COMPLETE CBC W/AUTO DIFF WBC: CPT | Performed by: INTERNAL MEDICINE

## 2024-12-17 PROCEDURE — 80053 COMPREHEN METABOLIC PANEL: CPT | Performed by: INTERNAL MEDICINE

## 2024-12-17 PROCEDURE — 99223 1ST HOSP IP/OBS HIGH 75: CPT | Performed by: INTERNAL MEDICINE

## 2024-12-17 PROCEDURE — 97530 THERAPEUTIC ACTIVITIES: CPT

## 2024-12-17 PROCEDURE — 99232 SBSQ HOSP IP/OBS MODERATE 35: CPT | Performed by: INTERNAL MEDICINE

## 2024-12-17 PROCEDURE — 97110 THERAPEUTIC EXERCISES: CPT

## 2024-12-17 PROCEDURE — 83880 ASSAY OF NATRIURETIC PEPTIDE: CPT | Performed by: INTERNAL MEDICINE

## 2024-12-17 PROCEDURE — 97116 GAIT TRAINING THERAPY: CPT

## 2024-12-17 RX ORDER — ATORVASTATIN CALCIUM 20 MG/1
20 TABLET, FILM COATED ORAL
Status: DISCONTINUED | OUTPATIENT
Start: 2024-12-17 | End: 2024-12-27 | Stop reason: HOSPADM

## 2024-12-17 RX ORDER — FOLIC ACID 1 MG/1
1 TABLET ORAL DAILY
Status: DISCONTINUED | OUTPATIENT
Start: 2024-12-17 | End: 2024-12-27 | Stop reason: HOSPADM

## 2024-12-17 RX ADMIN — HYDRALAZINE HYDROCHLORIDE 100 MG: 25 TABLET ORAL at 21:25

## 2024-12-17 RX ADMIN — TORSEMIDE 100 MG: 100 TABLET ORAL at 08:31

## 2024-12-17 RX ADMIN — FOLIC ACID 1 MG: 1 TABLET ORAL at 08:31

## 2024-12-17 RX ADMIN — HYDRALAZINE HYDROCHLORIDE 100 MG: 25 TABLET ORAL at 05:49

## 2024-12-17 RX ADMIN — CARVEDILOL 12.5 MG: 12.5 TABLET, FILM COATED ORAL at 07:17

## 2024-12-17 RX ADMIN — HYDRALAZINE HYDROCHLORIDE 10 MG: 20 INJECTION INTRAMUSCULAR; INTRAVENOUS at 17:48

## 2024-12-17 RX ADMIN — HYDRALAZINE HYDROCHLORIDE 100 MG: 25 TABLET ORAL at 15:31

## 2024-12-17 RX ADMIN — ASPIRIN 81 MG: 81 TABLET, COATED ORAL at 08:31

## 2024-12-17 RX ADMIN — Medication 3 MG: at 21:25

## 2024-12-17 RX ADMIN — ISOSORBIDE MONONITRATE 120 MG: 60 TABLET, EXTENDED RELEASE ORAL at 08:31

## 2024-12-17 RX ADMIN — ATORVASTATIN CALCIUM 20 MG: 20 TABLET, FILM COATED ORAL at 17:43

## 2024-12-17 RX ADMIN — CARVEDILOL 12.5 MG: 12.5 TABLET, FILM COATED ORAL at 17:43

## 2024-12-17 NOTE — PROGRESS NOTES
NEPHROLOGY HOSPITAL PROGRESS NOTE   Matthew Alvarez 69 y.o. male MRN: [id number]  Unit/Bed#: -01 Encounter: [id number]  Reason for Consult: End-stage renal disease on hemodialysis and fluid overload    Brief History of Admission - 68 yo male with DAVID now dialysis dependent new start in November, non ischemic cardiomyopathy with reduced EF 20% and uncontrolled HTN presented sp fall 2 days ago while attempting to get out of bathroom. He said the room was dark and lead to fall. Last HD tx 12/12. Outpatient dialysis records show large fluid gains.  Current dry weight had been approximately 60 kg and patient presents with a weight of 73.8 kg.  The patient is still having shortness of breath and his weight is down to 65.8 kg.  Dialysis had been performed on Sunday Monday and will be performed on Tuesday and Wednesday and then reassess.  As an outpatient patient was being dialyzed Monday Wednesday Friday.    Assessment & Plan  Acute hemodialysis patient (HCC)  Currently dialyzing MWF at [facility] under care of Dr Redman.Etiology of DAVID 2/2 cardiorenal syndrome. First dialysis start date as inpatient 11/13.  Access [facility], no evidence of infection.  The patient underwent hemodialysis on December 16 and 3 kg was removed.  As noted, the patient underwent hemodialysis on [place], December 15 as well as on Monday, December 16.  His weight has decreased to 73.8 kg to 65 kg.  Estimated dry weight is approximately 60 kg.  Plan for hemodialysis today with trying to remove another 3 kg.  With shortness of breath will check chest x-ray and repeat BNP.  Utilizing torsemide and 100 mg daily to see if we can also augment urinary output to minimize fluid gainsin between dialysis sessions.  Acute on chronic systolic heart failure (HCC)  Wt Readings from Last 3 Encounters:   12/17/24 65.5 kg (144 lb 6.4 oz)   11/20/24 55.2 kg (121 lb 12.8 oz)   09/03/24 55.3 kg (122 lb)     The plan is as noted under acute hemodialysis patient.   Plan for hemodialysis ultrafiltration today and tomorrow checking chest x-ray and BNP.  Patient still having shortness of breath.  Nonischemic cardiomyopathy (HCC)  It is noted the patient has an ejection fraction of 20 to 25%.  The patient is on hydralazine, isosorbide and carvedilol terms of guideline directed medical therapy.  Uncontrolled hypertension  BP trends elevated but follow with ultrafiltration, resume outpatient antihypertensive.  Ambulatory dysfunction  PT/OT evaluation and follow-up  Anemia of renal disease  Hgb 8.4 likely dilutional effect.  Iron studies show iron saturation of 4 and ferritin of 70.  S the patient was given 1 dose of IV iron on December 16 based on iron levels from November 2024. Awaiting full iron studies to come back ferritin is 109, folic acid is 8.4..  B12 is normal at 647.  Awaiting full iron panel from December 16 before starting ALCIRA.  Hold off on further iron until iron saturation is back. Check CBC today.  Hyponatremia  This is likely a combination of hypovolemic hyponatremia as well as CHF with decreased ejection fraction and DAVID affecting free water excretion.  Plan on doing hemodialysis with ultrafiltration daily for the next few days to get the patient's fluid weight down.  Sodium is 126 on December 16, check BMP today.    Secondary hyperparathyroidism of renal origin (HCC)  Follow PTH as outpatient.  Calcium is 8.8 with a calcium of 7.9 and albumin of 2.9.  Check phosphorus level.    Please Note: I communicated with Dr Arceo from the hospitalist service this morning regarding the events overnight. Cardiology to be consulted. Plan again for HD/UF today as well.     Administrative Statements   VIRTUAL CARE DOCUMENTATION:     1. This service was provided via Telemedicine using drumbi TV Kit     2. Parties in the room with patient during teleconsult RN    3. Confidentiality My office door was closed     4. Participants No one else was in the room    5. Patient  acknowledged consent and understanding of privacy and security of the  Telemedicine consult. I informed the patient that I have reviewed their record in Epic and presented the opportunity for them to ask any questions regarding the visit today.  The patient agreed to participate.    6. I have spent a total time of approximately 33  minutes in caring for this patient on the day of the visit/encounter including review of the medical record, discussion with the patient's nurse via [employer] secure chat as well as at bedside, documentation of medical complexity of care, documentation of plan of care and creation of plan of care, not including the time spent for establishing the audio/video connection.          SUBJECTIVE / 24H INTERVAL HISTORY:  Mr. Alvarez was seen this morning in follow-up for end-stage renal disease on hemodialysis and fluid management.  I also had communicated with the patient's nurse this morning via epic secure chat.  The patient had hemodialysis again on December 16 and 3.5 kg was removed and his post weight was 65.8 kg.  The patient developed shortness of breath lying flat overnight and needed to sit in a recliner.  When I saw the patient earlier this morning he was in the recliner on 1 L nasal cannula.  At home he normally rests in a bed.  He had to go overnight from the bed to the chair secondary to shortness of breath.  He denied any chest pressure or pleuritic chest pain for me.  He denied any significant cough for me.  When I saw him this morning, did not seem to be in any acute distress he just wanted to get some sleep.  He was on 1 L nasal cannula.  Systolic blood pressure on December 16 has been anywhere from 150-160 systolic and pulse oximetry is 97% on room air.  The patient was again currently on 1 L nasal cannula.  Of note, since the patient's admission his weight has decreased from 73.8 kg to 65.5 kg again noted he has a dry weight of approximately 60 kg this is about 18 pounds of fluid  weight.  He has tolerated dialysis well and has remained hemodynamically stable with regards to that.    OBJECTIVE:  Current Weight: Weight - Scale: 65.5 kg (144 lb 6.4 oz)  Vitals:    12/16/24 2312 12/17/24 0549 12/17/24 0600 12/17/24 0651   BP: 168/80 141/78  (!) 174/90   BP Location:       Pulse: 78 59  73   Resp:    20   Temp:    97.9 °F (36.6 °C)   TempSrc:       SpO2: 97% 99%  98%   Weight:   65.5 kg (144 lb 6.4 oz)        Intake/Output Summary (Last 24 hours) at 12/17/2024 0725  Last data filed at 12/17/2024 0530  Gross per 24 hour   Intake 510 ml   Output 4000 ml   Net -3490 ml     General: Patient was sitting in the recliner and at the time when I saw him was not in any acute distress although was noted the patient had shortness of breath prior when lying supine in the bed.  HEENT: There was eye bruising noted.  Neck: Appeared to be supple.  Pulmonary: No accessory muscle use noted when I saw him this morning, there is no increased work of breathing.  No significant conversational dyspnea noted.  Cardiac: Patient's pulse rate has been in the 70s to 80s range   Extremities: No gross deformity noted.  Prior exams noted the presence of edema.  Neurologic: No focal deficits when I spoke with the patient this morning.  Medications:    Current Facility-Administered Medications:     acetaminophen (TYLENOL) tablet 650 mg, 650 mg, Oral, Q4H PRN, David Meraz MD    aspirin (ECOTRIN LOW STRENGTH) EC tablet 81 mg, 81 mg, Oral, Daily, David Meraz MD, 81 mg at 12/16/24 1420    carvedilol (COREG) tablet 12.5 mg, 12.5 mg, Oral, BID With Meals, David Meraz MD, 12.5 mg at 12/17/24 0717    heparin (porcine) subcutaneous injection 5,000 Units, 5,000 Units, Subcutaneous, Q8H JEEVAN, David Meraz MD    hydrALAZINE (APRESOLINE) injection 10 mg, 10 mg, Intravenous, Q6H PRN, Chris Nolasco DO, 10 mg at 12/15/24 1032    hydrALAZINE (APRESOLINE) tablet 100 mg, 100 mg, Oral, Q8H JEEVAN, David Gonzalez  "MD Kt, 100 mg at 12/17/24 0549    isosorbide mononitrate (IMDUR) 24 hr tablet 120 mg, 120 mg, Oral, Daily, David Meraz MD, 120 mg at 12/16/24 1420    melatonin tablet 3 mg, 3 mg, Oral, HS, David Meraz MD, 3 mg at 12/16/24 2159    torsemide (DEMADEX) tablet 100 mg, 100 mg, Oral, Daily, Jensen Kelly DO, 100 mg at 12/16/24 1420    Laboratory Results:  Results from last 7 days   Lab Units 12/16/24  0448 12/15/24  0619 12/14/24  1624   WBC Thousand/uL 5.39 6.01 4.80   HEMOGLOBIN g/dL 8.4* 8.8* 9.6*   HEMATOCRIT % 26.8* 27.0* 29.7*   PLATELETS Thousands/uL 64* 82* 80*   POTASSIUM mmol/L 4.6 4.5 4.2   CHLORIDE mmol/L 92* 89* 89*   CO2 mmol/L 26 27 27   BUN mg/dL 41* 49* 44*   CREATININE mg/dL 4.55* 5.70* 5.30*   CALCIUM mg/dL 7.9* 8.4 8.4   MAGNESIUM mg/dL 2.0 2.0  --        Portions of the record may have been created with voice recognition software. Occasional wrong word or \"sound a like\" substitutions may have occurred due to the inherent limitations of voice recognition software. Read the chart carefully and recognize, using context, where substitutions have occurred.If you have any questions, please contact the dictating provider.   "

## 2024-12-17 NOTE — PROGRESS NOTES
Progress Note - Hospitalist   Name: Matthew Alvarez 69 y.o. male I MRN: [id number]  Unit/Bed#: -01 I Date of Admission: 12/14/2024   Date of Service: 12/17/2024 I Hospital Day: 3    Assessment & Plan  Acute on chronic systolic heart failure (HCC)  Patient presented  on (12/14) after sustaining a mechanical fall 2 to 3 days ago.    Patient states that he missed dialysis on Wednesday and presented for regularly scheduled session on Friday where he was noted with significant bruising and referred to the ED  Evidence of severe volume overload with weight significantly up from dry weight  Notably, patient with gross noncompliance with outpatient medical regimen  Presented with a weight of 73.8 kg with dry weight of 60 kg.  Underwent hemodialysis on 1215 and 1216.  Plan for hemodialysis today for removal of 3 kg.  Repeat chest x-ray today continues to show bilateral pleural effusions.    Will undergo HD with UF today-goal for 3 L removal  IR consulted for thoracentesis  Nephrology closely following  Cardiology consulted for concomitant management  Monitor for electrolyte abnormality.  Continue with ongoing volume control with dialysis.  Received additional dose of torsemide today.    Wt Readings from Last 3 Encounters:   12/17/24 65.5 kg (144 lb 6.4 oz)   11/20/24 55.2 kg (121 lb 12.8 oz)   09/03/24 55.3 kg (122 lb)     ESRD (end stage renal disease) on dialysis (HCC)  Dialysis, Monday, Wednesday, Friday.  HD y (12/15) and (12/16)-another dialysis treatment today goal for 3 L volume removal today (12/17)-nephrology closely following  An uric at baseline    Lab Results   Component Value Date    EGFR 12 12/16/2024    EGFR 9 12/15/2024    EGFR 10 12/14/2024    CREATININE 4.55 (H) 12/16/2024    CREATININE 5.70 (H) 12/15/2024    CREATININE 5.30 (H) 12/14/2024     Nonischemic cardiomyopathy (HCC)  November 11, 2024: Echo, EF of 20-24%  Noncompliant with all medications in the outpatient setting-unclear if taking  "any  Also, set up with LifeVest which patient states he wears \"as needed\"  GDMT limited by advanced end-stage kidney disease.  On GDMT with Coreg, Imdur, hydralazine.  Close outpatient cardiology follow-up on discharge  Primary hypertension  Noncompliant with his home medications  Restarted oral hydralazine, Coreg  As needed intravenous hydralazine/labetalol as needed  Patient also significantly volume overloaded upon presentation which is likely a significant contributing factor.  Undergoing hemodialysis today.  Ambulatory dysfunction  Patient states he fell at home, has bruising on his face  Will consult PT/OT/case management  He states he sometimes walks with an assistive device  Will likely require short-term rehab  Patient adamantly refusing rehab.  Discussed with patient's wife at length over the phone.  Coronary artery disease involving native coronary artery of native heart without angina pectoris  Continue aspirin  Mild nonobstructive coronary artery disease per cardiac catheterization in 9/2021.  Resume statin therapy.  Hyponatremia  Volume control with dialysis today.  Acute hemodialysis patient ([facility])    Anemia of renal disease  Follow-up on iron, vitamin B12 and folate.  Plan to start IV iron  Secondary hyperparathyroidism of renal origin ([facility])  Nephrology on board.  Follow-up phosphorus level    VTE Pharmacologic Prophylaxis: VTE Score: 5 High Risk (Score >/= 5) - Pharmacological DVT Prophylaxis Ordered: heparin. Sequential Compression Devices Ordered.    Mobility:   Basic Mobility Inpatient Raw Score: 16  JH-HLM Goal: 5: Stand one or more mins  JH-HLM Achieved: 6: Walk 10 steps or more  JH-HLM Goal achieved. Continue to encourage appropriate mobility.    Patient Centered Rounds: I performed bedside rounds with nursing staff today.   Discussions with Specialists or Other Care Team Provider: Discussed with nephrology and cardiology    Education and Discussions with Family / Patient: Updated contact " person (wife) via phone.    Current Length of Stay: 3 day(s)  Current Patient Status: Inpatient   Certification Statement: The patient will continue to require additional inpatient hospital stay due to management as recommended above  Discharge Plan: Anticipate discharge in 24-48 hrs to home with home services.    Code Status: Level 1 - Full Code    Subjective   Patient seen and examined at bedside.  Events of last night noted.  Complains of shortness of breath.  Denies any chest discomfort.  Denies any lightheadedness or dizziness.  Chest x-ray noted and results discussed with patient    Objective :  Temp:  [97.9 °F (36.6 °C)-98.1 °F (36.7 °C)] 97.9 °F (36.6 °C)  HR:  [56-81] 56  BP: (139-174)/(62-90) 158/78  Resp:  [17-20] 20  SpO2:  [96 %-99 %] 98 %  O2 Device: None (Room air)  Nasal Cannula O2 Flow Rate (L/min):  [2 L/min] 2 L/min    Body mass index is 23.31 kg/m².     Input and Output Summary (last 24 hours):     Intake/Output Summary (Last 24 hours) at 12/17/2024 1205  Last data filed at 12/17/2024 0921  Gross per 24 hour   Intake 550 ml   Output 4000 ml   Net -3450 ml       Physical Exam  Constitutional:       Appearance: He is ill-appearing.   HENT:      Mouth/Throat:      Mouth: Mucous membranes are moist.   Eyes:      Comments: Left periorbital ecchymosis.   Cardiovascular:      Rate and Rhythm: Normal rate and regular rhythm.   Pulmonary:      Effort: Pulmonary effort is normal.      Comments: Diminished breath sounds bilaterally.  Musculoskeletal:      Right lower leg: Edema present.      Left lower leg: Edema present.   Skin:     Coloration: Skin is pale.   Neurological:      General: No focal deficit present.      Mental Status: He is alert and oriented to person, place, and time.           Lines/Drains:  Lines/Drains/Airways       Active Status       Name Placement date Placement time Site Days    HD Permanent Double Catheter 11/14/24  1352  Internal jugular  32                       Telemetry:  Telemetry Orders (From admission, onward)               24 Hour Telemetry Monitoring  Continuous x 24 Hours (Telem)        Question:  Reason for 24 Hour Telemetry  Answer:  Decompensated CHF- and any one of the following: continuous diuretic infusion or total diuretic dose >200 mg daily, associated electrolyte derangement (I.e. K < 3.0), inotropic drip (continuous infusion), hx of ventricular arrhythmia, or new EF < 35%                     Telemetry Reviewed: Normal Sinus Rhythm  Indication for Continued Telemetry Use: Lifevest (remains on tele entire hospital stay)               Lab Results: I have reviewed the following results:   Results from last 7 days   Lab Units 12/16/24  0448 12/15/24  0619 12/14/24  1624   WBC Thousand/uL 5.39   < > 4.80   HEMOGLOBIN g/dL 8.4*   < > 9.6*   HEMATOCRIT % 26.8*   < > 29.7*   PLATELETS Thousands/uL 64*   < > 80*   SEGS PCT %  --   --  80*   LYMPHO PCT %  --   --  10*   MONO PCT %  --   --  8   EOS PCT %  --   --  1    < > = values in this interval not displayed.     Results from last 7 days   Lab Units 12/16/24  0448   SODIUM mmol/L 126*   POTASSIUM mmol/L 4.6   CHLORIDE mmol/L 92*   CO2 mmol/L 26   BUN mg/dL 41*   CREATININE mg/dL 4.55*   ANION GAP mmol/L 8   CALCIUM mg/dL 7.9*   ALBUMIN g/dL 2.9*   TOTAL BILIRUBIN mg/dL 1.29*   ALK PHOS U/L 100   ALT U/L 8   AST U/L 18   GLUCOSE RANDOM mg/dL 105     Results from last 7 days   Lab Units 12/14/24  1624   INR  1.27*                   Recent Cultures (last 7 days):         Imaging Results Review: I reviewed radiology reports from this admission including: chest xray.      Last 24 Hours Medication List:     Current Facility-Administered Medications:     acetaminophen (TYLENOL) tablet 650 mg, Q4H PRN    aspirin (ECOTRIN LOW STRENGTH) EC tablet 81 mg, Daily    atorvastatin (LIPITOR) tablet 20 mg, Daily With Dinner    carvedilol (COREG) tablet 12.5 mg, BID With Meals    folic acid (FOLVITE) tablet 1 mg, Daily     heparin (porcine) subcutaneous injection 5,000 Units, Q8H JEEVAN    hydrALAZINE (APRESOLINE) injection 10 mg, Q6H PRN    hydrALAZINE (APRESOLINE) tablet 100 mg, Q8H JEEVAN    isosorbide mononitrate (IMDUR) 24 hr tablet 120 mg, Daily    melatonin tablet 3 mg, HS    torsemide (DEMADEX) tablet 100 mg, Daily    Administrative Statements   Today, Patient Was Seen By: Shari Arceo MD      **Please Note: This note may have been constructed using a voice recognition system.**

## 2024-12-17 NOTE — ASSESSMENT & PLAN NOTE
Patient presented  on (12/14) after sustaining a mechanical fall 2 to 3 days ago.    Patient states that he missed dialysis on Wednesday and presented for regularly scheduled session on Friday where he was noted with significant bruising and referred to the ED  Evidence of severe volume overload with weight significantly up from dry weight  Notably, patient with gross noncompliance with outpatient medical regimen  Presented with a weight of 73.8 kg with dry weight of 60 kg.  Underwent hemodialysis on 1215 and 1216.  Plan for hemodialysis today for removal of 3 kg.  Repeat chest x-ray today continues to show bilateral pleural effusions.    Will undergo HD with UF today-goal for 3 L removal  IR consulted for thoracentesis  Nephrology closely following  Cardiology consulted for concomitant management  Monitor for electrolyte abnormality.  Continue with ongoing volume control with dialysis.  Received additional dose of torsemide today.    Wt Readings from Last 3 Encounters:   12/17/24 65.5 kg (144 lb 6.4 oz)   11/20/24 55.2 kg (121 lb 12.8 oz)   09/03/24 55.3 kg (122 lb)

## 2024-12-17 NOTE — ASSESSMENT & PLAN NOTE
"November 11, 2024: Echo, EF of 20-24%  Noncompliant with all medications in the outpatient setting-unclear if taking any  Also, set up with LifeVest which patient states he wears \"as needed\"  GDMT limited by advanced end-stage kidney disease.  On GDMT with Coreg, Imdur, hydralazine.  Close outpatient cardiology follow-up on discharge  "

## 2024-12-17 NOTE — PROGRESS NOTES
Patient:    MRN:  [id number]    Vanessa Request ID:  [id number]    Level of care reserved:  Home Health Agency    Partner Reserved:  [facility] [place] [address] [id number]    Clinical needs requested:    Geography searched:  [phone]    Start of Service:    Request sent:  2:53pm EST on 12/16/2024 by Kira Estrada    Partner reserved:  4:18pm EST on 12/17/2024 by Kira Estrada    Choice list shared:  8:43am EST on 12/17/2024 by Kira Estrada

## 2024-12-17 NOTE — ASSESSMENT & PLAN NOTE
Hgb 8.4 likely dilutional effect.  Iron studies show iron saturation of 4 and ferritin of 70.  S the patient was given 1 dose of IV iron on December 16 based on iron levels from November 2024. Awaiting full iron studies to come back ferritin is 109, folic acid is 8.4..  B12 is normal at 647.  Awaiting full iron panel from December 16 before starting ALCIRA.  Hold off on further iron until iron saturation is back. Check CBC today.

## 2024-12-17 NOTE — PHYSICAL THERAPY NOTE
"   PHYSICAL THERAPY TREATMENT NOTE  NAME:  Matthew Alvarez  DATE: 12/17/24    Length Of Stay: 3  Performed at least 2 patient identifiers during session: Name and Birthday    TREATMENT FLOWSHEET:    12/17/24 1119   PT Last Visit   PT Visit Date 12/17/24   Note Type   Note Type Treatment   Pain Assessment   Pain Assessment Tool 0-10   Pain Score No Pain   Restrictions/Precautions   Weight Bearing Precautions Per Order No   Other Precautions Chair Alarm;Bed Alarm;Fall Risk;Telemetry;O2  (2LO2)   General   Chart Reviewed Yes   Response to Previous Treatment Patient with no complaints from previous session.   Family/Caregiver Present No   Cognition   Overall Cognitive Status WFL   Arousal/Participation Alert   Attention Within functional limits   Orientation Level Oriented X4   Memory Within functional limits   Following Commands Follows one step commands without difficulty   Subjective   Subjective \"I am so cold.\"   Bed Mobility   Additional Comments Pt. found sitting OOB in recliner upon entering room   Transfers   Sit to Stand   (SBA)   Additional items Assist x 1;Increased time required;Verbal cues  (RW)   Stand to Sit   (SBA)   Additional items Assist x 1;Armrests;Increased time required;Verbal cues   Stand pivot   (SBA)   Additional items Assist x 1;Increased time required;Verbal cues;Armrests  (RW)   Additional Comments VC's required for proper hand placement during transitons due to attempting to pull up from RW   Ambulation/Elevation   Gait pattern Improper Weight shift;Decreased foot clearance;Forward Flexion;Narrow MELISSA;Short stride;Excessively slow;Decreased heel strike;Decreased toe off;Step through pattern   Gait Assistance   (SBA)   Additional items Assist x 1;Verbal cues   Assistive Device Rolling walker   Distance 45ft   Ambulation/Elevation Additional Comments Pt. reporting fatigue upon conclusion of ambulation   Balance   Static Sitting Good   Dynamic Sitting Fair +   Static Standing Fair   Dynamic " Standing Fair -   Ambulatory Fair -   Endurance Deficit   Endurance Deficit Yes   Activity Tolerance   Activity Tolerance Patient limited by fatigue   Nurse Made Aware yes   Exercises   Quad Sets Sitting;10 reps;AROM;Bilateral   Heelslides Sitting;10 reps;AROM;Bilateral   Glute Sets Sitting;10 reps;AROM;Bilateral   Hip Flexion Sitting;10 reps;AROM;Bilateral   Hip Abduction Sitting;10 reps;AROM;Bilateral   Hip Adduction Sitting;10 reps;AROM;Bilateral   Knee AROM Long Arc Quad Sitting;10 reps;AROM;Bilateral   Ankle Pumps Sitting;10 reps;AROM;Bilateral   Marching Sitting;10 reps;AROM;Bilateral   Assessment   Prognosis Good   Problem List Decreased strength;Decreased endurance;Impaired balance;Decreased mobility;Impaired judgement;Decreased safety awareness;Decreased skin integrity   Goals   Patient Goals to get warm   PT Treatment Day 2   Plan   Treatment/Interventions Functional transfer training;LE strengthening/ROM;Therapeutic exercise;Endurance training;Patient/family training;Equipment eval/education;Gait training;Compensatory technique education;Spoke to nursing   Progress Progressing toward goals   PT Frequency 3-5x/wk   Discharge Recommendation   Rehab Resource Intensity Level, PT II (Moderate Resource Intensity)   AM-PAC Basic Mobility Inpatient   Turning in Flat Bed Without Bedrails 3   Lying on Back to Sitting on Edge of Flat Bed Without Bedrails 3   Moving Bed to Chair 3   Standing Up From Chair Using Arms 3   Walk in Room 3   Climb 3-5 Stairs With Railing 1   Basic Mobility Inpatient Raw Score 16   Basic Mobility Standardized Score 38.32   [facility] Highest Level Of Mobility   -HLM Goal 5: Stand one or more mins   -HLM Achieved 7: Walk 25 feet or more       The patient's AM-PAC Basic Mobility Inpatient Short Form Raw Score is 16. A raw score 16 suggests the patient may benefit from discharge to post-acute rehabilitation services. Please also refer to the recommendation of the Physical Therapist for  safe discharge planning.    Pt seen for PT treatment session this date with interventions consisting of seated TE, transfer training, gait training, and education provided as needed for safety and direction to improve functional mobility, safety awareness, and activity tolerance. Pt agreeable to PT treatment session upon arrival, pt found sitting OOB in recliner. At end of session, pt left sitting OOB in recliner with chair moved to opposite side of bed due to patient reporting the window is too cold, chair alarm activated, and with all needs in reach. In comparison to previous session, pt with improvements in ambulation distance . Continue to recommend Level II (Moderate Resource Intensity) at time of d/c in order to maximize pt's functional independence and safety w/ mobility. Pt continues to be functioning below baseline level. PT will continue to see pt while here in order to address the deficits listed above and provide interventions consistent w/ POC in effort to achieve STGs.    Brooke Bains, PTA

## 2024-12-17 NOTE — ASSESSMENT & PLAN NOTE
Mild, non-obstructive CAD on cardiac cath 9/2021.  Continue aspirin 81 mg daily.  Resumed statin - atorvastatin 20 mg daily.

## 2024-12-17 NOTE — ASSESSMENT & PLAN NOTE
Continue aspirin  Mild nonobstructive coronary artery disease per cardiac catheterization in 9/2021.  Resume statin therapy.

## 2024-12-17 NOTE — ASSESSMENT & PLAN NOTE
Wt Readings from Last 3 Encounters:   12/17/24 65.5 kg (144 lb 6.4 oz)   11/20/24 55.2 kg (121 lb 12.8 oz)   09/03/24 55.3 kg (122 lb)     The plan is as noted under acute hemodialysis patient.  Plan for hemodialysis ultrafiltration today and tomorrow checking chest x-ray and BNP.  Patient still having shortness of breath.

## 2024-12-17 NOTE — PLAN OF CARE
Problem: CARDIOVASCULAR - ADULT  Goal: Maintains optimal cardiac output and hemodynamic stability  Description: INTERVENTIONS:  - Monitor I/O, vital signs and rhythm  - Monitor for S/S and trends of decreased cardiac output  - Administer and titrate ordered vasoactive medications to optimize hemodynamic stability  - Assess quality of pulses, skin color and temperature  - Assess for signs of decreased coronary artery perfusion  - Instruct patient to report change in severity of symptoms  Outcome: Progressing     Problem: RESPIRATORY - ADULT  Goal: Achieves optimal ventilation and oxygenation  Description: INTERVENTIONS:  - Assess for changes in respiratory status  - Assess for changes in mentation and behavior  - Position to facilitate oxygenation and minimize respiratory effort  - Oxygen administered by appropriate delivery if ordered  - Initiate smoking cessation education as indicated  - Encourage broncho-pulmonary hygiene including cough, deep breathe, Incentive Spirometry  - Assess the need for suctioning and aspirate as needed  - Assess and instruct to report SOB or any respiratory difficulty  - Respiratory Therapy support as indicated  Outcome: Progressing     Problem: METABOLIC, FLUID AND ELECTROLYTES - ADULT  Goal: Electrolytes maintained within normal limits  Description: INTERVENTIONS:  - Monitor labs and assess patient for signs and symptoms of electrolyte imbalances  - Administer electrolyte replacement as ordered  - Monitor response to electrolyte replacements, including repeat lab results as appropriate  - Instruct patient on fluid and nutrition as appropriate  Outcome: Progressing

## 2024-12-17 NOTE — ASSESSMENT & PLAN NOTE
Follow PTH as outpatient.  Calcium is 8.8 with a calcium of 7.9 and albumin of 2.9.  Check phosphorus level.    Please Note: I communicated with Dr Arceo from the hospitalist service this morning regarding the events overnight. Cardiology to be consulted. Plan again for HD/UF today as well.     Administrative Statements   VIRTUAL CARE DOCUMENTATION:     1. This service was provided via Telemedicine using [employer]     2. Parties in the room with patient during teleconsult RN    3. Confidentiality My office door was closed     4. Participants No one else was in the room    5. Patient acknowledged consent and understanding of privacy and security of the  Telemedicine consult. I informed the patient that I have reviewed their record in Epic and presented the opportunity for them to ask any questions regarding the visit today.  The patient agreed to participate.    6. I have spent a total time of approximately 33  minutes in caring for this patient on the day of the visit/encounter including review of the medical record, discussion with the patient's nurse via [employer] secure chat as well as at bedside, documentation of medical complexity of care, documentation of plan of care and creation of plan of care, not including the time spent for establishing the audio/video connection.

## 2024-12-17 NOTE — PLAN OF CARE
Problem: Prexisting or High Potential for Compromised Skin Integrity  Goal: Skin integrity is maintained or improved  Description: INTERVENTIONS:  - Identify patients at risk for skin breakdown  - Assess and monitor skin integrity  - Assess and monitor nutrition and hydration status  - Monitor labs   - Assess for incontinence   - Turn and reposition patient  - Assist with mobility/ambulation  - Relieve pressure over bony prominences  - Avoid friction and shearing  - Provide appropriate hygiene as needed including keeping skin clean and dry  - Evaluate need for skin moisturizer/barrier cream  - Collaborate with interdisciplinary team   - Patient/family teaching  - Consider wound care consult   Outcome: Progressing     Problem: CARDIOVASCULAR - ADULT  Goal: Maintains optimal cardiac output and hemodynamic stability  Description: INTERVENTIONS:  - Monitor I/O, vital signs and rhythm  - Monitor for S/S and trends of decreased cardiac output  - Administer and titrate ordered vasoactive medications to optimize hemodynamic stability  - Assess quality of pulses, skin color and temperature  - Assess for signs of decreased coronary artery perfusion  - Instruct patient to report change in severity of symptoms  Outcome: Progressing  Goal: Absence of cardiac dysrhythmias or at baseline rhythm  Description: INTERVENTIONS:  - Continuous cardiac monitoring, vital signs, obtain 12 lead EKG if ordered  - Administer antiarrhythmic and heart rate control medications as ordered  - Monitor electrolytes and administer replacement therapy as ordered  Outcome: Progressing     Problem: RESPIRATORY - ADULT  Goal: Achieves optimal ventilation and oxygenation  Description: INTERVENTIONS:  - Assess for changes in respiratory status  - Assess for changes in mentation and behavior  - Position to facilitate oxygenation and minimize respiratory effort  - Oxygen administered by appropriate delivery if ordered  - Initiate smoking cessation education  as indicated  - Encourage broncho-pulmonary hygiene including cough, deep breathe, Incentive Spirometry  - Assess the need for suctioning and aspirate as needed  - Assess and instruct to report SOB or any respiratory difficulty  - Respiratory Therapy support as indicated  Outcome: Progressing     Problem: METABOLIC, FLUID AND ELECTROLYTES - ADULT  Goal: Electrolytes maintained within normal limits  Description: INTERVENTIONS:  - Monitor labs and assess patient for signs and symptoms of electrolyte imbalances  - Administer electrolyte replacement as ordered  - Monitor response to electrolyte replacements, including repeat lab results as appropriate  - Instruct patient on fluid and nutrition as appropriate  Outcome: Progressing  Goal: Fluid balance maintained  Description: INTERVENTIONS:  - Monitor labs   - Monitor I/O and WT  - Instruct patient on fluid and nutrition as appropriate  - Assess for signs & symptoms of volume excess or deficit  Outcome: Progressing  Goal: Glucose maintained within target range  Description: INTERVENTIONS:  - Monitor Blood Glucose as ordered  - Assess for signs and symptoms of hyperglycemia and hypoglycemia  - Administer ordered medications to maintain glucose within target range  - Assess nutritional intake and initiate nutrition service referral as needed  Outcome: Progressing     Problem: SKIN/TISSUE INTEGRITY - ADULT  Goal: Skin Integrity remains intact(Skin Breakdown Prevention)  Description: Assess:  -Perform Omar assessment every shift   -Clean and moisturize skin every day  -Inspect skin when repositioning, toileting, and assisting with ADLS  -Assess under medical devices such   -Assess extremities for adequate circulation and sensation     Bed Management:  -Have minimal linens on bed & keep smooth, unwrinkled  -Change linens as needed when moist or perspiring  -Avoid sitting or lying in one position for more than 2 hours while in bed  -Keep HOB at 30 degrees      Toileting:  -Offer bedside commode  -Assess for incontinence every 2 hrs  -Use incontinent care products after each incontinent episode     Activity:  -Mobilize patient 3 times a day  -Encourage activity and walks on unit  -Encourage or provide ROM exercises   -Turn and reposition patient every 2 Hours  -Use appropriate equipment to lift or move patient in bed  -Instruct/ Assist with weight shifting every 2 hrs when out of bed in chair  -Consider limitation of chair time 2 hour intervals    Skin Care:  -Avoid use of baby powder, tape, friction and shearing, hot water or constrictive clothing  -Relieve pressure over bony prominences using weight shifting  -Do not massage red bony areas    Next Steps:  -Teach patient strategies to minimize risks    -Consider consults to  interdisciplinary teams such as pt/ot  Outcome: Progressing  Goal: Incision(s), wounds(s) or drain site(s) healing without S/S of infection  Description: INTERVENTIONS  - Assess and document dressing, incision, wound bed, drain sites and surrounding tissue  - Provide patient and family education  - Perform skin care/dressing changes every day or as needed  Outcome: Progressing  Goal: Pressure injury heals and does not worsen  Description: Interventions:  - Implement low air loss mattress or specialty surface (Criteria met)  - Apply silicone foam dressing  - Instruct/assist with weight shifting every 120 minutes when in chair   - Limit chair time to 2 hour intervals  - Use special pressure reducing interventions such as weight shifting when in chair   - Apply fecal or urinary incontinence containment device   - Perform passive or active ROM every 2hrs  - Turn and reposition patient & offload bony prominences every 2 hours   - Utilize friction reducing device or surface for transfers   - Consider consults to  interdisciplinary teams such as pt/ot  - Use incontinent care products after each incontinent episode   - Consider nutrition services referral  as needed  Outcome: Progressing     Problem: MUSCULOSKELETAL - ADULT  Goal: Maintain or return mobility to safest level of function  Description: INTERVENTIONS:  - Assess patient's ability to carry out ADLs; assess patient's baseline for ADL function and identify physical deficits which impact ability to perform ADLs (bathing, care of mouth/teeth, toileting, grooming, dressing, etc.)  - Assess/evaluate cause of self-care deficits   - Assess range of motion  - Assess patient's mobility  - Assess patient's need for assistive devices and provide as appropriate  - Encourage maximum independence but intervene and supervise when necessary  - Involve family in performance of ADLs  - Assess for home care needs following discharge   - Consider OT consult to assist with ADL evaluation and planning for discharge  - Provide patient education as appropriate  Outcome: Progressing  Goal: Maintain proper alignment of affected body part  Description: INTERVENTIONS:  - Support, maintain and protect limb and body alignment  - Provide patient/ family with appropriate education  Outcome: Progressing     Problem: Nutrition/Hydration-ADULT  Goal: Nutrient/Hydration intake appropriate for improving, restoring or maintaining nutritional needs  Description: Monitor and assess patient's nutrition/hydration status for malnutrition. Collaborate with interdisciplinary team and initiate plan and interventions as ordered.  Monitor patient's weight and dietary intake as ordered or per policy. Utilize nutrition screening tool and intervene as necessary. Determine patient's food preferences and provide high-protein, high-caloric foods as appropriate.     INTERVENTIONS:  - Monitor oral intake, urinary output, labs, and treatment plans  - Assess nutrition and hydration status and recommend course of action  - Evaluate amount of meals eaten  - Assist patient with eating if necessary   - Allow adequate time for meals  - Recommend/ encourage appropriate  diets, oral nutritional supplements, and vitamin/mineral supplements  - Order, calculate, and assess calorie counts as needed  - Recommend, monitor, and adjust tube feedings and TPN/PPN based on assessed needs  - Assess need for intravenous fluids  - Provide specific nutrition/hydration education as appropriate  - Include patient/family/caregiver in decisions related to nutrition  Outcome: Progressing

## 2024-12-17 NOTE — QUICK NOTE
Patient stating he is not ready to die and he wants to be resuscitated and intubated. Dr. Meraz made aware.

## 2024-12-17 NOTE — ASSESSMENT & PLAN NOTE
Wt Readings from Last 3 Encounters:   12/18/24 63.2 kg (139 lb 6.4 oz)   11/20/24 55.2 kg (121 lb 12.8 oz)   09/03/24 55.3 kg (122 lb)     History of HFrEF.  Presented with volume overload in the setting of missed dialysis sessions.  Echo 11/11/2024 shows LVEF 20-24% with severe global hypokinesis and dilated LV which is unchanged from 2021  Receiving hemodialysis. Appreciate nephrology recommendations.  Large bilateral pleural effusions on chest imaging - request IR consult for possible thoracentesis.  See discussion under cardiomyopathy.  At this time cardiology will sign off. Please feel free to reach out if needed.

## 2024-12-17 NOTE — ASSESSMENT & PLAN NOTE
This is likely a combination of hypovolemic hyponatremia as well as CHF with decreased ejection fraction and DAVID affecting free water excretion.  Plan on doing hemodialysis with ultrafiltration daily for the next few days to get the patient's fluid weight down.  Sodium is 126 on December 16, check BMP today.

## 2024-12-17 NOTE — ASSESSMENT & PLAN NOTE
Dialysis, Monday, Wednesday, Friday.  HD y (12/15) and (12/16)-another dialysis treatment today goal for 3 L volume removal today (12/17)-nephrology closely following  An uric at baseline    Lab Results   Component Value Date    EGFR 12 12/16/2024    EGFR 9 12/15/2024    EGFR 10 12/14/2024    CREATININE 4.55 (H) 12/16/2024    CREATININE 5.70 (H) 12/15/2024    CREATININE 5.30 (H) 12/14/2024

## 2024-12-17 NOTE — CASE MANAGEMENT
Case Management Discharge Planning Note    Patient name Matthew Alvarez  Location /-01 MRN [id number]  : 1955 Date 2024       Current Admission Date: 2024  Current Admission Diagnosis:Acute on chronic systolic heart failure (HCC)   Patient Active Problem List    Diagnosis Date Noted Date Diagnosed    Secondary hyperparathyroidism of renal origin ([facility]) 12/15/2024     ESRD (end stage renal disease) on dialysis ([facility]) 2024     Ambulatory dysfunction 2024     Thrombocytopenia ([facility]) 2024     Renal failure 11/15/2024     Encounter for hemodialysis ([facility]) 11/15/2024     Anemia due to chronic kidney disease, on chronic dialysis ([facility]) 11/15/2024     Anemia of renal disease 11/15/2024     Vitamin D deficiency, unspecified 11/15/2024     Acute hemodialysis patient ([facility]) 2024     Chronic systolic (congestive) heart failure ([facility]) 2024     Oliguria 2024     Encounter for hemodialysis for ESRD ([facility]) 2024     Hyponatremia 2024     Dilated cardiomyopathy ([facility]) 2024     Elevated liver transaminase level 11/10/2024     Acute on chronic systolic heart failure ([facility]) 2024     Coronary artery disease involving native coronary artery of native heart without angina pectoris 10/08/2021     Rheumatoid factor positive 2021     Protein calorie malnutrition ([facility]) 2021     Elevated troponin 2021     Tobacco abuse 2021     Nonischemic cardiomyopathy ([facility]) 2021     Primary hypertension 2021       LOS (days): 3  Geometric Mean LOS (GMLOS) (days): 3.9  Days to GMLOS:1     OBJECTIVE:  Risk of Unplanned Readmission Score: 22.24         Current admission status: Inpatient   Preferred Pharmacy:   [facility]/pharmacy #[id number] - [place] - [address]  [address]  [facility] [address]  Phone: [phone] Fax: [phone]    [facility] - [place] - [address]  [address]  [address]  [place] [address]  Phone: [phone] Fax: [phone]    Primary Care Provider: Olive Rodriguez MD    Primary Insurance: [employer] [facility]  Secondary Insurance:     DISCHARGE DETAILS:    Discharge planning discussed with:: patient, son Rafael and spouse Darlene  Freedom of Choice: Yes  Comments - Freedom of Choice: pt is contines to refuse STR, discussed HHC options - pt choice is [facility]- reserved in aidin       Were Treatment Team discharge recommendations reviewed with patient/caregiver?: Yes  Did patient/caregiver verbalize understanding of patient care needs?: Yes  Were patient/caregiver advised of the risks associated with not following Treatment Team discharge recommendations?: Yes    Contacts  Patient Contacts: Rafael HeadAntonio, son  Relationship to Patient:: Family  Contact Method: Phone  Phone Number: [phone]  Reason/Outcome: Discharge Planning                        Treatment Team Recommendation: Short Term Rehab- pt is refusing STR  Discharge Destination Plan:: Home with Home Health Care                                                                 Quality 226: Preventive Care And Screening: Tobacco Use: Screening And Cessation Intervention: Patient screened for tobacco use and is an ex/non-smoker Quality 130: Documentation Of Current Medications In The Medical Record: Current Medications Documented Detail Level: Detailed Quality 431: Preventive Care And Screening: Unhealthy Alcohol Use - Screening: Patient not identified as an unhealthy alcohol user when screened for unhealthy alcohol use using a systematic screening method

## 2024-12-17 NOTE — PLAN OF CARE
Problem: PHYSICAL THERAPY ADULT  Goal: Performs mobility at highest level of function for planned discharge setting.  See evaluation for individualized goals.  Description: Treatment/Interventions: ADL retraining, Functional transfer training, LE strengthening/ROM, Elevations, Therapeutic exercise, Endurance training, Patient/family training, Equipment eval/education, Gait training, Bed mobility, Compensatory technique education, Spoke to nursing, Spoke to case management, OT          See flowsheet documentation for full assessment, interventions and recommendations.  Outcome: Progressing  Note: Prognosis: Good  Problem List: Decreased strength, Decreased endurance, Impaired balance, Decreased mobility, Impaired judgement, Decreased safety awareness, Decreased skin integrity  Assessment: Pt seen for PT treatment session this date with interventions consisting of seated TE, transfer training, gait training, and education provided as needed for safety and direction to improve functional mobility, safety awareness, and activity tolerance. Pt agreeable to PT treatment session upon arrival, pt found sitting OOB in recliner. At end of session, pt left sitting OOB in recliner with chair moved to opposite side of bed due to patient reporting the window is too cold, chair alarm activated, and with all needs in reach. In comparison to previous session, pt with improvements in ambulation distance . Continue to recommend Level II (Moderate Resource Intensity) at time of d/c in order to maximize pt's functional independence and safety w/ mobility. Pt continues to be functioning below baseline level. PT will continue to see pt while here in order to address the deficits listed above and provide interventions consistent w/ POC in effort to achieve STGs.  Barriers to Discharge: Other (Comment)  Barriers to Discharge Comments: BRODY; impaired activity tolerance  Rehab Resource Intensity Level, PT: II (Moderate Resource  Intensity)    See flowsheet documentation for full assessment.

## 2024-12-17 NOTE — ASSESSMENT & PLAN NOTE
Patient states he fell at home, has bruising on his face  Will consult PT/OT/case management  He states he sometimes walks with an assistive device  Will likely require short-term rehab  Patient adamantly refusing rehab.  Discussed with patient's wife at length over the phone.

## 2024-12-17 NOTE — ASSESSMENT & PLAN NOTE
Currently dialyzing MWF at [facility] under care of Dr Redman.Etiology of DAVID 2/2 cardiorenal syndrome. First dialysis start date as inpatient 11/13.  Access [facility], no evidence of infection.  The patient underwent hemodialysis on December 16 and 3 kg was removed.  As noted, the patient underwent hemodialysis on Sunday, December 15 as well as on Monday, December 16.  His weight has decreased to 73.8 kg to 65 kg.  Estimated dry weight is approximately 60 kg.  Plan for hemodialysis today with trying to remove another 3 kg.  With shortness of breath will check chest x-ray and repeat BNP.  Utilizing torsemide and 100 mg daily to see if we can also augment urinary output to minimize fluid gainsin between dialysis sessions.

## 2024-12-17 NOTE — CONSULTS
Consult Cardiology    Matthew Alvarez 1955, 69 y.o. male MRN: [id number]    Unit/Bed#: -01 Encounter: [id number]    Attending Provider: Shari Arceo MD   Primary Care Provider: Olive Rodriguez MD   Date admitted to hospital: 12/14/2024       Inpatient consult to Cardiology  Consult performed by: YUE Griffin  Consult ordered by: Shari Arceo MD          Anemia of renal disease  Assessment & Plan  Stable this admission    Acute hemodialysis patient (HCC)  Assessment & Plan  As per nephrology    Hyponatremia  Assessment & Plan  Management as per nephrology    Nonischemic cardiomyopathy (HCC)  Assessment & Plan  History of non-ischemic severe cardiomyopathy.  Admitted with heart failure in 2021 where EF was < 25%.  Cardiac catheterization 9/16/2021 showed only mild nonobstructive disease.  GDMT limited by advanced CKD.  On GDMT with Coreg, hydralazine and Imdur. Continuing to titrate during admission.  LifeVest in place.  Plan close outpatient follow up with our office.    * Acute on chronic systolic heart failure (HCC)  Assessment & Plan  Wt Readings from Last 3 Encounters:   12/17/24 65.5 kg (144 lb 6.4 oz)   11/20/24 55.2 kg (121 lb 12.8 oz)   09/03/24 55.3 kg (122 lb)     History of HFrEF.  Presented with volume overload in the setting of missed dialysis sessions.  Echo 11/11/2024 shows LVEF 20-24% with severe global hypokinesis and dilated LV which is unchanged from 2021  Receiving hemodialysis. Appreciate nephrology.  Large bilateral pleural effusions on chest imaging - request IR consult for possible thoracentesis.  See discussion under cardiomyopathy.          Coronary artery disease involving native coronary artery of native heart without angina pectoris  Assessment & Plan  Mild, non-obstructive CAD on cardiac cath 9/2021.  Continue aspirin 81 mg daily.  Resume statin - atorvastatin 20 mg daily.    Primary hypertension  Assessment & Plan  Blood pressures improving with up-titration  of GDMT.   Continue current regimen.   Renal function limits options.  Continue coreg to 12.5 mg PO BID, Imdur to 120 mg daily, and hydralazine 100 mg PO TID        Physician Requesting Consult: Shari Arceo MD    Reason for Consult / Principal Problem: HFrEF, shortness of breath      HPI: Matthew Alvarez is a 69 y.o. year old male who has a history of nonischemic cardiomyopathy, nonobstructive CAD by cath 2021, ESRD on hemodialysis, HTN, alcohol and tobacco abuse, medical nonaherence  who follows with [facility].    Patient presented to [facility] ER 12/14/2024 for evaluation after a fall with significant facial bruising. He had initially presented to dialysis and due to bruising was advised to come to the ER. He reported falling in the middle of the night 2 days prior to arrival. He had gotten up to go to the kitchen and ended up on the floor. He denies loss of consciousness. Upon ER arrival ECG NSR with no acute changes. HS trop borderline and flat at 62->50->55. BNP was elevated > 4000. CBC showed stable anemia with hemoglobin 9.6. K+ 4.2. Chest imaging shows large bilateral pleural effusions. He admitted that he had missed 2 sessions of hemodialysis. He was admitted and started on hemodialysis.    Cardiology was consulted today due to persistent shortness of breath despite adequate diuresis on dialysis. He is - 5.2 L since admission. Chest xray this morning continues to reveal large bilateral pleural effusions which appear relatively unchanged from admission per my review. BNP was ordered by nephrology and pending. He continues to complain of shortness of breath and orthopnea. At the time of my evaluation he is sitting up in a chair at bedside. He appears to be breathing comfortably on room air. He reports positional anterior chest wall discomfort which is aggravated by certain positions. No overt lightheadedness. He denies any syncopal events. He was not wearing his LifeVest at home. He states his  sisters have been helping with transportation to dialysis.       Review of Systems   Constitutional:  Negative for chills and fever.   HENT:  Negative for ear pain and sore throat.    Eyes:  Negative for pain and visual disturbance.   Respiratory:  Positive for shortness of breath. Negative for cough.    Cardiovascular:  Positive for leg swelling. Negative for chest pain and palpitations.   Gastrointestinal:  Negative for abdominal pain and vomiting.   Genitourinary:  Negative for dysuria and hematuria.   Musculoskeletal:  Negative for arthralgias and back pain.   Skin:  Negative for color change and rash.   Neurological:  Negative for seizures and syncope.   All other systems reviewed and are negative.       Historical Information     Past Medical History:   Diagnosis Date    Acute hemodialysis patient ([facility]) 12/14/2024    CAD (coronary artery disease)     HFrEF (heart failure with reduced ejection fraction) ([facility]) 09/2021    Hypertension     Rheumatoid factor positive 09/2021    Stage 3 chronic kidney disease ([facility])     Tobacco abuse      Past Surgical History:   Procedure Laterality Date    APPENDECTOMY  1965    CARDIAC CATHETERIZATION  9/16/2021    INGUINAL HERNIA REPAIR Right 1991    IR TUNNELED DIALYSIS CATHETER PLACEMENT  11/14/2024     Social History     Substance and Sexual Activity   Alcohol Use Yes    Alcohol/week: 6.0 standard drinks of alcohol    Types: 6 Shots of liquor per week    Comment: rare     Social History     Substance and Sexual Activity   Drug Use Never     Social History     Tobacco Use   Smoking Status Every Day    Current packs/day: 0.50    Average packs/day: 0.5 packs/day for 51.0 years (25.5 ttl pk-yrs)    Types: Cigarettes    Start date: 1974   Smokeless Tobacco Never   Tobacco Comments    Began smoking in his early 20s, on average smoking 1+ packs daily. Quit in 08/2021 (Updated 09/15/2021).        Family History:   Family History   Problem Relation Age of Onset    Heart failure Mother   "   Other Father         \"blood clot\"    COPD Sister     Heart failure Brother     Valvular heart disease Brother     Heart attack Son     No Known Problems Son        Meds/Allergies     current meds:   Current Facility-Administered Medications:     acetaminophen (TYLENOL) tablet 650 mg, Q4H PRN    aspirin (ECOTRIN LOW STRENGTH) EC tablet 81 mg, Daily    atorvastatin (LIPITOR) tablet 20 mg, Daily With Dinner    carvedilol (COREG) tablet 12.5 mg, BID With Meals    folic acid (FOLVITE) tablet 1 mg, Daily    heparin (porcine) subcutaneous injection 5,000 Units, Q8H JEEVAN    hydrALAZINE (APRESOLINE) injection 10 mg, Q6H PRN    hydrALAZINE (APRESOLINE) tablet 100 mg, Q8H JEEVAN    isosorbide mononitrate (IMDUR) 24 hr tablet 120 mg, Daily    melatonin tablet 3 mg, HS    torsemide (DEMADEX) tablet 100 mg, Daily       No Known Allergies    Objective     Vitals: Blood pressure 139/62, pulse 58, temperature 97.9 °F (36.6 °C), resp. rate 20, weight 65.5 kg (144 lb 6.4 oz), SpO2 98%., Body mass index is 23.31 kg/m².    Orthostatic Blood Pressures      Flowsheet Row Most Recent Value   Blood Pressure 139/62 filed at 2024 0830   Patient Position - Orthostatic VS Sitting - Orthostatic VS filed at 2024 1356            Systolic (24hrs), Av , Min:139 , Max:174     Diastolic (24hrs), Av, Min:62, Max:90        Intake/Output Summary (Last 24 hours) at 2024 1023  Last data filed at 2024 0921  Gross per 24 hour   Intake 550 ml   Output 4000 ml   Net -3450 ml       Weight (last 2 days)       Date/Time Weight    24 0600 65.5 (144.4)    24 1330 65.8 (145.06)    24 0600 73.8 (162.7)     Weight: Patient refused standing weight at 24 0600    24 0300 73.8 (162.7)    12/15/24 0553 73.9 (162.92)     Weight: resfused standing weight at 12/15/24 0553            Invasive Devices       Peripheral Intravenous Line  Duration             Peripheral IV 24 Left Antecubital 2 days              " Hemodialysis Catheter  Duration             HD Permanent Double Catheter 32 days                    Physical Exam  Vitals and nursing note reviewed.   Constitutional:       General: He is not in acute distress.     Appearance: He is well-developed.      Comments: Appears chronically ill and frail   HENT:      Head: Normocephalic and atraumatic.   Eyes:      Conjunctiva/sclera: Conjunctivae normal.   Neck:      Vascular: No JVD.   Cardiovascular:      Rate and Rhythm: Normal rate and regular rhythm.      Heart sounds: No murmur heard.  Pulmonary:      Effort: Pulmonary effort is normal. No respiratory distress.      Breath sounds: Examination of the right-lower field reveals decreased breath sounds. Examination of the left-lower field reveals decreased breath sounds. Decreased breath sounds present.   Abdominal:      Palpations: Abdomen is soft.      Tenderness: There is no abdominal tenderness.   Musculoskeletal:         General: No swelling.      Cervical back: Neck supple.      Right lower le+ Edema present.      Left lower le+ Edema present.   Skin:     General: Skin is warm and dry.      Capillary Refill: Capillary refill takes less than 2 seconds.   Neurological:      Mental Status: He is alert.   Psychiatric:         Mood and Affect: Mood normal.              Laboratory Results:    Results from last 7 days   Lab Units 24  1624   CK TOTAL U/L 57       CBC with diff:   Results from last 7 days   Lab Units 24  0448 12/15/24  0624  1624   WBC Thousand/uL 5.39 6.01 4.80   HEMOGLOBIN g/dL 8.4* 8.8* 9.6*   HEMATOCRIT % 26.8* 27.0* 29.7*   MCV fL 84 83 83   PLATELETS Thousands/uL 64* 82* 80*   RBC Million/uL 3.19* 3.25* 3.57*   MCH pg 26.3* 27.1 26.9   MCHC g/dL 31.3* 32.6 32.3   RDW % 17.5* 17.2* 17.2*   MPV fL 11.9 10.7 10.7   NRBC AUTO /100 WBCs  --   --  0         CMP:  Results from last 7 days   Lab Units 248 12/15/24  0619 24  1624   POTASSIUM mmol/L 4.6 4.5 4.2    CHLORIDE mmol/L 92* 89* 89*   CO2 mmol/L 26 27 27   BUN mg/dL 41* 49* 44*   CREATININE mg/dL 4.55* 5.70* 5.30*   CALCIUM mg/dL 7.9* 8.4 8.4   AST U/L 18  --  21   ALT U/L 8  --  9   ALK PHOS U/L 100  --  119*   EGFR ml/min/1.73sq m 12 9 10       BMP:  Results from last 7 days   Lab Units 24  0448 12/15/24  0619 24  1624   POTASSIUM mmol/L 4.6 4.5 4.2   CHLORIDE mmol/L 92* 89* 89*   CO2 mmol/L 26 27 27   BUN mg/dL 41* 49* 44*   CREATININE mg/dL 4.55* 5.70* 5.30*   CALCIUM mg/dL 7.9* 8.4 8.4       BNP:    Recent Labs     24  1624   BNP >4,700*     HS troponin: 62->50->55    Magnesium:   Results from last 7 days   Lab Units 24  0448 12/15/24  0619   MAGNESIUM mg/dL 2.0 2.0       Coags:   Results from last 7 days   Lab Units 24  1624   PTT seconds 30   INR  1.27*       Lipid Profile:   Lab Results   Component Value Date    CHOLESTEROL 164 2023    CHOLESTEROL 115 2021     Lab Results   Component Value Date    HDL 35 (L) 2023    HDL 35 (L) 2021     Lab Results   Component Value Date    TRIG 75 2023    TRIG 34 2021     Lab Results   Component Value Date    NONHDLC 80 2021        Cardiac testing:     Results for orders placed during the hospital encounter of 21    Echo complete with contrast if indicated    Narrative  [address] [address]    Transthoracic Echocardiogram  2D, M-mode, Doppler, and Color Doppler    Study date:  13-Sep-2021    Patient: AWAIS CANDELARIA  MR number: [id number]  Account number: [id number]  : 1955  Age: 66 years  Gender: Male  Status: Inpatient  Location: [facility]  Height: 66 in  Weight: 128.7 lb  BP: 144/ 85 mmHg    Indications: Shortness of Breath    Diagnoses: R06.02 - Shortness of breath    Sonographer:  Hannah Edward RDCS, RCS  Referring Physician:  Stacy Carson MD  Group:  [facility]  Interpreting Physician:   Shelley Beck DO    SUMMARY    LEFT VENTRICLE:  Systolic function was severely reduced. Ejection fraction was estimated to be 25 %.  There was severe diffuse hypokinesis.  Wall thickness was increased.  There was mild concentric hypertrophy.    RIGHT VENTRICLE:  Systolic function was reduced. TAPSE 1.4 cm.    LEFT ATRIUM:  The atrium was mildly dilated.    MITRAL VALVE:  There was moderate regurgitation.    AORTIC VALVE:  There was trace regurgitation.    TRICUSPID VALVE:  There was mild regurgitation.  Estimated peak PA pressure was 40 mmHg (assuming an estimated right atrial pressure of 10 mmHg given top normal IVC size and blunted IVC collapse).  The findings suggest mild pulmonary hypertension.    PULMONIC VALVE:  There was mild regurgitation.    IVC, HEPATIC VEINS:  The respirophasic change in diameter was less than 50%.    PERICARDIUM:  There was a large left pleural effusion.    COMPARISONS:  No prior study for comparison.    HISTORY: PRIOR HISTORY: CHF,Htn    PROCEDURE: The study was performed in the [facility]. This was a routine study. The transthoracic approach was used. The study included complete 2D imaging, M-mode, complete spectral Doppler, and color Doppler. The heart  rate was 81 bpm, at the start of the study. Image quality was adequate.    LEFT VENTRICLE: Size was normal. Systolic function was severely reduced. Ejection fraction was estimated to be 25 %. There was severe diffuse hypokinesis. Wall thickness was increased. There was mild concentric hypertrophy. DOPPLER: Left  ventricular diastolic function is abnormal.    RIGHT VENTRICLE: The size was normal. Systolic function was reduced. TAPSE 1.4 cm.    LEFT ATRIUM: The atrium was mildly dilated.    RIGHT ATRIUM: Size was normal.    MITRAL VALVE: Valve structure was normal. There was normal leaflet separation. DOPPLER: The transmitral velocity was within the normal range. There was no evidence for stenosis. There was  moderate regurgitation.    AORTIC VALVE: The valve was trileaflet. Leaflets exhibited mildly to moderately increased thickness, mild calcification, and normal cuspal separation. DOPPLER: Transaortic velocity was within the normal range. There was no evidence for  stenosis. There was trace regurgitation.    TRICUSPID VALVE: The valve structure was normal. There was normal leaflet separation. DOPPLER: The transtricuspid velocity was within the normal range. There was no evidence for stenosis. There was mild regurgitation. Estimated peak PA  pressure was 40 mmHg (assuming an estimated right atrial pressure of 10 mmHg given top normal IVC size and blunted IVC collapse). The findings suggest mild pulmonary hypertension.    PULMONIC VALVE: Not well visualized. DOPPLER: There was mild regurgitation.    PERICARDIUM: There was no pericardial effusion. There was a large left pleural effusion. The pericardium was normal in appearance.    AORTA: The root exhibited normal size.    SYSTEMIC VEINS: IVC: The inferior vena cava was top normal in size. The respirophasic change in diameter was less than 50%.    SYSTEM MEASUREMENT TABLES    2D  %FS: 12.66 %  Ao Diam: 2.91 cm  Ao asc: 2.64 cm  EDV(Teich): 105.24 ml  EF(Teich): 27.26 %  ESV(Teich): 76.55 ml  HR 2Ch Q: 77.26 BPM  HR 4Ch Q: 79.3 BPM  IVSd: 1.14 cm  LA Diam: 3.97 cm  LAAs A4C: 14.04 cm2  LAESV A-L A4C: 32.79 ml  LAESV MOD A4C: 31.7 ml  LALs A4C: 5.1 cm  LVCO 2Ch Q: 3.55 L/min  LVCO 4Ch Q: 2.3 L/min  LVCO BiP Q: 2.93 L/min  LVEDV MOD A4C: 102.64 ml  LVEF 2Ch Q: 26.59 %  LVEF 4Ch Q: 24.12 %  LVEF BiP Q: 25.34 %  LVEF MOD A4C: 31.82 %  LVESV MOD A4C: 69.98 ml  LVIDd: 4.76 cm  LVIDs: 4.15 cm  LVLd 2Ch Q: 8.12 cm  LVLd 4Ch Q: 8.19 cm  LVLd A4C: 7.39 cm  LVLs 2Ch Q: 7.46 cm  LVLs 4Ch Q: 7.54 cm  LVLs A4C: 6.47 cm  LVPWd: 1.37 cm  LVSV 2Ch Q: 45.96 ml  LVSV 4Ch Q: 29.02 ml  LVSV BiP Q: 36.92 ml  LVVED 2Ch Q: 172.85 ml  LVVED 4Ch Q: 120.31 ml  LVVED BiP Q: 145.67 ml  LVVES 2Ch Q:  126.89 ml  LVVES 4Ch Q: 91.29 ml  LVVES BiP Q: 108.76 ml  RAAs A4C: 14.22 cm2  RAESV A-L: 40.17 ml  RAESV MOD: 39.48 ml  RALs: 4.27 cm  RVIDd: 3.44 cm  SV MOD A4C: 32.66 ml  SV(Teich): 28.69 ml    CW  TR Vmax: 2.74 m/s  TR maxP.97 mmHg    MM  TAPSE: 1.36 cm    PW  MV A Philip: 0.34 m/s  MV Dec Sierra: 6.97 m/s2  MV DecT: 131.75 ms  MV E Philip: 0.92 m/s  MV E/A Ratio: 2.7  MV PHT: 38.21 ms  MVA By PHT: 5.76 cm2    [employer] Accredited Echocardiography Laboratory    Prepared and electronically signed by    Shelley Beck DO  Signed 13-Sep-2021 17:11:48      Imaging: Results Review Statement: I reviewed radiology reports from this admission including: chest xray and CT chest.    XR Trauma pelvis ap only 1 or 2 vw  Result Date: 2024  Narrative: XR PELVIS AP ONLY 1 OR 2 VW INDICATION: TRAUMA. COMPARISON: CT abdomen pelvis 2024. FINDINGS: No acute fracture or dislocation. No significant degenerative changes. No lytic or blastic osseous lesion. Unremarkable soft tissues. Unremarkable visualized lumbar spine.     Impression: No acute osseous abnormality. Computerized Assisted Algorithm (CAA) may have been used to analyze all applicable images. Workstation performed: [id number]     XR Trauma chest portable  Result Date: 2024  Narrative: XR CHEST PORTABLE INDICATION: TRAUMA. COMPARISON: 2024 FINDINGS: Right internal jugular dialysis catheter in satisfactory position Vascular congestion moderate bilateral pleural effusions. No pneumothorax. Normal cardiomediastinal silhouette. No acute osseous abnormality within the limitations of portable radiography. Normal upper abdomen.     Impression: Vascular congestion and moderate bilateral pleural effusions. Workstation performed: [id number]     TRAUMA - CT chest abdomen pelvis w contrast  Result Date: 2024  Narrative: CT CHEST, ABDOMEN AND PELVIS WITH IV CONTRAST INDICATION: TRAUMA. COMPARISON: CT abdomen pelvis 2024. CT chest  abdomen pelvis 11/10/2024. CT chest 9/11/2021. TECHNIQUE: CT examination of the chest, abdomen and pelvis was performed. Multiplanar 2D reformatted images were created from the source data. 3D reconstructions of the bony thorax were performed in order to improve sensitivity of evaluation for rib fractures. This examination, like all CT scans performed in the [facility], was performed utilizing techniques to minimize radiation dose exposure, including the use of iterative reconstruction and automated exposure control. Radiation dose length product (DLP) for this visit: 913.94 mGy-cm IV Contrast: 85 mL of iohexol (OMNIPAQUE) Enteric Contrast: Not administered. FINDINGS: CHEST LUNGS: Compressive atelectasis in the right middle lobe, lingula and bilateral lower lobes. Biapical emphysema and scarring. No tracheal or endobronchial lesion. PLEURA: Moderate to large bilateral pleural effusions. HEART/GREAT VESSELS: Cardiomegaly. No pericardial effusion. No thoracic aortic aneurysm. Right internal jugular dialysis catheter terminates in the region of the cavoatrial junction. MEDIASTINUM AND REINALDO: Unchanged enlarged right paratracheal lymph node. CHEST WALL AND LOWER NECK: Right internal jugular dialysis catheter. ABDOMEN LIVER/BILIARY TREE: Unremarkable. GALLBLADDER: No calcified gallstones. No pericholecystic inflammatory change. SPLEEN: Unremarkable. PANCREAS: Unremarkable. ADRENAL GLANDS: Unremarkable. KIDNEYS/URETERS: No hydronephrosis. Moderate right renal atrophy. STOMACH AND BOWEL: Unremarkable. APPENDIX: No findings to suggest appendicitis. ABDOMINOPELVIC CAVITY: Small ascites. No pneumoperitoneum. No lymphadenopathy. VESSELS: Chronic occlusion aorta below the level of the superior mesenteric artery, and chronic occlusion of the bilateral iliac arteries. Reconstitution of the bilateral common femoral arteries via collaterals. PELVIS REPRODUCTIVE ORGANS: Unremarkable for patient's age. URINARY  BLADDER: Unremarkable. ABDOMINAL WALL/INGUINAL REGIONS: Body wall edema. BONES: No acute fracture or suspicious osseous lesion.     Impression: No findings of acute traumatic injury in the chest, abdomen or pelvis. Moderate to large bilateral pleural effusions. Cardiomegaly. Small ascites and diffuse body wall edema consistent with fluid overload/3rd spacing of fluids. Chronic abdominal aortoiliac occlusion noted. The study was marked in EPIC for immediate notification. Workstation performed: [id number]     TRAUMA - CT facial bones wo contrast  Result Date: 12/14/2024  Narrative: CT FACIAL BONES WITHOUT INTRAVENOUS CONTRAST INDICATION:   TRAUMA. COMPARISON: None. TECHNIQUE:  Axial CT images were obtained through the facial bones with additional sagittal and coronal reconstructions. Radiation dose length product (DLP) for this visit:  335.5 mGy-cm .  This examination, like all CT scans performed in the [facility], was performed utilizing techniques to minimize radiation dose exposure, including the use of iterative reconstruction and automated exposure control. IMAGE QUALITY:  Diagnostic. FINDINGS: FACIAL BONES:   No facial bone fracture identified.  Normal alignment of the temporomandibular joints.  No lytic or blastic lesion. ORBITS:  Orbital globes, optic nerves, and extraocular muscles appear symmetric and normal. There is no evidence of retrobulbar mass, abscess, or hematoma. SINUSES:  Normal. SOFT TISSUES: Diffuse left periorbital soft tissue swelling.     Impression: Diffuse left periorbital soft tissue swelling. No evidence of facial bone fracture. The study was marked in EPIC for immediate notification. Workstation performed: [id number]     TRAUMA - CT spine cervical wo contrast  Result Date: 12/14/2024  Narrative: CT CERVICAL SPINE - WITHOUT CONTRAST INDICATION:   TRAUMA. COMPARISON:  None. TECHNIQUE:  CT examination of the cervical spine was performed without intravenous contrast.   Contiguous axial images were obtained. Multiplanar 2D reformatted images were created from the source data. Radiation dose length product (DLP) for this visit:  341.58 mGy-cm .  This examination, like all CT scans performed in the [facility], was performed utilizing techniques to minimize radiation dose exposure, including the use of iterative  reconstruction and automated exposure control. IMAGE QUALITY:  Diagnostic. FINDINGS: ALIGNMENT:  Normal alignment of the cervical spine. No subluxation. VERTEBRAE:  No fracture. DEGENERATIVE CHANGES: Moderate disc degenerative changes at C5-6 and C6-7. PREVERTEBRAL AND PARASPINAL SOFT TISSUES: Unremarkable THORACIC INLET: Bilateral pleural effusions. Biapical emphysema and scarring.     Impression: No cervical spine fracture or traumatic malalignment. Partially imaged bilateral pleural effusions. The study was marked in EPIC for immediate notification. Workstation performed: [id number]     TRAUMA - CT head wo contrast  Result Date: 12/14/2024  Narrative: CT BRAIN - WITHOUT CONTRAST INDICATION:   TRAUMA. COMPARISON:  None. TECHNIQUE:  CT examination of the brain was performed.  Multiplanar 2D reformatted images were created from the source data. Radiation dose length product (DLP) for this visit:  867.8 mGy-cm .  This examination, like all CT scans performed in the [facility], was performed utilizing techniques to minimize radiation dose exposure, including the use of iterative reconstruction and automated exposure control. IMAGE QUALITY:  Diagnostic. FINDINGS: PARENCHYMA: Decreased attenuation is noted in periventricular and subcortical white matter demonstrating an appearance that is statistically most likely to represent advanced microangiopathic change. Old right basal ganglia and bilateral thalamic lacunar  infarcts. No CT signs of acute infarction.  No intracranial mass, mass effect or midline shift.  No acute parenchymal hemorrhage.  VENTRICLES AND EXTRA-AXIAL SPACES:  Normal for the patient's age. VISUALIZED ORBITS: Diffuse left periorbital soft tissue swelling. No evidence of underlying fracture. PARANASAL SINUSES:  Normal visualized paranasal sinuses. CALVARIUM AND EXTRACRANIAL SOFT TISSUES:  Normal.     Impression: No acute intracranial abnormality. Left periorbital soft tissue swelling. See  CT facial bone study for further details. The study was marked in EPIC for immediate notification. Workstation performed: [id number]     XR chest portable  Result Date: 11/19/2024  Narrative: XR CHEST PORTABLE INDICATION: Cough, rhonchi. COMPARISON: Chest radiograph 11/15/2024, CT chest, abdomen pelvis 11/10/2024 FINDINGS: Superimposed lifevest defibrillator obscures detail. -Right IJ central line tip terminates near the cavoatrial junction. Improving but residual mild vascular congestion with bilateral pleural effusions and left lower lobe atelectasis. No pneumothorax. Enlarged cardiac silhouette. Bones are unremarkable for age. Normal upper abdomen.     Impression: Residual but improving pulmonary vascular congestion with residual bilateral pleural effusions and left retrocardiac atelectasis. Workstation performed: [id number]       EKG reviewed personally: EKG: Normal sinus rhythm with nonspecific ST/T wave abnormality .     Telemetry: sinus rhythm. Rate 50-60's    Code Status: Level 1 - Full Code

## 2024-12-17 NOTE — PLAN OF CARE
Target UF Goal  3-3.5  L as tolerated. Patient dialyzing for  3.5 hours  on 2 K bath for serum K of  4.6  per protocol. Treatment plan reviewed with Nephrology.       Post-Dialysis RN Treatment Note    Blood Pressure:  Pre 194 109 mm/Hg  Post 187/92 mmHg   EDW  TBD kg    Weight:  Pre 65.5 kg   Post 62.6 kg   Mode of weight measurement: Standing Scale   Volume Removed  2852 ml    Treatment duration 3 hours   NS given  No    Treatment shortened? Yes, describe: Per pt request to eat dinner   Medications given during Rx None Reported   Estimated Kt/V  0.94   Access type: Permacath/TDC   Access Issues: No   Needle Gauge: N/A   Report called to primary nurse   Yes, Loretta Bhatt RN              Problem: METABOLIC, FLUID AND ELECTROLYTES - ADULT  Goal: Electrolytes maintained within normal limits  Description: INTERVENTIONS:  - Monitor labs and assess patient for signs and symptoms of electrolyte imbalances  - Administer electrolyte replacement as ordered  - Monitor response to electrolyte replacements, including repeat lab results as appropriate  - Instruct patient on fluid and nutrition as appropriate  Outcome: Progressing  Goal: Fluid balance maintained  Description: INTERVENTIONS:  - Monitor labs   - Monitor I/O and WT  - Instruct patient on fluid and nutrition as appropriate  - Assess for signs & symptoms of volume excess or deficit  Outcome: Progressing

## 2024-12-17 NOTE — QUICK NOTE
Dr. Kelly made aware pt has been SOB at rest and with exertion. He was lying in bed and switched to the recliner. He wears 1L of oxygen for comfort. Patient did state his breathing felt better when sitting up. [place] from the abdomen down +2. Patient does have a productive congested cough with thick mucous. Patient is scheduled for HD today and tomorrow per Dr. Kelly.

## 2024-12-17 NOTE — QUICK NOTE
Per HD RN hold hydralazine until 1500 and then will re evaluate if patient should receive dose during HD treatment.

## 2024-12-18 ENCOUNTER — APPOINTMENT (INPATIENT)
Dept: DIALYSIS | Facility: HOSPITAL | Age: 69
DRG: 291 | End: 2024-12-18
Attending: INTERNAL MEDICINE
Payer: COMMERCIAL

## 2024-12-18 ENCOUNTER — APPOINTMENT (INPATIENT)
Dept: INTERVENTIONAL RADIOLOGY/VASCULAR | Facility: HOSPITAL | Age: 69
DRG: 291 | End: 2024-12-18
Attending: INTERNAL MEDICINE
Payer: COMMERCIAL

## 2024-12-18 LAB
APPEARANCE FLD: ABNORMAL
BASOPHILS # BLD AUTO: 0.01 THOUSANDS/ÂΜL (ref 0–0.1)
BASOPHILS NFR BLD AUTO: 0 % (ref 0–1)
COLOR FLD: YELLOW
DME PARACHUTE DELIVERY DATE ACTUAL: NORMAL
DME PARACHUTE DELIVERY DATE EXPECTED: NORMAL
DME PARACHUTE DELIVERY DATE REQUESTED: NORMAL
DME PARACHUTE ITEM DESCRIPTION: NORMAL
DME PARACHUTE ORDER STATUS: NORMAL
DME PARACHUTE SUPPLIER NAME: NORMAL
DME PARACHUTE SUPPLIER PHONE: NORMAL
EOSINOPHIL # BLD AUTO: 0.04 THOUSAND/ÂΜL (ref 0–0.61)
EOSINOPHIL NFR BLD AUTO: 1 % (ref 0–6)
ERYTHROCYTE [DISTWIDTH] IN BLOOD BY AUTOMATED COUNT: 18 % (ref 11.6–15.1)
GLUCOSE FLD-MCNC: 126 MG/DL
HCT VFR BLD AUTO: 28.4 % (ref 36.5–49.3)
HGB BLD-MCNC: 8.9 G/DL (ref 12–17)
IMM GRANULOCYTES # BLD AUTO: 0.01 THOUSAND/UL (ref 0–0.2)
IMM GRANULOCYTES NFR BLD AUTO: 0 % (ref 0–2)
LDH FLD L TO P-CCNC: 43 U/L
LYMPHOCYTES # BLD AUTO: 0.42 THOUSANDS/ÂΜL (ref 0.6–4.47)
LYMPHOCYTES NFR BLD AUTO: 9 %
LYMPHOCYTES NFR BLD AUTO: 9 % (ref 14–44)
MCH RBC QN AUTO: 26.8 PG (ref 26.8–34.3)
MCHC RBC AUTO-ENTMCNC: 31.3 G/DL (ref 31.4–37.4)
MCV RBC AUTO: 86 FL (ref 82–98)
MONO+MESO NFR FLD MANUAL: 35 %
MONOCYTES # BLD AUTO: 0.28 THOUSAND/ÂΜL (ref 0.17–1.22)
MONOCYTES NFR BLD AUTO: 6 % (ref 4–12)
NEUTROPHILS # BLD AUTO: 3.8 THOUSANDS/ÂΜL (ref 1.85–7.62)
NEUTS SEG NFR BLD AUTO: 56 %
NEUTS SEG NFR BLD AUTO: 84 % (ref 43–75)
NRBC BLD AUTO-RTO: 0 /100 WBCS
PH BODY FLUID: 7.8
PLATELET # BLD AUTO: 45 THOUSANDS/UL (ref 149–390)
PMV BLD AUTO: 11.3 FL (ref 8.9–12.7)
PROT FLD-MCNC: <3 G/DL
RBC # BLD AUTO: 3.32 MILLION/UL (ref 3.88–5.62)
SITE: ABNORMAL
TOTAL CELLS COUNTED SPEC: 100
TOTAL PROTEIN FLUID: 1.3 G/DL
WBC # BLD AUTO: 4.56 THOUSAND/UL (ref 4.31–10.16)
WBC # FLD MANUAL: 233 /UL

## 2024-12-18 PROCEDURE — 84157 ASSAY OF PROTEIN OTHER: CPT | Performed by: INTERNAL MEDICINE

## 2024-12-18 PROCEDURE — 88112 CYTOPATH CELL ENHANCE TECH: CPT | Performed by: PATHOLOGY

## 2024-12-18 PROCEDURE — 82945 GLUCOSE OTHER FLUID: CPT | Performed by: INTERNAL MEDICINE

## 2024-12-18 PROCEDURE — 0W993ZZ DRAINAGE OF RIGHT PLEURAL CAVITY, PERCUTANEOUS APPROACH: ICD-10-PCS | Performed by: STUDENT IN AN ORGANIZED HEALTH CARE EDUCATION/TRAINING PROGRAM

## 2024-12-18 PROCEDURE — 85025 COMPLETE CBC W/AUTO DIFF WBC: CPT | Performed by: INTERNAL MEDICINE

## 2024-12-18 PROCEDURE — 87070 CULTURE OTHR SPECIMN AEROBIC: CPT | Performed by: INTERNAL MEDICINE

## 2024-12-18 PROCEDURE — 99232 SBSQ HOSP IP/OBS MODERATE 35: CPT | Performed by: INTERNAL MEDICINE

## 2024-12-18 PROCEDURE — 32555 ASPIRATE PLEURA W/ IMAGING: CPT | Performed by: STUDENT IN AN ORGANIZED HEALTH CARE EDUCATION/TRAINING PROGRAM

## 2024-12-18 PROCEDURE — 83986 ASSAY PH BODY FLUID NOS: CPT | Performed by: INTERNAL MEDICINE

## 2024-12-18 PROCEDURE — 87205 SMEAR GRAM STAIN: CPT | Performed by: INTERNAL MEDICINE

## 2024-12-18 PROCEDURE — 32555 ASPIRATE PLEURA W/ IMAGING: CPT

## 2024-12-18 PROCEDURE — 89051 BODY FLUID CELL COUNT: CPT | Performed by: INTERNAL MEDICINE

## 2024-12-18 PROCEDURE — 88305 TISSUE EXAM BY PATHOLOGIST: CPT | Performed by: PATHOLOGY

## 2024-12-18 PROCEDURE — 99233 SBSQ HOSP IP/OBS HIGH 50: CPT | Performed by: INTERNAL MEDICINE

## 2024-12-18 PROCEDURE — 83615 LACTATE (LD) (LDH) ENZYME: CPT | Performed by: INTERNAL MEDICINE

## 2024-12-18 RX ORDER — LIDOCAINE WITH 8.4% SOD BICARB 0.9%(10ML)
SYRINGE (ML) INJECTION AS NEEDED
Status: COMPLETED | OUTPATIENT
Start: 2024-12-18 | End: 2024-12-18

## 2024-12-18 RX ADMIN — ACETAMINOPHEN 325MG 650 MG: 325 TABLET ORAL at 12:03

## 2024-12-18 RX ADMIN — CARVEDILOL 12.5 MG: 12.5 TABLET, FILM COATED ORAL at 07:13

## 2024-12-18 RX ADMIN — TORSEMIDE 100 MG: 100 TABLET ORAL at 12:05

## 2024-12-18 RX ADMIN — ASPIRIN 81 MG: 81 TABLET, COATED ORAL at 12:05

## 2024-12-18 RX ADMIN — HYDRALAZINE HYDROCHLORIDE 100 MG: 25 TABLET ORAL at 06:10

## 2024-12-18 RX ADMIN — Medication 3 MG: at 22:04

## 2024-12-18 RX ADMIN — CARVEDILOL 12.5 MG: 12.5 TABLET, FILM COATED ORAL at 18:07

## 2024-12-18 RX ADMIN — Medication 10 ML: at 15:13

## 2024-12-18 RX ADMIN — ISOSORBIDE MONONITRATE 120 MG: 60 TABLET, EXTENDED RELEASE ORAL at 12:04

## 2024-12-18 RX ADMIN — ATORVASTATIN CALCIUM 20 MG: 20 TABLET, FILM COATED ORAL at 18:07

## 2024-12-18 RX ADMIN — IRON SUCROSE 200 MG: 20 INJECTION, SOLUTION INTRAVENOUS at 11:14

## 2024-12-18 RX ADMIN — HYDRALAZINE HYDROCHLORIDE 100 MG: 25 TABLET ORAL at 13:40

## 2024-12-18 RX ADMIN — HYDRALAZINE HYDROCHLORIDE 100 MG: 25 TABLET ORAL at 22:05

## 2024-12-18 RX ADMIN — FOLIC ACID 1 MG: 1 TABLET ORAL at 12:05

## 2024-12-18 NOTE — CASE MANAGEMENT
Case Management Discharge Planning Note    Patient name Matthew Alvarez  Location /-01 MRN [id number]  : 1955 Date 2024       Current Admission Date: 2024  Current Admission Diagnosis:Acute on chronic systolic heart failure (HCC)   Patient Active Problem List    Diagnosis Date Noted Date Diagnosed    Secondary hyperparathyroidism of renal origin ([facility]) 12/15/2024     ESRD (end stage renal disease) on dialysis ([facility]) 2024     Ambulatory dysfunction 2024     Thrombocytopenia ([facility]) 2024     Renal failure 11/15/2024     Encounter for hemodialysis ([facility]) 11/15/2024     Anemia due to chronic kidney disease, on chronic dialysis ([facility]) 11/15/2024     Anemia of renal disease 11/15/2024     Vitamin D deficiency, unspecified 11/15/2024     Acute hemodialysis patient ([facility]) 2024     Chronic systolic (congestive) heart failure ([facility]) 2024     Oliguria 2024     Encounter for hemodialysis for ESRD ([facility]) 2024     Hyponatremia 2024     Dilated cardiomyopathy ([facility]) 2024     Elevated liver transaminase level 11/10/2024     Acute on chronic systolic heart failure ([facility]) 2024     Coronary artery disease involving native coronary artery of native heart without angina pectoris 10/08/2021     Rheumatoid factor positive 2021     Protein calorie malnutrition (HCC) 2021     Elevated troponin 2021     Tobacco abuse 2021     Nonischemic cardiomyopathy ([facility]) 2021     Primary hypertension 2021       LOS (days): 4  Geometric Mean LOS (GMLOS) (days): 3.9  Days to GMLOS:0.3     OBJECTIVE:  Risk of Unplanned Readmission Score: 20.8         Current admission status: Inpatient   Preferred Pharmacy:   [id number] - [place] - [address]  [address]  [facility] [address]  Phone: [phone] Fax: [phone]    [facility] - [place] - [address]  [address]  [address]  [place] [address]  Phone: [phone] Fax: [phone]    Primary Care Provider: Olive Rodriguez MD    Primary Insurance: [employer]  Secondary Insurance:     DISCHARGE DETAILS:     Hemodialysis center [facility] [place] called updated that pt remains in the hospital and will notify HD center when pt is ready for discharge.            1432- CM spoke with family and stated that pts hospital bed from [facility] is scheduled to be delivered to pts home today.

## 2024-12-18 NOTE — ASSESSMENT & PLAN NOTE
Overall weight has significant proved since admission has lost nearly 12 kg.  Clinically however patient still appears significantly volume overloaded.  Will continue to challenge weight with hemodialysis.

## 2024-12-18 NOTE — ASSESSMENT & PLAN NOTE
Hyponatremia likely related to significant volume overload.  Will continue to challenge weight as tolerated.  Maintain fluid restriction.  Normalized sodium bath.

## 2024-12-18 NOTE — ASSESSMENT & PLAN NOTE
Iron stores: Iron saturation 10% with ferritin of 109.  Representing component of iron deficiency given ESRD status.  Had Venofer 200 mg 3 times weekly with hemodialysis.

## 2024-12-18 NOTE — PROGRESS NOTES
Progress Note - Cardiology   Name: Matthew Alvarez 69 y.o. male I MRN: [id number]  Unit/Bed#: -01 I Date of Admission: 12/14/2024   Date of Service: 12/18/2024 I Hospital Day: 4     Assessment & Plan  Acute on chronic systolic heart failure (HCC)  Wt Readings from Last 3 Encounters:   12/18/24 63.2 kg (139 lb 6.4 oz)   11/20/24 55.2 kg (121 lb 12.8 oz)   09/03/24 55.3 kg (122 lb)     History of HFrEF.  Presented with volume overload in the setting of missed dialysis sessions.  Echo 11/11/2024 shows LVEF 20-24% with severe global hypokinesis and dilated LV which is unchanged from 2021  Receiving hemodialysis. Appreciate nephrology recommendations.  Large bilateral pleural effusions on chest imaging - request IR consult for possible thoracentesis.  See discussion under cardiomyopathy.  At this time cardiology will sign off. Please feel free to reach out if needed.  Hyponatremia  Management as per nephrology  Acute hemodialysis patient (HCC)  As per nephrology  Anemia of renal disease  Stable this admission  ESRD (end stage renal disease) on dialysis ([facility])  Lab Results   Component Value Date    EGFR 13 12/17/2024    EGFR 12 12/16/2024    EGFR 9 12/15/2024    CREATININE 4.09 (H) 12/17/2024    CREATININE 4.55 (H) 12/16/2024    CREATININE 5.70 (H) 12/15/2024     Ambulatory dysfunction    Secondary hyperparathyroidism of renal origin (HCC)    Nonischemic cardiomyopathy ([facility])  History of non-ischemic severe cardiomyopathy.  Admitted with heart failure in 2021 where EF was < 25%.  Cardiac catheterization 9/16/2021 showed only mild nonobstructive disease.  GDMT limited by advanced CKD.  On GDMT with Coreg, hydralazine and Imdur. Continuing to titrate during admission.  LifeVest in place.  Plan close outpatient follow up with our office.  Coronary artery disease involving native coronary artery of native heart without angina pectoris  Mild, non-obstructive CAD on cardiac cath 9/2021.  Continue aspirin 81 mg  daily.  Resumed statin - atorvastatin 20 mg daily.  Primary hypertension  Blood pressures improving with up-titration of GDMT.   Continue current regimen.   Renal function limits options.  Continue coreg to 12.5 mg PO BID, Imdur to 120 mg daily, and hydralazine 100 mg PO TID    Subjective   Chief Complaint: fall, shortness of breath  Currently receiving hemodialysis at the time of my evaluation. Nephrologist is at bedside. No significant change in condition from yesterday to today.     Objective :  Temp:  [97.9 °F (36.6 °C)-98.2 °F (36.8 °C)] 98.1 °F (36.7 °C)  HR:  [56-82] 68  BP: (152-201)/() 152/61  Resp:  [16-28] 18  SpO2:  [95 %-98 %] 96 %  O2 Device: None (Room air)  Orthostatic Blood Pressures      Flowsheet Row Most Recent Value   Blood Pressure 152/61 filed at 12/18/2024 0930   Patient Position - Orthostatic VS Sitting filed at 12/18/2024 0828          First Weight: Weight - Scale: 73.9 kg (162 lb 14.7 oz) (Patient refused standing weight) (12/14/24 1951)  Vitals:    12/17/24 0600 12/18/24 0600   Weight: 65.5 kg (144 lb 6.4 oz) 63.2 kg (139 lb 6.4 oz)     Physical Exam  Vitals and nursing note reviewed.   Constitutional:       General: He is not in acute distress.     Appearance: He is well-developed.      Comments: Frail, malnourished, chronically ill   HENT:      Head: Normocephalic and atraumatic.   Eyes:      Conjunctiva/sclera: Conjunctivae normal.   Neck:      Vascular: No JVD.   Cardiovascular:      Rate and Rhythm: Normal rate and regular rhythm.      Heart sounds: No murmur heard.     Comments: Diffuse third spacing noted in lower body  Pulmonary:      Effort: Pulmonary effort is normal. No respiratory distress.      Breath sounds: Examination of the right-lower field reveals decreased breath sounds. Examination of the left-lower field reveals decreased breath sounds. Decreased breath sounds present.      Comments: Breathing comfortably lying flat on room air  Abdominal:      Palpations:  Abdomen is soft.      Tenderness: There is no abdominal tenderness.   Musculoskeletal:         General: No swelling.      Cervical back: Neck supple.   Skin:     General: Skin is warm and dry.      Capillary Refill: Capillary refill takes less than 2 seconds.      Findings: Ecchymosis (facial, present at admission) present.   Neurological:      Mental Status: He is alert.   Psychiatric:         Mood and Affect: Mood normal.           Lab Results: I have reviewed the following results:CBC/BMP:   .     12/17/24  1354 12/18/24  0842   WBC 4.44 4.56   HGB 8.3* 8.9*   HCT 25.8* 28.4*   PLT 52* 45*   SODIUM 125*  --    K 4.4  --    CL 92*  --    CO2 27  --    BUN 33*  --    CREATININE 4.09*  --    GLUC 143*  --       Results from last 7 days   Lab Units 12/18/24  0842 12/17/24  1354 12/16/24  0448   WBC Thousand/uL 4.56 4.44 5.39   HEMOGLOBIN g/dL 8.9* 8.3* 8.4*   HEMATOCRIT % 28.4* 25.8* 26.8*   PLATELETS Thousands/uL 45* 52* 64*     Results from last 7 days   Lab Units 12/17/24  1354 12/16/24  0448 12/15/24  0619   POTASSIUM mmol/L 4.4 4.6 4.5   CHLORIDE mmol/L 92* 92* 89*   CO2 mmol/L 27 26 27   BUN mg/dL 33* 41* 49*   CREATININE mg/dL 4.09* 4.55* 5.70*   CALCIUM mg/dL 8.1* 7.9* 8.4     Results from last 7 days   Lab Units 12/14/24  1624   INR  1.27*   PTT seconds 30     Lab Results   Component Value Date    HGBA1C 5.7 (H) 09/15/2021     Lab Results   Component Value Date    CKTOTAL 57 12/14/2024    TROPONINI 0.03 09/12/2021       Imaging Results Review: I reviewed radiology reports from this admission including: chest xray and Echocardiogram.  Other Study Results Review: EKG was reviewed.     Telemetry: sinus rhythm, rate 50-80's

## 2024-12-18 NOTE — PLAN OF CARE
Target UF Goal 3 L as tolerated. Patient dialyzing for  3.5 hrs on 3 K bath for serum K of 4.4 per protocol. Treatment plan reviewed with Nephrology.   Post-Dialysis RN Treatment Note    Blood Pressure:  Pre 168/80 mm/Hg  Post 181/79 mmHg   EDW:  60 kg    Weight:  Pre: 63.2 kg   Post 60 kg   Mode of weight measurement: Standing Scale   Volume Removed:  2605 ml    Treatment duration: 210 minutes    NS given:  N/A    Treatment shortened No   Medications given during Rx: Venofer 200 mg   Estimated Kt/V:  1.05   Access type: Permacath/TDC   Needle Gauge:  n/a   Access Issues: Yes, describe: lines reversed     Report called to primary nurse:   Yes  Hanh Shirley RN        Problem: METABOLIC, FLUID AND ELECTROLYTES - ADULT  Goal: Electrolytes maintained within normal limits  Description: INTERVENTIONS:  - Monitor labs and assess patient for signs and symptoms of electrolyte imbalances  - Administer electrolyte replacement as ordered  - Monitor response to electrolyte replacements, including repeat lab results as appropriate  - Instruct patient on fluid and nutrition as appropriate  Outcome: Progressing  Goal: Fluid balance maintained  Description: INTERVENTIONS:  - Monitor labs   - Monitor I/O and WT  - Instruct patient on fluid and nutrition as appropriate  - Assess for signs & symptoms of volume excess or deficit  Outcome: Progressing

## 2024-12-18 NOTE — ASSESSMENT & PLAN NOTE
It is noted the patient has an ejection fraction of 20 - 25%.  Continue to challenge weight as tolerated.

## 2024-12-18 NOTE — ASSESSMENT & PLAN NOTE
Blood pressure currently appears acceptable.  Hopeful continued improvement with challenging weight/ultrafiltration.

## 2024-12-18 NOTE — PROGRESS NOTES
Progress Note - Hospitalist   Name: Matthew Alvarez 69 y.o. male I MRN: [id number]  Unit/Bed#: -01 I Date of Admission: 12/14/2024   Date of Service: 12/18/2024 I Hospital Day: 4    Assessment & Plan  Acute on chronic systolic heart failure (HCC)  Patient presented  on (12/14) after sustaining a mechanical fall 2 to 3 days ago.    Patient states that he missed dialysis on Wednesday and presented for regularly scheduled session on Friday where he was noted with significant bruising and referred to the ED  Evidence of severe volume overload with weight significantly up from dry weight  Notably, patient with gross noncompliance with outpatient medical regimen  Presented with a weight of 73.8 kg with dry weight of 60 kg.  Underwent hemodialysis daily on 121/5 -12/17..  Plan for hemodialysis today for removal of 3 kg.  Repeat chest x-ray today continues to show bilateral pleural effusions.    Will undergo HD with UF today-goal for 3 L removal  IR consulted for thoracentesis subsequently.  Nephrology closely following  Cardiology consulted for concomitant management  Monitor for electrolyte abnormality.  Continue with ongoing volume control with dialysis.  Cardiology adjusting his dry weight.  Wt Readings from Last 3 Encounters:   12/18/24 63.2 kg (139 lb 6.4 oz)   11/20/24 55.2 kg (121 lb 12.8 oz)   09/03/24 55.3 kg (122 lb)     ESRD (end stage renal disease) on dialysis (HCC)  Dialysis, Monday, Wednesday, Friday.  HD y (12/15) and (12/16)-another dialysis treatment today goal for 3 L volume removal today (12/17)-nephrology closely following  An uric at baseline    Lab Results   Component Value Date    EGFR 13 12/17/2024    EGFR 12 12/16/2024    EGFR 9 12/15/2024    CREATININE 4.09 (H) 12/17/2024    CREATININE 4.55 (H) 12/16/2024    CREATININE 5.70 (H) 12/15/2024     Nonischemic cardiomyopathy (HCC)  November 11, 2024: Echo, EF of 20-24%  Noncompliant with all medications in the outpatient setting-unclear if taking  "any  Also, set up with LifeVest which patient states he wears \"as needed\"  GDMT limited by advanced end-stage kidney disease.  On GDMT with Coreg, Imdur, hydralazine.  Close outpatient cardiology follow-up on discharge  Maintain on telemetry monitor during hospitalization because of being on LifeVest.  Primary hypertension  Noncompliant with his home medications  Restarted oral hydralazine, Coreg  As needed intravenous hydralazine/labetalol as needed  Patient also significantly volume overloaded upon presentation which is likely a significant contributing factor.  Undergoing hemodialysis today.  Ambulatory dysfunction  Patient states he fell at home, has bruising on his face  Will consult PT/OT/case management  He states he sometimes walks with an assistive device  Will likely require short-term rehab  Patient adamantly refusing rehab.  Discussed with patient's wife at length over the phone.  Coronary artery disease involving native coronary artery of native heart without angina pectoris  Continue aspirin  Mild nonobstructive coronary artery disease per cardiac catheterization in 9/2021.  Resume statin therapy.  Hyponatremia  Volume control with dialysis today.  Acute hemodialysis patient ([facility])    Anemia of renal disease  Follow-up on iron, vitamin B12 and folate.  Plan to start IV iron  Secondary hyperparathyroidism of renal origin ([facility])  Nephrology on board.  Follow-up phosphorus level    VTE Pharmacologic Prophylaxis: VTE Score: 5 High Risk (Score >/= 5) - Pharmacological DVT Prophylaxis Ordered: heparin. Sequential Compression Devices Ordered.    Mobility:   Basic Mobility Inpatient Raw Score: 16  JH-HLM Goal: 5: Stand one or more mins  JH-HLM Achieved: 5: Stand (1 or more minutes)  JH-HLM Goal achieved. Continue to encourage appropriate mobility.    Patient Centered Rounds: I performed bedside rounds with nursing staff today.   Discussions with Specialists or Other Care Team Provider: Discussed with " nephrology    Education and Discussions with Family / Patient: Patient declined call to .     Current Length of Stay: 4 day(s)  Current Patient Status: Inpatient   Certification Statement: The patient will continue to require additional inpatient hospital stay due to ongoing dialysis and need for thoracentesis  Discharge Plan: Anticipate discharge in 48-72 hrs to home with home services.    Code Status: Level 1 - Full Code    Subjective   Patient seen and examined during dialysis.  Continues to have shortness of breath.  Does not offer any acute complaints.  No acute events overnight.    Objective :  Temp:  [98.1 °F (36.7 °C)-98.2 °F (36.8 °C)] 98.1 °F (36.7 °C)  HR:  [58-82] 75  BP: (152-201)/() 189/88  Resp:  [16-28] 18  SpO2:  [95 %-98 %] 97 %  O2 Device: None (Room air)    Body mass index is 22.5 kg/m².     Input and Output Summary (last 24 hours):     Intake/Output Summary (Last 24 hours) at 12/18/2024 1326  Last data filed at 12/18/2024 1300  Gross per 24 hour   Intake 1350 ml   Output 5957 ml   Net -4607 ml       Physical Exam  Constitutional:       Appearance: He is ill-appearing.   HENT:      Head: Normocephalic and atraumatic.      Mouth/Throat:      Mouth: Mucous membranes are moist.   Eyes:      Extraocular Movements: Extraocular movements intact.      Pupils: Pupils are equal, round, and reactive to light.      Comments: Left periorbital ecchymosis   Cardiovascular:      Rate and Rhythm: Normal rate and regular rhythm.   Pulmonary:      Comments: Diminished breath sounds bilaterally  Abdominal:      General: Bowel sounds are normal. There is no distension.      Palpations: Abdomen is soft.      Tenderness: There is no abdominal tenderness.   Musculoskeletal:      Right lower leg: Edema present.      Left lower leg: Edema present.   Skin:     General: Skin is warm.   Neurological:      Mental Status: He is alert and oriented to person, place, and time. Mental status is at baseline.            Lines/Drains:  Lines/Drains/Airways       Active Status       Name Placement date Placement time Site Days    HD Permanent Double Catheter 11/14/24  1352  Internal jugular  33                            Lab Results: I have reviewed the following results:   Results from last 7 days   Lab Units 12/18/24  0842   WBC Thousand/uL 4.56   HEMOGLOBIN g/dL 8.9*   HEMATOCRIT % 28.4*   PLATELETS Thousands/uL 45*   SEGS PCT % 84*   LYMPHO PCT % 9*   MONO PCT % 6   EOS PCT % 1     Results from last 7 days   Lab Units 12/17/24  1354   SODIUM mmol/L 125*   POTASSIUM mmol/L 4.4   CHLORIDE mmol/L 92*   CO2 mmol/L 27   BUN mg/dL 33*   CREATININE mg/dL 4.09*   ANION GAP mmol/L 6   CALCIUM mg/dL 8.1*   ALBUMIN g/dL 3.0*   TOTAL BILIRUBIN mg/dL 1.20*   ALK PHOS U/L 109*   ALT U/L 9   AST U/L 19   GLUCOSE RANDOM mg/dL 143*     Results from last 7 days   Lab Units 12/14/24  1624   INR  1.27*                   Recent Cultures (last 7 days):               Last 24 Hours Medication List:     Current Facility-Administered Medications:     acetaminophen (TYLENOL) tablet 650 mg, Q4H PRN    aspirin (ECOTRIN LOW STRENGTH) EC tablet 81 mg, Daily    atorvastatin (LIPITOR) tablet 20 mg, Daily With Dinner    carvedilol (COREG) tablet 12.5 mg, BID With Meals    folic acid (FOLVITE) tablet 1 mg, Daily    heparin (porcine) subcutaneous injection 5,000 Units, Q8H JEEVAN    hydrALAZINE (APRESOLINE) injection 10 mg, Q6H PRN    hydrALAZINE (APRESOLINE) tablet 100 mg, Q8H JEEVAN    iron sucrose (VENOFER) 200 mg in sodium chloride 0.9 % 50 mL IVPB, Once per day on Monday Wednesday Friday, Last Rate: Stopped (12/18/24 1213)    isosorbide mononitrate (IMDUR) 24 hr tablet 120 mg, Daily    melatonin tablet 3 mg, HS    torsemide (DEMADEX) tablet 100 mg, Daily    Administrative Statements   Today, Patient Was Seen By: Shari Arceo MD      **Please Note: This note may have been constructed using a voice recognition system.**

## 2024-12-18 NOTE — ASSESSMENT & PLAN NOTE
Patient presented  on (12/14) after sustaining a mechanical fall 2 to 3 days ago.    Patient states that he missed dialysis on Wednesday and presented for regularly scheduled session on Friday where he was noted with significant bruising and referred to the ED  Evidence of severe volume overload with weight significantly up from dry weight  Notably, patient with gross noncompliance with outpatient medical regimen  Presented with a weight of 73.8 kg with dry weight of 60 kg.  Underwent hemodialysis daily on 121/5 -12/17..  Plan for hemodialysis today for removal of 3 kg.  Repeat chest x-ray today continues to show bilateral pleural effusions.    Will undergo HD with UF today-goal for 3 L removal  IR consulted for thoracentesis subsequently.  Nephrology closely following  Cardiology consulted for concomitant management  Monitor for electrolyte abnormality.  Continue with ongoing volume control with dialysis.  Cardiology adjusting his dry weight.  Wt Readings from Last 3 Encounters:   12/18/24 63.2 kg (139 lb 6.4 oz)   11/20/24 55.2 kg (121 lb 12.8 oz)   09/03/24 55.3 kg (122 lb)

## 2024-12-18 NOTE — ASSESSMENT & PLAN NOTE
Dialysis, Monday, Wednesday, Friday.  HD y (12/15) and (12/16)-another dialysis treatment today goal for 3 L volume removal today (12/17)-nephrology closely following  An uric at baseline    Lab Results   Component Value Date    EGFR 13 12/17/2024    EGFR 12 12/16/2024    EGFR 9 12/15/2024    CREATININE 4.09 (H) 12/17/2024    CREATININE 4.55 (H) 12/16/2024    CREATININE 5.70 (H) 12/15/2024

## 2024-12-18 NOTE — ASSESSMENT & PLAN NOTE
Suspecting progression towards end-stage disease.  Previous baseline creatinine 2.0 (noted in February 2024 with an EGFR of 32) now with progression to requiring intermittent hemodialysis.  Likely related to cardiorenal syndrome.  Protein creatinine ratio subnephrotic at 0.6.  Urine microscopy fairly bland with 0-1 RBCs, 2-4 white cells.

## 2024-12-18 NOTE — PROGRESS NOTES
NEPHROLOGY HOSPITAL PROGRESS NOTE   Matthew Alvarez 69 y.o. male MRN: [id number]  Unit/Bed#: -01 Encounter: [id number]  Reason for Consult: DAVID / CKD    Assessment & Plan  Acute hemodialysis patient (HCC)  Suspecting progression towards end-stage disease.  Previous baseline creatinine 2.0 (noted in February 2024 with an EGFR of 32) now with progression to requiring intermittent hemodialysis.  Likely related to cardiorenal syndrome.  Protein creatinine ratio subnephrotic at 0.6.  Urine microscopy fairly bland with 0-1 RBCs, 2-4 white cells.  Acute on chronic systolic heart failure (HCC)  Overall weight has significant proved since admission has lost nearly 12 kg.  Clinically however patient still appears significantly volume overloaded.  Will continue to challenge weight with hemodialysis.  Nonischemic cardiomyopathy (HCC)  It is noted the patient has an ejection fraction of 20 - 25%.  Continue to challenge weight as tolerated.  Primary hypertension  Blood pressure currently appears acceptable.  Hopeful continued improvement with challenging weight/ultrafiltration.  Anemia of renal disease  Iron stores: Iron saturation 10% with ferritin of 109.  Representing component of iron deficiency given ESRD status.  Had Venofer 200 mg 3 times weekly with hemodialysis.  Hyponatremia  Hyponatremia likely related to significant volume overload.  Will continue to challenge weight as tolerated.  Maintain fluid restriction.  Normalized sodium bath.    Secondary hyperparathyroidism of renal origin ([facility])  Follow PTH as outpatient.  Calcium is 8.8 with a calcium of 8.1 and albumin of 3.0.      Summary:  Continued maintenance hemodialysis currently Monday Wednesday Friday  Again we will continue to challenge weight as blood pressure tolerates  Anticipated thoracentesis given persistent bilateral pleural effusions  Discussed with primary service as well as cardiology.    SUBJECTIVE / 24H INTERVAL HISTORY:  Seen and examined.   Patient awake alert.  Complaining of shortness of breath as well as cough.  No active chest pain or pressure.  Denies abdominal pain    OBJECTIVE:  Current Weight: Weight - Scale: 63.2 kg (139 lb 6.4 oz)  Vitals:    12/18/24 0900 12/18/24 0930 12/18/24 1000 12/18/24 1030   BP: 156/64 152/61 153/65 168/74   BP Location:       Pulse: 66 68 70 65   Resp: 17 18 18 18   Temp:       TempSrc:       SpO2: 97% 96% 97% 97%   Weight:           Intake/Output Summary (Last 24 hours) at 12/18/2024 1051  Last data filed at 12/18/2024 0843  Gross per 24 hour   Intake 850 ml   Output 3352 ml   Net -2502 ml       Physical Exam  Constitutional:       Appearance: He is not ill-appearing.   Eyes:      General: No scleral icterus.  Cardiovascular:      Rate and Rhythm: Normal rate and regular rhythm.   Pulmonary:      Effort: Pulmonary effort is normal.      Breath sounds: Examination of the right-lower field reveals decreased breath sounds. Examination of the left-lower field reveals decreased breath sounds. Decreased breath sounds present.   Abdominal:      General: There is no distension.      Palpations: Abdomen is soft.   Musculoskeletal:      Right lower leg: Edema present.      Left lower leg: Edema present.   Skin:     General: Skin is warm and dry.      Findings: No rash.   Neurological:      Mental Status: He is alert and oriented to person, place, and time.         Medications:    Current Facility-Administered Medications:     acetaminophen (TYLENOL) tablet 650 mg, 650 mg, Oral, Q4H PRN, David Meraz MD    aspirin (ECOTRIN LOW STRENGTH) EC tablet 81 mg, 81 mg, Oral, Daily, David Meraz MD, 81 mg at 12/17/24 0831    atorvastatin (LIPITOR) tablet 20 mg, 20 mg, Oral, Daily With Dinner, YUE Griffin, 20 mg at 12/17/24 1743    carvedilol (COREG) tablet 12.5 mg, 12.5 mg, Oral, BID With Meals, David Meraz MD, 12.5 mg at 12/18/24 0713    folic acid (FOLVITE) tablet 1 mg, 1 mg, Oral, Daily, Jensen ROLAND  "DO Robin, 1 mg at 12/17/24 0831    heparin (porcine) subcutaneous injection 5,000 Units, 5,000 Units, Subcutaneous, Q8H [facility], David Meraz MD    hydrALAZINE (APRESOLINE) injection 10 mg, 10 mg, Intravenous, Q6H PRN, Chris Nolasco DO, 10 mg at 12/17/24 1748    hydrALAZINE (APRESOLINE) tablet 100 mg, 100 mg, Oral, Q8H JEEVAN, David Meraz MD, 100 mg at 12/18/24 0610    isosorbide mononitrate (IMDUR) 24 hr tablet 120 mg, 120 mg, Oral, Daily, David Meraz MD, 120 mg at 12/17/24 0831    melatonin tablet 3 mg, 3 mg, Oral, HS, David Meraz MD, 3 mg at 12/17/24 2125    torsemide (DEMADEX) tablet 100 mg, 100 mg, Oral, Daily, Jensen Kelly DO, 100 mg at 12/17/24 0831    Laboratory Results:  Results from last 7 days   Lab Units 12/18/24  0842 12/17/24  1354 12/16/24  0448 12/15/24  0619 12/14/24  1624   WBC Thousand/uL 4.56 4.44 5.39 6.01 4.80   HEMOGLOBIN g/dL 8.9* 8.3* 8.4* 8.8* 9.6*   HEMATOCRIT % 28.4* 25.8* 26.8* 27.0* 29.7*   PLATELETS Thousands/uL 45* 52* 64* 82* 80*   POTASSIUM mmol/L  --  4.4 4.6 4.5 4.2   CHLORIDE mmol/L  --  92* 92* 89* 89*   CO2 mmol/L  --  27 26 27 27   BUN mg/dL  --  33* 41* 49* 44*   CREATININE mg/dL  --  4.09* 4.55* 5.70* 5.30*   CALCIUM mg/dL  --  8.1* 7.9* 8.4 8.4   MAGNESIUM mg/dL  --   --  2.0 2.0  --        Portions of the record may have been created with voice recognition software. Occasional wrong word or \"sound a like\" substitutions may have occurred due to the inherent limitations of voice recognition software. Read the chart carefully and recognize, using context, where substitutions have occurred.If you have any questions, please contact the dictating provider.  "

## 2024-12-18 NOTE — PLAN OF CARE
Problem: CARDIOVASCULAR - ADULT  Goal: Maintains optimal cardiac output and hemodynamic stability  Description: INTERVENTIONS:  - Monitor I/O, vital signs and rhythm  - Monitor for S/S and trends of decreased cardiac output  - Administer and titrate ordered vasoactive medications to optimize hemodynamic stability  - Assess quality of pulses, skin color and temperature  - Assess for signs of decreased coronary artery perfusion  - Instruct patient to report change in severity of symptoms  Outcome: Progressing     Problem: RESPIRATORY - ADULT  Goal: Achieves optimal ventilation and oxygenation  Description: INTERVENTIONS:  - Assess for changes in respiratory status  - Assess for changes in mentation and behavior  - Position to facilitate oxygenation and minimize respiratory effort  - Oxygen administered by appropriate delivery if ordered  - Initiate smoking cessation education as indicated  - Encourage broncho-pulmonary hygiene including cough, deep breathe, Incentive Spirometry  - Assess the need for suctioning and aspirate as needed  - Assess and instruct to report SOB or any respiratory difficulty  - Respiratory Therapy support as indicated  Outcome: Progressing     Problem: METABOLIC, FLUID AND ELECTROLYTES - ADULT  Goal: Electrolytes maintained within normal limits  Description: INTERVENTIONS:  - Monitor labs and assess patient for signs and symptoms of electrolyte imbalances  - Administer electrolyte replacement as ordered  - Monitor response to electrolyte replacements, including repeat lab results as appropriate  - Instruct patient on fluid and nutrition as appropriate  Outcome: Progressing     Problem: Nutrition/Hydration-ADULT  Goal: Nutrient/Hydration intake appropriate for improving, restoring or maintaining nutritional needs  Description: Monitor and assess patient's nutrition/hydration status for malnutrition. Collaborate with interdisciplinary team and initiate plan and interventions as ordered.   Monitor patient's weight and dietary intake as ordered or per policy. Utilize nutrition screening tool and intervene as necessary. Determine patient's food preferences and provide high-protein, high-caloric foods as appropriate.     INTERVENTIONS:  - Monitor oral intake, urinary output, labs, and treatment plans  - Assess nutrition and hydration status and recommend course of action  - Evaluate amount of meals eaten  - Assist patient with eating if necessary   - Allow adequate time for meals  - Recommend/ encourage appropriate diets, oral nutritional supplements, and vitamin/mineral supplements  - Order, calculate, and assess calorie counts as needed  - Recommend, monitor, and adjust tube feedings and TPN/PPN based on assessed needs  - Assess need for intravenous fluids  - Provide specific nutrition/hydration education as appropriate  - Include patient/family/caregiver in decisions related to nutrition  Outcome: Progressing

## 2024-12-18 NOTE — BRIEF OP NOTE (RAD/CATH)
INTERVENTIONAL RADIOLOGY PROCEDURE NOTE    Date: 12/18/2024    Procedure:   Right thoracentesis      Preoperative diagnosis:   1. Chronic systolic (congestive) heart failure (HCC)    2. Stage 5 chronic kidney disease on chronic dialysis (HCC)    3. Acute on chronic systolic heart failure (HCC)         Postoperative diagnosis: Same.    Surgeon: Leo Miller MD     Assistant: None. No qualified resident was available.    Blood loss: 0 ml    Specimens: sent to lab     Findings:   Right thoracentesis.    Given tenuous clinical status, I elected not to perform a bilateral thoracentesis. If desired, we could perform a left thoracentesis tomorrow.  Reach out to IR tomorrow if needed.    Complications: None immediate.    Anesthesia: local

## 2024-12-18 NOTE — ASSESSMENT & PLAN NOTE
"November 11, 2024: Echo, EF of 20-24%  Noncompliant with all medications in the outpatient setting-unclear if taking any  Also, set up with LifeVest which patient states he wears \"as needed\"  GDMT limited by advanced end-stage kidney disease.  On GDMT with Coreg, Imdur, hydralazine.  Close outpatient cardiology follow-up on discharge  Maintain on telemetry monitor during hospitalization because of being on LifeVest.  "

## 2024-12-18 NOTE — DISCHARGE INSTRUCTIONS
[facility] Interventional Radiology        [address] [place] [address]             Phone:  [phone]                  IR Scheduling: [phone]              Thoracentesis   WHAT YOU NEED TO KNOW:   A thoracentesis is a procedure to remove extra fluid or air from between your lungs and your inner chest wall. Air or fluid buildup may make it hard for you to breathe. A thoracentesis allows your lungs to expand fully so you can breathe more easily.  DISCHARGE INSTRUCTIONS:   Medicines:   Pain medicine:  You may be given a prescription medicine to decrease pain. Do not wait until the pain is severe before you take your medicine.    Antibiotics:  This medicine helps fight or prevent an infection.    Take your medicine as directed.  Call your healthcare provider if you think your medicine is not helping or if you have side effects. Tell him if you are allergic to any medicine. Keep a list of the medicines, vitamins, and herbs you take. Include the amounts, and when and why you take them. Bring the list or the pill bottles to follow-up visits. Carry your medicine list with you in case of an emergency.  Follow up with your healthcare provider as directed:  Write down your questions so you remember to ask them during your visits.   Rest:  Rest when you feel it is needed. Slowly start to do more each day. Return to your daily activities as directed.   Do not smoke:  If you smoke, it is never too late to quit. Ask for information about how to stop smoking if you need help.  Contact your healthcare provider if:   You have a fever.    Your puncture site is red, warm, swollen, or draining pus.    You have questions or concerns about your procedure, medicine, or care.    If you have any questions regarding, call the IR department @ [phone].     Seek care immediately or call 911 if:   Blood soaks through your bandage.    There is blood in your spit.

## 2024-12-18 NOTE — PLAN OF CARE
Problem: Prexisting or High Potential for Compromised Skin Integrity  Goal: Skin integrity is maintained or improved  Description: INTERVENTIONS:  - Identify patients at risk for skin breakdown  - Assess and monitor skin integrity  - Assess and monitor nutrition and hydration status  - Monitor labs   - Assess for incontinence   - Turn and reposition patient  - Assist with mobility/ambulation  - Relieve pressure over bony prominences  - Avoid friction and shearing  - Provide appropriate hygiene as needed including keeping skin clean and dry  - Evaluate need for skin moisturizer/barrier cream  - Collaborate with interdisciplinary team   - Patient/family teaching  - Consider wound care consult   Outcome: Progressing     Problem: CARDIOVASCULAR - ADULT  Goal: Maintains optimal cardiac output and hemodynamic stability  Description: INTERVENTIONS:  - Monitor I/O, vital signs and rhythm  - Monitor for S/S and trends of decreased cardiac output  - Administer and titrate ordered vasoactive medications to optimize hemodynamic stability  - Assess quality of pulses, skin color and temperature  - Assess for signs of decreased coronary artery perfusion  - Instruct patient to report change in severity of symptoms  Outcome: Progressing  Goal: Absence of cardiac dysrhythmias or at baseline rhythm  Description: INTERVENTIONS:  - Continuous cardiac monitoring, vital signs, obtain 12 lead EKG if ordered  - Administer antiarrhythmic and heart rate control medications as ordered  - Monitor electrolytes and administer replacement therapy as ordered  Outcome: Progressing     Problem: RESPIRATORY - ADULT  Goal: Achieves optimal ventilation and oxygenation  Description: INTERVENTIONS:  - Assess for changes in respiratory status  - Assess for changes in mentation and behavior  - Position to facilitate oxygenation and minimize respiratory effort  - Oxygen administered by appropriate delivery if ordered  - Initiate smoking cessation education  as indicated  - Encourage broncho-pulmonary hygiene including cough, deep breathe, Incentive Spirometry  - Assess the need for suctioning and aspirate as needed  - Assess and instruct to report SOB or any respiratory difficulty  - Respiratory Therapy support as indicated  Outcome: Progressing     Problem: METABOLIC, FLUID AND ELECTROLYTES - ADULT  Goal: Electrolytes maintained within normal limits  Description: INTERVENTIONS:  - Monitor labs and assess patient for signs and symptoms of electrolyte imbalances  - Administer electrolyte replacement as ordered  - Monitor response to electrolyte replacements, including repeat lab results as appropriate  - Instruct patient on fluid and nutrition as appropriate  Outcome: Progressing  Goal: Fluid balance maintained  Description: INTERVENTIONS:  - Monitor labs   - Monitor I/O and WT  - Instruct patient on fluid and nutrition as appropriate  - Assess for signs & symptoms of volume excess or deficit  Outcome: Progressing  Goal: Glucose maintained within target range  Description: INTERVENTIONS:  - Monitor Blood Glucose as ordered  - Assess for signs and symptoms of hyperglycemia and hypoglycemia  - Administer ordered medications to maintain glucose within target range  - Assess nutritional intake and initiate nutrition service referral as needed  Outcome: Progressing     Problem: SKIN/TISSUE INTEGRITY - ADULT  Goal: Skin Integrity remains intact(Skin Breakdown Prevention)  Description: Assess:  -Perform Omar assessment every shift  -Clean and moisturize skin every day  -Inspect skin when repositioning, toileting, and assisting with ADLS  -Assess under medical devices  -Assess extremities for adequate circulation and sensation     Bed Management:  -Have minimal linens on bed & keep smooth, unwrinkled  -Change linens as needed when moist or perspiring  -Avoid sitting or lying in one position for more than 2 hours while in bed  -Keep HOB at 30degrees     Toileting:  -Offer  bedside commode  -Assess for incontinence every 2 hrs  -Use incontinent care products after each incontinent episode     Activity:  -Mobilize patient 3 times a day  -Encourage activity and walks on unit  -Encourage or provide ROM exercises   -Turn and reposition patient every 2 Hours  -Use appropriate equipment to lift or move patient in bed  -Instruct/ Assist with weight shifting every 2hrs  when out of bed in chair  -Consider limitation of chair time 2 hour intervals    Skin Care:  -Avoid use of baby powder, tape, friction and shearing, hot water or constrictive clothing  -Relieve pressure over bony prominences using weight shifting  -Do not massage red bony areas    Next Steps:  -Teach patient strategies to minimize risks such as skin breakdown    -Consider consults to  interdisciplinary teams such as pt/ot  Outcome: Progressing  Goal: Incision(s), wounds(s) or drain site(s) healing without S/S of infection  Description: INTERVENTIONS  - Assess and document dressing, incision, wound bed, drain sites and surrounding tissue  - Provide patient and family education  - Perform skin care/dressing changes every dy  Outcome: Progressing  Goal: Pressure injury heals and does not worsen  Description: Interventions:  - Implement low air loss mattress or specialty surface (Criteria met)  - Apply silicone foam dressing  - Instruct/assist with weight shifting every 120 minutes when in chair   - Limit chair time to 2 hour intervals  - Use special pressure reducing interventions such as weight shifting when in chair   - Apply fecal or urinary incontinence containment device   - Perform passive or active ROM every 2 hrs  - Turn and reposition patient & offload bony prominences every 2 hours   - Utilize friction reducing device or surface for transfers   - Consider consults to  interdisciplinary teams such as pt/ot  - Use incontinent care products after each incontinent episode   - Consider nutrition services referral as  needed  Outcome: Progressing     Problem: MUSCULOSKELETAL - ADULT  Goal: Maintain or return mobility to safest level of function  Description: INTERVENTIONS:  - Assess patient's ability to carry out ADLs; assess patient's baseline for ADL function and identify physical deficits which impact ability to perform ADLs (bathing, care of mouth/teeth, toileting, grooming, dressing, etc.)  - Assess/evaluate cause of self-care deficits   - Assess range of motion  - Assess patient's mobility  - Assess patient's need for assistive devices and provide as appropriate  - Encourage maximum independence but intervene and supervise when necessary  - Involve family in performance of ADLs  - Assess for home care needs following discharge   - Consider OT consult to assist with ADL evaluation and planning for discharge  - Provide patient education as appropriate  Outcome: Progressing  Goal: Maintain proper alignment of affected body part  Description: INTERVENTIONS:  - Support, maintain and protect limb and body alignment  - Provide patient/ family with appropriate education  Outcome: Progressing     Problem: Nutrition/Hydration-ADULT  Goal: Nutrient/Hydration intake appropriate for improving, restoring or maintaining nutritional needs  Description: Monitor and assess patient's nutrition/hydration status for malnutrition. Collaborate with interdisciplinary team and initiate plan and interventions as ordered.  Monitor patient's weight and dietary intake as ordered or per policy. Utilize nutrition screening tool and intervene as necessary. Determine patient's food preferences and provide high-protein, high-caloric foods as appropriate.     INTERVENTIONS:  - Monitor oral intake, urinary output, labs, and treatment plans  - Assess nutrition and hydration status and recommend course of action  - Evaluate amount of meals eaten  - Assist patient with eating if necessary   - Allow adequate time for meals  - Recommend/ encourage appropriate  diets, oral nutritional supplements, and vitamin/mineral supplements  - Order, calculate, and assess calorie counts as needed  - Recommend, monitor, and adjust tube feedings and TPN/PPN based on assessed needs  - Assess need for intravenous fluids  - Provide specific nutrition/hydration education as appropriate  - Include patient/family/caregiver in decisions related to nutrition  Outcome: Progressing

## 2024-12-18 NOTE — ASSESSMENT & PLAN NOTE
Lab Results   Component Value Date    EGFR 13 12/17/2024    EGFR 12 12/16/2024    EGFR 9 12/15/2024    CREATININE 4.09 (H) 12/17/2024    CREATININE 4.55 (H) 12/16/2024    CREATININE 5.70 (H) 12/15/2024

## 2024-12-19 ENCOUNTER — APPOINTMENT (INPATIENT)
Dept: RADIOLOGY | Facility: HOSPITAL | Age: 69
DRG: 291 | End: 2024-12-19
Payer: COMMERCIAL

## 2024-12-19 ENCOUNTER — APPOINTMENT (INPATIENT)
Dept: DIALYSIS | Facility: HOSPITAL | Age: 69
DRG: 291 | End: 2024-12-19
Attending: INTERNAL MEDICINE
Payer: COMMERCIAL

## 2024-12-19 PROBLEM — J90 PLEURAL EFFUSION: Status: ACTIVE | Noted: 2024-12-19

## 2024-12-19 PROCEDURE — 99232 SBSQ HOSP IP/OBS MODERATE 35: CPT | Performed by: INTERNAL MEDICINE

## 2024-12-19 PROCEDURE — 71045 X-RAY EXAM CHEST 1 VIEW: CPT

## 2024-12-19 PROCEDURE — 97110 THERAPEUTIC EXERCISES: CPT

## 2024-12-19 PROCEDURE — 97116 GAIT TRAINING THERAPY: CPT

## 2024-12-19 RX ADMIN — ISOSORBIDE MONONITRATE 120 MG: 60 TABLET, EXTENDED RELEASE ORAL at 08:15

## 2024-12-19 RX ADMIN — CARVEDILOL 12.5 MG: 12.5 TABLET, FILM COATED ORAL at 08:15

## 2024-12-19 RX ADMIN — FOLIC ACID 1 MG: 1 TABLET ORAL at 08:15

## 2024-12-19 RX ADMIN — ATORVASTATIN CALCIUM 20 MG: 20 TABLET, FILM COATED ORAL at 17:45

## 2024-12-19 RX ADMIN — HYDRALAZINE HYDROCHLORIDE 10 MG: 20 INJECTION INTRAMUSCULAR; INTRAVENOUS at 20:04

## 2024-12-19 RX ADMIN — Medication 3 MG: at 21:40

## 2024-12-19 RX ADMIN — HYDRALAZINE HYDROCHLORIDE 100 MG: 25 TABLET ORAL at 21:40

## 2024-12-19 RX ADMIN — HYDRALAZINE HYDROCHLORIDE 100 MG: 25 TABLET ORAL at 05:55

## 2024-12-19 RX ADMIN — ASPIRIN 81 MG: 81 TABLET, COATED ORAL at 08:15

## 2024-12-19 RX ADMIN — CARVEDILOL 12.5 MG: 12.5 TABLET, FILM COATED ORAL at 17:45

## 2024-12-19 RX ADMIN — TORSEMIDE 100 MG: 100 TABLET ORAL at 08:15

## 2024-12-19 NOTE — ASSESSMENT & PLAN NOTE
Patient presented  on (12/14) after sustaining a mechanical fall 2 to 3 days ago.    Patient states that he missed dialysis on Wednesday and presented for regularly scheduled session on Friday where he was noted with significant bruising and referred to the ED  Evidence of severe volume overload with weight significantly up from dry weight  Notably, patient with gross noncompliance with outpatient medical regimen  Presented with a weight of 73.8 kg with dry weight of 60 kg.  Underwent hemodialysis daily on 12/15 -12/18..  Plan for hemodialysis today for removal of 3 kg.  Nephrology wants to continue to challenge dry weight lower.  Repeat chest x-ray today continues slightly diminished right effusion and ongoing left-sided effusion.  Will undergo HD with UF today-goal for 3 L removal  IR consulted for  left thoracentesis subsequently.  Patient underwent 800 mL of fluid removed from the right side.  Nephrology closely following  Cardiology consulted for concomitant management  Monitor for electrolyte abnormality.  Continue with ongoing volume control with dialysis.  Cardiology adjusting his dry weight.  Wt Readings from Last 3 Encounters:   12/19/24 60.7 kg (133 lb 12.8 oz)   11/20/24 55.2 kg (121 lb 12.8 oz)   09/03/24 55.3 kg (122 lb)

## 2024-12-19 NOTE — HEMODIALYSIS
Post-Dialysis RN Treatment Note    Blood Pressure:  Pre 170/76 mm/Hg  Post 171/83 mmHg   EDW: tbd    Weight:  Pre 60.9 kg   Post 57.9 kg   Mode of weight measurement: Standing Scale   Volume Removed:  2500 ml    Treatment duration: 180 minutes    NS given:  No    Treatment shortened No   Medications given during Rx: None Reported   Estimated Kt/V:  Not Applicable   Access type: Permacath/TDC   Needle Gauge:  n/a   Access Issues: No    Report called to primary nurse:   Yes Hayle Bainbridge LPN

## 2024-12-19 NOTE — PLAN OF CARE
Problem: Prexisting or High Potential for Compromised Skin Integrity  Goal: Skin integrity is maintained or improved  Description: INTERVENTIONS:  - Identify patients at risk for skin breakdown  - Assess and monitor skin integrity  - Assess and monitor nutrition and hydration status  - Monitor labs   - Assess for incontinence   - Turn and reposition patient  - Assist with mobility/ambulation  - Relieve pressure over bony prominences  - Avoid friction and shearing  - Provide appropriate hygiene as needed including keeping skin clean and dry  - Evaluate need for skin moisturizer/barrier cream  - Collaborate with interdisciplinary team   - Patient/family teaching  - Consider wound care consult   Outcome: Progressing     Problem: CARDIOVASCULAR - ADULT  Goal: Maintains optimal cardiac output and hemodynamic stability  Description: INTERVENTIONS:  - Monitor I/O, vital signs and rhythm  - Monitor for S/S and trends of decreased cardiac output  - Administer and titrate ordered vasoactive medications to optimize hemodynamic stability  - Assess quality of pulses, skin color and temperature  - Assess for signs of decreased coronary artery perfusion  - Instruct patient to report change in severity of symptoms  Outcome: Progressing  Goal: Absence of cardiac dysrhythmias or at baseline rhythm  Description: INTERVENTIONS:  - Continuous cardiac monitoring, vital signs, obtain 12 lead EKG if ordered  - Administer antiarrhythmic and heart rate control medications as ordered  - Monitor electrolytes and administer replacement therapy as ordered  Outcome: Progressing     Problem: RESPIRATORY - ADULT  Goal: Achieves optimal ventilation and oxygenation  Description: INTERVENTIONS:  - Assess for changes in respiratory status  - Assess for changes in mentation and behavior  - Position to facilitate oxygenation and minimize respiratory effort  - Oxygen administered by appropriate delivery if ordered  - Initiate smoking cessation education  as indicated  - Encourage broncho-pulmonary hygiene including cough, deep breathe, Incentive Spirometry  - Assess the need for suctioning and aspirate as needed  - Assess and instruct to report SOB or any respiratory difficulty  - Respiratory Therapy support as indicated  Outcome: Progressing     Problem: METABOLIC, FLUID AND ELECTROLYTES - ADULT  Goal: Electrolytes maintained within normal limits  Description: INTERVENTIONS:  - Monitor labs and assess patient for signs and symptoms of electrolyte imbalances  - Administer electrolyte replacement as ordered  - Monitor response to electrolyte replacements, including repeat lab results as appropriate  - Instruct patient on fluid and nutrition as appropriate  Outcome: Progressing  Goal: Fluid balance maintained  Description: INTERVENTIONS:  - Monitor labs   - Monitor I/O and WT  - Instruct patient on fluid and nutrition as appropriate  - Assess for signs & symptoms of volume excess or deficit  Outcome: Progressing  Goal: Glucose maintained within target range  Description: INTERVENTIONS:  - Monitor Blood Glucose as ordered  - Assess for signs and symptoms of hyperglycemia and hypoglycemia  - Administer ordered medications to maintain glucose within target range  - Assess nutritional intake and initiate nutrition service referral as needed  Outcome: Progressing     Problem: SKIN/TISSUE INTEGRITY - ADULT  Goal: Skin Integrity remains intact(Skin Breakdown Prevention)  Description: Assess:  -Perform Omar assessment every    -Clean and moisturize skin every    -Inspect skin when repositioning, toileting, and assisting with ADLS  -Assess under medical devices such as   every    -Assess extremities for adequate circulation and sensation     Bed Management:  -Have minimal linens on bed & keep smooth, unwrinkled  -Change linens as needed when moist or perspiring  -Avoid sitting or lying in one position for more than   hours while in bed  -Keep HOB at  degrees      Toileting:  -Offer bedside commode  -Assess for incontinence every    -Use incontinent care products after each incontinent episode such as      Activity:  -Mobilize patient   times a day  -Encourage activity and walks on unit  -Encourage or provide ROM exercises   -Turn and reposition patient every   Hours  -Use appropriate equipment to lift or move patient in bed  -Instruct/ Assist with weight shifting every   when out of bed in chair  -Consider limitation of chair time       hour intervals    Skin Care:  -Avoid use of baby powder, tape, friction and shearing, hot water or constrictive clothing  -Relieve pressure over bony prominences using    -Do not massage red bony areas    Next Steps:  -Teach patient strategies to minimize risks such as     -Consider consults to  interdisciplinary teams such as    Outcome: Progressing  Goal: Incision(s), wounds(s) or drain site(s) healing without S/S of infection  Description: INTERVENTIONS  - Assess and document dressing, incision, wound bed, drain sites and surrounding tissue  - Provide patient and family education  - Perform skin care/dressing changes every    Outcome: Progressing  Goal: Pressure injury heals and does not worsen  Description: Interventions:  - Implement low air loss mattress or specialty surface (Criteria met)  - Apply silicone foam dressing  - Instruct/assist with weight shifting every   minutes when in chair   - Limit chair time to   hour intervals  - Use special pressure reducing interventions such as   when in chair   - Apply fecal or urinary incontinence containment device   - Perform passive or active ROM every    - Turn and reposition patient & offload bony prominences every   hours   - Utilize friction reducing device or surface for transfers   - Consider consults to  interdisciplinary teams such as    - Use incontinent care products after each incontinent episode such as    - Consider nutrition services referral as needed  Outcome:  Progressing     Problem: MUSCULOSKELETAL - ADULT  Goal: Maintain or return mobility to safest level of function  Description: INTERVENTIONS:  - Assess patient's ability to carry out ADLs; assess patient's baseline for ADL function and identify physical deficits which impact ability to perform ADLs (bathing, care of mouth/teeth, toileting, grooming, dressing, etc.)  - Assess/evaluate cause of self-care deficits   - Assess range of motion  - Assess patient's mobility  - Assess patient's need for assistive devices and provide as appropriate  - Encourage maximum independence but intervene and supervise when necessary  - Involve family in performance of ADLs  - Assess for home care needs following discharge   - Consider OT consult to assist with ADL evaluation and planning for discharge  - Provide patient education as appropriate  Outcome: Progressing  Goal: Maintain proper alignment of affected body part  Description: INTERVENTIONS:  - Support, maintain and protect limb and body alignment  - Provide patient/ family with appropriate education  Outcome: Progressing     Problem: Nutrition/Hydration-ADULT  Goal: Nutrient/Hydration intake appropriate for improving, restoring or maintaining nutritional needs  Description: Monitor and assess patient's nutrition/hydration status for malnutrition. Collaborate with interdisciplinary team and initiate plan and interventions as ordered.  Monitor patient's weight and dietary intake as ordered or per policy. Utilize nutrition screening tool and intervene as necessary. Determine patient's food preferences and provide high-protein, high-caloric foods as appropriate.     INTERVENTIONS:  - Monitor oral intake, urinary output, labs, and treatment plans  - Assess nutrition and hydration status and recommend course of action  - Evaluate amount of meals eaten  - Assist patient with eating if necessary   - Allow adequate time for meals  - Recommend/ encourage appropriate diets, oral  nutritional supplements, and vitamin/mineral supplements  - Order, calculate, and assess calorie counts as needed  - Recommend, monitor, and adjust tube feedings and TPN/PPN based on assessed needs  - Assess need for intravenous fluids  - Provide specific nutrition/hydration education as appropriate  - Include patient/family/caregiver in decisions related to nutrition  Outcome: Progressing

## 2024-12-19 NOTE — ASSESSMENT & PLAN NOTE
Dialysis, Monday, Wednesday, Friday.  Patient has been receiving hemodialysis daily with ultrafiltration from 1215 12/15-12/19.-nephrology closely following  An uric at baseline    Lab Results   Component Value Date    EGFR 13 12/17/2024    EGFR 12 12/16/2024    EGFR 9 12/15/2024    CREATININE 4.09 (H) 12/17/2024    CREATININE 4.55 (H) 12/16/2024    CREATININE 5.70 (H) 12/15/2024

## 2024-12-19 NOTE — QUICK NOTE
Patient still refusing to wear Lifevest, provider aware. Will continue monitoring patient on cardiac telemetry.

## 2024-12-19 NOTE — PROGRESS NOTES
Progress Note - Hospitalist   Name: Matthew Alvarez 69 y.o. male I MRN: [id number]  Unit/Bed#: -01 I Date of Admission: 12/14/2024   Date of Service: 12/19/2024 I Hospital Day: 5    Assessment & Plan  Acute on chronic systolic heart failure (HCC)  Patient presented  on (12/14) after sustaining a mechanical fall 2 to 3 days ago.    Patient states that he missed dialysis on Wednesday and presented for regularly scheduled session on Friday where he was noted with significant bruising and referred to the ED  Evidence of severe volume overload with weight significantly up from dry weight  Notably, patient with gross noncompliance with outpatient medical regimen  Presented with a weight of 73.8 kg with dry weight of 60 kg.  Underwent hemodialysis daily on 12/15 -12/18..  Plan for hemodialysis today for removal of 3 kg.  Nephrology wants to continue to challenge dry weight lower.  Repeat chest x-ray today continues slightly diminished right effusion and ongoing left-sided effusion.  Will undergo HD with UF today-goal for 3 L removal  IR consulted for  left thoracentesis subsequently.  Patient underwent 800 mL of fluid removed from the right side.  Nephrology closely following  Cardiology consulted for concomitant management  Monitor for electrolyte abnormality.  Continue with ongoing volume control with dialysis.  Cardiology adjusting his dry weight.  Wt Readings from Last 3 Encounters:   12/19/24 60.7 kg (133 lb 12.8 oz)   11/20/24 55.2 kg (121 lb 12.8 oz)   09/03/24 55.3 kg (122 lb)     ESRD (end stage renal disease) on dialysis (HCC)  Dialysis, Monday, Wednesday, Friday.  Patient has been receiving hemodialysis daily with ultrafiltration from 1215 12/15-12/19.-nephrology closely following  An uric at baseline    Lab Results   Component Value Date    EGFR 13 12/17/2024    EGFR 12 12/16/2024    EGFR 9 12/15/2024    CREATININE 4.09 (H) 12/17/2024    CREATININE 4.55 (H) 12/16/2024    CREATININE 5.70 (H) 12/15/2024  "    Nonischemic cardiomyopathy ([facility])  November 11, 2024: Echo, EF of 20-24%  Noncompliant with all medications in the outpatient setting-unclear if taking any  Also, set up with LifeVest which patient states he wears \"as needed\"  GDMT limited by advanced end-stage kidney disease.  On GDMT with Coreg, Imdur, hydralazine.  Close outpatient cardiology follow-up on discharge  Maintain on telemetry monitor during hospitalization because of being on LifeVest.  Primary hypertension  Noncompliant with his home medications  Restarted oral hydralazine, Coreg  As needed intravenous hydralazine/labetalol as needed  Patient also significantly volume overloaded upon presentation which is likely a significant contributing factor.  Undergoing hemodialysis today.  Ambulatory dysfunction  Patient states he fell at home, has bruising on his face  Will consult PT/OT/case management  He states he sometimes walks with an assistive device  Will likely require short-term rehab  Patient adamantly refusing rehab.  Discussed with patient's wife at length over the phone.  Coronary artery disease involving native coronary artery of native heart without angina pectoris  Continue aspirin  Mild nonobstructive coronary artery disease per cardiac catheterization in 9/2021.  Resume statin therapy.  Hyponatremia  Volume control with dialysis today.  Acute hemodialysis patient ([facility])    Anemia of renal disease  Follow-up on iron, vitamin B12 and folate.  Plan to start IV iron  Secondary hyperparathyroidism of renal origin ([facility])  Nephrology on board.  Follow-up phosphorus level  Pleural effusion  Bilateral pleural effusion not improving with dialysis treatment.  Underwent thoracentesis for the right side.  Repeat chest x-ray still shows left-sided effusion.  IR consulted for thoracentesis.    VTE Pharmacologic Prophylaxis: VTE Score: 5 High Risk (Score >/= 5) - Pharmacological DVT Prophylaxis Ordered: heparin. Sequential Compression Devices " Ordered.    Mobility:   Basic Mobility Inpatient Raw Score: 16  JH-HLM Goal: 5: Stand one or more mins  JH-HLM Achieved: 7: Walk 25 feet or more  JH-HLM Goal achieved. Continue to encourage appropriate mobility.    Patient Centered Rounds: I performed bedside rounds with nursing staff today.   Discussions with Specialists or Other Care Team Provider: Discussed with nephrology    Education and Discussions with Family / Patient: Attempted to update  (wife) via phone. Unable to contact.    Current Length of Stay: 5 day(s)  Current Patient Status: Inpatient   Certification Statement: The patient will continue to require additional inpatient hospital stay due to ongoing hemodialysis  Discharge Plan: Anticipate discharge in 48-72 hrs to home with home services.    Code Status: Level 1 - Full Code    Subjective   Seen and examined at bedside.  Still continues to complain of shortness of breath    Objective :  Temp:  [97.9 °F (36.6 °C)-98.4 °F (36.9 °C)] 97.9 °F (36.6 °C)  HR:  [62-67] 63  BP: (155-173)/(61-79) 163/75  Resp:  [16-24] 16  SpO2:  [97 %-100 %] 97 %  O2 Device: None (Room air)    Body mass index is 21.6 kg/m².     Input and Output Summary (last 24 hours):     Intake/Output Summary (Last 24 hours) at 12/19/2024 1425  Last data filed at 12/19/2024 1242  Gross per 24 hour   Intake 480 ml   Output 800 ml   Net -320 ml       Physical Exam  Constitutional:       Appearance: He is ill-appearing.   HENT:      Head: Normocephalic and atraumatic.      Mouth/Throat:      Mouth: Mucous membranes are moist.   Eyes:      Pupils: Pupils are equal, round, and reactive to light.   Cardiovascular:      Rate and Rhythm: Normal rate.   Pulmonary:      Effort: Pulmonary effort is normal.   Abdominal:      General: Abdomen is flat. Bowel sounds are normal.      Palpations: Abdomen is soft.   Musculoskeletal:      Cervical back: Neck supple.      Right lower leg: Edema present.      Left lower leg: Edema present.    Skin:     General: Skin is warm.   Neurological:      General: No focal deficit present.      Mental Status: He is alert and oriented to person, place, and time.   Psychiatric:         Mood and Affect: Mood normal.           Lines/Drains:  Lines/Drains/Airways       Active Status       Name Placement date Placement time Site Days    HD Permanent Double Catheter 11/14/24  1352  Internal jugular  35                      Telemetry:  Telemetry Orders (From admission, onward)               LifeVest Patient: Continuous Telemetry Monitoring during hospitalization (non-expiring)  Continuous LifeVest Telemetry Monitoring        References:    LifeVest Policy                     Telemetry Reviewed: Normal Sinus Rhythm  Indication for Continued Telemetry Use: Lifevest (remains on tele entire hospital stay)               Lab Results: I have reviewed the following results:   Results from last 7 days   Lab Units 12/18/24  0842   WBC Thousand/uL 4.56   HEMOGLOBIN g/dL 8.9*   HEMATOCRIT % 28.4*   PLATELETS Thousands/uL 45*   SEGS PCT % 84*   LYMPHO PCT % 9*   MONO PCT % 6   EOS PCT % 1     Results from last 7 days   Lab Units 12/17/24  1354   SODIUM mmol/L 125*   POTASSIUM mmol/L 4.4   CHLORIDE mmol/L 92*   CO2 mmol/L 27   BUN mg/dL 33*   CREATININE mg/dL 4.09*   ANION GAP mmol/L 6   CALCIUM mg/dL 8.1*   ALBUMIN g/dL 3.0*   TOTAL BILIRUBIN mg/dL 1.20*   ALK PHOS U/L 109*   ALT U/L 9   AST U/L 19   GLUCOSE RANDOM mg/dL 143*     Results from last 7 days   Lab Units 12/14/24  1624   INR  1.27*                   Recent Cultures (last 7 days):   Results from last 7 days   Lab Units 12/18/24  1544   GRAM STAIN RESULT  1+ Polys  No bacteria seen   BODY FLUID CULTURE, STERILE  No growth             Last 24 Hours Medication List:     Current Facility-Administered Medications:     acetaminophen (TYLENOL) tablet 650 mg, Q4H PRN    aspirin (ECOTRIN LOW STRENGTH) EC tablet 81 mg, Daily    atorvastatin (LIPITOR) tablet 20 mg, Daily With  Dinner    carvedilol (COREG) tablet 12.5 mg, BID With Meals    folic acid (FOLVITE) tablet 1 mg, Daily    heparin (porcine) subcutaneous injection 5,000 Units, Q8H JEEVAN    hydrALAZINE (APRESOLINE) injection 10 mg, Q6H PRN    hydrALAZINE (APRESOLINE) tablet 100 mg, Q8H JEEVAN    iron sucrose (VENOFER) 200 mg in sodium chloride 0.9 % 50 mL IVPB, Once per day on Monday Wednesday Friday, Last Rate: Stopped (12/18/24 1213)    isosorbide mononitrate (IMDUR) 24 hr tablet 120 mg, Daily    melatonin tablet 3 mg, HS    torsemide (DEMADEX) tablet 100 mg, Daily    Administrative Statements   Today, Patient Was Seen By: Shari Arceo MD      **Please Note: This note may have been constructed using a voice recognition system.**

## 2024-12-19 NOTE — ASSESSMENT & PLAN NOTE
Status post thoracentesis on the right.  800 cc removed.  Consideration for left thoracentesis today by primary service.  Again continuing to aggressively challenge weight as to try to prevent reoccurrence.

## 2024-12-19 NOTE — PLAN OF CARE
Target UF Goal  2-3  L as tolerated. Patient dialyzing for 3 hours uf fluid removal only, ufp 16 seq. Treatment plan reviewed with Nephrology.     Problem: METABOLIC, FLUID AND ELECTROLYTES - ADULT  Goal: Electrolytes maintained within normal limits  Description: INTERVENTIONS:  - Monitor labs and assess patient for signs and symptoms of electrolyte imbalances  - Administer electrolyte replacement as ordered  - Monitor response to electrolyte replacements, including repeat lab results as appropriate  - Instruct patient on fluid and nutrition as appropriate  Outcome: Progressing     Problem: METABOLIC, FLUID AND ELECTROLYTES - ADULT  Goal: Fluid balance maintained  Description: INTERVENTIONS:  - Monitor labs   - Monitor I/O and WT  - Instruct patient on fluid and nutrition as appropriate  - Assess for signs & symptoms of volume excess or deficit  Outcome: Progressing

## 2024-12-19 NOTE — ASSESSMENT & PLAN NOTE
Bilateral pleural effusion not improving with dialysis treatment.  Underwent thoracentesis for the right side.  Repeat chest x-ray still shows left-sided effusion.  IR consulted for thoracentesis.

## 2024-12-19 NOTE — ASSESSMENT & PLAN NOTE
Overall weight has significant proved since admission has lost nearly 12 kg.  Clinically however patient still appears significantly volume overloaded.  Although clinically patient still appears volume overloaded.  Will plan for additional ultrafiltration treatment today with continued hemodialysis tomorrow.

## 2024-12-19 NOTE — ASSESSMENT & PLAN NOTE
Suspecting progression towards end-stage disease.  Previous baseline creatinine 2.0 (noted in February 2024 with an EGFR of 32) now with progression to requiring hemodialysis.  Likely related to cardiorenal syndrome.  Protein creatinine ratio subnephrotic at 0.6.  Urine microscopy fairly bland with 0-1 RBCs, 2-4 white cells.

## 2024-12-19 NOTE — QUICK NOTE
Dr. Arceo stated okay to leave patient's IV in despite being   @ 1625 due to patient refusing site change.

## 2024-12-19 NOTE — PROGRESS NOTES
NEPHROLOGY HOSPITAL PROGRESS NOTE   Matthew Alvarez 69 y.o. male MRN: [id number]  Unit/Bed#: -01 Encounter: [id number]  Reason for Consult: DAVID    Assessment & Plan  Acute hemodialysis patient (HCC)  Suspecting progression towards end-stage disease.  Previous baseline creatinine 2.0 (noted in February 2024 with an EGFR of 32) now with progression to requiring hemodialysis.  Likely related to cardiorenal syndrome.  Protein creatinine ratio subnephrotic at 0.6.  Urine microscopy fairly bland with 0-1 RBCs, 2-4 white cells.  Acute on chronic systolic heart failure (HCC)  Overall weight has significant proved since admission has lost nearly 12 kg.  Clinically however patient still appears significantly volume overloaded.  Although clinically patient still appears volume overloaded.  Will plan for additional ultrafiltration treatment today with continued hemodialysis tomorrow.  Pleural effusion  Status post thoracentesis on the right.  800 cc removed.  Consideration for left thoracentesis today by primary service.  Again continuing to aggressively challenge weight as to try to prevent reoccurrence.  Nonischemic cardiomyopathy (HCC)  It is noted the patient has an ejection fraction of 20 - 25%.  Continue to challenge weight as tolerated.  Primary hypertension  Blood pressure currently appears acceptable.  Hopeful continued improvement with challenging weight/ultrafiltration.  Anemia of renal disease  Iron stores: Iron saturation 10% with ferritin of 109.  Representing component of iron deficiency given ESRD status.  Had Venofer 200 mg 3 times weekly with hemodialysis.  Hyponatremia  Hyponatremia likely related to significant volume overload.  Will continue to challenge weight as tolerated.  Maintain fluid restriction.  Normalized sodium bath.    Secondary hyperparathyroidism of renal origin (HCC)  Follow PTH as outpatient.  Calcium is 8.8 with a calcium of 8.1 and albumin of 3.0.      Summary:  Continued  hemodialysis Monday Wednesday Friday although we will plan for additional ultrafiltration treatment today given persistent and clinical volume overload.  Anticipating possible left thoracentesis  No other changes from nephrology standpoint    SUBJECTIVE / 24H INTERVAL HISTORY:  Seen and examined.  Patient feeling quite well today after thoracentesis.  Shortness of breath has significantly improved.  In fact states that he is ready to be discharged.  No reported chest pain or pressure.  Appetite is stable.    OBJECTIVE:  Current Weight: Weight - Scale: 60.7 kg (133 lb 12.8 oz)  Vitals:    12/19/24 0600 12/19/24 0810 12/19/24 0900 12/19/24 1107   BP:  (!) 173/79  163/75   BP Location:       Pulse:    63   Resp:  16  16   Temp:  97.9 °F (36.6 °C)  97.9 °F (36.6 °C)   TempSrc:       SpO2:   97% 97%   Weight: 60.7 kg (133 lb 12.8 oz)          Intake/Output Summary (Last 24 hours) at 12/19/2024 1121  Last data filed at 12/19/2024 0854  Gross per 24 hour   Intake 1202 ml   Output 3405 ml   Net -2203 ml       Physical Exam  Constitutional:       Appearance: He is not ill-appearing.   Eyes:      General: No scleral icterus.  Cardiovascular:      Rate and Rhythm: Normal rate and regular rhythm.   Pulmonary:      Effort: Pulmonary effort is normal.      Breath sounds: Examination of the right-lower field reveals decreased breath sounds. Examination of the left-lower field reveals decreased breath sounds. Decreased breath sounds present.   Abdominal:      General: There is no distension.      Palpations: Abdomen is soft.   Musculoskeletal:      Right lower leg: Edema present.      Left lower leg: Edema present.   Skin:     General: Skin is warm and dry.   Neurological:      Mental Status: He is alert and oriented to person, place, and time.         Medications:    Current Facility-Administered Medications:     acetaminophen (TYLENOL) tablet 650 mg, 650 mg, Oral, Q4H PRN, David Meraz MD, 650 mg at 12/18/24 1203     aspirin (ECOTRIN LOW STRENGTH) EC tablet 81 mg, 81 mg, Oral, Daily, David Meraz MD, 81 mg at 12/19/24 0815    atorvastatin (LIPITOR) tablet 20 mg, 20 mg, Oral, Daily With Dinner, YUE Griffin, 20 mg at 12/18/24 1807    carvedilol (COREG) tablet 12.5 mg, 12.5 mg, Oral, BID With Meals, David Meraz MD, 12.5 mg at 12/19/24 0815    folic acid (FOLVITE) tablet 1 mg, 1 mg, Oral, Daily, Jensen Kelly DO, 1 mg at 12/19/24 0815    heparin (porcine) subcutaneous injection 5,000 Units, 5,000 Units, Subcutaneous, Q8H JEEVAN, David Meraz MD    hydrALAZINE (APRESOLINE) injection 10 mg, 10 mg, Intravenous, Q6H PRN, Chris Nolasco DO, 10 mg at 12/17/24 1748    hydrALAZINE (APRESOLINE) tablet 100 mg, 100 mg, Oral, Q8H JEEVAN, David Meraz MD, 100 mg at 12/19/24 0555    iron sucrose (VENOFER) 200 mg in sodium chloride 0.9 % 50 mL IVPB, 200 mg, Intravenous, Once per day on Monday Wednesday Friday, Leo Marshall DO, Stopped at 12/18/24 1213    isosorbide mononitrate (IMDUR) 24 hr tablet 120 mg, 120 mg, Oral, Daily, David Meraz MD, 120 mg at 12/19/24 0815    melatonin tablet 3 mg, 3 mg, Oral, HS, David Meraz MD, 3 mg at 12/18/24 2204    torsemide (DEMADEX) tablet 100 mg, 100 mg, Oral, Daily, Jensen Kelly DO, 100 mg at 12/19/24 0815    Laboratory Results:  Results from last 7 days   Lab Units 12/18/24  0842 12/17/24  1354 12/16/24  0448 12/15/24  0619 12/14/24  1624   WBC Thousand/uL 4.56 4.44 5.39 6.01 4.80   HEMOGLOBIN g/dL 8.9* 8.3* 8.4* 8.8* 9.6*   HEMATOCRIT % 28.4* 25.8* 26.8* 27.0* 29.7*   PLATELETS Thousands/uL 45* 52* 64* 82* 80*   POTASSIUM mmol/L  --  4.4 4.6 4.5 4.2   CHLORIDE mmol/L  --  92* 92* 89* 89*   CO2 mmol/L  --  27 26 27 27   BUN mg/dL  --  33* 41* 49* 44*   CREATININE mg/dL  --  4.09* 4.55* 5.70* 5.30*   CALCIUM mg/dL  --  8.1* 7.9* 8.4 8.4   MAGNESIUM mg/dL  --   --  2.0 2.0  --        Portions of the record may have been created with  "voice recognition software. Occasional wrong word or \"sound a like\" substitutions may have occurred due to the inherent limitations of voice recognition software. Read the chart carefully and recognize, using context, where substitutions have occurred.If you have any questions, please contact the dictating provider.  "

## 2024-12-19 NOTE — PLAN OF CARE
Problem: PHYSICAL THERAPY ADULT  Goal: Performs mobility at highest level of function for planned discharge setting.  See evaluation for individualized goals.  Description: Treatment/Interventions: ADL retraining, Functional transfer training, LE strengthening/ROM, Elevations, Therapeutic exercise, Endurance training, Patient/family training, Equipment eval/education, Gait training, Bed mobility, Compensatory technique education, Spoke to nursing, Spoke to case management, OT          See flowsheet documentation for full assessment, interventions and recommendations.  Outcome: Progressing  Note: Prognosis: Good  Problem List: Decreased strength, Decreased endurance, Impaired balance, Decreased mobility, Impaired judgement, Decreased safety awareness, Decreased skin integrity  Assessment: Pt seen for PT treatment session this date with interventions consisting of seated TE, transfer training, gait training, and education provided as needed for safety and direction to improve functional mobility, safety awareness, and activity tolerance. Pt agreeable to PT treatment session upon arrival, pt found sitting OOB in recliner. At end of session, pt left sitting OOB in recliner with chair alarm activated and with all needs in reach. In comparison to previous session, pt with improvements in ambulation distance and amount of assistance required for transfers . Continue to recommend Level II (Moderate Resource Intensity) at time of d/c in order to maximize pt's functional independence and safety w/ mobility. Pt continues to be functioning below baseline level. PT will continue to see pt while here in order to address the deficits listed above and provide interventions consistent w/ POC in effort to achieve STGs.  Barriers to Discharge: Other (Comment)  Barriers to Discharge Comments: BRODY; impaired activity tolerance  Rehab Resource Intensity Level, PT: II (Moderate Resource Intensity)    See flowsheet documentation for full  assessment.

## 2024-12-19 NOTE — PHYSICAL THERAPY NOTE
"   PHYSICAL THERAPY TREATMENT NOTE  NAME:  Matthew Alvarez  DATE: 12/19/24    Length Of Stay: 5  Performed at least 2 patient identifiers during session: Name and Birthday    TREATMENT FLOWSHEET:    12/19/24 1138   PT Last Visit   PT Visit Date 12/19/24   Note Type   Note Type Treatment   Pain Assessment   Pain Assessment Tool 0-10   Pain Score No Pain   Restrictions/Precautions   Weight Bearing Precautions Per Order No   Other Precautions Chair Alarm;Bed Alarm;Fall Risk;Telemetry;O2   General   Chart Reviewed Yes   Response to Previous Treatment Patient with no complaints from previous session.   Family/Caregiver Present No   Cognition   Overall Cognitive Status WFL   Arousal/Participation Alert   Attention Within functional limits   Orientation Level Oriented X4   Memory Within functional limits   Following Commands Follows one step commands without difficulty   Subjective   Subjective \"I am freezing.\"   Bed Mobility   Additional Comments Pt. found sitting OOB in recliner upon entering room   Transfers   Sit to Stand 5  Supervision   Additional items Assist x 1;Armrests;Increased time required;Verbal cues  (RW)   Stand to Sit 5  Supervision   Additional items Assist x 1;Armrests;Increased time required;Verbal cues   Stand pivot 5  Supervision   Additional items Assist x 1;Armrests;Increased time required;Verbal cues  (RW)   Additional Comments VC's provided for proper hand placemetn during transitions   Ambulation/Elevation   Gait pattern Improper Weight shift;Decreased foot clearance;Narrow MELISSA;Forward Flexion;Short stride;Excessively slow;Decreased heel strike;Decreased toe off;Step through pattern   Gait Assistance   (SBA)   Additional items Assist x 1;Verbal cues   Assistive Device Rolling walker   Distance 50 ft   Balance   Static Sitting Good   Dynamic Sitting Fair +   Static Standing Fair   Dynamic Standing Fair -   Ambulatory Fair -   Endurance Deficit   Endurance Deficit Yes   Activity Tolerance   Activity " Tolerance Patient limited by fatigue   Exercises   Quad Sets Sitting;15 reps;AROM;Bilateral   Heelslides Sitting;15 reps;AROM;Bilateral   Hip Flexion Sitting;15 reps;AROM;Bilateral   Hip Abduction Sitting;15 reps;AROM;Bilateral   Hip Adduction Sitting;15 reps;AROM;Bilateral   Knee AROM Long Arc Quad Sitting;15 reps;AROM;Bilateral   Ankle Pumps Sitting;15 reps;AROM;Bilateral   Marching Sitting;15 reps;AROM;Bilateral   Assessment   Prognosis Good   Problem List Decreased strength;Decreased endurance;Impaired balance;Decreased mobility;Impaired judgement;Decreased safety awareness;Decreased skin integrity   Goals   Patient Goals to go home   PT Treatment Day 3   Plan   Treatment/Interventions Functional transfer training;LE strengthening/ROM;Therapeutic exercise;Endurance training;Patient/family training;Equipment eval/education;Gait training;Compensatory technique education;Spoke to nursing   Progress Slow progress, decreased activity tolerance   PT Frequency 3-5x/wk   Discharge Recommendation   Rehab Resource Intensity Level, PT II (Moderate Resource Intensity)   AM-PAC Basic Mobility Inpatient   Turning in Flat Bed Without Bedrails 3   Lying on Back to Sitting on Edge of Flat Bed Without Bedrails 3   Moving Bed to Chair 3   Standing Up From Chair Using Arms 3   Walk in Room 3   Climb 3-5 Stairs With Railing 1   Basic Mobility Inpatient Raw Score 16   Basic Mobility Standardized Score 38.32   [facility] Highest Level Of Mobility   -HL Goal 5: Stand one or more mins   -HLM Achieved 7: Walk 25 feet or more       The patient's AM-PAC Basic Mobility Inpatient Short Form Raw Score is 16. A raw score 16 suggests the patient may benefit from discharge to post-acute rehabilitation services. Please also refer to the recommendation of the Physical Therapist for safe discharge planning.    Pt seen for PT treatment session this date with interventions consisting of seated TE, transfer training, gait training, and  education provided as needed for safety and direction to improve functional mobility, safety awareness, and activity tolerance. Pt agreeable to PT treatment session upon arrival, pt found sitting OOB in recliner. At end of session, pt left sitting OOB in recliner with chair alarm activated and with all needs in reach. In comparison to previous session, pt with improvements in ambulation distance and amount of assistance required for transfers . Continue to recommend Level II (Moderate Resource Intensity) at time of d/c in order to maximize pt's functional independence and safety w/ mobility. Pt continues to be functioning below baseline level. PT will continue to see pt while here in order to address the deficits listed above and provide interventions consistent w/ POC in effort to achieve STGs.    Brooke Bains, PTA

## 2024-12-20 ENCOUNTER — APPOINTMENT (INPATIENT)
Dept: DIALYSIS | Facility: HOSPITAL | Age: 69
DRG: 291 | End: 2024-12-20
Attending: INTERNAL MEDICINE
Payer: COMMERCIAL

## 2024-12-20 PROCEDURE — 99232 SBSQ HOSP IP/OBS MODERATE 35: CPT | Performed by: INTERNAL MEDICINE

## 2024-12-20 PROCEDURE — 88305 TISSUE EXAM BY PATHOLOGIST: CPT | Performed by: PATHOLOGY

## 2024-12-20 PROCEDURE — 88112 CYTOPATH CELL ENHANCE TECH: CPT | Performed by: PATHOLOGY

## 2024-12-20 RX ADMIN — CARVEDILOL 12.5 MG: 12.5 TABLET, FILM COATED ORAL at 16:56

## 2024-12-20 RX ADMIN — IRON SUCROSE 200 MG: 20 INJECTION, SOLUTION INTRAVENOUS at 12:39

## 2024-12-20 RX ADMIN — FOLIC ACID 1 MG: 1 TABLET ORAL at 14:46

## 2024-12-20 RX ADMIN — HYDRALAZINE HYDROCHLORIDE 100 MG: 25 TABLET ORAL at 05:16

## 2024-12-20 RX ADMIN — HYDRALAZINE HYDROCHLORIDE 100 MG: 25 TABLET ORAL at 21:19

## 2024-12-20 RX ADMIN — TORSEMIDE 100 MG: 100 TABLET ORAL at 14:46

## 2024-12-20 RX ADMIN — ASPIRIN 81 MG: 81 TABLET, COATED ORAL at 14:46

## 2024-12-20 RX ADMIN — Medication 3 MG: at 21:20

## 2024-12-20 RX ADMIN — HYDRALAZINE HYDROCHLORIDE 100 MG: 25 TABLET ORAL at 14:46

## 2024-12-20 RX ADMIN — ATORVASTATIN CALCIUM 20 MG: 20 TABLET, FILM COATED ORAL at 16:56

## 2024-12-20 RX ADMIN — ISOSORBIDE MONONITRATE 120 MG: 60 TABLET, EXTENDED RELEASE ORAL at 14:51

## 2024-12-20 NOTE — PLAN OF CARE
Pre-tx:  UF 3-3.5L as tolerated per order.  Pt below EDW to start HD today, continue to challenge EDW.  HD permcath accessed without issue.  BP stable to start HD.  Pt on 3k bath, last serum K 4.4 (12/17/24).      Post-Dialysis RN Treatment Note    Blood Pressure:  Pre 166/76 mm/Hg  Post 184/85 mmHg  **Pt hypertensive toward   EDW:  60 kg    Weight:  Pre 59.3 kg   Post 55.8 kg   Mode of weight measurement: Standing Scale   Volume Removed:  3500 ml    Treatment duration: 3.5 hours    NS given: No     Treatment shortened No   Medications given during Rx: Venofer IV per order   Estimated Kt/V:  1.2   Access type: Permacath/TDC   Needle Gauge:    Access Issues: No    Report called to primary nurse:   Yes             Problem: METABOLIC, FLUID AND ELECTROLYTES - ADULT  Goal: Electrolytes maintained within normal limits  Description: INTERVENTIONS:  - Monitor labs and assess patient for signs and symptoms of electrolyte imbalances  - Administer electrolyte replacement as ordered  - Monitor response to electrolyte replacements, including repeat lab results as appropriate  - Instruct patient on fluid and nutrition as appropriate  Outcome: Progressing  Goal: Fluid balance maintained  Description: INTERVENTIONS:  - Monitor labs   - Monitor I/O and WT  - Instruct patient on fluid and nutrition as appropriate  - Assess for signs & symptoms of volume excess or deficit  Outcome: Progressing

## 2024-12-20 NOTE — ASSESSMENT & PLAN NOTE
Overall weight has significant proved since admission has lost nearly 14 kg.  Clinically however patient still appears significantly volume overloaded.. Will plan continued hemodialysis today

## 2024-12-20 NOTE — PROGRESS NOTES
Progress Note - Hospitalist   Name: Matthew Alvarez 69 y.o. male I MRN: [id number]  Unit/Bed#: -01 I Date of Admission: 12/14/2024   Date of Service: 12/20/2024 I Hospital Day: 6    Assessment & Plan  Acute on chronic systolic heart failure (HCC)  Patient presented  on (12/14) after sustaining a mechanical fall 2 to 3 days ago.    Patient states that he missed dialysis on Wednesday and presented for regularly scheduled session on Friday where he was noted with significant bruising and referred to the ED  Evidence of severe volume overload with weight significantly up from dry weight  Notably, patient with gross noncompliance with outpatient medical regimen  Presented with a weight of 73.8 kg with dry weight of 60 kg.  Underwent hemodialysis daily on 12/15 -12/18..  Plan for hemodialysis today for removal of 3 kg.  Nephrology wants to continue to challenge dry weight lower.  Repeat chest x-ray today continues slightly diminished right effusion and ongoing left-sided effusion.  Will undergo HD with UF today-goal for 3 L removal  IR consulted for  left thoracentesis subsequently.  Patient underwent 800 mL of fluid removed from the right side.  Nephrology closely following  Cardiology consulted for concomitant management  Monitor for electrolyte abnormality.  Continue with ongoing volume control with dialysis.  Cardiology adjusting his dry weight.  Wt Readings from Last 3 Encounters:   12/20/24 59.3 kg (130 lb 12.8 oz)   11/20/24 55.2 kg (121 lb 12.8 oz)   09/03/24 55.3 kg (122 lb)     ESRD (end stage renal disease) on dialysis (HCC)  Dialysis, Monday, Wednesday, Friday.  Patient has been receiving hemodialysis daily with ultrafiltration from 1215 12/15-12/19.-nephrology closely following  An uric at baseline    Lab Results   Component Value Date    EGFR 13 12/17/2024    EGFR 12 12/16/2024    EGFR 9 12/15/2024    CREATININE 4.09 (H) 12/17/2024    CREATININE 4.55 (H) 12/16/2024    CREATININE 5.70 (H) 12/15/2024  "    Nonischemic cardiomyopathy ([facility])  November 11, 2024: Echo, EF of 20-24%  Noncompliant with all medications in the outpatient setting-unclear if taking any  Also, set up with LifeVest which patient states he wears \"as needed\"  GDMT limited by advanced end-stage kidney disease.  On GDMT with Coreg, Imdur, hydralazine.  Close outpatient cardiology follow-up on discharge  Maintain on telemetry monitor during hospitalization because of being on LifeVest.  Primary hypertension  Noncompliant with his home medications  Restarted oral hydralazine, Coreg  As needed intravenous hydralazine/labetalol as needed  Patient also significantly volume overloaded upon presentation which is likely a significant contributing factor.  Undergoing hemodialysis today.  Ambulatory dysfunction  Patient states he fell at home, has bruising on his face  Will consult PT/OT/case management  He states he sometimes walks with an assistive device  Will likely require short-term rehab  Patient adamantly refusing rehab.  Discussed with patient's wife at length over the phone.  Coronary artery disease involving native coronary artery of native heart without angina pectoris  Continue aspirin  Mild nonobstructive coronary artery disease per cardiac catheterization in 9/2021.  Resume statin therapy.  Hyponatremia  Volume control with dialysis today.  Acute hemodialysis patient ([facility])    Anemia of renal disease  Follow-up on iron, vitamin B12 and folate.  Plan to start IV iron  Secondary hyperparathyroidism of renal origin ([facility])  Nephrology on board.  Follow-up phosphorus level  Pleural effusion  Bilateral pleural effusion not improving with dialysis treatment.  Underwent thoracentesis for the right side.  Repeat chest x-ray still shows left-sided effusion.  IR consulted for thoracentesis.    VTE Pharmacologic Prophylaxis: VTE Score: 5 High Risk (Score >/= 5) - Pharmacological DVT Prophylaxis Ordered: heparin. Sequential Compression Devices " Ordered.    Mobility:   Basic Mobility Inpatient Raw Score: 16  JH-HLM Goal: 5: Stand one or more mins  JH-HLM Achieved: 6: Walk 10 steps or more  JH-HLM Goal achieved. Continue to encourage appropriate mobility.    Patient Centered Rounds: I performed bedside rounds with nursing staff today.   Discussions with Specialists or Other Care Team Provider: Discussed with nursing    Education and Discussions with Family / Patient: Patient declined call to .     Current Length of Stay: 6 day(s)  Current Patient Status: Inpatient   Certification Statement: The patient will continue to require additional inpatient hospital stay due to ongoing volume control with dialysis  Discharge Plan: Anticipate discharge in 48-72 hrs to home with home services.    Code Status: Level 1 - Full Code    Subjective   Seen and examined at bedside.  Currently undergoing dialysis.  Does not offer any complaints.    Objective :  Temp:  [97.8 °F (36.6 °C)-98.4 °F (36.9 °C)] 98 °F (36.7 °C)  HR:  [60-74] 72  BP: (112-186)/(7-102) 184/73  Resp:  [16-18] 18  SpO2:  [94 %-98 %] 96 %  O2 Device: None (Room air)    Body mass index is 21.11 kg/m².     Input and Output Summary (last 24 hours):     Intake/Output Summary (Last 24 hours) at 12/20/2024 1516  Last data filed at 12/20/2024 1357  Gross per 24 hour   Intake 1010 ml   Output 7102 ml   Net -6092 ml       Physical Exam  Constitutional:       Appearance: He is ill-appearing.   HENT:      Head: Normocephalic.      Nose: Nose normal.      Mouth/Throat:      Mouth: Mucous membranes are moist.   Eyes:      Extraocular Movements: Extraocular movements intact.      Pupils: Pupils are equal, round, and reactive to light.   Cardiovascular:      Rate and Rhythm: Normal rate and regular rhythm.   Pulmonary:      Comments: Diminished breath sounds bilaterally.  Abdominal:      General: Abdomen is flat.      Palpations: Abdomen is soft.   Musculoskeletal:      Right lower leg: Edema present.       Left lower leg: Edema present.   Skin:     General: Skin is warm.      Coloration: Skin is pale.   Neurological:      General: No focal deficit present.      Mental Status: He is alert. Mental status is at baseline.           Lines/Drains:  Lines/Drains/Airways       Active Status       Name Placement date Placement time Site Days    HD Permanent Double Catheter 11/14/24  1352  Internal jugular  36                      Telemetry:  Telemetry Orders (From admission, onward)               LifeVest Patient: Continuous Telemetry Monitoring during hospitalization (non-expiring)  Continuous LifeVest Telemetry Monitoring        References:    LifeVest Policy                     Telemetry Reviewed: Normal Sinus Rhythm  Indication for Continued Telemetry Use: Lifevest (remains on tele entire hospital stay)               Lab Results: I have reviewed the following results:   Results from last 7 days   Lab Units 12/18/24  0842   WBC Thousand/uL 4.56   HEMOGLOBIN g/dL 8.9*   HEMATOCRIT % 28.4*   PLATELETS Thousands/uL 45*   SEGS PCT % 84*   LYMPHO PCT % 9*   MONO PCT % 6   EOS PCT % 1     Results from last 7 days   Lab Units 12/17/24  1354   SODIUM mmol/L 125*   POTASSIUM mmol/L 4.4   CHLORIDE mmol/L 92*   CO2 mmol/L 27   BUN mg/dL 33*   CREATININE mg/dL 4.09*   ANION GAP mmol/L 6   CALCIUM mg/dL 8.1*   ALBUMIN g/dL 3.0*   TOTAL BILIRUBIN mg/dL 1.20*   ALK PHOS U/L 109*   ALT U/L 9   AST U/L 19   GLUCOSE RANDOM mg/dL 143*     Results from last 7 days   Lab Units 12/14/24  1624   INR  1.27*                   Recent Cultures (last 7 days):   Results from last 7 days   Lab Units 12/18/24  1544   GRAM STAIN RESULT  1+ Polys  No bacteria seen   BODY FLUID CULTURE, STERILE  No growth             Last 24 Hours Medication List:     Current Facility-Administered Medications:     acetaminophen (TYLENOL) tablet 650 mg, Q4H PRN    aspirin (ECOTRIN LOW STRENGTH) EC tablet 81 mg, Daily    atorvastatin (LIPITOR) tablet 20 mg, Daily With  Dinner    carvedilol (COREG) tablet 12.5 mg, BID With Meals    folic acid (FOLVITE) tablet 1 mg, Daily    heparin (porcine) subcutaneous injection 5,000 Units, Q8H JEEVAN    hydrALAZINE (APRESOLINE) injection 10 mg, Q6H PRN    hydrALAZINE (APRESOLINE) tablet 100 mg, Q8H JEEVAN    iron sucrose (VENOFER) 200 mg in sodium chloride 0.9 % 50 mL IVPB, Once per day on Monday Wednesday Friday, Last Rate: 200 mg (12/20/24 1239)    isosorbide mononitrate (IMDUR) 24 hr tablet 120 mg, Daily    melatonin tablet 3 mg, HS    torsemide (DEMADEX) tablet 100 mg, Daily    Administrative Statements   Today, Patient Was Seen By: Shari Arceo MD      **Please Note: This note may have been constructed using a voice recognition system.**

## 2024-12-20 NOTE — ASSESSMENT & PLAN NOTE
Hyponatremia likely related to significant volume overload.  Will continue to challenge weight as tolerated.  Maintain fluid restriction.  Normalized sodium bath.  Repeat BMP tomorrow morning.

## 2024-12-20 NOTE — PLAN OF CARE
Problem: Prexisting or High Potential for Compromised Skin Integrity  Goal: Skin integrity is maintained or improved  Description: INTERVENTIONS:  - Identify patients at risk for skin breakdown  - Assess and monitor skin integrity  - Assess and monitor nutrition and hydration status  - Monitor labs   - Assess for incontinence   - Turn and reposition patient  - Assist with mobility/ambulation  - Relieve pressure over bony prominences  - Avoid friction and shearing  - Provide appropriate hygiene as needed including keeping skin clean and dry  - Evaluate need for skin moisturizer/barrier cream  - Collaborate with interdisciplinary team   - Patient/family teaching  - Consider wound care consult   Outcome: Progressing     Problem: CARDIOVASCULAR - ADULT  Goal: Maintains optimal cardiac output and hemodynamic stability  Description: INTERVENTIONS:  - Monitor I/O, vital signs and rhythm  - Monitor for S/S and trends of decreased cardiac output  - Administer and titrate ordered vasoactive medications to optimize hemodynamic stability  - Assess quality of pulses, skin color and temperature  - Assess for signs of decreased coronary artery perfusion  - Instruct patient to report change in severity of symptoms  Outcome: Progressing  Goal: Absence of cardiac dysrhythmias or at baseline rhythm  Description: INTERVENTIONS:  - Continuous cardiac monitoring, vital signs, obtain 12 lead EKG if ordered  - Administer antiarrhythmic and heart rate control medications as ordered  - Monitor electrolytes and administer replacement therapy as ordered  Outcome: Progressing     Problem: RESPIRATORY - ADULT  Goal: Achieves optimal ventilation and oxygenation  Description: INTERVENTIONS:  - Assess for changes in respiratory status  - Assess for changes in mentation and behavior  - Position to facilitate oxygenation and minimize respiratory effort  - Oxygen administered by appropriate delivery if ordered  - Initiate smoking cessation education  as indicated  - Encourage broncho-pulmonary hygiene including cough, deep breathe, Incentive Spirometry  - Assess the need for suctioning and aspirate as needed  - Assess and instruct to report SOB or any respiratory difficulty  - Respiratory Therapy support as indicated  Outcome: Progressing     Problem: METABOLIC, FLUID AND ELECTROLYTES - ADULT  Goal: Electrolytes maintained within normal limits  Description: INTERVENTIONS:  - Monitor labs and assess patient for signs and symptoms of electrolyte imbalances  - Administer electrolyte replacement as ordered  - Monitor response to electrolyte replacements, including repeat lab results as appropriate  - Instruct patient on fluid and nutrition as appropriate  Outcome: Progressing  Goal: Fluid balance maintained  Description: INTERVENTIONS:  - Monitor labs   - Monitor I/O and WT  - Instruct patient on fluid and nutrition as appropriate  - Assess for signs & symptoms of volume excess or deficit  Outcome: Progressing  Goal: Glucose maintained within target range  Description: INTERVENTIONS:  - Monitor Blood Glucose as ordered  - Assess for signs and symptoms of hyperglycemia and hypoglycemia  - Administer ordered medications to maintain glucose within target range  - Assess nutritional intake and initiate nutrition service referral as needed  Outcome: Progressing     Problem: SKIN/TISSUE INTEGRITY - ADULT  Goal: Skin Integrity remains intact(Skin Breakdown Prevention)  Description: Assess:  -Perform Omar assessment every .  -Clean and moisturize skin every .  -Inspect skin when repositioning, toileting, and assisting with ADLS  -Assess under medical devices such as . every .  -Assess extremities for adequate circulation and sensation     Bed Management:  -Have minimal linens on bed & keep smooth, unwrinkled  -Change linens as needed when moist or perspiring  -Avoid sitting or lying in one position for more than . hours while in bed  -Keep HOB at .degrees      Toileting:  -Offer bedside commode  -Assess for incontinence every .  -Use incontinent care products after each incontinent episode such as .    Activity:  -Mobilize patient . times a day  -Encourage activity and walks on unit  -Encourage or provide ROM exercises   -Turn and reposition patient every . Hours  -Use appropriate equipment to lift or move patient in bed  -Instruct/ Assist with weight shifting every . when out of bed in chair  -Consider limitation of chair time . hour intervals    Skin Care:  -Avoid use of baby powder, tape, friction and shearing, hot water or constrictive clothing  -Relieve pressure over bony prominences using .  -Do not massage red bony areas    Next Steps:  -Teach patient strategies to minimize risks such as .   -Consider consults to  interdisciplinary teams such as .  Outcome: Progressing  Goal: Incision(s), wounds(s) or drain site(s) healing without S/S of infection  Description: INTERVENTIONS  - Assess and document dressing, incision, wound bed, drain sites and surrounding tissue  - Provide patient and family education  - Perform skin care/dressing changes every .  Outcome: Progressing  Goal: Pressure injury heals and does not worsen  Description: Interventions:  - Implement low air loss mattress or specialty surface (Criteria met)  - Apply silicone foam dressing  - Instruct/assist with weight shifting every . minutes when in chair   - Limit chair time to . hour intervals  - Use special pressure reducing interventions such as . when in chair   - Apply fecal or urinary incontinence containment device   - Perform passive or active ROM every .  - Turn and reposition patient & offload bony prominences every . hours   - Utilize friction reducing device or surface for transfers   - Consider consults to  interdisciplinary teams such as .  - Use incontinent care products after each incontinent episode such as .  - Consider nutrition services referral as needed  Outcome: Progressing

## 2024-12-20 NOTE — ASSESSMENT & PLAN NOTE
Patient presented  on (12/14) after sustaining a mechanical fall 2 to 3 days ago.    Patient states that he missed dialysis on Wednesday and presented for regularly scheduled session on Friday where he was noted with significant bruising and referred to the ED  Evidence of severe volume overload with weight significantly up from dry weight  Notably, patient with gross noncompliance with outpatient medical regimen  Presented with a weight of 73.8 kg with dry weight of 60 kg.  Underwent hemodialysis daily on 12/15 -12/18..  Plan for hemodialysis today for removal of 3 kg.  Nephrology wants to continue to challenge dry weight lower.  Repeat chest x-ray today continues slightly diminished right effusion and ongoing left-sided effusion.  Will undergo HD with UF today-goal for 3 L removal  IR consulted for  left thoracentesis subsequently.  Patient underwent 800 mL of fluid removed from the right side.  Nephrology closely following  Cardiology consulted for concomitant management  Monitor for electrolyte abnormality.  Continue with ongoing volume control with dialysis.  Cardiology adjusting his dry weight.  Wt Readings from Last 3 Encounters:   12/20/24 59.3 kg (130 lb 12.8 oz)   11/20/24 55.2 kg (121 lb 12.8 oz)   09/03/24 55.3 kg (122 lb)

## 2024-12-20 NOTE — PROGRESS NOTES
NEPHROLOGY HOSPITAL PROGRESS NOTE   Matthew Alvarez 69 y.o. male MRN: [id number]  Unit/Bed#: -01 Encounter: [id number]  Reason for Consult: DAVID    Assessment & Plan  Acute hemodialysis patient (HCC)  Suspecting progression towards end-stage disease.  Previous baseline creatinine 2.0 (noted in February 2024 with an EGFR of 32) now with progression to requiring hemodialysis.  Likely related to cardiorenal syndrome.  Protein creatinine ratio subnephrotic at 0.6.  Urine microscopy fairly bland with 0-1 RBCs, 2-4 white cells.  Acute on chronic systolic heart failure (HCC)  Overall weight has significant proved since admission has lost nearly 14 kg.  Clinically however patient still appears significantly volume overloaded.. Will plan continued hemodialysis today  Pleural effusion  Status post thoracentesis on the right.  800 cc removed.  Consideration for left thoracentesis today by primary service.  Again continuing to aggressively challenge weight as to try to prevent reoccurrence.  Nonischemic cardiomyopathy (HCC)  It is noted the patient has an ejection fraction of 20 - 25%.  Continue to challenge weight as tolerated.  Primary hypertension  Blood pressure currently appears acceptable.  Hopeful continued improvement with challenging weight/ultrafiltration.  Anemia of renal disease  Iron stores: Iron saturation 10% with ferritin of 109.  Representing component of iron deficiency given ESRD status.  Had Venofer 200 mg 3 times weekly with hemodialysis.  Hyponatremia  Hyponatremia likely related to significant volume overload.  Will continue to challenge weight as tolerated.  Maintain fluid restriction.  Normalized sodium bath.  Repeat BMP tomorrow morning.    Secondary hyperparathyroidism of renal origin (HCC)  Follow PTH as outpatient.  Calcium is 8.8 with a calcium of 8.1 and albumin of 3.0.    Ambulatory dysfunction  PT/OT evaluation and follow-up    Summary:  Continue maintenance hemodialysis currently Monday  Wednesday Friday.  Patient also receiving additional ultrafiltration on nondialysis days.  Continue to challenge weight as tolerated.  Clinically still appears volume overloaded.  Suspect estimated dry weight likely closer to 50 kg.    SUBJECTIVE / 24H INTERVAL HISTORY:  Seen and examined.  Currently on hemodialysis.  Sleepy today.  Discussed with dialysis nursing at bedside.    OBJECTIVE:  Current Weight: Weight - Scale: 59.3 kg (130 lb 12.8 oz)  Vitals:    12/20/24 0538 12/20/24 0720 12/20/24 1020 12/20/24 1030   BP:  157/72 166/76 (!) 166/7   Pulse:  65 65 62   Resp:   18 18   Temp:  97.9 °F (36.6 °C) 98.4 °F (36.9 °C)    TempSrc:       SpO2:  97%     Weight: 59.3 kg (130 lb 12.8 oz)          Intake/Output Summary (Last 24 hours) at 12/20/2024 1128  Last data filed at 12/20/2024 1020  Gross per 24 hour   Intake 910 ml   Output 3000 ml   Net -2090 ml       Physical Exam  Constitutional:       General: He is sleeping. He is not in acute distress.  Cardiovascular:      Rate and Rhythm: Normal rate and regular rhythm.   Pulmonary:      Effort: Pulmonary effort is normal.      Breath sounds: Examination of the right-lower field reveals decreased breath sounds. Examination of the left-lower field reveals decreased breath sounds. Decreased breath sounds present.   Abdominal:      General: There is no distension.      Palpations: Abdomen is soft.      Tenderness: There is no abdominal tenderness.   Musculoskeletal:      Right lower leg: Edema present.      Left lower leg: Edema present.   Skin:     General: Skin is warm and dry.      Findings: No rash.   Neurological:      Mental Status: Mental status is at baseline.         Medications:    Current Facility-Administered Medications:     acetaminophen (TYLENOL) tablet 650 mg, 650 mg, Oral, Q4H PRN, David Meraz MD, 650 mg at 12/18/24 1203    aspirin (ECOTRIN LOW STRENGTH) EC tablet 81 mg, 81 mg, Oral, Daily, David Meraz MD, 81 mg at 12/19/24 0815     "atorvastatin (LIPITOR) tablet 20 mg, 20 mg, Oral, Daily With Dinner, YUE Griffin, 20 mg at 12/19/24 1745    carvedilol (COREG) tablet 12.5 mg, 12.5 mg, Oral, BID With Meals, David Meraz MD, 12.5 mg at 12/19/24 1745    folic acid (FOLVITE) tablet 1 mg, 1 mg, Oral, Daily, Jensen Kelly DO, 1 mg at 12/19/24 0815    heparin (porcine) subcutaneous injection 5,000 Units, 5,000 Units, Subcutaneous, Q8H JEEVAN, David Meraz MD    hydrALAZINE (APRESOLINE) injection 10 mg, 10 mg, Intravenous, Q6H PRN, Chris Nolasco DO, 10 mg at 12/19/24 2004    hydrALAZINE (APRESOLINE) tablet 100 mg, 100 mg, Oral, Q8H JEEVAN, David Meraz MD, 100 mg at 12/20/24 0516    iron sucrose (VENOFER) 200 mg in sodium chloride 0.9 % 50 mL IVPB, 200 mg, Intravenous, Once per day on Monday Wednesday Friday, Leo Marshall DO, Stopped at 12/18/24 1213    isosorbide mononitrate (IMDUR) 24 hr tablet 120 mg, 120 mg, Oral, Daily, David Meraz MD, 120 mg at 12/19/24 0815    melatonin tablet 3 mg, 3 mg, Oral, HS, David Meraz MD, 3 mg at 12/19/24 2140    torsemide (DEMADEX) tablet 100 mg, 100 mg, Oral, Daily, Jensen Kelly DO, 100 mg at 12/19/24 0815    Laboratory Results:  Results from last 7 days   Lab Units 12/18/24  0842 12/17/24  1354 12/16/24  0448 12/15/24  0619 12/14/24  1624   WBC Thousand/uL 4.56 4.44 5.39 6.01 4.80   HEMOGLOBIN g/dL 8.9* 8.3* 8.4* 8.8* 9.6*   HEMATOCRIT % 28.4* 25.8* 26.8* 27.0* 29.7*   PLATELETS Thousands/uL 45* 52* 64* 82* 80*   POTASSIUM mmol/L  --  4.4 4.6 4.5 4.2   CHLORIDE mmol/L  --  92* 92* 89* 89*   CO2 mmol/L  --  27 26 27 27   BUN mg/dL  --  33* 41* 49* 44*   CREATININE mg/dL  --  4.09* 4.55* 5.70* 5.30*   CALCIUM mg/dL  --  8.1* 7.9* 8.4 8.4   MAGNESIUM mg/dL  --   --  2.0 2.0  --        Portions of the record may have been created with voice recognition software. Occasional wrong word or \"sound a like\" substitutions may have occurred due to the inherent " limitations of voice recognition software. Read the chart carefully and recognize, using context, where substitutions have occurred.If you have any questions, please contact the dictating provider.

## 2024-12-21 ENCOUNTER — APPOINTMENT (INPATIENT)
Dept: DIALYSIS | Facility: HOSPITAL | Age: 69
DRG: 291 | End: 2024-12-21
Payer: COMMERCIAL

## 2024-12-21 LAB
ANION GAP SERPL CALCULATED.3IONS-SCNC: 7 MMOL/L (ref 4–13)
BACTERIA SPEC BFLD CULT: NO GROWTH
BASOPHILS # BLD AUTO: 0.01 THOUSANDS/ÂΜL (ref 0–0.1)
BASOPHILS NFR BLD AUTO: 0 % (ref 0–1)
BUN SERPL-MCNC: 41 MG/DL (ref 5–25)
CALCIUM SERPL-MCNC: 7.9 MG/DL (ref 8.4–10.2)
CHLORIDE SERPL-SCNC: 92 MMOL/L (ref 96–108)
CO2 SERPL-SCNC: 28 MMOL/L (ref 21–32)
CREAT SERPL-MCNC: 3.62 MG/DL (ref 0.6–1.3)
EOSINOPHIL # BLD AUTO: 0.05 THOUSAND/ÂΜL (ref 0–0.61)
EOSINOPHIL NFR BLD AUTO: 1 % (ref 0–6)
ERYTHROCYTE [DISTWIDTH] IN BLOOD BY AUTOMATED COUNT: 18.6 % (ref 11.6–15.1)
GFR SERPL CREATININE-BSD FRML MDRD: 16 ML/MIN/1.73SQ M
GLUCOSE SERPL-MCNC: 130 MG/DL (ref 65–140)
GRAM STN SPEC: NORMAL
GRAM STN SPEC: NORMAL
HCT VFR BLD AUTO: 24.3 % (ref 36.5–49.3)
HGB BLD-MCNC: 7.7 G/DL (ref 12–17)
IMM GRANULOCYTES # BLD AUTO: 0.02 THOUSAND/UL (ref 0–0.2)
IMM GRANULOCYTES NFR BLD AUTO: 0 % (ref 0–2)
LYMPHOCYTES # BLD AUTO: 0.45 THOUSANDS/ÂΜL (ref 0.6–4.47)
LYMPHOCYTES NFR BLD AUTO: 10 % (ref 14–44)
MCH RBC QN AUTO: 27.1 PG (ref 26.8–34.3)
MCHC RBC AUTO-ENTMCNC: 31.7 G/DL (ref 31.4–37.4)
MCV RBC AUTO: 86 FL (ref 82–98)
MONOCYTES # BLD AUTO: 0.44 THOUSAND/ÂΜL (ref 0.17–1.22)
MONOCYTES NFR BLD AUTO: 10 % (ref 4–12)
NEUTROPHILS # BLD AUTO: 3.63 THOUSANDS/ÂΜL (ref 1.85–7.62)
NEUTS SEG NFR BLD AUTO: 79 % (ref 43–75)
NRBC BLD AUTO-RTO: 0 /100 WBCS
PLATELET # BLD AUTO: 34 THOUSANDS/UL (ref 149–390)
PMV BLD AUTO: 10.5 FL (ref 8.9–12.7)
POTASSIUM SERPL-SCNC: 4.3 MMOL/L (ref 3.5–5.3)
RBC # BLD AUTO: 2.84 MILLION/UL (ref 3.88–5.62)
SODIUM SERPL-SCNC: 127 MMOL/L (ref 135–147)
WBC # BLD AUTO: 4.6 THOUSAND/UL (ref 4.31–10.16)

## 2024-12-21 PROCEDURE — 80048 BASIC METABOLIC PNL TOTAL CA: CPT | Performed by: INTERNAL MEDICINE

## 2024-12-21 PROCEDURE — 83615 LACTATE (LD) (LDH) ENZYME: CPT | Performed by: INTERNAL MEDICINE

## 2024-12-21 PROCEDURE — 99232 SBSQ HOSP IP/OBS MODERATE 35: CPT | Performed by: INTERNAL MEDICINE

## 2024-12-21 PROCEDURE — 85025 COMPLETE CBC W/AUTO DIFF WBC: CPT | Performed by: INTERNAL MEDICINE

## 2024-12-21 PROCEDURE — 90935 HEMODIALYSIS ONE EVALUATION: CPT

## 2024-12-21 RX ADMIN — CARVEDILOL 12.5 MG: 12.5 TABLET, FILM COATED ORAL at 09:51

## 2024-12-21 RX ADMIN — ATORVASTATIN CALCIUM 20 MG: 20 TABLET, FILM COATED ORAL at 15:54

## 2024-12-21 RX ADMIN — HYDRALAZINE HYDROCHLORIDE 100 MG: 25 TABLET ORAL at 15:00

## 2024-12-21 RX ADMIN — FOLIC ACID 1 MG: 1 TABLET ORAL at 09:46

## 2024-12-21 RX ADMIN — CARVEDILOL 12.5 MG: 12.5 TABLET, FILM COATED ORAL at 15:54

## 2024-12-21 RX ADMIN — ASPIRIN 81 MG: 81 TABLET, COATED ORAL at 09:46

## 2024-12-21 RX ADMIN — ISOSORBIDE MONONITRATE 120 MG: 60 TABLET, EXTENDED RELEASE ORAL at 09:46

## 2024-12-21 RX ADMIN — HYDRALAZINE HYDROCHLORIDE 100 MG: 25 TABLET ORAL at 06:14

## 2024-12-21 RX ADMIN — Medication 3 MG: at 22:03

## 2024-12-21 RX ADMIN — HYDRALAZINE HYDROCHLORIDE 100 MG: 25 TABLET ORAL at 22:03

## 2024-12-21 RX ADMIN — TORSEMIDE 100 MG: 100 TABLET ORAL at 09:46

## 2024-12-21 NOTE — ASSESSMENT & PLAN NOTE
Hyponatremia likely related to significant volume overload.  Will continue to challenge weight as tolerated.  Maintain fluid restriction. Normalized sodium bath. Sodium 127 today 12/21, improved from 125 yesterday.  Repeat BMP tomorrow morning.

## 2024-12-21 NOTE — ASSESSMENT & PLAN NOTE
Chronic thrombocytopenia.  Platelets at 35,000.  Hold subcu heparin today.  No overt bleeding.  Continue to monitor hemoglobin and platelet count closely.

## 2024-12-21 NOTE — ASSESSMENT & PLAN NOTE
Overall weight has significantly improved since admission has lost nearly 14 kg (60.4<-73.9 kg).  Clinically however patient still appears significantly volume overloaded. Currently having HD for UF only with 4 L UF as tolerated.

## 2024-12-21 NOTE — PLAN OF CARE
Pre-tx:  UF only treatment, 3L x 3hrs as tolerated.  Pt hypertensive to start tx today.  Will monitor.  Pt started on 3K bath per last serum K of 4.4.  Labs drawn prior to HD per order.  HD permcath accessed without issue.      Post-Dialysis RN Treatment Note    Blood Pressure:  Pre 175/78 mm/Hg  Post  mmHg   EDW: TBD  kg    Weight:  Pre 56.5 kg   Post 53.2 kg   Mode of weight measurement: Bed Scale   Volume Removed:  3300 ml    Treatment duration: 3 hours    NS given:  No    Treatment shortened No   Medications given during Rx: Not Applicable   Estimated Kt/V:  Not Applicable   Access type: Permacath/TDC   Needle Gauge:    Access Issues: No    Report called to primary nurse:   Yes             Problem: METABOLIC, FLUID AND ELECTROLYTES - ADULT  Goal: Electrolytes maintained within normal limits  Description: INTERVENTIONS:  - Monitor labs and assess patient for signs and symptoms of electrolyte imbalances  - Administer electrolyte replacement as ordered  - Monitor response to electrolyte replacements, including repeat lab results as appropriate  - Instruct patient on fluid and nutrition as appropriate  Outcome: Progressing  Goal: Fluid balance maintained  Description: INTERVENTIONS:  - Monitor labs   - Monitor I/O and WT  - Instruct patient on fluid and nutrition as appropriate  - Assess for signs & symptoms of volume excess or deficit  Outcome: Progressing

## 2024-12-21 NOTE — PROGRESS NOTES
Progress Note - Nephrology   Name: Matthew Alvarez 69 y.o. male I MRN: [id number]  Unit/Bed#: -01 I Date of Admission: 12/14/2024   Date of Service: 12/21/2024 I Hospital Day: 7        HEMODIALYSIS PROCEDURE NOTE  The patient was seen and examined on hemodialysis.  Time: 3 hours  Sodium: 140 Blood flow: 250   Dialyzer: F160 Potassium: 2/3 Dialysate flow: UF only   Access: R PC Bicarbonate: 35 Ultrafiltration goal: 4L UF as tolerated   Medications on HD: none       Assessment & Plan  Acute hemodialysis patient (HCC)  Suspecting progression towards end-stage disease.  Previous baseline creatinine 2.0 (noted in February 2024 with an EGFR of 32) now with progression to requiring hemodialysis.  Likely related to cardiorenal syndrome.  Protein creatinine ratio subnephrotic at 0.6.  Urine microscopy fairly bland with 0-1 RBCs, 2-4 white cells.  Acute on chronic systolic heart failure (HCC)  Overall weight has significantly improved since admission has lost nearly 14 kg (60.4<-73.9 kg).  Clinically however patient still appears significantly volume overloaded. Currently having HD for UF only with 4 L UF as tolerated.   Pleural effusion  Status post thoracentesis on the right.  800 cc removed.  Consideration for left thoracentesis by primary service.  Again continuing to aggressively challenge weight as to try to prevent reoccurrence.  Nonischemic cardiomyopathy (HCC)  It is noted the patient has an ejection fraction of 20 - 25%.  Continue to challenge weight as tolerated.  Primary hypertension  Blood pressure currently appears acceptable.  Hopeful continued improvement with challenging weight/ultrafiltration.  Anemia of renal disease  Iron stores: Iron saturation 10% with ferritin of 109.  Representing component of iron deficiency given ESRD status.  Had Venofer 200 mg 3 times weekly with hemodialysis.  Hyponatremia  Hyponatremia likely related to significant volume overload.  Will continue to challenge weight as  tolerated.  Maintain fluid restriction. Normalized sodium bath. Sodium 127 today 12/21, improved from 125 yesterday.  Repeat BMP tomorrow morning.    Secondary hyperparathyroidism of renal origin (HCC)  Follow PTH as outpatient.  Calcium is 7.9 of 8.1, phosphorus 5.0 and albumin of 3.0.    Ambulatory dysfunction  PT/OT evaluation and follow-up  Coronary artery disease involving native coronary artery of native heart without angina pectoris      I have reviewed the nephrology recommendations including hemodialysis schedule and UF treatment, with hospitalist, and we are in agreement with renal plan including the information outlined above.     Subjective   Brief History of Admission - 69 year old male with DAVID now hemodialysis dependent with new HD start in November 2024, nonischemic cardiomyopathy with EF 20%, and uncontrolled HTN presented s/p fall when attempting to leave the bathroom.     Patient is seen and examined on hemodialysis today with UF only treatment for 3 hours with 4L UF as tolerated. Denies complaints, including hematuria, dysuria, nausea, vomiting, diarrhea, chest pain, shortness of breath, dizziness or headaches.  Next regular hemodialysis session will be Monday 12/23.      Objective :  Temp:  [98 °F (36.7 °C)-98.6 °F (37 °C)] 98.6 °F (37 °C)  HR:  [62-74] 66  BP: (124-186)/(7-102) 152/71  Resp:  [16-18] 16  SpO2:  [96 %-98 %] 97 %  O2 Device: None (Room air)    Current Weight: Weight - Scale: 60.4 kg (133 lb 2.5 oz) (pt did not want to stand up this AM)  First Weight: Weight - Scale: 73.9 kg (162 lb 14.7 oz) (Patient refused standing weight)  I/O         12/19 0701  12/20 0700 12/20 0701  12/21 0700 12/21 0701  12/22 0700    P.O. 240 320 480    I.V. (mL/kg) 470 (7.9) 500 (8.3)     Other       Total Intake(mL/kg) 710 (12) 820 (13.6) 480 (7.9)    Other 3000 4102     Stool 0      Total Output 3000 4102     Net -2290 -3282 +480           Unmeasured Stool Occurrence 1 x            Physical Exam  Vitals  and nursing note reviewed.   Constitutional:       General: He is not in acute distress.     Appearance: He is well-developed and normal weight. He is ill-appearing.   HENT:      Head: Normocephalic and atraumatic.      Right Ear: External ear normal.      Left Ear: External ear normal.      Nose: Nose normal.      Mouth/Throat:      Mouth: Mucous membranes are moist.      Pharynx: Oropharynx is clear.   Eyes:      Extraocular Movements: Extraocular movements intact.      Conjunctiva/sclera: Conjunctivae normal.      Pupils: Pupils are equal, round, and reactive to light.   Cardiovascular:      Rate and Rhythm: Normal rate and regular rhythm.      Heart sounds: Normal heart sounds. No murmur heard.  Pulmonary:      Effort: Pulmonary effort is normal. No respiratory distress.      Breath sounds: Normal breath sounds.      Comments: decreased  Abdominal:      General: Abdomen is flat.      Palpations: Abdomen is soft.      Tenderness: There is no abdominal tenderness.   Musculoskeletal:         General: No swelling.      Cervical back: Neck supple.      Right lower leg: Edema present.      Left lower leg: Edema present.      Comments: 3+ BLE pitting edema    Skin:     General: Skin is warm and dry.      Capillary Refill: Capillary refill takes less than 2 seconds.      Findings: Bruising present.      Comments: Left facial and chest ecchymosis, patient states he fell at home   Neurological:      General: No focal deficit present.      Mental Status: He is alert and oriented to person, place, and time.   Psychiatric:         Mood and Affect: Mood normal.         Behavior: Behavior normal.       Medications:    Current Facility-Administered Medications:     acetaminophen (TYLENOL) tablet 650 mg, 650 mg, Oral, Q4H PRN, David Meraz MD, 650 mg at 12/18/24 1203    aspirin (ECOTRIN LOW STRENGTH) EC tablet 81 mg, 81 mg, Oral, Daily, David Meraz MD, 81 mg at 12/20/24 1446    atorvastatin (LIPITOR) tablet 20  mg, 20 mg, Oral, Daily With Dinner, YUE Griffin, 20 mg at 12/20/24 1656    carvedilol (COREG) tablet 12.5 mg, 12.5 mg, Oral, BID With Meals, David Meraz MD, 12.5 mg at 12/20/24 1656    folic acid (FOLVITE) tablet 1 mg, 1 mg, Oral, Daily, Jensen Kelly DO, 1 mg at 12/20/24 1446    heparin (porcine) subcutaneous injection 5,000 Units, 5,000 Units, Subcutaneous, Q8H JEEVAN, David Meraz MD    hydrALAZINE (APRESOLINE) injection 10 mg, 10 mg, Intravenous, Q6H PRN, Chris Nolasco DO, 10 mg at 12/19/24 2004    hydrALAZINE (APRESOLINE) tablet 100 mg, 100 mg, Oral, Q8H JEEVAN, David Meraz MD, 100 mg at 12/21/24 0614    iron sucrose (VENOFER) 200 mg in sodium chloride 0.9 % 50 mL IVPB, 200 mg, Intravenous, Once per day on Monday Wednesday Friday, Leo Marshall DO, Last Rate: 50 mL/hr at 12/20/24 1239, 200 mg at 12/20/24 1239    isosorbide mononitrate (IMDUR) 24 hr tablet 120 mg, 120 mg, Oral, Daily, David Meraz MD, 120 mg at 12/20/24 1451    melatonin tablet 3 mg, 3 mg, Oral, HS, David Meraz MD, 3 mg at 12/20/24 2120    torsemide (DEMADEX) tablet 100 mg, 100 mg, Oral, Daily, Jensen Kelly DO, 100 mg at 12/20/24 1446      Lab Results: I have reviewed the following results:  Results from last 7 days   Lab Units 12/18/24  0842 12/17/24  1354 12/16/24  0448 12/15/24  0619 12/14/24  1624   WBC Thousand/uL 4.56 4.44 5.39 6.01 4.80   HEMOGLOBIN g/dL 8.9* 8.3* 8.4* 8.8* 9.6*   HEMATOCRIT % 28.4* 25.8* 26.8* 27.0* 29.7*   PLATELETS Thousands/uL 45* 52* 64* 82* 80*   POTASSIUM mmol/L  --  4.4 4.6 4.5 4.2   CHLORIDE mmol/L  --  92* 92* 89* 89*   CO2 mmol/L  --  27 26 27 27   BUN mg/dL  --  33* 41* 49* 44*   CREATININE mg/dL  --  4.09* 4.55* 5.70* 5.30*   CALCIUM mg/dL  --  8.1* 7.9* 8.4 8.4   MAGNESIUM mg/dL  --   --  2.0 2.0  --    ALBUMIN g/dL  --  3.0* 2.9*  --  3.2*       Administrative Statements     Portions of the record may have been created with voice  "recognition software. Occasional wrong word or \"sound a like\" substitutions may have occurred due to the inherent limitations of voice recognition software. Read the chart carefully and recognize, using context, where substitutions have occurred.If you have any questions, please contact the dictating provider.  "

## 2024-12-21 NOTE — PLAN OF CARE
Problem: CARDIOVASCULAR - ADULT  Goal: Maintains optimal cardiac output and hemodynamic stability  Description: INTERVENTIONS:  - Monitor I/O, vital signs and rhythm  - Monitor for S/S and trends of decreased cardiac output  - Administer and titrate ordered vasoactive medications to optimize hemodynamic stability  - Assess quality of pulses, skin color and temperature  - Assess for signs of decreased coronary artery perfusion  - Instruct patient to report change in severity of symptoms  Outcome: Progressing     Problem: RESPIRATORY - ADULT  Goal: Achieves optimal ventilation and oxygenation  Description: INTERVENTIONS:  - Assess for changes in respiratory status  - Assess for changes in mentation and behavior  - Position to facilitate oxygenation and minimize respiratory effort  - Oxygen administered by appropriate delivery if ordered  - Initiate smoking cessation education as indicated  - Encourage broncho-pulmonary hygiene including cough, deep breathe, Incentive Spirometry  - Assess the need for suctioning and aspirate as needed  - Assess and instruct to report SOB or any respiratory difficulty  - Respiratory Therapy support as indicated  Outcome: Progressing     Problem: METABOLIC, FLUID AND ELECTROLYTES - ADULT  Goal: Electrolytes maintained within normal limits  Description: INTERVENTIONS:  - Monitor labs and assess patient for signs and symptoms of electrolyte imbalances  - Administer electrolyte replacement as ordered  - Monitor response to electrolyte replacements, including repeat lab results as appropriate  - Instruct patient on fluid and nutrition as appropriate  Outcome: Progressing     Problem: MUSCULOSKELETAL - ADULT  Goal: Maintain or return mobility to safest level of function  Description: INTERVENTIONS:  - Assess patient's ability to carry out ADLs; assess patient's baseline for ADL function and identify physical deficits which impact ability to perform ADLs (bathing, care of mouth/teeth,  toileting, grooming, dressing, etc.)  - Assess/evaluate cause of self-care deficits   - Assess range of motion  - Assess patient's mobility  - Assess patient's need for assistive devices and provide as appropriate  - Encourage maximum independence but intervene and supervise when necessary  - Involve family in performance of ADLs  - Assess for home care needs following discharge   - Consider OT consult to assist with ADL evaluation and planning for discharge  - Provide patient education as appropriate  Outcome: Progressing     Problem: Nutrition/Hydration-ADULT  Goal: Nutrient/Hydration intake appropriate for improving, restoring or maintaining nutritional needs  Description: Monitor and assess patient's nutrition/hydration status for malnutrition. Collaborate with interdisciplinary team and initiate plan and interventions as ordered.  Monitor patient's weight and dietary intake as ordered or per policy. Utilize nutrition screening tool and intervene as necessary. Determine patient's food preferences and provide high-protein, high-caloric foods as appropriate.     INTERVENTIONS:  - Monitor oral intake, urinary output, labs, and treatment plans  - Assess nutrition and hydration status and recommend course of action  - Evaluate amount of meals eaten  - Assist patient with eating if necessary   - Allow adequate time for meals  - Recommend/ encourage appropriate diets, oral nutritional supplements, and vitamin/mineral supplements  - Order, calculate, and assess calorie counts as needed  - Recommend, monitor, and adjust tube feedings and TPN/PPN based on assessed needs  - Assess need for intravenous fluids  - Provide specific nutrition/hydration education as appropriate  - Include patient/family/caregiver in decisions related to nutrition  Outcome: Progressing

## 2024-12-21 NOTE — ASSESSMENT & PLAN NOTE
Dialysis, Monday, Wednesday, Friday.  Patient has been receiving hemodialysis daily with ultrafiltration from 1215 12/15-12/19.-nephrology closely following  An uric at baseline    Lab Results   Component Value Date    EGFR 16 12/21/2024    EGFR 13 12/17/2024    EGFR 12 12/16/2024    CREATININE 3.62 (H) 12/21/2024    CREATININE 4.09 (H) 12/17/2024    CREATININE 4.55 (H) 12/16/2024

## 2024-12-21 NOTE — PROGRESS NOTES
Progress Note - Hospitalist   Name: Matthew Alvarez 69 y.o. male I MRN: [id number]  Unit/Bed#: -01 I Date of Admission: 12/14/2024   Date of Service: 12/21/2024 I Hospital Day: 7    Assessment & Plan  Acute on chronic systolic heart failure (HCC)  Patient presented  on (12/14) after sustaining a mechanical fall 2 to 3 days ago.    Patient states that he missed dialysis on Wednesday and presented for regularly scheduled session on Friday where he was noted with significant bruising and referred to the ED  Evidence of severe volume overload with weight significantly up from dry weight  Notably, patient with gross noncompliance with outpatient medical regimen  Presented with a weight of 73.8 kg with dry weight of 60 kg.  Patient appears to be volume overloaded upon admission.  Has been requiring daily hemodialysis with ultrafiltration.    Repeat chest x-ray shows slightly diminished right effusion and ongoing left-sided effusion.  Will undergo HD with UF today-goal for 3 L removal  IR consulted for  left thoracentesis .  Patient underwent 800 mL of fluid removed from the right side.  Nephrology closely following  Cardiology consulted for concomitant management  Monitor for electrolyte abnormality.  Continue with ongoing volume control with dialysis.  Cardiology adjusting his dry weight.  Wt Readings from Last 3 Encounters:   12/21/24 60.4 kg (133 lb 2.5 oz)   11/20/24 55.2 kg (121 lb 12.8 oz)   09/03/24 55.3 kg (122 lb)     ESRD (end stage renal disease) on dialysis (HCC)  Dialysis, Monday, Wednesday, Friday.  Patient has been receiving hemodialysis daily with ultrafiltration from 1215 12/15-12/19.-nephrology closely following  An uric at baseline    Lab Results   Component Value Date    EGFR 16 12/21/2024    EGFR 13 12/17/2024    EGFR 12 12/16/2024    CREATININE 3.62 (H) 12/21/2024    CREATININE 4.09 (H) 12/17/2024    CREATININE 4.55 (H) 12/16/2024     Nonischemic cardiomyopathy (HCC)  November 11, 2024: Echo,  "EF of 20-24%  Noncompliant with all medications in the outpatient setting-unclear if taking any  Also, set up with LifeVest which patient states he wears \"as needed\"  GDMT limited by advanced end-stage kidney disease.  On GDMT with Coreg, Imdur, hydralazine.  Close outpatient cardiology follow-up on discharge  Maintain on telemetry monitor during hospitalization because of being on LifeVest.  Primary hypertension  Noncompliant with his home medications  Restarted oral hydralazine, Coreg  As needed intravenous hydralazine/labetalol as needed  Patient also significantly volume overloaded upon presentation which is likely a significant contributing factor.  Undergoing hemodialysis today.  Ambulatory dysfunction  Patient states he fell at home, has bruising on his face  Will consult PT/OT/case management  He states he sometimes walks with an assistive device  Will likely require short-term rehab  Patient adamantly refusing rehab.  Discussed with patient's wife at length over the phone.  Coronary artery disease involving native coronary artery of native heart without angina pectoris  Continue aspirin  Mild nonobstructive coronary artery disease per cardiac catheterization in 9/2021.  Resume statin therapy.  Hyponatremia  Volume control with dialysis today.  Acute hemodialysis patient (HCC)    Anemia of renal disease  Continue with IV iron 3 times weekly.  Currently no indication of blood transfusion.  Secondary hyperparathyroidism of renal origin ([facility])  Nephrology on board.  Follow-up phosphorus level  Pleural effusion  Bilateral pleural effusion not improving with dialysis treatment.  Underwent thoracentesis for the right side.  Repeat chest x-ray still shows left-sided effusion.  IR consulted for thoracentesis.  Plan for thoracentesis on 12/23 if chest x-ray shows ongoing left-sided effusion.  Thrombocytopenia (HCC)  Chronic thrombocytopenia.  Platelets at 35,000.  Hold subcu heparin today.  No overt bleeding.  " Continue to monitor hemoglobin and platelet count closely.    VTE Pharmacologic Prophylaxis: VTE Score: 5 High Risk (Score >/= 5) - Pharmacological DVT Prophylaxis Ordered: heparin. Sequential Compression Devices Ordered.    Mobility:   Basic Mobility Inpatient Raw Score: 16  JH-HLM Goal: 5: Stand one or more mins  JH-HLM Achieved: 6: Walk 10 steps or more  JH-HLM Goal achieved. Continue to encourage appropriate mobility.    Patient Centered Rounds: I performed bedside rounds with nursing staff today.   Discussions with Specialists or Other Care Team Provider: Discussed with nursing    Education and Discussions with Family / Patient: Patient declined call to .     Current Length of Stay: 7 day(s)  Current Patient Status: Inpatient   Certification Statement: The patient will continue to require additional inpatient hospital stay due to ongoing hemodialysis  Discharge Plan: Anticipate discharge in 48-72 hrs to home with home services.    Code Status: Level 1 - Full Code    Subjective   Patient seen and examined at bedside during hemodialysis.  Off supplemental oxygen.  Denies any worsening shortness of breath.  No acute events overnight.    Objective :  Temp:  [98 °F (36.7 °C)-98.6 °F (37 °C)] 98.1 °F (36.7 °C)  HR:  [62-73] 66  BP: (124-184)/() 174/72  Resp:  [16-18] 18  SpO2:  [96 %-98 %] 97 %  O2 Device: None (Room air)  Nasal Cannula O2 Flow Rate (L/min):  [2 L/min] 2 L/min    Body mass index is 21.49 kg/m².     Input and Output Summary (last 24 hours):     Intake/Output Summary (Last 24 hours) at 12/21/2024 1336  Last data filed at 12/21/2024 1250  Gross per 24 hour   Intake 1640 ml   Output 7847 ml   Net -6207 ml       Physical Exam  Constitutional:       General: He is not in acute distress.  HENT:      Head: Normocephalic.      Nose: Nose normal.   Eyes:      Extraocular Movements: Extraocular movements intact.      Pupils: Pupils are equal, round, and reactive to light.   Cardiovascular:       Rate and Rhythm: Normal rate and regular rhythm.   Pulmonary:      Comments: Diminished breath sounds bilateral  Abdominal:      General: Abdomen is flat.      Palpations: Abdomen is soft.   Musculoskeletal:      Cervical back: Neck supple.      Right lower leg: Edema present.      Left lower leg: Edema present.      Comments: Anasarca with bilateral lower extremity edema up to abdomen.   Neurological:      Mental Status: He is alert and oriented to person, place, and time. Mental status is at baseline.           Lines/Drains:  Lines/Drains/Airways       Active Status       Name Placement date Placement time Site Days    HD Permanent Double Catheter 11/14/24  1352  Internal jugular  36                      Telemetry:  Telemetry Orders (From admission, onward)               LifeVest Patient: Continuous Telemetry Monitoring during hospitalization (non-expiring)  Continuous LifeVest Telemetry Monitoring        References:    LifeVest Policy                     Telemetry Reviewed: Normal Sinus Rhythm  Indication for Continued Telemetry Use: Lifevest (remains on tele entire hospital stay)               Lab Results: I have reviewed the following results:   Results from last 7 days   Lab Units 12/21/24  0953   WBC Thousand/uL 4.60   HEMOGLOBIN g/dL 7.7*   HEMATOCRIT % 24.3*   PLATELETS Thousands/uL 34*   SEGS PCT % 79*   LYMPHO PCT % 10*   MONO PCT % 10   EOS PCT % 1     Results from last 7 days   Lab Units 12/21/24  0953 12/17/24  1354   SODIUM mmol/L 127* 125*   POTASSIUM mmol/L 4.3 4.4   CHLORIDE mmol/L 92* 92*   CO2 mmol/L 28 27   BUN mg/dL 41* 33*   CREATININE mg/dL 3.62* 4.09*   ANION GAP mmol/L 7 6   CALCIUM mg/dL 7.9* 8.1*   ALBUMIN g/dL  --  3.0*   TOTAL BILIRUBIN mg/dL  --  1.20*   ALK PHOS U/L  --  109*   ALT U/L  --  9   AST U/L  --  19   GLUCOSE RANDOM mg/dL 130 143*     Results from last 7 days   Lab Units 12/14/24  1624   INR  1.27*                   Recent Cultures (last 7 days):   Results from last 7  days   Lab Units 12/18/24  4484   GRAM STAIN RESULT  1+ Polys  No bacteria seen   BODY FLUID CULTURE, STERILE  No growth             Last 24 Hours Medication List:     Current Facility-Administered Medications:     acetaminophen (TYLENOL) tablet 650 mg, Q4H PRN    aspirin (ECOTRIN LOW STRENGTH) EC tablet 81 mg, Daily    atorvastatin (LIPITOR) tablet 20 mg, Daily With Dinner    carvedilol (COREG) tablet 12.5 mg, BID With Meals    folic acid (FOLVITE) tablet 1 mg, Daily    heparin (porcine) subcutaneous injection 5,000 Units, Q8H JEEVAN    hydrALAZINE (APRESOLINE) injection 10 mg, Q6H PRN    hydrALAZINE (APRESOLINE) tablet 100 mg, Q8H JEEVAN    iron sucrose (VENOFER) 200 mg in sodium chloride 0.9 % 50 mL IVPB, Once per day on Monday Wednesday Friday, Last Rate: 200 mg (12/20/24 1239)    isosorbide mononitrate (IMDUR) 24 hr tablet 120 mg, Daily    melatonin tablet 3 mg, HS    torsemide (DEMADEX) tablet 100 mg, Daily    Administrative Statements   Today, Patient Was Seen By: Shari Arceo MD      **Please Note: This note may have been constructed using a voice recognition system.**

## 2024-12-21 NOTE — ASSESSMENT & PLAN NOTE
Bilateral pleural effusion not improving with dialysis treatment.  Underwent thoracentesis for the right side.  Repeat chest x-ray still shows left-sided effusion.  IR consulted for thoracentesis.  Plan for thoracentesis on 12/23 if chest x-ray shows ongoing left-sided effusion.

## 2024-12-21 NOTE — ASSESSMENT & PLAN NOTE
Status post thoracentesis on the right.  800 cc removed.  Consideration for left thoracentesis by primary service.  Again continuing to aggressively challenge weight as to try to prevent reoccurrence.

## 2024-12-21 NOTE — ASSESSMENT & PLAN NOTE
Patient presented  on (12/14) after sustaining a mechanical fall 2 to 3 days ago.    Patient states that he missed dialysis on Wednesday and presented for regularly scheduled session on Friday where he was noted with significant bruising and referred to the ED  Evidence of severe volume overload with weight significantly up from dry weight  Notably, patient with gross noncompliance with outpatient medical regimen  Presented with a weight of 73.8 kg with dry weight of 60 kg.  Patient appears to be volume overloaded upon admission.  Has been requiring daily hemodialysis with ultrafiltration.    Repeat chest x-ray shows slightly diminished right effusion and ongoing left-sided effusion.  Will undergo HD with UF today-goal for 3 L removal  IR consulted for  left thoracentesis .  Patient underwent 800 mL of fluid removed from the right side.  Nephrology closely following  Cardiology consulted for concomitant management  Monitor for electrolyte abnormality.  Continue with ongoing volume control with dialysis.  Cardiology adjusting his dry weight.  Wt Readings from Last 3 Encounters:   12/21/24 60.4 kg (133 lb 2.5 oz)   11/20/24 55.2 kg (121 lb 12.8 oz)   09/03/24 55.3 kg (122 lb)

## 2024-12-21 NOTE — PLAN OF CARE
Problem: Prexisting or High Potential for Compromised Skin Integrity  Goal: Skin integrity is maintained or improved  Description: INTERVENTIONS:  - Identify patients at risk for skin breakdown  - Assess and monitor skin integrity  - Assess and monitor nutrition and hydration status  - Monitor labs   - Assess for incontinence   - Turn and reposition patient  - Assist with mobility/ambulation  - Relieve pressure over bony prominences  - Avoid friction and shearing  - Provide appropriate hygiene as needed including keeping skin clean and dry  - Evaluate need for skin moisturizer/barrier cream  - Collaborate with interdisciplinary team   - Patient/family teaching  - Consider wound care consult   12/21/2024 1820 by Arti Gomez RN  Outcome: Progressing  12/21/2024 1243 by Arti Gomez RN  Outcome: Progressing     Problem: CARDIOVASCULAR - ADULT  Goal: Maintains optimal cardiac output and hemodynamic stability  Description: INTERVENTIONS:  - Monitor I/O, vital signs and rhythm  - Monitor for S/S and trends of decreased cardiac output  - Administer and titrate ordered vasoactive medications to optimize hemodynamic stability  - Assess quality of pulses, skin color and temperature  - Assess for signs of decreased coronary artery perfusion  - Instruct patient to report change in severity of symptoms  12/21/2024 1820 by Arti Gomez RN  Outcome: Progressing  12/21/2024 1243 by Arti Gomez RN  Outcome: Progressing  Goal: Absence of cardiac dysrhythmias or at baseline rhythm  Description: INTERVENTIONS:  - Continuous cardiac monitoring, vital signs, obtain 12 lead EKG if ordered  - Administer antiarrhythmic and heart rate control medications as ordered  - Monitor electrolytes and administer replacement therapy as ordered  12/21/2024 1820 by Arti Gomez RN  Outcome: Progressing  12/21/2024 1243 by Arti Gomez RN  Outcome: Progressing     Problem: RESPIRATORY - ADULT  Goal: Achieves optimal  ventilation and oxygenation  Description: INTERVENTIONS:  - Assess for changes in respiratory status  - Assess for changes in mentation and behavior  - Position to facilitate oxygenation and minimize respiratory effort  - Oxygen administered by appropriate delivery if ordered  - Initiate smoking cessation education as indicated  - Encourage broncho-pulmonary hygiene including cough, deep breathe, Incentive Spirometry  - Assess the need for suctioning and aspirate as needed  - Assess and instruct to report SOB or any respiratory difficulty  - Respiratory Therapy support as indicated  12/21/2024 1820 by Arti Gomez RN  Outcome: Progressing  12/21/2024 1243 by Arti Gomez RN  Outcome: Progressing     Problem: METABOLIC, FLUID AND ELECTROLYTES - ADULT  Goal: Electrolytes maintained within normal limits  Description: INTERVENTIONS:  - Monitor labs and assess patient for signs and symptoms of electrolyte imbalances  - Administer electrolyte replacement as ordered  - Monitor response to electrolyte replacements, including repeat lab results as appropriate  - Instruct patient on fluid and nutrition as appropriate  12/21/2024 1820 by Arti Gomez RN  Outcome: Progressing  12/21/2024 1243 by Arti Gomez RN  Outcome: Progressing  Goal: Fluid balance maintained  Description: INTERVENTIONS:  - Monitor labs   - Monitor I/O and WT  - Instruct patient on fluid and nutrition as appropriate  - Assess for signs & symptoms of volume excess or deficit  12/21/2024 1820 by Arti Gomez RN  Outcome: Progressing  12/21/2024 1243 by Arti Gomez RN  Outcome: Progressing  Goal: Glucose maintained within target range  Description: INTERVENTIONS:  - Monitor Blood Glucose as ordered  - Assess for signs and symptoms of hyperglycemia and hypoglycemia  - Administer ordered medications to maintain glucose within target range  - Assess nutritional intake and initiate nutrition service referral as needed  12/21/2024  1820 by Arti Gomez RN  Outcome: Progressing  12/21/2024 1243 by Arti Gomez RN  Outcome: Progressing     Problem: SKIN/TISSUE INTEGRITY - ADULT  Goal: Skin Integrity remains intact(Skin Breakdown Prevention)  Description: Assess:  -Perform Omar assessment every     -Clean and moisturize skin every     -Inspect skin when repositioning, toileting, and assisting with ADLS  -Assess under medical devices such as    every     -Assess extremities for adequate circulation and sensation     Bed Management:  -Have minimal linens on bed & keep smooth, unwrinkled  -Change linens as needed when moist or perspiring  -Avoid sitting or lying in one position for more than    hours while in bed  -Keep HOB at   degrees     Toileting:  -Offer bedside commode  -Assess for incontinence every     -Use incontinent care products after each incontinent episode such as       Activity:  -Mobilize patient    times a day  -Encourage activity and walks on unit  -Encourage or provide ROM exercises   -Turn and reposition patient every    Hours  -Use appropriate equipment to lift or move patient in bed  -Instruct/ Assist with weight shifting every    when out of bed in chair  -Consider limitation of chair time    hour intervals    Skin Care:  -Avoid use of baby powder, tape, friction and shearing, hot water or constrictive clothing  -Relieve pressure over bony prominences using     -Do not massage red bony areas    Next Steps:  -Teach patient strategies to minimize risks such as      -Consider consults to  interdisciplinary teams such as     12/21/2024 1820 by Arti Gomez RN  Outcome: Progressing  12/21/2024 1243 by Arti Gomez RN  Outcome: Progressing  Goal: Incision(s), wounds(s) or drain site(s) healing without S/S of infection  Description: INTERVENTIONS  - Assess and document dressing, incision, wound bed, drain sites and surrounding tissue  - Provide patient and family education  - Perform skin care/dressing changes  every     12/21/2024 1820 by Arti Gomez RN  Outcome: Progressing  12/21/2024 1243 by Arti Gomez RN  Outcome: Progressing  Goal: Pressure injury heals and does not worsen  Description: Interventions:  - Implement low air loss mattress or specialty surface (Criteria met)  - Apply silicone foam dressing  - Instruct/assist with weight shifting every    minutes when in chair   - Limit chair time to    hour intervals  - Use special pressure reducing interventions such as    when in chair   - Apply fecal or urinary incontinence containment device   - Perform passive or active ROM every     - Turn and reposition patient & offload bony prominences every    hours   - Utilize friction reducing device or surface for transfers   - Consider consults to  interdisciplinary teams such as     - Use incontinent care products after each incontinent episode such as           - Consider nutrition services referral as needed  12/21/2024 1820 by Arti Gomez RN  Outcome: Progressing  12/21/2024 1243 by Arti Gomez RN  Outcome: Progressing     Problem: MUSCULOSKELETAL - ADULT  Goal: Maintain or return mobility to safest level of function  Description: INTERVENTIONS:  - Assess patient's ability to carry out ADLs; assess patient's baseline for ADL function and identify physical deficits which impact ability to perform ADLs (bathing, care of mouth/teeth, toileting, grooming, dressing, etc.)  - Assess/evaluate cause of self-care deficits   - Assess range of motion  - Assess patient's mobility  - Assess patient's need for assistive devices and provide as appropriate  - Encourage maximum independence but intervene and supervise when necessary  - Involve family in performance of ADLs  - Assess for home care needs following discharge   - Consider OT consult to assist with ADL evaluation and planning for discharge  - Provide patient education as appropriate  12/21/2024 1820 by Arti Gomez RN  Outcome:  Progressing  12/21/2024 1243 by Arti Gomez RN  Outcome: Progressing  Goal: Maintain proper alignment of affected body part  Description: INTERVENTIONS:  - Support, maintain and protect limb and body alignment  - Provide patient/ family with appropriate education  12/21/2024 1820 by Arti Gomez RN  Outcome: Progressing  12/21/2024 1243 by Arti Gomez RN  Outcome: Progressing     Problem: Nutrition/Hydration-ADULT  Goal: Nutrient/Hydration intake appropriate for improving, restoring or maintaining nutritional needs  Description: Monitor and assess patient's nutrition/hydration status for malnutrition. Collaborate with interdisciplinary team and initiate plan and interventions as ordered.  Monitor patient's weight and dietary intake as ordered or per policy. Utilize nutrition screening tool and intervene as necessary. Determine patient's food preferences and provide high-protein, high-caloric foods as appropriate.     INTERVENTIONS:  - Monitor oral intake, urinary output, labs, and treatment plans  - Assess nutrition and hydration status and recommend course of action  - Evaluate amount of meals eaten  - Assist patient with eating if necessary   - Allow adequate time for meals  - Recommend/ encourage appropriate diets, oral nutritional supplements, and vitamin/mineral supplements  - Order, calculate, and assess calorie counts as needed  - Recommend, monitor, and adjust tube feedings and TPN/PPN based on assessed needs  - Assess need for intravenous fluids  - Provide specific nutrition/hydration education as appropriate  - Include patient/family/caregiver in decisions related to nutrition  12/21/2024 1820 by Arti Gomez RN  Outcome: Progressing  12/21/2024 1243 by Arti Gomez RN  Outcome: Progressing

## 2024-12-22 ENCOUNTER — APPOINTMENT (INPATIENT)
Dept: DIALYSIS | Facility: HOSPITAL | Age: 69
DRG: 291 | End: 2024-12-22
Payer: COMMERCIAL

## 2024-12-22 LAB
BASOPHILS # BLD AUTO: 0.03 THOUSANDS/ÂΜL (ref 0–0.1)
BASOPHILS NFR BLD AUTO: 1 % (ref 0–1)
EOSINOPHIL # BLD AUTO: 0.04 THOUSAND/ÂΜL (ref 0–0.61)
EOSINOPHIL NFR BLD AUTO: 1 % (ref 0–6)
ERYTHROCYTE [DISTWIDTH] IN BLOOD BY AUTOMATED COUNT: 18.6 % (ref 11.6–15.1)
HCT VFR BLD AUTO: 24.4 % (ref 36.5–49.3)
HGB BLD-MCNC: 7.9 G/DL (ref 12–17)
IMM GRANULOCYTES # BLD AUTO: 0.02 THOUSAND/UL (ref 0–0.2)
IMM GRANULOCYTES NFR BLD AUTO: 0 % (ref 0–2)
LYMPHOCYTES # BLD AUTO: 0.53 THOUSANDS/ÂΜL (ref 0.6–4.47)
LYMPHOCYTES NFR BLD AUTO: 11 % (ref 14–44)
MCH RBC QN AUTO: 27.1 PG (ref 26.8–34.3)
MCHC RBC AUTO-ENTMCNC: 32.4 G/DL (ref 31.4–37.4)
MCV RBC AUTO: 84 FL (ref 82–98)
MONOCYTES # BLD AUTO: 0.31 THOUSAND/ÂΜL (ref 0.17–1.22)
MONOCYTES NFR BLD AUTO: 6 % (ref 4–12)
NEUTROPHILS # BLD AUTO: 3.9 THOUSANDS/ÂΜL (ref 1.85–7.62)
NEUTS SEG NFR BLD AUTO: 81 % (ref 43–75)
NRBC BLD AUTO-RTO: 0 /100 WBCS
PLATELET # BLD AUTO: 47 THOUSANDS/UL (ref 149–390)
PMV BLD AUTO: 11.4 FL (ref 8.9–12.7)
RBC # BLD AUTO: 2.92 MILLION/UL (ref 3.88–5.62)
WBC # BLD AUTO: 4.83 THOUSAND/UL (ref 4.31–10.16)

## 2024-12-22 PROCEDURE — 85025 COMPLETE CBC W/AUTO DIFF WBC: CPT | Performed by: INTERNAL MEDICINE

## 2024-12-22 PROCEDURE — 99232 SBSQ HOSP IP/OBS MODERATE 35: CPT | Performed by: INTERNAL MEDICINE

## 2024-12-22 PROCEDURE — 85007 BL SMEAR W/DIFF WBC COUNT: CPT | Performed by: INTERNAL MEDICINE

## 2024-12-22 PROCEDURE — 90935 HEMODIALYSIS ONE EVALUATION: CPT

## 2024-12-22 RX ADMIN — HYDRALAZINE HYDROCHLORIDE 100 MG: 25 TABLET ORAL at 15:00

## 2024-12-22 RX ADMIN — CARVEDILOL 12.5 MG: 12.5 TABLET, FILM COATED ORAL at 16:34

## 2024-12-22 RX ADMIN — CARVEDILOL 12.5 MG: 12.5 TABLET, FILM COATED ORAL at 08:30

## 2024-12-22 RX ADMIN — ISOSORBIDE MONONITRATE 120 MG: 60 TABLET, EXTENDED RELEASE ORAL at 08:30

## 2024-12-22 RX ADMIN — FOLIC ACID 1 MG: 1 TABLET ORAL at 08:30

## 2024-12-22 RX ADMIN — ATORVASTATIN CALCIUM 20 MG: 20 TABLET, FILM COATED ORAL at 16:34

## 2024-12-22 RX ADMIN — TORSEMIDE 100 MG: 100 TABLET ORAL at 08:31

## 2024-12-22 RX ADMIN — HYDRALAZINE HYDROCHLORIDE 100 MG: 25 TABLET ORAL at 21:02

## 2024-12-22 RX ADMIN — ASPIRIN 81 MG: 81 TABLET, COATED ORAL at 08:31

## 2024-12-22 RX ADMIN — Medication 3 MG: at 21:02

## 2024-12-22 RX ADMIN — HYDRALAZINE HYDROCHLORIDE 100 MG: 25 TABLET ORAL at 06:26

## 2024-12-22 NOTE — ASSESSMENT & PLAN NOTE
Sodium 127 mmol/L, 12/22. Improved from 125 mmol/L, 12/21. Hyponatremia likely related to significant volume overload.  Will continue to challenge weight as tolerated.  Maintain fluid restriction. Normalized sodium bath. Repeat BMP tomorrow morning.

## 2024-12-22 NOTE — PROGRESS NOTES
Progress Note - Hospitalist   Name: Matthew Alvarez 69 y.o. male I MRN: [id number]  Unit/Bed#: -01 I Date of Admission: 12/14/2024   Date of Service: 12/22/2024 I Hospital Day: 8    Assessment & Plan  Acute on chronic systolic heart failure (HCC)  Patient presented  on (12/14) after sustaining a mechanical fall 2 to 3 days ago.    Patient states that he missed dialysis on Wednesday and presented for regularly scheduled session on Friday where he was noted with significant bruising and referred to the ED  Evidence of severe volume overload with weight significantly up from dry weight  Notably, patient with gross noncompliance with outpatient medical regimen  Presented with a weight of 73.8 kg with dry weight of 60 kg.  Patient appears to be volume overloaded upon admission.  Has been requiring daily hemodialysis with ultrafiltration.    Repeat chest x-ray shows slightly diminished right effusion and ongoing left-sided effusion.  Will undergo HD with UF today-goal for 3 L removal  IR consulted for  left thoracentesis .  Patient underwent 800 mL of fluid removed from the right side.  Repeat chest x-ray in a.m. and still has left-sided effusion, will need thoracentesis of the left side.  Nephrology closely following  Cardiology consulted for concomitant management  Monitor for electrolyte abnormality.  Continue with ongoing volume control with dialysis.  Cardiology adjusting his dry weight.  Wt Readings from Last 3 Encounters:   12/22/24 56 kg (123 lb 6.4 oz)   11/20/24 55.2 kg (121 lb 12.8 oz)   09/03/24 55.3 kg (122 lb)     ESRD (end stage renal disease) on dialysis (HCC)  Dialysis, Monday, Wednesday, Friday.  Patient has been receiving hemodialysis daily with ultrafiltration from 1215 12/15-12/19.-nephrology closely following  An uric at baseline    Lab Results   Component Value Date    EGFR 16 12/21/2024    EGFR 13 12/17/2024    EGFR 12 12/16/2024    CREATININE 3.62 (H) 12/21/2024    CREATININE 4.09 (H)  "12/17/2024    CREATININE 4.55 (H) 12/16/2024     Nonischemic cardiomyopathy (HCC)  November 11, 2024: Echo, EF of 20-24%  Noncompliant with all medications in the outpatient setting-unclear if taking any  Also, set up with LifeVest which patient states he wears \"as needed\"  GDMT limited by advanced end-stage kidney disease.  On GDMT with Coreg, Imdur, hydralazine.  Close outpatient cardiology follow-up on discharge  Maintain on telemetry monitor during hospitalization because of being on LifeVest.  Primary hypertension  Noncompliant with his home medications  Restarted oral hydralazine, Coreg  As needed intravenous hydralazine/labetalol as needed  Patient also significantly volume overloaded upon presentation which is likely a significant contributing factor.  Undergoing hemodialysis today.  Ambulatory dysfunction  Patient states he fell at home, has bruising on his face  Will consult PT/OT/case management  He states he sometimes walks with an assistive device  Will likely require short-term rehab  Patient adamantly refusing rehab.  Discussed with patient's wife at length over the phone.  Coronary artery disease involving native coronary artery of native heart without angina pectoris  Continue aspirin  Mild nonobstructive coronary artery disease per cardiac catheterization in 9/2021.  Resume statin therapy.  Hyponatremia  Volume control with dialysis today.  Acute hemodialysis patient (formerly [facility])    Anemia of renal disease  Continue with IV iron 3 times weekly.  Currently no indication of blood transfusion.  Secondary hyperparathyroidism of renal origin (formerly [facility])  Nephrology on board.  Follow-up phosphorus level  Pleural effusion  Bilateral pleural effusion not improving with dialysis treatment.  Underwent thoracentesis for the right side.  Repeat chest x-ray still shows left-sided effusion.  IR consulted for thoracentesis.  Plan for thoracentesis on 12/23 if chest x-ray shows ongoing left-sided effusion.  Thrombocytopenia " (HCC)  Chronic thrombocytopenia.  Platelets at 35,000.  Hold subcu heparin today.  No overt bleeding.  Continue to monitor hemoglobin and platelet count closely.    VTE Pharmacologic Prophylaxis: VTE Score: 5 High Risk (Score >/= 5) - Pharmacological DVT Prophylaxis Ordered: heparin. Sequential Compression Devices Ordered.    Mobility:   Basic Mobility Inpatient Raw Score: 16  JH-HLM Goal: 5: Stand one or more mins  JH-HLM Achieved: 3: Sit at edge of bed  JH-HLM Goal achieved. Continue to encourage appropriate mobility.    Patient Centered Rounds: I performed bedside rounds with nursing staff today.   Discussions with Specialists or Other Care Team Provider: Discussed with dialysis nurse    Education and Discussions with Family / Patient: Patient declined call to .     Current Length of Stay: 8 day(s)  Current Patient Status: Inpatient   Certification Statement: The patient will continue to require additional inpatient hospital stay due to ongoing hemodialysis as noted above  Discharge Plan: Anticipate discharge in 48-72 hrs to home with home services.    Code Status: Level 1 - Full Code    Subjective   Seen and examined at bedside.  Feels tired.  Denies any shortness of breath.  No acute events overnight.    Objective :  Temp:  [98.1 °F (36.7 °C)-98.2 °F (36.8 °C)] 98.2 °F (36.8 °C)  HR:  [62-85] 85  BP: (148-180)/() 171/91  Resp:  [18] 18  SpO2:  [97 %-98 %] 97 %    Body mass index is 19.92 kg/m².     Input and Output Summary (last 24 hours):     Intake/Output Summary (Last 24 hours) at 12/22/2024 1104  Last data filed at 12/22/2024 0853  Gross per 24 hour   Intake 1041 ml   Output 3745 ml   Net -2704 ml       Physical Exam  Constitutional:       Appearance: He is ill-appearing.   HENT:      Head: Normocephalic and atraumatic.      Mouth/Throat:      Mouth: Mucous membranes are moist.   Eyes:      Pupils: Pupils are equal, round, and reactive to light.   Cardiovascular:      Rate and Rhythm:  Normal rate.   Pulmonary:      Effort: Pulmonary effort is normal.   Abdominal:      General: Abdomen is flat. Bowel sounds are normal.      Palpations: Abdomen is soft.   Musculoskeletal:      Cervical back: Neck supple.      Right lower leg: Edema present.      Left lower leg: Edema present.      Comments: Bilateral 3+ edema up to the thigh   Skin:     Coloration: Skin is pale.   Neurological:      Mental Status: He is alert. Mental status is at baseline.           Lines/Drains:  Lines/Drains/Airways       Active Status       Name Placement date Placement time Site Days    HD Permanent Double Catheter 11/14/24  1352  Internal jugular  37                      Telemetry:  Telemetry Orders (From admission, onward)               LifeVest Patient: Continuous Telemetry Monitoring during hospitalization (non-expiring)  Continuous LifeVest Telemetry Monitoring        References:    LifeVest Policy                     Telemetry Reviewed: Normal Sinus Rhythm  Indication for Continued Telemetry Use: Lifevest (remains on tele entire hospital stay)               Lab Results: I have reviewed the following results:   Results from last 7 days   Lab Units 12/22/24  0901   WBC Thousand/uL 4.83   HEMOGLOBIN g/dL 7.9*   HEMATOCRIT % 24.4*   PLATELETS Thousands/uL 47*   SEGS PCT % 81*   LYMPHO PCT % 11*   MONO PCT % 6   EOS PCT % 1     Results from last 7 days   Lab Units 12/21/24  0953 12/17/24  1354   SODIUM mmol/L 127* 125*   POTASSIUM mmol/L 4.3 4.4   CHLORIDE mmol/L 92* 92*   CO2 mmol/L 28 27   BUN mg/dL 41* 33*   CREATININE mg/dL 3.62* 4.09*   ANION GAP mmol/L 7 6   CALCIUM mg/dL 7.9* 8.1*   ALBUMIN g/dL  --  3.0*   TOTAL BILIRUBIN mg/dL  --  1.20*   ALK PHOS U/L  --  109*   ALT U/L  --  9   AST U/L  --  19   GLUCOSE RANDOM mg/dL 130 143*                       Recent Cultures (last 7 days):   Results from last 7 days   Lab Units 12/18/24  1544   GRAM STAIN RESULT  1+ Polys  No bacteria seen   BODY FLUID CULTURE, STERILE  No  growth             Last 24 Hours Medication List:     Current Facility-Administered Medications:     acetaminophen (TYLENOL) tablet 650 mg, Q4H PRN    aspirin (ECOTRIN LOW STRENGTH) EC tablet 81 mg, Daily    atorvastatin (LIPITOR) tablet 20 mg, Daily With Dinner    carvedilol (COREG) tablet 12.5 mg, BID With Meals    folic acid (FOLVITE) tablet 1 mg, Daily    heparin (porcine) subcutaneous injection 5,000 Units, Q8H JEEVAN    hydrALAZINE (APRESOLINE) injection 10 mg, Q6H PRN    hydrALAZINE (APRESOLINE) tablet 100 mg, Q8H JEEVAN    iron sucrose (VENOFER) 200 mg in sodium chloride 0.9 % 50 mL IVPB, Once per day on Monday Wednesday Friday, Last Rate: 200 mg (12/20/24 1239)    isosorbide mononitrate (IMDUR) 24 hr tablet 120 mg, Daily    melatonin tablet 3 mg, HS    torsemide (DEMADEX) tablet 100 mg, Daily    Administrative Statements   Today, Patient Was Seen By: Shari Arceo MD      **Please Note: This note may have been constructed using a voice recognition system.**

## 2024-12-22 NOTE — ASSESSMENT & PLAN NOTE
Patient presented  on (12/14) after sustaining a mechanical fall 2 to 3 days ago.    Patient states that he missed dialysis on Wednesday and presented for regularly scheduled session on Friday where he was noted with significant bruising and referred to the ED  Evidence of severe volume overload with weight significantly up from dry weight  Notably, patient with gross noncompliance with outpatient medical regimen  Presented with a weight of 73.8 kg with dry weight of 60 kg.  Patient appears to be volume overloaded upon admission.  Has been requiring daily hemodialysis with ultrafiltration.    Repeat chest x-ray shows slightly diminished right effusion and ongoing left-sided effusion.  Will undergo HD with UF today-goal for 3 L removal  IR consulted for  left thoracentesis .  Patient underwent 800 mL of fluid removed from the right side.  Repeat chest x-ray in a.m. and still has left-sided effusion, will need thoracentesis of the left side.  Nephrology closely following  Cardiology consulted for concomitant management  Monitor for electrolyte abnormality.  Continue with ongoing volume control with dialysis.  Cardiology adjusting his dry weight.  Wt Readings from Last 3 Encounters:   12/22/24 56 kg (123 lb 6.4 oz)   11/20/24 55.2 kg (121 lb 12.8 oz)   09/03/24 55.3 kg (122 lb)

## 2024-12-22 NOTE — PROGRESS NOTES
Progress Note - Nephrology   Name: Matthew Alvarez 69 y.o. male I MRN: [id number]  Unit/Bed#: -01 I Date of Admission: 12/14/2024   Date of Service: 12/22/2024 I Hospital Day: 8      HEMODIALYSIS PROCEDURE NOTE  The patient was seen and examined on hemodialysis.  Time: 3.5 hours  Sodium: 140 Blood flow: 375   Dialyzer: F160 Potassium: 3 Dialysate flow: 600   Access: R PC Bicarbonate: 35 Ultrafiltration goal: 3-3.5 L as tolerated   Medications on HD: none      Assessment & Plan  Acute hemodialysis patient (HCC)  Suspecting progression towards end-stage disease.  Previous baseline creatinine 2.0 (noted in February 2024 with an EGFR of 32) now with progression to requiring hemodialysis.  Likely related to cardiorenal syndrome.  Protein creatinine ratio subnephrotic at 0.6.  Urine microscopy fairly bland with 0-1 RBCs, 2-4 white cells.  Acute on chronic systolic heart failure (HCC)  Overall weight has significantly improved since admission has lost nearly 18 kg (56<-73.9 kg).  Clinically however patient still appears significantly volume overloaded. Most recent hemodialysis was extra tx on for UF only on 12/21 for 3.3L. Has been Monday, Wed, Friday hemodialysis schedule; will dialyze Sun, Tues, Friday on holiday schedule this week.   Pleural effusion  Status post thoracentesis on the right.  800 cc removed.  Consideration for left thoracentesis by primary service.  Again continuing to aggressively challenge weight as to try to prevent reoccurrence.  Nonischemic cardiomyopathy (HCC)  It is noted the patient has an ejection fraction of 20 - 25%.  Continue to challenge weight as tolerated.  Primary hypertension  Blood pressure currently appears acceptable. -180 12/22.  Hopeful continued improvement with challenging weight/ultrafiltration.  Anemia of renal disease  Iron stores: Iron saturation 10% with ferritin of 109.  Representing component of iron deficiency given ESRD status.  Had Venofer 200 mg 3 times  weekly with hemodialysis.  Hyponatremia  Sodium 127 mmol/L, 12/22. Improved from 125 mmol/L, 12/21. Hyponatremia likely related to significant volume overload.  Will continue to challenge weight as tolerated.  Maintain fluid restriction. Normalized sodium bath. Repeat BMP tomorrow morning.    Secondary hyperparathyroidism of renal origin (HCC)  Follow PTH as outpatient.  Calcium is 7.9, phosphorus 5.0 and albumin of 3.0.    Ambulatory dysfunction  PT/OT evaluation and follow-up  Coronary artery disease involving native coronary artery of native heart without angina pectoris    Thrombocytopenia (HCC)      I have reviewed the nephrology recommendations including hemodialysis schedule and hyponatremia with hospitalist, and we are in agreement with renal plan including the information outlined above.     Subjective   Brief History of Admission - 69 year old male with DAVID now hemodialysis dependent with new HD start in November 2024, nonischemic cardiomyopathy with EF 20%, and uncontrolled HTN presented s/p fall when attempting to leave the bathroom.      Patient is seen and examined on hemodialysis today with UF only treatment for 3.5 hours with 3.7 L UF as tolerated. Denies complaints, including hematuria, dysuria, nausea, vomiting, diarrhea, chest pain, shortness of breath, dizziness or headaches.  Next regular hemodialysis session will be Tues 12/24 for holiday schedule with Sun, Tues, Friday (for usual MWF schedule).     Objective :  Temp:  [98.1 °F (36.7 °C)-98.2 °F (36.8 °C)] 98.2 °F (36.8 °C)  HR:  [62-85] 85  BP: (148-181)/() 173/90  Resp:  [18] 18  SpO2:  [97 %-98 %] 97 %    Current Weight: Weight - Scale: 56 kg (123 lb 6.4 oz)  First Weight: Weight - Scale: 73.9 kg (162 lb 14.7 oz) (Patient refused standing weight)  I/O         12/20 0701  12/21 0700 12/21 0701  12/22 0700 12/22 0701 12/23 0700    P.O. 320 1181     I.V. (mL/kg) 500 (8.3) 500 (8.9) 200 (3.6)    Total Intake(mL/kg) 820 (13.6) 1681 (30)  200 (3.6)    Other 4102 3745     Stool       Total Output 4102 3745     Net -3112 -2064 +200                 Physical Exam  Vitals and nursing note reviewed.   Constitutional:       General: He is not in acute distress.     Appearance: He is well-developed. He is ill-appearing.   HENT:      Head: Normocephalic and atraumatic.      Right Ear: External ear normal.      Left Ear: External ear normal.      Nose: Nose normal.      Mouth/Throat:      Mouth: Mucous membranes are moist.      Pharynx: Oropharynx is clear.   Eyes:      Extraocular Movements: Extraocular movements intact.      Conjunctiva/sclera: Conjunctivae normal.      Pupils: Pupils are equal, round, and reactive to light.   Cardiovascular:      Rate and Rhythm: Normal rate and regular rhythm.      Heart sounds: Normal heart sounds. No murmur heard.  Pulmonary:      Effort: Pulmonary effort is normal. No respiratory distress.      Breath sounds: Normal breath sounds.   Abdominal:      Palpations: Abdomen is soft.      Tenderness: There is no abdominal tenderness.   Musculoskeletal:         General: No swelling.      Cervical back: Neck supple.      Right lower leg: Edema present.      Left lower leg: Edema present.      Comments: 1+ BLE Edema   Skin:     General: Skin is warm and dry.      Capillary Refill: Capillary refill takes less than 2 seconds.      Findings: Bruising present.      Comments: Facial and chest bruising from fall at home.   Neurological:      General: No focal deficit present.      Mental Status: He is alert and oriented to person, place, and time.   Psychiatric:         Mood and Affect: Mood normal.       Medications:    Current Facility-Administered Medications:     acetaminophen (TYLENOL) tablet 650 mg, 650 mg, Oral, Q4H PRN, David Meraz MD, 650 mg at 12/18/24 1203    aspirin (ECOTRIN LOW STRENGTH) EC tablet 81 mg, 81 mg, Oral, Daily, David Meraz MD, 81 mg at 12/22/24 0831    atorvastatin (LIPITOR) tablet 20 mg, 20  mg, Oral, Daily With Dinner, YUE Griffin, 20 mg at 12/21/24 1554    carvedilol (COREG) tablet 12.5 mg, 12.5 mg, Oral, BID With Meals, David Meraz MD, 12.5 mg at 12/22/24 0830    folic acid (FOLVITE) tablet 1 mg, 1 mg, Oral, Daily, Jensen Kelly DO, 1 mg at 12/22/24 0830    [Held by provider] heparin (porcine) subcutaneous injection 5,000 Units, 5,000 Units, Subcutaneous, Q8H [facility], David Meraz MD    hydrALAZINE (APRESOLINE) injection 10 mg, 10 mg, Intravenous, Q6H PRN, Chris Nolasco DO, 10 mg at 12/19/24 2004    hydrALAZINE (APRESOLINE) tablet 100 mg, 100 mg, Oral, Q8H JEEVAN, David Meraz MD, 100 mg at 12/22/24 0626    iron sucrose (VENOFER) 200 mg in sodium chloride 0.9 % 50 mL IVPB, 200 mg, Intravenous, Once per day on Monday Wednesday Friday, Leo Marshall DO, Last Rate: 50 mL/hr at 12/20/24 1239, 200 mg at 12/20/24 1239    isosorbide mononitrate (IMDUR) 24 hr tablet 120 mg, 120 mg, Oral, Daily, David Meraz MD, 120 mg at 12/22/24 0830    melatonin tablet 3 mg, 3 mg, Oral, HS, David Meraz MD, 3 mg at 12/21/24 2203    torsemide (DEMADEX) tablet 100 mg, 100 mg, Oral, Daily, Jensen Kelly DO, 100 mg at 12/22/24 0831      Lab Results: I have reviewed the following results:  Results from last 7 days   Lab Units 12/22/24  0901 12/21/24  0953 12/18/24  0842 12/17/24  1354 12/16/24  0448   WBC Thousand/uL 4.83 4.60 4.56 4.44 5.39   HEMOGLOBIN g/dL 7.9* 7.7* 8.9* 8.3* 8.4*   HEMATOCRIT % 24.4* 24.3* 28.4* 25.8* 26.8*   PLATELETS Thousands/uL 47* 34* 45* 52* 64*   POTASSIUM mmol/L  --  4.3  --  4.4 4.6   CHLORIDE mmol/L  --  92*  --  92* 92*   CO2 mmol/L  --  28  --  27 26   BUN mg/dL  --  41*  --  33* 41*   CREATININE mg/dL  --  3.62*  --  4.09* 4.55*   CALCIUM mg/dL  --  7.9*  --  8.1* 7.9*   MAGNESIUM mg/dL  --   --   --   --  2.0   ALBUMIN g/dL  --   --   --  3.0* 2.9*       Administrative Statements     Portions of the record may have been created  "with voice recognition software. Occasional wrong word or \"sound a like\" substitutions may have occurred due to the inherent limitations of voice recognition software. Read the chart carefully and recognize, using context, where substitutions have occurred.If you have any questions, please contact the dictating provider.  "

## 2024-12-22 NOTE — ASSESSMENT & PLAN NOTE
Overall weight has significantly improved since admission has lost nearly 18 kg (56<-73.9 kg).  Clinically however patient still appears significantly volume overloaded. Most recent hemodialysis was extra tx on for UF only on 12/21 for 3.3L. Has been Monday, Wed, Friday hemodialysis schedule; will dialyze Sun, Tues, Friday on holiday schedule this week.

## 2024-12-22 NOTE — PLAN OF CARE
Problem: CARDIOVASCULAR - ADULT  Goal: Maintains optimal cardiac output and hemodynamic stability  Description: INTERVENTIONS:  - Monitor I/O, vital signs and rhythm  - Monitor for S/S and trends of decreased cardiac output  - Administer and titrate ordered vasoactive medications to optimize hemodynamic stability  - Assess quality of pulses, skin color and temperature  - Assess for signs of decreased coronary artery perfusion  - Instruct patient to report change in severity of symptoms  Outcome: Progressing     Problem: CARDIOVASCULAR - ADULT  Goal: Absence of cardiac dysrhythmias or at baseline rhythm  Description: INTERVENTIONS:  - Continuous cardiac monitoring, vital signs, obtain 12 lead EKG if ordered  - Administer antiarrhythmic and heart rate control medications as ordered  - Monitor electrolytes and administer replacement therapy as ordered  Outcome: Progressing     Problem: RESPIRATORY - ADULT  Goal: Achieves optimal ventilation and oxygenation  Description: INTERVENTIONS:  - Assess for changes in respiratory status  - Assess for changes in mentation and behavior  - Position to facilitate oxygenation and minimize respiratory effort  - Oxygen administered by appropriate delivery if ordered  - Initiate smoking cessation education as indicated  - Encourage broncho-pulmonary hygiene including cough, deep breathe, Incentive Spirometry  - Assess the need for suctioning and aspirate as needed  - Assess and instruct to report SOB or any respiratory difficulty  - Respiratory Therapy support as indicated  Outcome: Progressing     Problem: METABOLIC, FLUID AND ELECTROLYTES - ADULT  Goal: Electrolytes maintained within normal limits  Description: INTERVENTIONS:  - Monitor labs and assess patient for signs and symptoms of electrolyte imbalances  - Administer electrolyte replacement as ordered  - Monitor response to electrolyte replacements, including repeat lab results as appropriate  - Instruct patient on fluid and  nutrition as appropriate  Outcome: Progressing

## 2024-12-22 NOTE — PLAN OF CARE
Target UF Goal  3.5  L as tolerated. Patient dialyzing for  3.5 hrs  on 3 K bath for serum K of  4.3  per protocol. Treatment plan reviewed with Nephrology.       Post-Dialysis RN Treatment Note    Blood Pressure:  Pre 151/103 mm/Hg  Post 165/110 mmHg   EDW  60 kg (but being challenged)   Weight:  Pre 55.9 kg   Post 53.4 kg   Mode of weight measurement: Standing Scale   Volume Removed  3200 ml (but patient drank 2 8oz cans of soda just during tx alone, and ate lunch with a drink on it as well)   Treatment duration  3 hrs and 30 mins   NS given  No    Treatment shortened? No   Medications given during Rx None Reported   Estimated Kt/V  1.43   Access type: Permacath/TDC   Access Issues: No   Needle Gauge: N/A   Report called to primary nurse   Yes, Arti RN at bedside       Problem: METABOLIC, FLUID AND ELECTROLYTES - ADULT  Goal: Electrolytes maintained within normal limits  Description: INTERVENTIONS:  - Monitor labs and assess patient for signs and symptoms of electrolyte imbalances  - Administer electrolyte replacement as ordered  - Monitor response to electrolyte replacements, including repeat lab results as appropriate  - Instruct patient on fluid and nutrition as appropriate  Outcome: Progressing  Goal: Fluid balance maintained  Description: INTERVENTIONS:  - Monitor labs   - Monitor I/O and WT  - Instruct patient on fluid and nutrition as appropriate  - Assess for signs & symptoms of volume excess or deficit  Outcome: Progressing

## 2024-12-22 NOTE — ASSESSMENT & PLAN NOTE
Blood pressure currently appears acceptable. -180 12/22.  Hopeful continued improvement with challenging weight/ultrafiltration.

## 2024-12-23 ENCOUNTER — APPOINTMENT (INPATIENT)
Dept: DIALYSIS | Facility: HOSPITAL | Age: 69
DRG: 291 | End: 2024-12-23
Payer: COMMERCIAL

## 2024-12-23 ENCOUNTER — APPOINTMENT (INPATIENT)
Dept: INTERVENTIONAL RADIOLOGY/VASCULAR | Facility: HOSPITAL | Age: 69
DRG: 291 | End: 2024-12-23
Attending: INTERNAL MEDICINE
Payer: COMMERCIAL

## 2024-12-23 LAB
ACANTHOCYTES BLD QL SMEAR: PRESENT
ALBUMIN SERPL BCG-MCNC: 3.3 G/DL (ref 3.5–5)
ALP SERPL-CCNC: 118 U/L (ref 34–104)
ALT SERPL W P-5'-P-CCNC: 15 U/L (ref 7–52)
ANION GAP SERPL CALCULATED.3IONS-SCNC: 6 MMOL/L (ref 4–13)
ANISOCYTOSIS BLD QL SMEAR: PRESENT
APPEARANCE FLD: ABNORMAL
AST SERPL W P-5'-P-CCNC: 28 U/L (ref 13–39)
BASOPHILS # BLD AUTO: 0.03 THOUSANDS/ÂΜL (ref 0–0.1)
BASOPHILS NFR BLD AUTO: 1 % (ref 0–1)
BILIRUB SERPL-MCNC: 1.17 MG/DL (ref 0.2–1)
BUN SERPL-MCNC: 39 MG/DL (ref 5–25)
BURR CELLS BLD QL SMEAR: PRESENT
CALCIUM ALBUM COR SERPL-MCNC: 8.5 MG/DL (ref 8.3–10.1)
CALCIUM SERPL-MCNC: 7.9 MG/DL (ref 8.4–10.2)
CHLORIDE SERPL-SCNC: 91 MMOL/L (ref 96–108)
CO2 SERPL-SCNC: 29 MMOL/L (ref 21–32)
COLOR FLD: ABNORMAL
CREAT SERPL-MCNC: 3.84 MG/DL (ref 0.6–1.3)
EOSINOPHIL # BLD AUTO: 0.06 THOUSAND/ÂΜL (ref 0–0.61)
EOSINOPHIL NFR BLD AUTO: 1 % (ref 0–6)
ERYTHROCYTE [DISTWIDTH] IN BLOOD BY AUTOMATED COUNT: 19.2 % (ref 11.6–15.1)
GFR SERPL CREATININE-BSD FRML MDRD: 14 ML/MIN/1.73SQ M
GLUCOSE FLD-MCNC: 112 MG/DL
GLUCOSE SERPL-MCNC: 132 MG/DL (ref 65–140)
GLUCOSE SERPL-MCNC: 157 MG/DL (ref 65–140)
HCT VFR BLD AUTO: 25.7 % (ref 36.5–49.3)
HGB BLD-MCNC: 8.1 G/DL (ref 12–17)
IMM GRANULOCYTES # BLD AUTO: 0.02 THOUSAND/UL (ref 0–0.2)
IMM GRANULOCYTES NFR BLD AUTO: 0 % (ref 0–2)
LDH FLD L TO P-CCNC: 62 U/L
LDH SERPL-CCNC: 145 U/L (ref 140–271)
LYMPHOCYTES # BLD AUTO: 0.55 THOUSANDS/ÂΜL (ref 0.6–4.47)
LYMPHOCYTES # BLD AUTO: 0.82 THOUSAND/UL (ref 0.6–4.47)
LYMPHOCYTES # BLD AUTO: 17 %
LYMPHOCYTES NFR BLD AUTO: 9 % (ref 14–44)
MACROCYTES BLD QL AUTO: PRESENT
MCH RBC QN AUTO: 27.2 PG (ref 26.8–34.3)
MCHC RBC AUTO-ENTMCNC: 31.5 G/DL (ref 31.4–37.4)
MCV RBC AUTO: 86 FL (ref 82–98)
MICROCYTES BLD QL AUTO: PRESENT
MONOCYTES # BLD AUTO: 0.34 THOUSAND/UL (ref 0–1.22)
MONOCYTES # BLD AUTO: 0.53 THOUSAND/ÂΜL (ref 0.17–1.22)
MONOCYTES NFR BLD AUTO: 7 % (ref 4–12)
MONOCYTES NFR BLD AUTO: 8 % (ref 4–12)
NEUTROPHILS # BLD AUTO: 5.18 THOUSANDS/ÂΜL (ref 1.85–7.62)
NEUTS SEG # BLD: 3.67 THOUSAND/UL (ref 1.81–6.82)
NEUTS SEG NFR BLD AUTO: 76 %
NEUTS SEG NFR BLD AUTO: 81 % (ref 43–75)
NRBC BLD AUTO-RTO: 0 /100 WBCS
PH BODY FLUID: 7.6
PLATELET # BLD AUTO: 61 THOUSANDS/UL (ref 149–390)
PMV BLD AUTO: 12.3 FL (ref 8.9–12.7)
POTASSIUM SERPL-SCNC: 4.8 MMOL/L (ref 3.5–5.3)
PROT FLD-MCNC: <3 G/DL
PROT SERPL-MCNC: 6.8 G/DL (ref 6.4–8.4)
RBC # BLD AUTO: 2.98 MILLION/UL (ref 3.88–5.62)
RBC MORPH BLD: PRESENT
SITE: ABNORMAL
SODIUM SERPL-SCNC: 126 MMOL/L (ref 135–147)
TOTAL CELLS COUNTED SPEC: 100
TOTAL PROTEIN FLUID: 1.4 G/DL
WBC # BLD AUTO: 6.37 THOUSAND/UL (ref 4.31–10.16)
WBC # FLD MANUAL: 304 /UL

## 2024-12-23 PROCEDURE — 97530 THERAPEUTIC ACTIVITIES: CPT

## 2024-12-23 PROCEDURE — 84157 ASSAY OF PROTEIN OTHER: CPT | Performed by: INTERNAL MEDICINE

## 2024-12-23 PROCEDURE — 99232 SBSQ HOSP IP/OBS MODERATE 35: CPT | Performed by: INTERNAL MEDICINE

## 2024-12-23 PROCEDURE — 80053 COMPREHEN METABOLIC PANEL: CPT | Performed by: INTERNAL MEDICINE

## 2024-12-23 PROCEDURE — 89051 BODY FLUID CELL COUNT: CPT | Performed by: INTERNAL MEDICINE

## 2024-12-23 PROCEDURE — 87070 CULTURE OTHR SPECIMN AEROBIC: CPT | Performed by: INTERNAL MEDICINE

## 2024-12-23 PROCEDURE — 0W9B3ZZ DRAINAGE OF LEFT PLEURAL CAVITY, PERCUTANEOUS APPROACH: ICD-10-PCS | Performed by: RADIOLOGY

## 2024-12-23 PROCEDURE — 82948 REAGENT STRIP/BLOOD GLUCOSE: CPT

## 2024-12-23 PROCEDURE — 87205 SMEAR GRAM STAIN: CPT | Performed by: INTERNAL MEDICINE

## 2024-12-23 PROCEDURE — 88112 CYTOPATH CELL ENHANCE TECH: CPT | Performed by: STUDENT IN AN ORGANIZED HEALTH CARE EDUCATION/TRAINING PROGRAM

## 2024-12-23 PROCEDURE — 83986 ASSAY PH BODY FLUID NOS: CPT | Performed by: INTERNAL MEDICINE

## 2024-12-23 PROCEDURE — 85025 COMPLETE CBC W/AUTO DIFF WBC: CPT | Performed by: INTERNAL MEDICINE

## 2024-12-23 PROCEDURE — 32555 ASPIRATE PLEURA W/ IMAGING: CPT

## 2024-12-23 PROCEDURE — 83615 LACTATE (LD) (LDH) ENZYME: CPT | Performed by: INTERNAL MEDICINE

## 2024-12-23 PROCEDURE — 82945 GLUCOSE OTHER FLUID: CPT | Performed by: INTERNAL MEDICINE

## 2024-12-23 PROCEDURE — 88312 SPECIAL STAINS GROUP 1: CPT | Performed by: STUDENT IN AN ORGANIZED HEALTH CARE EDUCATION/TRAINING PROGRAM

## 2024-12-23 PROCEDURE — 88305 TISSUE EXAM BY PATHOLOGIST: CPT | Performed by: STUDENT IN AN ORGANIZED HEALTH CARE EDUCATION/TRAINING PROGRAM

## 2024-12-23 RX ORDER — LIDOCAINE WITH 8.4% SOD BICARB 0.9%(10ML)
SYRINGE (ML) INJECTION AS NEEDED
Status: COMPLETED | OUTPATIENT
Start: 2024-12-23 | End: 2024-12-23

## 2024-12-23 RX ADMIN — IRON SUCROSE 200 MG: 20 INJECTION, SOLUTION INTRAVENOUS at 11:13

## 2024-12-23 RX ADMIN — Medication 3 MG: at 21:46

## 2024-12-23 RX ADMIN — HYDRALAZINE HYDROCHLORIDE 100 MG: 25 TABLET ORAL at 17:18

## 2024-12-23 RX ADMIN — CARVEDILOL 18.75 MG: 6.25 TABLET, FILM COATED ORAL at 17:13

## 2024-12-23 RX ADMIN — ISOSORBIDE MONONITRATE 120 MG: 60 TABLET, EXTENDED RELEASE ORAL at 17:13

## 2024-12-23 RX ADMIN — ATORVASTATIN CALCIUM 20 MG: 20 TABLET, FILM COATED ORAL at 17:13

## 2024-12-23 RX ADMIN — HYDRALAZINE HYDROCHLORIDE 100 MG: 25 TABLET ORAL at 05:42

## 2024-12-23 RX ADMIN — Medication 5 ML: at 09:38

## 2024-12-23 RX ADMIN — TORSEMIDE 100 MG: 100 TABLET ORAL at 17:13

## 2024-12-23 RX ADMIN — FOLIC ACID 1 MG: 1 TABLET ORAL at 17:13

## 2024-12-23 RX ADMIN — HYDRALAZINE HYDROCHLORIDE 100 MG: 25 TABLET ORAL at 21:46

## 2024-12-23 NOTE — ASSESSMENT & PLAN NOTE
It is noted the patient has an ejection fraction of 20 - 25%.  Continue to challenge weight as tolerated.  The patient has been on guideline directed medical therapy with carvedilol 12.5 mg twice daily as well as hydralazine and isosorbide.  Cardiology had seen the patient during this admission.  Given elevated blood pressures, we can increase carvedilol to 18.5 mg twice daily.

## 2024-12-23 NOTE — ASSESSMENT & PLAN NOTE
Iron stores: Iron saturation 10% with ferritin of 109.  Representing component of iron deficiency given ESRD status.  Had Venofer 200 mg 3 times weekly with hemodialysis.  Last hemoglobin is 7.9  And prior to that had been 7.7 continue to monitor.

## 2024-12-23 NOTE — PLAN OF CARE
Problem: METABOLIC, FLUID AND ELECTROLYTES - ADULT  Goal: Electrolytes maintained within normal limits  Description: INTERVENTIONS:  - Monitor labs and assess patient for signs and symptoms of electrolyte imbalances  - Administer electrolyte replacement as ordered  - Monitor response to electrolyte replacements, including repeat lab results as appropriate  - Instruct patient on fluid and nutrition as appropriate  12/23/2024 1422 by Belem Ken RN  Outcome: Progressing  12/23/2024 1422 by Belem Ken RN  Outcome: Progressing  Goal: Fluid balance maintained  Description: INTERVENTIONS:  - Monitor labs   - Monitor I/O and WT  - Instruct patient on fluid and nutrition as appropriate  - Assess for signs & symptoms of volume excess or deficit  12/23/2024 1422 by Belem Ken RN  Outcome: Progressing  12/23/2024 1422 by Belem Ken RN  Outcome: Progressing

## 2024-12-23 NOTE — ASSESSMENT & PLAN NOTE
Noncompliant with his home medications  Restarted oral hydralazine, Coreg  As needed intravenous hydralazine/labetalol as needed  Patient also significantly volume overloaded upon presentation which is likely a significant contributing factor.  Undergoing ultrafiltration today.

## 2024-12-23 NOTE — ASSESSMENT & PLAN NOTE
Sodium 127 mmol/L and this has improved with fluid removal.  Patient had been placed on a normal sodium bath.  Continue to monitor.  Maintain fluid restriction.

## 2024-12-23 NOTE — ASSESSMENT & PLAN NOTE
Chronic thrombocytopenia.  Platelets at 47,000.  Hold subcu heparin today.  No overt bleeding.  Continue to monitor hemoglobin and platelet count closely.

## 2024-12-23 NOTE — NURSING NOTE
Noted pt turning off alarm on bed. Education provided as pt is high fall risk. Pt also has massamo removed. Reminding pt that monitor needs to stay in place. Pt needs encouragement to follow hospital policies

## 2024-12-23 NOTE — ASSESSMENT & PLAN NOTE
Overall weight has significantly improved since admission has lost nearly 18 kg (53 <-73.9 kg).  There is still some increased volume on board, plan to continue to challenge dry weight with hemodialysis and ultrafiltration.  Given holiday schedule patient's next hemodialysis treatment is on December 24.  The patient is also maintained on torsemide 100 mg daily to try to minimize fluid gains but I do not know how effective this is there is not much of urine output recorded.  Check bladder scan/PVR.  The patient is still having significant leg edema we will plan to do an additional HD/UF today.  Plan for next hemodialysis ultrafiltration on December 24.  I communicated with the hospitalist service with Dr. Arceo via [employer] secure chat this morning.  The goal is to try to push HD UF as much as the patient will tolerate however patient still has significant leg edema noted.

## 2024-12-23 NOTE — PLAN OF CARE
Problem: OCCUPATIONAL THERAPY ADULT  Goal: Performs self-care activities at highest level of function for planned discharge setting.  See evaluation for individualized goals.  Description: Treatment Interventions: ADL retraining, Functional transfer training, UE strengthening/ROM, Endurance training, Patient/family training, Equipment evaluation/education, Cognitive reorientation, Compensatory technique education, Cardiac education, Continued evaluation, Activityengagement, Energy conservation        See flowsheet documentation for full assessment, interventions and recommendations.   Outcome: Progressing  Note: Limitation: Decreased ADL status, Decreased UE strength, Decreased Safe judgement during ADL, Decreased endurance, Decreased self-care trans, Decreased high-level ADLs  Prognosis: Fair  Assessment: See note     Rehab Resource Intensity Level, OT: II (Moderate Resource Intensity)

## 2024-12-23 NOTE — ASSESSMENT & PLAN NOTE
Bilateral pleural effusion not improving with dialysis treatment.  Underwent thoracentesis for the right side.  Repeat chest x-ray still shows left-sided effusion.  IR consulted for thoracentesis.  Underwent thoracentesis with removal of 850 mL of fluid from the left side.

## 2024-12-23 NOTE — CASE MANAGEMENT
Case Management Discharge Planning Note    Patient name Matthew Alvarez  Location /-01 MRN [id number]  : 1955 Date 2024       Current Admission Date: 2024  Current Admission Diagnosis:Acute on chronic systolic heart failure (HCC)   Patient Active Problem List    Diagnosis Date Noted Date Diagnosed    Pleural effusion 2024     Secondary hyperparathyroidism of renal origin ([facility]) 12/15/2024     ESRD (end stage renal disease) on dialysis ([facility]) 2024     Ambulatory dysfunction 2024     Thrombocytopenia ([facility]) 2024     Renal failure 11/15/2024     Encounter for hemodialysis ([facility]) 11/15/2024     Anemia due to chronic kidney disease, on chronic dialysis ([facility]) 11/15/2024     Anemia of renal disease 11/15/2024     Vitamin D deficiency, unspecified 11/15/2024     Acute hemodialysis patient ([facility]) 2024     Chronic systolic (congestive) heart failure ([facility]) 2024     Oliguria 2024     Encounter for hemodialysis for ESRD ([facility]) 2024     Hyponatremia 2024     Dilated cardiomyopathy ([facility]) 2024     Elevated liver transaminase level 11/10/2024     Acute on chronic systolic heart failure ([facility]) 2024     Coronary artery disease involving native coronary artery of native heart without angina pectoris 10/08/2021     Rheumatoid factor positive 2021     Protein calorie malnutrition ([facility]) 2021     Elevated troponin 2021     Tobacco abuse 2021     Nonischemic cardiomyopathy ([facility]) 2021     Primary hypertension 2021       LOS (days): 9  Geometric Mean LOS (GMLOS) (days): 3.9  Days to GMLOS:-5     OBJECTIVE:  Risk of Unplanned Readmission Score: 22.21         Current admission status: Inpatient   Preferred Pharmacy:   [facility]/pharmacy #[id number] - [place] - [address]  [address]  [facility] [address]  Phone: [phone] Fax: [phone]    [facility]  [place], [place] - [address]  [address]  [place] [address]  Phone: [phone] Fax: [phone]    Primary Care Provider: Olive Rodriguez MD    Primary Insurance: [facility] RE[facility]  Secondary Insurance:     DISCHARGE DETAILS:     Chart reviewed aware of medical management. Case was discussed in multidisciplinary discharge meeting.  Clinical information supporting hospitalization: pt not medically stable for d/c yet. Continues on dialysis followed by nephrology. Pt required thoracentesis.      Barriers to discharge:  - medical management  Discharge plan: pt is refusing STR, home with C.     CM will continue to follow plan of care.

## 2024-12-23 NOTE — ASSESSMENT & PLAN NOTE
Dialysis, Monday, Wednesday, Friday.  Patient has been receiving hemodialysis daily with ultrafiltration on a daily basis.    An uric at baseline    Lab Results   Component Value Date    EGFR 16 12/21/2024    EGFR 13 12/17/2024    EGFR 12 12/16/2024    CREATININE 3.62 (H) 12/21/2024    CREATININE 4.09 (H) 12/17/2024    CREATININE 4.55 (H) 12/16/2024

## 2024-12-23 NOTE — ASSESSMENT & PLAN NOTE
The patient likely has end-stage renal disease at this point.  Previous baseline creatinine 2.0 (noted in February 2024 with an EGFR of 32) now with progression to requiring hemodialysis.  Likely related to cardiorenal syndrome that has been severe with diuretic resistance and fluid overload..  Protein creatinine ratio subnephrotic at 0.6.  Urine microscopy fairly bland with 0-1 RBCs, 2-4 white cells.  CT done earlier this admission had showed no evidence of hydronephrosis with right renal atrophy noted.

## 2024-12-23 NOTE — OCCUPATIONAL THERAPY NOTE
Occupational Therapy Treatment    Patient Name: Matthew Alvarez  Today's Date: 12/23/2024  Problem List  Principal Problem:    Acute on chronic systolic heart failure (HCC)  Active Problems:    Nonischemic cardiomyopathy (HCC)    Primary hypertension    Coronary artery disease involving native coronary artery of native heart without angina pectoris    Hyponatremia    Acute hemodialysis patient (HCC)    Thrombocytopenia (HCC)    Anemia of renal disease    ESRD (end stage renal disease) on dialysis (HCC)    Ambulatory dysfunction    Secondary hyperparathyroidism of renal origin (HCC)    Pleural effusion    Past Medical History  Past Medical History:   Diagnosis Date    Acute hemodialysis patient (HCC) 12/14/2024    CAD (coronary artery disease)     HFrEF (heart failure with reduced ejection fraction) (HCC) 09/2021    Hypertension     Rheumatoid factor positive 09/2021    Stage 3 chronic kidney disease (HCC)     Tobacco abuse      Past Surgical History  Past Surgical History:   Procedure Laterality Date    APPENDECTOMY  1965    CARDIAC CATHETERIZATION  9/16/2021    INGUINAL HERNIA REPAIR Right 1991    IR THORACENTESIS  12/18/2024    IR THORACENTESIS  12/23/2024    IR TUNNELED DIALYSIS CATHETER PLACEMENT  11/14/2024 12/23/24 1330   OT Last Visit   OT Visit Date 12/23/24   Note Type   Note Type Treatment   Pain Assessment   Pain Assessment Tool 0-10   Pain Score 2   Pain Location/Orientation Orientation: Lower;Location: Back   Restrictions/Precautions   Other Precautions Chair Alarm;Bed Alarm;O2;Fall Risk;Telemetry   Functional Standing Tolerance   Time 1 min   Comments with RW   Bed Mobility   Sit to Supine 3  Moderate assistance   Additional items Assist x 1;Increased time required;Verbal cues;LE management   Transfers   Sit to Stand 5  Supervision   Additional items Assist x 1;Increased time required;Verbal cues   Stand to Sit 5  Supervision   Additional items Assist x 1;Increased time required;Verbal cues  "  Additional Comments Upon entrance pt sitting EOB with intermittent UE support.   Subjective   Subjective \"I'm cold.\"   Cognition   Overall Cognitive Status WFL   Orientation Level Oriented X4   Additional Activities   Additional Activities Comments Pt completed forward cross reaches with UEs pt instructed to reach across midline to simulate UB ADLs requiring Supervision in stance.   Assessment   Assessment Pt completed OT tx session #1 focused on transfer training, standing balance and tolerance, and reaching tasks. Pt alert and agreeable to participate. Pt demonstrated improvements in transfers this session but continues to be limited by fatigue. Pt demonstrating fair participation and good potential to achieve goals but is currently demonstrating deficits in decrease activity tolerance, decrease standing balance, decrease performance during ADL tasks, decrease generalized strength, decrease performance during functional transfers, limited family support, and decreased alertness. Nurse present for dialysis post session. Continue to recommend moderate resource intensity upon discharge to increase safety and independence in ADL tasks and functional mobility.    Plan   Treatment Interventions ADL retraining;Functional transfer training;UE strengthening/ROM;Endurance training;Continued evaluation;Energy conservation   Goal Expiration Date 12/30/24   OT Treatment Day 1   OT Frequency 3-5x/wk   Discharge Recommendation   Rehab Resource Intensity Level, OT II (Moderate Resource Intensity)   AM-PAC Daily Activity Inpatient   Lower Body Dressing 2   Bathing 2   Toileting 2   Upper Body Dressing 3   Grooming 3   Eating 3   Daily Activity Raw Score 15   Daily Activity Standardized Score (Calc for Raw Score >=11) 34.69   AM-PAC Applied Cognition Inpatient   Following a Speech/Presentation 4   Understanding Ordinary Conversation 3   Taking Medications 3   Remembering Where Things Are Placed or Put Away 3   Remembering List of " 4-5 Errands 3   Taking Care of Complicated Tasks 2   Applied Cognition Raw Score 18   Applied Cognition Standardized Score 38.07   End of Consult   Education Provided Yes   Patient Position at End of Consult Supine;Bed/Chair alarm activated;All needs within reach   Nurse Communication Nurse aware of consult     Rhiannon Dupree MS, OTR/L

## 2024-12-23 NOTE — PLAN OF CARE
Target UF Goal  2.5  L as tolerated. Patient dialyzing for 2.5 hours on 2 K bath for serum K of  4.8  per protocol. Treatment plan reviewed with Nephrology.       Post-Dialysis RN Treatment Note    Blood Pressure:  Pre 177/108 mm/Hg  Post 180/101 mmHg   EDW  60 kg    Weight:  Pre 54.9 kg   Post 53.2 kg   Mode of weight measurement: Standing Scale   Volume Removed  2000 ml    Treatment duration 2.5 hours   NS given  No    Treatment shortened? No   Medications given during Rx None Reported   Estimated Kt/V  0.98   Access type: Permacath/TDC   Access Issues: No   Needle Gauge: N/A   Report called to primary nurse   Yes, Ana Paula RN at bedside    Pt son brought 2 bottles of water for patient; explained that he is on a 1200 ml fluid restriction and his intake is monitored by nursing staff.  This did not stop the patient from continuing to ask for sips of his water despite the education provided about fluid intake, probability of overload if he continues to disregard that restriction, and the ongoing need for daily dialysis.

## 2024-12-23 NOTE — PROGRESS NOTES
NEPHROLOGY HOSPITAL PROGRESS NOTE   Matthew Alvarez 69 y.o. male MRN: [id number]  Unit/Bed#: -01 Encounter: [id number]  Reason for Consult: End-stage renal disease on hemodialysis.    Brief History of Admission - 69 year old male with DAVID now hemodialysis dependent with new HD start in November 2024, nonischemic cardiomyopathy with EF 20%, and uncontrolled HTN presented s/p fall when attempting to leave the bathroom the patient over the past week is required daily dialysis ultrafiltration and there has been a significant mount of fluid removed during this admission.    Assessment & Plan  Acute hemodialysis patient (HCC)  The patient likely has end-stage renal disease at this point.  Previous baseline creatinine 2.0 (noted in February 2024 with an EGFR of 32) now with progression to requiring hemodialysis.  Likely related to cardiorenal syndrome that has been severe with diuretic resistance and fluid overload..  Protein creatinine ratio subnephrotic at 0.6.  Urine microscopy fairly bland with 0-1 RBCs, 2-4 white cells.  CT done earlier this admission had showed no evidence of hydronephrosis with right renal atrophy noted.  Acute on chronic systolic heart failure (HCC)  Overall weight has significantly improved since admission has lost nearly 18 kg (53 <-73.9 kg).  There is still some increased volume on board, plan to continue to challenge dry weight with hemodialysis and ultrafiltration.  Given holiday schedule patient's next hemodialysis treatment is on December 24.  The patient is also maintained on torsemide 100 mg daily to try to minimize fluid gains but I do not know how effective this is there is not much of urine output recorded.  Check bladder scan/PVR.  The patient is still having significant leg edema we will plan to do an additional HD/UF today.  Plan for next hemodialysis ultrafiltration on December 24.  I communicated with the hospitalist service with Dr. Arceo via [employer] secure chat this morning.   The goal is to try to push HD UF as much as the patient will tolerate however patient still has significant leg edema noted.  Pleural effusion  The patient during this admission was status post thoracentesis on the right.  800 cc removed.  Consideration for left thoracentesis by primary service.  Again continuing to aggressively challenge weight as to try to prevent reoccurrence.  Nonischemic cardiomyopathy (HCC)  It is noted the patient has an ejection fraction of 20 - 25%.  Continue to challenge weight as tolerated.  The patient has been on guideline directed medical therapy with carvedilol 12.5 mg twice daily as well as hydralazine and isosorbide.  Cardiology had seen the patient during this admission.  Given elevated blood pressures, we can increase carvedilol to 18.5 mg twice daily.  Primary hypertension  Blood pressure currently appears acceptable. -180 12/22.  Hopeful continued improvement with challenging weight/ultrafiltration.  Anemia of renal disease  Iron stores: Iron saturation 10% with ferritin of 109.  Representing component of iron deficiency given ESRD status.  Had Venofer 200 mg 3 times weekly with hemodialysis.  Last hemoglobin is 7.9  And prior to that had been 7.7 continue to monitor.  Hyponatremia  Sodium 127 mmol/L and this has improved with fluid removal.  Patient had been placed on a normal sodium bath.  Continue to monitor.  Maintain fluid restriction.  Secondary hyperparathyroidism of renal origin (HCC)  Follow PTH as outpatient.  Calcium is 7.9, phosphorus 5.0 and albumin of 3.0.    Ambulatory dysfunction  PT/OT evaluation and follow-up  Coronary artery disease involving native coronary artery of native heart without angina pectoris    Thrombocytopenia (HCC)  This seems to be chronic in nature and has increased to 47 as of December 23.  With decrease in hemoglobin level, check LDH and peripheral smear, also check HIT assay.    Administrative Statements   [facility]  DOCUMENTATION:     1. This service was provided via Telemedicine using RxResults Kit     2. Parties in the room with patient during teleconsult Patient only    3. Confidentiality My office door was closed     4. Participants No one else was in the room    5. Patient acknowledged consent and understanding of privacy and security of the  Telemedicine consult. I informed the patient that I have reviewed their record in Epic and presented the opportunity for them to ask any questions regarding the visit today.  The patient agreed to participate.    6. I have spent a total time of 25 minutes in caring for this patient on the day of the visit/encounter including Documenting in the medical record, review of the medical record, review of patient labs, brief patient visit, creation of assessment and plan, not including the time spent for establishing the audio/video connection.        SUBJECTIVE / 24H INTERVAL HISTORY:  Mr. Alvarez is seen in follow-up for end-stage renal disease on hemodialysis and fluid management.  I had the opportunity of seeing the patient earlier during this hospitalization when the patient was in the ICU.  The patient has had a significant amount of fluid removal during this admission with reduction in his dry weight.  The patient last underwent hemodialysis on December December 22 and it was noted that 3.2 kg was removed.  The patient was resting comfortably lying flat.  No acute events reported overnight.  The patient had been transferred out of the ICU.  Systolic blood pressure on December 22 was anywhere from 150 180 systolic.  Pulse oximetry is 94% on room air.-    OBJECTIVE:  Current Weight: Weight - Scale: 54.9 kg (121 lb 0.5 oz)  Vitals:    12/22/24 2324 12/23/24 0542 12/23/24 0620 12/23/24 0725   BP: 163/73 164/84  160/71   BP Location:       Pulse: 64   82   Resp: 18   20   Temp: 98.2 °F (36.8 °C)   98.6 °F (37 °C)   TempSrc:       SpO2: 98%   94%   Weight:   54.9 kg (121 lb 0.5 oz)         Intake/Output Summary (Last 24 hours) at 12/23/2024 0813  Last data filed at 12/22/2024 2030  Gross per 24 hour   Intake 620 ml   Output 3650 ml   Net -3030 ml     General: Patient is resting comfortably no acute distress there is still left eye bruising noted.  HEENT: Normocephalic atraumatic  Neck: Appears to be supple  Pulmonary: No accessory muscle use; no increased work of breathing noted  Cardiac: Patient's pulse rate has been in the 80s.  Extremities: There is lower extremity edema present  improved when I saw the patient earlier during this admission however there is still significant leg edema present..  Neurologic: No focal deficits noted    Medications:    Current Facility-Administered Medications:     acetaminophen (TYLENOL) tablet 650 mg, 650 mg, Oral, Q4H PRN, David Meraz MD, 650 mg at 12/18/24 1203    aspirin (ECOTRIN LOW STRENGTH) EC tablet 81 mg, 81 mg, Oral, Daily, David Meraz MD, 81 mg at 12/22/24 0831    atorvastatin (LIPITOR) tablet 20 mg, 20 mg, Oral, Daily With Dinner, YUE Griffin, 20 mg at 12/22/24 1634    carvedilol (COREG) tablet 12.5 mg, 12.5 mg, Oral, BID With Meals, David Meraz MD, 12.5 mg at 12/22/24 1634    folic acid (FOLVITE) tablet 1 mg, 1 mg, Oral, Daily, Jensen Kelly DO, 1 mg at 12/22/24 0830    heparin (porcine) subcutaneous injection 5,000 Units, 5,000 Units, Subcutaneous, Q8H [facility], Shari Arceo MD    hydrALAZINE (APRESOLINE) injection 10 mg, 10 mg, Intravenous, Q6H PRN, Chris Nolasco DO, 10 mg at 12/19/24 2004    hydrALAZINE (APRESOLINE) tablet 100 mg, 100 mg, Oral, Q8H [facility], David Meraz MD, 100 mg at 12/23/24 0542    iron sucrose (VENOFER) 200 mg in sodium chloride 0.9 % 50 mL IVPB, 200 mg, Intravenous, Once per day on Monday Wednesday Friday, Leo Marshall DO, Last Rate: 50 mL/hr at 12/20/24 1239, 200 mg at 12/20/24 1239    isosorbide mononitrate (IMDUR) 24 hr tablet 120 mg, 120 mg, Oral, Daily, David  "Carlos Meraz MD, 120 mg at 12/22/24 0830    melatonin tablet 3 mg, 3 mg, Oral, HS, David Carlos Meraz MD, 3 mg at 12/22/24 2102    torsemide (DEMADEX) tablet 100 mg, 100 mg, Oral, Daily, Jensen Kelly DO, 100 mg at 12/22/24 0831    Laboratory Results:  Results from last 7 days   Lab Units 12/22/24  0901 12/21/24  0953 12/18/24  0842 12/17/24  1354   WBC Thousand/uL 4.83 4.60 4.56 4.44   HEMOGLOBIN g/dL 7.9* 7.7* 8.9* 8.3*   HEMATOCRIT % 24.4* 24.3* 28.4* 25.8*   PLATELETS Thousands/uL 47* 34* 45* 52*   POTASSIUM mmol/L  --  4.3  --  4.4   CHLORIDE mmol/L  --  92*  --  92*   CO2 mmol/L  --  28  --  27   BUN mg/dL  --  41*  --  33*   CREATININE mg/dL  --  3.62*  --  4.09*   CALCIUM mg/dL  --  7.9*  --  8.1*       Portions of the record may have been created with voice recognition software. Occasional wrong word or \"sound a like\" substitutions may have occurred due to the inherent limitations of voice recognition software. Read the chart carefully and recognize, using context, where substitutions have occurred.If you have any questions, please contact the dictating provider.   "

## 2024-12-23 NOTE — BRIEF OP NOTE (RAD/CATH)
INTERVENTIONAL RADIOLOGY PROCEDURE NOTE    Date: 12/23/2024    Procedure: Left thoracentesis  Procedure Summary       Date:  Room / Location:     Anesthesia Start:  Anesthesia Stop:     Procedure:  Diagnosis:     Scheduled Providers:  Responsible Provider:     Anesthesia Type: Not recorded ASA Status: Not recorded            Preoperative diagnosis:   1. Chronic systolic (congestive) heart failure (HCC)    2. Stage 5 chronic kidney disease on chronic dialysis (HCC)    3. Acute on chronic systolic heart failure (HCC)         Postoperative diagnosis: Same.    Surgeon: Juan Alberto Collins MD     Assistant: None. No qualified resident was available.    Blood loss: None    Specimens: None    Findings: US showed bilateral  pleural effusions. Left thoracentesis performed and 850 ml clear yellow fluid aspirated.Patient very sensitive to Lidocaine.  Patient did not want a repeat right thoracentesis today.     Complications: None immediate.    Anesthesia: local

## 2024-12-23 NOTE — PHYSICAL THERAPY NOTE
12/23/24 1000   PT Last Visit   PT Visit Date 12/23/24   Note Type   Note Type Cancelled Session   Cancel Reasons Patient off floor/test                                          Physical Therapy Cancellation Note     PT treatment attempted but patient unavailable and out of room to IR per RN.  Will continue to offer PT treatment as ordered and progress as able when patient is available to participate.       Brooke Bains, PTA

## 2024-12-23 NOTE — ASSESSMENT & PLAN NOTE
Patient presented  on (12/14) after sustaining a mechanical fall 2 to 3 days ago.    Patient states that he missed dialysis on Wednesday and presented for regularly scheduled session on Friday where he was noted with significant bruising and referred to the ED  Evidence of severe volume overload with weight significantly up from dry weight  Notably, patient with gross noncompliance with outpatient medical regimen  Presented with a weight of 73.8 kg with dry weight of 60 kg.  Patient appears to be volume overloaded upon admission.  Has been requiring daily hemodialysis with ultrafiltration.    Repeat chest x-ray shows slightly diminished right effusion and ongoing left-sided effusion.  Patient had received right-sided thoracentesis with 800 mL of fluid removal earlier and underwent left thoracentesis with 850 mL of fluid removal on 12/23.  Has been having daily ultrafiltration with removal of approximately 3 L of fluid daily.  Weight has declined from 160 to 121 pounds however still examines as volume overloaded.  Continues to require hospitalization for ultrafiltration and hemodialysis.  Nephrology closely following  Cardiology consulted for concomitant management  Monitor for electrolyte abnormality.  Continue with ongoing volume control with dialysis.  Cardiology adjusting his dry weight.  Wt Readings from Last 3 Encounters:   12/23/24 54.9 kg (121 lb 0.5 oz)   11/20/24 55.2 kg (121 lb 12.8 oz)   09/03/24 55.3 kg (122 lb)

## 2024-12-23 NOTE — ASSESSMENT & PLAN NOTE
The patient during this admission was status post thoracentesis on the right.  800 cc removed.  Consideration for left thoracentesis by primary service.  Again continuing to aggressively challenge weight as to try to prevent reoccurrence.

## 2024-12-23 NOTE — PROGRESS NOTES
Progress Note - Hospitalist   Name: Matthew Alvarez 69 y.o. male I MRN: [id number]  Unit/Bed#: -01 I Date of Admission: 12/14/2024   Date of Service: 12/23/2024 I Hospital Day: 9    Assessment & Plan  Acute on chronic systolic heart failure (HCC)  Patient presented  on (12/14) after sustaining a mechanical fall 2 to 3 days ago.    Patient states that he missed dialysis on Wednesday and presented for regularly scheduled session on Friday where he was noted with significant bruising and referred to the ED  Evidence of severe volume overload with weight significantly up from dry weight  Notably, patient with gross noncompliance with outpatient medical regimen  Presented with a weight of 73.8 kg with dry weight of 60 kg.  Patient appears to be volume overloaded upon admission.  Has been requiring daily hemodialysis with ultrafiltration.    Repeat chest x-ray shows slightly diminished right effusion and ongoing left-sided effusion.  Patient had received right-sided thoracentesis with 800 mL of fluid removal earlier and underwent left thoracentesis with 850 mL of fluid removal on 12/23.  Has been having daily ultrafiltration with removal of approximately 3 L of fluid daily.  Weight has declined from 160 to 121 pounds however still examines as volume overloaded.  Continues to require hospitalization for ultrafiltration and hemodialysis.  Nephrology closely following  Cardiology consulted for concomitant management  Monitor for electrolyte abnormality.  Continue with ongoing volume control with dialysis.  Cardiology adjusting his dry weight.  Wt Readings from Last 3 Encounters:   12/23/24 54.9 kg (121 lb 0.5 oz)   11/20/24 55.2 kg (121 lb 12.8 oz)   09/03/24 55.3 kg (122 lb)     ESRD (end stage renal disease) on dialysis (HCC)  Dialysis, Monday, Wednesday, Friday.  Patient has been receiving hemodialysis daily with ultrafiltration on a daily basis.    An uric at baseline    Lab Results   Component Value Date    EGFR  "16 12/21/2024    EGFR 13 12/17/2024    EGFR 12 12/16/2024    CREATININE 3.62 (H) 12/21/2024    CREATININE 4.09 (H) 12/17/2024    CREATININE 4.55 (H) 12/16/2024     Nonischemic cardiomyopathy (HCC)  November 11, 2024: Echo, EF of 20-24%  Noncompliant with all medications in the outpatient setting-unclear if taking any  Also, set up with LifeVest which patient states he wears \"as needed\"  GDMT limited by advanced end-stage kidney disease.  On GDMT with Coreg, Imdur, hydralazine.  Close outpatient cardiology follow-up on discharge  Maintain on telemetry monitor during hospitalization because of being on LifeVest.  Primary hypertension  Noncompliant with his home medications  Restarted oral hydralazine, Coreg  As needed intravenous hydralazine/labetalol as needed  Patient also significantly volume overloaded upon presentation which is likely a significant contributing factor.  Undergoing ultrafiltration today.  Ambulatory dysfunction  Patient states he fell at home, has bruising on his face  Will consult PT/OT/case management  He states he sometimes walks with an assistive device  Will likely require short-term rehab  Patient adamantly refusing rehab.  Discussed with patient's wife at length over the phone.  Coronary artery disease involving native coronary artery of native heart without angina pectoris  Continue aspirin  Mild nonobstructive coronary artery disease per cardiac catheterization in 9/2021.  Resume statin therapy.  Hyponatremia  Volume control with dialysis today.  Acute hemodialysis patient ([facility])    Anemia of renal disease  Continue with IV iron 3 times weekly.  Currently no indication of blood transfusion.  Secondary hyperparathyroidism of renal origin ([facility])  Nephrology on board.  Follow-up phosphorus level  Pleural effusion  Bilateral pleural effusion not improving with dialysis treatment.  Underwent thoracentesis for the right side.  Repeat chest x-ray still shows left-sided effusion.  IR consulted for " thoracentesis.  Underwent thoracentesis with removal of 850 mL of fluid from the left side.  Thrombocytopenia (HCC)  Chronic thrombocytopenia.  Platelets at 47,000.  Hold subcu heparin today.  No overt bleeding.  Continue to monitor hemoglobin and platelet count closely.    VTE Pharmacologic Prophylaxis: VTE Score: 5 High Risk (Score >/= 5) - Pharmacological DVT Prophylaxis Ordered: heparin. Sequential Compression Devices Ordered.    Mobility:   Basic Mobility Inpatient Raw Score: 16  JH-HLM Goal: 5: Stand one or more mins  JH-HLM Achieved: 3: Sit at edge of bed  JH-HLM Goal achieved. Continue to encourage appropriate mobility.    Patient Centered Rounds: I performed bedside rounds with nursing staff today.   Discussions with Specialists or Other Care Team Provider: Discussed with nephrology    Education and Discussions with Family / Patient: Attempted to update  (wife) via phone. Unable to contact.    Current Length of Stay: 9 day(s)  Current Patient Status: Inpatient   Certification Statement: The patient will continue to require additional inpatient hospital stay due to ongoing need for ultrafiltration and dialysis  Discharge Plan: Anticipate discharge in 48-72 hrs to home with home services.    Code Status: Level 1 - Full Code    Subjective   Patient seen and examined at bedside.  Looks generally unwell but denies any worsening shortness of breath.  Oral intake remains poor.    Objective :  Temp:  [98.2 °F (36.8 °C)-98.6 °F (37 °C)] 98.6 °F (37 °C)  HR:  [64-85] 81  BP: (131-184)/(56-88) 176/88  Resp:  [18-20] 20  SpO2:  [94 %-99 %] 97 %  O2 Device: None (Room air)    Body mass index is 19.54 kg/m².     Input and Output Summary (last 24 hours):     Intake/Output Summary (Last 24 hours) at 12/23/2024 1416  Last data filed at 12/23/2024 0938  Gross per 24 hour   Intake 480 ml   Output 850 ml   Net -370 ml       Physical Exam  Constitutional:       Appearance: He is ill-appearing.   HENT:      Head:  Normocephalic and atraumatic.      Mouth/Throat:      Mouth: Mucous membranes are moist.   Eyes:      Pupils: Pupils are equal, round, and reactive to light.   Abdominal:      General: Abdomen is flat. Bowel sounds are normal.      Palpations: Abdomen is soft.   Musculoskeletal:      Right lower leg: Edema present.      Left lower leg: Edema present.   Skin:     Comments: Resolving bruising left periorbital and neck region.   Neurological:      General: No focal deficit present.      Mental Status: He is alert and oriented to person, place, and time. Mental status is at baseline.           Lines/Drains:  Lines/Drains/Airways       Active Status       Name Placement date Placement time Site Days    HD Permanent Double Catheter 11/14/24  1352  Internal jugular  39                      Telemetry:  Telemetry Orders (From admission, onward)               LifeVest Patient: Continuous Telemetry Monitoring during hospitalization (non-expiring)  Continuous LifeVest Telemetry Monitoring        References:    LifeVest Policy                     Telemetry Reviewed: Normal Sinus Rhythm  Indication for Continued Telemetry Use: Lifevest (remains on tele entire hospital stay)               Lab Results: I have reviewed the following results:   Results from last 7 days   Lab Units 12/22/24  0901   WBC Thousand/uL 4.83   HEMOGLOBIN g/dL 7.9*   HEMATOCRIT % 24.4*   PLATELETS Thousands/uL 47*   SEGS PCT % 81*   LYMPHO PCT % 11*  17   MONO PCT % 6  7   EOS PCT % 1     Results from last 7 days   Lab Units 12/21/24  0953 12/17/24  1354   SODIUM mmol/L 127* 125*   POTASSIUM mmol/L 4.3 4.4   CHLORIDE mmol/L 92* 92*   CO2 mmol/L 28 27   BUN mg/dL 41* 33*   CREATININE mg/dL 3.62* 4.09*   ANION GAP mmol/L 7 6   CALCIUM mg/dL 7.9* 8.1*   ALBUMIN g/dL  --  3.0*   TOTAL BILIRUBIN mg/dL  --  1.20*   ALK PHOS U/L  --  109*   ALT U/L  --  9   AST U/L  --  19   GLUCOSE RANDOM mg/dL 130 143*                       Recent Cultures (last 7 days):    Results from last 7 days   Lab Units 12/18/24  1544   GRAM STAIN RESULT  1+ Polys  No bacteria seen   BODY FLUID CULTURE, STERILE  No growth             Last 24 Hours Medication List:     Current Facility-Administered Medications:     acetaminophen (TYLENOL) tablet 650 mg, Q4H PRN    aspirin (ECOTRIN LOW STRENGTH) EC tablet 81 mg, Daily    atorvastatin (LIPITOR) tablet 20 mg, Daily With Dinner    carvedilol (COREG) tablet 18.75 mg, BID With Meals    folic acid (FOLVITE) tablet 1 mg, Daily    heparin (porcine) subcutaneous injection 5,000 Units, Q8H JEEVAN    hydrALAZINE (APRESOLINE) injection 10 mg, Q6H PRN    hydrALAZINE (APRESOLINE) tablet 100 mg, Q8H JEEVAN    iron sucrose (VENOFER) 200 mg in sodium chloride 0.9 % 50 mL IVPB, Once per day on Monday Wednesday Friday, Last Rate: 200 mg (12/23/24 1113)    isosorbide mononitrate (IMDUR) 24 hr tablet 120 mg, Daily    melatonin tablet 3 mg, HS    torsemide (DEMADEX) tablet 100 mg, Daily    Administrative Statements   Today, Patient Was Seen By: Shari Arceo MD      **Please Note: This note may have been constructed using a voice recognition system.**

## 2024-12-23 NOTE — PLAN OF CARE
Problem: Prexisting or High Potential for Compromised Skin Integrity  Goal: Skin integrity is maintained or improved  Description: INTERVENTIONS:  - Identify patients at risk for skin breakdown  - Assess and monitor skin integrity  - Assess and monitor nutrition and hydration status  - Monitor labs   - Assess for incontinence   - Turn and reposition patient  - Assist with mobility/ambulation  - Relieve pressure over bony prominences  - Avoid friction and shearing  - Provide appropriate hygiene as needed including keeping skin clean and dry  - Evaluate need for skin moisturizer/barrier cream  - Collaborate with interdisciplinary team   - Patient/family teaching  - Consider wound care consult   12/23/2024 0814 by Suzanne Daniels RN  Outcome: Progressing  12/23/2024 0814 by Suzanne Daniels RN  Outcome: Progressing     Problem: CARDIOVASCULAR - ADULT  Goal: Maintains optimal cardiac output and hemodynamic stability  Description: INTERVENTIONS:  - Monitor I/O, vital signs and rhythm  - Monitor for S/S and trends of decreased cardiac output  - Administer and titrate ordered vasoactive medications to optimize hemodynamic stability  - Assess quality of pulses, skin color and temperature  - Assess for signs of decreased coronary artery perfusion  - Instruct patient to report change in severity of symptoms  12/23/2024 0814 by Suzanne Daniels RN  Outcome: Progressing  12/23/2024 0814 by Suzanne Daniels RN  Outcome: Progressing  Goal: Absence of cardiac dysrhythmias or at baseline rhythm  Description: INTERVENTIONS:  - Continuous cardiac monitoring, vital signs, obtain 12 lead EKG if ordered  - Administer antiarrhythmic and heart rate control medications as ordered  - Monitor electrolytes and administer replacement therapy as ordered  12/23/2024 0814 by Suzanne Daniels RN  Outcome: Progressing  12/23/2024 0814 by Suzanne Daniels RN  Outcome: Progressing     Problem: RESPIRATORY - ADULT  Goal: Achieves optimal ventilation and  oxygenation  Description: INTERVENTIONS:  - Assess for changes in respiratory status  - Assess for changes in mentation and behavior  - Position to facilitate oxygenation and minimize respiratory effort  - Oxygen administered by appropriate delivery if ordered  - Initiate smoking cessation education as indicated  - Encourage broncho-pulmonary hygiene including cough, deep breathe, Incentive Spirometry  - Assess the need for suctioning and aspirate as needed  - Assess and instruct to report SOB or any respiratory difficulty  - Respiratory Therapy support as indicated  Outcome: Progressing     Problem: METABOLIC, FLUID AND ELECTROLYTES - ADULT  Goal: Electrolytes maintained within normal limits  Description: INTERVENTIONS:  - Monitor labs and assess patient for signs and symptoms of electrolyte imbalances  - Administer electrolyte replacement as ordered  - Monitor response to electrolyte replacements, including repeat lab results as appropriate  - Instruct patient on fluid and nutrition as appropriate  Outcome: Progressing  Goal: Fluid balance maintained  Description: INTERVENTIONS:  - Monitor labs   - Monitor I/O and WT  - Instruct patient on fluid and nutrition as appropriate  - Assess for signs & symptoms of volume excess or deficit  Outcome: Progressing  Goal: Glucose maintained within target range  Description: INTERVENTIONS:  - Monitor Blood Glucose as ordered  - Assess for signs and symptoms of hyperglycemia and hypoglycemia  - Administer ordered medications to maintain glucose within target range  - Assess nutritional intake and initiate nutrition service referral as needed  Outcome: Progressing     Problem: SKIN/TISSUE INTEGRITY - ADULT  Goal: Skin Integrity remains intact(Skin Breakdown Prevention)  Description: Assess:  -Perform Omar assessment every shift  -Clean and moisturize skin every shift and PRN  -Inspect skin when repositioning, toileting, and assisting with ADLS  -Assess under medical devices  such as SCDs every 2 hours  -Assess extremities for adequate circulation and sensation     Bed Management:  -Have minimal linens on bed & keep smooth, unwrinkled  -Change linens as needed when moist or perspiring  -Avoid sitting or lying in one position for more than 2 hours while in bed  -Keep HOB at 30 degrees     Toileting:  -Offer bedside commode  -Assess for incontinence every 2 hours and PRN  -Use incontinent care products after each incontinent episode such as barrier cream    Activity:  -Mobilize patient 2 times a day  -Encourage activity and walks on unit  -Encourage or provide ROM exercises   -Turn and reposition patient every 2 Hours  -Use appropriate equipment to lift or move patient in bed  -Instruct/ Assist with weight shifting every hour when out of bed in chair  -Consider limitation of chair time 2 hour intervals    Skin Care:  -Avoid use of baby powder, tape, friction and shearing, hot water or constrictive clothing  -Relieve pressure over bony prominences using Allevyn  -Do not massage red bony areas    Next Steps:  -Teach patient strategies to minimize risks such as skincare   -Consider consults to  interdisciplinary teams such as wound care  Outcome: Progressing  Goal: Incision(s), wounds(s) or drain site(s) healing without S/S of infection  Description: INTERVENTIONS  - Assess and document dressing, incision, wound bed, drain sites and surrounding tissue  - Provide patient and family education  - Perform skin care/dressing changes every shift and PRN  Outcome: Progressing  Goal: Pressure injury heals and does not worsen  Description: Interventions:  - Implement low air loss mattress or specialty surface (Criteria met)  - Apply silicone foam dressing  - Instruct/assist with weight shifting every 30 minutes when in chair   - Limit chair time to 2 hour intervals  - Use special pressure reducing interventions such as waffle cushion when in chair   - Apply fecal or urinary incontinence containment  device   - Perform passive or active ROM every shift  - Turn and reposition patient & offload bony prominences every 2 hours   - Utilize friction reducing device or surface for transfers   - Consider consults to  interdisciplinary teams such as wound care  - Use incontinent care products after each incontinent episode such as barrier cream  - Consider nutrition services referral as needed  Outcome: Progressing

## 2024-12-23 NOTE — ASSESSMENT & PLAN NOTE
This seems to be chronic in nature and has increased to 47 as of December 23.  With decrease in hemoglobin level, check LDH and peripheral smear, also check HIT assay.

## 2024-12-23 NOTE — PROGRESS NOTES
d/c fat restriction to optimize kcal offered to pt noting borderline low BMI and muscle loss. +double PRO servings. D/c Nepro as pt does not like these.

## 2024-12-24 ENCOUNTER — APPOINTMENT (INPATIENT)
Dept: DIALYSIS | Facility: HOSPITAL | Age: 69
DRG: 291 | End: 2024-12-24
Payer: COMMERCIAL

## 2024-12-24 PROBLEM — N18.6 ESRD ON HEMODIALYSIS (HCC): Status: ACTIVE | Noted: 2024-12-24

## 2024-12-24 PROBLEM — Z99.2 ESRD ON HEMODIALYSIS (HCC): Status: ACTIVE | Noted: 2024-12-24

## 2024-12-24 PROCEDURE — 99232 SBSQ HOSP IP/OBS MODERATE 35: CPT | Performed by: INTERNAL MEDICINE

## 2024-12-24 RX ADMIN — HYDRALAZINE HYDROCHLORIDE 100 MG: 25 TABLET ORAL at 15:00

## 2024-12-24 RX ADMIN — TORSEMIDE 100 MG: 100 TABLET ORAL at 09:17

## 2024-12-24 RX ADMIN — ASPIRIN 81 MG: 81 TABLET, COATED ORAL at 09:17

## 2024-12-24 RX ADMIN — FOLIC ACID 1 MG: 1 TABLET ORAL at 09:17

## 2024-12-24 RX ADMIN — CARVEDILOL 18.75 MG: 6.25 TABLET, FILM COATED ORAL at 09:17

## 2024-12-24 RX ADMIN — ATORVASTATIN CALCIUM 20 MG: 20 TABLET, FILM COATED ORAL at 17:28

## 2024-12-24 RX ADMIN — CARVEDILOL 18.75 MG: 6.25 TABLET, FILM COATED ORAL at 17:28

## 2024-12-24 RX ADMIN — ISOSORBIDE MONONITRATE 120 MG: 60 TABLET, EXTENDED RELEASE ORAL at 09:17

## 2024-12-24 RX ADMIN — HYDRALAZINE HYDROCHLORIDE 100 MG: 25 TABLET ORAL at 05:53

## 2024-12-24 RX ADMIN — HYDRALAZINE HYDROCHLORIDE 100 MG: 25 TABLET ORAL at 21:18

## 2024-12-24 RX ADMIN — Medication 3 MG: at 21:19

## 2024-12-24 NOTE — PROGRESS NOTES
NEPHROLOGY PROGRESS NOTE    Matthew Alvarez 69 y.o. male MRN: [id number]  Unit/Bed#: -01 Encounter: [id number]  Reason for Consult: End-stage renal disease    The patient was awake just tired was sitting up on the edge of the bed leaning sideways.  He was in no distress.    ASSESSMENT/PLAN:    1.  Renal    The patient has end-stage renal disease and has been on dialysis now for about a month.  Dialysis is planned for this afternoon today.  Labs yesterday potassium 4.8 sodium was 126 mEq/L.  Hyponatremia is related to renal failure with limited water excretion ability    Hemodialysis today and next scheduled would be for Friday to keep on his normal  schedule.  Continue current medications  Monitor dry weight and volume status    2.  Cardiac    Patient with history of congestive heart failure significantly reduced LVEF to 20-25%.  On dialysis has had significant cardiorenal syndrome and renal failure.        SUBJECTIVE:  Review of Systems   Constitutional: Positive for malaise/fatigue. Negative for chills and diaphoresis.   HENT: Negative.     Cardiovascular:  Negative for chest pain and dyspnea on exertion.   Respiratory:  Negative for cough and shortness of breath.    Gastrointestinal:  Negative for abdominal pain, diarrhea, nausea and vomiting.       OBJECTIVE:  Current Weight: Weight - Scale: 54.7 kg (120 lb 9.6 oz)  Vitals:Temp (24hrs), Av °F (36.7 °C), Min:97.6 °F (36.4 °C), Max:98.6 °F (37 °C)  Current: Temperature: 98.6 °F (37 °C)   Blood pressure 159/79, pulse 81, temperature 98.6 °F (37 °C), resp. rate 18, weight 54.7 kg (120 lb 9.6 oz), SpO2 95%. , Body mass index is 19.47 kg/m².      Intake/Output Summary (Last 24 hours) at 2024 1006  Last data filed at 2024 0908  Gross per 24 hour   Intake 970 ml   Output 2500 ml   Net -1530 ml       Physical Exam: /79   Pulse 81   Temp 98.6 °F (37 °C)   Resp 18   Wt 54.7 kg (120 lb 9.6 oz)   SpO2 95%   BMI 19.47  kg/m²   Physical Exam  Constitutional:       Comments: Alert but sleepy.   HENT:      Head: Normocephalic and atraumatic.      Mouth/Throat:      Mouth: Mucous membranes are dry.   Eyes:      General: No scleral icterus.  Cardiovascular:      Rate and Rhythm: Normal rate and regular rhythm.      Heart sounds:      No friction rub. No gallop.   Pulmonary:      Effort: Pulmonary effort is normal. No respiratory distress.      Breath sounds: No wheezing or rales.   Abdominal:      General: Bowel sounds are normal.      Tenderness: There is no abdominal tenderness.         Medications:    Current Facility-Administered Medications:     acetaminophen (TYLENOL) tablet 650 mg, 650 mg, Oral, Q4H PRN, David Meraz MD, 650 mg at 12/18/24 1203    aspirin (ECOTRIN LOW STRENGTH) EC tablet 81 mg, 81 mg, Oral, Daily, David Meraz MD, 81 mg at 12/24/24 0917    atorvastatin (LIPITOR) tablet 20 mg, 20 mg, Oral, Daily With Dinner, YUE Griffin, 20 mg at 12/23/24 1713    carvedilol (COREG) tablet 18.75 mg, 18.75 mg, Oral, BID With Meals, Jensen Kelly DO, 18.75 mg at 12/24/24 0917    folic acid (FOLVITE) tablet 1 mg, 1 mg, Oral, Daily, Jensen Kelly DO, 1 mg at 12/24/24 0917    heparin (porcine) subcutaneous injection 5,000 Units, 5,000 Units, Subcutaneous, Q8H [facility], Shari Arceo MD    hydrALAZINE (APRESOLINE) injection 10 mg, 10 mg, Intravenous, Q6H PRN, Chris Nolasco DO, 10 mg at 12/19/24 2004    hydrALAZINE (APRESOLINE) tablet 100 mg, 100 mg, Oral, Q8H [facility], David Meraz MD, 100 mg at 12/24/24 0553    iron sucrose (VENOFER) 200 mg in sodium chloride 0.9 % 50 mL IVPB, 200 mg, Intravenous, Once per day on Monday Wednesday Friday, Leo Marshall, DO, Last Rate: 50 mL/hr at 12/23/24 1113, 200 mg at 12/23/24 1113    isosorbide mononitrate (IMDUR) 24 hr tablet 120 mg, 120 mg, Oral, Daily, David Meraz MD, 120 mg at 12/24/24 0917    melatonin tablet 3 mg, 3 mg, Oral, HS, David  "Carlos Meraz MD, 3 mg at 12/23/24 2146    torsemide (DEMADEX) tablet 100 mg, 100 mg, Oral, Daily, Jensen Kelly DO, 100 mg at 12/24/24 0917    Laboratory Results:  Lab Results   Component Value Date    WBC 6.37 12/23/2024    HGB 8.1 (L) 12/23/2024    HCT 25.7 (L) 12/23/2024    MCV 86 12/23/2024    PLT 61 (L) 12/23/2024     Lab Results   Component Value Date    SODIUM 126 (L) 12/23/2024    K 4.8 12/23/2024    CL 91 (L) 12/23/2024    CO2 29 12/23/2024    BUN 39 (H) 12/23/2024    CREATININE 3.84 (H) 12/23/2024    GLUC 132 12/23/2024    CALCIUM 7.9 (L) 12/23/2024     Lab Results   Component Value Date    CALCIUM 7.9 (L) 12/23/2024    PHOS 5.0 (H) 11/20/2024     No results found for: \"LABPROT\"      "

## 2024-12-24 NOTE — PLAN OF CARE
Target UF Goal  3.5  L as tolerated. Patient dialyzing for  3.5 hrs  on 2 K bath for serum K of  4.8  per protocol. Treatment plan reviewed with Nephrology.       Post-Dialysis RN Treatment Note    Blood Pressure:  Pre 113/96 mm/Hg  Post 179/97 mmHg   EDW  60 kg    Weight:  Pre 54.7 kg   Post 52.0 kg   Mode of weight measurement: Standing Scale   Volume Removed  3200 ml    Treatment duration  3 hrs and 30 mins   NS given  No    Treatment shortened? No   Medications given during Rx None Reported   Estimated Kt/V  1.22   Access type: Permacath/TDC   Access Issues: No   Needle Gauge: N/A   Report called to primary nurse   Yes, Arti RN at bedside        Problem: METABOLIC, FLUID AND ELECTROLYTES - ADULT  Goal: Electrolytes maintained within normal limits  Description: INTERVENTIONS:  - Monitor labs and assess patient for signs and symptoms of electrolyte imbalances  - Administer electrolyte replacement as ordered  - Monitor response to electrolyte replacements, including repeat lab results as appropriate  - Instruct patient on fluid and nutrition as appropriate  Outcome: Progressing  Goal: Fluid balance maintained  Description: INTERVENTIONS:  - Monitor labs   - Monitor I/O and WT  - Instruct patient on fluid and nutrition as appropriate  - Assess for signs & symptoms of volume excess or deficit  Outcome: Progressing

## 2024-12-25 PROBLEM — Z99.2 ACUTE HEMODIALYSIS PATIENT (HCC): Status: RESOLVED | Noted: 2024-11-14 | Resolved: 2024-12-25

## 2024-12-25 PROBLEM — E87.1 HYPONATREMIA: Status: RESOLVED | Noted: 2024-11-12 | Resolved: 2024-12-25

## 2024-12-25 PROBLEM — Z99.2 ESRD ON HEMODIALYSIS (HCC): Status: RESOLVED | Noted: 2024-12-24 | Resolved: 2024-12-25

## 2024-12-25 PROBLEM — N18.6 ESRD ON HEMODIALYSIS (HCC): Status: RESOLVED | Noted: 2024-12-24 | Resolved: 2024-12-25

## 2024-12-25 PROCEDURE — 99232 SBSQ HOSP IP/OBS MODERATE 35: CPT | Performed by: FAMILY MEDICINE

## 2024-12-25 PROCEDURE — 99232 SBSQ HOSP IP/OBS MODERATE 35: CPT

## 2024-12-25 RX ADMIN — HYDRALAZINE HYDROCHLORIDE 100 MG: 25 TABLET ORAL at 13:57

## 2024-12-25 RX ADMIN — ISOSORBIDE MONONITRATE 120 MG: 60 TABLET, EXTENDED RELEASE ORAL at 08:11

## 2024-12-25 RX ADMIN — TORSEMIDE 100 MG: 100 TABLET ORAL at 08:11

## 2024-12-25 RX ADMIN — HYDRALAZINE HYDROCHLORIDE 100 MG: 25 TABLET ORAL at 05:47

## 2024-12-25 RX ADMIN — IRON SUCROSE 200 MG: 20 INJECTION, SOLUTION INTRAVENOUS at 09:42

## 2024-12-25 RX ADMIN — ATORVASTATIN CALCIUM 20 MG: 20 TABLET, FILM COATED ORAL at 15:50

## 2024-12-25 RX ADMIN — CARVEDILOL 18.75 MG: 6.25 TABLET, FILM COATED ORAL at 08:11

## 2024-12-25 RX ADMIN — FOLIC ACID 1 MG: 1 TABLET ORAL at 08:11

## 2024-12-25 RX ADMIN — ASPIRIN 81 MG: 81 TABLET, COATED ORAL at 08:11

## 2024-12-25 RX ADMIN — Medication 3 MG: at 21:50

## 2024-12-25 RX ADMIN — CARVEDILOL 18.75 MG: 6.25 TABLET, FILM COATED ORAL at 15:50

## 2024-12-25 RX ADMIN — HYDRALAZINE HYDROCHLORIDE 100 MG: 25 TABLET ORAL at 21:50

## 2024-12-25 NOTE — ASSESSMENT & PLAN NOTE
Based off workup there does not appear to be any evidence of hemolysis while the HIT antibody panel remains pending.

## 2024-12-25 NOTE — ASSESSMENT & PLAN NOTE
He is getting IV Venofer today for iron deficiency anemia.  After this dose today he will have had a total of 9 g.

## 2024-12-25 NOTE — ASSESSMENT & PLAN NOTE
Bilateral pleural effusion not improving with dialysis treatment.  Underwent thoracentesis for the right side earlier in hospitalization.  Repeat chest x-ray still shows left-sided effusion.    Underwent thoracentesis with removal of 850 mL of fluid from the left side.

## 2024-12-25 NOTE — ASSESSMENT & PLAN NOTE
Patient presented  on (12/14) after sustaining a mechanical fall 2 to 3 prior to presentation  Patient states that he missed dialysis on Wednesday and presented for regularly scheduled session on Friday where he was noted with significant bruising and referred to the ED  Evidence of severe volume overload with weight significantly up from dry weight on presentation  Notably, patient with gross noncompliance with outpatient medical regimen   Presented (12/14) with a weight of 73.8 kg with dry weight of 60 kg.  Has been requiring daily hemodialysis with ultrafiltration.    Repeat chest x-ray shows slightly diminished right effusion and ongoing left-sided effusion.  Patient had received right-sided thoracentesis with 800 mL of fluid removal earlier in hospitalization and underwent left thoracentesis with 850 mL of fluid removal on 12/23.  Has been having daily ultrafiltration with removal of approximately 3 L of fluid daily.    Weight has declined from 160 to 118 pounds however still examines as volume overloaded.    Continues to require hospitalization for ultrafiltration and hemodialysis.  Nephrology closely following  Cardiology consulted for concomitant management  Monitor for electrolyte abnormality.  Continue with ongoing volume control with dialysis.  Cardiology adjusting his dry weight.  Wt Readings from Last 3 Encounters:   12/25/24 53.6 kg (118 lb 3.2 oz)   11/20/24 55.2 kg (121 lb 12.8 oz)   09/03/24 55.3 kg (122 lb)

## 2024-12-25 NOTE — ASSESSMENT & PLAN NOTE
Chronic thrombocytopenia.    No overt bleeding.  Continue to monitor hemoglobin and platelet count closely.

## 2024-12-25 NOTE — PROGRESS NOTES
Progress Note - Nephrology   Name: Matthew Alvarez 69 y.o. male I MRN: [id number]  Unit/Bed#: -01 I Date of Admission: 12/14/2024   Date of Service: 12/25/2024 I Hospital Day: 11     Assessment & Plan  Acute hemodialysis patient (HCC)  The patient was started on hemodialysis a few months ago during an earlier hospitalization.  There are reports that the patient is not compliant with his outpatient hemodialysis sessions.  Here the patient has been receiving near daily dialysis with ultrafiltration for volume overload.  His next treatment is scheduled for Friday per Dr. Ariza's note yesterday.  He is on torsemide 100 mg daily and is nonoliguric.  The patient did not have blood work drawn yesterday or today.  There is no emergent indication for HD from a respiratory standpoint but I cannot tell regarding an electrolyte or acid-base standpoint.  For tomorrow morning, December 26, I have ordered a CBC with differential, comprehensive metabolic panel and phosphorus level.  Acute on chronic systolic heart failure (HCC)  He has evidence of volume overload including bilateral lower extremity edema and bilateral pleural effusions undergoing bilateral thoracenteses.  His urine output is nonoliguric on torsemide 100 mg daily.  He is on a sodium and fluid restricted diet.  He has been receiving daily ultrafiltration to assist with volume removal.  Pleural effusion  Patient underwent bilateral thoracenteses during this hospitalization  Nonischemic cardiomyopathy (HCC)  Patient has a severely reduced left ventricular ejection fraction and cardiology has the patient on goal-directed medical therapy.  Follow-up with cardiology as an outpatient for dose titration to maximal tolerated doses.  He is significant volume overload on exam.  Primary hypertension  The patient's blood pressure this morning is about stable with systolic blood pressures averaging around 150.  There appears to be room for additional titration of  goal-directed medical therapy.  Anemia of renal disease  He is getting IV Venofer today for iron deficiency anemia.  After this dose today he will have had a total of 9 g.  Hyponatremia  This is in the setting of ESRD on HD and from what I understand from previous documentation, noncompliance with outpatient hemodialysis sessions  Secondary hyperparathyroidism of renal origin (HCC)  I will be checking a phosphorus level tomorrow to follow-up on mineral bone disease parameters.  He has hypocalcemia so the question is whether or not he also has hyperphosphatemia which we can use a calcium based phosphorus binder to our advantage.  Regarding the PTH level, this is usually monitored as an outpatient unless there is severe signs or symptoms of hypocalcemia.  Ambulatory dysfunction  PT/OT evaluation and follow-up  Coronary artery disease involving native coronary artery of native heart without angina pectoris    Thrombocytopenia (HCC)  Based off workup there does not appear to be any evidence of hemolysis while the HIT antibody panel remains pending.  ESRD on hemodialysis (HCC)  Lab Results   Component Value Date    EGFR 14 12/23/2024    EGFR 16 12/21/2024    EGFR 13 12/17/2024    CREATININE 3.84 (H) 12/23/2024    CREATININE 3.62 (H) 12/21/2024    CREATININE 4.09 (H) 12/17/2024   See plan above      Subjective   Brief History of Admission -69-year-old male with a history of nonischemic cardiomyopathy, chronic systolic heart failure, coronary artery disease, hypertension, ESRD on HD presenting with a fall.  Nephrology was consulted for management of ESRD.  Patient was found to be severely volume overloaded on exam and up until recently has been receiving daily dialysis for ultrafiltration with target UF goals of 2.5-3.5 L as tolerated.  Today is the Christmas holiday and for that reason he will not be receiving dialysis.  Per Dr. Ariza note yesterday his next hemodialysis treatment will be on Friday.  There were no labs  drawn today or yesterday but I will order a CMP, phosphorus and CBC with differential for the patient tomorrow morning.  The patient's last dialysis session was yesterday for 3 hours and 30 minutes with 3.2 L removed and post weight of 52 kg.  Patient seen and examined today.  He finished his entire breakfast this morning.  He denies chest pain, shortness of breath, nausea, vomiting, diarrhea, fevers or chills.  He is awaiting placement to rehab        Objective :  Temp:  [98.1 °F (36.7 °C)-98.8 °F (37.1 °C)] 98.8 °F (37.1 °C)  HR:  [60-81] 63  BP: ()/() 147/69  Resp:  [15-18] 15  SpO2:  [95 %-100 %] 96 %  O2 Device: Nasal cannula  Nasal Cannula O2 Flow Rate (L/min):  [2 L/min] 2 L/min    Current Weight: Weight - Scale: 53.6 kg (118 lb 3.2 oz)  First Weight: Weight - Scale: 73.9 kg (162 lb 14.7 oz) (Patient refused standing weight)  I/O         12/23 0701  12/24 0700 12/24 0701  12/25 0700    P.O. 660 1260    I.V. (mL/kg) 430 (7.9) 430 (8)    Total Intake(mL/kg) 1090 (19.9) 1690 (31.5)    Other 3350 3707    Total Output 3350 3707    Net -2260 -2017                Physical Exam  Vitals and nursing note reviewed.   Constitutional:       General: He is not in acute distress.     Appearance: He is well-developed.   HENT:      Head: Normocephalic and atraumatic.   Cardiovascular:      Rate and Rhythm: Normal rate and regular rhythm.      Heart sounds: No murmur heard.  Pulmonary:      Effort: Pulmonary effort is normal. No respiratory distress.      Breath sounds: Wheezing present.   Abdominal:      Palpations: Abdomen is soft.      Tenderness: There is no abdominal tenderness.   Musculoskeletal:      Right lower leg: Edema present.      Left lower leg: Edema present.   Skin:     General: Skin is warm and dry.      Capillary Refill: Capillary refill takes less than 2 seconds.   Neurological:      Mental Status: He is alert.   Psychiatric:         Mood and Affect: Mood normal.         Medications:    Current  Facility-Administered Medications:     acetaminophen (TYLENOL) tablet 650 mg, 650 mg, Oral, Q4H PRN, David Meraz MD, 650 mg at 12/18/24 1203    aspirin (ECOTRIN LOW STRENGTH) EC tablet 81 mg, 81 mg, Oral, Daily, David Meraz MD, 81 mg at 12/24/24 0917    atorvastatin (LIPITOR) tablet 20 mg, 20 mg, Oral, Daily With Dinner, YUE Griffin, 20 mg at 12/24/24 1728    carvedilol (COREG) tablet 18.75 mg, 18.75 mg, Oral, BID With Meals, Jensen Kelly DO, 18.75 mg at 12/24/24 1728    folic acid (FOLVITE) tablet 1 mg, 1 mg, Oral, Daily, Jensen Kelly DO, 1 mg at 12/24/24 0917    heparin (porcine) subcutaneous injection 5,000 Units, 5,000 Units, Subcutaneous, Q8H JEEVAN, Shari Arceo MD    hydrALAZINE (APRESOLINE) injection 10 mg, 10 mg, Intravenous, Q6H PRN, Chris Nolasco DO, 10 mg at 12/19/24 2004    hydrALAZINE (APRESOLINE) tablet 100 mg, 100 mg, Oral, Q8H JEEVAN, David Meraz MD, 100 mg at 12/25/24 0547    iron sucrose (VENOFER) 200 mg in sodium chloride 0.9 % 50 mL IVPB, 200 mg, Intravenous, Once per day on Monday Wednesday Friday, Leo Marshall DO, Last Rate: 50 mL/hr at 12/23/24 1113, 200 mg at 12/23/24 1113    isosorbide mononitrate (IMDUR) 24 hr tablet 120 mg, 120 mg, Oral, Daily, David Meraz MD, 120 mg at 12/24/24 0917    melatonin tablet 3 mg, 3 mg, Oral, HS, David Meraz MD, 3 mg at 12/24/24 2119    torsemide (DEMADEX) tablet 100 mg, 100 mg, Oral, Daily, Jensen Kelly DO, 100 mg at 12/24/24 0917      Lab Results: I have reviewed the following results:  Results from last 7 days   Lab Units 12/23/24  1402 12/22/24  0901 12/21/24  0953 12/18/24  0842   WBC Thousand/uL 6.37 4.83 4.60 4.56   HEMOGLOBIN g/dL 8.1* 7.9* 7.7* 8.9*   HEMATOCRIT % 25.7* 24.4* 24.3* 28.4*   PLATELETS Thousands/uL 61* 47* 34* 45*   POTASSIUM mmol/L 4.8  --  4.3  --    CHLORIDE mmol/L 91*  --  92*  --    CO2 mmol/L 29  --  28  --    BUN mg/dL 39*  --  41*  --    CREATININE  "mg/dL 3.84*  --  3.62*  --    CALCIUM mg/dL 7.9*  --  7.9*  --    ALBUMIN g/dL 3.3*  --   --   --        Administrative Statements     Portions of the record may have been created with voice recognition software. Occasional wrong word or \"sound a like\" substitutions may have occurred due to the inherent limitations of voice recognition software. Read the chart carefully and recognize, using context, where substitutions have occurred.If you have any questions, please contact the dictating provider.  "

## 2024-12-25 NOTE — ASSESSMENT & PLAN NOTE
The patient's blood pressure this morning is about stable with systolic blood pressures averaging around 150.  There appears to be room for additional titration of goal-directed medical therapy.

## 2024-12-25 NOTE — ASSESSMENT & PLAN NOTE
I will be checking a phosphorus level tomorrow to follow-up on mineral bone disease parameters.  He has hypocalcemia so the question is whether or not he also has hyperphosphatemia which we can use a calcium based phosphorus binder to our advantage.  Regarding the PTH level, this is usually monitored as an outpatient unless there is severe signs or symptoms of hypocalcemia.

## 2024-12-25 NOTE — NURSING NOTE
Patient found in bathroom unassisted. Pt admitted to turing off bed alarm and walking to bathroom on his own. Education provided on fall risk and need to call and wait for assistance to get out of bed. Pt returned to bed and bed alarm in place.

## 2024-12-25 NOTE — ASSESSMENT & PLAN NOTE
Dialysis, Monday, Wednesday, Friday.  Patient has been receiving hemodialysis daily with ultrafiltration on a daily basis.    Nonoliguric at baseline    Lab Results   Component Value Date    EGFR 14 12/23/2024    EGFR 16 12/21/2024    EGFR 13 12/17/2024    CREATININE 3.84 (H) 12/23/2024    CREATININE 3.62 (H) 12/21/2024    CREATININE 4.09 (H) 12/17/2024

## 2024-12-25 NOTE — ASSESSMENT & PLAN NOTE
Lab Results   Component Value Date    EGFR 14 12/23/2024    EGFR 16 12/21/2024    EGFR 13 12/17/2024    CREATININE 3.84 (H) 12/23/2024    CREATININE 3.62 (H) 12/21/2024    CREATININE 4.09 (H) 12/17/2024   See plan above

## 2024-12-25 NOTE — ASSESSMENT & PLAN NOTE
The patient was started on hemodialysis a few months ago during an earlier hospitalization.  There are reports that the patient is not compliant with his outpatient hemodialysis sessions.  Here the patient has been receiving near daily dialysis with ultrafiltration for volume overload.  His next treatment is scheduled for Friday per Dr. Ariza's note yesterday.  He is on torsemide 100 mg daily and is nonoliguric.  The patient did not have blood work drawn yesterday or today.  There is no emergent indication for HD from a respiratory standpoint but I cannot tell regarding an electrolyte or acid-base standpoint.  For tomorrow morning, December 26, I have ordered a CBC with differential, comprehensive metabolic panel and phosphorus level.

## 2024-12-25 NOTE — ASSESSMENT & PLAN NOTE
This is in the setting of ESRD on HD and from what I understand from previous documentation, noncompliance with outpatient hemodialysis sessions

## 2024-12-25 NOTE — ASSESSMENT & PLAN NOTE
Noncompliant with his home medications  Restarted oral hydralazine, Coreg  As needed intravenous hydralazine/labetalol as needed  Patient also significantly volume overloaded upon presentation which is likely a significant contributing factor.   Most recent UF session yesterday (12/24) with plan for next session Friday (12/27)

## 2024-12-25 NOTE — ASSESSMENT & PLAN NOTE
He has evidence of volume overload including bilateral lower extremity edema and bilateral pleural effusions undergoing bilateral thoracenteses.  His urine output is nonoliguric on torsemide 100 mg daily.  He is on a sodium and fluid restricted diet.  He has been receiving daily ultrafiltration to assist with volume removal.

## 2024-12-25 NOTE — ASSESSMENT & PLAN NOTE
Patient has a severely reduced left ventricular ejection fraction and cardiology has the patient on goal-directed medical therapy.  Follow-up with cardiology as an outpatient for dose titration to maximal tolerated doses.  He is significant volume overload on exam.

## 2024-12-25 NOTE — PROGRESS NOTES
Progress Note - Hospitalist   Name: Matthew Alvarez 69 y.o. male I MRN: [id number]  Unit/Bed#: -01 I Date of Admission: 12/14/2024   Date of Service: 12/25/2024 I Hospital Day: 11    Assessment & Plan  Acute on chronic systolic heart failure (HCC)  Patient presented  on (12/14) after sustaining a mechanical fall 2 to 3 prior to presentation  Patient states that he missed dialysis on Wednesday and presented for regularly scheduled session on Friday where he was noted with significant bruising and referred to the ED  Evidence of severe volume overload with weight significantly up from dry weight on presentation  Notably, patient with gross noncompliance with outpatient medical regimen   Presented (12/14) with a weight of 73.8 kg with dry weight of 60 kg.  Has been requiring daily hemodialysis with ultrafiltration.    Repeat chest x-ray shows slightly diminished right effusion and ongoing left-sided effusion.  Patient had received right-sided thoracentesis with 800 mL of fluid removal earlier in hospitalization and underwent left thoracentesis with 850 mL of fluid removal on 12/23.  Has been having daily ultrafiltration with removal of approximately 3 L of fluid daily.    Weight has declined from 160 to 118 pounds however still examines as volume overloaded.    Continues to require hospitalization for ultrafiltration and hemodialysis.  Nephrology closely following  Cardiology consulted for concomitant management  Monitor for electrolyte abnormality.  Continue with ongoing volume control with dialysis.  Cardiology adjusting his dry weight.  Wt Readings from Last 3 Encounters:   12/25/24 53.6 kg (118 lb 3.2 oz)   11/20/24 55.2 kg (121 lb 12.8 oz)   09/03/24 55.3 kg (122 lb)     ESRD (end stage renal disease) on dialysis (HCC)  Dialysis, Monday, Wednesday, Friday.  Patient has been receiving hemodialysis daily with ultrafiltration on a daily basis.    Nonoliguric at baseline    Lab Results   Component Value Date     "EGFR 14 12/23/2024    EGFR 16 12/21/2024    EGFR 13 12/17/2024    CREATININE 3.84 (H) 12/23/2024    CREATININE 3.62 (H) 12/21/2024    CREATININE 4.09 (H) 12/17/2024     Nonischemic cardiomyopathy (HCC)  November 11, 2024: Echo, EF of 20-24%  Noncompliant with all medications in the outpatient setting-unclear if taking any  Also, set up with LifeVest which patient states he wears \"as needed\"  GDMT limited by advanced end-stage kidney disease.  On GDMT with Coreg, Imdur, hydralazine.  Close outpatient cardiology follow-up on discharge  Maintain on telemetry monitor during hospitalization because of being on LifeVest.  Primary hypertension  Noncompliant with his home medications  Restarted oral hydralazine, Coreg  As needed intravenous hydralazine/labetalol as needed  Patient also significantly volume overloaded upon presentation which is likely a significant contributing factor.   Most recent UF session yesterday (12/24) with plan for next session Friday (12/27)  Ambulatory dysfunction  Patient states he fell at home, has bruising on his face  Will consult PT/OT/case management  He states he sometimes walks with an assistive device  Will likely require short-term rehab  Patient adamantly refusing rehab.  Discussed with patient's wife at length over the phone.  Coronary artery disease involving native coronary artery of native heart without angina pectoris  Continue aspirin  Mild nonobstructive coronary artery disease per cardiac catheterization in 9/2021.  Resume statin therapy.  Hyponatremia (Resolved: 12/25/2024)  Volume control with dialysis today.  Anemia of renal disease  Continue with IV iron 3 times weekly.  Currently no indication of blood transfusion.  Secondary hyperparathyroidism of renal origin ([facility])  Nephrology on board.  Follow-up phosphorus level  Pleural effusion  Bilateral pleural effusion not improving with dialysis treatment.  Underwent thoracentesis for the right side earlier in hospitalization.  " Repeat chest x-ray still shows left-sided effusion.    Underwent thoracentesis with removal of 850 mL of fluid from the left side.  Thrombocytopenia (HCC)  Chronic thrombocytopenia.    No overt bleeding.  Continue to monitor hemoglobin and platelet count closely.    VTE Pharmacologic Prophylaxis: VTE Score: 5 High Risk (Score >/= 5) - Pharmacological DVT Prophylaxis Ordered: heparin. Sequential Compression Devices Ordered.    Mobility:   Basic Mobility Inpatient Raw Score: 16  JH-HLM Goal: 5: Stand one or more mins  JH-HLM Achieved: 2: Bed activities/Dependent transfer  JH-HLM Goal achieved. Continue to encourage appropriate mobility.    Patient Centered Rounds: I performed bedside rounds with nursing staff today.   Discussions with Specialists or Other Care Team Provider:     Education and Discussions with Family / Patient: Patient declined call to .     Current Length of Stay: 11 day(s)  Current Patient Status: Inpatient   Certification Statement: The patient will continue to require additional inpatient hospital stay due to volume overload  Discharge Plan: Anticipate discharge in 24-48 hrs to home with home services.    Code Status: Level 1 - Full Code    Subjective   Examined at bedside.  Without complaint.  Without events reported by nursing overnight.    Objective :  Temp:  [98.1 °F (36.7 °C)-98.8 °F (37.1 °C)] 98.8 °F (37.1 °C)  HR:  [60-80] 66  BP: ()/() 151/71  Resp:  [15-20] 20  SpO2:  [95 %-100 %] 97 %  O2 Device: Nasal cannula  Nasal Cannula O2 Flow Rate (L/min):  [2 L/min] 2 L/min    Body mass index is 19.08 kg/m².     Input and Output Summary (last 24 hours):     Intake/Output Summary (Last 24 hours) at 12/25/2024 1230  Last data filed at 12/25/2024 1222  Gross per 24 hour   Intake 1550 ml   Output 3707 ml   Net -2157 ml       Physical Exam  Constitutional:       Appearance: He is ill-appearing.   HENT:      Head: Normocephalic and atraumatic.      Mouth/Throat:      Mouth:  Mucous membranes are moist.   Eyes:      Pupils: Pupils are equal, round, and reactive to light.   Abdominal:      General: Abdomen is flat. Bowel sounds are normal.      Palpations: Abdomen is soft.   Musculoskeletal:      Right lower leg: Edema present.      Left lower leg: Edema present.   Skin:     Comments: Resolving bruising left periorbital and neck region.   Neurological:      General: No focal deficit present.      Mental Status: He is alert and oriented to person, place, and time. Mental status is at baseline.       Lines/Drains:  Lines/Drains/Airways       Active Status       Name Placement date Placement time Site Days    HD Permanent Double Catheter 11/14/24  1352  Internal jugular  40                      Telemetry:  Telemetry Orders (From admission, onward)               LifeVest Patient: Continuous Telemetry Monitoring during hospitalization (non-expiring)  Continuous LifeVest Telemetry Monitoring        References:    LifeVest Policy                     Telemetry Reviewed: Normal Sinus Rhythm  Indication for Continued Telemetry Use: No indication for continued use. Will discontinue.                Lab Results: I have reviewed the following results:   Results from last 7 days   Lab Units 12/23/24  1402   WBC Thousand/uL 6.37   HEMOGLOBIN g/dL 8.1*   HEMATOCRIT % 25.7*   PLATELETS Thousands/uL 61*   SEGS PCT % 81*   LYMPHO PCT % 9*   MONO PCT % 8   EOS PCT % 1     Results from last 7 days   Lab Units 12/23/24  1402   SODIUM mmol/L 126*   POTASSIUM mmol/L 4.8   CHLORIDE mmol/L 91*   CO2 mmol/L 29   BUN mg/dL 39*   CREATININE mg/dL 3.84*   ANION GAP mmol/L 6   CALCIUM mg/dL 7.9*   ALBUMIN g/dL 3.3*   TOTAL BILIRUBIN mg/dL 1.17*   ALK PHOS U/L 118*   ALT U/L 15   AST U/L 28   GLUCOSE RANDOM mg/dL 132         Results from last 7 days   Lab Units 12/23/24  1601   POC GLUCOSE mg/dl 157*               Recent Cultures (last 7 days):   Results from last 7 days   Lab Units 12/23/24  0953 12/18/24  1544   GRAM  STAIN RESULT  No Polys or Bacteria seen 1+ Polys  No bacteria seen   BODY FLUID CULTURE, STERILE  No growth No growth       Imaging Results Review: No pertinent imaging studies reviewed.  Other Study Results Review: No additional pertinent studies reviewed.    Last 24 Hours Medication List:     Current Facility-Administered Medications:     acetaminophen (TYLENOL) tablet 650 mg, Q4H PRN    aspirin (ECOTRIN LOW STRENGTH) EC tablet 81 mg, Daily    atorvastatin (LIPITOR) tablet 20 mg, Daily With Dinner    carvedilol (COREG) tablet 18.75 mg, BID With Meals    folic acid (FOLVITE) tablet 1 mg, Daily    heparin (porcine) subcutaneous injection 5,000 Units, Q8H JEEVAN    hydrALAZINE (APRESOLINE) injection 10 mg, Q6H PRN    hydrALAZINE (APRESOLINE) tablet 100 mg, Q8H JEEVAN    isosorbide mononitrate (IMDUR) 24 hr tablet 120 mg, Daily    melatonin tablet 3 mg, HS    torsemide (DEMADEX) tablet 100 mg, Daily    Administrative Statements   Today, Patient Was Seen By: Chris Nolasco, DO  I have spent a total time of 33 minutes in caring for this patient on the day of the visit/encounter including Diagnostic results, Instructions for management, Importance of tx compliance, Impressions, and Documenting in the medical record.    **Please Note: This note may have been constructed using a voice recognition system.**

## 2024-12-26 LAB
ALBUMIN SERPL BCG-MCNC: 3.3 G/DL (ref 3.5–5)
ALP SERPL-CCNC: 137 U/L (ref 34–104)
ALT SERPL W P-5'-P-CCNC: 16 U/L (ref 7–52)
ANION GAP SERPL CALCULATED.3IONS-SCNC: 9 MMOL/L (ref 4–13)
AST SERPL W P-5'-P-CCNC: 26 U/L (ref 13–39)
BACTERIA SPEC BFLD CULT: NO GROWTH
BASOPHILS # BLD AUTO: 0.04 THOUSANDS/ÂΜL (ref 0–0.1)
BASOPHILS NFR BLD AUTO: 1 % (ref 0–1)
BILIRUB SERPL-MCNC: 1.2 MG/DL (ref 0.2–1)
BUN SERPL-MCNC: 40 MG/DL (ref 5–25)
CALCIUM ALBUM COR SERPL-MCNC: 9 MG/DL (ref 8.3–10.1)
CALCIUM SERPL-MCNC: 8.4 MG/DL (ref 8.4–10.2)
CHLORIDE SERPL-SCNC: 89 MMOL/L (ref 96–108)
CO2 SERPL-SCNC: 25 MMOL/L (ref 21–32)
CREAT SERPL-MCNC: 4.41 MG/DL (ref 0.6–1.3)
EOSINOPHIL # BLD AUTO: 0.08 THOUSAND/ÂΜL (ref 0–0.61)
EOSINOPHIL NFR BLD AUTO: 1 % (ref 0–6)
ERYTHROCYTE [DISTWIDTH] IN BLOOD BY AUTOMATED COUNT: 19.7 % (ref 11.6–15.1)
GFR SERPL CREATININE-BSD FRML MDRD: 12 ML/MIN/1.73SQ M
GLUCOSE SERPL-MCNC: 92 MG/DL (ref 65–140)
GRAM STN SPEC: NORMAL
HCT VFR BLD AUTO: 22.8 % (ref 36.5–49.3)
HGB BLD-MCNC: 7.6 G/DL (ref 12–17)
IMM GRANULOCYTES # BLD AUTO: 0.04 THOUSAND/UL (ref 0–0.2)
IMM GRANULOCYTES NFR BLD AUTO: 1 % (ref 0–2)
LYMPHOCYTES # BLD AUTO: 0.55 THOUSANDS/ÂΜL (ref 0.6–4.47)
LYMPHOCYTES NFR BLD AUTO: 10 % (ref 14–44)
MAGNESIUM SERPL-MCNC: 1.9 MG/DL (ref 1.9–2.7)
MCH RBC QN AUTO: 27.7 PG (ref 26.8–34.3)
MCHC RBC AUTO-ENTMCNC: 33.3 G/DL (ref 31.4–37.4)
MCV RBC AUTO: 83 FL (ref 82–98)
MONOCYTES # BLD AUTO: 0.56 THOUSAND/ÂΜL (ref 0.17–1.22)
MONOCYTES NFR BLD AUTO: 10 % (ref 4–12)
NEUTROPHILS # BLD AUTO: 4.39 THOUSANDS/ÂΜL (ref 1.85–7.62)
NEUTS SEG NFR BLD AUTO: 77 % (ref 43–75)
NRBC BLD AUTO-RTO: 0 /100 WBCS
PHOSPHATE SERPL-MCNC: 2.9 MG/DL (ref 2.3–4.1)
PLATELET # BLD AUTO: 79 THOUSANDS/UL (ref 149–390)
PMV BLD AUTO: 11.3 FL (ref 8.9–12.7)
POTASSIUM SERPL-SCNC: 4.8 MMOL/L (ref 3.5–5.3)
PROT SERPL-MCNC: 7 G/DL (ref 6.4–8.4)
RBC # BLD AUTO: 2.74 MILLION/UL (ref 3.88–5.62)
SODIUM SERPL-SCNC: 123 MMOL/L (ref 135–147)
WBC # BLD AUTO: 5.66 THOUSAND/UL (ref 4.31–10.16)

## 2024-12-26 PROCEDURE — 99232 SBSQ HOSP IP/OBS MODERATE 35: CPT | Performed by: INTERNAL MEDICINE

## 2024-12-26 PROCEDURE — 99232 SBSQ HOSP IP/OBS MODERATE 35: CPT | Performed by: FAMILY MEDICINE

## 2024-12-26 PROCEDURE — 83735 ASSAY OF MAGNESIUM: CPT | Performed by: FAMILY MEDICINE

## 2024-12-26 PROCEDURE — 80053 COMPREHEN METABOLIC PANEL: CPT

## 2024-12-26 PROCEDURE — 85025 COMPLETE CBC W/AUTO DIFF WBC: CPT

## 2024-12-26 PROCEDURE — 84100 ASSAY OF PHOSPHORUS: CPT

## 2024-12-26 RX ADMIN — CARVEDILOL 18.75 MG: 6.25 TABLET, FILM COATED ORAL at 15:56

## 2024-12-26 RX ADMIN — HYDRALAZINE HYDROCHLORIDE 100 MG: 25 TABLET ORAL at 05:32

## 2024-12-26 RX ADMIN — HYDRALAZINE HYDROCHLORIDE 100 MG: 25 TABLET ORAL at 21:54

## 2024-12-26 RX ADMIN — ATORVASTATIN CALCIUM 20 MG: 20 TABLET, FILM COATED ORAL at 15:56

## 2024-12-26 RX ADMIN — CARVEDILOL 18.75 MG: 6.25 TABLET, FILM COATED ORAL at 09:03

## 2024-12-26 RX ADMIN — TORSEMIDE 100 MG: 100 TABLET ORAL at 09:03

## 2024-12-26 RX ADMIN — Medication 3 MG: at 21:54

## 2024-12-26 RX ADMIN — FOLIC ACID 1 MG: 1 TABLET ORAL at 09:03

## 2024-12-26 RX ADMIN — HYDRALAZINE HYDROCHLORIDE 100 MG: 25 TABLET ORAL at 15:56

## 2024-12-26 RX ADMIN — ASPIRIN 81 MG: 81 TABLET, COATED ORAL at 09:03

## 2024-12-26 RX ADMIN — ISOSORBIDE MONONITRATE 120 MG: 60 TABLET, EXTENDED RELEASE ORAL at 09:03

## 2024-12-26 NOTE — ASSESSMENT & PLAN NOTE
Continue aspirin  Mild nonobstructive coronary artery disease per cardiac catheterization in 9/2021.    Resumed statin therapy.

## 2024-12-26 NOTE — PLAN OF CARE
Problem: Prexisting or High Potential for Compromised Skin Integrity  Goal: Skin integrity is maintained or improved  Description: INTERVENTIONS:  - Identify patients at risk for skin breakdown  - Assess and monitor skin integrity  - Assess and monitor nutrition and hydration status  - Monitor labs   - Assess for incontinence   - Turn and reposition patient  - Assist with mobility/ambulation  - Relieve pressure over bony prominences  - Avoid friction and shearing  - Provide appropriate hygiene as needed including keeping skin clean and dry  - Evaluate need for skin moisturizer/barrier cream  - Collaborate with interdisciplinary team   - Patient/family teaching  - Consider wound care consult   Outcome: Progressing     Problem: CARDIOVASCULAR - ADULT  Goal: Maintains optimal cardiac output and hemodynamic stability  Description: INTERVENTIONS:  - Monitor I/O, vital signs and rhythm  - Monitor for S/S and trends of decreased cardiac output  - Administer and titrate ordered vasoactive medications to optimize hemodynamic stability  - Assess quality of pulses, skin color and temperature  - Assess for signs of decreased coronary artery perfusion  - Instruct patient to report change in severity of symptoms  Outcome: Progressing  Goal: Absence of cardiac dysrhythmias or at baseline rhythm  Description: INTERVENTIONS:  - Continuous cardiac monitoring, vital signs, obtain 12 lead EKG if ordered  - Administer antiarrhythmic and heart rate control medications as ordered  - Monitor electrolytes and administer replacement therapy as ordered  Outcome: Progressing     Problem: RESPIRATORY - ADULT  Goal: Achieves optimal ventilation and oxygenation  Description: INTERVENTIONS:  - Assess for changes in respiratory status  - Assess for changes in mentation and behavior  - Position to facilitate oxygenation and minimize respiratory effort  - Oxygen administered by appropriate delivery if ordered  - Initiate smoking cessation education  as indicated  - Encourage broncho-pulmonary hygiene including cough, deep breathe, Incentive Spirometry  - Assess the need for suctioning and aspirate as needed  - Assess and instruct to report SOB or any respiratory difficulty  - Respiratory Therapy support as indicated  Outcome: Progressing     Problem: METABOLIC, FLUID AND ELECTROLYTES - ADULT  Goal: Electrolytes maintained within normal limits  Description: INTERVENTIONS:  - Monitor labs and assess patient for signs and symptoms of electrolyte imbalances  - Administer electrolyte replacement as ordered  - Monitor response to electrolyte replacements, including repeat lab results as appropriate  - Instruct patient on fluid and nutrition as appropriate  Outcome: Progressing  Goal: Fluid balance maintained  Description: INTERVENTIONS:  - Monitor labs   - Monitor I/O and WT  - Instruct patient on fluid and nutrition as appropriate  - Assess for signs & symptoms of volume excess or deficit  Outcome: Progressing  Goal: Glucose maintained within target range  Description: INTERVENTIONS:  - Monitor Blood Glucose as ordered  - Assess for signs and symptoms of hyperglycemia and hypoglycemia  - Administer ordered medications to maintain glucose within target range  - Assess nutritional intake and initiate nutrition service referral as needed  Outcome: Progressing     Problem: SKIN/TISSUE INTEGRITY - ADULT  Goal: Skin Integrity remains intact(Skin Breakdown Prevention)  Description: Assess:  -Perform Omar assessment every ***  -Clean and moisturize skin every ***  -Inspect skin when repositioning, toileting, and assisting with ADLS  -Assess under medical devices such as *** every ***  -Assess extremities for adequate circulation and sensation     Bed Management:  -Have minimal linens on bed & keep smooth, unwrinkled  -Change linens as needed when moist or perspiring  -Avoid sitting or lying in one position for more than *** hours while in bed  -Keep HOB at ***degrees      Toileting:  -Offer bedside commode  -Assess for incontinence every ***  -Use incontinent care products after each incontinent episode such as ***    Activity:  -Mobilize patient *** times a day  -Encourage activity and walks on unit  -Encourage or provide ROM exercises   -Turn and reposition patient every *** Hours  -Use appropriate equipment to lift or move patient in bed  -Instruct/ Assist with weight shifting every *** when out of bed in chair  -Consider limitation of chair time *** hour intervals    Skin Care:  -Avoid use of baby powder, tape, friction and shearing, hot water or constrictive clothing  -Relieve pressure over bony prominences using ***  -Do not massage red bony areas    Next Steps:  -Teach patient strategies to minimize risks such as ***   -Consider consults to  interdisciplinary teams such as ***  Outcome: Progressing  Goal: Incision(s), wounds(s) or drain site(s) healing without S/S of infection  Description: INTERVENTIONS  - Assess and document dressing, incision, wound bed, drain sites and surrounding tissue  - Provide patient and family education  - Perform skin care/dressing changes every ***  Outcome: Progressing  Goal: Pressure injury heals and does not worsen  Description: Interventions:  - Implement low air loss mattress or specialty surface (Criteria met)  - Apply silicone foam dressing  - Instruct/assist with weight shifting every *** minutes when in chair   - Limit chair time to *** hour intervals  - Use special pressure reducing interventions such as *** when in chair   - Apply fecal or urinary incontinence containment device   - Perform passive or active ROM every ***  - Turn and reposition patient & offload bony prominences every *** hours   - Utilize friction reducing device or surface for transfers   - Consider consults to  interdisciplinary teams such as ***  - Use incontinent care products after each incontinent episode such as ***  - Consider nutrition services referral as  needed  Outcome: Progressing     Problem: MUSCULOSKELETAL - ADULT  Goal: Maintain or return mobility to safest level of function  Description: INTERVENTIONS:  - Assess patient's ability to carry out ADLs; assess patient's baseline for ADL function and identify physical deficits which impact ability to perform ADLs (bathing, care of mouth/teeth, toileting, grooming, dressing, etc.)  - Assess/evaluate cause of self-care deficits   - Assess range of motion  - Assess patient's mobility  - Assess patient's need for assistive devices and provide as appropriate  - Encourage maximum independence but intervene and supervise when necessary  - Involve family in performance of ADLs  - Assess for home care needs following discharge   - Consider OT consult to assist with ADL evaluation and planning for discharge  - Provide patient education as appropriate  Outcome: Progressing  Goal: Maintain proper alignment of affected body part  Description: INTERVENTIONS:  - Support, maintain and protect limb and body alignment  - Provide patient/ family with appropriate education  Outcome: Progressing     Problem: Nutrition/Hydration-ADULT  Goal: Nutrient/Hydration intake appropriate for improving, restoring or maintaining nutritional needs  Description: Monitor and assess patient's nutrition/hydration status for malnutrition. Collaborate with interdisciplinary team and initiate plan and interventions as ordered.  Monitor patient's weight and dietary intake as ordered or per policy. Utilize nutrition screening tool and intervene as necessary. Determine patient's food preferences and provide high-protein, high-caloric foods as appropriate.     INTERVENTIONS:  - Monitor oral intake, urinary output, labs, and treatment plans  - Assess nutrition and hydration status and recommend course of action  - Evaluate amount of meals eaten  - Assist patient with eating if necessary   - Allow adequate time for meals  - Recommend/ encourage appropriate  diets, oral nutritional supplements, and vitamin/mineral supplements  - Order, calculate, and assess calorie counts as needed  - Recommend, monitor, and adjust tube feedings and TPN/PPN based on assessed needs  - Assess need for intravenous fluids  - Provide specific nutrition/hydration education as appropriate  - Include patient/family/caregiver in decisions related to nutrition  Outcome: Progressing

## 2024-12-26 NOTE — ASSESSMENT & PLAN NOTE
Patient presented  on (12/14) after sustaining a mechanical fall 2 to 3 prior to presentation  Patient states that he missed dialysis on Wednesday and presented for regularly scheduled session on Friday where he was noted with significant bruising and referred to the ED  Evidence of severe volume overload with weight significantly up from dry weight on presentation  Notably, patient with gross noncompliance with outpatient medical regimen   Presented (12/14) with a weight of 73.8 kg with dry weight of ? 60 kg.  Had been requiring daily hemodialysis with ultrafiltration.    Patient had received right-sided thoracentesis with 800 mL of fluid removal earlier in hospitalization and underwent left thoracentesis with 850 mL of fluid removal on 12/23.  Had been having daily ultrafiltration since presentation (12/14) with removal of approximately 3 L of fluid daily.    Weight has declined from 160 to 118 pounds however still examines as volume overloaded.    Continues to require hospitalization for ultrafiltration and hemodialysis.  Nephrology closely following  Cardiology consulted for concomitant management  Monitor for electrolyte abnormality.  Continue with ongoing volume control with dialysis.  Cardiology adjusting his dry weight.  Wt Readings from Last 3 Encounters:   12/26/24 54.4 kg (120 lb)   11/20/24 55.2 kg (121 lb 12.8 oz)   09/03/24 55.3 kg (122 lb)   Last HD was (12/24)-plan to wait till Friday (12/27)-assess for reaccumulation  Today, weight stable without respiratory compromise  Has evolved some hyponatremia

## 2024-12-26 NOTE — QUICK NOTE
This LPN witnessed both of patient's visitors attempting to use controls on foot of bed to turn off bed alarm when alarming as patient changed positions. Visitors educated why it is important to let staff control the bed alarm and controls.

## 2024-12-26 NOTE — PROGRESS NOTES
NEPHROLOGY PROGRESS NOTE    Matthew Alvarez 69 y.o. male MRN: [id number]  Unit/Bed#: -01 Encounter: [id number]  Reason for Consult: End-stage renal disease    The patient was resting when I went into the bed I awakened him says that he was stable relatively with no changes in his overall clinical state.  He had no acute complaints for me.    ASSESSMENT/PLAN:    1.  Renal    Patient history of chronic kidney disease has cardiorenal syndrome and now is dialysis dependent and appears to have end-stage renal disease requiring intermittent dialysis .  The patient underwent dialysis yesterday and no comments of any complications noted in the dialysis nurses note from yesterday afternoon.    Labs today potassium is 4.8 sodium concentration 123 mEq/L.  Hyponatremia primarily related to end-stage renal disease impairing ability to excrete water loads on fluid restriction.  Hemoglobin 7.6.    Hemodialysis   Will increase dialysate flow rate to 800 cc/min to see if this makes a difference on hyponatremia.  Continue current medications  Continue fluid restriction    2.  Cardiac    Patient with cardiomyopathy LVEF 20-25%.  PT/OT and try to keep dry weight optimized.        SUBJECTIVE:  Review of Systems   Constitutional: Positive for malaise/fatigue. Negative for chills, diaphoresis and fever.   HENT: Negative.     Eyes: Negative.    Cardiovascular:  Negative for chest pain and orthopnea.   Respiratory:  Negative for cough and shortness of breath.    Gastrointestinal:  Negative for abdominal pain, diarrhea, nausea and vomiting.       OBJECTIVE:  Current Weight: Weight - Scale: 54.4 kg (120 lb)  Vitals:Temp (24hrs), Av.9 °F (36.6 °C), Min:97.9 °F (36.6 °C), Max:97.9 °F (36.6 °C)  Current: Temperature: 97.9 °F (36.6 °C)   Blood pressure 159/72, pulse 64, temperature 97.9 °F (36.6 °C), resp. rate 19, weight 54.4 kg (120 lb), SpO2 96%. , Body mass index is 19.37  kg/m².      Intake/Output Summary (Last 24 hours) at 12/26/2024 0958  Last data filed at 12/26/2024 0901  Gross per 24 hour   Intake 1022 ml   Output 0 ml   Net 1022 ml       Physical Exam: /72   Pulse 64   Temp 97.9 °F (36.6 °C)   Resp 19   Wt 54.4 kg (120 lb)   SpO2 96%   BMI 19.37 kg/m²   Physical Exam  Constitutional:       General: He is not in acute distress.     Appearance: He is not toxic-appearing.   HENT:      Head: Normocephalic and atraumatic.      Mouth/Throat:      Mouth: Mucous membranes are dry.   Eyes:      General: No scleral icterus.  Cardiovascular:      Rate and Rhythm: Normal rate and regular rhythm.      Heart sounds:      No friction rub. No gallop.      Comments: Positive edema.  Pulmonary:      Effort: Pulmonary effort is normal. No respiratory distress.      Breath sounds: No wheezing or rales.   Abdominal:      General: Bowel sounds are normal. There is no distension.      Palpations: Abdomen is soft.   Neurological:      Mental Status: He is alert.         Medications:    Current Facility-Administered Medications:     acetaminophen (TYLENOL) tablet 650 mg, 650 mg, Oral, Q4H PRN, David Meraz MD, 650 mg at 12/18/24 1203    aspirin (ECOTRIN LOW STRENGTH) EC tablet 81 mg, 81 mg, Oral, Daily, David Meraz MD, 81 mg at 12/26/24 0903    atorvastatin (LIPITOR) tablet 20 mg, 20 mg, Oral, Daily With Dinner, YUE Griffin, 20 mg at 12/25/24 1550    carvedilol (COREG) tablet 18.75 mg, 18.75 mg, Oral, BID With Meals, Jensen Kelly DO, 18.75 mg at 12/26/24 0903    folic acid (FOLVITE) tablet 1 mg, 1 mg, Oral, Daily, Jensen Kelly DO, 1 mg at 12/26/24 0903    heparin (porcine) subcutaneous injection 5,000 Units, 5,000 Units, Subcutaneous, Q8H JEEVAN, Shari Arceo MD    hydrALAZINE (APRESOLINE) injection 10 mg, 10 mg, Intravenous, Q6H PRN, Chris Nolasco DO, 10 mg at 12/19/24 2004    hydrALAZINE (APRESOLINE) tablet 100 mg, 100 mg, Oral, Q8H JEEVAN, David  "Carlos Meraz MD, 100 mg at 12/26/24 0532    isosorbide mononitrate (IMDUR) 24 hr tablet 120 mg, 120 mg, Oral, Daily, David Meraz MD, 120 mg at 12/26/24 0903    melatonin tablet 3 mg, 3 mg, Oral, HS, Dvaid Meraz MD, 3 mg at 12/25/24 2150    torsemide (DEMADEX) tablet 100 mg, 100 mg, Oral, Daily, Jensen Kelly DO, 100 mg at 12/26/24 0903    Laboratory Results:  Lab Results   Component Value Date    WBC 5.66 12/26/2024    HGB 7.6 (L) 12/26/2024    HCT 22.8 (L) 12/26/2024    MCV 83 12/26/2024    PLT 79 (L) 12/26/2024     Lab Results   Component Value Date    SODIUM 123 (L) 12/26/2024    K 4.8 12/26/2024    CL 89 (L) 12/26/2024    CO2 25 12/26/2024    BUN 40 (H) 12/26/2024    CREATININE 4.41 (H) 12/26/2024    GLUC 92 12/26/2024    CALCIUM 8.4 12/26/2024     Lab Results   Component Value Date    CALCIUM 8.4 12/26/2024    PHOS 2.9 12/26/2024     No results found for: \"LABPROT\"    "

## 2024-12-26 NOTE — ASSESSMENT & PLAN NOTE
Noncompliant with his home medications  Restarted oral hydralazine, Coreg  As needed intravenous hydralazine/labetalol as needed  Patient also significantly volume overloaded upon presentation which is likely a significant contributing factor.   Most recent UF session (12/24) with plan for next session Friday (12/27)

## 2024-12-26 NOTE — PROGRESS NOTES
Progress Note - Hospitalist   Name: Matthew Alvarez 69 y.o. male I MRN: [id number]  Unit/Bed#: -01 I Date of Admission: 12/14/2024   Date of Service: 12/26/2024 I Hospital Day: 12    Assessment & Plan  Acute on chronic systolic heart failure (HCC)  Patient presented  on (12/14) after sustaining a mechanical fall 2 to 3 prior to presentation  Patient states that he missed dialysis on Wednesday and presented for regularly scheduled session on Friday where he was noted with significant bruising and referred to the ED  Evidence of severe volume overload with weight significantly up from dry weight on presentation  Notably, patient with gross noncompliance with outpatient medical regimen   Presented (12/14) with a weight of 73.8 kg with dry weight of ? 60 kg.  Had been requiring daily hemodialysis with ultrafiltration.    Patient had received right-sided thoracentesis with 800 mL of fluid removal earlier in hospitalization and underwent left thoracentesis with 850 mL of fluid removal on 12/23.  Had been having daily ultrafiltration since presentation (12/14) with removal of approximately 3 L of fluid daily.    Weight has declined from 160 to 118 pounds however still examines as volume overloaded.    Continues to require hospitalization for ultrafiltration and hemodialysis.  Nephrology closely following  Cardiology consulted for concomitant management  Monitor for electrolyte abnormality.  Continue with ongoing volume control with dialysis.  Cardiology adjusting his dry weight.  Wt Readings from Last 3 Encounters:   12/26/24 54.4 kg (120 lb)   11/20/24 55.2 kg (121 lb 12.8 oz)   09/03/24 55.3 kg (122 lb)   Last HD was (12/24)-plan to wait till Friday (12/27)-assess for reaccumulation  Today, weight stable without respiratory compromise  Has evolved some hyponatremia  ESRD (end stage renal disease) on dialysis (HCC)  Dialysis, Monday, Wednesday, Friday.  Patient has been receiving hemodialysis daily with  "ultrafiltration on a daily basis since presentation-trialing dialysis holiday with next HD/UF tomorrow (12/27)  Nonoliguric at baseline    Lab Results   Component Value Date    EGFR 12 12/26/2024    EGFR 14 12/23/2024    EGFR 16 12/21/2024    CREATININE 4.41 (H) 12/26/2024    CREATININE 3.84 (H) 12/23/2024    CREATININE 3.62 (H) 12/21/2024     Nonischemic cardiomyopathy (HCC)  November 11, 2024: Echo, EF of 20-24%  Noncompliant with all medications in the outpatient setting-unclear if taking any  Also, set up with LifeVest which patient states he wears \"as needed\"  GDMT limited by advanced end-stage kidney disease.  On GDMT with Coreg, Imdur, hydralazine.  Close outpatient cardiology and nephrology follow-up on discharge  Maintain on telemetry monitor during hospitalization because of being on LifeVest.    Lengthy discussion at bedside (12/25) regarding overall prognosis-patient verifies that he would like to continue with volume unloading with HD  Offered the option of palliative care/hospice-patient wants to continue with volume unloading and dialysis in the outpatient setting-poor insight as to severity of underlying illness  With that said, stressed importance of strict adherence to medical regimen in the outpatient setting upon discharge  Primary hypertension  Noncompliant with his home medications  Restarted oral hydralazine, Coreg  As needed intravenous hydralazine/labetalol as needed  Patient also significantly volume overloaded upon presentation which is likely a significant contributing factor.   Most recent UF session (12/24) with plan for next session Friday (12/27)  Ambulatory dysfunction  Patient states he fell at home, has bruising on his face  Will consult PT/OT/case management  He states he sometimes walks with an assistive device  Will likely require short-term rehab  Patient adamantly refusing rehab.  Discussed with patient's wife at length over the phone.  Coronary artery disease involving native " "coronary artery of native heart without angina pectoris  Continue aspirin  Mild nonobstructive coronary artery disease per cardiac catheterization in 9/2021.    Resumed statin therapy.  Anemia of renal disease  Continue with IV iron 3 times weekly.    Currently no indication of blood transfusion.  Secondary hyperparathyroidism of renal origin ([facility])  Nephrology on board.  Follow-up phosphorus level  Pleural effusion  Bilateral pleural effusion not improving with dialysis treatment.  Underwent thoracentesis for the right side earlier in hospitalization.  Repeat chest x-ray still shows left-sided effusion.    Underwent thoracentesis with removal of 850 mL of fluid from the left side.  Thrombocytopenia (HCC)  Chronic thrombocytopenia.    No overt bleeding.  Continue to monitor hemoglobin and platelet count closely.    VTE Pharmacologic Prophylaxis: VTE Score: 5 High Risk (Score >/= 5) - Pharmacological DVT Prophylaxis Ordered: heparin. Sequential Compression Devices Ordered.    Mobility:   Basic Mobility Inpatient Raw Score: 15  -HLM Goal: 4: Move to chair/commode  JH-HLM Achieved: 3: Sit at edge of bed  JH-HLM Goal NOT achieved. Continue with multidisciplinary rounding and encourage appropriate mobility to improve upon JH-HLM goals.    Patient Centered Rounds: I performed bedside rounds with nursing staff today.   Discussions with Specialists or Other Care Team Provider: Nephrology    Education and Discussions with Family / Patient: Patient declined call to .     Current Length of Stay: 12 day(s)  Current Patient Status: Inpatient   Certification Statement: The patient will continue to require additional inpatient hospital stay due to volume overload  Discharge Plan: Anticipate discharge in 48-72 hrs to home with home services.    Code Status: Level 1 - Full Code    Subjective   Patient examined at bedside.  States he feels \"not worse\" denies abdominal pain, nausea, vomiting, shortness of breath, chest " pressure.  No events reported per nursing overnight.    Objective :  Temp:  [97.9 °F (36.6 °C)] 97.9 °F (36.6 °C)  HR:  [62-68] 64  BP: (138-167)/(67-78) 159/72  Resp:  [16-19] 19  SpO2:  [96 %-98 %] 96 %  O2 Device: None (Room air)    Body mass index is 19.37 kg/m².     Input and Output Summary (last 24 hours):     Intake/Output Summary (Last 24 hours) at 12/26/2024 0952  Last data filed at 12/26/2024 0901  Gross per 24 hour   Intake 1022 ml   Output 0 ml   Net 1022 ml       Physical Exam  Constitutional:       Appearance: He is ill-appearing.   HENT:      Head: Normocephalic and atraumatic.      Mouth/Throat:      Mouth: Mucous membranes are moist.   Eyes:      Pupils: Pupils are equal, round, and reactive to light.   Abdominal:      General: Abdomen is flat. Bowel sounds are normal.      Palpations: Abdomen is soft.   Musculoskeletal:      Right lower leg: Edema present.      Left lower leg: Edema present.   Skin:     Comments: Resolving bruising left periorbital and neck region.   Neurological:      General: No focal deficit present.      Mental Status: He is alert and oriented to person, place, and time. Mental status is at baseline.       Lines/Drains:  Lines/Drains/Airways       Active Status       Name Placement date Placement time Site Days    HD Permanent Double Catheter 11/14/24  1352  Internal jugular  41                      Telemetry:  Telemetry Orders (From admission, onward)               LifeVest Patient: Continuous Telemetry Monitoring during hospitalization (non-expiring)  Continuous LifeVest Telemetry Monitoring        References:    LifeVest Policy                     Telemetry Reviewed: Normal Sinus Rhythm  Indication for Continued Telemetry Use: Arrthymias requiring medical therapy               Lab Results: I have reviewed the following results:   Results from last 7 days   Lab Units 12/26/24  0517   WBC Thousand/uL 5.66   HEMOGLOBIN g/dL 7.6*   HEMATOCRIT % 22.8*   PLATELETS Thousands/uL 79*    SEGS PCT % 77*   LYMPHO PCT % 10*   MONO PCT % 10   EOS PCT % 1     Results from last 7 days   Lab Units 12/26/24  0657   SODIUM mmol/L 123*   POTASSIUM mmol/L 4.8   CHLORIDE mmol/L 89*   CO2 mmol/L 25   BUN mg/dL 40*   CREATININE mg/dL 4.41*   ANION GAP mmol/L 9   CALCIUM mg/dL 8.4   ALBUMIN g/dL 3.3*   TOTAL BILIRUBIN mg/dL 1.20*   ALK PHOS U/L 137*   ALT U/L 16   AST U/L 26   GLUCOSE RANDOM mg/dL 92         Results from last 7 days   Lab Units 12/23/24  1601   POC GLUCOSE mg/dl 157*               Recent Cultures (last 7 days):   Results from last 7 days   Lab Units 12/23/24  0953   GRAM STAIN RESULT  No Polys or Bacteria seen   BODY FLUID CULTURE, STERILE  No growth       Imaging Results Review: I reviewed radiology reports from this admission including: chest xray.  Other Study Results Review: EKG was reviewed.     Last 24 Hours Medication List:     Current Facility-Administered Medications:     acetaminophen (TYLENOL) tablet 650 mg, Q4H PRN    aspirin (ECOTRIN LOW STRENGTH) EC tablet 81 mg, Daily    atorvastatin (LIPITOR) tablet 20 mg, Daily With Dinner    carvedilol (COREG) tablet 18.75 mg, BID With Meals    folic acid (FOLVITE) tablet 1 mg, Daily    heparin (porcine) subcutaneous injection 5,000 Units, Q8H JEEVAN    hydrALAZINE (APRESOLINE) injection 10 mg, Q6H PRN    hydrALAZINE (APRESOLINE) tablet 100 mg, Q8H JEEVAN    isosorbide mononitrate (IMDUR) 24 hr tablet 120 mg, Daily    melatonin tablet 3 mg, HS    torsemide (DEMADEX) tablet 100 mg, Daily    Administrative Statements   Today, Patient Was Seen By: Chris Nolasco, DO  I have spent a total time of 44 minutes in caring for this patient on the day of the visit/encounter including Instructions for management, Patient and family education, Impressions, and Reviewing / ordering tests, medicine, procedures  .    **Please Note: This note may have been constructed using a voice recognition system.**

## 2024-12-26 NOTE — ASSESSMENT & PLAN NOTE
"November 11, 2024: Echo, EF of 20-24%  Noncompliant with all medications in the outpatient setting-unclear if taking any  Also, set up with LifeVest which patient states he wears \"as needed\"  GDMT limited by advanced end-stage kidney disease.  On GDMT with Coreg, Imdur, hydralazine.  Close outpatient cardiology and nephrology follow-up on discharge  Maintain on telemetry monitor during hospitalization because of being on LifeVest.    Lengthy discussion at bedside (12/25) regarding overall prognosis-patient verifies that he would like to continue with volume unloading with HD  Offered the option of palliative care/hospice-patient wants to continue with volume unloading and dialysis in the outpatient setting-poor insight as to severity of underlying illness  With that said, stressed importance of strict adherence to medical regimen in the outpatient setting upon discharge  "

## 2024-12-26 NOTE — ASSESSMENT & PLAN NOTE
Dialysis, Monday, Wednesday, Friday.  Patient has been receiving hemodialysis daily with ultrafiltration on a daily basis since presentation-trialing dialysis holiday with next HD/UF tomorrow (12/27)  Nonoliguric at baseline    Lab Results   Component Value Date    EGFR 12 12/26/2024    EGFR 14 12/23/2024    EGFR 16 12/21/2024    CREATININE 4.41 (H) 12/26/2024    CREATININE 3.84 (H) 12/23/2024    CREATININE 3.62 (H) 12/21/2024

## 2024-12-26 NOTE — CASE MANAGEMENT
Case Management Discharge Planning Note    Patient name Matthew Alvarez  Location /-01 MRN [id number]  : 1955 Date 2024       Current Admission Date: 2024  Current Admission Diagnosis:Acute on chronic systolic heart failure (HCC)   Patient Active Problem List    Diagnosis Date Noted Date Diagnosed    Pleural effusion 2024     Secondary hyperparathyroidism of renal origin (HCC) 12/15/2024     ESRD (end stage renal disease) on dialysis ([facility]) 2024     Ambulatory dysfunction 2024     Thrombocytopenia ([facility]) 2024     Renal failure 11/15/2024     Encounter for hemodialysis ([facility]) 11/15/2024     Anemia due to chronic kidney disease, on chronic dialysis ([facility]) 11/15/2024     Anemia of renal disease 11/15/2024     Vitamin D deficiency, unspecified 11/15/2024     Chronic systolic (congestive) heart failure ([facility]) 2024     Oliguria 2024     Encounter for hemodialysis for ESRD ([facility]) 2024     Dilated cardiomyopathy ([facility]) 2024     Elevated liver transaminase level 11/10/2024     Acute on chronic systolic heart failure (HCC) 2024     Coronary artery disease involving native coronary artery of native heart without angina pectoris 10/08/2021     Rheumatoid factor positive 2021     Protein calorie malnutrition (HCC) 2021     Elevated troponin 2021     Tobacco abuse 2021     Nonischemic cardiomyopathy (HCC) 2021     Primary hypertension 2021       LOS (days): 12  Geometric Mean LOS (GMLOS) (days): 3.9  Days to GMLOS:-7.8     OBJECTIVE:  Risk of Unplanned Readmission Score: 19.4         Current admission status: Inpatient   Preferred Pharmacy:   [id number] [place] [facility] [address]  [address]  [facility] [address]  Phone: [phone] Fax: [phone]    [facility] [place] - [address]  [address]  [place] [address]  Phone:  [phone] Fax: [phone]    Primary Care Provider: Olive Rodriguez MD    Primary Insurance: [facility]  Secondary Insurance:     DISCHARGE DETAILS:     Chart reviewed aware of medical management. Case was discussed in multidisciplinary discharge meeting.  Clinical information supporting hospitalization: pt remains not medically cleared for discharge, pt requires continued hospitalization for hemodialysis and ultrafiltration, nephrology following pt. Need to continue to monitor abnormal labs, current sodium level 123.     Barriers to discharge:  - medical management  Discharge plan: goals of care discussion was held with pt by Chris Nolasco DO, pt wants to continue with current treatment. Pt is refusing STR, pt is agreeable to [facility] on discharge. [facility] services set up with [place] upon discharge. Hospital bed ordered and delivered to pts home.     CM will continue to follow plan of care.

## 2024-12-27 ENCOUNTER — APPOINTMENT (INPATIENT)
Dept: DIALYSIS | Facility: HOSPITAL | Age: 69
DRG: 291 | End: 2024-12-27
Payer: COMMERCIAL

## 2024-12-27 VITALS
SYSTOLIC BLOOD PRESSURE: 160 MMHG | RESPIRATION RATE: 18 BRPM | TEMPERATURE: 97.9 F | WEIGHT: 123.4 LBS | HEART RATE: 73 BPM | BODY MASS INDEX: 19.92 KG/M2 | DIASTOLIC BLOOD PRESSURE: 72 MMHG | OXYGEN SATURATION: 97 %

## 2024-12-27 LAB
ANION GAP SERPL CALCULATED.3IONS-SCNC: 11 MMOL/L (ref 4–13)
BUN SERPL-MCNC: 61 MG/DL (ref 5–25)
CALCIUM SERPL-MCNC: 8.3 MG/DL (ref 8.4–10.2)
CHLORIDE SERPL-SCNC: 88 MMOL/L (ref 96–108)
CO2 SERPL-SCNC: 24 MMOL/L (ref 21–32)
CREAT SERPL-MCNC: 5.93 MG/DL (ref 0.6–1.3)
ERYTHROCYTE [DISTWIDTH] IN BLOOD BY AUTOMATED COUNT: 19.9 % (ref 11.6–15.1)
GFR SERPL CREATININE-BSD FRML MDRD: 8 ML/MIN/1.73SQ M
GLUCOSE SERPL-MCNC: 146 MG/DL (ref 65–140)
HCT VFR BLD AUTO: 21.9 % (ref 36.5–49.3)
HGB BLD-MCNC: 7.5 G/DL (ref 12–17)
MCH RBC QN AUTO: 28.5 PG (ref 26.8–34.3)
MCHC RBC AUTO-ENTMCNC: 34.2 G/DL (ref 31.4–37.4)
MCV RBC AUTO: 83 FL (ref 82–98)
PLATELET # BLD AUTO: 97 THOUSANDS/UL (ref 149–390)
PMV BLD AUTO: 10.9 FL (ref 8.9–12.7)
POTASSIUM SERPL-SCNC: 5 MMOL/L (ref 3.5–5.3)
RBC # BLD AUTO: 2.63 MILLION/UL (ref 3.88–5.62)
SODIUM SERPL-SCNC: 123 MMOL/L (ref 135–147)
WBC # BLD AUTO: 5.33 THOUSAND/UL (ref 4.31–10.16)

## 2024-12-27 PROCEDURE — 97530 THERAPEUTIC ACTIVITIES: CPT

## 2024-12-27 PROCEDURE — 85027 COMPLETE CBC AUTOMATED: CPT | Performed by: FAMILY MEDICINE

## 2024-12-27 PROCEDURE — 97116 GAIT TRAINING THERAPY: CPT

## 2024-12-27 PROCEDURE — 99239 HOSP IP/OBS DSCHRG MGMT >30: CPT | Performed by: FAMILY MEDICINE

## 2024-12-27 PROCEDURE — 80048 BASIC METABOLIC PNL TOTAL CA: CPT | Performed by: FAMILY MEDICINE

## 2024-12-27 PROCEDURE — 90935 HEMODIALYSIS ONE EVALUATION: CPT | Performed by: INTERNAL MEDICINE

## 2024-12-27 RX ORDER — LABETALOL HYDROCHLORIDE 5 MG/ML
10 INJECTION, SOLUTION INTRAVENOUS ONCE
Status: COMPLETED | OUTPATIENT
Start: 2024-12-27 | End: 2024-12-27

## 2024-12-27 RX ORDER — ATORVASTATIN CALCIUM 20 MG/1
20 TABLET, FILM COATED ORAL
Qty: 30 TABLET | Refills: 0 | Status: SHIPPED | OUTPATIENT
Start: 2024-12-27

## 2024-12-27 RX ORDER — CARVEDILOL 6.25 MG/1
18.75 TABLET ORAL 2 TIMES DAILY WITH MEALS
Qty: 180 TABLET | Refills: 0 | Status: SHIPPED | OUTPATIENT
Start: 2024-12-27

## 2024-12-27 RX ORDER — FOLIC ACID 1 MG/1
1 TABLET ORAL DAILY
Qty: 30 TABLET | Refills: 0 | Status: SHIPPED | OUTPATIENT
Start: 2024-12-27

## 2024-12-27 RX ORDER — TORSEMIDE 100 MG/1
100 TABLET ORAL DAILY
Qty: 30 TABLET | Refills: 0 | Status: SHIPPED | OUTPATIENT
Start: 2024-12-27

## 2024-12-27 RX ADMIN — LABETALOL HYDROCHLORIDE 10 MG: 5 INJECTION, SOLUTION INTRAVENOUS at 14:19

## 2024-12-27 RX ADMIN — HYDRALAZINE HYDROCHLORIDE 10 MG: 20 INJECTION INTRAMUSCULAR; INTRAVENOUS at 10:47

## 2024-12-27 RX ADMIN — ASPIRIN 81 MG: 81 TABLET, COATED ORAL at 12:52

## 2024-12-27 RX ADMIN — EPOETIN ALFA 5000 UNITS: 4000 SOLUTION INTRAVENOUS; SUBCUTANEOUS at 12:31

## 2024-12-27 RX ADMIN — CARVEDILOL 18.75 MG: 6.25 TABLET, FILM COATED ORAL at 10:11

## 2024-12-27 RX ADMIN — TORSEMIDE 100 MG: 100 TABLET ORAL at 12:53

## 2024-12-27 RX ADMIN — HYDRALAZINE HYDROCHLORIDE 100 MG: 25 TABLET ORAL at 12:52

## 2024-12-27 RX ADMIN — FOLIC ACID 1 MG: 1 TABLET ORAL at 12:52

## 2024-12-27 RX ADMIN — ISOSORBIDE MONONITRATE 120 MG: 60 TABLET, EXTENDED RELEASE ORAL at 10:11

## 2024-12-27 RX ADMIN — HYDRALAZINE HYDROCHLORIDE 100 MG: 25 TABLET ORAL at 05:28

## 2024-12-27 NOTE — ASSESSMENT & PLAN NOTE
Patient presented  on (12/14) after sustaining a mechanical fall 2 to 3 prior to presentation  Patient states that he missed dialysis on Wednesday and presented for regularly scheduled session on Friday where he was noted with significant bruising and referred to the ED  Evidence of severe volume overload with weight significantly up from dry weight on presentation  Notably, patient with gross noncompliance with outpatient medical regimen   Presented (12/14) with a weight of 73.8 kg with dry weight of ? 60 kg.  Had been requiring daily hemodialysis with ultrafiltration.    Patient had received right-sided thoracentesis with 800 mL of fluid removal earlier in hospitalization and underwent left thoracentesis with 850 mL of fluid removal on 12/23.  Had been having daily ultrafiltration since presentation (12/14) with removal of approximately 3 L of fluid daily.    Weight has declined from 160 to 118 pounds   Monitor for electrolyte abnormality.  Continue with ongoing volume control with dialysis.  Cardiology adjusting his dry weight.  Wt Readings from Last 3 Encounters:   12/27/24 56 kg (123 lb 6.4 oz)   11/20/24 55.2 kg (121 lb 12.8 oz)   09/03/24 55.3 kg (122 lb)   Last HD was (12/24) without significant reaccumulation   today, weight stable without respiratory compromise  Underwent HD with UF today (12/27)  Patient's weight at this juncture 123 pounds  Will resume Monday Wednesday Friday schedule with HD/UF on Monday (12/30)  Patient resistant to wearing LifeVest in the outpatient setting-extensive counseling upon discharge today (12/27)  Otherwise, continue goal-directed medical therapy appropriate for comorbidities  Over the course of hospitalization I personally discussed option of palliative medicine with the patient-no interest-reports will be compliant with medication regimen upon discharge  Furthermore, short-term rehab was suggested however patient to discharge to home on home services per  request    Recommend close outpatient nephrology and cardiology follow-up

## 2024-12-27 NOTE — ASSESSMENT & PLAN NOTE
His latest hemoglobin is 7.5.  There is a contributing component related to his renal failure so will initiate Epogen 5000 units with each dialysis.  He is status post IV iron as noted in prior notes.    Eventual target hemoglobin 10-12

## 2024-12-27 NOTE — CASE MANAGEMENT
Case Management Discharge Planning Note    Patient name Matthew Alvarez  Location /-01 MRN [id number]  : 1955 Date 2024       Current Admission Date: 2024  Current Admission Diagnosis:Acute on chronic systolic heart failure (HCC)   Patient Active Problem List    Diagnosis Date Noted Date Diagnosed    Pleural effusion 2024     Secondary hyperparathyroidism of renal origin (HCC) 12/15/2024     ESRD (end stage renal disease) on dialysis ([facility]) 2024     Ambulatory dysfunction 2024     Thrombocytopenia ([facility]) 2024     Renal failure 11/15/2024     Encounter for hemodialysis ([facility]) 11/15/2024     Anemia due to chronic kidney disease, on chronic dialysis ([facility]) 11/15/2024     Anemia of renal disease 11/15/2024     Vitamin D deficiency, unspecified 11/15/2024     Chronic systolic (congestive) heart failure ([facility]) 2024     Oliguria 2024     Encounter for hemodialysis for ESRD ([facility]) 2024     Dilated cardiomyopathy ([facility]) 2024     Elevated liver transaminase level 11/10/2024     Acute on chronic systolic heart failure (HCC) 2024     Coronary artery disease involving native coronary artery of native heart without angina pectoris 10/08/2021     Rheumatoid factor positive 2021     Protein calorie malnutrition (HCC) 2021     Elevated troponin 2021     Tobacco abuse 2021     Nonischemic cardiomyopathy (HCC) 2021     Primary hypertension 2021       LOS (days): 13  Geometric Mean LOS (GMLOS) (days): 3.9  Days to GMLOS:-8.8     OBJECTIVE:  Risk of Unplanned Readmission Score: 20.19         Current admission status: Inpatient   Preferred Pharmacy:   [id number] [place] - [address]  [address]  [facility] [address]  Phone: [phone] Fax: [phone]    [facility] [place] - [address]  [address]  [place] [address]  Phone:  [phone] Fax: [phone]    Primary Care Provider: Olive Rodriguez MD    Primary Insurance: [facility]  Secondary Insurance:     DISCHARGE DETAILS:    Discharge planning discussed with:: Patient  Freedom of Choice: Yes  Comments - Freedom of Choice: pt is refusing STR, agreeable to [facility]   CM placed a call to pts PCP to schedule follow up appointment. Per SUZANNA Rodriguez MD office pt is a new patient, was a no show for the last 3 appointments. They will schedule another appointment, but pt needs to attend. Appointment scheduled for 12/30/24 at 3:00PM, placed on follow up providers d/c instructions. CM placed call to [facility] [place] aware pt is planning on discharging home today, due to New Years holiday pts dialysis days will change to [id number] this coming week due to holiday, no time change. Per [facility], pt needs to wear life vest to dialysis. Pt aware and verbalized understanding.        Pts sister Belgica Boo at bedside, requesting list of home care aides, Private pay home health agencies serving [facility] provided to pt, sister and pts son rafael at bedside. CM reviewed with family pt has a PCP appointment on 12/30/24 at 1500, and [facility] dialysis schedule Sunday-tuesday- Friday this coming week due to New Years holiday, regular scheduled time. Pt is to wear life vest to dialysis. Pts sister and son Rafael verbalized understanding. Sister will provide transportation to PCP appointment on 12/30/24.         1539- Discharge summary and current med list faxed to Dr. Rodriguez office at #[phone].      1536- [facility] aware pt is discharging home today, order uploaded to [facility] and AVS faxed.      Contacts  Patient Contacts: Rafael Penasco, son  Relationship to Patient:: Family  Contact Method: Phone  Phone Number: [phone]- message left to return call to discuss d/c planning              Other Referral/Resources/Interventions  Provided:  Interventions: HHC, Other (Specify)  Referral Comments: Referral made to office of senor service for caregiver assistance at home and meals on wheels. Referral faxed to [phone].          Treatment Team Recommendation: Short Term Rehab  Discharge Destination Plan:: Home with Home Health Care  Transport at Discharge : Family- sister at bedside                             IMM Given (Date):: 12/27/24  IMM Given to:: Patient   CM spoke to patient at the bedside, reviewed DC IMM with patient and informed that patient can stay an additional 4 hours for reconsidering appealing the discharge as the medicare rights were review on the day of discharge. Pt verbalized understanding and feels ready to go home and does not intend to stay 4 hours to reconsider.

## 2024-12-27 NOTE — ASSESSMENT & PLAN NOTE
Bilateral pleural effusion not improving with dialysis treatment.  Underwent thoracentesis for the right side earlier in hospitalization.  Repeat chest x-ray still shows left-sided effusion.    Underwent thoracentesis with removal of 850 mL of fluid from the left side.  Had significant reaccumulation requiring repeat thoracentesis  Will require close outpatient monitoring in the outpatient setting

## 2024-12-27 NOTE — PLAN OF CARE
Pre-tx:  UF 3.5L as tolerated per order, 2.7 kg gain noted since last HD.  Pt hypertensive during tx initiation today.  Firm, pitting edema noted on B/L legs/thighs.  Pt started on 2K bath per serum K of 4.8 on 12/26/24.  Labs drawn prior to HD.  HD permcath accessed without issue.     Post-Dialysis RN Treatment Note    Blood Pressure:  Pre 178/72 mm/Hg  Post 182/81 mmHg   EDW:  6 kg    Weight:  Pre 54.7 kg   Post  51.3kg   Mode of weight measurement: Bed Scale   Volume Removed: 3400 ml    Treatment duration: 3.5 hours    NS given:   No   Treatment shortened No   Medications given during Rx: Epogen per order   Estimated Kt/V:  1.32   Access type: Permacath/TDC   Needle Gauge:    Access Issues: No    Report called to primary nurse:   Yes           .  Problem: METABOLIC, FLUID AND ELECTROLYTES - ADULT  Goal: Electrolytes maintained within normal limits  Description: INTERVENTIONS:  - Monitor labs and assess patient for signs and symptoms of electrolyte imbalances  - Administer electrolyte replacement as ordered  - Monitor response to electrolyte replacements, including repeat lab results as appropriate  - Instruct patient on fluid and nutrition as appropriate  Outcome: Progressing  Goal: Fluid balance maintained  Description: INTERVENTIONS:  - Monitor labs   - Monitor I/O and WT  - Instruct patient on fluid and nutrition as appropriate  - Assess for signs & symptoms of volume excess or deficit  Outcome: Progressing

## 2024-12-27 NOTE — PLAN OF CARE
Problem: OCCUPATIONAL THERAPY ADULT  Goal: Performs self-care activities at highest level of function for planned discharge setting.  See evaluation for individualized goals.  Description: Treatment Interventions: ADL retraining, Functional transfer training, UE strengthening/ROM, Endurance training, Patient/family training, Equipment evaluation/education, Cognitive reorientation, Compensatory technique education, Cardiac education, Continued evaluation, Activityengagement, Energy conservation          See flowsheet documentation for full assessment, interventions and recommendations.   Outcome: Progressing  Note: Limitation: Decreased ADL status, Decreased UE strength, Decreased Safe judgement during ADL, Decreased endurance, Decreased self-care trans, Decreased high-level ADLs  Prognosis: Fair  Assessment: Pt tolerated treatment fairly but is limited by fatigue. He demonstrates ability to complete UB/LB ADLs with setup and increased time at worst, however requests for help with all ADLs throughout treatment. Pt educated on importance of continued activity and independence with ADL completion to maximize his functional outcomes and overall QoL. He continues with HTN however it is worthy of noting that different readings result on the L as compared to the R arm. Pt's sister and son entering room during tx and discussed current POC, requesting information regarding resources for the pt and to speak with hospitalist regarding d/c plan (CM and DO notified). Recommending Level III (Minimum Resource Intensity) to continue to maximize his independence with ADLs and functional mobility.     Rehab Resource Intensity Level, OT: III (Minimum Resource Intensity)

## 2024-12-27 NOTE — ASSESSMENT & PLAN NOTE
Chronic thrombocytopenia.    No overt bleeding.  Continue to monitor hemoglobin and platelet count closely in the outpatient setting upon discharge

## 2024-12-27 NOTE — PLAN OF CARE
Problem: Prexisting or High Potential for Compromised Skin Integrity  Goal: Skin integrity is maintained or improved  Description: INTERVENTIONS:  - Identify patients at risk for skin breakdown  - Assess and monitor skin integrity  - Assess and monitor nutrition and hydration status  - Monitor labs   - Assess for incontinence   - Turn and reposition patient  - Assist with mobility/ambulation  - Relieve pressure over bony prominences  - Avoid friction and shearing  - Provide appropriate hygiene as needed including keeping skin clean and dry  - Evaluate need for skin moisturizer/barrier cream  - Collaborate with interdisciplinary team   - Patient/family teaching  - Consider wound care consult   Outcome: Progressing     Problem: CARDIOVASCULAR - ADULT  Goal: Maintains optimal cardiac output and hemodynamic stability  Description: INTERVENTIONS:  - Monitor I/O, vital signs and rhythm  - Monitor for S/S and trends of decreased cardiac output  - Administer and titrate ordered vasoactive medications to optimize hemodynamic stability  - Assess quality of pulses, skin color and temperature  - Assess for signs of decreased coronary artery perfusion  - Instruct patient to report change in severity of symptoms  Outcome: Progressing  Goal: Absence of cardiac dysrhythmias or at baseline rhythm  Description: INTERVENTIONS:  - Continuous cardiac monitoring, vital signs, obtain 12 lead EKG if ordered  - Administer antiarrhythmic and heart rate control medications as ordered  - Monitor electrolytes and administer replacement therapy as ordered  Outcome: Progressing

## 2024-12-27 NOTE — PROGRESS NOTES
Progress Note - Nephrology   Name: Matthew Alvarez 69 y.o. male I MRN: [id number]  Unit/Bed#: -01 I Date of Admission: 12/14/2024   Date of Service: 12/27/2024 I Hospital Day: 13     Assessment & Plan  Nonischemic cardiomyopathy (HCC)  The patient had chronic congestive heart failure is now on dialysis with good volume control.  Blood pressure tolerates volume removal so continue to optimize this and he has no symptoms of heart failure.  LVEF 20-25%.  Anemia of renal disease  His latest hemoglobin is 7.5.  There is a contributing component related to his renal failure so will initiate Epogen 5000 units with each dialysis.  He is status post IV iron as noted in prior notes.    Eventual target hemoglobin 10-12  Secondary hyperparathyroidism of renal origin ([facility])  No changes  Ambulatory dysfunction  PT/OT evaluation and follow-up.  Trying to arrange outpatient physical therapy in the home as patient refuses inpatient rehab.  ESRD (end stage renal disease) on dialysis ([facility])  Lab Results   Component Value Date    EGFR 8 12/27/2024    EGFR 12 12/26/2024    EGFR 14 12/23/2024    CREATININE 5.93 (H) 12/27/2024    CREATININE 4.41 (H) 12/26/2024    CREATININE 3.84 (H) 12/23/2024     The patient was seen while on dialysis was tolerating it hemodynamically with good blood flow.  Labs today potassium was 5 sodium concentration 123 mill equivalents per liter and as above hemoglobin 7.5.    Hyponatremia related to his renal failure and inability to excrete water between dialysis.  On fluid restriction and increase dialysate flow rate and monitor.    Details of today's treatment outlined below.    HEMODIALYSIS PROCEDURE NOTE  The patient was seen and examined on hemodialysis.  Time: 3.5 hours  Sodium: 140 Blood flow: 400   Dialyzer: F160 Potassium: 2 Dialysate flow: 800   Access: catheter Bicarbonate: 35 Ultrafiltration goal: 2 - 3 liters as tolerated        Monitor hemodynamically.  Patient says he is eating  well.    Subjective   Brief History of Admission -the patient has history of cardiorenal syndrome congestive heart failure and significant renal disease and has been initiated on hemodialysis and has end-stage renal disease.  He is doing fairly well clinically tolerating dialysis he has no complaints for me today just wants to go home.    Objective :  Temp:  [97.7 °F (36.5 °C)-99 °F (37.2 °C)] 97.7 °F (36.5 °C)  HR:  [61-69] 62  BP: (138-178)/(68-82) 178/82  Resp:  [18-19] 18  SpO2:  [96 %-99 %] 98 %    Current Weight: Weight - Scale: 56 kg (123 lb 6.4 oz)  First Weight: Weight - Scale: 73.9 kg (162 lb 14.7 oz) (Patient refused standing weight)  I/O         12/25 0701  12/26 0700 12/26 0701  12/27 0700 12/27 0701  12/28 0700    P.O. 842 180     I.V. (mL/kg)   200 (3.6)    Total Intake(mL/kg) 842 (15.5) 180 (3.2) 200 (3.6)    Urine (mL/kg/hr) 0 (0)      Other       Total Output 0      Net +842 +180 +200                 Physical Exam  Constitutional:       General: He is not in acute distress.     Appearance: He is not toxic-appearing.   HENT:      Head: Normocephalic.      Comments: Ecchymosis under the eyes.     Mouth/Throat:      Mouth: Mucous membranes are dry.   Eyes:      General: No scleral icterus.  Cardiovascular:      Rate and Rhythm: Normal rate and regular rhythm.      Heart sounds:      No friction rub. No gallop.      Comments: Mild edema.  Pulmonary:      Effort: Pulmonary effort is normal. No respiratory distress.      Breath sounds: No wheezing or rales.   Abdominal:      General: Bowel sounds are normal. There is no distension.      Palpations: Abdomen is soft.      Tenderness: There is no abdominal tenderness.   Neurological:      Mental Status: He is alert and oriented to person, place, and time.   Psychiatric:         Mood and Affect: Mood normal.         Behavior: Behavior normal.         Thought Content: Thought content normal.         Medications:    Current Facility-Administered Medications:      acetaminophen (TYLENOL) tablet 650 mg, 650 mg, Oral, Q4H PRN, David Meraz MD, 650 mg at 12/18/24 1203    aspirin (ECOTRIN LOW STRENGTH) EC tablet 81 mg, 81 mg, Oral, Daily, David Meraz MD, 81 mg at 12/26/24 0903    atorvastatin (LIPITOR) tablet 20 mg, 20 mg, Oral, Daily With Dinner, YUE Griffin, 20 mg at 12/26/24 1556    carvedilol (COREG) tablet 18.75 mg, 18.75 mg, Oral, BID With Meals, Jensen Kelly DO, 18.75 mg at 12/26/24 1556    epoetin loida (EPOGEN,PROCRIT) injection 5,000 Units, 5,000 Units, Intravenous, Once per day on Monday Wednesday Friday, Brandyn Ariaz MD    folic acid (FOLVITE) tablet 1 mg, 1 mg, Oral, Daily, Jensen Kelly DO, 1 mg at 12/26/24 0903    heparin (porcine) subcutaneous injection 5,000 Units, 5,000 Units, Subcutaneous, Q8H JEEVAN, Shari Arceo MD    hydrALAZINE (APRESOLINE) injection 10 mg, 10 mg, Intravenous, Q6H PRN, Chris Nolasco DO, 10 mg at 12/19/24 2004    hydrALAZINE (APRESOLINE) tablet 100 mg, 100 mg, Oral, Q8H JEEVAN, David Meraz MD, 100 mg at 12/27/24 0528    isosorbide mononitrate (IMDUR) 24 hr tablet 120 mg, 120 mg, Oral, Daily, David Meraz MD, 120 mg at 12/26/24 0903    melatonin tablet 3 mg, 3 mg, Oral, HS, David Meraz MD, 3 mg at 12/26/24 2154    torsemide (DEMADEX) tablet 100 mg, 100 mg, Oral, Daily, Jensen Kelly DO, 100 mg at 12/26/24 0903      Lab Results: I have reviewed the following results:  Results from last 7 days   Lab Units 12/27/24  0925 12/26/24  0657 12/26/24  0517 12/23/24  1402 12/22/24  0901 12/21/24  0953   WBC Thousand/uL 5.33  --  5.66 6.37 4.83 4.60   HEMOGLOBIN g/dL 7.5*  --  7.6* 8.1* 7.9* 7.7*   HEMATOCRIT % 21.9*  --  22.8* 25.7* 24.4* 24.3*   PLATELETS Thousands/uL 97*  --  79* 61* 47* 34*   POTASSIUM mmol/L 5.0 4.8  --  4.8  --  4.3   CHLORIDE mmol/L 88* 89*  --  91*  --  92*   CO2 mmol/L 24 25  --  29  --  28   BUN mg/dL 61* 40*  --  39*  --  41*   CREATININE mg/dL 5.93*  "4.41*  --  3.84*  --  3.62*   CALCIUM mg/dL 8.3* 8.4  --  7.9*  --  7.9*   MAGNESIUM mg/dL  --  1.9  --   --   --   --    PHOSPHORUS mg/dL  --  2.9  --   --   --   --    ALBUMIN g/dL  --  3.3*  --  3.3*  --   --        Administrative Statements     Portions of the record may have been created with voice recognition software. Occasional wrong word or \"sound a like\" substitutions may have occurred due to the inherent limitations of voice recognition software. Read the chart carefully and recognize, using context, where substitutions have occurred.If you have any questions, please contact the dictating provider.  "

## 2024-12-27 NOTE — PLAN OF CARE
Problem: Prexisting or High Potential for Compromised Skin Integrity  Goal: Skin integrity is maintained or improved  Description: INTERVENTIONS:  - Identify patients at risk for skin breakdown  - Assess and monitor skin integrity  - Assess and monitor nutrition and hydration status  - Monitor labs   - Assess for incontinence   - Turn and reposition patient  - Assist with mobility/ambulation  - Relieve pressure over bony prominences  - Avoid friction and shearing  - Provide appropriate hygiene as needed including keeping skin clean and dry  - Evaluate need for skin moisturizer/barrier cream  - Collaborate with interdisciplinary team   - Patient/family teaching  - Consider wound care consult   Outcome: Progressing     Problem: CARDIOVASCULAR - ADULT  Goal: Maintains optimal cardiac output and hemodynamic stability  Description: INTERVENTIONS:  - Monitor I/O, vital signs and rhythm  - Monitor for S/S and trends of decreased cardiac output  - Administer and titrate ordered vasoactive medications to optimize hemodynamic stability  - Assess quality of pulses, skin color and temperature  - Assess for signs of decreased coronary artery perfusion  - Instruct patient to report change in severity of symptoms  Outcome: Progressing

## 2024-12-27 NOTE — ASSESSMENT & PLAN NOTE
The patient had chronic congestive heart failure is now on dialysis with good volume control.  Blood pressure tolerates volume removal so continue to optimize this and he has no symptoms of heart failure.  LVEF 20-25%.

## 2024-12-27 NOTE — ASSESSMENT & PLAN NOTE
Dialysis, Monday, Wednesday, Friday.  Lab Results   Component Value Date    EGFR 8 12/27/2024    EGFR 12 12/26/2024    EGFR 14 12/23/2024    CREATININE 5.93 (H) 12/27/2024    CREATININE 4.41 (H) 12/26/2024    CREATININE 3.84 (H) 12/23/2024   Plan as above

## 2024-12-27 NOTE — ASSESSMENT & PLAN NOTE
Noncompliant with his home medications  Restarted oral hydralazine, Coreg  As needed intravenous hydralazine/labetalol as needed intermittently over the course of hospitalization-cautious of hypotension  Patient also significantly volume overloaded upon presentation which is likely a significant contributing factor.   Continue with HD UF in the outpatient setting is now reached dry weight  Can expect some degree of hypertension and days in between dialysis

## 2024-12-27 NOTE — PHYSICAL THERAPY NOTE
"   PHYSICAL THERAPY TREATMENT NOTE  NAME:  Matthew Alvarez  DATE: 12/27/24    Length Of Stay: 13  Performed at least 2 patient identifiers during session: Name and Birthday    TREATMENT FLOW SHEET:    12/27/24 1415   PT Last Visit   PT Visit Date 12/27/24   Note Type   Note Type Treatment   Pain Assessment   Pain Assessment Tool 0-10   Pain Score No Pain   Restrictions/Precautions   Other Precautions Chair Alarm;Bed Alarm;Fall Risk   General   Chart Reviewed Yes   Response to Previous Treatment Patient reporting fatigue but able to participate.   Family/Caregiver Present Yes  (patient's sister and son present toward end of session)   Cognition   Orientation Level Oriented X4   Following Commands Follows one step commands without difficulty   Subjective   Subjective \"I am waiting for my sister to get here.\"   Bed Mobility   Supine to Sit 5  Supervision   Additional items Increased time required;Verbal cues   Sit to Supine 5  Supervision   Additional items Increased time required;Verbal cues   Additional Comments HOB elevated ~ 30 degrees. pt reports hospital bed at home. pt requesting assistance however wiht encouragement able to complete with supervision with inc time.   Transfers   Sit to Stand 5  Supervision   Additional items Increased time required;Verbal cues   Stand to Sit 5  Supervision   Additional items Increased time required;Verbal cues   Stand pivot 5  Supervision  (close)   Additional items Increased time required;Verbal cues   Additional Comments RW. cues for hand placement for safety with RW. without reaching for surface, patient with inc posterior lean and would require minAx1 for controlled descent. pt able to demonstrate stand to sit with supervision with proper hand palcement after mod verbal cues. 2nd trial patient not receptive to education and sat without proper hand palcement with uncontrolled descent. spt wiht supervision wiht min verbal cues for turning compeltely prior to sitting. " "  Ambulation/Elevation   Gait pattern Improper Weight shift;Decreased foot clearance;Short stride;Excessively slow   Gait Assistance 5  Supervision  (close)   Additional items Verbal cues   Assistive Device Rolling walker   Distance ambulated 24'x1 with RW with close supervision with decreased step length, foot clearance. pt declined further ambulation.   Balance   Static Sitting Good   Dynamic Sitting Fair +   Static Standing Fair   Dynamic Standing Fair -   Ambulatory Fair -   Endurance Deficit   Endurance Deficit Yes   Endurance Deficit Description fatigue   Activity Tolerance   Activity Tolerance Patient limited by fatigue  (self limiting behavior)   Medical Staff Made Aware [place]   Assessment   Prognosis Good   Problem List Decreased strength;Decreased endurance;Impaired balance;Decreased mobility;Impaired judgement;Decreased safety awareness;Decreased skin integrity   Barriers to Discharge Comments BRODY home, inc fall risk, safety concerns, decreased receptiveness to education, recommendations   Goals   Patient Goals \"To go home today.\"   PT Treatment Day 4   Plan   Treatment/Interventions ADL retraining;Functional transfer training;LE strengthening/ROM;Elevations;Therapeutic exercise;Endurance training;Patient/family training;Equipment eval/education;Gait training;Bed mobility;Compensatory technique education;Spoke to nursing;Spoke to case management;OT   Progress Slow progress, decreased activity tolerance   PT Frequency 3-5x/wk   Discharge Recommendation   Rehab Resource Intensity Level, PT III (Minimum Resource Intensity)  (HHPT)   Equipment Recommended   (walker and wheelchair-pt has)   Additional Comments encouraged doing for self as able. pt relying on son despite ability to complete tasks. recommend HHC and assistance from son prn.   AM-PAC Basic Mobility Inpatient   Turning in Flat Bed Without Bedrails 3   Lying on Back to Sitting on Edge of Flat Bed Without Bedrails 3   Moving Bed to Chair 3 "   Standing Up From Chair Using Arms 3   Walk in Room 3   Climb 3-5 Stairs With Railing 2   Basic Mobility Inpatient Raw Score 17   Basic Mobility Standardized Score 39.67   [facility] Highest Level Of Mobility   -HL Goal 5: Stand one or more mins   -[facility] Achieved 6: Walk 10 steps or more   Education   Education Provided Mobility training;Assistive device   Patient Reinforcement needed   End of Consult   Patient Position at End of Consult Supine;Bed/Chair alarm activated;All needs within reach          12/27/24 1418 12/27/24 1421   Vitals   Pulse 79 73   Blood Pressure (!) 191/78 160/72   MAP (mmHg) 116 101   BP Location Right arm Left arm   Patient Position - Orthostatic VS Lying Sitting   Oxygen Therapy   SpO2 96 % 97 %     The patient's AM-PAC Basic Mobility Inpatient Short Form Raw Score is 17. A Raw score of greater than 16 suggests the patient may benefit from discharge to home. Please also refer to the recommendation of the Physical Therapist for safe discharge planning.     Pt tolerated session fairly. He was able to complete transfers and ambulation with decreased assistance this date, but fatigues easily. He ambulated short distance and then declined further ambulation due to fatigue. He also demonstrates self limiting behavior requiring encouragement for participation. Despite need for consistent dialysis and complex medical course, patient is making progress with skilled PT. He was noted to have elevated BP this date right arm > left. RN present and providing medication. He is limited by decreased strength, balance, endurance. He will continue to benefit from PT services to maximize LOF.       Maral Morris, PT,DPT

## 2024-12-27 NOTE — NURSING NOTE
IV removed, belongings returned to patient. Patient declined to wear lifevest home, educated on importance. AVS reviewed with patient and his sister.

## 2024-12-27 NOTE — ASSESSMENT & PLAN NOTE
Patient states he fell at home, has bruising on his face  He states he sometimes walks with an assistive device  Patient adamantly refusing rehab.  Discussed with patient's wife at length over the phone.

## 2024-12-27 NOTE — DISCHARGE SUMMARY
Discharge Summary - Hospitalist   Name: Matthew Alvarez 69 y.o. male I MRN: [id number]  Unit/Bed#: -01 I Date of Admission: 12/14/2024   Date of Service: 12/27/2024 I Hospital Day: 13     Assessment & Plan  Acute on chronic systolic heart failure (HCC)  Patient presented  on (12/14) after sustaining a mechanical fall 2 to 3 prior to presentation  Patient states that he missed dialysis on Wednesday and presented for regularly scheduled session on Friday where he was noted with significant bruising and referred to the ED  Evidence of severe volume overload with weight significantly up from dry weight on presentation  Notably, patient with gross noncompliance with outpatient medical regimen   Presented (12/14) with a weight of 73.8 kg with dry weight of ? 60 kg.  Had been requiring daily hemodialysis with ultrafiltration.    Patient had received right-sided thoracentesis with 800 mL of fluid removal earlier in hospitalization and underwent left thoracentesis with 850 mL of fluid removal on 12/23.  Had been having daily ultrafiltration since presentation (12/14) with removal of approximately 3 L of fluid daily.    Weight has declined from 160 to 118 pounds   Monitor for electrolyte abnormality.  Continue with ongoing volume control with dialysis.  Cardiology adjusting his dry weight.  Wt Readings from Last 3 Encounters:   12/27/24 56 kg (123 lb 6.4 oz)   11/20/24 55.2 kg (121 lb 12.8 oz)   09/03/24 55.3 kg (122 lb)   Last HD was (12/24) without significant reaccumulation   today, weight stable without respiratory compromise  Underwent HD with UF today (12/27)  Patient's weight at this juncture 123 pounds  Will resume Monday Wednesday Friday schedule with HD/UF on Monday (12/30)  Patient resistant to wearing LifeVest in the outpatient setting-extensive counseling upon discharge today (12/27)  Otherwise, continue goal-directed medical therapy appropriate for comorbidities  Over the course of hospitalization I  "personally discussed option of palliative medicine with the patient-no interest-reports will be compliant with medication regimen upon discharge  Furthermore, short-term rehab was suggested however patient to discharge to home on home services per request    Recommend close outpatient nephrology and cardiology follow-up    ESRD (end stage renal disease) on dialysis (HCC)  Dialysis, Monday, Wednesday, Friday.  Lab Results   Component Value Date    EGFR 8 12/27/2024    EGFR 12 12/26/2024    EGFR 14 12/23/2024    CREATININE 5.93 (H) 12/27/2024    CREATININE 4.41 (H) 12/26/2024    CREATININE 3.84 (H) 12/23/2024   Plan as above  Nonischemic cardiomyopathy (HCC)  November 11, 2024: Echo, EF of 20-24%  Noncompliant with all medications in the outpatient setting-unclear if taking any  Also, set up with LifeVest which patient states he wears \"as needed\"  GDMT limited by advanced end-stage kidney disease.  On GDMT with Coreg, Imdur, hydralazine.  Close outpatient cardiology and nephrology follow-up on discharge  Maintain on telemetry monitor during hospitalization because of being on LifeVest.    Lengthy discussion at bedside (12/25) regarding overall prognosis-patient verifies that he would like to continue with volume unloading with HD  Offered the option of palliative care/hospice-patient wants to continue with volume unloading and dialysis in the outpatient setting-poor insight as to severity of underlying illness  With that said, stressed importance of strict adherence to medical regimen in the outpatient setting upon discharge  Primary hypertension  Noncompliant with his home medications  Restarted oral hydralazine, Coreg  As needed intravenous hydralazine/labetalol as needed intermittently over the course of hospitalization-cautious of hypotension  Patient also significantly volume overloaded upon presentation which is likely a significant contributing factor.   Continue with HD UF in the outpatient setting is now " reached dry weight  Can expect some degree of hypertension and days in between dialysis  Ambulatory dysfunction  Patient states he fell at home, has bruising on his face  He states he sometimes walks with an assistive device  Patient adamantly refusing rehab.  Discussed with patient's wife at length over the phone.  Coronary artery disease involving native coronary artery of native heart without angina pectoris  Continue aspirin  Mild nonobstructive coronary artery disease per cardiac catheterization in 9/2021.    Resumed statin therapy.  Anemia of renal disease  Continue with IV iron 3 times weekly.    Currently no indication of blood transfusion.  Secondary hyperparathyroidism of renal origin ([facility])  Nephrology on board.  Follow-up phosphorus level  Pleural effusion  Bilateral pleural effusion not improving with dialysis treatment.  Underwent thoracentesis for the right side earlier in hospitalization.  Repeat chest x-ray still shows left-sided effusion.    Underwent thoracentesis with removal of 850 mL of fluid from the left side.  Had significant reaccumulation requiring repeat thoracentesis  Will require close outpatient monitoring in the outpatient setting  Thrombocytopenia ([facility])  Chronic thrombocytopenia.    No overt bleeding.  Continue to monitor hemoglobin and platelet count closely in the outpatient setting upon discharge     Medical Problems       Resolved Problems  Date Reviewed: 11/18/2024          Resolved    Hyponatremia 12/25/2024     Resolved by  Chris Nolasco DO    Acute hemodialysis patient ([facility]) 12/25/2024     Resolved by  Chris Nolasco DO    ESRD on hemodialysis ([facility]) 12/25/2024     Resolved by  Chris Nolasco DO          MESSAGE TO PCP (Olive Rodriguez MD) FOR FOLLOW UP:   Thank you for allowing us to participate in the care of your patient, Matthew Alvarez, who was hospitalized from 12/14/2024 through 12/27/24 with the admitting diagnosis of volume overload      Medication  Changes:  Several-see discharge med rec  Outpatient testing recommended:  Recommend continued outpatient follow-up with nephrology and cardiology-to continue HD  If you have any additional questions or would like to discuss further, please feel free to contact me.  Chris Nolasco DO  [facility] Internal Medicine, Hospitalist, [phone]     Admission Date:   Admission Orders (From admission, onward)       Ordered        12/14/24 1904  INPATIENT ADMISSION  Once                          Discharge Date: 12/27/24    Consultations During Hospital Stay:  Cardiology and nephrology    Procedures Performed:   Thoracentesis x 2    Significant Findings / Test Results:   None other than those noted in the problem specific assessment and plan as detailed above    Incidental Findings:   None other than those noted in problem specific assessment and plan as detailed above      Test Results Pending at Discharge (will require follow up):   None     Complications: None    Reason for Admission: Volume overload    Hospital Course:   Matthew Alvarez is a 69 y.o. male patient who originally presented to the hospital on 12/14/2024 due to volume overload.  Was diuresed with HD/UF over the course of hospitalization eventually with adequate volume unloading and reaching a dry weight.  Weight upon discharge 123 pounds.  Will resume Monday Wednesday Friday hemodialysis.  Notably, patient with significantly reduced ejection fraction.  He does make urine and has been initiated on torsemide.  The patient is grossly noncompliant with his LifeVest.  Will likely not wear upon discharge.  Extensive counseling and risk discussed with patient.  Furthermore, short-term rehab recommended.  Patient adamantly refused.  Will discharge home with home care.  High risk of readmission.    Please see above list of diagnoses and related plan for additional information.     Condition at Discharge: fair    Discharge Day Visit / Exam:   Subjective: Examined  at bedside.  Eager for discharge.  Vitals: Blood Pressure: 160/72 (12/27/24 1422)  Pulse: 73 (12/27/24 1422)  Temperature: 97.9 °F (36.6 °C) (12/27/24 1250)  Temp Source: Temporal (12/26/24 1515)  Respirations: 18 (12/27/24 1250)  Weight - Scale: 56 kg (123 lb 6.4 oz) (12/27/24 0600)  SpO2: 97 % (12/27/24 1422)  Physical Exam   onstitutional:       Appearance: He is ill-appearing.   HENT:      Head: Normocephalic and atraumatic.      Mouth/Throat:      Mouth: Mucous membranes are moist.   Eyes:      Pupils: Pupils are equal, round, and reactive to light.   Abdominal:      General: Abdomen is flat. Bowel sounds are normal.      Palpations: Abdomen is soft.   Musculoskeletal:      Right lower leg: Edema present.      Left lower leg: Edema present.   Skin:     Comments: Resolving bruising left periorbital and neck region.   Neurological:      General: No focal deficit present.      Mental Status: He is alert and oriented to person, place, and time. Mental status is at baseline.     Discussion with Family: Attempted to update  (wife, son, and sister) via phone. Unable to contact.    Discharge instructions/Information to patient and family:   See after visit summary for information provided to patient and family.      Provisions for Follow-Up Care:  See after visit summary for information related to follow-up care and any pertinent home health orders.      Mobility at time of Discharge:   Basic Mobility Inpatient Raw Score: 15  JH-HLM Goal: 4: Move to chair/commode  JH-HLM Achieved: 4: Move to chair/commode  HLM Goal achieved. Continue to encourage appropriate mobility.     Disposition:   Home    Planned Readmission: No    Discharge Medications:  See after visit summary for reconciled discharge medications provided to patient and/or family.      Administrative Statements   Discharge Statement:  I have spent a total time of 44 minutes in caring for this patient on the day of the visit/encounter. >30 minutes  of time was spent on: Prognosis, Instructions for management, Importance of tx compliance, Impressions, and Documenting in the medical record.    **Please Note: This note may have been constructed using a voice recognition system**

## 2024-12-27 NOTE — OCCUPATIONAL THERAPY NOTE
Occupational Therapy Progress Note     Patient Name: Matthew Alvarez  Today's Date: 12/27/2024  Problem List  Principal Problem:    Acute on chronic systolic heart failure (HCC)  Active Problems:    Nonischemic cardiomyopathy (HCC)    Primary hypertension    Coronary artery disease involving native coronary artery of native heart without angina pectoris    Thrombocytopenia (HCC)    Anemia of renal disease    ESRD (end stage renal disease) on dialysis (HCC)    Ambulatory dysfunction    Secondary hyperparathyroidism of renal origin (HCC)    Pleural effusion       12/27/24 1416   Note Type   Note Type Treatment   Pain Assessment   Pain Assessment Tool 0-10   Pain Score No Pain   Restrictions/Precautions   Other Precautions Chair Alarm;Bed Alarm;Fall Risk   ADL   Eating Assistance 7  Independent   Grooming Assistance 5  Supervision/Setup   UB Bathing Assistance 5  Supervision/Setup   LB Bathing Assistance   (SBA)   UB Dressing Assistance 5  Supervision/Setup   LB Dressing Assistance   (SBA)   LB Dressing Deficit Pull up over hips   Toileting Assistance  5  Supervision/Setup   Bed Mobility   Supine to Sit 5  Supervision   Additional items Increased time required;Verbal cues   Sit to Supine 5  Supervision   Additional items Increased time required;Verbal cues   Transfers   Sit to Stand 5  Supervision   Additional items Increased time required;Verbal cues   Stand to Sit 5  Supervision   Additional items Increased time required;Verbal cues   Stand pivot 5  Supervision   Additional items Increased time required;Verbal cues   Additional Comments RW   Functional Mobility   Functional Mobility 5  Supervision   Additional Comments Pt participated in short functional distance in room with supervision and RW.   Cognition   Overall Cognitive Status WFL   Arousal/Participation Alert   Following Commands Follows one step commands without difficulty   Activity Tolerance   Activity Tolerance Patient limited by fatigue   Medical Staff  Made Aware PTMaral   Assessment   Assessment Pt tolerated treatment fairly but is limited by fatigue. He demonstrates ability to complete UB/LB ADLs with setup and increased time at worst, however requests for help with all ADLs throughout treatment. Pt educated on importance of continued activity and independence with ADL completion to maximize his functional outcomes and overall QoL. He continues with HTN however it is worthy of noting that different readings result on the L as compared to the R arm. Pt's sister and son entering room during tx and discussed current POC, requesting information regarding resources for the pt and to speak with hospitalist regarding d/c plan (CM and DO notified). Recommending Level III (Minimum Resource Intensity) to continue to maximize his independence with ADLs and functional mobility.   Plan   Treatment Interventions ADL retraining;Functional transfer training;UE strengthening/ROM;Endurance training;Patient/family training;Equipment evaluation/education;Compensatory technique education;Continued evaluation;Energy conservation;Activityengagement   Goal Expiration Date 12/30/24   OT Treatment Day 2   OT Frequency 2-3x/wk   Discharge Recommendation   Rehab Resource Intensity Level, OT III (Minimum Resource Intensity)   AM-PAC Daily Activity Inpatient   Lower Body Dressing 3   Bathing 3   Toileting 3   Upper Body Dressing 3   Grooming 3   Eating 4   Daily Activity Raw Score 19   Daily Activity Standardized Score (Calc for Raw Score >=11) 40.22   AM-PAC Applied Cognition Inpatient   Following a Speech/Presentation 4   Understanding Ordinary Conversation 4   Taking Medications 4   Remembering Where Things Are Placed or Put Away 4   Remembering List of 4-5 Errands 4   Taking Care of Complicated Tasks 4   Applied Cognition Raw Score 24   Applied Cognition Standardized Score 62.21   End of Consult   Patient Position at End of Consult Supine;Bed/Chair alarm activated;All needs within  reach       The patient's raw score on the AM-PAC Daily Activity Inpatient Short Form is 19. A raw score of greater than or equal to 19 suggests the patient may benefit from discharge to home. Please refer to the recommendation of the Occupational Therapist for safe discharge planning.    Pt goals to be met by 12/30/2024:     Pt will demonstrate ability to complete grooming/hygiene tasks @ mod I after set-up.  Pt will demonstrate ability to complete supine<>sit @ mod I in order to increase safety and independence during ADL tasks.  Pt will demonstrate ability to complete UB ADLs including washing/dressing @ mod I in order to increase performance and participation during meaningful tasks  Pt will demonstrate ability to complete LB dressing @ mod I in order to increase safety and independence during meaningful tasks.   Pt will demonstrate ability to tyler/doff socks/shoes while sitting EOB/chair @ mod I in order to increase safety and independence during meaningful tasks.   Pt will demonstrate ability to complete toileting tasks including CM and pericare @ mod I in order to increase safety and independence during meaningful tasks.  Pt will demonstrate ability to complete EOB, chair, toilet/commode transfers @ mod I in order to increase performance and participation during functional tasks.  Pt will demonstrate ability to stand for 10 minutes while maintaining F+ balance with use of RW for UB support PRN.  Pt will demonstrate ability to tolerate 30 minute OT session with no vc'ing for deep breathing or use of energy conservation techniques in order to increase activity tolerance during functional tasks.   Pt will demonstrate Good carryover of use of energy conservation/compensatory strategies during ADLs and functional tasks in order to increase safety and reduce risk for falls.   Pt will demonstrate Good attention and participation in continued evaluation of functional ambulation house hold distances in order to assist  with safe d/c planning.  Pt will attend to continued cognitive assessments 100% of the time in order to provide most appropriate d/c recommendations.   Pt will follow 100% simple 2-step commands and be A&O x4 consistently with environmental cues to increase participation in functional activities.   Pt will identify 3 areas of interest/hobbies and 1 intervention on how to incorporate into daily life in order to increase interaction with environment and peers as well as increase participation in meaningful tasks.   Pt will demonstrate 100% carryover of BUE HEP in order to increase BUE MS and increase performance during functional tasks upon d/c home.    Hal Gonzáles MS, OTR/L

## 2024-12-27 NOTE — HOSPITAL COURSE
"ue to overload.  Patient with history of noncompliance with medication regimen in the outpatient setting.  Furthermore, is to be maintained on LifeVest in the outpatient setting however has been noncompliant wearing it \"as needed\".  Several documented discussions among specialist in the outpatient setting regarding adherence to medical regimen.    Patient presented with significant volume overload.  Thought combination of poorly compensated HFrEF and end-stage renal disease.  Has been followed by cardiology and nephrology over the course of hospitalization.  Patient presented to hospital (12/14) and received daily ultrafiltration/HD until just 2 days ago.  Achieved adequate diuresis and reached dry weight (12/home  "

## 2024-12-27 NOTE — ASSESSMENT & PLAN NOTE
Lab Results   Component Value Date    EGFR 8 12/27/2024    EGFR 12 12/26/2024    EGFR 14 12/23/2024    CREATININE 5.93 (H) 12/27/2024    CREATININE 4.41 (H) 12/26/2024    CREATININE 3.84 (H) 12/23/2024     The patient was seen while on dialysis was tolerating it hemodynamically with good blood flow.  Labs today potassium was 5 sodium concentration 123 mill equivalents per liter and as above hemoglobin 7.5.    Hyponatremia related to his renal failure and inability to excrete water between dialysis.  On fluid restriction and increase dialysate flow rate and monitor.    Details of today's treatment outlined below.    HEMODIALYSIS PROCEDURE NOTE  The patient was seen and examined on hemodialysis.  Time: 3.5 hours  Sodium: 140 Blood flow: 400   Dialyzer: F160 Potassium: 2 Dialysate flow: 800   Access: catheter Bicarbonate: 35 Ultrafiltration goal: 2 - 3 liters as tolerated

## 2024-12-27 NOTE — ASSESSMENT & PLAN NOTE
PT/OT evaluation and follow-up.  Trying to arrange outpatient physical therapy in the home as patient refuses inpatient rehab.

## 2024-12-27 NOTE — DISCHARGE INSTR - AVS FIRST PAGE
You must wear your LifeVest at all times  You must take all medications exactly as directed  You must follow-up with regularly scheduled hemodialysis  Must follow-up with nephrology and cardiology  If you have worsening symptoms please present back to the emergency department

## 2024-12-30 PROCEDURE — 88312 SPECIAL STAINS GROUP 1: CPT | Performed by: STUDENT IN AN ORGANIZED HEALTH CARE EDUCATION/TRAINING PROGRAM

## 2024-12-30 PROCEDURE — 88305 TISSUE EXAM BY PATHOLOGIST: CPT | Performed by: STUDENT IN AN ORGANIZED HEALTH CARE EDUCATION/TRAINING PROGRAM

## 2024-12-30 PROCEDURE — 88112 CYTOPATH CELL ENHANCE TECH: CPT | Performed by: STUDENT IN AN ORGANIZED HEALTH CARE EDUCATION/TRAINING PROGRAM

## 2024-12-31 LAB
HISTIOCYTES NFR FLD: 40 %
LYMPHOCYTES NFR BLD AUTO: 14 %
MONO+MESO NFR FLD MANUAL: 7 %
MONOCYTES NFR BLD AUTO: 9 %
NEUTS SEG NFR BLD AUTO: 30 %
PATH REV: NORMAL
TOTAL CELLS COUNTED SPEC: 100

## 2025-01-10 ENCOUNTER — APPOINTMENT (EMERGENCY)
Dept: RADIOLOGY | Facility: HOSPITAL | Age: 70
DRG: 682 | End: 2025-01-10
Payer: COMMERCIAL

## 2025-01-10 ENCOUNTER — APPOINTMENT (INPATIENT)
Dept: CT IMAGING | Facility: HOSPITAL | Age: 70
DRG: 682 | End: 2025-01-10
Payer: COMMERCIAL

## 2025-01-10 ENCOUNTER — HOSPITAL ENCOUNTER (INPATIENT)
Facility: HOSPITAL | Age: 70
LOS: 5 days | Discharge: HOME HEALTH | DRG: 682 | End: 2025-01-15
Attending: EMERGENCY MEDICINE | Admitting: FAMILY MEDICINE
Payer: COMMERCIAL

## 2025-01-10 DIAGNOSIS — J90 PLEURAL EFFUSION: ICD-10-CM

## 2025-01-10 DIAGNOSIS — I50.23 ACUTE ON CHRONIC SYSTOLIC HEART FAILURE (HCC): ICD-10-CM

## 2025-01-10 DIAGNOSIS — D64.9 ANEMIA, UNSPECIFIED TYPE: Primary | ICD-10-CM

## 2025-01-10 DIAGNOSIS — I50.22 CHRONIC SYSTOLIC (CONGESTIVE) HEART FAILURE (HCC): ICD-10-CM

## 2025-01-10 DIAGNOSIS — N25.81 SECONDARY HYPERPARATHYROIDISM OF RENAL ORIGIN (HCC): ICD-10-CM

## 2025-01-10 DIAGNOSIS — R26.2 AMBULATORY DYSFUNCTION: ICD-10-CM

## 2025-01-10 DIAGNOSIS — I10 PRIMARY HYPERTENSION: ICD-10-CM

## 2025-01-10 DIAGNOSIS — G47.00 INSOMNIA: ICD-10-CM

## 2025-01-10 DIAGNOSIS — Z99.2 ACUTE HEMODIALYSIS PATIENT (HCC): ICD-10-CM

## 2025-01-10 DIAGNOSIS — E55.9 VITAMIN D DEFICIENCY, UNSPECIFIED: ICD-10-CM

## 2025-01-10 DIAGNOSIS — D63.1 ANEMIA OF RENAL DISEASE: ICD-10-CM

## 2025-01-10 DIAGNOSIS — Z99.2 ESRD (END STAGE RENAL DISEASE) ON DIALYSIS (HCC): ICD-10-CM

## 2025-01-10 DIAGNOSIS — N18.6 ESRD (END STAGE RENAL DISEASE) ON DIALYSIS (HCC): ICD-10-CM

## 2025-01-10 DIAGNOSIS — N18.9 ANEMIA OF RENAL DISEASE: ICD-10-CM

## 2025-01-10 DIAGNOSIS — I42.8 NONISCHEMIC CARDIOMYOPATHY (HCC): ICD-10-CM

## 2025-01-10 LAB
2HR DELTA HS TROPONIN: -4 NG/L
4HR DELTA HS TROPONIN: -5 NG/L
ABO GROUP BLD: NORMAL
ALBUMIN SERPL BCG-MCNC: 3.2 G/DL (ref 3.5–5)
ALP SERPL-CCNC: 132 U/L (ref 34–104)
ALT SERPL W P-5'-P-CCNC: 8 U/L (ref 7–52)
ANION GAP SERPL CALCULATED.3IONS-SCNC: 9 MMOL/L (ref 4–13)
AST SERPL W P-5'-P-CCNC: 18 U/L (ref 13–39)
BASOPHILS # BLD AUTO: 0.01 THOUSANDS/ΜL (ref 0–0.1)
BASOPHILS NFR BLD AUTO: 0 % (ref 0–1)
BILIRUB SERPL-MCNC: 0.95 MG/DL (ref 0.2–1)
BLD GP AB SCN SERPL QL: POSITIVE
BLOOD GROUP ANTIBODIES SERPL: NORMAL
BNP SERPL-MCNC: >4700 PG/ML (ref 0–100)
BUN SERPL-MCNC: 71 MG/DL (ref 5–25)
CALCIUM ALBUM COR SERPL-MCNC: 8.8 MG/DL (ref 8.3–10.1)
CALCIUM SERPL-MCNC: 8.2 MG/DL (ref 8.4–10.2)
CARDIAC TROPONIN I PNL SERPL HS: 63 NG/L (ref ?–50)
CARDIAC TROPONIN I PNL SERPL HS: 64 NG/L (ref ?–50)
CARDIAC TROPONIN I PNL SERPL HS: 68 NG/L (ref ?–50)
CHLORIDE SERPL-SCNC: 92 MMOL/L (ref 96–108)
CO2 SERPL-SCNC: 27 MMOL/L (ref 21–32)
CREAT SERPL-MCNC: 7.84 MG/DL (ref 0.6–1.3)
EOSINOPHIL # BLD AUTO: 0.04 THOUSAND/ΜL (ref 0–0.61)
EOSINOPHIL NFR BLD AUTO: 1 % (ref 0–6)
ERYTHROCYTE [DISTWIDTH] IN BLOOD BY AUTOMATED COUNT: 21.7 % (ref 11.6–15.1)
FERRITIN SERPL-MCNC: 244 NG/ML (ref 24–336)
GFR SERPL CREATININE-BSD FRML MDRD: 6 ML/MIN/1.73SQ M
GLUCOSE SERPL-MCNC: 123 MG/DL (ref 65–140)
HCT VFR BLD AUTO: 21.5 % (ref 36.5–49.3)
HGB BLD-MCNC: 7 G/DL (ref 12–17)
IMM GRANULOCYTES # BLD AUTO: 0.01 THOUSAND/UL (ref 0–0.2)
IMM GRANULOCYTES NFR BLD AUTO: 0 % (ref 0–2)
IRON SATN MFR SERPL: 8 % (ref 15–50)
IRON SERPL-MCNC: 28 UG/DL (ref 50–212)
LYMPHOCYTES # BLD AUTO: 0.34 THOUSANDS/ΜL (ref 0.6–4.47)
LYMPHOCYTES NFR BLD AUTO: 8 % (ref 14–44)
MCH RBC QN AUTO: 29.4 PG (ref 26.8–34.3)
MCHC RBC AUTO-ENTMCNC: 32.6 G/DL (ref 31.4–37.4)
MCV RBC AUTO: 90 FL (ref 82–98)
MONOCYTES # BLD AUTO: 0.39 THOUSAND/ΜL (ref 0.17–1.22)
MONOCYTES NFR BLD AUTO: 9 % (ref 4–12)
NEUTROPHILS # BLD AUTO: 3.44 THOUSANDS/ΜL (ref 1.85–7.62)
NEUTS SEG NFR BLD AUTO: 82 % (ref 43–75)
NRBC BLD AUTO-RTO: 0 /100 WBCS
PLATELET # BLD AUTO: 87 THOUSANDS/UL (ref 149–390)
PMV BLD AUTO: 10.6 FL (ref 8.9–12.7)
POTASSIUM SERPL-SCNC: 5 MMOL/L (ref 3.5–5.3)
PROT SERPL-MCNC: 6.6 G/DL (ref 6.4–8.4)
RBC # BLD AUTO: 2.38 MILLION/UL (ref 3.88–5.62)
RH BLD: POSITIVE
SODIUM SERPL-SCNC: 128 MMOL/L (ref 135–147)
SPECIMEN EXPIRATION DATE: NORMAL
TIBC SERPL-MCNC: 330.4 UG/DL (ref 250–450)
TRANSFERRIN SERPL-MCNC: 236 MG/DL (ref 203–362)
UIBC SERPL-MCNC: 302 UG/DL (ref 155–355)
WBC # BLD AUTO: 4.23 THOUSAND/UL (ref 4.31–10.16)

## 2025-01-10 PROCEDURE — 84484 ASSAY OF TROPONIN QUANT: CPT | Performed by: EMERGENCY MEDICINE

## 2025-01-10 PROCEDURE — 87081 CULTURE SCREEN ONLY: CPT | Performed by: FAMILY MEDICINE

## 2025-01-10 PROCEDURE — 86850 RBC ANTIBODY SCREEN: CPT | Performed by: EMERGENCY MEDICINE

## 2025-01-10 PROCEDURE — 74176 CT ABD & PELVIS W/O CONTRAST: CPT

## 2025-01-10 PROCEDURE — 83550 IRON BINDING TEST: CPT | Performed by: FAMILY MEDICINE

## 2025-01-10 PROCEDURE — 99223 1ST HOSP IP/OBS HIGH 75: CPT | Performed by: FAMILY MEDICINE

## 2025-01-10 PROCEDURE — 83540 ASSAY OF IRON: CPT | Performed by: FAMILY MEDICINE

## 2025-01-10 PROCEDURE — 83880 ASSAY OF NATRIURETIC PEPTIDE: CPT | Performed by: EMERGENCY MEDICINE

## 2025-01-10 PROCEDURE — 82728 ASSAY OF FERRITIN: CPT | Performed by: FAMILY MEDICINE

## 2025-01-10 PROCEDURE — 93005 ELECTROCARDIOGRAM TRACING: CPT

## 2025-01-10 PROCEDURE — 86921 COMPATIBILITY TEST INCUBATE: CPT

## 2025-01-10 PROCEDURE — 36415 COLL VENOUS BLD VENIPUNCTURE: CPT | Performed by: EMERGENCY MEDICINE

## 2025-01-10 PROCEDURE — 86922 COMPATIBILITY TEST ANTIGLOB: CPT

## 2025-01-10 PROCEDURE — 80053 COMPREHEN METABOLIC PANEL: CPT | Performed by: EMERGENCY MEDICINE

## 2025-01-10 PROCEDURE — 85025 COMPLETE CBC W/AUTO DIFF WBC: CPT | Performed by: EMERGENCY MEDICINE

## 2025-01-10 PROCEDURE — 86870 RBC ANTIBODY IDENTIFICATION: CPT | Performed by: FAMILY MEDICINE

## 2025-01-10 PROCEDURE — P9016 RBC LEUKOCYTES REDUCED: HCPCS

## 2025-01-10 PROCEDURE — 86900 BLOOD TYPING SEROLOGIC ABO: CPT | Performed by: EMERGENCY MEDICINE

## 2025-01-10 PROCEDURE — 30233N1 TRANSFUSION OF NONAUTOLOGOUS RED BLOOD CELLS INTO PERIPHERAL VEIN, PERCUTANEOUS APPROACH: ICD-10-PCS | Performed by: FAMILY MEDICINE

## 2025-01-10 PROCEDURE — 99285 EMERGENCY DEPT VISIT HI MDM: CPT

## 2025-01-10 PROCEDURE — 86901 BLOOD TYPING SEROLOGIC RH(D): CPT | Performed by: EMERGENCY MEDICINE

## 2025-01-10 PROCEDURE — 71045 X-RAY EXAM CHEST 1 VIEW: CPT

## 2025-01-10 PROCEDURE — 99285 EMERGENCY DEPT VISIT HI MDM: CPT | Performed by: EMERGENCY MEDICINE

## 2025-01-10 RX ORDER — ASPIRIN 81 MG/1
81 TABLET ORAL DAILY
Status: DISCONTINUED | OUTPATIENT
Start: 2025-01-11 | End: 2025-01-15 | Stop reason: HOSPADM

## 2025-01-10 RX ORDER — ISOSORBIDE MONONITRATE 60 MG/1
120 TABLET, EXTENDED RELEASE ORAL DAILY
Status: DISCONTINUED | OUTPATIENT
Start: 2025-01-11 | End: 2025-01-15 | Stop reason: HOSPADM

## 2025-01-10 RX ORDER — HYDRALAZINE HYDROCHLORIDE 20 MG/ML
5 INJECTION INTRAMUSCULAR; INTRAVENOUS EVERY 6 HOURS PRN
Status: DISCONTINUED | OUTPATIENT
Start: 2025-01-10 | End: 2025-01-15 | Stop reason: HOSPADM

## 2025-01-10 RX ORDER — HEPARIN SODIUM 5000 [USP'U]/ML
5000 INJECTION, SOLUTION INTRAVENOUS; SUBCUTANEOUS EVERY 8 HOURS SCHEDULED
Status: DISCONTINUED | OUTPATIENT
Start: 2025-01-10 | End: 2025-01-15 | Stop reason: HOSPADM

## 2025-01-10 RX ORDER — ATORVASTATIN CALCIUM 20 MG/1
20 TABLET, FILM COATED ORAL
Status: DISCONTINUED | OUTPATIENT
Start: 2025-01-10 | End: 2025-01-15 | Stop reason: HOSPADM

## 2025-01-10 RX ORDER — FOLIC ACID 1 MG/1
1 TABLET ORAL DAILY
Status: DISCONTINUED | OUTPATIENT
Start: 2025-01-10 | End: 2025-01-15 | Stop reason: HOSPADM

## 2025-01-10 RX ORDER — HYDRALAZINE HYDROCHLORIDE 25 MG/1
100 TABLET, FILM COATED ORAL EVERY 8 HOURS SCHEDULED
Status: DISCONTINUED | OUTPATIENT
Start: 2025-01-10 | End: 2025-01-15 | Stop reason: HOSPADM

## 2025-01-10 RX ORDER — ACETAMINOPHEN 325 MG/1
650 TABLET ORAL EVERY 6 HOURS PRN
Status: DISCONTINUED | OUTPATIENT
Start: 2025-01-10 | End: 2025-01-15 | Stop reason: HOSPADM

## 2025-01-10 RX ORDER — TORSEMIDE 100 MG/1
100 TABLET ORAL DAILY
Status: DISCONTINUED | OUTPATIENT
Start: 2025-01-10 | End: 2025-01-12

## 2025-01-10 RX ADMIN — ATORVASTATIN CALCIUM 20 MG: 20 TABLET, FILM COATED ORAL at 16:35

## 2025-01-10 RX ADMIN — HYDRALAZINE HYDROCHLORIDE 5 MG: 20 INJECTION INTRAMUSCULAR; INTRAVENOUS at 17:40

## 2025-01-10 RX ADMIN — Medication 3 MG: at 21:11

## 2025-01-10 RX ADMIN — FOLIC ACID 1 MG: 1 TABLET ORAL at 15:30

## 2025-01-10 RX ADMIN — CARVEDILOL 18.75 MG: 6.25 TABLET, FILM COATED ORAL at 16:35

## 2025-01-10 RX ADMIN — HYDRALAZINE HYDROCHLORIDE 100 MG: 25 TABLET ORAL at 15:30

## 2025-01-10 RX ADMIN — TORSEMIDE 100 MG: 100 TABLET ORAL at 15:30

## 2025-01-10 RX ADMIN — HYDRALAZINE HYDROCHLORIDE 100 MG: 25 TABLET ORAL at 21:11

## 2025-01-10 NOTE — ASSESSMENT & PLAN NOTE
Lab Results   Component Value Date    EGFR 6 01/10/2025    EGFR 8 12/27/2024    EGFR 12 12/26/2024    CREATININE 7.84 (H) 01/10/2025    CREATININE 5.93 (H) 12/27/2024    CREATININE 4.41 (H) 12/26/2024   He has acute on chronic anemia usually his hemoglobin is above 7.5 he was found to have a 6 on repeat here at 7 will transfuse 1 unit of PRBC's.  Nephrology consent obtained.  Patient denies any melena or hematochezia will obtain occult and iron panel.

## 2025-01-10 NOTE — ASSESSMENT & PLAN NOTE
Wt Readings from Last 3 Encounters:   01/10/25 64 kg (141 lb 1.5 oz)   12/27/24 56 kg (123 lb 6.4 oz)   11/20/24 55.2 kg (121 lb 12.8 oz)     Missed dialysis on Wednesday or Mr. Sandoval wife and him have a different story last EF was 20-24 in November 24 patient is on dialysis Monday Wednesday Friday and takes torsemide 100 mg daily  He has pulmonary edema with moderate pleural effusion past history of thoracocentesis I discussed with him again that he might need 1 on Monday by interventional radiology.  Nephrology was spoken to they will arrange dialysis tomorrow.  Will restart his torsemide 100 mg daily  He also has associated abdominal distention we will do an ultrasound to evaluate for any ascites also has positive leg edema.  Continue his isosorbide  Continue his aspirin  Patient also has a LifeVest at home which she does not wear all the time he only wears it to dialysis here he is can to be on telemetry monitor throughout the hospital stay

## 2025-01-10 NOTE — PLAN OF CARE
Problem: Nutrition/Hydration-ADULT  Goal: Nutrient/Hydration intake appropriate for improving, restoring or maintaining nutritional needs  Description: Monitor and assess patient's nutrition/hydration status for malnutrition. Collaborate with interdisciplinary team and initiate plan and interventions as ordered.  Monitor patient's weight and dietary intake as ordered or per policy. Utilize nutrition screening tool and intervene as necessary. Determine patient's food preferences and provide high-protein, high-caloric foods as appropriate.     INTERVENTIONS:  - Monitor oral intake, urinary output, labs, and treatment plans  - Assess nutrition and hydration status and recommend course of action  - Evaluate amount of meals eaten  - Assist patient with eating if necessary   - Allow adequate time for meals  - Recommend/ encourage appropriate diets, oral nutritional supplements, and vitamin/mineral supplements  - Order, calculate, and assess calorie counts as needed  - Recommend, monitor, and adjust tube feedings and TPN/PPN based on assessed needs  - Assess need for intravenous fluids  - Provide specific nutrition/hydration education as appropriate  - Include patient/family/caregiver in decisions related to nutrition  Outcome: Progressing     Problem: PAIN - ADULT  Goal: Verbalizes/displays adequate comfort level or baseline comfort level  Description: Interventions:  - Encourage patient to monitor pain and request assistance  - Assess pain using appropriate pain scale  - Administer analgesics based on type and severity of pain and evaluate response  - Implement non-pharmacological measures as appropriate and evaluate response  - Consider cultural and social influences on pain and pain management  - Notify physician/advanced practitioner if interventions unsuccessful or patient reports new pain  Outcome: Progressing     Problem: INFECTION - ADULT  Goal: Absence or prevention of progression during  hospitalization  Description: INTERVENTIONS:  - Assess and monitor for signs and symptoms of infection  - Monitor lab/diagnostic results  - Monitor all insertion sites, i.e. indwelling lines, tubes, and drains  - Monitor endotracheal if appropriate and nasal secretions for changes in amount and color  - [place] appropriate cooling/warming therapies per order  - Administer medications as ordered  - Instruct and encourage patient and family to use good hand hygiene technique  - Identify and instruct in appropriate isolation precautions for identified infection/condition  Outcome: Progressing  Goal: Absence of fever/infection during neutropenic period  Description: INTERVENTIONS:  - Monitor WBC    Outcome: Progressing     Problem: SAFETY ADULT  Goal: Patient will remain free of falls  Description: INTERVENTIONS:  - Educate patient/family on patient safety including physical limitations  - Instruct patient to call for assistance with activity   - Consult OT/PT to assist with strengthening/mobility   - Keep Call bell within reach  - Keep bed low and locked with side rails adjusted as appropriate  - Keep care items and personal belongings within reach  - Initiate and maintain comfort rounds  - Make Fall Risk Sign visible to staff  - Offer Toileting every 2 Hours, in advance of need  - Initiate/Maintain bed alarm  - Obtain necessary fall risk management equipment: bed alarm  - Apply yellow socks and bracelet for high fall risk patients  - Consider moving patient to room near nurses station  Outcome: Progressing  Goal: Maintain or return to baseline ADL function  Description: INTERVENTIONS:  -  Assess patient's ability to carry out ADLs; assess patient's baseline for ADL function and identify physical deficits which impact ability to perform ADLs (bathing, care of mouth/teeth, toileting, grooming, dressing, etc.)  - Assess/evaluate cause of self-care deficits   - Assess range of motion  - Assess patient's mobility;  develop plan if impaired  - Assess patient's need for assistive devices and provide as appropriate  - Encourage maximum independence but intervene and supervise when necessary  - Involve family in performance of ADLs  - Assess for home care needs following discharge   - Consider OT consult to assist with ADL evaluation and planning for discharge  - Provide patient education as appropriate  Outcome: Progressing  Goal: Maintains/Returns to pre admission functional level  Description: INTERVENTIONS:  - Perform AM-PAC 6 Click Basic Mobility/ Daily Activity assessment daily.  - Set and communicate daily mobility goal to care team and patient/family/caregiver.   - Collaborate with rehabilitation services on mobility goals if consulted  - Perform Range of Motion 3 times a day.  - Reposition patient every 2 hours.  - Dangle patient 3 times a day  - Stand patient 3 times a day  - Ambulate patient 3 times a day  - Out of bed to chair 3 times a day   - Out of bed for meals 3 times a day  - Out of bed for toileting  - Record patient progress and toleration of activity level   Outcome: Progressing     Problem: DISCHARGE PLANNING  Goal: Discharge to home or other facility with appropriate resources  Description: INTERVENTIONS:  - Identify barriers to discharge w/patient and caregiver  - Arrange for needed discharge resources and transportation as appropriate  - Identify discharge learning needs (meds, wound care, etc.)  - Arrange for interpretive services to assist at discharge as needed  - Refer to Case Management Department for coordinating discharge planning if the patient needs post-hospital services based on physician/advanced practitioner order or complex needs related to functional status, cognitive ability, or social support system  Outcome: Progressing     Problem: Knowledge Deficit  Goal: Patient/family/caregiver demonstrates understanding of disease process, treatment plan, medications, and discharge  instructions  Description: Complete learning assessment and assess knowledge base.  Interventions:  - Provide teaching at level of understanding  - Provide teaching via preferred learning methods  Outcome: Progressing

## 2025-01-10 NOTE — ASSESSMENT & PLAN NOTE
Complains of no chest pain always has elevated troponin suspect in light of ESRD.  And CHF exacerbation.  EKG nonischemic

## 2025-01-10 NOTE — H&P
H&P - Hospitalist   Name: Matthew Alvarez 69 y.o. male I MRN: [id number]  Unit/Bed#: [facility] I Date of Admission: 1/10/2025   Date of Service: 1/10/2025 I Hospital Day: 0     Assessment & Plan  Acute on chronic systolic heart failure (HCC)  Wt Readings from Last 3 Encounters:   01/10/25 64 kg (141 lb 1.5 oz)   12/27/24 56 kg (123 lb 6.4 oz)   11/20/24 55.2 kg (121 lb 12.8 oz)     Missed dialysis on Wednesday or Mr. Sandoval wife and him have a different story last EF was 20-24 in November 24 patient is on dialysis Monday Wednesday Friday and takes torsemide 100 mg daily  He has pulmonary edema with moderate pleural effusion past history of thoracocentesis I discussed with him again that he might need 1 on Monday by interventional radiology.  Nephrology was spoken to they will arrange dialysis tomorrow.  Will restart his torsemide 100 mg daily  He also has associated abdominal distention we will do an ultrasound to evaluate for any ascites also has positive leg edema.  Continue his isosorbide  Continue his aspirin  Patient also has a LifeVest at home which she does not wear all the time he only wears it to dialysis here he is can to be on telemetry monitor throughout the hospital stay        Anemia due to chronic kidney disease, on chronic dialysis (HCC)  Lab Results   Component Value Date    EGFR 6 01/10/2025    EGFR 8 12/27/2024    EGFR 12 12/26/2024    CREATININE 7.84 (H) 01/10/2025    CREATININE 5.93 (H) 12/27/2024    CREATININE 4.41 (H) 12/26/2024   He has acute on chronic anemia usually his hemoglobin is above 7.5 he was found to have a 6 on repeat here at 7 will transfuse 1 unit of PRBC's.  Nephrology consent obtained.  Patient denies any melena or hematochezia will obtain occult and iron panel.  Primary hypertension  Continue his hydralazine continue isosorbide continue Coreg  Tobacco abuse  Per wife still smokes 6 to 7 cigarettes  Elevated troponin  Complains of no chest pain always has elevated troponin  suspect in light of ESRD.  And CHF exacerbation.  EKG nonischemic  Thrombocytopenia (HCC)  Chronic monitor while on heparin.      VTE Pharmacologic Prophylaxis: VTE Score: 2 Low Risk (Score 0-2) - Encourage Ambulation.  Code Status: Level 1 - Full Code   Discussion with family: Updated  (wife) via phone.    Anticipated Length of Stay: Patient will be admitted on an inpatient basis with an anticipated length of stay of greater than 2 midnights secondary to fluid overload/anemia.    History of Present Illness   Chief Complaint: fatigue    Matthew Alvarez is a 69 y.o. male with a PMH of syctolic heart failure  who presents with fatigue and swelling of legs . No sob as per him.no chest pain . Denies blood in stool . No abdominal pain or per him distention. He tells me that he takes his meds wife tells me he misses meds at times and he tells me hasn't been a while in dialysis and wife tells me he only misses once a week and last dialysis was Monday and missed wed. He only wears his life vest to dialysis.he was called and told his hb is 6 and he complains of fatigue .    Review of Systems   Constitutional:  Positive for fatigue. Negative for chills and fever.   HENT:  Negative for ear pain and sore throat.    Eyes:  Negative for pain and visual disturbance.   Respiratory:  Negative for cough and shortness of breath.    Cardiovascular:  Negative for chest pain and palpitations.   Gastrointestinal:  Negative for abdominal pain and vomiting.   Genitourinary:  Negative for dysuria and hematuria.   Musculoskeletal:  Negative for arthralgias and back pain.   Skin:  Negative for color change and rash.   Neurological:  Negative for seizures and syncope.   All other systems reviewed and are negative.      Historical Information   Past Medical History:   Diagnosis Date    Acute hemodialysis patient ([facility]) 12/14/2024    CAD (coronary artery disease)     HFrEF (heart failure with reduced ejection fraction) ([facility]) 09/2021  "   Hypertension     Rheumatoid factor positive 09/2021    Stage 3 chronic kidney disease (HCC)     Tobacco abuse      Past Surgical History:   Procedure Laterality Date    APPENDECTOMY  1965    CARDIAC CATHETERIZATION  9/16/2021    INGUINAL HERNIA REPAIR Right 1991    IR THORACENTESIS  12/18/2024    IR THORACENTESIS  12/23/2024    IR TUNNELED DIALYSIS CATHETER PLACEMENT  11/14/2024     Social History     Tobacco Use    Smoking status: Every Day     Current packs/day: 0.50     Average packs/day: 0.5 packs/day for 51.0 years (25.5 ttl pk-yrs)     Types: Cigarettes     Start date: 1974    Smokeless tobacco: Never    Tobacco comments:     Began smoking in his early 20s, on average smoking 1+ packs daily. Quit in 08/2021 (Updated 09/15/2021).    Vaping Use    Vaping status: Never Used   Substance and Sexual Activity    Alcohol use: Yes     Alcohol/week: 6.0 standard drinks of alcohol     Types: 6 Shots of liquor per week     Comment: rare    Drug use: Never    Sexual activity: Not on file     Comment: defer     E-Cigarette/Vaping    E-Cigarette Use Never User      E-Cigarette/Vaping Substances     Family History   Problem Relation Age of Onset    Heart failure Mother     Other Father         \"blood clot\"    COPD Sister     Heart failure Brother     Valvular heart disease Brother     Heart attack Son     No Known Problems Son      Social History:  Marital Status: /Civil Union     Meds/Allergies   I have reviewed home medications using recent Epic encounter.  Prior to Admission medications    Medication Sig Start Date End Date Taking? Authorizing Provider   aspirin (ECOTRIN LOW STRENGTH) 81 mg EC tablet Take 1 tablet (81 mg total) by mouth daily 12/10/24   YUE Griffin   atorvastatin (LIPITOR) 20 mg tablet Take 1 tablet (20 mg total) by mouth daily with dinner 12/27/24   Chris Nolasco DO   carvedilol (COREG) 6.25 mg tablet Take 3 tablets (18.75 mg total) by mouth 2 (two) times a day with meals " 12/27/24   Chris Nolasco DO   folic acid (FOLVITE) 1 mg tablet Take 1 tablet (1 mg total) by mouth daily 12/27/24   Chris Nolasco DO   hydrALAZINE (APRESOLINE) 100 MG tablet Take 1 tablet (100 mg total) by mouth every 8 (eight) hours 12/10/24   YUE Griffin   isosorbide mononitrate (IMDUR) 120 mg 24 hr tablet Take 1 tablet (120 mg total) by mouth daily 12/10/24   YUE Griffin   melatonin 3 mg Take 1 tablet (3 mg total) by mouth daily at bedtime 11/20/24   Amy Rodriguez MD   torsemide (DEMADEX) 100 mg tablet Take 1 tablet (100 mg total) by mouth daily 12/27/24   Chris Nolasco DO     No Known Allergies    Objective :  Temp:  [97.7 °F (36.5 °C)-97.9 °F (36.6 °C)] 97.7 °F (36.5 °C)  HR:  [67-73] 68  BP: (169-199)/() 169/97  Resp:  [18-31] 18  SpO2:  [95 %-99 %] 96 %  O2 Device: None (Room air)    Physical Exam  Vitals and nursing note reviewed.   Constitutional:       General: He is not in acute distress.     Appearance: He is well-developed.   HENT:      Head: Normocephalic and atraumatic.   Eyes:      Conjunctiva/sclera: Conjunctivae normal.   Cardiovascular:      Rate and Rhythm: Normal rate and regular rhythm.      Heart sounds: No murmur heard.  Pulmonary:      Effort: Pulmonary effort is normal. No respiratory distress.      Breath sounds: Wheezing and rales present.   Abdominal:      General: There is distension.      Palpations: Abdomen is soft.      Tenderness: There is no abdominal tenderness.   Musculoskeletal:         General: Swelling present.      Cervical back: Neck supple.   Skin:     General: Skin is warm and dry.      Capillary Refill: Capillary refill takes less than 2 seconds.   Neurological:      Mental Status: He is alert and oriented to person, place, and time.   Psychiatric:         Mood and Affect: Mood normal.          Lines/Drains:            Lab Results: I have reviewed the following results:  Results from last 7 days   Lab Units 01/10/25  1126    WBC Thousand/uL 4.23*   HEMOGLOBIN g/dL 7.0*   HEMATOCRIT % 21.5*   PLATELETS Thousands/uL 87*   SEGS PCT % 82*   LYMPHO PCT % 8*   MONO PCT % 9   EOS PCT % 1     Results from last 7 days   Lab Units 01/10/25  1126   SODIUM mmol/L 128*   POTASSIUM mmol/L 5.0   CHLORIDE mmol/L 92*   CO2 mmol/L 27   BUN mg/dL 71*   CREATININE mg/dL 7.84*   ANION GAP mmol/L 9   CALCIUM mg/dL 8.2*   ALBUMIN g/dL 3.2*   TOTAL BILIRUBIN mg/dL 0.95   ALK PHOS U/L 132*   ALT U/L 8   AST U/L 18   GLUCOSE RANDOM mg/dL 123             Lab Results   Component Value Date    HGBA1C 5.7 (H) 09/15/2021           Imaging Results Review: I reviewed radiology reports from this admission including: chest xray.  Other Study Results Review: EKG was reviewed.     Administrative Statements   I have spent a total time of >45 minutes in caring for this patient on the day of the visit/encounter including Patient and family education, Documenting in the medical record, Reviewing / ordering tests, medicine, procedures  , and Communicating with other healthcare professionals .    ** Please Note: This note has been constructed using a voice recognition system. **

## 2025-01-10 NOTE — CASE MANAGEMENT
Case Management Assessment & Discharge Planning Note    Patient name Matthew Alvarez  Location /-01 MRN [id number]  : 1955 Date 1/10/2025       Current Admission Date: 1/10/2025  Current Admission Diagnosis:Acute on chronic systolic heart failure (HCC)   Patient Active Problem List    Diagnosis Date Noted Date Diagnosed    Pleural effusion 2024     Secondary hyperparathyroidism of renal origin (HCC) 12/15/2024     ESRD (end stage renal disease) on dialysis ([facility]) 2024     Ambulatory dysfunction 2024     Thrombocytopenia ([facility]) 2024     Renal failure 11/15/2024     Encounter for hemodialysis ([facility]) 11/15/2024     Anemia due to chronic kidney disease, on chronic dialysis ([facility]) 11/15/2024     Anemia of renal disease 11/15/2024     Vitamin D deficiency, unspecified 11/15/2024     Chronic systolic (congestive) heart failure ([facility]) 2024     Oliguria 2024     Encounter for hemodialysis for ESRD ([facility]) 2024     Dilated cardiomyopathy ([facility]) 2024     Elevated liver transaminase level 11/10/2024     Acute on chronic systolic heart failure (HCC) 2024     Coronary artery disease involving native coronary artery of native heart without angina pectoris 10/08/2021     Rheumatoid factor positive 2021     Protein calorie malnutrition (HCC) 2021     Elevated troponin 2021     Tobacco abuse 2021     Nonischemic cardiomyopathy (HCC) 2021     Primary hypertension 2021       LOS (days): 0  Geometric Mean LOS (GMLOS) (days):   Days to GMLOS:     OBJECTIVE:  PATIENT READMITTED TO HOSPITAL            Current admission status: Inpatient  Referral Reason: Other (Social Issues)    Preferred Pharmacy:   [id number] [place] - [address]  [address]  [facility] [address]  Phone: [phone] Fax: [phone]    [facility] [place] - [address]  [address]  [place] [address]  Phone: [phone] Fax: [phone]    Primary Care Provider: Olive Rodriguez MD    Primary Insurance: [employer] RE[employer]  Secondary Insurance:     ASSESSMENT:  Active Health Care Proxies    There are no active Health Care Proxies on file.       Advance Directives  Does patient have a Health Care POA?: Yes (Kellee Alvarez)  Does patient have Advance Directives?: Yes  Advance Directives: Living will, Power of  for health care  Primary Contact: Rafael Alvarez- son         Readmission Root Cause  30 Day Readmission: Yes  During your hospital stay, did someone (provider, nurse, ) explain your care to you in a way you could understand?: Yes  Did you feel medically stable to leave the hospital?: Yes  Were you able to pay for your medication at the pharmacy?: Yes  Did you have reliable transportation to take you to your appointments?: Yes  During previous admission, was a post-acute recommendation made?: Yes  What post-acute resources were offered?: Offered, but declined  Patient was readmitted due to: HGB- 7, hypertensive, BLE edema  Action Plan: hemodialysis tomorrow with 1 unit of blood.    Patient Information  Admitted from:: Home  Mental Status: Alert  During Assessment patient was accompanied by: Not accompanied during assessment  Assessment information provided by:: Patient, Spouse  Primary Caregiver: Child  Caregiver's Name:: Rafael Alvarez- cristal  Caregiver's Relationship to Patient:: Family Member  Caregiver's Telephone Number:: [phone]  Support Systems: Spouse/significant other, Son, Family members  County [facility] Residence: [facility]  What city do you live in?: [place]  Home entry access options. Select all that apply.: Stairs  Number of steps to enter home.: 1  Do the steps have railings?: Yes  Type of Current Residence: 2 story home  Upon entering residence, is there a bedroom on the main floor (no further steps)?: No (pt sleeps on the sofa on the 1st floor)  A  bedroom is located on the following floor levels of residence (select all that apply):: 2nd Floor  Upon entering residence, is there a bathroom on the main floor (no further steps)?: Yes  Number of steps to 2nd floor from main floor: One Flight  Living Arrangements: Lives w/ Spouse/significant other, Lives w/ Son  Is patient a ?: No    Activities of Daily Living Prior to Admission  Functional Status: Total dependent  Completes ADLs independently?: No  Level of ADL dependence: Total Dependent  Ambulates independently?: No  Level of ambulatory dependence: Total Dependent  Does patient use assisted devices?: Yes  Assisted Devices (DME) used: Walker, Wheelchair, Other (Comment) (LifeVest)  Does patient currently own DME?: Yes  What DME does the patient currently own?: Walker, Wheelchair  Does patient have a history of Outpatient Therapy (PT/OT)?: No  Does the patient have a history of Short-Term Rehab?: No  Does patient have a history of HHC?: No  Does patient currently have HHC?: No         Patient Information Continued  Income Source: Pension/correction  Does patient have prescription coverage?: Yes  Does patient receive dialysis treatments?: Yes ([facility] M-W-F)  Does patient have a history of substance abuse?: No  Does patient have a history of Mental Health Diagnosis?: No         Means of Transportation  Means of Transport to [facility]:: Family transport      Social Determinants of Health (SDOH)      Flowsheet Row Most Recent Value   Housing Stability    In the last 12 months, was there a time when you were not able to pay the mortgage or rent on time? N   In the past 12 months, how many times have you moved where you were living? 0   At any time in the past 12 months, were you homeless or living in a shelter (including now)? N   Transportation Needs    In the past 12 months, has lack of transportation kept you from medical appointments or from getting medications? no   In the past 12 months, has lack of  transportation kept you from meetings, work, or from getting things needed for daily living? No   Food Insecurity    Within the past 12 months, you worried that your food would run out before you got the money to buy more. Never true   Within the past 12 months, the food you bought just didn't last and you didn't have money to get more. Never true   Utilities    In the past 12 months has the electric, gas, oil, or water company threatened to shut off services in your home? No            DISCHARGE DETAILS:    Discharge planning discussed with:: patient and spouse Kelleehieu Alvarez  Freedom of Choice: Yes  Comments - Freedom of Choice: pt is agreeable to blanket referral for STR, if receommended by care team  CM contacted family/caregiver?: Yes (Kellee Alvarez)             Contacts  Patient Contacts: Kelleehieu Alvarez- spouse  Relationship to Patient:: Family  Contact Method: Phone  Phone Number: [phone]  Reason/Outcome: Discharge Planning         CM met with pt to review role of CM and discuss any supports needed upon discharge. Baseline information obtained, pt indicates he lives at home with his son and spouse in a 2 story home with [address] [facility]. Pt indicates he has a hospital bed and a bathroom on the main floor. Pt indicates he has become weak since his last hospital discharge and unable to get to OOB. Pt indicates his son Rafael has been his primary caregiver at home. Pt has been requiring total assistance with ADLs, transfer and not able to ambulate. Pt indicates he gets HD for ESRD at [facility] in [place] on M-W-F at 2:00 PM x 4 hours, pts sister or brother in law provide transportation to HD and appointments. Pts spouse Kellee indicated pt refused to go to his scheduled PCP appointment with Dr. Ojeda that was scheduled on 12/30/24, and since pt refused to go to appointment [facility] was unable to start services at home. Pt does have a Lifevest. Pt is agreeable to STR if recommended by care team.  PT/OT eval pending. CM will continue to follow and address discharge needs.

## 2025-01-10 NOTE — ED PROVIDER NOTES
Time reflects when diagnosis was documented in both MDM as applicable and the Disposition within this note       Time User Action Codes Description Comment    1/10/2025 12:30 PM Nahum Casiano Add [D64.9] Anemia, unspecified type           ED Disposition       ED Disposition   Admit    Condition   Stable    Date/Time   Fri Loco 10, 2025 12:29 PM    Comment   Case was discussed with hospital medicine and the patient's admission status was agreed to be Admission Status: inpatient status to the service of Dr. Figueroa .               Assessment & Plan       Medical Decision Making  69-year-old male with past medical history of end-stage renal disease on hemodialysis presents to the emergency department for evaluation of abnormal lab work.  Patient's hemoglobin was found to be 7, patient is hypertensive, with bilateral lower extremity pitting edema, discussed case with nephrology who states that patient can have hemodialysis tomorrow with 1 unit of blood.  Patient has no other medical complaints at this time.  Case management consult placed for social issues.  Discussed case with hospital medicine, patient will be admitted for further evaluation.    Amount and/or Complexity of Data Reviewed  Labs: ordered.  Radiology: ordered.    Risk  Decision regarding hospitalization.             Medications - No data to display    ED Risk Strat Scores                          SBIRT 22yo+      Flowsheet Row Most Recent Value   Initial Alcohol Screen: US AUDIT-C     1. How often do you have a drink containing alcohol? 1 Filed at: 01/10/2025 1113   2. How many drinks containing alcohol do you have on a typical day you are drinking?  1 Filed at: 01/10/2025 1113   3a. Male UNDER 65: How often do you have five or more drinks on one occasion? 0 Filed at: 01/10/2025 1113   3b. FEMALE Any Age, or MALE 65+: How often do you have 4 or more drinks on one occassion? 0 Filed at: 01/10/2025 1113   Audit-C Score 2 Filed at: 01/10/2025 1113   ALONDRA:  "How many times in the past year have you...    Used an illegal drug or used a prescription medication for non-medical reasons? Never Filed at: 01/10/2025 1113                            History of Present Illness       Chief Complaint   Patient presents with    Abnormal Lab     Per EMS, patient coming from home. Lives w/ son and wife who are his caretakers. Patient is a dialysis patient. Unaware of last treatment. Coming in today because his family was notified his hemoglobin was 6.0. Patient's only complaint is fatigue. Legs also noted to be swollen and weeping in triage.        Past Medical History:   Diagnosis Date    Acute hemodialysis patient ([facility]) 12/14/2024    CAD (coronary artery disease)     HFrEF (heart failure with reduced ejection fraction) ([facility]) 09/2021    Hypertension     Rheumatoid factor positive 09/2021    Stage 3 chronic kidney disease ([facility])     Tobacco abuse       Past Surgical History:   Procedure Laterality Date    APPENDECTOMY  1965    CARDIAC CATHETERIZATION  9/16/2021    INGUINAL HERNIA REPAIR Right 1991    IR THORACENTESIS  12/18/2024    IR THORACENTESIS  12/23/2024    IR TUNNELED DIALYSIS CATHETER PLACEMENT  11/14/2024      Family History   Problem Relation Age of Onset    Heart failure Mother     Other Father         \"blood clot\"    COPD Sister     Heart failure Brother     Valvular heart disease Brother     Heart attack Son     No Known Problems Son       Social History     Tobacco Use    Smoking status: Every Day     Current packs/day: 0.50     Average packs/day: 0.5 packs/day for 51.0 years (25.5 ttl pk-yrs)     Types: Cigarettes     Start date: 1974    Smokeless tobacco: Never    Tobacco comments:     Began smoking in his early 20s, on average smoking 1+ packs daily. Quit in 08/2021 (Updated 09/15/2021).    Vaping Use    Vaping status: Never Used   Substance Use Topics    Alcohol use: Yes     Alcohol/week: 6.0 standard drinks of alcohol     Types: 6 Shots of liquor per week     " Comment: rare    Drug use: Never      E-Cigarette/Vaping    E-Cigarette Use Never User       E-Cigarette/Vaping Substances      I have reviewed and agree with the history as documented.     69-year-old male with past medical history of end-stage renal disease on hemodialysis presents to the emergency department for evaluation of abnormal labs.  Patient states that he has missed approximately 2 weeks of hemodialysis due to inability to get to the center due to ride/social issues.  Patient was told by family member to come to the emergency department because he received a call regarding a low hemoglobin level.  They told him that his hemoglobin was 6, in the emergency department, his hemoglobin here is 7.  Patient denies any chills, fevers, sweats, cough, mucus, chest pain, shortness of breath, GI or  complaints.  On initial evaluation, he is hypertensive, with pitting edema bilaterally.  Case management consulted due to social issues, discussed case with nephrology.          Review of Systems   Constitutional:  Negative for chills and fever.   HENT:  Negative for ear pain and sore throat.    Eyes:  Negative for pain and visual disturbance.   Respiratory:  Negative for cough and shortness of breath.    Cardiovascular:  Negative for chest pain and palpitations.   Gastrointestinal:  Negative for abdominal pain and vomiting.   Genitourinary:  Negative for dysuria and hematuria.   Musculoskeletal:  Positive for joint swelling. Negative for arthralgias and back pain.   Skin:  Negative for color change and rash.   Neurological:  Negative for seizures and syncope.   All other systems reviewed and are negative.          Objective       ED Triage Vitals [01/10/25 1111]   Temperature Pulse Blood Pressure Respirations SpO2 Patient Position - Orthostatic VS   97.9 °F (36.6 °C) 73 (!) 198/89 18 96 % Lying      Temp Source Heart Rate Source BP Location FiO2 (%) Pain Score    Temporal Monitor Right arm -- No Pain      Vitals       Date and Time Temp Pulse SpO2 Resp BP Pain Score FACES Pain Rating User   01/10/25 1200 -- 67 98 % 23 182/101 -- -- SS   01/10/25 1145 -- 68 96 % 18 199/98 -- -- NB   01/10/25 1130 -- 70 99 % 24 179/92 -- -- NB   01/10/25 1115 -- 72 96 % 27 199/95 -- -- SS   01/10/25 1112 -- -- 95 % -- -- -- -- NB   01/10/25 1111 97.9 °F (36.6 °C) 73 96 % 18 198/89 No Pain -- NB            Physical Exam  Vitals and nursing note reviewed.   Constitutional:       General: He is not in acute distress.     Appearance: He is well-developed.   HENT:      Head: Normocephalic and atraumatic.   Eyes:      Conjunctiva/sclera: Conjunctivae normal.   Cardiovascular:      Rate and Rhythm: Normal rate and regular rhythm.   Pulmonary:      Effort: Pulmonary effort is normal. No respiratory distress.      Breath sounds: Normal breath sounds.   Abdominal:      Palpations: Abdomen is soft.      Tenderness: There is no abdominal tenderness.   Musculoskeletal:         General: No swelling.      Cervical back: Neck supple.      Right lower leg: Edema present.      Left lower leg: Edema present.   Skin:     General: Skin is warm and dry.      Capillary Refill: Capillary refill takes less than 2 seconds.   Neurological:      Mental Status: He is alert.   Psychiatric:         Mood and Affect: Mood normal.         Results Reviewed       Procedure Component Value Units Date/Time    B-Type Natriuretic Peptide(BNP) [[id number]]  (Abnormal) Collected: 01/10/25 1126    Lab Status: Final result Specimen: Blood from Arm, Left Updated: 01/10/25 1231     BNP >4,700 pg/mL     HS Troponin I 4hr [[id number]]     Lab Status: No result Specimen: Blood     HS Troponin 0hr (reflex protocol) [[id number]]  (Abnormal) Collected: 01/10/25 1126    Lab Status: Final result Specimen: Blood from Arm, Left Updated: 01/10/25 1158     hs TnI 0hr 68 ng/L     HS Troponin I 2hr [[id number]]     Lab Status: No result Specimen: Blood     Comprehensive metabolic panel [[id number]]   (Abnormal) Collected: 01/10/25 1126    Lab Status: Final result Specimen: Blood from Arm, Left Updated: 01/10/25 1150     Sodium 128 mmol/L      Potassium 5.0 mmol/L      Chloride 92 mmol/L      CO2 27 mmol/L      ANION GAP 9 mmol/L      BUN 71 mg/dL      Creatinine 7.84 mg/dL      Glucose 123 mg/dL      Calcium 8.2 mg/dL      Corrected Calcium 8.8 mg/dL      AST 18 U/L      ALT 8 U/L      Alkaline Phosphatase 132 U/L      Total Protein 6.6 g/dL      Albumin 3.2 g/dL      Total Bilirubin 0.95 mg/dL      eGFR 6 ml/min/1.73sq m     Narrative:      National Kidney Disease Foundation guidelines for Chronic Kidney Disease (CKD):     Stage 1 with normal or high GFR (GFR > 90 mL/min/1.73 square meters)    Stage 2 Mild CKD (GFR = 60-89 mL/min/1.73 square meters)    Stage 3A Moderate CKD (GFR = 45-59 mL/min/1.73 square meters)    Stage 3B Moderate CKD (GFR = 30-44 mL/min/1.73 square meters)    Stage 4 Severe CKD (GFR = 15-29 mL/min/1.73 square meters)    Stage 5 End Stage CKD (GFR <15 mL/min/1.73 square meters)  Note: GFR calculation is accurate only with a steady state creatinine    CBC and differential [[id number]]  (Abnormal) Collected: 01/10/25 1126    Lab Status: Final result Specimen: Blood from Arm, Left Updated: 01/10/25 1138     WBC 4.23 Thousand/uL      RBC 2.38 Million/uL      Hemoglobin 7.0 g/dL      Hematocrit 21.5 %      MCV 90 fL      MCH 29.4 pg      MCHC 32.6 g/dL      RDW 21.7 %      MPV 10.6 fL      Platelets 87 Thousands/uL      nRBC 0 /100 WBCs      Segmented % 82 %      Immature Grans % 0 %      Lymphocytes % 8 %      Monocytes % 9 %      Eosinophils Relative 1 %      Basophils Relative 0 %      Absolute Neutrophils 3.44 Thousands/µL      Absolute Immature Grans 0.01 Thousand/uL      Absolute Lymphocytes 0.34 Thousands/µL      Absolute Monocytes 0.39 Thousand/µL      Eosinophils Absolute 0.04 Thousand/µL      Basophils Absolute 0.01 Thousands/µL             XR chest 1 view portable    (Results Pending)        Procedures    ED Medication and Procedure Management   Prior to Admission Medications   Prescriptions Last Dose Informant Patient Reported? Taking?   aspirin (ECOTRIN LOW STRENGTH) 81 mg EC tablet   No No   Sig: Take 1 tablet (81 mg total) by mouth daily   atorvastatin (LIPITOR) 20 mg tablet   No No   Sig: Take 1 tablet (20 mg total) by mouth daily with dinner   carvedilol (COREG) 6.25 mg tablet   No No   Sig: Take 3 tablets (18.75 mg total) by mouth 2 (two) times a day with meals   folic acid (FOLVITE) 1 mg tablet   No No   Sig: Take 1 tablet (1 mg total) by mouth daily   hydrALAZINE (APRESOLINE) 100 MG tablet   No No   Sig: Take 1 tablet (100 mg total) by mouth every 8 (eight) hours   isosorbide mononitrate (IMDUR) 120 mg 24 hr tablet   No No   Sig: Take 1 tablet (120 mg total) by mouth daily   melatonin 3 mg   No No   Sig: Take 1 tablet (3 mg total) by mouth daily at bedtime   torsemide (DEMADEX) 100 mg tablet   No No   Sig: Take 1 tablet (100 mg total) by mouth daily      Facility-Administered Medications: None     Patient's Medications   Discharge Prescriptions    No medications on file     No discharge procedures on file.  ED SEPSIS DOCUMENTATION   Time reflects when diagnosis was documented in both MDM as applicable and the Disposition within this note       Time User Action Codes Description Comment    1/10/2025 12:30 PM Nahum Casiano Add [D64.9] Anemia, unspecified type                  Nahum Casiano,   01/10/25 1234

## 2025-01-11 ENCOUNTER — APPOINTMENT (INPATIENT)
Dept: DIALYSIS | Facility: HOSPITAL | Age: 70
DRG: 682 | End: 2025-01-11
Payer: COMMERCIAL

## 2025-01-11 LAB
ABO GROUP BLD BPU: NORMAL
ANION GAP SERPL CALCULATED.3IONS-SCNC: 11 MMOL/L (ref 4–13)
BASOPHILS # BLD AUTO: 0.02 THOUSANDS/ΜL (ref 0–0.1)
BASOPHILS NFR BLD AUTO: 0 % (ref 0–1)
BPU ID: NORMAL
BUN SERPL-MCNC: 77 MG/DL (ref 5–25)
CALCIUM SERPL-MCNC: 8.7 MG/DL (ref 8.4–10.2)
CHLORIDE SERPL-SCNC: 91 MMOL/L (ref 96–108)
CO2 SERPL-SCNC: 26 MMOL/L (ref 21–32)
CREAT SERPL-MCNC: 8.45 MG/DL (ref 0.6–1.3)
CROSSMATCH: NORMAL
EOSINOPHIL # BLD AUTO: 0.02 THOUSAND/ΜL (ref 0–0.61)
EOSINOPHIL NFR BLD AUTO: 0 % (ref 0–6)
ERYTHROCYTE [DISTWIDTH] IN BLOOD BY AUTOMATED COUNT: 20.2 % (ref 11.6–15.1)
GFR SERPL CREATININE-BSD FRML MDRD: 5 ML/MIN/1.73SQ M
GLUCOSE SERPL-MCNC: 108 MG/DL (ref 65–140)
GLUCOSE SERPL-MCNC: 98 MG/DL (ref 65–140)
GLUCOSE SERPL-MCNC: 99 MG/DL (ref 65–140)
HCT VFR BLD AUTO: 24.4 % (ref 36.5–49.3)
HGB BLD-MCNC: 8.4 G/DL (ref 12–17)
IMM GRANULOCYTES # BLD AUTO: 0.03 THOUSAND/UL (ref 0–0.2)
IMM GRANULOCYTES NFR BLD AUTO: 1 % (ref 0–2)
LYMPHOCYTES # BLD AUTO: 0.3 THOUSANDS/ΜL (ref 0.6–4.47)
LYMPHOCYTES NFR BLD AUTO: 5 % (ref 14–44)
MCH RBC QN AUTO: 29.8 PG (ref 26.8–34.3)
MCHC RBC AUTO-ENTMCNC: 34.4 G/DL (ref 31.4–37.4)
MCV RBC AUTO: 87 FL (ref 82–98)
MONOCYTES # BLD AUTO: 0.43 THOUSAND/ΜL (ref 0.17–1.22)
MONOCYTES NFR BLD AUTO: 8 % (ref 4–12)
NEUTROPHILS # BLD AUTO: 4.78 THOUSANDS/ΜL (ref 1.85–7.62)
NEUTS SEG NFR BLD AUTO: 86 % (ref 43–75)
NRBC BLD AUTO-RTO: 0 /100 WBCS
PLATELET # BLD AUTO: 107 THOUSANDS/UL (ref 149–390)
PMV BLD AUTO: 11.4 FL (ref 8.9–12.7)
POTASSIUM SERPL-SCNC: 5.2 MMOL/L (ref 3.5–5.3)
RBC # BLD AUTO: 2.82 MILLION/UL (ref 3.88–5.62)
SODIUM SERPL-SCNC: 128 MMOL/L (ref 135–147)
UNIT DISPENSE STATUS: NORMAL
UNIT PRODUCT CODE: NORMAL
UNIT PRODUCT VOLUME: 350 ML
UNIT RH: NORMAL
WBC # BLD AUTO: 5.58 THOUSAND/UL (ref 4.31–10.16)

## 2025-01-11 PROCEDURE — 5A1D70Z PERFORMANCE OF URINARY FILTRATION, INTERMITTENT, LESS THAN 6 HOURS PER DAY: ICD-10-PCS | Performed by: INTERNAL MEDICINE

## 2025-01-11 PROCEDURE — 80048 BASIC METABOLIC PNL TOTAL CA: CPT | Performed by: FAMILY MEDICINE

## 2025-01-11 PROCEDURE — 86705 HEP B CORE ANTIBODY IGM: CPT | Performed by: INTERNAL MEDICINE

## 2025-01-11 PROCEDURE — 85025 COMPLETE CBC W/AUTO DIFF WBC: CPT | Performed by: FAMILY MEDICINE

## 2025-01-11 PROCEDURE — 82948 REAGENT STRIP/BLOOD GLUCOSE: CPT

## 2025-01-11 PROCEDURE — 99222 1ST HOSP IP/OBS MODERATE 55: CPT | Performed by: INTERNAL MEDICINE

## 2025-01-11 PROCEDURE — 86706 HEP B SURFACE ANTIBODY: CPT | Performed by: INTERNAL MEDICINE

## 2025-01-11 PROCEDURE — 90935 HEMODIALYSIS ONE EVALUATION: CPT | Performed by: INTERNAL MEDICINE

## 2025-01-11 PROCEDURE — 86803 HEPATITIS C AB TEST: CPT | Performed by: INTERNAL MEDICINE

## 2025-01-11 PROCEDURE — 86704 HEP B CORE ANTIBODY TOTAL: CPT | Performed by: INTERNAL MEDICINE

## 2025-01-11 PROCEDURE — 87340 HEPATITIS B SURFACE AG IA: CPT | Performed by: INTERNAL MEDICINE

## 2025-01-11 PROCEDURE — 99232 SBSQ HOSP IP/OBS MODERATE 35: CPT | Performed by: FAMILY MEDICINE

## 2025-01-11 RX ORDER — ALPRAZOLAM 0.25 MG/1
0.25 TABLET ORAL ONCE
Status: COMPLETED | OUTPATIENT
Start: 2025-01-11 | End: 2025-01-11

## 2025-01-11 RX ADMIN — HYDRALAZINE HYDROCHLORIDE 100 MG: 25 TABLET ORAL at 05:52

## 2025-01-11 RX ADMIN — IRON SUCROSE 300 MG: 20 INJECTION, SOLUTION INTRAVENOUS at 13:31

## 2025-01-11 RX ADMIN — HYDRALAZINE HYDROCHLORIDE 100 MG: 25 TABLET ORAL at 13:31

## 2025-01-11 RX ADMIN — CARVEDILOL 18.75 MG: 6.25 TABLET, FILM COATED ORAL at 17:06

## 2025-01-11 RX ADMIN — TORSEMIDE 100 MG: 100 TABLET ORAL at 13:31

## 2025-01-11 RX ADMIN — FOLIC ACID 1 MG: 1 TABLET ORAL at 13:31

## 2025-01-11 RX ADMIN — HYDRALAZINE HYDROCHLORIDE 100 MG: 25 TABLET ORAL at 20:57

## 2025-01-11 RX ADMIN — ATORVASTATIN CALCIUM 20 MG: 20 TABLET, FILM COATED ORAL at 17:06

## 2025-01-11 RX ADMIN — ISOSORBIDE MONONITRATE 120 MG: 60 TABLET, EXTENDED RELEASE ORAL at 13:31

## 2025-01-11 RX ADMIN — ASPIRIN 81 MG: 81 TABLET, COATED ORAL at 13:32

## 2025-01-11 RX ADMIN — Medication 3 MG: at 20:57

## 2025-01-11 RX ADMIN — ALPRAZOLAM 0.25 MG: 0.25 TABLET ORAL at 17:06

## 2025-01-11 NOTE — CONSULTS
Consultation - Nephrology   Matthew Alvarez 69 y.o. male MRN: [id number]  Unit/Bed#: -01 Encounter: [id number]      A/P:  1.  Hemodialysis dependent acute kidney injury   Will give the patient a similar urgent hemodialysis session today, Saturday, July 11 and plan to get his next session on Monday, if he remains in the inpatient setting.  Will continue with Monday Wednesday Friday hemodialysis sessions unless otherwise indicated.  Orders are in the chart.  Patient confirmed his estimated dry weight at this time is around 60 kg.  2.  Secondary hyperparathyroidism, renal   Follow-up phosphorus levels, phosphorus binders as indicated, follow PTH in the outpatient setting.  3.  Anemia due to end-stage renal disease   Continue to monitor hemoglobin levels, currently quite reduced at 7 g/deciliter, avoid short acting ALCIRA as the patient most likely on long-acting ALCIRA in the outpatient setting.  Packed red blood cells as indicated.  4.  Acute on chronic systolic congestive heart failure   Continue torsemide 100 mg on nondialysis days, as noted above we will provide the patient with a hemodialysis session today and attempt to achieve his dry weight which is around 60 kg.  Previously patient was able to come down to 56 kg.  Ultrafiltration will occur as tolerated during his hospitalization.  Continue to provide telemetry, patient has a LifeVest which he apparently does not typically wear.  5.  Hypertension   Continue with current medications, follow and monitor blood pressures.  Modifications and medications as indicated going forward.    Case discussed with hospitalist.  Will ultrafilter and dialyze the patient today as tolerated, arrange for next hemodialysis session to occur on Monday, January 13 if the patient remains in the inpatient setting.    Total time required for this consultation was 65 minutes.       Thank you for allowing us to participate in the care of your patient.     Please feel free to contact us  regarding the care of this patient, or any other questions/concerns that may be applicable.    Patient Active Problem List   Diagnosis    Nonischemic cardiomyopathy (HCC)    Primary hypertension    Elevated troponin    Tobacco abuse    Protein calorie malnutrition (HCC)    Rheumatoid factor positive    Coronary artery disease involving native coronary artery of native heart without angina pectoris    Acute on chronic systolic heart failure (HCC)    Elevated liver transaminase level    Dilated cardiomyopathy (HCC)    Oliguria    Encounter for hemodialysis for ESRD (HCC)    Renal failure    Encounter for hemodialysis (HCC)    Anemia due to chronic kidney disease, on chronic dialysis (HCC)    Thrombocytopenia (HCC)    Anemia of renal disease    Vitamin D deficiency, unspecified    Chronic systolic (congestive) heart failure (HCC)    ESRD (end stage renal disease) on dialysis (HCC)    Ambulatory dysfunction    Secondary hyperparathyroidism of renal origin (HCC)    Pleural effusion       History of Present Illness   Physician Requesting Consult: Stacy Carson MD  Reason for Consult / Principal Problem: End-stage renal disease  Hx and PE limited by:   HPI: Matthew Alvarez is a 69 y.o. year old male who presented to the emergency department yesterday due to feeling excessively tired.  Patient claims that he is on hemodialysis Monday Wednesday and Friday, however, his last dialysis session was on Monday, January 6.  He was unable to go to his dialysis treatments due to feeling tired.  He currently denies chest pain or difficulty breathing.  However, in general the patient has low physical activity in general and has not been overdoing things.    From the renal standpoint, patient became acutely dependent upon hemodialysis less than a month ago during his hospitalization in December.  Unfortunately it appears that he misses at least 1 hemodialysis session weekly, and given that his main issue is volume overload, may add  additional strain to both his heart as well as to his kidney function.  He was 56 kg over a month ago, but more recently he claims he has been coming off of his hemodialysis sessions at around 60 kg.  He presented here at 64 kg.    History obtained from chart review and the patient    Constitutional ROS- Denies fever, chills, night sweats, weight changes.  HEENT ROS- Denies history of eye surgeries, glaucoma, headaches or history of trauma, blurred vision..  Endocrine ROS- No history diabetes mellitus or thyroid disease.  Cardiovascular ROS- Denies chest pain, palpitation, dyspnea exertion, orthopnea, claudication.  Pulmonary ROS- Denies history of COPD, asthma.  Denies cough, hemoptysis, shortness of breath.  GI ROS- Denies abdominal pain, diarrhea, nausea, swallowing problems, vomiting, constipation, blood in stools, fecal incontinence.   Hematological ROS- Denies history of easy bruising, blood clots, bleeding or blood transfusions.  Genitourinary ROS- Denies recent hematuria, pyuria, flank pain, change in urinary stream, decreased urinary output, increased urinary frequency, nocturia, foamy urine, or urinary incontinence.  Lymphatic ROS- Denies lymphadenopathy.  Musculoskeletal ROS- Denies history of muscle weakness, joint pain.  Dermatological ROS- Denies rash, wounds, ulcers, itching, jaundice.  Psychiatric ROS- Denies anxiety, depression, hallucinations, disorientation.  Neurological ROS- No stroke or TIA symptoms. .    Historical Information   Past Medical History:   Diagnosis Date    Acute hemodialysis patient ([facility]) 12/14/2024    CAD (coronary artery disease)     HFrEF (heart failure with reduced ejection fraction) ([facility]) 09/2021    Hypertension     Rheumatoid factor positive 09/2021    Stage 3 chronic kidney disease (HCC)     Tobacco abuse      Past Surgical History:   Procedure Laterality Date    APPENDECTOMY  1965    CARDIAC CATHETERIZATION  9/16/2021    INGUINAL HERNIA REPAIR Right 1991    IR  "THORACENTESIS  12/18/2024    IR THORACENTESIS  12/23/2024    IR TUNNELED DIALYSIS CATHETER PLACEMENT  11/14/2024     Social History   Social History     Substance and Sexual Activity   Alcohol Use Yes    Alcohol/week: 6.0 standard drinks of alcohol    Types: 6 Shots of liquor per week    Comment: rare     Social History     Substance and Sexual Activity   Drug Use Never     Social History     Tobacco Use   Smoking Status Every Day    Current packs/day: 0.50    Average packs/day: 0.5 packs/day for 51.0 years (25.5 ttl pk-yrs)    Types: Cigarettes    Start date: 1974   Smokeless Tobacco Never   Tobacco Comments    Began smoking in his early 20s, on average smoking 1+ packs daily. Quit in 08/2021 (Updated 09/15/2021).      Family History   Problem Relation Age of Onset    Heart failure Mother     Other Father         \"blood clot\"    COPD Sister     Heart failure Brother     Valvular heart disease Brother     Heart attack Son     No Known Problems Son        Meds/Allergies   all current active meds have been reviewed, current meds:   Current Facility-Administered Medications:     acetaminophen (TYLENOL) tablet 650 mg, Q6H PRN    aspirin (ECOTRIN LOW STRENGTH) EC tablet 81 mg, Daily    atorvastatin (LIPITOR) tablet 20 mg, Daily With Dinner    carvedilol (COREG) tablet 18.75 mg, BID With Meals    folic acid (FOLVITE) tablet 1 mg, Daily    heparin (porcine) subcutaneous injection 5,000 Units, Q8H JEEVAN    hydrALAZINE (APRESOLINE) injection 5 mg, Q6H PRN    hydrALAZINE (APRESOLINE) tablet 100 mg, Q8H JEEVAN    isosorbide mononitrate (IMDUR) 24 hr tablet 120 mg, Daily    melatonin tablet 3 mg, HS    torsemide (DEMADEX) tablet 100 mg, Daily and PTA meds:    Medications Prior to Admission:     aspirin (ECOTRIN LOW STRENGTH) 81 mg EC tablet    atorvastatin (LIPITOR) 20 mg tablet    carvedilol (COREG) 6.25 mg tablet    folic acid (FOLVITE) 1 mg tablet    hydrALAZINE (APRESOLINE) 100 MG tablet    melatonin 3 mg    torsemide (DEMADEX) " 100 mg tablet    isosorbide mononitrate (IMDUR) 120 mg 24 hr tablet      No Known Allergies    Objective     Intake/Output Summary (Last 24 hours) at 1/11/2025 0910  Last data filed at 1/11/2025 0558  Gross per 24 hour   Intake 705 ml   Output --   Net 705 ml       Invasive Devices:        Physical Exam      I/O last 3 completed shifts:  In: 705 [P.O.:360; I.V.:10; Blood:335]  Out: -     Vitals:    01/11/25 0745   BP: 162/75   Pulse: 64   Resp: 18   Temp: 97.5 °F (36.4 °C)   SpO2: 93%       General Appearance:    No acute distress. Cooperative. Appears stated age.   Head:    Normocephalic. Atraumatic. Normal jaw occlusion.   Eyes:    Lids, conjunctiva normal. No scleral icterus.   Ears:    Normal external ears.   Nose:   Nares normal. No drainage.   Mouth:   Lips, tongue normal. Mucosa normal. Phonation normal.   Neck:   Supple. Symmetrical.   Back:     Symmetric. No CVA tenderness.   Lungs:     Normal respiratory effort. Clear to auscultation bilaterally.   Chest wall:    No tenderness or deformity.   Heart:    Regular rate and rhythm. Normal S1 and S2. No murmur. No JVD. +3 bilateral LE edema.   Abdomen:     Soft. Non-tender. Bowel sounds active.   Genitourinary:   No Pinzon catheter present.   Extremities:   Extremities normal. Atraumatic. No cyanosis.   Skin:   Warm and dry. No pallor, jaundice, rash, ecchymoses.   Neurologic:   Alert and oriented to person, place, time. No focal deficit.         Current Weight: Weight - Scale: 62.1 kg (136 lb 12.8 oz)  First Weight: Weight - Scale: 64 kg (141 lb 1.5 oz)    Lab Results:  I have personally reviewed pertinent labs.    CBC:   Lab Results   Component Value Date    WBC 4.23 (L) 01/10/2025    HGB 7.0 (L) 01/10/2025    HCT 21.5 (L) 01/10/2025    MCV 90 01/10/2025    PLT 87 (L) 01/10/2025    RBC 2.38 (L) 01/10/2025    MCH 29.4 01/10/2025    MCHC 32.6 01/10/2025    RDW 21.7 (H) 01/10/2025    MPV 10.6 01/10/2025    NRBC 0 01/10/2025     CMP:   Lab Results   Component Value  "Date    K 5.0 01/10/2025    CL 92 (L) 01/10/2025    CO2 27 01/10/2025    BUN 71 (H) 01/10/2025    CREATININE 7.84 (H) 01/10/2025    CALCIUM 8.2 (L) 01/10/2025    AST 18 01/10/2025    ALT 8 01/10/2025    ALKPHOS 132 (H) 01/10/2025    EGFR 6 01/10/2025     Phosphorus: No results found for: \"PHOS\"  Magnesium: No results found for: \"MG\"  Urinalysis: No results found for: \"COLORU\", \"CLARITYU\", \"SPECGRAV\", \"PHUR\", \"LEUKOCYTESUR\", \"NITRITE\", \"PROTEINUA\", \"GLUCOSEU\", \"KETONESU\", \"BILIRUBINUR\", \"BLOODU\"  Ionized Calcium: No results found for: \"CAION\"  Coagulation: No results found for: \"PT\", \"INR\", \"APTT\"  Troponin: No results found for: \"TROPONINI\"  ABG: No results found for: \"PHART\", \"VZC5UEY\", \"PO2ART\", \"GDP3WUY\", \"W1IZFLTF\", \"BEART\", \"SOURCE\"    Results from last 7 days   Lab Units 01/10/25  1126   POTASSIUM mmol/L 5.0   CHLORIDE mmol/L 92*   CO2 mmol/L 27   BUN mg/dL 71*   CREATININE mg/dL 7.84*   CALCIUM mg/dL 8.2*   ALK PHOS U/L 132*   ALT U/L 8   AST U/L 18       Radiology review:  Procedure: XR chest 1 view portable  Result Date: 1/10/2025  Narrative: XR CHEST PORTABLE INDICATION: weakness. COMPARISON: CXR 12/19/2024, chest CT 12/14/2024. FINDINGS: Right jugular catheter in right atrium. Persistent moderate pulmonary edema with persistent moderate pleural effusions and bibasilar atelectasis. No pneumothorax. Normal cardiomediastinal silhouette. Bones are unremarkable for age. Normal upper abdomen.     Impression: Persistent moderate pulmonary edema with moderate pleural effusions and bibasilar atelectasis. Workstation performed: [id number]       Hao Mckinley,       This consultation note was produced in part using a dictation device which may document imprecise wording from author's original intent.    "

## 2025-01-11 NOTE — PROGRESS NOTES
HEMODIALYSIS PROCEDURE NOTE  The patient was seen and examined on hemodialysis.  Time: 3.5 hours  Sodium: 135 Blood flow: 400   Dialyzer: F160 Potassium: 2 Dialysate flow: 600   Access: R PermCath Bicarbonate: 30 Ultrafiltration goal: 2.5L   Medications on HD: none       Hao Mckinley

## 2025-01-11 NOTE — ASSESSMENT & PLAN NOTE
Lab Results   Component Value Date    EGFR 5 01/11/2025    EGFR 6 01/10/2025    EGFR 8 12/27/2024    CREATININE 8.45 (H) 01/11/2025    CREATININE 7.84 (H) 01/10/2025    CREATININE 5.93 (H) 12/27/2024   He has acute on chronic anemia usually his hemoglobin is above 7.5 he was found to have a 6 on repeat here at 7 s/p 1 unit of prbc transfusion and hb stable at 8.4.  Nephrology consent obtained.  Patient denies any melena or hematochezia will obtain occult- pending  Iron panel some evidence of iron deficiency anemia as well , will give a dose of venofer

## 2025-01-11 NOTE — ASSESSMENT & PLAN NOTE
Continue his hydralazine continue isosorbide continue Coreg  Uncontrolled - so receiving dialysis today

## 2025-01-11 NOTE — OCCUPATIONAL THERAPY NOTE
Occupational Therapy         Patient Name: Matthew Alvarez  Today's Date: 1/11/2025 01/11/25 1058   OT Last Visit   OT Visit Date 01/11/25   Note Type   Note type Evaluation;Cancelled Session   Cancel Reasons Patient off floor/hemodialysis       Chart reviewed. Attempted to see pt for OT session this AM. Pt currently receiving hemodialysis. Will continue to follow case and address as appropriate and as time allows.     Antonio Santiago OTR/L

## 2025-01-11 NOTE — PLAN OF CARE
Target UF Goal  2.5  L as tolerated. Patient dialyzing for  3.5hr  on 2 K bath for serum K of  5.2  per protocol. Treatment plan reviewed with Nephrology.     Problem: METABOLIC, FLUID AND ELECTROLYTES - ADULT  Goal: Electrolytes maintained within normal limits  Description: INTERVENTIONS:  - Monitor labs and assess patient for signs and symptoms of electrolyte imbalances  - Administer electrolyte replacement as ordered  - Monitor response to electrolyte replacements, including repeat lab results as appropriate  - Instruct patient on fluid and nutrition as appropriate  Outcome: Progressing     Problem: METABOLIC, FLUID AND ELECTROLYTES - ADULT  Goal: Fluid balance maintained  Description: INTERVENTIONS:  - Monitor labs   - Monitor I/O and WT  - Instruct patient on fluid and nutrition as appropriate  - Assess for signs & symptoms of volume excess or deficit  Outcome: Progressing

## 2025-01-11 NOTE — PLAN OF CARE
Problem: Nutrition/Hydration-ADULT  Goal: Nutrient/Hydration intake appropriate for improving, restoring or maintaining nutritional needs  Description: Monitor and assess patient's nutrition/hydration status for malnutrition. Collaborate with interdisciplinary team and initiate plan and interventions as ordered.  Monitor patient's weight and dietary intake as ordered or per policy. Utilize nutrition screening tool and intervene as necessary. Determine patient's food preferences and provide high-protein, high-caloric foods as appropriate.     INTERVENTIONS:  - Monitor oral intake, urinary output, labs, and treatment plans  - Assess nutrition and hydration status and recommend course of action  - Evaluate amount of meals eaten  - Assist patient with eating if necessary   - Allow adequate time for meals  - Recommend/ encourage appropriate diets, oral nutritional supplements, and vitamin/mineral supplements  - Order, calculate, and assess calorie counts as needed  - Recommend, monitor, and adjust tube feedings and TPN/PPN based on assessed needs  - Assess need for intravenous fluids  - Provide specific nutrition/hydration education as appropriate  - Include patient/family/caregiver in decisions related to nutrition  Outcome: Progressing     Problem: PAIN - ADULT  Goal: Verbalizes/displays adequate comfort level or baseline comfort level  Description: Interventions:  - Encourage patient to monitor pain and request assistance  - Assess pain using appropriate pain scale  - Administer analgesics based on type and severity of pain and evaluate response  - Implement non-pharmacological measures as appropriate and evaluate response  - Consider cultural and social influences on pain and pain management  - Notify physician/advanced practitioner if interventions unsuccessful or patient reports new pain  Outcome: Progressing     Problem: INFECTION - ADULT  Goal: Absence or prevention of progression during  hospitalization  Description: INTERVENTIONS:  - Assess and monitor for signs and symptoms of infection  - Monitor lab/diagnostic results  - Monitor all insertion sites, i.e. indwelling lines, tubes, and drains  - Monitor endotracheal if appropriate and nasal secretions for changes in amount and color  - Singer appropriate cooling/warming therapies per order  - Administer medications as ordered  - Instruct and encourage patient and family to use good hand hygiene technique  - Identify and instruct in appropriate isolation precautions for identified infection/condition  Outcome: Progressing     Problem: SAFETY ADULT  Goal: Patient will remain free of falls  Description: INTERVENTIONS:  - Educate patient/family on patient safety including physical limitations  - Instruct patient to call for assistance with activity   - Consult OT/PT to assist with strengthening/mobility   - Keep Call bell within reach  - Keep bed low and locked with side rails adjusted as appropriate  - Keep care items and personal belongings within reach  - Initiate and maintain comfort rounds  - Make Fall Risk Sign visible to staff  - Apply yellow socks and bracelet for high fall risk patients  - Consider moving patient to room near nurses station  Outcome: Progressing  Goal: Maintain or return to baseline ADL function  Description: INTERVENTIONS:  -  Assess patient's ability to carry out ADLs; assess patient's baseline for ADL function and identify physical deficits which impact ability to perform ADLs (bathing, care of mouth/teeth, toileting, grooming, dressing, etc.)  - Assess/evaluate cause of self-care deficits   - Assess range of motion  - Assess patient's mobility; develop plan if impaired  - Assess patient's need for assistive devices and provide as appropriate  - Encourage maximum independence but intervene and supervise when necessary  - Involve family in performance of ADLs  - Assess for home care needs following discharge   - Consider  OT consult to assist with ADL evaluation and planning for discharge  - Provide patient education as appropriate  Outcome: Progressing

## 2025-01-11 NOTE — ASSESSMENT & PLAN NOTE
Wt Readings from Last 3 Encounters:   01/11/25 62.1 kg (136 lb 12.8 oz)   12/27/24 56 kg (123 lb 6.4 oz)   11/20/24 55.2 kg (121 lb 12.8 oz)     Missed dialysis on Wednesday or Mr. Sandoval wife and him have a different story last EF was 20-24 in November 24 patient is on dialysis Monday Wednesday Friday and takes torsemide 100 mg daily  He has pulmonary edema with moderate pleural effusion past history of thoracocentesis I discussed with him again that he might need 1 on Monday by interventional radiology.  Will continue  his torsemide 100 mg daily  He also has associated abdominal - ct abdomen done mild- moderate ascites - HD today also asked CC for paracentesis james .Continue his isosorbide  Continue his aspirin  Patient also has a LifeVest at home which she does not wear all the time he only wears it to dialysis here he is can to be on telemetry monitor throughout the hospital stay  HD today

## 2025-01-11 NOTE — PHYSICAL THERAPY NOTE
PT Note          01/11/25 1059   PT Last Visit   PT Visit Date 01/11/25   Note Type   Note type Cancelled Session   Cancel Reasons Patient off floor/hemodialysis       Pt receiving dialysis at the time of PT evaluation. Will re attempt PT evaluation as able.     Edwin Day  PT

## 2025-01-11 NOTE — PROGRESS NOTES
Progress Note - Hospitalist   Name: Matthew Alvarez 69 y.o. male I MRN: [id number]  Unit/Bed#: -01 I Date of Admission: 1/10/2025   Date of Service: 1/11/2025 I Hospital Day: 1    Assessment & Plan  Acute on chronic systolic heart failure (HCC)  Wt Readings from Last 3 Encounters:   01/11/25 62.1 kg (136 lb 12.8 oz)   12/27/24 56 kg (123 lb 6.4 oz)   11/20/24 55.2 kg (121 lb 12.8 oz)     Missed dialysis on Wednesday or Mr. Sandoval wife and him have a different story last EF was 20-24 in November 24 patient is on dialysis Monday Wednesday Friday and takes torsemide 100 mg daily  He has pulmonary edema with moderate pleural effusion past history of thoracocentesis I discussed with him again that he might need 1 on Monday by interventional radiology.  Will continue  his torsemide 100 mg daily  He also has associated abdominal - ct abdomen done mild- moderate ascites - HD today also asked CC for paracentesis james .Continue his isosorbide  Continue his aspirin  Patient also has a LifeVest at home which she does not wear all the time he only wears it to dialysis here he is can to be on telemetry monitor throughout the hospital stay  HD today         Anemia due to chronic kidney disease, on chronic dialysis (HCC)  Lab Results   Component Value Date    EGFR 5 01/11/2025    EGFR 6 01/10/2025    EGFR 8 12/27/2024    CREATININE 8.45 (H) 01/11/2025    CREATININE 7.84 (H) 01/10/2025    CREATININE 5.93 (H) 12/27/2024   He has acute on chronic anemia usually his hemoglobin is above 7.5 he was found to have a 6 on repeat here at 7 s/p 1 unit of prbc transfusion and hb stable at 8.4.  Nephrology consent obtained.  Patient denies any melena or hematochezia will obtain occult- pending  Iron panel some evidence of iron deficiency anemia as well , will give a dose of venofer   Primary hypertension  Continue his hydralazine continue isosorbide continue Coreg  Uncontrolled - so receiving dialysis today   Tobacco abuse  Per wife still  smokes 6 to 7 cigarettes  Elevated troponin  Complains of no chest pain always has elevated troponin suspect in light of ESRD.  And CHF exacerbation.  EKG nonischemic  Thrombocytopenia (HCC)  Chronic monitor while on heparin. Improved     VTE Pharmacologic Prophylaxis: VTE Score: 2 Moderate Risk (Score 3-4) - Pharmacological DVT Prophylaxis Ordered: heparin.    Mobility:   Basic Mobility Inpatient Raw Score: 13  JH-HLM Goal: 4: Move to chair/commode  JH-HLM Achieved: 5: Stand (1 or more minutes)  JH-HLM Goal NOT achieved. Continue with multidisciplinary rounding and encourage appropriate mobility to improve upon JH-HLM goals.    Patient Centered Rounds: I performed bedside rounds with nursing staff today.   Discussions with Specialists or Other Care Team Provider: nephrology     Education and Discussions with Family / Patient: Updated  (wife) via phone.    Current Length of Stay: 1 day(s)  Current Patient Status: Inpatient   Certification Statement: The patient will continue to require additional inpatient hospital stay due to fluid overload   Discharge Plan: Anticipate discharge in 48-72 hrs to rehab facility.    Code Status: Level 1 - Full Code    Subjective   Seen and examined no complaints    Objective :  Temp:  [97.2 °F (36.2 °C)-98.1 °F (36.7 °C)] 97.5 °F (36.4 °C)  HR:  [63-76] 72  BP: (155-199)/() 177/87  Resp:  [18-31] 18  SpO2:  [91 %-99 %] 91 %  O2 Device: None (Room air)    Body mass index is 22.08 kg/m².     Input and Output Summary (last 24 hours):     Intake/Output Summary (Last 24 hours) at 1/11/2025 1035  Last data filed at 1/11/2025 0910  Gross per 24 hour   Intake 905 ml   Output --   Net 905 ml       Physical Exam  Vitals and nursing note reviewed.   Constitutional:       General: He is not in acute distress.     Appearance: He is well-developed.   HENT:      Head: Normocephalic and atraumatic.   Eyes:      Conjunctiva/sclera: Conjunctivae normal.   Cardiovascular:      Rate  and Rhythm: Normal rate and regular rhythm.      Heart sounds: No murmur heard.  Pulmonary:      Effort: Pulmonary effort is normal. No respiratory distress.      Breath sounds: Rales present.   Abdominal:      General: There is distension.      Palpations: Abdomen is soft.      Tenderness: There is no abdominal tenderness.   Musculoskeletal:         General: Swelling present.      Cervical back: Neck supple.   Skin:     General: Skin is warm and dry.      Capillary Refill: Capillary refill takes less than 2 seconds.   Neurological:      Mental Status: He is alert and oriented to person, place, and time.   Psychiatric:         Mood and Affect: Mood normal.           Lines/Drains:  Lines/Drains/Airways       Active Status       Name Placement date Placement time Site Days    HD Permanent Double Catheter 11/14/24  1352  Internal jugular  57                      Telemetry:  Telemetry Orders (From admission, onward)               LifeVest Patient: Continuous Telemetry Monitoring during hospitalization (non-expiring)  Continuous LifeVest Telemetry Monitoring        References:    LifeVest Policy                     Telemetry Reviewed: Normal Sinus Rhythm  Indication for Continued Telemetry Use: Lifevest (remains on tele entire hospital stay)               Lab Results: I have reviewed the following results:   Results from last 7 days   Lab Units 01/11/25  0903   WBC Thousand/uL 5.58   HEMOGLOBIN g/dL 8.4*   HEMATOCRIT % 24.4*   PLATELETS Thousands/uL 107*   SEGS PCT % 86*   LYMPHO PCT % 5*   MONO PCT % 8   EOS PCT % 0     Results from last 7 days   Lab Units 01/11/25  0903 01/10/25  1126   SODIUM mmol/L 128* 128*   POTASSIUM mmol/L 5.2 5.0   CHLORIDE mmol/L 91* 92*   CO2 mmol/L 26 27   BUN mg/dL 77* 71*   CREATININE mg/dL 8.45* 7.84*   ANION GAP mmol/L 11 9   CALCIUM mg/dL 8.7 8.2*   ALBUMIN g/dL  --  3.2*   TOTAL BILIRUBIN mg/dL  --  0.95   ALK PHOS U/L  --  132*   ALT U/L  --  8   AST U/L  --  18   GLUCOSE RANDOM mg/dL  98 123         Results from last 7 days   Lab Units 01/11/25  0757   POC GLUCOSE mg/dl 108               Recent Cultures (last 7 days):         Imaging Results Review: No pertinent imaging studies reviewed.  Other Study Results Review: No additional pertinent studies reviewed.    Last 24 Hours Medication List:     Current Facility-Administered Medications:     acetaminophen (TYLENOL) tablet 650 mg, Q6H PRN    aspirin (ECOTRIN LOW STRENGTH) EC tablet 81 mg, Daily    atorvastatin (LIPITOR) tablet 20 mg, Daily With Dinner    carvedilol (COREG) tablet 18.75 mg, BID With Meals    folic acid (FOLVITE) tablet 1 mg, Daily    heparin (porcine) subcutaneous injection 5,000 Units, Q8H JEEVAN    hydrALAZINE (APRESOLINE) injection 5 mg, Q6H PRN    hydrALAZINE (APRESOLINE) tablet 100 mg, Q8H JEEVAN    iron sucrose (VENOFER) 300 mg in sodium chloride 0.9 % 250 mL IVPB, Once    isosorbide mononitrate (IMDUR) 24 hr tablet 120 mg, Daily    melatonin tablet 3 mg, HS    torsemide (DEMADEX) tablet 100 mg, Daily    Administrative Statements   Today, Patient Was Seen By: Stacy Carson MD  I have spent a total time of >35 minutes in caring for this patient on the day of the visit/encounter including Patient and family education, Documenting in the medical record, Reviewing / ordering tests, medicine, procedures  , and Communicating with other healthcare professionals .    **Please Note: This note may have been constructed using a voice recognition system.**

## 2025-01-11 NOTE — HEMODIALYSIS
Post-Dialysis RN Treatment Note    Blood Pressure:  Pre 165/71 mm/Hg  Post 149/61 mmHg   EDW:  60 kg    Weight:  Pre 62.1 kg   Post 59.5 kg   Mode of weight measurement: Standing Scale   Volume Removed:  2500 ml    Treatment duration: 210 minutes    NS given:  No    Treatment shortened No   Medications given during Rx: None Reported   Estimated Kt/V:  1.2   Access type: Permacath/TDC   Needle Gauge:  n/a   Access Issues: No    Report called to primary nurse:   Yes Hayle Bainbridge LPN

## 2025-01-12 PROBLEM — Z99.2 DEPENDENCE ON HEMODIALYSIS (HCC): Chronic | Status: ACTIVE | Noted: 2025-01-12

## 2025-01-12 LAB
ANION GAP SERPL CALCULATED.3IONS-SCNC: 7 MMOL/L (ref 4–13)
BUN SERPL-MCNC: 40 MG/DL (ref 5–25)
CALCIUM SERPL-MCNC: 8.3 MG/DL (ref 8.4–10.2)
CHLORIDE SERPL-SCNC: 96 MMOL/L (ref 96–108)
CO2 SERPL-SCNC: 26 MMOL/L (ref 21–32)
CREAT SERPL-MCNC: 5.43 MG/DL (ref 0.6–1.3)
GFR SERPL CREATININE-BSD FRML MDRD: 9 ML/MIN/1.73SQ M
GLUCOSE SERPL-MCNC: 102 MG/DL (ref 65–140)
HBV CORE AB SER QL: NORMAL
HBV CORE IGM SER QL: NORMAL
HBV SURFACE AB SER-ACNC: 3.03 MIU/ML
HBV SURFACE AG SER QL: NORMAL
HCV AB SER QL: NORMAL
MRSA NOSE QL CULT: NORMAL
POTASSIUM SERPL-SCNC: 4.2 MMOL/L (ref 3.5–5.3)
SODIUM SERPL-SCNC: 129 MMOL/L (ref 135–147)

## 2025-01-12 PROCEDURE — 80048 BASIC METABOLIC PNL TOTAL CA: CPT | Performed by: FAMILY MEDICINE

## 2025-01-12 PROCEDURE — 97163 PT EVAL HIGH COMPLEX 45 MIN: CPT

## 2025-01-12 PROCEDURE — 99232 SBSQ HOSP IP/OBS MODERATE 35: CPT | Performed by: INTERNAL MEDICINE

## 2025-01-12 PROCEDURE — 99232 SBSQ HOSP IP/OBS MODERATE 35: CPT | Performed by: FAMILY MEDICINE

## 2025-01-12 RX ORDER — TORSEMIDE 100 MG/1
100 TABLET ORAL
Status: DISCONTINUED | OUTPATIENT
Start: 2025-01-14 | End: 2025-01-15 | Stop reason: HOSPADM

## 2025-01-12 RX ORDER — CARVEDILOL 12.5 MG/1
25 TABLET ORAL 2 TIMES DAILY WITH MEALS
Status: DISCONTINUED | OUTPATIENT
Start: 2025-01-12 | End: 2025-01-12

## 2025-01-12 RX ORDER — CARVEDILOL 12.5 MG/1
25 TABLET ORAL 2 TIMES DAILY WITH MEALS
Status: DISCONTINUED | OUTPATIENT
Start: 2025-01-12 | End: 2025-01-15 | Stop reason: HOSPADM

## 2025-01-12 RX ADMIN — ISOSORBIDE MONONITRATE 120 MG: 60 TABLET, EXTENDED RELEASE ORAL at 08:38

## 2025-01-12 RX ADMIN — CARVEDILOL 25 MG: 12.5 TABLET, FILM COATED ORAL at 17:09

## 2025-01-12 RX ADMIN — HYDRALAZINE HYDROCHLORIDE 100 MG: 25 TABLET ORAL at 13:18

## 2025-01-12 RX ADMIN — CARVEDILOL 25 MG: 12.5 TABLET, FILM COATED ORAL at 08:38

## 2025-01-12 RX ADMIN — Medication 3 MG: at 21:10

## 2025-01-12 RX ADMIN — HYDRALAZINE HYDROCHLORIDE 100 MG: 25 TABLET ORAL at 05:05

## 2025-01-12 RX ADMIN — HYDRALAZINE HYDROCHLORIDE 100 MG: 25 TABLET ORAL at 21:10

## 2025-01-12 RX ADMIN — ATORVASTATIN CALCIUM 20 MG: 20 TABLET, FILM COATED ORAL at 17:07

## 2025-01-12 RX ADMIN — ASPIRIN 81 MG: 81 TABLET, COATED ORAL at 08:38

## 2025-01-12 RX ADMIN — FOLIC ACID 1 MG: 1 TABLET ORAL at 08:38

## 2025-01-12 RX ADMIN — TORSEMIDE 100 MG: 100 TABLET ORAL at 08:38

## 2025-01-12 NOTE — ASSESSMENT & PLAN NOTE
Lab Results   Component Value Date    EGFR 9 01/12/2025    EGFR 5 01/11/2025    EGFR 6 01/10/2025    CREATININE 5.43 (H) 01/12/2025    CREATININE 8.45 (H) 01/11/2025    CREATININE 7.84 (H) 01/10/2025   He has acute on chronic anemia usually his hemoglobin is above 7.5 he was found to have a 6 on repeat here at 7 s/p 1 unit of prbc transfusion and hb stable at 8.4.  Nephrology consent obtained.  Patient denies any melena or hematochezia will obtain occult- pending  Iron panel some evidence of iron deficiency anemia as well , s/p venofer 300 mg iv x1 1/11

## 2025-01-12 NOTE — PHYSICAL THERAPY NOTE
"PHYSICAL THERAPY EVALUATION  NAME:  Matthew Alvarez  DATE: 01/12/25    AGE:   69 y.o.  Mrn:   [id number]  ADMIT DX:  Abnormal laboratory test [R89.9]  Anemia, unspecified type [D64.9]    Past Medical History:   Diagnosis Date    Acute hemodialysis patient ([facility]) 12/14/2024    CAD (coronary artery disease)     HFrEF (heart failure with reduced ejection fraction) ([facility]) 09/2021    Hypertension     Rheumatoid factor positive 09/2021    Stage 3 chronic kidney disease ([facility])     Tobacco abuse      Length Of Stay: 2  Performed at least 2 patient identifiers during session: Name and Birthday  PHYSICAL THERAPY EVALUATION :    01/12/25 1148   PT Last Visit   PT Visit Date 01/12/25   Note Type   Note type Evaluation   Pain Assessment   Pain Assessment Tool 0-10   Restrictions/Precautions   Weight Bearing Precautions Per Order No   Other Precautions Chair Alarm;Bed Alarm;Fall Risk;Multiple lines  (HD)   Home Living   Type of Home House  (1 BRODY 0 HR)   Home Layout Multi-level;Performs ADLs on one level;Able to live on main level with bedroom/bathroom;Stairs to enter without rails   Bathroom Shower/Tub Walk-in shower   Bathroom Toilet Standard   Bathroom Equipment Shower chair   Home Equipment Walker;Wheelchair-manual;Cane;Hospital bed   Additional Comments Pt lives in a 3  with 1 BRODY ( no HR).  Pt resides on the first floor with a full bathroom. Pt reports using the walker \"occasionally\" at baseline.  Pt is a questionable historian at times.   Prior Function   Level of [place] Independent with ADLs;Independent with functional mobility;Needs assistance with IADLS  (Per pt, wife completes all IADL's)   Lives With Spouse;Son   Receives Help From Family   IADLs Independent with driving;Family/Friend/Other provides meals;Independent with medication management   Falls in the last 6 months 1 to 4   Comments Pt is a questionable historian at baseline.  Per pt, he lives with his wife and son and was MI with all mobility and ADL's " "PTA with intermittent use of RW.  Spouse completes IADL's, although patient admits he manages his own medicaion.   General   Additional Pertinent History B/L LE pitting edema 3+   Family/Caregiver Present No   Cognition   Overall Cognitive Status Impaired   Arousal/Participation Cooperative   Orientation Level Oriented to person;Oriented to place;Disoriented to situation;Disoriented to time   Memory Decreased short term memory   Following Commands Follows one step commands with increased time or repetition   Subjective   Subjective \"I'm cold\"   RLE Assessment   RLE Assessment WFL  (Grossly 3+/5)   LLE Assessment   LLE Assessment WFL  (Grossly 3+/5)   Bed Mobility   Additional Comments Pt seated in bedside chair at start and end of session with alarm intact and all needs within reach.  D/T same, bed mobility N/T.   Transfers   Sit to Stand 5  Supervision   Additional items Verbal cues;Increased time required;Assist x 1   Stand to Sit 5  Supervision   Additional items Assist x 1;Increased time required;Verbal cues   Stand pivot   (Sba)   Additional items Assist x 1;Increased time required   Additional Comments Pt completed STS at S and SPT at Sba x 1 with increased time and VC's for proper hand placement.  RW for UB support t/o   Ambulation/Elevation   Gait pattern Improper Weight shift;Wide MELISSA;Decreased foot clearance;Short stride;Decreased hip extension;Decreased heel strike   Gait Assistance   (Sba)   Additional items Assist x 1;Verbal cues   Assistive Device Rolling walker   Distance Patient ambulated 75' with Rw and SBA x 1 with Vc's for increased foot clearance   Balance   Static Sitting Normal   Dynamic Sitting Good   Static Standing Fair +   Dynamic Standing Fair   Ambulatory Fair   Endurance Deficit   Endurance Deficit Yes   Activity Tolerance   Activity Tolerance Patient limited by fatigue   Medical Staff Made Aware Spoke with case management   Nurse Made Aware Spoke with RN   Assessment   Prognosis Good "   Problem List Decreased strength;Decreased endurance;Impaired balance;Decreased mobility;Impaired judgement;Decreased safety awareness;Decreased skin integrity   Barriers to Discharge Inaccessible home environment   Barriers to Discharge Comments Increased need for Ad, BRODY, cognitive   Goals   Patient Goals none stated   STG Expiration Date 01/26/25   Plan   Treatment/Interventions ADL retraining;Functional transfer training;LE strengthening/ROM;Elevations;Therapeutic exercise;Endurance training;Cognitive reorientation;Patient/family training;Equipment eval/education;Compensatory technique education;Gait training;Bed mobility;Spoke to nursing;Spoke to case management   PT Frequency 3-5x/wk   Discharge Recommendation   Rehab Resource Intensity Level, PT III (Minimum Resource Intensity)   Equipment Recommended   (Rw 9 pt owns))   Additional Comments Recommend use of Rw upon D/C, along with increased caregiver support and HHPT   AM-PAC Basic Mobility Inpatient   Turning in Flat Bed Without Bedrails 3   Lying on Back to Sitting on Edge of Flat Bed Without Bedrails 3   Moving Bed to Chair 3   Standing Up From Chair Using Arms 3   Walk in Room 3   Climb 3-5 Stairs With Railing 2   Basic Mobility Inpatient Raw Score 17   Basic Mobility Standardized Score 39.67   [facility] Highest Level Of Mobility   -HL Goal 5: Stand one or more mins   -HL Achieved 7: Walk 25 feet or more   End of Consult   Patient Position at End of Consult Bedside chair;Bed/Chair alarm activated;All needs within reach     (Please find full objective findings from PT assessment regarding body systems outlined above).     Assessment: Pt is a 69 y.o. male seen for PT evaluation s/p admission to [facility] on 1/10/2025 with Acute on chronic systolic heart failure (HCC).  Order placed for PT services.  Upon evaluation: Pt is presenting with impaired functional mobility due to decreased strength, decreased endurance, impaired  balance, gait deviations, impaired cognition, decreased safety awareness, impaired judgment, fall risk, LE edema, and impaired skin integrity requiring  Sba assistance for transfers and Sba assistance for ambulation with Rw . Pt's clinical presentation is currently unstable/unpredictable given the functional mobility deficits above, especially weakness, edema of extremities, decreased skin integrity, decreased endurance, impaired balance, gait deviations, decreased activity tolerance, decreased functional mobility tolerance, decreased safety awareness, impaired judgement, and decreased cognition, coupled with fall risks as indicated by AM-PAC 6-Clicks: 17/24 as well as hx of falls, impaired balance, polypharmacy, impaired judgement, decreased safety awareness, and decreased cognition and combined with medical complications of ,anemic.  Pt's PMHx and comorbidities that may affect physical performance and progress include: anemia, CAD, CHF, CKD, HTN, limited cognition, and acute HD, Rh+, tobacco use . Personal factors affecting pt at time of IE include: questionable non-compliance, unable to perform caregiver support/tasks, step(s) to enter environment, inability to perform IADLs, inability to navigate level surfaces without external assistance, inability to navigate community distances, limited insight into impairments, and tobacco use. Pt will benefit from continued skilled PT services to address deficits as defined above and to maximize level of functional mobility to facilitate return toward PLOF and improved QOL. From PT/mobility standpoint, recommendation at time of d/c would be Level III (Minimum Resource Intensity) and with walker pending progress in order to reduce fall risk and maximize pt's functional independence and consistency with mobility in order to facilitate return to PLOF.  Recommend trial with walker next 1-2 sessions and ther ex next 1-2 sessions.     The patient's AM-PAC Basic Mobility Inpatient  Short Form Raw Score is 17. A Raw score of greater than 16 suggests the patient may benefit from discharge to home. Please also refer to the recommendation of the Physical Therapist for safe discharge planning.       Goals: Pt will: Perform bed mobility tasks with modified I to reposition in bed and prepare for transfers. Pt will perform transfers with modified I to increase Indep in prior living environment and decrease burden of care and prepare for ambulation. Pt will ambulate with LRAD for >/= 150' with  modified I  to increase Indep in prior living environment, decrease burden of care, decrease risk for falls, and improve activity tolerance and to access home environment. Pt will complete >/= 1 steps with without handrail with Supervision to return to home with BRODY. Pt will participate in objective balance assessment to determine baseline fall risk. Pt will increase B LE strength >/= 1/2 MMT grade to facilitate functional mobility.     Sarahi Andrade, PT,MSPT

## 2025-01-12 NOTE — ASSESSMENT & PLAN NOTE
Blood pressure slightly elevated this morning, continue to monitor for now, continue to ultrafilter as tolerated.

## 2025-01-12 NOTE — PROGRESS NOTES
Progress Note - Nephrology   Name: Matthew Alvarez 69 y.o. male I MRN: [id number]  Unit/Bed#: -01 I Date of Admission: 1/10/2025   Date of Service: 1/12/2025 I Hospital Day: 2     Assessment & Plan  Dependence on hemodialysis (HCC)  Patient had a hemodialysis session yesterday, Saturday, January 11 and was able to ultrafilter about 2.5 L to have his weight come down to 59.5 kg.  Previous estimatedry weight was around 60 kg, will continue to challenge dry weight and try to bring him down to 58.5 kg if possible.  Will maintain the patient on his typical Monday Wednesday Friday hemodialysis sessions during his inpatient stay unless otherwise indicated.  Anemia due to chronic kidney disease, on chronic dialysis (HCC)  Lab Results   Component Value Date    EGFR 9 01/12/2025    EGFR 5 01/11/2025    EGFR 6 01/10/2025    CREATININE 5.43 (H) 01/12/2025    CREATININE 8.45 (H) 01/11/2025    CREATININE 7.84 (H) 01/10/2025   Patient on long-acting ALCIRA in the outpatient setting, avoid short acting ALCIRA during this hospitalization.  Hyponatremia  Continue fluid restriction, and low-sodium diet.  Continue torsemide 100 mg on nondialysis days.  Acute on chronic systolic heart failure (HCC)  Wt Readings from Last 3 Encounters:   01/11/25 62.1 kg (136 lb 12.8 oz)   12/27/24 56 kg (123 lb 6.4 oz)   11/20/24 55.2 kg (121 lb 12.8 oz)     Post hemodialysis weight was down to 59.5 kg.  Will work to continue to ultrafilter as tolerated.  Goal hemodialysis weight after treatment tomorrow is 58.5 kg, however, continue to be more aggressive as tolerated.        Primary hypertension  Blood pressure slightly elevated this morning, continue to monitor for now, continue to ultrafilter as tolerated.  Elevated troponin    Tobacco abuse    Thrombocytopenia (HCC)      Case discussed with hospitalist.  Will continue to ultrafilter as tolerated, focus on low-sodium diet, patient for potential paracentesis to assist with further volume removal.   "Will need to take this into consideration when planning for amount of ultrafiltration with hemodialysis.    Follow up reason for today's visit: End-stage renal disease/hyponatremia/volume management    Acute on chronic systolic heart failure (HCC)    Patient Active Problem List   Diagnosis    Nonischemic cardiomyopathy (HCC)    Primary hypertension    Elevated troponin    Tobacco abuse    Protein calorie malnutrition (HCC)    Rheumatoid factor positive    Coronary artery disease involving native coronary artery of native heart without angina pectoris    Acute on chronic systolic heart failure (HCC)    Elevated liver transaminase level    Dilated cardiomyopathy (HCC)    Hyponatremia    Oliguria    Encounter for hemodialysis for ESRD (HCC)    Renal failure    Encounter for hemodialysis (HCC)    Anemia due to chronic kidney disease, on chronic dialysis (HCC)    Thrombocytopenia (HCC)    Anemia of renal disease    Vitamin D deficiency, unspecified    Chronic systolic (congestive) heart failure (HCC)    ESRD (end stage renal disease) on dialysis (HCC)    Ambulatory dysfunction    Secondary hyperparathyroidism of renal origin (HCC)    Pleural effusion         Subjective:   No acute issues overnight, patient is eating and drinking well.    Objective:     Vitals: Blood pressure (!) 180/86, pulse 71, temperature 97.7 °F (36.5 °C), resp. rate 17, height 5' 6\" (1.676 m), weight 62.1 kg (136 lb 12.8 oz), SpO2 94%.,Body mass index is 22.08 kg/m².    Weight (last 2 days)       Date/Time Weight    01/11/25 05:40:42 62.1 (136.8)    01/10/25 1111 64 (141.09)              Intake/Output Summary (Last 24 hours) at 1/12/2025 0837  Last data filed at 1/12/2025 0513  Gross per 24 hour   Intake 1050 ml   Output 3000 ml   Net -1950 ml     I/O last 3 completed shifts:  In: 1755 [P.O.:960; I.V.:460; Blood:335]  Out: 3000 [Other:3000]    HD Permanent Double Catheter (Active)   Reasons to continue HD Cath Treatment Therapy 01/11/25 0904   Goal " "for Removal N/A- chronic HD catheter 01/11/25 0904   Line Necessity Reviewed Yes, reviewed with provider 01/11/25 0904   Site Assessment WDL 01/11/25 1240   Proximal Lumen Status Flushed & Clamped;Normal saline locked;Passive disinfecting cap applied 01/11/25 1240   Distal Lumen Status Flushed & Clamped;Normal saline locked;Passive disinfecting cap applied 01/11/25 1240   Dressing Type Chlorhexidine dressing 01/11/25 1240   Dressing Status Clean;Dry;Intact 01/11/25 1240   Dressing Intervention Dressing changed 01/11/25 0904   Dressing Change Due 01/18/25 01/11/25 1240       Physical Exam: BP (!) 180/86   Pulse 71   Temp 97.7 °F (36.5 °C)   Resp 17   Ht 5' 6\" (1.676 m)   Wt 62.1 kg (136 lb 12.8 oz)   SpO2 94%   BMI 22.08 kg/m²     General Appearance:    Alert, cooperative, no distress, appears stated age   Head:    Normocephalic, without obvious abnormality, atraumatic   Eyes:    Conjunctiva/corneas clear   Ears:    Normal external ears   Nose:   Nares normal, septum midline, mucosa normal, no drainage    or sinus tenderness   Throat:   Lips, mucosa, and tongue normal; teeth and gums normal   Neck:   Supple   Back:     Symmetric, no curvature, ROM normal, no CVA tenderness   Lungs:     Clear to auscultation bilaterally, respirations unlabored   Chest wall:    No tenderness or deformity   Heart:    Regular rate and rhythm, S1 and S2 normal, no murmur, rub   or gallop   Abdomen:     Soft, non-tender, bowel sounds active   Extremities:   Extremities normal, atraumatic, no cyanosis, 2 bilateral lower extremity edema   Skin:   Skin color, texture, turgor normal, no rashes or lesions   Lymph nodes:   Cervical normal   Neurologic:   CNII-XII intact            Lab, Imaging and other studies: I have personally reviewed pertinent labs.  CBC:   Lab Results   Component Value Date    WBC 5.58 01/11/2025    HGB 8.4 (L) 01/11/2025    HCT 24.4 (L) 01/11/2025    MCV 87 01/11/2025     (L) 01/11/2025    RBC 2.82 (L) " "01/11/2025    MCH 29.8 01/11/2025    MCHC 34.4 01/11/2025    RDW 20.2 (H) 01/11/2025    MPV 11.4 01/11/2025    NRBC 0 01/11/2025     CMP:   Lab Results   Component Value Date    K 4.2 01/12/2025    CL 96 01/12/2025    CO2 26 01/12/2025    BUN 40 (H) 01/12/2025    CREATININE 5.43 (H) 01/12/2025    CALCIUM 8.3 (L) 01/12/2025    EGFR 9 01/12/2025       .  Results from last 7 days   Lab Units 01/12/25  0452 01/11/25  0903 01/10/25  1126   POTASSIUM mmol/L 4.2 5.2 5.0   CHLORIDE mmol/L 96 91* 92*   CO2 mmol/L 26 26 27   BUN mg/dL 40* 77* 71*   CREATININE mg/dL 5.43* 8.45* 7.84*   CALCIUM mg/dL 8.3* 8.7 8.2*   ALK PHOS U/L  --   --  132*   ALT U/L  --   --  8   AST U/L  --   --  18         Phosphorus: No results found for: \"PHOS\"  Magnesium: No results found for: \"MG\"  Urinalysis: No results found for: \"COLORU\", \"CLARITYU\", \"SPECGRAV\", \"PHUR\", \"LEUKOCYTESUR\", \"NITRITE\", \"PROTEINUA\", \"GLUCOSEU\", \"KETONESU\", \"BILIRUBINUR\", \"BLOODU\"  Ionized Calcium: No results found for: \"CAION\"  Coagulation: No results found for: \"PT\", \"INR\", \"APTT\"  Troponin: No results found for: \"TROPONINI\"  ABG: No results found for: \"PHART\", \"JIT6MPS\", \"PO2ART\", \"CEZ8GXZ\", \"P0MUZJGJ\", \"BEART\", \"SOURCE\"  Radiology review:     IMAGING  Procedure: CT abdomen pelvis wo contrast  Result Date: 1/11/2025  Narrative: CT ABDOMEN AND PELVIS WITHOUT IV CONTRAST INDICATION: left flank hematoma eval for bleed and ascites. COMPARISON: CT chest abdomen pelvis 12/14/2024. TECHNIQUE: CT examination of the abdomen and pelvis was performed without intravenous contrast. Multiplanar 2D reformatted images were created from the source data. This examination, like all CT scans performed in the [facility], was performed utilizing techniques to minimize radiation dose exposure, including the use of iterative reconstruction and automated exposure control. Radiation dose length product (DLP) for this visit: 525 mGy-cm Enteric Contrast: Not administered. " FINDINGS: Exam is limited without IV and oral contrast particularly for soft tissue and bowel evaluation. ABDOMEN LOWER CHEST: Partially visualized at least moderate-sized bilateral pleural effusions, grossly stable. Associated passive atelectasis in the lower lobes. Stable partial consolidation in the right middle lobe. Visualized heart is enlarged. Low-density appearance to the blood pool suggests anemia. LIVER/BILIARY TREE: Unremarkable. GALLBLADDER: Contracted with possible wall thickening, stable can be seen secondary to ascites. No calcified gallstones. SPLEEN: Unremarkable. PANCREAS: Unremarkable. ADRENAL GLANDS: Unremarkable. KIDNEYS/URETERS: Right renal cortical atrophy. No hydronephrosis. STOMACH AND BOWEL: Unremarkable. APPENDIX: No findings to suggest appendicitis. ABDOMINOPELVIC CAVITY: Mild to moderate ascites, stable. No pneumoperitoneum. No lymphadenopathy. VESSELS: Atherosclerosis without abdominal aortic aneurysm. PELVIS REPRODUCTIVE ORGANS: Mild enlarged prostate. URINARY BLADDER: Collapsed limiting evaluation. ABDOMINAL WALL/INGUINAL REGIONS: Diffuse subcutaneous infiltration suggesting anasarca similar to prior CT. No findings for flank hematoma. Small fat-containing bilateral inguinal hernias. BONES: No acute fracture or suspicious osseous lesion.     Impression: 1. No findings for hematoma. 2. Stable mild-moderate ascites. Diffuse subcutaneous infiltration suggesting anasarca similar to prior CT. 3. Partially visualized at least moderate-sized bilateral effusions, grossly stable. Workstation performed: [id number]     Procedure: XR chest 1 view portable  Result Date: 1/10/2025  Narrative: XR CHEST PORTABLE INDICATION: weakness. COMPARISON: CXR 12/19/2024, chest CT 12/14/2024. FINDINGS: Right jugular catheter in right atrium. Persistent moderate pulmonary edema with persistent moderate pleural effusions and bibasilar atelectasis. No pneumothorax. Normal cardiomediastinal silhouette. Bones are  unremarkable for age. Normal upper abdomen.     Impression: Persistent moderate pulmonary edema with moderate pleural effusions and bibasilar atelectasis. Workstation performed: [id number]         Current Facility-Administered Medications:     acetaminophen (TYLENOL) tablet 650 mg, Q6H PRN    aspirin (ECOTRIN LOW STRENGTH) EC tablet 81 mg, Daily    atorvastatin (LIPITOR) tablet 20 mg, Daily With Dinner    carvedilol (COREG) tablet 25 mg, BID With Meals    folic acid (FOLVITE) tablet 1 mg, Daily    heparin (porcine) subcutaneous injection 5,000 Units, Q8H JEEVAN    hydrALAZINE (APRESOLINE) injection 5 mg, Q6H PRN    hydrALAZINE (APRESOLINE) tablet 100 mg, Q8H JEEVAN    isosorbide mononitrate (IMDUR) 24 hr tablet 120 mg, Daily    melatonin tablet 3 mg, HS    torsemide (DEMADEX) tablet 100 mg, Daily  Medications Discontinued During This Encounter   Medication Reason    carvedilol (COREG) tablet 18.75 mg     carvedilol (COREG) tablet 25 mg        Hao Mckinley, DO      This progress note was produced in part using a dictation device which may document imprecise wording from author's original intent.

## 2025-01-12 NOTE — ASSESSMENT & PLAN NOTE
Lab Results   Component Value Date    EGFR 9 01/12/2025    EGFR 5 01/11/2025    EGFR 6 01/10/2025    CREATININE 5.43 (H) 01/12/2025    CREATININE 8.45 (H) 01/11/2025    CREATININE 7.84 (H) 01/10/2025   Patient on long-acting ALCIRA in the outpatient setting, avoid short acting ALCIRA during this hospitalization.

## 2025-01-12 NOTE — ASSESSMENT & PLAN NOTE
Patient had a hemodialysis session yesterday, Saturday, January 11 and was able to ultrafilter about 2.5 L to have his weight come down to 59.5 kg.  Previous estimatedry weight was around 60 kg, will continue to challenge dry weight and try to bring him down to 58.5 kg if possible.  Will maintain the patient on his typical Monday Wednesday Friday hemodialysis sessions during his inpatient stay unless otherwise indicated.

## 2025-01-12 NOTE — PLAN OF CARE
Problem: PHYSICAL THERAPY ADULT  Goal: Performs mobility at highest level of function for planned discharge setting.  See evaluation for individualized goals.  Description: Treatment/Interventions: ADL retraining, Functional transfer training, LE strengthening/ROM, Elevations, Therapeutic exercise, Endurance training, Cognitive reorientation, Patient/family training, Equipment eval/education, Compensatory technique education, Gait training, Bed mobility, Spoke to nursing, Spoke to case management  Equipment Recommended:  (Rw 9 pt owns))       See flowsheet documentation for full assessment, interventions and recommendations.  Note: Prognosis: Good  Problem List: Decreased strength, Decreased endurance, Impaired balance, Decreased mobility, Impaired judgement, Decreased safety awareness, Decreased skin integrity  Assessment: Pt is a 69 y.o. male seen for PT evaluation s/p admission to [facility] on 1/10/2025 with Acute on chronic systolic heart failure (HCC).  Order placed for PT services.  Upon evaluation: Pt is presenting with impaired functional mobility due to decreased strength, decreased endurance, impaired balance, gait deviations, impaired cognition, decreased safety awareness, impaired judgment, fall risk, LE edema, and impaired skin integrity requiring  Sba assistance for transfers and Sba assistance for ambulation with Rw . Pt's clinical presentation is currently unstable/unpredictable given the functional mobility deficits above, especially weakness, edema of extremities, decreased skin integrity, decreased endurance, impaired balance, gait deviations, decreased activity tolerance, decreased functional mobility tolerance, decreased safety awareness, impaired judgement, and decreased cognition, coupled with fall risks as indicated by AM-PAC 6-Clicks: 17/24 as well as hx of falls, impaired balance, polypharmacy, impaired judgement, decreased safety awareness, and decreased cognition and  combined with medical complications of ,anemic.  Pt's PMHx and comorbidities that may affect physical performance and progress include: anemia, CAD, CHF, CKD, HTN, limited cognition, and acute HD, Rh+, tobacco use . Personal factors affecting pt at time of IE include: questionable non-compliance, unable to perform caregiver support/tasks, step(s) to enter environment, inability to perform IADLs, inability to navigate level surfaces without external assistance, inability to navigate community distances, limited insight into impairments, and tobacco use. Pt will benefit from continued skilled PT services to address deficits as defined above and to maximize level of functional mobility to facilitate return toward PLOF and improved QOL. From PT/mobility standpoint, recommendation at time of d/c would be Level III (Minimum Resource Intensity) and with walker pending progress in order to reduce fall risk and maximize pt's functional independence and consistency with mobility in order to facilitate return to PLOF.  Recommend trial with walker next 1-2 sessions and ther ex next 1-2 sessions.  Barriers to Discharge: Inaccessible home environment  Barriers to Discharge Comments: Increased need for Ad, BRODY, cognitive  Rehab Resource Intensity Level, PT: III (Minimum Resource Intensity)    See flowsheet documentation for full assessment.

## 2025-01-12 NOTE — UTILIZATION REVIEW
Initial Clinical Review    Admission: Date/Time/Statement:   Admission Orders (From admission, onward)       Ordered        01/10/25 1235  INPATIENT ADMISSION  Once                          Orders Placed This Encounter   Procedures    INPATIENT ADMISSION     Standing Status:   Standing     Number of Occurrences:   1     Level of Care:   Med Surg [16]     Estimated length of stay:   More than 2 Midnights     Certification:   I certify that inpatient services are medically necessary for this patient for a duration of greater than two midnights. See H&P and MD Progress Notes for additional information about the patient's course of treatment.     ED Arrival Information       Expected   1/10/2025 11:07    Arrival   1/10/2025 11:08    Acuity   Urgent              Means of arrival   Ambulance    Escorted by   [facility]    Service   Hospitalist    Admission type   Emergency              Arrival complaint   Abnormal Lab             Chief Complaint   Patient presents with    Abnormal Lab     Per EMS, patient coming from home. Lives w/ son and wife who are his caretakers. Patient is a dialysis patient. Unaware of last treatment. Coming in today because his family was notified his hemoglobin was 6.0. Patient's only complaint is fatigue. Legs also noted to be swollen and weeping in triage.        Initial Presentation: 69 y.o. male ***    Anticipated Length of Stay/Certification Statement: ***    Date: ***   Day 2: ***    ED Treatment-Medication Administration from 01/10/2025 1108 to 01/10/2025 1318       None            Scheduled Medications:  aspirin, 81 mg, Oral, Daily  atorvastatin, 20 mg, Oral, Daily With Dinner  carvedilol, 18.75 mg, Oral, BID With Meals  folic acid, 1 mg, Oral, Daily  heparin (porcine), 5,000 Units, Subcutaneous, Q8H JEEVAN  hydrALAZINE, 100 mg, Oral, Q8H JEEVAN  isosorbide mononitrate, 120 mg, Oral, Daily  melatonin, 3 mg, Oral, HS  torsemide, 100 mg, Oral, Daily      Continuous IV Infusions:     PRN  Meds:  acetaminophen, 650 mg, Oral, Q6H PRN  hydrALAZINE, 5 mg, Intravenous, Q6H PRN      ED Triage Vitals [01/10/25 1111]   Temperature Pulse Respirations Blood Pressure SpO2 Pain Score   97.9 °F (36.6 °C) 73 18 (!) 198/89 96 % No Pain     Weight (last 2 days)       Date/Time Weight    01/11/25 05:40:42 62.1 (136.8)    01/10/25 1111 64 (141.09)            Vital Signs (last 3 days)       Date/Time Temp Pulse Resp BP MAP (mmHg) SpO2 O2 Device Patient Position - Orthostatic VS Pain    01/11/25 15:22:30 97.7 °F (36.5 °C) 75 16 155/77 103 92 % -- -- --    01/11/25 1300 -- -- -- -- -- 92 % None (Room air) -- No Pain    01/11/25 1245 97.6 °F (36.4 °C) 69 20 149/61 90 93 % None (Room air) Lying --    01/11/25 1230 -- 62 18 151/66 94 93 % None (Room air) Lying --    01/11/25 1200 -- 58 18 155/70 98 -- -- -- --    01/11/25 1130 -- 56 20 154/69 97 -- -- -- --    01/11/25 1100 -- 69 20 148/60 89 -- -- -- --    01/11/25 1030 -- 68 18 152/69 97 -- -- -- --    01/11/25 1000 -- 65 18 155/64 94 -- -- -- --    01/11/25 0930 -- 72 18 177/87 117 91 % -- -- --    01/11/25 0900 -- 72 18 157/71 100 -- -- -- --    01/11/25 0855 -- 71 18 165/71 102 91 % -- Lying --    01/11/25 07:45:29 97.5 °F (36.4 °C) 64 18 162/75 104 93 % -- -- --    01/11/25 05:40:42 97.7 °F (36.5 °C) 63 20 183/88 120 98 % None (Room air) Sitting --    01/10/25 22:35:08 -- 67 20 166/87 113 93 % None (Room air) Sitting --    01/10/25 22:03:14 -- 67 20 155/83 107 91 % None (Room air) Sitting --    01/10/25 20:59:22 97.7 °F (36.5 °C) 70 20 178/91 120 95 % None (Room air) Sitting No Pain    01/10/25 19:52:56 97.7 °F (36.5 °C) 73 18 177/96 123 95 % None (Room air) Sitting No Pain    01/10/25 19:52:04 -- -- -- 177/96 123 -- -- -- --    01/10/25 1945 -- -- -- -- -- -- -- -- No Pain    01/10/25 1758 97.7 °F (36.5 °C) 73 18 184/94 -- 94 % None (Room air) -- --    01/10/25 1736 98.1 °F (36.7 °C) 75 18 173/88 -- 93 % None (Room air) -- --    01/10/25 1730 97.5 °F (36.4 °C) 73 --  179/96 -- 93 % -- -- --    01/10/25 1640 -- 75 -- 175/90 118 -- -- -- --    01/10/25 1530 97.2 °F (36.2 °C) 76 18 166/82 124 93 % None (Room air) -- --    01/10/25 1515 -- -- -- -- -- 94 % None (Room air) -- No Pain    01/10/25 13:22:16 97.7 °F (36.5 °C) 68 18 169/97 121 96 % None (Room air) Lying --    01/10/25 1245 -- 67 24 184/121 145 98 % None (Room air) -- --    01/10/25 1230 -- 67 31 194/95 133 98 % None (Room air) -- --    01/10/25 1215 -- 67 25 186/101 137 98 % -- -- --    01/10/25 1200 -- 67 23 182/101 135 98 % None (Room air) -- --    01/10/25 1145 -- 68 18 199/98 140 96 % None (Room air) Lying --    01/10/25 1130 -- 70 24 179/92 128 99 % None (Room air) Lying --    01/10/25 1115 -- 72 27 199/95 137 96 % None (Room air) -- --    01/10/25 1112 -- -- -- -- -- 95 % -- -- --    01/10/25 1111 97.9 °F (36.6 °C) 73 18 198/89 128 96 % None (Room air) Lying No Pain              Pertinent Labs/Diagnostic Test Results:   Radiology:  CT abdomen pelvis wo contrast   Final Interpretation by Jayme Ramírez MD (01/11 0943)      1. No findings for hematoma.   2. Stable mild-moderate ascites. Diffuse subcutaneous infiltration suggesting anasarca similar to prior CT.   3. Partially visualized at least moderate-sized bilateral effusions, grossly stable.      Workstation performed: [id number]         XR chest 1 view portable   Final Interpretation by Norma Avery MD (01/10 1239)      Persistent moderate pulmonary edema with moderate pleural effusions and bibasilar atelectasis.            Workstation performed: [id number]           Cardiology:  No orders to display     GI:  No orders to display           Results from last 7 days   Lab Units 01/11/25  0903 01/10/25  1126   WBC Thousand/uL 5.58 4.23*   HEMOGLOBIN g/dL 8.4* 7.0*   HEMATOCRIT % 24.4* 21.5*   PLATELETS Thousands/uL 107* 87*   TOTAL NEUT ABS Thousands/µL 4.78 3.44         Results from last 7 days   Lab Units 01/11/25  0903 01/10/25  1126   SODIUM mmol/L 128*  "128*   POTASSIUM mmol/L 5.2 5.0   CHLORIDE mmol/L 91* 92*   CO2 mmol/L 26 27   ANION GAP mmol/L 11 9   BUN mg/dL 77* 71*   CREATININE mg/dL 8.45* 7.84*   EGFR ml/min/1.73sq m 5 6   CALCIUM mg/dL 8.7 8.2*     Results from last 7 days   Lab Units 01/10/25  1126   AST U/L 18   ALT U/L 8   ALK PHOS U/L 132*   TOTAL PROTEIN g/dL 6.6   ALBUMIN g/dL 3.2*   TOTAL BILIRUBIN mg/dL 0.95     Results from last 7 days   Lab Units 01/11/25  1110 01/11/25  0757   POC GLUCOSE mg/dl 99 108     Results from last 7 days   Lab Units 01/11/25  0903 01/10/25  1126   GLUCOSE RANDOM mg/dL 98 123             No results found for: \"BETA-HYDROXYBUTYRATE\"                   Results from last 7 days   Lab Units 01/10/25  1744 01/10/25  1416 01/10/25  1126   HS TNI 0HR ng/L  --   --  68*   HS TNI 2HR ng/L  --  64*  --    HSTNI D2 ng/L  --  -4  --    HS TNI 4HR ng/L 63*  --   --    HSTNI D4 ng/L -5  --   --                                  Results from last 7 days   Lab Units 01/10/25  1126   BNP pg/mL >4,700*     Results from last 7 days   Lab Units 01/10/25  1126   FERRITIN ng/mL 244   IRON SATURATION % 8*   IRON ug/dL 28*   TIBC ug/dL 330.4     Results from last 7 days   Lab Units 01/10/25  1126   TRANSFERRIN mg/dL 236     Results from last 7 days   Lab Units 01/11/25  0637   UNIT PRODUCT CODE  [id number]   UNIT NUMBER  [id number]   UNITABO  O   UNITRH  POS   CROSSMATCH  Compatible   UNIT DISPENSE STATUS  Presumed Trans   UNIT PRODUCT VOL ml 350                                                                           Past Medical History:   Diagnosis Date    Acute hemodialysis patient ([facility]) 12/14/2024    CAD (coronary artery disease)     HFrEF (heart failure with reduced ejection fraction) ([facility]) 09/2021    Hypertension     Rheumatoid factor positive 09/2021    Stage 3 chronic kidney disease (HCC)     Tobacco abuse      Present on Admission:   Acute on chronic systolic heart failure (HCC)   Primary hypertension   Tobacco abuse   Elevated " troponin   Thrombocytopenia (HCC)      Admitting Diagnosis: Abnormal laboratory test [R89.9]  Anemia, unspecified type [D64.9]  Age/Sex: 69 y.o. male    Network Utilization Review Department  ATTENTION: Please call with any questions or concerns to [phone] and carefully listen to the prompts so that you are directed to the right person. All voicemails are confidential.   For Discharge needs, contact Care Management DC Support Team at [phone] opt. [phone]  Send all requests for admission clinical reviews, approved or denied determinations and any other requests to dedicated fax number below belonging to the campus where the patient is receiving treatment. List of dedicated fax numbers for the Facilities:  FACILITY NAME UR FAX NUMBER   ADMISSION DENIALS (Administrative/Medical Necessity) [phone]   DISCHARGE SUPPORT TEAM (NETWORK) [phone]   PARENT CHILD HEALTH (Maternity/NICU/Pediatrics) [phone]   [facility] [phone]   [facility] [phone]   [facility] [phone]   [facility] [phone]   [facility] [phone]   [facility] [phone]   General acute hospital [phone]   [facility] [phone]   [facility] [phone]   [facility] [phone]   [facility] [phone]   [facility] [phone]

## 2025-01-12 NOTE — ASSESSMENT & PLAN NOTE
Continue his hydralazine continue isosorbide continue Coreg  Uncontrolled - increase coreg to 25 mg po bid

## 2025-01-12 NOTE — ASSESSMENT & PLAN NOTE
Wt Readings from Last 3 Encounters:   01/11/25 62.1 kg (136 lb 12.8 oz)   12/27/24 56 kg (123 lb 6.4 oz)   11/20/24 55.2 kg (121 lb 12.8 oz)     Post hemodialysis weight was down to 59.5 kg.  Will work to continue to ultrafilter as tolerated.  Goal hemodialysis weight after treatment tomorrow is 58.5 kg, however, continue to be more aggressive as tolerated.

## 2025-01-12 NOTE — PROGRESS NOTES
Progress Note - Hospitalist   Name: Matthew Alvarez 69 y.o. male I MRN: [id number]  Unit/Bed#: -01 I Date of Admission: 1/10/2025   Date of Service: 1/12/2025 I Hospital Day: 2    Assessment & Plan  Acute on chronic systolic heart failure (HCC)  Wt Readings from Last 3 Encounters:   01/11/25 62.1 kg (136 lb 12.8 oz)   12/27/24 56 kg (123 lb 6.4 oz)   11/20/24 55.2 kg (121 lb 12.8 oz)     Missed dialysis on Wednesday or Mr. Sandoval wife and him have a different story last EF was 20-24 in November 24 patient is on dialysis Monday Wednesday Friday and takes torsemide 100 mg daily  He has pulmonary edema with moderate pleural effusion past history of thoracocentesis   Will continue  his torsemide 100 mg nondialysis days per nephrology   Discussed with CC not enough fluid in abdomen for paracentesis   Continue his aspirin  Patient also has a LifeVest at home which she does not wear all the time he only wears it to dialysis here he is can to be on telemetry monitor throughout the hospital stay  S/p HD 1/11  Spoke to patient declines thoracocentesis         Anemia due to chronic kidney disease, on chronic dialysis (HCC)  Lab Results   Component Value Date    EGFR 9 01/12/2025    EGFR 5 01/11/2025    EGFR 6 01/10/2025    CREATININE 5.43 (H) 01/12/2025    CREATININE 8.45 (H) 01/11/2025    CREATININE 7.84 (H) 01/10/2025   He has acute on chronic anemia usually his hemoglobin is above 7.5 he was found to have a 6 on repeat here at 7 s/p 1 unit of prbc transfusion and hb stable at 8.4.  Nephrology consent obtained.  Patient denies any melena or hematochezia will obtain occult- pending  Iron panel some evidence of iron deficiency anemia as well , s/p venofer 300 mg iv x1 1/11  Primary hypertension  Continue his hydralazine continue isosorbide continue Coreg  Uncontrolled - increase coreg to 25 mg po bid   Tobacco abuse  Per wife still smokes 6 to 7 cigarettes  Elevated troponin  Complains of no chest pain always has elevated  troponin suspect in light of ESRD.  And CHF exacerbation.  EKG nonischemic  Thrombocytopenia (HCC)  Chronic monitor while on heparin. Improved   Hyponatremia  Hyponatremia (POA) in the setting of CHF with ESRD as evidenced by Sodium levels of 128 on admission and 129 today, 1/12 being treated with serial monitoring of his sodium level. Fluid restriction 1.5L  and  HD   Dependence on hemodialysis (HCC)      VTE Pharmacologic Prophylaxis: VTE Score: 2 Low Risk (Score 0-2) - Encourage Ambulation.    Mobility:   Basic Mobility Inpatient Raw Score: 13  JH-HLM Goal: 4: Move to chair/commode  JH-HLM Achieved: 7: Walk 25 feet or more  JH-HLM Goal NOT achieved. Continue with multidisciplinary rounding and encourage appropriate mobility to improve upon JH-HLM goals.    Patient Centered Rounds: I performed bedside rounds with nursing staff today.   Discussions with Specialists or Other Care Team Provider: none    Education and Discussions with Family / Patient: pt     Current Length of Stay: 2 day(s)  Current Patient Status: Inpatient   Certification Statement: The patient will continue to require additional inpatient hospital stay due to uncontrolled htn and hyponatremia   Discharge Plan: Anticipate discharge in 24-48 hrs to rehab facility.    Code Status: Level 1 - Full Code    Subjective   Seen and examined no complaints     Objective :  Temp:  [97.5 °F (36.4 °C)-97.9 °F (36.6 °C)] 97.7 °F (36.5 °C)  HR:  [56-75] 71  BP: (148-180)/(60-86) 180/86  Resp:  [16-20] 17  SpO2:  [92 %-97 %] 94 %  O2 Device: None (Room air)    Body mass index is 22.08 kg/m².     Input and Output Summary (last 24 hours):     Intake/Output Summary (Last 24 hours) at 1/12/2025 0941  Last data filed at 1/12/2025 0513  Gross per 24 hour   Intake 670 ml   Output 3000 ml   Net -2330 ml       Physical Exam  Vitals and nursing note reviewed.   Constitutional:       General: He is not in acute distress.     Appearance: He is well-developed.   HENT:       Head: Normocephalic and atraumatic.   Eyes:      Conjunctiva/sclera: Conjunctivae normal.   Cardiovascular:      Rate and Rhythm: Normal rate and regular rhythm.      Heart sounds: No murmur heard.  Pulmonary:      Effort: Pulmonary effort is normal. No respiratory distress.      Breath sounds: Rales present. No wheezing.   Abdominal:      General: Bowel sounds are normal. There is distension (mild better then before).      Palpations: Abdomen is soft.      Tenderness: There is no abdominal tenderness.   Musculoskeletal:         General: Swelling present.      Cervical back: Neck supple.   Skin:     General: Skin is warm and dry.      Capillary Refill: Capillary refill takes less than 2 seconds.   Neurological:      Mental Status: He is alert and oriented to person, place, and time.   Psychiatric:         Mood and Affect: Mood normal.           Lines/Drains:  Lines/Drains/Airways       Active Status       Name Placement date Placement time Site Days    HD Permanent Double Catheter 11/14/24  1352  Internal jugular  58                      Telemetry:  Telemetry Orders (From admission, onward)               LifeVest Patient: Continuous Telemetry Monitoring during hospitalization (non-expiring)  Continuous LifeVest Telemetry Monitoring        References:    LifeVest Policy                     Telemetry Reviewed: Normal Sinus Rhythm  Indication for Continued Telemetry Use: Lifevest (remains on tele entire hospital stay)               Lab Results: I have reviewed the following results:   Results from last 7 days   Lab Units 01/11/25  0903   WBC Thousand/uL 5.58   HEMOGLOBIN g/dL 8.4*   HEMATOCRIT % 24.4*   PLATELETS Thousands/uL 107*   SEGS PCT % 86*   LYMPHO PCT % 5*   MONO PCT % 8   EOS PCT % 0     Results from last 7 days   Lab Units 01/12/25  0452 01/11/25  0903 01/10/25  1126   SODIUM mmol/L 129*   < > 128*   POTASSIUM mmol/L 4.2   < > 5.0   CHLORIDE mmol/L 96   < > 92*   CO2 mmol/L 26   < > 27   BUN mg/dL 40*   < >  71*   CREATININE mg/dL 5.43*   < > 7.84*   ANION GAP mmol/L 7   < > 9   CALCIUM mg/dL 8.3*   < > 8.2*   ALBUMIN g/dL  --   --  3.2*   TOTAL BILIRUBIN mg/dL  --   --  0.95   ALK PHOS U/L  --   --  132*   ALT U/L  --   --  8   AST U/L  --   --  18   GLUCOSE RANDOM mg/dL 102   < > 123    < > = values in this interval not displayed.         Results from last 7 days   Lab Units 01/11/25  1110 01/11/25  0757   POC GLUCOSE mg/dl 99 108               Recent Cultures (last 7 days):         Imaging Results Review: No pertinent imaging studies reviewed.  Other Study Results Review: No additional pertinent studies reviewed.    Last 24 Hours Medication List:     Current Facility-Administered Medications:     acetaminophen (TYLENOL) tablet 650 mg, Q6H PRN    aspirin (ECOTRIN LOW STRENGTH) EC tablet 81 mg, Daily    atorvastatin (LIPITOR) tablet 20 mg, Daily With Dinner    carvedilol (COREG) tablet 25 mg, BID With Meals    folic acid (FOLVITE) tablet 1 mg, Daily    heparin (porcine) subcutaneous injection 5,000 Units, Q8H JEEVAN    hydrALAZINE (APRESOLINE) injection 5 mg, Q6H PRN    hydrALAZINE (APRESOLINE) tablet 100 mg, Q8H JEEVAN    isosorbide mononitrate (IMDUR) 24 hr tablet 120 mg, Daily    melatonin tablet 3 mg, HS    [START ON 1/14/2025] torsemide (DEMADEX) tablet 100 mg, Once per day on Sunday Tuesday Thursday Saturday    Administrative Statements   Today, Patient Was Seen By: Stacy Carson MD  I have spent a total time of >35 minutes in caring for this patient on the day of the visit/encounter including Documenting in the medical record and Reviewing / ordering tests, medicine, procedures  .    **Please Note: This note may have been constructed using a voice recognition system.**

## 2025-01-12 NOTE — ASSESSMENT & PLAN NOTE
Hyponatremia (POA) in the setting of CHF with ESRD as evidenced by Sodium levels of 128 on admission and 129 today, 1/12 being treated with serial monitoring of his sodium level. Fluid restriction 1.5L  and  HD

## 2025-01-12 NOTE — CASE MANAGEMENT
Case Management Discharge Planning Note    Patient name Matthew Alvarez  Location /-01 MRN [id number]  : 1955 Date 2025       Current Admission Date: 1/10/2025  Current Admission Diagnosis:Acute on chronic systolic heart failure (HCC)   Patient Active Problem List    Diagnosis Date Noted Date Diagnosed    Dependence on hemodialysis (HCC) 2025     Pleural effusion 2024     Secondary hyperparathyroidism of renal origin ([facility]) 12/15/2024     ESRD (end stage renal disease) on dialysis ([facility]) 2024     Ambulatory dysfunction 2024     Thrombocytopenia ([facility]) 2024     Renal failure 11/15/2024     Encounter for hemodialysis ([facility]) 11/15/2024     Anemia due to chronic kidney disease, on chronic dialysis ([facility]) 11/15/2024     Anemia of renal disease 11/15/2024     Vitamin D deficiency, unspecified 11/15/2024     Chronic systolic (congestive) heart failure ([facility]) 2024     Oliguria 2024     Encounter for hemodialysis for ESRD ([facility]) 2024     Hyponatremia 2024     Dilated cardiomyopathy ([facility]) 2024     Elevated liver transaminase level 11/10/2024     Acute on chronic systolic heart failure ([facility]) 2024     Coronary artery disease involving native coronary artery of native heart without angina pectoris 10/08/2021     Rheumatoid factor positive 2021     Protein calorie malnutrition ([facility]) 2021     Elevated troponin 2021     Tobacco abuse 2021     Nonischemic cardiomyopathy ([facility]) 2021     Primary hypertension 2021       LOS (days): 2  Geometric Mean LOS (GMLOS) (days): 3.7  Days to GMLOS:1.6     OBJECTIVE:  Risk of Unplanned Readmission Score: 26.24         Current admission status: Inpatient   Preferred Pharmacy:   [id number] - [place] - [address]  [address]  [facility] [address]  Phone: [phone] Fax: [phone]    [facility] [place] - [address]  [address]  [place] [address]  Phone: [phone] Fax: [phone]    Primary Care Provider: Olive Rodriguez MD    Primary Insurance: [employer] [facility]  Secondary Insurance:     DISCHARGE DETAILS:              CM Submitted blanket HHC referral in [facility] based on therapy recommendations. CM to follow for choice.

## 2025-01-13 ENCOUNTER — APPOINTMENT (INPATIENT)
Dept: DIALYSIS | Facility: HOSPITAL | Age: 70
DRG: 682 | End: 2025-01-13
Attending: INTERNAL MEDICINE
Payer: COMMERCIAL

## 2025-01-13 PROBLEM — Z99.2 ESRD ON DIALYSIS (HCC): Status: ACTIVE | Noted: 2025-01-12

## 2025-01-13 PROBLEM — N18.6 ESRD ON DIALYSIS (HCC): Status: ACTIVE | Noted: 2025-01-12

## 2025-01-13 LAB
ANION GAP SERPL CALCULATED.3IONS-SCNC: 9 MMOL/L (ref 4–13)
ATRIAL RATE: 70 BPM
BASOPHILS # BLD AUTO: 0.02 THOUSANDS/ΜL (ref 0–0.1)
BASOPHILS NFR BLD AUTO: 0 % (ref 0–1)
BUN SERPL-MCNC: 43 MG/DL (ref 5–25)
CALCIUM SERPL-MCNC: 8.4 MG/DL (ref 8.4–10.2)
CHLORIDE SERPL-SCNC: 95 MMOL/L (ref 96–108)
CO2 SERPL-SCNC: 26 MMOL/L (ref 21–32)
CREAT SERPL-MCNC: 5.3 MG/DL (ref 0.6–1.3)
EOSINOPHIL # BLD AUTO: 0.01 THOUSAND/ΜL (ref 0–0.61)
EOSINOPHIL NFR BLD AUTO: 0 % (ref 0–6)
ERYTHROCYTE [DISTWIDTH] IN BLOOD BY AUTOMATED COUNT: 20.6 % (ref 11.6–15.1)
GFR SERPL CREATININE-BSD FRML MDRD: 10 ML/MIN/1.73SQ M
GLUCOSE SERPL-MCNC: 127 MG/DL (ref 65–140)
HCT VFR BLD AUTO: 24.4 % (ref 36.5–49.3)
HGB BLD-MCNC: 8.1 G/DL (ref 12–17)
IMM GRANULOCYTES # BLD AUTO: 0.02 THOUSAND/UL (ref 0–0.2)
IMM GRANULOCYTES NFR BLD AUTO: 0 % (ref 0–2)
LYMPHOCYTES # BLD AUTO: 0.3 THOUSANDS/ΜL (ref 0.6–4.47)
LYMPHOCYTES NFR BLD AUTO: 6 % (ref 14–44)
MCH RBC QN AUTO: 29.7 PG (ref 26.8–34.3)
MCHC RBC AUTO-ENTMCNC: 33.2 G/DL (ref 31.4–37.4)
MCV RBC AUTO: 89 FL (ref 82–98)
MONOCYTES # BLD AUTO: 0.33 THOUSAND/ΜL (ref 0.17–1.22)
MONOCYTES NFR BLD AUTO: 7 % (ref 4–12)
NEUTROPHILS # BLD AUTO: 4.01 THOUSANDS/ΜL (ref 1.85–7.62)
NEUTS SEG NFR BLD AUTO: 87 % (ref 43–75)
NRBC BLD AUTO-RTO: 0 /100 WBCS
P AXIS: 54 DEGREES
PLATELET # BLD AUTO: 85 THOUSANDS/UL (ref 149–390)
PMV BLD AUTO: 10.8 FL (ref 8.9–12.7)
POTASSIUM SERPL-SCNC: 4.2 MMOL/L (ref 3.5–5.3)
PR INTERVAL: 160 MS
QRS AXIS: -14 DEGREES
QRSD INTERVAL: 104 MS
QT INTERVAL: 430 MS
QTC INTERVAL: 464 MS
RBC # BLD AUTO: 2.73 MILLION/UL (ref 3.88–5.62)
SODIUM SERPL-SCNC: 130 MMOL/L (ref 135–147)
T WAVE AXIS: 128 DEGREES
VENTRICULAR RATE: 70 BPM
WBC # BLD AUTO: 4.69 THOUSAND/UL (ref 4.31–10.16)

## 2025-01-13 PROCEDURE — 99232 SBSQ HOSP IP/OBS MODERATE 35: CPT | Performed by: FAMILY MEDICINE

## 2025-01-13 PROCEDURE — 90935 HEMODIALYSIS ONE EVALUATION: CPT | Performed by: INTERNAL MEDICINE

## 2025-01-13 PROCEDURE — 85025 COMPLETE CBC W/AUTO DIFF WBC: CPT | Performed by: FAMILY MEDICINE

## 2025-01-13 PROCEDURE — 93010 ELECTROCARDIOGRAM REPORT: CPT | Performed by: INTERNAL MEDICINE

## 2025-01-13 PROCEDURE — 80048 BASIC METABOLIC PNL TOTAL CA: CPT | Performed by: FAMILY MEDICINE

## 2025-01-13 PROCEDURE — 5A1D70Z PERFORMANCE OF URINARY FILTRATION, INTERMITTENT, LESS THAN 6 HOURS PER DAY: ICD-10-PCS | Performed by: INTERNAL MEDICINE

## 2025-01-13 RX ORDER — AMLODIPINE BESYLATE 5 MG/1
5 TABLET ORAL DAILY
Status: DISCONTINUED | OUTPATIENT
Start: 2025-01-13 | End: 2025-01-15 | Stop reason: HOSPADM

## 2025-01-13 RX ADMIN — ASPIRIN 81 MG: 81 TABLET, COATED ORAL at 08:08

## 2025-01-13 RX ADMIN — Medication 6 MG: at 21:15

## 2025-01-13 RX ADMIN — ATORVASTATIN CALCIUM 20 MG: 20 TABLET, FILM COATED ORAL at 16:11

## 2025-01-13 RX ADMIN — HYDRALAZINE HYDROCHLORIDE 100 MG: 25 TABLET ORAL at 14:13

## 2025-01-13 RX ADMIN — HYDRALAZINE HYDROCHLORIDE 100 MG: 25 TABLET ORAL at 04:56

## 2025-01-13 RX ADMIN — AMLODIPINE BESYLATE 5 MG: 5 TABLET ORAL at 16:11

## 2025-01-13 RX ADMIN — HYDRALAZINE HYDROCHLORIDE 100 MG: 25 TABLET ORAL at 21:15

## 2025-01-13 RX ADMIN — IRON SUCROSE 100 MG: 20 INJECTION, SOLUTION INTRAVENOUS at 14:00

## 2025-01-13 RX ADMIN — ISOSORBIDE MONONITRATE 120 MG: 60 TABLET, EXTENDED RELEASE ORAL at 08:08

## 2025-01-13 RX ADMIN — EPOETIN ALFA 8000 UNITS: 4000 SOLUTION INTRAVENOUS; SUBCUTANEOUS at 16:11

## 2025-01-13 RX ADMIN — CARVEDILOL 25 MG: 12.5 TABLET, FILM COATED ORAL at 08:08

## 2025-01-13 RX ADMIN — CARVEDILOL 25 MG: 12.5 TABLET, FILM COATED ORAL at 16:11

## 2025-01-13 RX ADMIN — FOLIC ACID 1 MG: 1 TABLET ORAL at 08:08

## 2025-01-13 NOTE — PROGRESS NOTES
Progress Note - Nephrology   Name: Matthew Alvarez 69 y.o. male I MRN: [id number]  Unit/Bed#: -01 I Date of Admission: 1/10/2025   Date of Service: 1/13/2025 I Hospital Day: 3    Assessment & Plan  Acute hemodialysis patient (HCC)  -Outpatient unit: [facility], on dialysis treatment since November 2024  -Schedule:  MWF  -Access: PermCath  -Plan: Patient was seen and examined on hemodialysis treatment today.  Tolerating HD, blood pressure is elevated and he has significant lower extremity edema, plan for ultrafiltration 3.0 kg using 3K bath.  Will repeat extra dialysis treatment tomorrow.  Dialysis prescription was reviewed, continue to challenge target weight.  For now target weight is around 58.5 kg but will need to challenge further as he is significant lower extremity edema and blood pressure is elevated      Acute on chronic systolic heart failure (HCC)  Wt Readings from Last 3 Encounters:   01/13/25 61.7 kg (136 lb)   12/27/24 56 kg (123 lb 6.4 oz)   11/20/24 55.2 kg (121 lb 12.8 oz)     Post hemodialysis weight was down to 59.5 kg.   Still with significant lower extremity edema.  Continue torsemide on nondialysis days.  Plan for extra hemodialysis treatment tomorrow, plan for ultrafiltration 3 kg today during dialysis treatment        Anemia due to chronic kidney disease, on chronic dialysis (HCC)  Lab Results   Component Value Date    EGFR 10 01/13/2025    EGFR 9 01/12/2025    EGFR 5 01/11/2025    CREATININE 5.30 (H) 01/13/2025    CREATININE 5.43 (H) 01/12/2025    CREATININE 8.45 (H) 01/11/2025   Status post 1 unit PRBC after admission, status post IV Venofer 300 mg IV on 1/11 with iron saturation 8%.  Ordered for IV Venofer 100 mg daily for next 7 treatment with HD.  Last hemoglobin was below goal at 8.1 g/dL also started on ALCIRA as do not see any contraindication for ALCIRA.  Hopefully blood pressure will improve and he can get ALCIRA  Hyponatremia  Sodium 130 meq/L, likely due to decreased free water  excretion in the setting of DAVID on dialysis.  Recommend free water restriction, continue loop diuretics and UF with HD  Primary hypertension  Blood pressure slightly elevated today morning monitor with UF on HD.  Continue current treatment with carvedilol 25 mg twice daily and hydralazine 100 mg every 8 hours along with Imdur. If  Blood pressure remains elevated may need to consider addition of ARB  Elevated troponin  -No chest pain, elevated troponin was suspected to be in the setting of ESRD and CHF.  Continue management per primary team  Tobacco abuse  Still with ongoing smoking, recommend quitting smoking  Thrombocytopenia (HCC)  , Continue to monitor platelet count per primary team.    I have reviewed the nephrology recommendations including plan for hemodialysis treatment today and again tomorrow, with primary team, and we are in agreement with renal plan including the information outlined above.     Subjective   No chest pain or shortness of breath, no nausea vomiting    Objective :  Temp:  [97.9 °F (36.6 °C)-98.8 °F (37.1 °C)] 98.6 °F (37 °C)  HR:  [71-80] 73  BP: (162-188)/() 165/89  Resp:  [18-24] 22  SpO2:  [94 %-97 %] 95 %  O2 Device: None (Room air)    Current Weight: Weight - Scale: 61.7 kg (136 lb)  First Weight: Weight - Scale: 64 kg (141 lb 1.5 oz)  I/O         01/11 0701  01/12 0700 01/12 0701  01/13 0700 01/13 0701  01/14 0700    P.O. 600 340 240    I.V. (mL/kg) 450 (7.2)  200 (3.2)    Blood       Total Intake(mL/kg) 1050 (16.9) 340 (5.5) 440 (7.1)    Other 3000      Total Output 3000      Net -1950 +340 +440           Unmeasured Urine Occurrence 1 x            Physical Exam   General:  Ill looking, awake.  Eyes: Conjunctivae pink,  Sclera anicteric  ENT: lips and mucous membranes moist  Neck: supple   Chest: Decreased air entry bilateral lung bases  CVS: S1 & S2 present, normal rate, regular rhythm, no murmur.  Abdomen: soft, non-tender, non-distended, Bowel sounds normoactive  Extremities:  2+ pitting edema both legs  Skin: no rash  Neuro: awake, alert, oriented x 3   Psych: Mood and affect appropriate    Medications:    Current Facility-Administered Medications:     acetaminophen (TYLENOL) tablet 650 mg, 650 mg, Oral, Q6H PRN, Stacy Carson MD    amLODIPine (NORVASC) tablet 5 mg, 5 mg, Oral, Daily, Stacy Carson MD    aspirin (ECOTRIN LOW STRENGTH) EC tablet 81 mg, 81 mg, Oral, Daily, Stacy Carson MD, 81 mg at 01/13/25 0808    atorvastatin (LIPITOR) tablet 20 mg, 20 mg, Oral, Daily With Dinner, Stacy Carson MD, 20 mg at 01/12/25 1707    carvedilol (COREG) tablet 25 mg, 25 mg, Oral, BID With Meals, Stacy Carson MD, 25 mg at 01/13/25 0808    folic acid (FOLVITE) tablet 1 mg, 1 mg, Oral, Daily, Stacy Carson MD, 1 mg at 01/13/25 0808    heparin (porcine) subcutaneous injection 5,000 Units, 5,000 Units, Subcutaneous, Q8H JEEVAN, Stacy Carson MD    hydrALAZINE (APRESOLINE) injection 5 mg, 5 mg, Intravenous, Q6H PRN, Nohelia Murphy MD, 5 mg at 01/10/25 1740    hydrALAZINE (APRESOLINE) tablet 100 mg, 100 mg, Oral, Q8H JEEVAN, Stacy Carson MD, 100 mg at 01/13/25 0456    isosorbide mononitrate (IMDUR) 24 hr tablet 120 mg, 120 mg, Oral, Daily, Stacy Carson MD, 120 mg at 01/13/25 0808    melatonin tablet 6 mg, 6 mg, Oral, HS, Stacy Carson MD    [START ON 1/14/2025] torsemide (DEMADEX) tablet 100 mg, 100 mg, Oral, Once per day on Sunday Tuesday Thursday Saturday, Stacy Carson MD      Lab Results: I have reviewed the following results:  Results from last 7 days   Lab Units 01/13/25  0934 01/12/25  0452 01/11/25  0903 01/10/25  1126   WBC Thousand/uL 4.69  --  5.58 4.23*   HEMOGLOBIN g/dL 8.1*  --  8.4* 7.0*   HEMATOCRIT % 24.4*  --  24.4* 21.5*   PLATELETS Thousands/uL 85*  --  107* 87*   POTASSIUM mmol/L 4.2 4.2 5.2 5.0   CHLORIDE mmol/L 95* 96 91* 92*   CO2 mmol/L 26 26 26 27   BUN mg/dL 43* 40* 77* 71*   CREATININE mg/dL 5.30* 5.43* 8.45* 7.84*   CALCIUM mg/dL 8.4 8.3* 8.7 8.2*  "  ALBUMIN g/dL  --   --   --  3.2*       Administrative Statements     Portions of the record may have been created with voice recognition software. Occasional wrong word or \"sound a like\" substitutions may have occurred due to the inherent limitations of voice recognition software. Read the chart carefully and recognize, using context, where substitutions have occurred.If you have any questions, please contact the dictating provider.  "

## 2025-01-13 NOTE — ASSESSMENT & PLAN NOTE
-Outpatient unit: [facility], [place], on dialysis treatment since November 2024  -Schedule:  MWF  -Access: PermCath  -Plan: Patient was seen and examined on hemodialysis treatment today.  Tolerating HD, blood pressure is elevated and he has significant lower extremity edema, plan for ultrafiltration 3.0 kg using 3K bath.  Will repeat extra dialysis treatment tomorrow.  Dialysis prescription was reviewed, continue to challenge target weight.  For now target weight is around 58.5 kg but will need to challenge further as he is significant lower extremity edema and blood pressure is elevated

## 2025-01-13 NOTE — ASSESSMENT & PLAN NOTE
Lab Results   Component Value Date    EGFR 10 01/13/2025    EGFR 9 01/12/2025    EGFR 5 01/11/2025    CREATININE 5.30 (H) 01/13/2025    CREATININE 5.43 (H) 01/12/2025    CREATININE 8.45 (H) 01/11/2025   He has acute on chronic anemia usually his hemoglobin is above 7.5 he was found to have a 6 on repeat here at 7 s/p 1 unit of prbc transfusion and hb stable at 8.4 currently dropped to 8.1 monitor closely.  Nephrology consent obtained.  Patient denies any melena or hematochezia will obtain occult- pending  Iron panel some evidence of iron deficiency anemia as well , s/p venofer 300 mg iv x1 1/11

## 2025-01-13 NOTE — WOUND OSTOMY CARE
Consult Note - Wound   Matthew Alvarez 69 y.o. male MRN: [id number]  Unit/Bed#: -01 Encounter: [id number]      History and Present Illness:  69 year old male presented to [facility] ED 1/10/25 due to abnormal lab values. ESRD HD.Hyponatremia,anemia due to chronic kidney     Assessment Findings:   Seen for initial wound assessment   1)Lle anterior distal tibia partial thickness skin loss wound is macerated with mix of white and pink tissue. Moderated clear drainage on dressing .  No induration, fluctuance, odor, warmth/temperature differences, redness, or purulence noted to the above noted wounds and skin areas assessed. New dressings applied per orders listed below. Patient tolerated well- no s/s of non-verbal pain or discomfort observed during the encounter. Bedside nurse aware of plan of care. See flow sheets for more detailed assessment findings.  Wound care will continue to follow, call or Secure Chat Message with questions.     Skin care plans:  1-Hydraguard/Silicone Cream to bilateral sacrum, buttock, and heels BID and PRN  2-Elevate heels to offload pressure.  3-Ehob cushion in chair when out of bed.  4-Moisturize skin daily with skin nourishing cream.  5-Turn/reposition q2h or when medically stable for pressure re-distribution on skin.   6-Cleanse left anterior tibial wound with NSS apply  Mepilex UP change daily and prn   Wound 12/14/24 Pretibial Left (Active)   Wound Image   01/13/25 1423   Wound Description Beefy red;Drainage;White 01/13/25 1423   Mor[place]-wound Assessment Erythema;Maceration 01/13/25 1423   Wound Length (cm) 4 cm 01/13/25 1423   Wound Width (cm) 4 cm 01/13/25 1423   Wound Surface Area (cm^2) 16 cm^2 01/13/25 1423   Drainage Amount Moderate 01/13/25 1423   Drainage Description Clear 01/13/25 1423   Non-staged Wound Description Partial thickness 01/13/25 1423   Treatments Elevated;Site care;Cleansed 01/13/25 1423   Dressing Foam 01/13/25 1423   Dressing Changed Changed 01/13/25 1423   Patient  Tolerance Tolerated well 01/13/25 1423   Dressing Status Clean;Dry;Intact 01/13/25 1423                 Call or Secure Chat  with any questions  Wound Care will continue to follow  weekly    Katie Lopez BSN RN [facility]

## 2025-01-13 NOTE — ASSESSMENT & PLAN NOTE
Blood pressure slightly elevated today morning monitor with UF on HD.  Continue current treatment with carvedilol 25 mg twice daily and hydralazine 100 mg every 8 hours along with Imdur. If  Blood pressure remains elevated may need to consider addition of ARB

## 2025-01-13 NOTE — PROGRESS NOTES
Patient:    MRN:  [id number]    Vanessa Request ID:  [id number]    Level of care reserved:  Home Health Agency    Partner Reserved:  [facility], [place] [id number] [phone]    Clinical needs requested:    Geography searched:  [phone]    Start of Service:    Request sent:  1:56pm EST on 1/12/2025 by Paula Hilario    Partner reserved:  3:13pm EST on 1/13/2025 by Kira Estrada    Choice list shared:  2:43pm EST on 1/13/2025 by Kira Estrada

## 2025-01-13 NOTE — ASSESSMENT & PLAN NOTE
Sodium 130 meq/L, likely due to decreased free water excretion in the setting of DAVID on dialysis.  Recommend free water restriction, continue loop diuretics and UF with HD

## 2025-01-13 NOTE — ASSESSMENT & PLAN NOTE
Wt Readings from Last 3 Encounters:   01/13/25 61.7 kg (136 lb)   12/27/24 56 kg (123 lb 6.4 oz)   11/20/24 55.2 kg (121 lb 12.8 oz)     Missed dialysis on Wednesday or Mr. Sandoval wife and him have a different story last EF was 20-24 in November 24 patient is on dialysis Monday Wednesday Friday and takes torsemide 100 mg daily  He has pulmonary edema with moderate pleural effusion past history of thoracocentesis   Will continue  his torsemide 100 mg nondialysis days per nephrology   Discussed with CC not enough fluid in abdomen for paracentesis   Continue his aspirin  Patient also has a LifeVest at home which she does not wear all the time he only wears it to dialysis here he is can to be on telemetry monitor throughout the hospital stay  S/p HD 1/11  Spoke to patient declines thoracocentesis

## 2025-01-13 NOTE — ASSESSMENT & PLAN NOTE
Lab Results   Component Value Date    EGFR 10 01/13/2025    EGFR 9 01/12/2025    EGFR 5 01/11/2025    CREATININE 5.30 (H) 01/13/2025    CREATININE 5.43 (H) 01/12/2025    CREATININE 8.45 (H) 01/11/2025   Status post 1 unit PRBC after admission, status post IV Venofer 300 mg IV on 1/11 with iron saturation 8%.  Ordered for IV Venofer 100 mg daily for next 7 treatment with HD.  Last hemoglobin was below goal at 8.1 g/dL also started on ALCIRA as do not see any contraindication for ALCIRA.  Hopefully blood pressure will improve and he can get ALCIRA

## 2025-01-13 NOTE — ASSESSMENT & PLAN NOTE
-No chest pain, elevated troponin was suspected to be in the setting of ESRD and CHF.  Continue management per primary team

## 2025-01-13 NOTE — CASE MANAGEMENT
Case Management Discharge Planning Note    Patient name Matthew Alvarez  Location /-01 MRN [id number]  : 1955 Date 2025       Current Admission Date: 1/10/2025  Current Admission Diagnosis:Acute on chronic systolic heart failure (HCC)   Patient Active Problem List    Diagnosis Date Noted Date Diagnosed    Acute hemodialysis patient (HCC) 2025     Pleural effusion 2024     Secondary hyperparathyroidism of renal origin (HCC) 12/15/2024     ESRD (end stage renal disease) on dialysis ([facility]) 2024     Ambulatory dysfunction 2024     Thrombocytopenia ([facility]) 2024     Renal failure 11/15/2024     Encounter for hemodialysis ([facility]) 11/15/2024     Anemia due to chronic kidney disease, on chronic dialysis ([facility]) 11/15/2024     Anemia of renal disease 11/15/2024     Vitamin D deficiency, unspecified 11/15/2024     Chronic systolic (congestive) heart failure ([facility]) 2024     Oliguria 2024     Encounter for hemodialysis for ESRD ([facility]) 2024     Hyponatremia 2024     Dilated cardiomyopathy ([facility]) 2024     Elevated liver transaminase level 11/10/2024     Acute on chronic systolic heart failure ([facility]) 2024     Coronary artery disease involving native coronary artery of native heart without angina pectoris 10/08/2021     Rheumatoid factor positive 2021     Protein calorie malnutrition ([facility]) 2021     Elevated troponin 2021     Tobacco abuse 2021     Nonischemic cardiomyopathy ([facility]) 2021     Primary hypertension 2021       LOS (days): 3  Geometric Mean LOS (GMLOS) (days): 3.7  Days to GMLOS:0.6     OBJECTIVE:  Risk of Unplanned Readmission Score: 23.69         Current admission status: Inpatient   Preferred Pharmacy:   [id number] - [place] - [address]  [address]  [facility] [address]  Phone: [phone] Fax: [phone]    [facility] - [place] - [address]  [address]  [place] [address]  Phone: [phone] Fax: [phone]    Primary Care Provider: Olive Rodriguez MD    Primary Insurance: [employer] [facility]  Secondary Insurance:     DISCHARGE DETAILS:    Discharge planning discussed with:: patient and spouse Kellee Alvarez  Freedom of Choice: Yes  Comments - Freedom of Choice: pt is requesting to go home with [facility] services, pt choice list discussed with patient and spouse- [facility]  CM contacted family/caregiver?: Yes  Were Treatment Team discharge recommendations reviewed with patient/caregiver?: Yes  Did patient/caregiver verbalize understanding of patient care needs?: Yes  Were patient/caregiver advised of the risks associated with not following Treatment Team discharge recommendations?: Yes    Contacts  Patient Contacts: Kellee Alvarez- spouse  Relationship to Patient:: Family  Contact Method: Phone  Phone Number: [phone]  Reason/Outcome: Discharge Planning    Requested Home Health Care         Is the patient interested in HHC at discharge?: Yes  Home Health Discipline requested:: Nursing, Occupational Therapy, Physical Therapy  Home Health Agency Name:: [facility]  HHA External Referral Reason (only applicable if external HHA name selected): [facility] does not accept patient's insurance  Home Health Follow-Up Provider:: PCP  Home Health Services Needed:: Heart Failure Management, Gait/ADL Training, Evaluate Functional Status and Safety, Strengthening/Theraputic Exercises to Improve Function  Homebound Criteria Met:: Uses an Assist Device (i.e. cane, walker, etc), Requires the Assistance of Another Person for Safe Ambulation or to Leave the Home  Supporting Clincal Findings:: Limited Endurance         Other Referral/Resources/Interventions Provided:  Interventions: HHC         Treatment Team Recommendation: Home with Home Health Care  Discharge Destination Plan:: Home with Home Health  Care  Transport at Discharge : Family

## 2025-01-13 NOTE — UTILIZATION REVIEW
Initial Clinical Review    Admission: Date/Time/Statement:   Admission Orders (From admission, onward)       Ordered        01/10/25 1235  INPATIENT ADMISSION  Once                          Orders Placed This Encounter   Procedures    INPATIENT ADMISSION     Standing Status:   Standing     Number of Occurrences:   1     Level of Care:   Med Surg [16]     Estimated length of stay:   More than 2 Midnights     Certification:   I certify that inpatient services are medically necessary for this patient for a duration of greater than two midnights. See H&P and MD Progress Notes for additional information about the patient's course of treatment.     ED Arrival Information       Expected   1/10/2025 11:07    Arrival   1/10/2025 11:08    Acuity   Urgent              Means of arrival   Ambulance    Escorted by   [facility]    Service   Hospitalist    Admission type   Emergency              Arrival complaint   Abnormal Lab             Chief Complaint   Patient presents with    Abnormal Lab     Per EMS, patient coming from home. Lives w/ son and wife who are his caretakers. Patient is a dialysis patient. Unaware of last treatment. Coming in today because his family was notified his hemoglobin was 6.0. Patient's only complaint is fatigue. Legs also noted to be swollen and weeping in triage.        Initial Presentation: 69 y.o. male to ED via EMS from home   Present to ED with fatigue and swelling of legs. last dialysis was Monday and missed wed. He only wears his life vest to dialysis.he was called and told his hb is 6 and he complains of fatigue . Dialysis M-W-F  PMHX syctolic heart failure, CAD; HTN; CKD stage 3; last EF was 20-24 in November 24   Admitted to [facility] with DX: Acute on chronic systolic heart failure   on exam: hypertensive; tachypnea; lungs with wheezing and rales; abdominal distension; BNP >4,700; B/L LE edema +3 pitting; WBC 4.23; Heme 7.0 - repeat 6; Na 128; Cr 7.84; albumin 3.2; Ca 8.2  CT pulmonary edema  with moderate pleural effusion   PLAN: s/p 1 unit PRBCs; Cardiopulmonary monitoring; maintain lifevest; monitor labs; fluid / Na restriction; nephrology consulted; f/u U/S abdomen; f/u iron panel      Anticipated Length of Stay/Certification Statement: Patient will be admitted on an inpatient basis with an anticipated length of stay of greater than 2 midnights secondary to fluid overload/anemia.       Date: 1/11/25      Day 2   HD today; No complaints; lungs with rales and abdominal distention; heme 8.4; iron deficiency anemia per iron panel. Na 128; Cr 8.45  Plan: recd venofer iv x1; Cardiopulmonary monitoring; maintain lifevest; monitor labs; fluid / Na restriction; f/u U/S abdomen; f/u iron panel; f/u occult stool; PT/ OT eval - tx     NEPHROLOGY CONSULT   Hemodialysis dependent acute kidney injury: Will give the patient a similar urgent hemodialysis session today, Saturday, July 11 and plan to get his next session on Monday, if he remains in the inpatient setting.  Will continue with Monday Wednesday Friday hemodialysis sessions unless otherwise indicated.  Orders are in the chart.  Patient confirmed his estimated dry weight at this time is around 60 kg.  Anemia due to end-stage renal disease: Continue to monitor hemoglobin levels, currently quite reduced at 7 g/deciliter, avoid short acting ALCIRA as the patient most likely on long-acting ALCIRA in the outpatient setting.  Packed red blood cells as indicated.      Date: 1/12/25     Day 3: Has surpassed a 2nd midnight with active treatments and services. Require additional inpatient hospital stay due to uncontrolled htn and hyponatremia   S/p HD 1/11; Post hemodialysis weight was down to 59.5 kg. patient declines thoracocentesis; Blood pressure slightly elevated this morning, continue to monitor for now. Na 129. Cr 5.43;  I/O net -2330; lungs with rales; abdominal distension improving. Per PT/ OT - no needs.   Plan: HD tomorrow; Cardiopulmonary monitoring; maintain  lifevest; monitor labs; fluid / Na restriction; f/u U/S abdomen; f/u iron panel; f/u occult stool         Scheduled Medications:  amLODIPine, 5 mg, Oral, Daily  aspirin, 81 mg, Oral, Daily  atorvastatin, 20 mg, Oral, Daily With Dinner  carvedilol, 25 mg, Oral, BID With Meals  folic acid, 1 mg, Oral, Daily  heparin (porcine), 5,000 Units, Subcutaneous, Q8H JEEVAN  hydrALAZINE, 100 mg, Oral, Q8H JEEVAN  isosorbide mononitrate, 120 mg, Oral, Daily  melatonin, 6 mg, Oral, HS  [START ON 1/14/2025] torsemide, 100 mg, Oral, Once per day on Sunday Tuesday Thursday Saturday    ALPRAZolam (XANAX) tablet 0.25 mg  Dose: 0.25 mg  Freq: Once Route: PO  Start: 01/11/25 1645 End: 01/11/25 1706    iron sucrose (VENOFER) 300 mg in sodium chloride 0.9 % 250 mL IVPB  Dose: 300 mg  Freq: Once Route: IV  Last Dose: 300 mg (01/11/25 1331)  Start: 01/11/25 1130 End: 01/11/25 1501    Continuous IV Infusions: None       PRN Meds:  acetaminophen, 650 mg, Oral, Q6H PRN  hydrALAZINE, 5 mg, Intravenous, Q6H PRN      ED Triage Vitals [01/10/25 1111]   Temperature Pulse Respirations Blood Pressure SpO2 Pain Score   97.9 °F (36.6 °C) 73 18 (!) 198/89 96 % No Pain     Weight (last 2 days)       Date/Time Weight    01/13/25 0600 61.7 (136)    01/12/25 0755 61 (134.4)    01/11/25 05:40:42 62.1 (136.8)            Vital Signs (last 3 days)       Date/Time Temp Pulse Resp BP MAP (mmHg) SpO2 O2 Device Patient Position - Orthostatic VS Pain    01/13/25 1030 -- 71 22 172/96 120 -- -- Lying --    01/13/25 1000 -- 80 22 187/102 129 -- -- Lying --    01/13/25 0930 -- 80 24 186/101 128 -- -- Lying --    01/13/25 0920 98.6 °F (37 °C) 80 22 188/107 133 -- -- Lying --    01/13/25 0808 -- -- -- -- -- 95 % None (Room air) -- No Pain    01/13/25 07:16:29 97.9 °F (36.6 °C) 72 20 183/95 124 95 % None (Room air) -- --    01/13/25 0420 -- 76 -- 171/81 111 95 % -- -- --    01/13/25 03:17:05 98.8 °F (37.1 °C) 75 20 174/83 113 96 % -- -- --    01/12/25 2300 98 °F (36.7 °C) -- 18  -- -- -- -- -- --    01/12/25 22:58:30 -- 71 -- 178/85 116 97 % -- -- --    01/12/25 2110 -- -- -- 183/82 -- -- -- -- --    01/12/25 2005 -- -- -- -- -- -- -- -- No Pain    01/12/25 19:26:18 -- 72 -- 177/79 112 97 % -- -- --    01/12/25 15:33:42 98.1 °F (36.7 °C) 76 18 162/74 103 94 % -- -- --    01/12/25 1042 -- -- -- -- -- 95 % None (Room air) -- No Pain    01/12/25 10:38:28 -- -- -- 164/77 106 -- -- -- --    01/12/25 07:44:03 97.7 °F (36.5 °C) 71 17 180/86 117 94 % -- -- --    01/12/25 06:14:45 -- 73 -- 170/84 113 96 % -- -- --    01/12/25 05:02:53 -- 71 -- 179/86 117 96 % -- -- --    01/12/25 0300 97.5 °F (36.4 °C) -- 18 171/86 114 -- None (Room air) Lying --    01/11/25 22:37:39 97.7 °F (36.5 °C) 69 -- 162/68 99 97 % -- -- --    01/11/25 2100 -- -- -- -- -- -- None (Room air) -- No Pain    01/11/25 1900 97.9 °F (36.6 °C) -- 16 154/61 -- -- -- Lying --    01/11/25 15:22:30 97.7 °F (36.5 °C) 75 16 155/77 103 92 % -- -- --    01/11/25 1300 -- -- -- -- -- 92 % None (Room air) -- No Pain    01/11/25 1245 97.6 °F (36.4 °C) 69 20 149/61 90 93 % None (Room air) Lying --    01/11/25 1230 -- 62 18 151/66 94 93 % None (Room air) Lying --    01/11/25 1200 -- 58 18 155/70 98 -- -- -- --    01/11/25 1130 -- 56 20 154/69 97 -- -- -- --    01/11/25 1100 -- 69 20 148/60 89 -- -- -- --    01/11/25 1030 -- 68 18 152/69 97 -- -- -- --    01/11/25 1000 -- 65 18 155/64 94 -- -- -- --    01/11/25 0930 -- 72 18 177/87 117 91 % -- -- --    01/11/25 0900 -- 72 18 157/71 100 -- -- -- --    01/11/25 0855 -- 71 18 165/71 102 91 % -- Lying --    01/11/25 07:45:29 97.5 °F (36.4 °C) 64 18 162/75 104 93 % -- -- --    01/11/25 05:40:42 97.7 °F (36.5 °C) 63 20 183/88 120 98 % None (Room air) Sitting --    01/10/25 22:35:08 -- 67 20 166/87 113 93 % None (Room air) Sitting --    01/10/25 22:03:14 -- 67 20 155/83 107 91 % None (Room air) Sitting --    01/10/25 20:59:22 97.7 °F (36.5 °C) 70 20 178/91 120 95 % None (Room air) Sitting No Pain     01/10/25 19:52:56 97.7 °F (36.5 °C) 73 18 177/96 123 95 % None (Room air) Sitting No Pain    01/10/25 19:52:04 -- -- -- 177/96 123 -- -- -- --    01/10/25 1945 -- -- -- -- -- -- -- -- No Pain    01/10/25 1758 97.7 °F (36.5 °C) 73 18 184/94 -- 94 % None (Room air) -- --    01/10/25 1736 98.1 °F (36.7 °C) 75 18 173/88 -- 93 % None (Room air) -- --    01/10/25 1730 97.5 °F (36.4 °C) 73 -- 179/96 -- 93 % -- -- --    01/10/25 1640 -- 75 -- 175/90 118 -- -- -- --    01/10/25 1530 97.2 °F (36.2 °C) 76 18 166/82 124 93 % None (Room air) -- --    01/10/25 1515 -- -- -- -- -- 94 % None (Room air) -- No Pain    01/10/25 13:22:16 97.7 °F (36.5 °C) 68 18 169/97 121 96 % None (Room air) Lying --    01/10/25 1245 -- 67 24 184/121 145 98 % None (Room air) -- --    01/10/25 1230 -- 67 31 194/95 133 98 % None (Room air) -- --    01/10/25 1215 -- 67 25 186/101 137 98 % -- -- --    01/10/25 1200 -- 67 23 182/101 135 98 % None (Room air) -- --    01/10/25 1145 -- 68 18 199/98 140 96 % None (Room air) Lying --    01/10/25 1130 -- 70 24 179/92 128 99 % None (Room air) Lying --    01/10/25 1115 -- 72 27 199/95 137 96 % None (Room air) -- --    01/10/25 1112 -- -- -- -- -- 95 % -- -- --    01/10/25 1111 97.9 °F (36.6 °C) 73 18 198/89 128 96 % None (Room air) Lying No Pain              Pertinent Labs/Diagnostic Test Results:   Radiology:  CT abdomen pelvis wo contrast   Final Interpretation by Jayme aRmírez MD (01/11 0943)      1. No findings for hematoma.   2. Stable mild-moderate ascites. Diffuse subcutaneous infiltration suggesting anasarca similar to prior CT.   3. Partially visualized at least moderate-sized bilateral effusions, grossly stable.      Workstation performed: [id number]         XR chest 1 view portable   Final Interpretation by Norma Avery MD (01/10 1239)      Persistent moderate pulmonary edema with moderate pleural effusions and bibasilar atelectasis.            Workstation performed: [id number]            Cardiology:  ECG 12 lead   Final Result by Carlos To MD (01/13 0804)   Normal sinus rhythm   Left ventricular hypertrophy with repolarization abnormality (    Sokolow-Comer , Maciel product )   Cannot exclude ischemia   Abnormal ECG      Confirmed by Carlos To ([id number]) on 1/13/2025 8:04:19 AM          Results from last 7 days   Lab Units 01/13/25  0934 01/11/25  0903 01/10/25  1126   WBC Thousand/uL 4.69 5.58 4.23*   HEMOGLOBIN g/dL 8.1* 8.4* 7.0*   HEMATOCRIT % 24.4* 24.4* 21.5*   PLATELETS Thousands/uL 85* 107* 87*   TOTAL NEUT ABS Thousands/µL 4.01 4.78 3.44        Results from last 7 days   Lab Units 01/13/25  0934 01/12/25  0452 01/11/25  0903 01/10/25  1126   SODIUM mmol/L 130* 129* 128* 128*   POTASSIUM mmol/L 4.2 4.2 5.2 5.0   CHLORIDE mmol/L 95* 96 91* 92*   CO2 mmol/L 26 26 26 27   ANION GAP mmol/L 9 7 11 9   BUN mg/dL 43* 40* 77* 71*   CREATININE mg/dL 5.30* 5.43* 8.45* 7.84*   EGFR ml/min/1.73sq m 10 9 5 6   CALCIUM mg/dL 8.4 8.3* 8.7 8.2*     Results from last 7 days   Lab Units 01/10/25  1126   AST U/L 18   ALT U/L 8   ALK PHOS U/L 132*   TOTAL PROTEIN g/dL 6.6   ALBUMIN g/dL 3.2*   TOTAL BILIRUBIN mg/dL 0.95     Results from last 7 days   Lab Units 01/11/25  1110 01/11/25  0757   POC GLUCOSE mg/dl 99 108     Results from last 7 days   Lab Units 01/13/25  0934 01/12/25  0452 01/11/25  0903 01/10/25  1126   GLUCOSE RANDOM mg/dL 127 102 98 123        Results from last 7 days   Lab Units 01/10/25  1744 01/10/25  1416 01/10/25  1126   HS TNI 0HR ng/L  --   --  68*   HS TNI 2HR ng/L  --  64*  --    HSTNI D2 ng/L  --  -4  --    HS TNI 4HR ng/L 63*  --   --    HSTNI D4 ng/L -5  --   --         Results from last 7 days   Lab Units 01/10/25  1126   BNP pg/mL >4,700*     Results from last 7 days   Lab Units 01/10/25  1126   FERRITIN ng/mL 244   IRON SATURATION % 8*   IRON ug/dL 28*   TIBC ug/dL 330.4     Results from last 7 days   Lab Units 01/10/25  1126   TRANSFERRIN mg/dL 236     Results from last 7  days   Lab Units 01/11/25  0637   UNIT PRODUCT CODE  [id number]   UNIT NUMBER  [id number]   UNITABO  O   UNITRH  POS   CROSSMATCH  Compatible   UNIT DISPENSE STATUS  Presumed Trans   UNIT PRODUCT VOL ml 350     Results from last 7 days   Lab Units 01/11/25  1113   HEP B S AG  Non-reactive   HEP C AB  Non-reactive   HEP B C IGM  Non-reactive   HEP B C TOTAL AB  Non-reactive       Past Medical History:   Diagnosis Date    Acute hemodialysis patient ([facility]) 12/14/2024    CAD (coronary artery disease)     HFrEF (heart failure with reduced ejection fraction) ([facility]) 09/2021    Hypertension     Rheumatoid factor positive 09/2021    Stage 3 chronic kidney disease (HCC)     Tobacco abuse      Present on Admission:   Acute on chronic systolic heart failure (HCC)   Primary hypertension   Tobacco abuse   Elevated troponin   Thrombocytopenia (HCC)   Hyponatremia      Admitting Diagnosis: Abnormal laboratory test [R89.9]  Anemia, unspecified type [D64.9]  Age/Sex: 69 y.o. male    Network Utilization Review Department  ATTENTION: Please call with any questions or concerns to [phone] and carefully listen to the prompts so that you are directed to the right person. All voicemails are confidential.   For Discharge needs, contact Care Management DC Support Team at [phone] opt. [phone]  Send all requests for admission clinical reviews, approved or denied determinations and any other requests to dedicated fax number below belonging to the campus where the patient is receiving treatment. List of dedicated fax numbers for the Facilities:  FACILITY NAME UR FAX NUMBER   ADMISSION DENIALS (Administrative/Medical Necessity) [phone]   DISCHARGE SUPPORT TEAM (NETWORK) [phone]   PARENT CHILD HEALTH (Maternity/NICU/Pediatrics) [phone]   [facility] [phone]   [facility] [phone]   [facility] [phone]   [facility]  [place] [phone]   [facility] [phone]   [facility] [phone]   [facility] [phone]   [facility] [phone]   [facility] [phone]   formerly [facility] [phone]   [facility] [phone]   [facility] [phone]

## 2025-01-13 NOTE — DISCHARGE INSTR - OTHER ORDERS
Skin care plans:  1-Hydraguard/Silicone Cream to bilateral sacrum, buttock, and heels BID and PRN  2-Elevate heels to offload pressure.  3-Ehob cushion in chair when out of bed.  4-Moisturize skin daily with skin nourishing cream.  5-Turn/reposition q2h or when medically stable for pressure re-distribution on skin.   6-Cleanse left anterior tibial wound with NSS apply  Mepilex UP change daily and prn

## 2025-01-13 NOTE — PROGRESS NOTES
Progress Note - Hospitalist   Name: Matthew Alvarez 69 y.o. male I MRN: [id number]  Unit/Bed#: -01 I Date of Admission: 1/10/2025   Date of Service: 1/13/2025 I Hospital Day: 3    Assessment & Plan  Acute on chronic systolic heart failure (HCC)  Wt Readings from Last 3 Encounters:   01/13/25 61.7 kg (136 lb)   12/27/24 56 kg (123 lb 6.4 oz)   11/20/24 55.2 kg (121 lb 12.8 oz)     Missed dialysis on Wednesday or Mr. Sandoval wife and him have a different story last EF was 20-24 in November 24 patient is on dialysis Monday Wednesday Friday and takes torsemide 100 mg daily  He has pulmonary edema with moderate pleural effusion past history of thoracocentesis   Will continue  his torsemide 100 mg nondialysis days per nephrology   Discussed with CC not enough fluid in abdomen for paracentesis   Continue his aspirin  Patient also has a LifeVest at home which she does not wear all the time he only wears it to dialysis here he is can to be on telemetry monitor throughout the hospital stay  S/p HD 1/11  Spoke to patient declines thoracocentesis         Anemia due to chronic kidney disease, on chronic dialysis (HCC)  Lab Results   Component Value Date    EGFR 10 01/13/2025    EGFR 9 01/12/2025    EGFR 5 01/11/2025    CREATININE 5.30 (H) 01/13/2025    CREATININE 5.43 (H) 01/12/2025    CREATININE 8.45 (H) 01/11/2025   He has acute on chronic anemia usually his hemoglobin is above 7.5 he was found to have a 6 on repeat here at 7 s/p 1 unit of prbc transfusion and hb stable at 8.4 currently dropped to 8.1 monitor closely.  Nephrology consent obtained.  Patient denies any melena or hematochezia will obtain occult- pending  Iron panel some evidence of iron deficiency anemia as well , s/p venofer 300 mg iv x1 1/11  Primary hypertension  Continue his hydralazine continue isosorbide continue Coreg  Still uncontrolled start Norvasc 5 mg daily  Tobacco abuse  Per wife still smokes 6 to 7 cigarettes  Elevated troponin  Complains of no  chest pain always has elevated troponin suspect in light of ESRD.  And CHF exacerbation.  EKG nonischemic  Thrombocytopenia (HCC)  Chronic monitor while on heparin.   Hyponatremia  Hyponatremia (POA) in the setting of CHF with ESRD as evidenced by Sodium levels of 128 on admission and 130 today, 1/13 being treated with serial monitoring of his sodium level. Fluid restriction 1.5L  and  HD   Dependence on hemodialysis (HCC)      VTE Pharmacologic Prophylaxis: VTE Score: 2 Low Risk (Score 0-2) - Encourage Ambulation.    Mobility:   Basic Mobility Inpatient Raw Score: 20  JH-HLM Goal: 6: Walk 10 steps or more  JH-HLM Achieved: 6: Walk 10 steps or more  JH-HLM Goal achieved. Continue to encourage appropriate mobility.    Patient Centered Rounds: I performed bedside rounds with nursing staff today.   Discussions with Specialists or Other Care Team Provider: None    Education and Discussions with Family / Patient: Attempted to update  (wife) via phone. Unable to contact.    Current Length of Stay: 3 day(s)  Current Patient Status: Inpatient   Certification Statement: The patient will continue to require additional inpatient hospital stay due to hyponatremia  Discharge Plan: Anticipate discharge in 24-48 hrs to rehab facility.    Code Status: Level 1 - Full Code    Subjective   Seen and examined just feels tired    Objective :  Temp:  [97.9 °F (36.6 °C)-98.8 °F (37.1 °C)] 98.6 °F (37 °C)  HR:  [71-80] 71  BP: (162-188)/() 172/96  Resp:  [18-24] 22  SpO2:  [94 %-97 %] 95 %  O2 Device: None (Room air)    Body mass index is 21.95 kg/m².     Input and Output Summary (last 24 hours):     Intake/Output Summary (Last 24 hours) at 1/13/2025 1037  Last data filed at 1/13/2025 0920  Gross per 24 hour   Intake 660 ml   Output --   Net 660 ml       Physical Exam  Vitals and nursing note reviewed.   Constitutional:       General: He is not in acute distress.     Appearance: He is well-developed.   HENT:      Head:  Normocephalic and atraumatic.   Eyes:      Conjunctiva/sclera: Conjunctivae normal.   Cardiovascular:      Rate and Rhythm: Normal rate and regular rhythm.      Heart sounds: No murmur heard.  Pulmonary:      Effort: Pulmonary effort is normal. No respiratory distress.      Breath sounds: Rales present.   Abdominal:      Palpations: Abdomen is soft.      Tenderness: There is no abdominal tenderness.   Musculoskeletal:         General: Swelling present.      Cervical back: Neck supple.   Skin:     General: Skin is warm and dry.      Capillary Refill: Capillary refill takes less than 2 seconds.   Neurological:      Mental Status: He is alert. Mental status is at baseline.   Psychiatric:         Mood and Affect: Mood normal.           Lines/Drains:  Lines/Drains/Airways       Active Status       Name Placement date Placement time Site Days    HD Permanent Double Catheter 11/14/24  1352  Internal jugular  59                      Telemetry:  Telemetry Orders (From admission, onward)               LifeVest Patient: Continuous Telemetry Monitoring during hospitalization (non-expiring)  Continuous LifeVest Telemetry Monitoring        References:    LifeVest Policy                     Telemetry Reviewed: Normal Sinus Rhythm  Indication for Continued Telemetry Use: Lifevest (remains on tele entire hospital stay)               Lab Results: I have reviewed the following results:   Results from last 7 days   Lab Units 01/13/25  0934   WBC Thousand/uL 4.69   HEMOGLOBIN g/dL 8.1*   HEMATOCRIT % 24.4*   PLATELETS Thousands/uL 85*   SEGS PCT % 87*   LYMPHO PCT % 6*   MONO PCT % 7   EOS PCT % 0     Results from last 7 days   Lab Units 01/13/25  0934 01/11/25  0903 01/10/25  1126   SODIUM mmol/L 130*   < > 128*   POTASSIUM mmol/L 4.2   < > 5.0   CHLORIDE mmol/L 95*   < > 92*   CO2 mmol/L 26   < > 27   BUN mg/dL 43*   < > 71*   CREATININE mg/dL 5.30*   < > 7.84*   ANION GAP mmol/L 9   < > 9   CALCIUM mg/dL 8.4   < > 8.2*   ALBUMIN g/dL   --   --  3.2*   TOTAL BILIRUBIN mg/dL  --   --  0.95   ALK PHOS U/L  --   --  132*   ALT U/L  --   --  8   AST U/L  --   --  18   GLUCOSE RANDOM mg/dL 127   < > 123    < > = values in this interval not displayed.         Results from last 7 days   Lab Units 01/11/25  1110 01/11/25  0757   POC GLUCOSE mg/dl 99 108               Recent Cultures (last 7 days):         Imaging Results Review: No pertinent imaging studies reviewed.  Other Study Results Review: No additional pertinent studies reviewed.    Last 24 Hours Medication List:     Current Facility-Administered Medications:     acetaminophen (TYLENOL) tablet 650 mg, Q6H PRN    amLODIPine (NORVASC) tablet 5 mg, Daily    aspirin (ECOTRIN LOW STRENGTH) EC tablet 81 mg, Daily    atorvastatin (LIPITOR) tablet 20 mg, Daily With Dinner    carvedilol (COREG) tablet 25 mg, BID With Meals    folic acid (FOLVITE) tablet 1 mg, Daily    heparin (porcine) subcutaneous injection 5,000 Units, Q8H JEEVAN    hydrALAZINE (APRESOLINE) injection 5 mg, Q6H PRN    hydrALAZINE (APRESOLINE) tablet 100 mg, Q8H JEEVAN    isosorbide mononitrate (IMDUR) 24 hr tablet 120 mg, Daily    melatonin tablet 3 mg, HS    [START ON 1/14/2025] torsemide (DEMADEX) tablet 100 mg, Once per day on Sunday Tuesday Thursday Saturday    Administrative Statements   Today, Patient Was Seen By: Stacy Carson MD  I have spent a total time of >35 minutes in caring for this patient on the day of the visit/encounter including Documenting in the medical record and Reviewing / ordering tests, medicine, procedures  .    **Please Note: This note may have been constructed using a voice recognition system.**

## 2025-01-13 NOTE — ASSESSMENT & PLAN NOTE
Hyponatremia (POA) in the setting of CHF with ESRD as evidenced by Sodium levels of 128 on admission and 130 today, 1/13 being treated with serial monitoring of his sodium level. Fluid restriction 1.5L  and  HD

## 2025-01-13 NOTE — PLAN OF CARE
Target UF Goal  3.5  L as tolerated. Patient dialyzing for  3.5 hrs  on 3 K bath for serum K of  4.2  per protocol. Treatment plan reviewed with Nephrology.       Post-Dialysis RN Treatment Note    Blood Pressure:  Pre 188/107 mm/Hg  Post 164/91 mmHg   EDW  57 kg    Weight:  Pre 61.6 kg   Post 58.7 kg   Mode of weight measurement: Standing Scale   Volume Removed  3000 ml    Treatment duration  3 hrs and 30 mins   NS given  No    Treatment shortened? No   Medications given during Rx None Reported   Estimated Kt/V  1.28   Access type: Permacath/TDC   Access Issues: No   Needle Gauge: N/A   Report called to primary nurse   Yes, Isabela RN at bedside        Problem: METABOLIC, FLUID AND ELECTROLYTES - ADULT  Goal: Electrolytes maintained within normal limits  Description: INTERVENTIONS:  - Monitor labs and assess patient for signs and symptoms of electrolyte imbalances  - Administer electrolyte replacement as ordered  - Monitor response to electrolyte replacements, including repeat lab results as appropriate  - Instruct patient on fluid and nutrition as appropriate  Outcome: Progressing  Goal: Fluid balance maintained  Description: INTERVENTIONS:  - Monitor labs   - Monitor I/O and WT  - Instruct patient on fluid and nutrition as appropriate  - Assess for signs & symptoms of volume excess or deficit  Outcome: Progressing

## 2025-01-13 NOTE — ASSESSMENT & PLAN NOTE
Continue his hydralazine continue isosorbide continue Coreg  Still uncontrolled start Norvasc 5 mg daily

## 2025-01-14 ENCOUNTER — APPOINTMENT (INPATIENT)
Dept: DIALYSIS | Facility: HOSPITAL | Age: 70
DRG: 682 | End: 2025-01-14
Attending: INTERNAL MEDICINE
Payer: COMMERCIAL

## 2025-01-14 LAB
ANION GAP SERPL CALCULATED.3IONS-SCNC: 9 MMOL/L (ref 4–13)
BASOPHILS # BLD AUTO: 0.01 THOUSANDS/ΜL (ref 0–0.1)
BASOPHILS NFR BLD AUTO: 0 % (ref 0–1)
BUN SERPL-MCNC: 33 MG/DL (ref 5–25)
CALCIUM SERPL-MCNC: 8.5 MG/DL (ref 8.4–10.2)
CHLORIDE SERPL-SCNC: 97 MMOL/L (ref 96–108)
CO2 SERPL-SCNC: 24 MMOL/L (ref 21–32)
CREAT SERPL-MCNC: 4.46 MG/DL (ref 0.6–1.3)
EOSINOPHIL # BLD AUTO: 0.01 THOUSAND/ΜL (ref 0–0.61)
EOSINOPHIL NFR BLD AUTO: 0 % (ref 0–6)
ERYTHROCYTE [DISTWIDTH] IN BLOOD BY AUTOMATED COUNT: 20.7 % (ref 11.6–15.1)
GFR SERPL CREATININE-BSD FRML MDRD: 12 ML/MIN/1.73SQ M
GLUCOSE SERPL-MCNC: 157 MG/DL (ref 65–140)
GLUCOSE SERPL-MCNC: 90 MG/DL (ref 65–140)
HCT VFR BLD AUTO: 24.2 % (ref 36.5–49.3)
HGB BLD-MCNC: 8 G/DL (ref 12–17)
IMM GRANULOCYTES # BLD AUTO: 0.03 THOUSAND/UL (ref 0–0.2)
IMM GRANULOCYTES NFR BLD AUTO: 1 % (ref 0–2)
LYMPHOCYTES # BLD AUTO: 0.38 THOUSANDS/ΜL (ref 0.6–4.47)
LYMPHOCYTES NFR BLD AUTO: 7 % (ref 14–44)
MCH RBC QN AUTO: 29.4 PG (ref 26.8–34.3)
MCHC RBC AUTO-ENTMCNC: 33.1 G/DL (ref 31.4–37.4)
MCV RBC AUTO: 89 FL (ref 82–98)
MONOCYTES # BLD AUTO: 0.48 THOUSAND/ΜL (ref 0.17–1.22)
MONOCYTES NFR BLD AUTO: 9 % (ref 4–12)
NEUTROPHILS # BLD AUTO: 4.37 THOUSANDS/ΜL (ref 1.85–7.62)
NEUTS SEG NFR BLD AUTO: 83 % (ref 43–75)
NRBC BLD AUTO-RTO: 0 /100 WBCS
PLATELET # BLD AUTO: 82 THOUSANDS/UL (ref 149–390)
PMV BLD AUTO: 10.9 FL (ref 8.9–12.7)
POTASSIUM SERPL-SCNC: 4.4 MMOL/L (ref 3.5–5.3)
RBC # BLD AUTO: 2.72 MILLION/UL (ref 3.88–5.62)
SODIUM SERPL-SCNC: 130 MMOL/L (ref 135–147)
WBC # BLD AUTO: 5.28 THOUSAND/UL (ref 4.31–10.16)

## 2025-01-14 PROCEDURE — 85025 COMPLETE CBC W/AUTO DIFF WBC: CPT | Performed by: FAMILY MEDICINE

## 2025-01-14 PROCEDURE — 99232 SBSQ HOSP IP/OBS MODERATE 35: CPT | Performed by: INTERNAL MEDICINE

## 2025-01-14 PROCEDURE — 82948 REAGENT STRIP/BLOOD GLUCOSE: CPT

## 2025-01-14 PROCEDURE — 99232 SBSQ HOSP IP/OBS MODERATE 35: CPT | Performed by: STUDENT IN AN ORGANIZED HEALTH CARE EDUCATION/TRAINING PROGRAM

## 2025-01-14 PROCEDURE — 80048 BASIC METABOLIC PNL TOTAL CA: CPT | Performed by: FAMILY MEDICINE

## 2025-01-14 RX ADMIN — HYDRALAZINE HYDROCHLORIDE 100 MG: 25 TABLET ORAL at 13:49

## 2025-01-14 RX ADMIN — HYDRALAZINE HYDROCHLORIDE 100 MG: 25 TABLET ORAL at 21:31

## 2025-01-14 RX ADMIN — CARVEDILOL 25 MG: 12.5 TABLET, FILM COATED ORAL at 08:14

## 2025-01-14 RX ADMIN — ISOSORBIDE MONONITRATE 120 MG: 60 TABLET, EXTENDED RELEASE ORAL at 08:14

## 2025-01-14 RX ADMIN — HYDRALAZINE HYDROCHLORIDE 100 MG: 25 TABLET ORAL at 05:44

## 2025-01-14 RX ADMIN — Medication 6 MG: at 21:31

## 2025-01-14 RX ADMIN — FOLIC ACID 1 MG: 1 TABLET ORAL at 08:14

## 2025-01-14 RX ADMIN — ASPIRIN 81 MG: 81 TABLET, COATED ORAL at 08:15

## 2025-01-14 RX ADMIN — AMLODIPINE BESYLATE 5 MG: 5 TABLET ORAL at 08:15

## 2025-01-14 NOTE — PLAN OF CARE
Problem: Nutrition/Hydration-ADULT  Goal: Nutrient/Hydration intake appropriate for improving, restoring or maintaining nutritional needs  Description: Monitor and assess patient's nutrition/hydration status for malnutrition. Collaborate with interdisciplinary team and initiate plan and interventions as ordered.  Monitor patient's weight and dietary intake as ordered or per policy. Utilize nutrition screening tool and intervene as necessary. Determine patient's food preferences and provide high-protein, high-caloric foods as appropriate.     INTERVENTIONS:  - Monitor oral intake, urinary output, labs, and treatment plans  - Assess nutrition and hydration status and recommend course of action  - Evaluate amount of meals eaten  - Assist patient with eating if necessary   - Allow adequate time for meals  - Recommend/ encourage appropriate diets, oral nutritional supplements, and vitamin/mineral supplements  - Order, calculate, and assess calorie counts as needed  - Recommend, monitor, and adjust tube feedings and TPN/PPN based on assessed needs  - Assess need for intravenous fluids  - Provide specific nutrition/hydration education as appropriate  - Include patient/family/caregiver in decisions related to nutrition  Outcome: Progressing     Problem: PAIN - ADULT  Goal: Verbalizes/displays adequate comfort level or baseline comfort level  Description: Interventions:  - Encourage patient to monitor pain and request assistance  - Assess pain using appropriate pain scale  - Administer analgesics based on type and severity of pain and evaluate response  - Implement non-pharmacological measures as appropriate and evaluate response  - Consider cultural and social influences on pain and pain management  - Notify physician/advanced practitioner if interventions unsuccessful or patient reports new pain  Outcome: Progressing     Problem: INFECTION - ADULT  Goal: Absence or prevention of progression during  hospitalization  Description: INTERVENTIONS:  - Assess and monitor for signs and symptoms of infection  - Monitor lab/diagnostic results  - Monitor all insertion sites, i.e. indwelling lines, tubes, and drains  - Monitor endotracheal if appropriate and nasal secretions for changes in amount and color  - [place] appropriate cooling/warming therapies per order  - Administer medications as ordered  - Instruct and encourage patient and family to use good hand hygiene technique  - Identify and instruct in appropriate isolation precautions for identified infection/condition  Outcome: Progressing  Goal: Absence of fever/infection during neutropenic period  Description: INTERVENTIONS:  - Monitor WBC    Outcome: Progressing     Problem: SAFETY ADULT  Goal: Patient will remain free of falls  Description: INTERVENTIONS:  - Educate patient/family on patient safety including physical limitations  - Instruct patient to call for assistance with activity   - Consult OT/PT to assist with strengthening/mobility   - Keep Call bell within reach  - Keep bed low and locked with side rails adjusted as appropriate  - Keep care items and personal belongings within reach  - Initiate and maintain comfort rounds  - Make Fall Risk Sign visible to staff  - Offer Toileting every 2 Hours, in advance of need  - Initiate/Maintain bed alarm  - Obtain necessary fall risk management equipment: yellow socks, bracelet  - Apply yellow socks and bracelet for high fall risk patients  - Consider moving patient to room near nurses station  Outcome: Progressing  Goal: Maintain or return to baseline ADL function  Description: INTERVENTIONS:  -  Assess patient's ability to carry out ADLs; assess patient's baseline for ADL function and identify physical deficits which impact ability to perform ADLs (bathing, care of mouth/teeth, toileting, grooming, dressing, etc.)  - Assess/evaluate cause of self-care deficits   - Assess range of motion  - Assess patient's  mobility; develop plan if impaired  - Assess patient's need for assistive devices and provide as appropriate  - Encourage maximum independence but intervene and supervise when necessary  - Involve family in performance of ADLs  - Assess for home care needs following discharge   - Consider OT consult to assist with ADL evaluation and planning for discharge  - Provide patient education as appropriate  Outcome: Progressing  Goal: Maintains/Returns to pre admission functional level  Description: INTERVENTIONS:  - Perform AM-PAC 6 Click Basic Mobility/ Daily Activity assessment daily.  - Set and communicate daily mobility goal to care team and patient/family/caregiver.   - Collaborate with rehabilitation services on mobility goals if consulted  - Perform Range of Motion 3 times a day.  - Reposition patient every 2 hours.  - Dangle patient 2 times a day  - Stand patient 2 times a day  - Ambulate patient 2 times a day  - Out of bed to chair 2 times a day   - Out of bed for meals 2 times a day  - Out of bed for toileting  - Record patient progress and toleration of activity level   Outcome: Progressing     Problem: DISCHARGE PLANNING  Goal: Discharge to home or other facility with appropriate resources  Description: INTERVENTIONS:  - Identify barriers to discharge w/patient and caregiver  - Arrange for needed discharge resources and transportation as appropriate  - Identify discharge learning needs (meds, wound care, etc.)  - Arrange for interpretive services to assist at discharge as needed  - Refer to Case Management Department for coordinating discharge planning if the patient needs post-hospital services based on physician/advanced practitioner order or complex needs related to functional status, cognitive ability, or social support system  Outcome: Progressing     Problem: Knowledge Deficit  Goal: Patient/family/caregiver demonstrates understanding of disease process, treatment plan, medications, and discharge  instructions  Description: Complete learning assessment and assess knowledge base.  Interventions:  - Provide teaching at level of understanding  - Provide teaching via preferred learning methods  Outcome: Progressing     Problem: Prexisting or High Potential for Compromised Skin Integrity  Goal: Skin integrity is maintained or improved  Description: INTERVENTIONS:  - Identify patients at risk for skin breakdown  - Assess and monitor skin integrity  - Assess and monitor nutrition and hydration status  - Monitor labs   - Assess for incontinence   - Turn and reposition patient  - Assist with mobility/ambulation  - Relieve pressure over bony prominences  - Avoid friction and shearing  - Provide appropriate hygiene as needed including keeping skin clean and dry  - Evaluate need for skin moisturizer/barrier cream  - Collaborate with interdisciplinary team   - Patient/family teaching  - Consider wound care consult   Outcome: Progressing     Problem: METABOLIC, FLUID AND ELECTROLYTES - ADULT  Goal: Electrolytes maintained within normal limits  Description: INTERVENTIONS:  - Monitor labs and assess patient for signs and symptoms of electrolyte imbalances  - Administer electrolyte replacement as ordered  - Monitor response to electrolyte replacements, including repeat lab results as appropriate  - Instruct patient on fluid and nutrition as appropriate  Outcome: Progressing  Goal: Fluid balance maintained  Description: INTERVENTIONS:  - Monitor labs   - Monitor I/O and WT  - Instruct patient on fluid and nutrition as appropriate  - Assess for signs & symptoms of volume excess or deficit  Outcome: Progressing

## 2025-01-14 NOTE — ASSESSMENT & PLAN NOTE
Hyponatremia (POA) in the setting of CHF with ESRD as evidenced by Sodium levels of 128 on admission and 130 today, 1/13 being treated with serial monitoring of his sodium level. Fluid restriction 1.5L  and  HD   Continue loop diuretics and ultrafiltration with HD

## 2025-01-14 NOTE — ASSESSMENT & PLAN NOTE
Sodium 130 meq/L, likely due to decreased free water excretion in the setting of DAVID on dialysis in the setting of significant heart failure and fluid overload.  Recommend to continue free water restriction, continue loop diuretics and UF with HD

## 2025-01-14 NOTE — PROGRESS NOTES
Progress Note - Hospitalist   Name: Matthew Alvarez 69 y.o. male I MRN: [id number]  Unit/Bed#: -01 I Date of Admission: 1/10/2025   Date of Service: 1/14/2025 I Hospital Day: 4    Assessment & Plan  Acute on chronic systolic heart failure (HCC)  Wt Readings from Last 3 Encounters:   01/14/25 61.8 kg (136 lb 3.2 oz)   12/27/24 56 kg (123 lb 6.4 oz)   11/20/24 55.2 kg (121 lb 12.8 oz)     Missed dialysis on Wednesday or More wife and him have a different story last EF was 20-24 in November 24 patient is on dialysis Monday Wednesday Friday and takes torsemide 100 mg daily  He has pulmonary edema with moderate pleural effusion past history of thoracocentesis   Will continue  his torsemide 100 mg nondialysis days per nephrology   Discussed with CC not enough fluid in abdomen for paracentesis   Continue his aspirin  Patient also has a LifeVest at home which she does not wear all the time he only wears it to dialysis here he is can to be on telemetry monitor throughout the hospital stay  S/p HD 1/11  Spoke to patient declines thoracocentesis   Patient underwent ultrafiltration today and  plan for extra HD treatment tomorrow  Anemia due to chronic kidney disease, on chronic dialysis (HCC)  Lab Results   Component Value Date    EGFR 10 01/13/2025    EGFR 9 01/12/2025    EGFR 5 01/11/2025    CREATININE 5.30 (H) 01/13/2025    CREATININE 5.43 (H) 01/12/2025    CREATININE 8.45 (H) 01/11/2025   He has acute on chronic anemia usually his hemoglobin is above 7.5 he was found to have a 6 on repeat here at 7 s/p 1 unit of prbc transfusion and hb stable at 8.4 currently dropped to 8.1 monitor closely.  Nephrology consent obtained.  Patient denies any melena or hematochezia will obtain fecal occult- pending  Iron panel some evidence of iron deficiency anemia as well , s/p venofer 300 mg iv x1 1/11  Primary hypertension  Continue his hydralazine continue isosorbide continue Coreg  Still uncontrolled start Norvasc 5 mg  "daily  Tobacco abuse  Per wife still smokes 6 to 7 cigarettes  Elevated troponin  Complains of no chest pain always has elevated troponin suspect in light of ESRD.  And CHF exacerbation.  EKG nonischemic  Thrombocytopenia (HCC)  Chronic monitor while on heparin.   Hyponatremia  Hyponatremia (POA) in the setting of CHF with ESRD as evidenced by Sodium levels of 128 on admission and 130 today, 1/13 being treated with serial monitoring of his sodium level. Fluid restriction 1.5L  and  HD   Continue loop diuretics and ultrafiltration with HD  ESRD (end stage renal disease) on dialysis (HCC)  Lab Results   Component Value Date    EGFR 12 01/14/2025    EGFR 10 01/13/2025    EGFR 9 01/12/2025    CREATININE 4.46 (H) 01/14/2025    CREATININE 5.30 (H) 01/13/2025    CREATININE 5.43 (H) 01/12/2025   Missed HD outpatient, post HD on January 3, plan for ultrafiltration today, and next HD tomorrow    VTE Pharmacologic Prophylaxis: VTE Score: 2 Low Risk (Score 0-2) - Encourage Ambulation.    Mobility:   Basic Mobility Inpatient Raw Score: 20  JH-HLM Goal: 6: Walk 10 steps or more  JH-HLM Achieved: 6: Walk 10 steps or more  JH-HLM Goal achieved. Continue to encourage appropriate mobility.    Patient Centered Rounds: I performed bedside rounds with nursing staff today.   Discussions with Specialists or Other Care Team Provider: Nephrology, CM    Education and Discussions with Family / Patient: Attempted to update  (wife and son) via phone. Unable to contact.    Current Length of Stay: 4 day(s)  Current Patient Status: Inpatient   Certification Statement: The patient will continue to require additional inpatient hospital stay due to UF today, HD tomorrow  Discharge Plan: Anticipate discharge in 48-72 hrs to home.    Code Status: Level 1 - Full Code    Subjective   Patient seen and examined at bedside.  No acute events overnight.  Patient reports this morning that \"nothing is working\", \"my legs are always swollen\".  " Patient is unable to tell me why he is noncompliant with HD.  No other acute complaints at this time.    Objective :  Temp:  [97.7 °F (36.5 °C)-98.6 °F (37 °C)] 97.7 °F (36.5 °C)  HR:  [66-80] 66  BP: (129-188)/() 156/74  Resp:  [18-24] 19  SpO2:  [96 %-97 %] 96 %  O2 Device: None (Room air)    Body mass index is 21.98 kg/m².     Input and Output Summary (last 24 hours):     Intake/Output Summary (Last 24 hours) at 1/14/2025 0835  Last data filed at 1/13/2025 1610  Gross per 24 hour   Intake 920 ml   Output 3501 ml   Net -2581 ml       Physical Exam  Constitutional:       General: He is not in acute distress.     Appearance: He is ill-appearing (Chronically). He is not toxic-appearing.   HENT:      Head: Normocephalic and atraumatic.   Eyes:      Extraocular Movements: Extraocular movements intact.      Pupils: Pupils are equal, round, and reactive to light.   Cardiovascular:      Rate and Rhythm: Normal rate and regular rhythm.   Pulmonary:      Effort: Pulmonary effort is normal.      Breath sounds: Rales present.   Musculoskeletal:         General: Swelling present.      Right lower leg: Edema present.      Left lower leg: Edema present.   Skin:     General: Skin is warm.   Neurological:      General: No focal deficit present.      Mental Status: He is alert and oriented to person, place, and time. Mental status is at baseline.   Psychiatric:         Mood and Affect: Mood normal.         Behavior: Behavior normal.           Lines/Drains:  Lines/Drains/Airways       Active Status       Name Placement date Placement time Site Days    HD Permanent Double Catheter 11/14/24  1352  Internal jugular  60                      Telemetry:  Telemetry Orders (From admission, onward)               LifeVest Patient: Continuous Telemetry Monitoring during hospitalization (non-expiring)  Continuous LifeVest Telemetry Monitoring        References:    LifeVest Policy                     Telemetry Reviewed: Normal Sinus  Rhythm  Indication for Continued Telemetry Use: Lifevest (remains on tele entire hospital stay)               Lab Results: I have reviewed the following results:   Results from last 7 days   Lab Units 01/13/25  0934   WBC Thousand/uL 4.69   HEMOGLOBIN g/dL 8.1*   HEMATOCRIT % 24.4*   PLATELETS Thousands/uL 85*   SEGS PCT % 87*   LYMPHO PCT % 6*   MONO PCT % 7   EOS PCT % 0     Results from last 7 days   Lab Units 01/13/25  0934 01/11/25  0903 01/10/25  1126   SODIUM mmol/L 130*   < > 128*   POTASSIUM mmol/L 4.2   < > 5.0   CHLORIDE mmol/L 95*   < > 92*   CO2 mmol/L 26   < > 27   BUN mg/dL 43*   < > 71*   CREATININE mg/dL 5.30*   < > 7.84*   ANION GAP mmol/L 9   < > 9   CALCIUM mg/dL 8.4   < > 8.2*   ALBUMIN g/dL  --   --  3.2*   TOTAL BILIRUBIN mg/dL  --   --  0.95   ALK PHOS U/L  --   --  132*   ALT U/L  --   --  8   AST U/L  --   --  18   GLUCOSE RANDOM mg/dL 127   < > 123    < > = values in this interval not displayed.         Results from last 7 days   Lab Units 01/11/25  1110 01/11/25  0757   POC GLUCOSE mg/dl 99 108               Recent Cultures (last 7 days):               Last 24 Hours Medication List:     Current Facility-Administered Medications:     acetaminophen (TYLENOL) tablet 650 mg, Q6H PRN    amLODIPine (NORVASC) tablet 5 mg, Daily    aspirin (ECOTRIN LOW STRENGTH) EC tablet 81 mg, Daily    atorvastatin (LIPITOR) tablet 20 mg, Daily With Dinner    carvedilol (COREG) tablet 25 mg, BID With Meals    epoetin loida (EPOGEN,PROCRIT) injection 8,000 Units, Once per day on Monday Wednesday Friday    folic acid (FOLVITE) tablet 1 mg, Daily    heparin (porcine) subcutaneous injection 5,000 Units, Q8H JEEVAN    hydrALAZINE (APRESOLINE) injection 5 mg, Q6H PRN    hydrALAZINE (APRESOLINE) tablet 100 mg, Q8H JEEVAN    iron sucrose (VENOFER) 100 mg in sodium chloride 0.9 % 50 mL IVPB, Once per day on Monday Wednesday Friday    isosorbide mononitrate (IMDUR) 24 hr tablet 120 mg, Daily    melatonin tablet 6 mg, HS     torsemide (DEMADEX) tablet 100 mg, Once per day on Sunday Tuesday Thursday Saturday    Administrative Statements   Today, Patient Was Seen By: Amy Rodriguez MD      **Please Note: This note may have been constructed using a voice recognition system.**

## 2025-01-14 NOTE — ASSESSMENT & PLAN NOTE
-Outpatient unit: [facility], on dialysis treatment since November 2024  -Schedule:  MWF  -Access: PermCath  -Plan: The patient is status post 3 kg being removed on January 13.  Post  hemodialysis on January 13 his weight was 58.7 kg.  We are planning for ultrafiltration today for 3 kg.  Continue to challenge dry weight plan for next hemodialysis after isolated ultrafiltration today on January 14 to have hemodialysis on January 15.

## 2025-01-14 NOTE — PLAN OF CARE
Ultrafiltration only treatment.  Plan 180 minutes, 3300 ml/2800 ml net as tolerated.  Treatment review with Dr. Kelly via Epic Chat.        Post-Dialysis RN Treatment Note    Blood Pressure:  Pre 157/84 mm/Hg  Post 146/76 mmHg   EDW:  57 kg    Weight:  Pre 58.8 kg   Post 55.6 kg   Mode of weight measurement: Bed Scale   Volume Removed:  3300 ml/2800 ml net   Treatment duration: 180 minutes    NS given:  No    Treatment shortened No   Medications given during Rx: None Reported   Estimated Kt/V:  None Reported   Access type: Permacath/TDC   Access Issues: No    Report called to primary nurse:   Yes               Problem: METABOLIC, FLUID AND ELECTROLYTES - ADULT  Goal: Fluid balance maintained  Description: INTERVENTIONS:  - Monitor labs   - Monitor I/O and WT  - Instruct patient on fluid and nutrition as appropriate  - Assess for signs & symptoms of volume excess or deficit  Outcome: Progressing

## 2025-01-14 NOTE — OCCUPATIONAL THERAPY NOTE
Occupational Therapy Cancel Note      Patient Name: Matthew Alvarez  Today's Date: 1/14/2025 01/14/25 1255   Note Type   Note type Evaluation;Cancelled Session   Cancel Reasons Patient off floor/hemodialysis         Chart reviewed. Attempted to see pt for OT session this date, however pt receiving dialysis. Will continue to follow pt and provide care as medically appropriate and available.      Hal Gonzáles MS, OTR/L

## 2025-01-14 NOTE — ASSESSMENT & PLAN NOTE
Lab Results   Component Value Date    EGFR 10 01/13/2025    EGFR 9 01/12/2025    EGFR 5 01/11/2025    CREATININE 5.30 (H) 01/13/2025    CREATININE 5.43 (H) 01/12/2025    CREATININE 8.45 (H) 01/11/2025   Status post 1 unit PRBC after admission, status post IV Venofer 300 mg IV on 1/11 with iron saturation 8%.  The patient has been ordered IV Venofer 100 mg daily for next 7 treatment with HD.  The patient was started on ALCIRA as his hemoglobin is 8.1 on January 13.  Continue to monitor hemoglobin levels.  There may also be a dilutional effect but the patient having fluid overload lower extremity edema.

## 2025-01-14 NOTE — ASSESSMENT & PLAN NOTE
Wt Readings from Last 3 Encounters:   01/14/25 61.8 kg (136 lb 3.2 oz)   12/27/24 56 kg (123 lb 6.4 oz)   11/20/24 55.2 kg (121 lb 12.8 oz)     Post hemodialysis weight was down to 59.5 kg.   Still with significant lower extremity edema.  Continue torsemide on nondialysis days.  Plan for extra hemodialysis treatment tomorrow, plan for  isolated ultrafiltration today on January 14 for 3 kg.  Continue to challenge dry weight given significant lower extremity edema.  Would then plan for next hemodialysis to be on tomorrow on January 15.

## 2025-01-14 NOTE — ASSESSMENT & PLAN NOTE
Lab Results   Component Value Date    EGFR 12 01/14/2025    EGFR 10 01/13/2025    EGFR 9 01/12/2025    CREATININE 4.46 (H) 01/14/2025    CREATININE 5.30 (H) 01/13/2025    CREATININE 5.43 (H) 01/12/2025   Missed HD outpatient, post HD on January 3, plan for ultrafiltration today, and next HD tomorrow

## 2025-01-14 NOTE — PROGRESS NOTES
NEPHROLOGY HOSPITAL PROGRESS NOTE   Matthew Alvarez 69 y.o. male MRN: [id number]  Unit/Bed#: -01 Encounter: [id number]    Assessment & Plan  Acute hemodialysis patient (HCC)  -Outpatient unit: [facility], on dialysis treatment since November 2024  -Schedule:  MWF  -Access: PermCath  -Plan: The patient is status post 3 kg being removed on January 13.  Post  hemodialysis on January 13 his weight was 58.7 kg.  We are planning for ultrafiltration today for 3 kg.  Continue to challenge dry weight plan for next hemodialysis after isolated ultrafiltration today on January 14 to have hemodialysis on January 15.  Acute on chronic systolic heart failure (HCC)  Wt Readings from Last 3 Encounters:   01/14/25 61.8 kg (136 lb 3.2 oz)   12/27/24 56 kg (123 lb 6.4 oz)   11/20/24 55.2 kg (121 lb 12.8 oz)     Post hemodialysis weight was down to 59.5 kg.   Still with significant lower extremity edema.  Continue torsemide on nondialysis days.  Plan for extra hemodialysis treatment tomorrow, plan for  isolated ultrafiltration today on January 14 for 3 kg.  Continue to challenge dry weight given significant lower extremity edema.  Would then plan for next hemodialysis to be on tomorrow on January 15.        Anemia due to chronic kidney disease, on chronic dialysis ([facility])  Lab Results   Component Value Date    EGFR 10 01/13/2025    EGFR 9 01/12/2025    EGFR 5 01/11/2025    CREATININE 5.30 (H) 01/13/2025    CREATININE 5.43 (H) 01/12/2025    CREATININE 8.45 (H) 01/11/2025   Status post 1 unit PRBC after admission, status post IV Venofer 300 mg IV on 1/11 with iron saturation 8%.  The patient has been ordered IV Venofer 100 mg daily for next 7 treatment with HD.  The patient was started on ALCIRA as his hemoglobin is 8.1 on January 13.  Continue to monitor hemoglobin levels.  There may also be a dilutional effect but the patient having fluid overload lower extremity edema.  Hyponatremia  Sodium 130 meq/L, likely due to decreased free  water excretion in the setting of DAVID on dialysis in the setting of significant heart failure and fluid overload.  Recommend to continue free water restriction, continue loop diuretics and UF with HD  Primary hypertension  As of today January 14, systolic blood pressure has been for the past 1218 hrs. approximately 120-150s systolic.  Continue to challenge dry weight with fluid removal.  Continue current treatment with carvedilol 25 mg twice daily and hydralazine 100 mg every 8 hours along with Imdur.  May consider use of ARB if blood pressure continues to be elevated.  Elevated troponin  -No chest pain, elevated troponin was suspected to be in the setting of ESRD and CHF.  Continue management per primary team  Tobacco abuse  Still with ongoing smoking, recommend quitting smoking  Thrombocytopenia (HCC)  , Continue to monitor platelet count per primary team.    Administrative Statements   VIRTUAL CARE DOCUMENTATION:     1. This service was provided via Telemedicine using [employer]     2. Parties in the room with patient during teleconsult Patient only    3. Confidentiality My office door was closed     4. Participants No one else was in the room    5. Patient acknowledged consent and understanding of privacy and security of the  Telemedicine consult. I informed the patient that I have reviewed their record in Epic and presented the opportunity for them to ask any questions regarding the visit today.  The patient agreed to participate.    6. I have spent a total time of approximately 25 minutes in caring for this patient on the day of the visit/encounter including review of patient record, documentation of medical decision making, brief patient visit, creation/modification of existing assessment and plan, not including the time spent for establishing the audio/video connection.        SUBJECTIVE / 24H INTERVAL HISTORY:  Mr. Alvarez is seen in follow-up for end-stage renal disease and hemodialysis and fluid  "overload.  The patient had hemodialysis on January 13 and 3 kg was removed.  Post weight his weight was 58.7 kg.  We are seeing the patient in follow-up.  When I saw the patient this morning, he was lying in a left lateral recumbent position.  He denied any shortness of breath and he states he felt \"good.\".  He denied any shortness of breath lying down.  He denied any issues with hemodialysis.  He denied any cramping.  He was amenable to undergoing additional dialysis today.  Systolic blood pressure has been anywhere from 120-160s systolic and pulse oximetry is 97% on room air    OBJECTIVE:  Current Weight: Weight - Scale: 61.8 kg (136 lb 3.2 oz)  Vitals:    01/13/25 2249 01/14/25 0523 01/14/25 0544 01/14/25 0710   BP: 142/72  136/71 156/74   BP Location:       Pulse:    66   Resp: 18   19   Temp: 97.7 °F (36.5 °C)   97.7 °F (36.5 °C)   TempSrc:       SpO2:    96%   Weight:  61.8 kg (136 lb 3.2 oz)     Height:           Intake/Output Summary (Last 24 hours) at 1/14/2025 0733  Last data filed at 1/13/2025 1610  Gross per 24 hour   Intake 1040 ml   Output 3501 ml   Net -2461 ml     General: Patient is resting comfortably no acute distress in the left lateral recumbent position  HEENT: Normocephalic atraumatic   Neck: Appears to be supple  Pulmonary: No accessory muscle use noted/no conversational dyspnea  Cardiac: Pulse ranges in the 70s to 80s  Extremities: There is still significant lower extremity edema noted  Neurologic: No focal deficits the patient was able to answer questions for me appropriately this morning.    Medications:    Current Facility-Administered Medications:     acetaminophen (TYLENOL) tablet 650 mg, 650 mg, Oral, Q6H PRN, Stacy Carson MD    amLODIPine (NORVASC) tablet 5 mg, 5 mg, Oral, Daily, Stacy Carson MD, 5 mg at 01/13/25 1611    aspirin (ECOTRIN LOW STRENGTH) EC tablet 81 mg, 81 mg, Oral, Daily, Stacy Carson MD, 81 mg at 01/13/25 0808    atorvastatin (LIPITOR) tablet 20 mg, 20 mg, " "Oral, Daily With Dinner, Stacy Carson MD, 20 mg at 01/13/25 1611    carvedilol (COREG) tablet 25 mg, 25 mg, Oral, BID With Meals, Stacy Carson MD, 25 mg at 01/13/25 1611    epoetin loida (EPOGEN,PROCRIT) injection 8,000 Units, 8,000 Units, Intravenous, Once per day on Monday Wednesday Friday, Speedy Barth MD, 8,000 Units at 01/13/25 1611    folic acid (FOLVITE) tablet 1 mg, 1 mg, Oral, Daily, Stacy Carson MD, 1 mg at 01/13/25 0808    heparin (porcine) subcutaneous injection 5,000 Units, 5,000 Units, Subcutaneous, Q8H JEEVAN, Stacy Carson MD    hydrALAZINE (APRESOLINE) injection 5 mg, 5 mg, Intravenous, Q6H PRN, Nohelia Murphy MD, 5 mg at 01/10/25 1740    hydrALAZINE (APRESOLINE) tablet 100 mg, 100 mg, Oral, Q8H JEEVAN, Stacy Carson MD, 100 mg at 01/14/25 0544    iron sucrose (VENOFER) 100 mg in sodium chloride 0.9 % 50 mL IVPB, 100 mg, Intravenous, Once per day on Monday Wednesday Friday, Speedy Barth MD, 100 mg at 01/13/25 1400    isosorbide mononitrate (IMDUR) 24 hr tablet 120 mg, 120 mg, Oral, Daily, Stacy Carson MD, 120 mg at 01/13/25 0808    melatonin tablet 6 mg, 6 mg, Oral, HS, Stacy Carson MD, 6 mg at 01/13/25 2115    torsemide (DEMADEX) tablet 100 mg, 100 mg, Oral, Once per day on Sunday Tuesday Thursday Saturday, Stacy Carson MD    Laboratory Results:  Results from last 7 days   Lab Units 01/13/25  0934 01/12/25  0452 01/11/25  0903 01/10/25  1126   WBC Thousand/uL 4.69  --  5.58 4.23*   HEMOGLOBIN g/dL 8.1*  --  8.4* 7.0*   HEMATOCRIT % 24.4*  --  24.4* 21.5*   PLATELETS Thousands/uL 85*  --  107* 87*   POTASSIUM mmol/L 4.2 4.2 5.2 5.0   CHLORIDE mmol/L 95* 96 91* 92*   CO2 mmol/L 26 26 26 27   BUN mg/dL 43* 40* 77* 71*   CREATININE mg/dL 5.30* 5.43* 8.45* 7.84*   CALCIUM mg/dL 8.4 8.3* 8.7 8.2*       Portions of the record may have been created with voice recognition software. Occasional wrong word or \"sound a like\" substitutions may have occurred due to the inherent limitations " of voice recognition software. Read the chart carefully and recognize, using context, where substitutions have occurred.If you have any questions, please contact the dictating provider.

## 2025-01-14 NOTE — PLAN OF CARE
Problem: Nutrition/Hydration-ADULT  Goal: Nutrient/Hydration intake appropriate for improving, restoring or maintaining nutritional needs  Description: Monitor and assess patient's nutrition/hydration status for malnutrition. Collaborate with interdisciplinary team and initiate plan and interventions as ordered.  Monitor patient's weight and dietary intake as ordered or per policy. Utilize nutrition screening tool and intervene as necessary. Determine patient's food preferences and provide high-protein, high-caloric foods as appropriate.     INTERVENTIONS:  - Monitor oral intake, urinary output, labs, and treatment plans  - Assess nutrition and hydration status and recommend course of action  - Evaluate amount of meals eaten  - Assist patient with eating if necessary   - Allow adequate time for meals  - Recommend/ encourage appropriate diets, oral nutritional supplements, and vitamin/mineral supplements  - Order, calculate, and assess calorie counts as needed  - Recommend, monitor, and adjust tube feedings and TPN/PPN based on assessed needs  - Assess need for intravenous fluids  - Provide specific nutrition/hydration education as appropriate  - Include patient/family/caregiver in decisions related to nutrition  Outcome: Progressing     Problem: PAIN - ADULT  Goal: Verbalizes/displays adequate comfort level or baseline comfort level  Description: Interventions:  - Encourage patient to monitor pain and request assistance  - Assess pain using appropriate pain scale  - Administer analgesics based on type and severity of pain and evaluate response  - Implement non-pharmacological measures as appropriate and evaluate response  - Consider cultural and social influences on pain and pain management  - Notify physician/advanced practitioner if interventions unsuccessful or patient reports new pain  Outcome: Progressing     Problem: INFECTION - ADULT  Goal: Absence or prevention of progression during  hospitalization  Description: INTERVENTIONS:  - Assess and monitor for signs and symptoms of infection  - Monitor lab/diagnostic results  - Monitor all insertion sites, i.e. indwelling lines, tubes, and drains  - Monitor endotracheal if appropriate and nasal secretions for changes in amount and color  - [place] appropriate cooling/warming therapies per order  - Administer medications as ordered  - Instruct and encourage patient and family to use good hand hygiene technique  - Identify and instruct in appropriate isolation precautions for identified infection/condition  Outcome: Progressing  Goal: Absence of fever/infection during neutropenic period  Description: INTERVENTIONS:  - Monitor WBC    Outcome: Progressing     Problem: SAFETY ADULT  Goal: Patient will remain free of falls  Description: INTERVENTIONS:  - Educate patient/family on patient safety including physical limitations  - Instruct patient to call for assistance with activity   - Consult OT/PT to assist with strengthening/mobility   - Keep Call bell within reach  - Keep bed low and locked with side rails adjusted as appropriate  - Keep care items and personal belongings within reach  - Initiate and maintain comfort rounds  - Make Fall Risk Sign visible to staff  - Offer Toileting every 2   Hours, in advance of need  - Initiate/Maintain bed/ chair  alarm  - Obtain necessary fall risk management equipment: walker, yellow socks  - Apply yellow socks and bracelet for high fall risk patients  - Consider moving patient to room near nurses station  Outcome: Progressing  Goal: Maintain or return to baseline ADL function  Description: INTERVENTIONS:  -  Assess patient's ability to carry out ADLs; assess patient's baseline for ADL function and identify physical deficits which impact ability to perform ADLs (bathing, care of mouth/teeth, toileting, grooming, dressing, etc.)  - Assess/evaluate cause of self-care deficits   - Assess range of motion  - Assess  patient's mobility; develop plan if impaired  - Assess patient's need for assistive devices and provide as appropriate  - Encourage maximum independence but intervene and supervise when necessary  - Involve family in performance of ADLs  - Assess for home care needs following discharge   - Consider OT consult to assist with ADL evaluation and planning for discharge  - Provide patient education as appropriate  Outcome: Progressing  Goal: Maintains/Returns to pre admission functional level  Description: INTERVENTIONS:  - Perform AM-PAC 6 Click Basic Mobility/ Daily Activity assessment daily.  - Set and communicate daily mobility goal to care team and patient/family/caregiver.   - Collaborate with rehabilitation services on mobility goals if consulted  - Perform Range of Motion 2 times a day.  - Reposition patient every 2 hours.  - Dangle patient 2 times a day  - Stand patient 2 times a day  - Ambulate patient 2 times a day  - Out of bed to chair 2 times a day   - Out of bed for meals 2 times a day  - Out of bed for toileting  - Record patient progress and toleration of activity level   Outcome: Progressing     Problem: INFECTION - ADULT  Goal: Absence or prevention of progression during hospitalization  Description: INTERVENTIONS:  - Assess and monitor for signs and symptoms of infection  - Monitor lab/diagnostic results  - Monitor all insertion sites, i.e. indwelling lines, tubes, and drains  - Monitor endotracheal if appropriate and nasal secretions for changes in amount and color  - [place] appropriate cooling/warming therapies per order  - Administer medications as ordered  - Instruct and encourage patient and family to use good hand hygiene technique  - Identify and instruct in appropriate isolation precautions for identified infection/condition  Outcome: Progressing  Goal: Absence of fever/infection during neutropenic period  Description: INTERVENTIONS:  - Monitor WBC    Outcome: Progressing     Problem:  SAFETY ADULT  Goal: Patient will remain free of falls  Description: INTERVENTIONS:  - Educate patient/family on patient safety including physical limitations  - Instruct patient to call for assistance with activity   - Consult OT/PT to assist with strengthening/mobility   - Keep Call bell within reach  - Keep bed low and locked with side rails adjusted as appropriate  - Keep care items and personal belongings within reach  - Initiate and maintain comfort rounds  - Make Fall Risk Sign visible to staff  - Offer Toileting every 2   Hours, in advance of need  - Initiate/Maintain bed/ chair  alarm  - Obtain necessary fall risk management equipment: walker, yellow socks  - Apply yellow socks and bracelet for high fall risk patients  - Consider moving patient to room near nurses station  Outcome: Progressing  Goal: Maintain or return to baseline ADL function  Description: INTERVENTIONS:  -  Assess patient's ability to carry out ADLs; assess patient's baseline for ADL function and identify physical deficits which impact ability to perform ADLs (bathing, care of mouth/teeth, toileting, grooming, dressing, etc.)  - Assess/evaluate cause of self-care deficits   - Assess range of motion  - Assess patient's mobility; develop plan if impaired  - Assess patient's need for assistive devices and provide as appropriate  - Encourage maximum independence but intervene and supervise when necessary  - Involve family in performance of ADLs  - Assess for home care needs following discharge   - Consider OT consult to assist with ADL evaluation and planning for discharge  - Provide patient education as appropriate  Outcome: Progressing  Goal: Maintains/Returns to pre admission functional level  Description: INTERVENTIONS:  - Perform AM-PAC 6 Click Basic Mobility/ Daily Activity assessment daily.  - Set and communicate daily mobility goal to care team and patient/family/caregiver.   - Collaborate with rehabilitation services on mobility goals  if consulted  - Perform Range of Motion 2 times a day.  - Reposition patient every 2 hours.  - Dangle patient 2 times a day  - Stand patient 2 times a day  - Ambulate patient 2 times a day  - Out of bed to chair 2 times a day   - Out of bed for meals 2 times a day  - Out of bed for toileting  - Record patient progress and toleration of activity level   Outcome: Progressing     Problem: Knowledge Deficit  Goal: Patient/family/caregiver demonstrates understanding of disease process, treatment plan, medications, and discharge instructions  Description: Complete learning assessment and assess knowledge base.  Interventions:  - Provide teaching at level of understanding  - Provide teaching via preferred learning methods  Outcome: Progressing     Problem: Prexisting or High Potential for Compromised Skin Integrity  Goal: Skin integrity is maintained or improved  Description: INTERVENTIONS:  - Identify patients at risk for skin breakdown  - Assess and monitor skin integrity  - Assess and monitor nutrition and hydration status  - Monitor labs   - Assess for incontinence   - Turn and reposition patient  - Assist with mobility/ambulation  - Relieve pressure over bony prominences  - Avoid friction and shearing  - Provide appropriate hygiene as needed including keeping skin clean and dry  - Evaluate need for skin moisturizer/barrier cream  - Collaborate with interdisciplinary team   - Patient/family teaching  - Consider wound care consult   Outcome: Progressing

## 2025-01-14 NOTE — ASSESSMENT & PLAN NOTE
As of today January 14, systolic blood pressure has been for the past 1218 hrs. approximately 120-150s systolic.  Continue to challenge dry weight with fluid removal.  Continue current treatment with carvedilol 25 mg twice daily and hydralazine 100 mg every 8 hours along with Imdur.  May consider use of ARB if blood pressure continues to be elevated.

## 2025-01-14 NOTE — ASSESSMENT & PLAN NOTE
Wt Readings from Last 3 Encounters:   01/14/25 61.8 kg (136 lb 3.2 oz)   12/27/24 56 kg (123 lb 6.4 oz)   11/20/24 55.2 kg (121 lb 12.8 oz)     Missed dialysis on Wednesday or More wife and him have a different story last EF was 20-24 in November 24 patient is on dialysis Monday Wednesday Friday and takes torsemide 100 mg daily  He has pulmonary edema with moderate pleural effusion past history of thoracocentesis   Will continue  his torsemide 100 mg nondialysis days per nephrology   Discussed with CC not enough fluid in abdomen for paracentesis   Continue his aspirin  Patient also has a LifeVest at home which she does not wear all the time he only wears it to dialysis here he is can to be on telemetry monitor throughout the hospital stay  S/p HD 1/11  Spoke to patient declines thoracocentesis   Patient underwent ultrafiltration today and  plan for extra HD treatment tomorrow

## 2025-01-14 NOTE — ASSESSMENT & PLAN NOTE
Lab Results   Component Value Date    EGFR 10 01/13/2025    EGFR 9 01/12/2025    EGFR 5 01/11/2025    CREATININE 5.30 (H) 01/13/2025    CREATININE 5.43 (H) 01/12/2025    CREATININE 8.45 (H) 01/11/2025   He has acute on chronic anemia usually his hemoglobin is above 7.5 he was found to have a 6 on repeat here at 7 s/p 1 unit of prbc transfusion and hb stable at 8.4 currently dropped to 8.1 monitor closely.  Nephrology consent obtained.  Patient denies any melena or hematochezia will obtain fecal occult- pending  Iron panel some evidence of iron deficiency anemia as well , s/p venofer 300 mg iv x1 1/11

## 2025-01-14 NOTE — NURSING NOTE
1/14 0600 After entering the patient's room, I told him I was going to insert a new IV and get blood work. After one attempt, the patient refused for me to continue. His two IV sites remain clean, dry, and intact. The provider was made aware and there are no new orders at this time.

## 2025-01-15 ENCOUNTER — APPOINTMENT (INPATIENT)
Dept: DIALYSIS | Facility: HOSPITAL | Age: 70
DRG: 682 | End: 2025-01-15
Attending: INTERNAL MEDICINE
Payer: COMMERCIAL

## 2025-01-15 VITALS
OXYGEN SATURATION: 96 % | DIASTOLIC BLOOD PRESSURE: 62 MMHG | HEIGHT: 66 IN | WEIGHT: 124.6 LBS | SYSTOLIC BLOOD PRESSURE: 125 MMHG | BODY MASS INDEX: 20.03 KG/M2 | RESPIRATION RATE: 20 BRPM | TEMPERATURE: 97.8 F | HEART RATE: 87 BPM

## 2025-01-15 PROBLEM — Z99.2 ESRD (END STAGE RENAL DISEASE) ON DIALYSIS (HCC): Status: RESOLVED | Noted: 2024-12-14 | Resolved: 2025-01-15

## 2025-01-15 PROBLEM — Z71.89 GOALS OF CARE, COUNSELING/DISCUSSION: Status: ACTIVE | Noted: 2024-11-15

## 2025-01-15 PROBLEM — N18.6 ESRD (END STAGE RENAL DISEASE) ON DIALYSIS (HCC): Status: RESOLVED | Noted: 2024-12-14 | Resolved: 2025-01-15

## 2025-01-15 LAB
ANION GAP SERPL CALCULATED.3IONS-SCNC: 10 MMOL/L (ref 4–13)
BUN SERPL-MCNC: 50 MG/DL (ref 5–25)
CALCIUM SERPL-MCNC: 8.4 MG/DL (ref 8.4–10.2)
CHLORIDE SERPL-SCNC: 94 MMOL/L (ref 96–108)
CO2 SERPL-SCNC: 23 MMOL/L (ref 21–32)
CREAT SERPL-MCNC: 5.82 MG/DL (ref 0.6–1.3)
ERYTHROCYTE [DISTWIDTH] IN BLOOD BY AUTOMATED COUNT: 20.6 % (ref 11.6–15.1)
GFR SERPL CREATININE-BSD FRML MDRD: 9 ML/MIN/1.73SQ M
GLUCOSE SERPL-MCNC: 147 MG/DL (ref 65–140)
HCT VFR BLD AUTO: 24.6 % (ref 36.5–49.3)
HGB BLD-MCNC: 8.1 G/DL (ref 12–17)
MCH RBC QN AUTO: 29.2 PG (ref 26.8–34.3)
MCHC RBC AUTO-ENTMCNC: 32.9 G/DL (ref 31.4–37.4)
MCV RBC AUTO: 89 FL (ref 82–98)
PLATELET # BLD AUTO: 82 THOUSANDS/UL (ref 149–390)
PMV BLD AUTO: 11.1 FL (ref 8.9–12.7)
POTASSIUM SERPL-SCNC: 4.6 MMOL/L (ref 3.5–5.3)
RBC # BLD AUTO: 2.77 MILLION/UL (ref 3.88–5.62)
SODIUM SERPL-SCNC: 127 MMOL/L (ref 135–147)
WBC # BLD AUTO: 9.23 THOUSAND/UL (ref 4.31–10.16)

## 2025-01-15 PROCEDURE — 80048 BASIC METABOLIC PNL TOTAL CA: CPT | Performed by: STUDENT IN AN ORGANIZED HEALTH CARE EDUCATION/TRAINING PROGRAM

## 2025-01-15 PROCEDURE — 85027 COMPLETE CBC AUTOMATED: CPT | Performed by: STUDENT IN AN ORGANIZED HEALTH CARE EDUCATION/TRAINING PROGRAM

## 2025-01-15 PROCEDURE — 99239 HOSP IP/OBS DSCHRG MGMT >30: CPT | Performed by: STUDENT IN AN ORGANIZED HEALTH CARE EDUCATION/TRAINING PROGRAM

## 2025-01-15 PROCEDURE — 99232 SBSQ HOSP IP/OBS MODERATE 35: CPT | Performed by: INTERNAL MEDICINE

## 2025-01-15 RX ORDER — AMLODIPINE BESYLATE 5 MG/1
5 TABLET ORAL DAILY
Qty: 30 TABLET | Refills: 0 | Status: SHIPPED | OUTPATIENT
Start: 2025-01-15

## 2025-01-15 RX ORDER — TORSEMIDE 100 MG/1
TABLET ORAL
Qty: 30 TABLET | Refills: 0 | Status: SHIPPED | OUTPATIENT
Start: 2025-01-16

## 2025-01-15 RX ORDER — CARVEDILOL 25 MG/1
25 TABLET ORAL 2 TIMES DAILY WITH MEALS
Qty: 60 TABLET | Refills: 0 | Status: SHIPPED | OUTPATIENT
Start: 2025-01-15 | End: 2025-01-29

## 2025-01-15 RX ADMIN — ASPIRIN 81 MG: 81 TABLET, COATED ORAL at 12:28

## 2025-01-15 RX ADMIN — CARVEDILOL 25 MG: 12.5 TABLET, FILM COATED ORAL at 12:28

## 2025-01-15 RX ADMIN — IRON SUCROSE 100 MG: 20 INJECTION, SOLUTION INTRAVENOUS at 09:31

## 2025-01-15 RX ADMIN — AMLODIPINE BESYLATE 5 MG: 5 TABLET ORAL at 12:28

## 2025-01-15 RX ADMIN — FOLIC ACID 1 MG: 1 TABLET ORAL at 12:28

## 2025-01-15 RX ADMIN — HYDRALAZINE HYDROCHLORIDE 100 MG: 25 TABLET ORAL at 06:00

## 2025-01-15 RX ADMIN — ISOSORBIDE MONONITRATE 120 MG: 60 TABLET, EXTENDED RELEASE ORAL at 12:28

## 2025-01-15 RX ADMIN — ACETAMINOPHEN 325MG 650 MG: 325 TABLET ORAL at 12:25

## 2025-01-15 RX ADMIN — EPOETIN ALFA 8000 UNITS: 4000 SOLUTION INTRAVENOUS; SUBCUTANEOUS at 10:06

## 2025-01-15 NOTE — PROGRESS NOTES
Progress Note - Nephrology   Name: Matthew Alvarez 69 y.o. male I MRN: [id number]  Unit/Bed#: -01 I Date of Admission: 1/10/2025   Date of Service: 1/15/2025 I Hospital Day: 5    Assessment & Plan  Acute hemodialysis patient (HCC)  -Outpatient unit: [facility], on dialysis treatment since November 2024  -Schedule:  MWF  -Access: PermCath  -Plan: Patient had extra treatment with isolated ultrafiltration on 1/14 due to persistent lower extremity edema, ultrafiltration around 2.8 L fluid removed.  Postdialysis weight was 55.6 kg  -Patient was seen and examined on hemodialysis treatment today.  Blood pressure slightly on higher side and tolerating HD treatment, still with lower extremity edema.  Hemodialysis treatment today using 2K bath and plan for ultrafiltration 3.0 kg as tolerated.  Will need to adjust outpatient target weight.  Stressed on fluid restriction    Acute on chronic systolic heart failure (HCC)  Wt Readings from Last 3 Encounters:   01/15/25 56.5 kg (124 lb 9.6 oz)   12/27/24 56 kg (123 lb 6.4 oz)   11/20/24 55.2 kg (121 lb 12.8 oz)   -Volume status improving with ultrafiltration on HD, plan for ultrafiltration during hemodialysis treatment today.  Patient had extra hemodialysis session with isolated ultrafiltration on 1/14  -Continue oral torsemide 100 mg on nondialysis days     Anemia due to chronic kidney disease, on chronic dialysis (HCC)  Lab Results   Component Value Date    EGFR 9 01/15/2025    EGFR 12 01/14/2025    EGFR 10 01/13/2025    CREATININE 5.82 (H) 01/15/2025    CREATININE 4.46 (H) 01/14/2025    CREATININE 5.30 (H) 01/13/2025   Status post 1 unit PRBC after admission, status post IV Venofer 300 mg IV on 1/11 with iron saturation 8%.    -Currently on IV Venofer 100 mg  for next 7 treatment with HD.  The patient was started on ALCIRA with hemodialysis treatment as well at 8000 unit with HD.  Hemoglobin currently at 8.1 g/dL and stable, continue to monitor  Hyponatremia  - likely due  to decreased free water excretion in the setting of DAVID on dialysis in the setting of significant heart failure and fluid overload.  Recommend to continue free water restriction, continue loop diuretics and UF with HD  - sodium dropped to 127 but expected to improve with hemodialysis treatment and ultrafiltration  Primary hypertension  -Blood pressure is elevated.  Continue to challenge dry weight with fluid removal.  Continue current treatment with carvedilol 25 mg twice daily and hydralazine 100 mg every 8 hours along with Imdur.   .- Was also started on amlodipine 5 mg daily on 1/13.  If blood pressure remains elevated may need to increase the dose of amlodipine  Elevated troponin  -No chest pain, elevated troponin was suspected to be in the setting of ESRD and CHF.  Continue management per primary team  Tobacco abuse  Still with ongoing smoking, recommend quitting smoking  Thrombocytopenia (HCC)  , Continue to monitor platelet count per primary team.  Platelet count still low at 82    I have reviewed the nephrology recommendations including plan for hemodialysis treatment today, with primary team, and we are in agreement with renal plan including the information outlined above.   Overall stable for discharge  Subjective   No new complaints.  No chest pain or shortness of breath, no nausea vomiting    Objective :  Temp:  [97.7 °F (36.5 °C)-98.1 °F (36.7 °C)] 97.8 °F (36.6 °C)  HR:  [62-84] 84  BP: (131-171)/(60-84) 171/81  Resp:  [17-20] 20  SpO2:  [93 %-99 %] 96 %  O2 Device: None (Room air)    Current Weight: Weight - Scale: 56.5 kg (124 lb 9.6 oz)  First Weight: Weight - Scale: 64 kg (141 lb 1.5 oz)  I/O         01/13 0701  01/14 0700 01/14 0701  01/15 0700 01/15 0701  01/16 0700    P.O. 600 1020 222    I.V. (mL/kg) 440 (7.1) 500 (8.8) 200 (3.5)    Total Intake(mL/kg) 1040 (16.8) 1520 (26.9) 422 (7.5)    Other 3501 3301     Total Output 3501 3301     Net -2461 -1781 +422                 Physical Exam    General:  Ill looking, awake.  Eyes: Conjunctivae pink,  Sclera anicteric  ENT: lips and mucous membranes moist  Neck: supple   Chest: Clear to Auscultation both lungs,  no crackles, ronchus or wheezing.  CVS: S1 & S2 present, normal rate, regular rhythm, no murmur.  Abdomen: soft, non-tender, non-distended, Bowel sounds normoactive  Extremities: 2+ edema both legs  Skin: no rash  Neuro: awake, alert, oriented x 3   Psych: Mood and affect appropriate    Medications:    Current Facility-Administered Medications:     acetaminophen (TYLENOL) tablet 650 mg, 650 mg, Oral, Q6H PRN, Stacy Carson MD    amLODIPine (NORVASC) tablet 5 mg, 5 mg, Oral, Daily, Stacy Carson MD, 5 mg at 01/14/25 0815    aspirin (ECOTRIN LOW STRENGTH) EC tablet 81 mg, 81 mg, Oral, Daily, Stacy Carson MD, 81 mg at 01/14/25 0815    atorvastatin (LIPITOR) tablet 20 mg, 20 mg, Oral, Daily With Dinner, Stacy Carson MD, 20 mg at 01/13/25 1611    carvedilol (COREG) tablet 25 mg, 25 mg, Oral, BID With Meals, Stacy Carson MD, 25 mg at 01/14/25 0814    epoetin loida (EPOGEN,PROCRIT) injection 8,000 Units, 8,000 Units, Intravenous, Once per day on Monday Wednesday Friday, Speedy Barth MD, 8,000 Units at 01/15/25 1006    folic acid (FOLVITE) tablet 1 mg, 1 mg, Oral, Daily, Stacy Carson MD, 1 mg at 01/14/25 0814    heparin (porcine) subcutaneous injection 5,000 Units, 5,000 Units, Subcutaneous, Q8H [facility], Stacy Carson MD    hydrALAZINE (APRESOLINE) injection 5 mg, 5 mg, Intravenous, Q6H PRN, Nohelia Murphy MD, 5 mg at 01/10/25 1740    hydrALAZINE (APRESOLINE) tablet 100 mg, 100 mg, Oral, Q8H [facility], Stacy Carson MD, 100 mg at 01/15/25 0600    iron sucrose (VENOFER) 100 mg in sodium chloride 0.9 % 50 mL IVPB, 100 mg, Intravenous, Once per day on Monday Wednesday Friday, Speedy Barth MD, Stopped at 01/15/25 1049    isosorbide mononitrate (IMDUR) 24 hr tablet 120 mg, 120 mg, Oral, Daily, Stacy Carson MD, 120 mg at 01/14/25 0814     "melatonin tablet 6 mg, 6 mg, Oral, HS, Stacy Carson MD, 6 mg at 01/14/25 2131    torsemide (DEMADEX) tablet 100 mg, 100 mg, Oral, Once per day on Sunday Tuesday Thursday Saturday, Stacy Carson MD      Lab Results: I have reviewed the following results:  Results from last 7 days   Lab Units 01/15/25  0906 01/14/25  0825 01/13/25  0934 01/12/25  0452 01/11/25  0903 01/10/25  1126   WBC Thousand/uL 9.23 5.28 4.69  --  5.58 4.23*   HEMOGLOBIN g/dL 8.1* 8.0* 8.1*  --  8.4* 7.0*   HEMATOCRIT % 24.6* 24.2* 24.4*  --  24.4* 21.5*   PLATELETS Thousands/uL 82* 82* 85*  --  107* 87*   POTASSIUM mmol/L 4.6 4.4 4.2 4.2 5.2 5.0   CHLORIDE mmol/L 94* 97 95* 96 91* 92*   CO2 mmol/L 23 24 26 26 26 27   BUN mg/dL 50* 33* 43* 40* 77* 71*   CREATININE mg/dL 5.82* 4.46* 5.30* 5.43* 8.45* 7.84*   CALCIUM mg/dL 8.4 8.5 8.4 8.3* 8.7 8.2*   ALBUMIN g/dL  --   --   --   --   --  3.2*       Administrative Statements     Portions of the record may have been created with voice recognition software. Occasional wrong word or \"sound a like\" substitutions may have occurred due to the inherent limitations of voice recognition software. Read the chart carefully and recognize, using context, where substitutions have occurred.If you have any questions, please contact the dictating provider.  "

## 2025-01-15 NOTE — ASSESSMENT & PLAN NOTE
Wt Readings from Last 3 Encounters:   01/15/25 56.5 kg (124 lb 9.6 oz)   12/27/24 56 kg (123 lb 6.4 oz)   11/20/24 55.2 kg (121 lb 12.8 oz)     Missed dialysis on Wednesday or more, wife and him have a different story last EF was 20-24 in November 24 patient is on dialysis Monday Wednesday Friday and takes torsemide 100 mg daily  He has pulmonary edema with moderate pleural effusion past history of thoracocentesis   Will continue  his torsemide 100 mg nondialysis days per nephrology   Discussed with CC not enough fluid in abdomen for paracentesis   Continue his aspirin  Patient also has a LifeVest at home which she does not wear all the time he only wears it to dialysis here he is can to be on telemetry monitor throughout the hospital stay  S/p HD 1/11  Spoke to patient declines thoracocentesis   Patient underwent ultrafiltration on 01/14 and  HD on 01/15 prior to discharge

## 2025-01-15 NOTE — PLAN OF CARE
Target UF Goal  3.5  L as tolerated. Patient dialyzing for  3.5hr  on 3 K bath for serum K of  4.4  per protocol. Treatment plan reviewed with Nephrology.     Post-Dialysis RN Treatment Note    Blood Pressure:  Pre 157/77 mm/Hg  Post 177/79 mmHg   EDW  57 kg    Weight:  Pre 55.9 kg   Post 53 kg   Mode of weight measurement: Bed Scale   Volume Removed  2806 ml    Treatment duration  3hr 15 min   NS given  no   Treatment shortened? Yes, describe: pt request   Medications given during Rx 8000units epogen   100 mg venofer   Estimated Kt/V  1.34   Access type: Temporary HD catheter   Access Issues: No   Needle Gauge: NA   Report called to primary nurse   Yes Barb Howell RN     Problem: METABOLIC, FLUID AND ELECTROLYTES - ADULT  Goal: Electrolytes maintained within normal limits  Description: INTERVENTIONS:  - Monitor labs and assess patient for signs and symptoms of electrolyte imbalances  - Administer electrolyte replacement as ordered  - Monitor response to electrolyte replacements, including repeat lab results as appropriate  - Instruct patient on fluid and nutrition as appropriate  Outcome: Progressing     Problem: METABOLIC, FLUID AND ELECTROLYTES - ADULT  Goal: Fluid balance maintained  Description: INTERVENTIONS:  - Monitor labs   - Monitor I/O and WT  - Instruct patient on fluid and nutrition as appropriate  - Assess for signs & symptoms of volume excess or deficit  Outcome: Progressing

## 2025-01-15 NOTE — ASSESSMENT & PLAN NOTE
Wt Readings from Last 3 Encounters:   01/15/25 56.5 kg (124 lb 9.6 oz)   12/27/24 56 kg (123 lb 6.4 oz)   11/20/24 55.2 kg (121 lb 12.8 oz)   -Volume status improving with ultrafiltration on HD, plan for ultrafiltration during hemodialysis treatment today.  Patient had extra hemodialysis session with isolated ultrafiltration on 1/14  -Continue oral torsemide 100 mg on nondialysis days

## 2025-01-15 NOTE — ASSESSMENT & PLAN NOTE
-Blood pressure is elevated.  Continue to challenge dry weight with fluid removal.  Continue current treatment with carvedilol 25 mg twice daily and hydralazine 100 mg every 8 hours along with Imdur.   .- Was also started on amlodipine 5 mg daily on 1/13.  If blood pressure remains elevated may need to increase the dose of amlodipine

## 2025-01-15 NOTE — ASSESSMENT & PLAN NOTE
Lab Results   Component Value Date    EGFR 9 01/15/2025    EGFR 12 01/14/2025    EGFR 10 01/13/2025    CREATININE 5.82 (H) 01/15/2025    CREATININE 4.46 (H) 01/14/2025    CREATININE 5.30 (H) 01/13/2025   Status post 1 unit PRBC after admission, status post IV Venofer 300 mg IV on 1/11 with iron saturation 8%.    -Currently on IV Venofer 100 mg  for next 7 treatment with HD.  The patient was started on ALCIRA with hemodialysis treatment as well at 8000 unit with HD.  Hemoglobin currently at 8.1 g/dL and stable, continue to monitor

## 2025-01-15 NOTE — ASSESSMENT & PLAN NOTE
- likely due to decreased free water excretion in the setting of DAVID on dialysis in the setting of significant heart failure and fluid overload.  Recommend to continue free water restriction, continue loop diuretics and UF with HD  - sodium dropped to 127 but expected to improve with hemodialysis treatment and ultrafiltration

## 2025-01-15 NOTE — ASSESSMENT & PLAN NOTE
Lab Results   Component Value Date    EGFR 9 01/15/2025    EGFR 12 01/14/2025    EGFR 10 01/13/2025    CREATININE 5.82 (H) 01/15/2025    CREATININE 4.46 (H) 01/14/2025    CREATININE 5.30 (H) 01/13/2025   He has acute on chronic anemia usually his hemoglobin is above 7.5 he was found to have a 6 on repeat here at 7 s/p 1 unit of prbc transfusion and hb stable at 8.4 currently dropped to 8.1 monitor closely.  Nephrology consent obtained.  Patient denies any melena or hematochezia.  Iron panel some evidence of iron deficiency anemia as well , s/p venofer 300 mg iv x1 1/11

## 2025-01-15 NOTE — ASSESSMENT & PLAN NOTE
"Discussed with patient about missing hemodialysis in the outpatient setting, he reports that he goes to hemodialysis \"when his sister takes him\".  Patient reported he does not know his own hemodialysis schedule.  Had extensive discussion with patient, he would like to continue all treatment and all life-saving measures.  Stressed the importance of patient continuing to ensure that he goes to HD sessions, as he has had multiple repeated hospitalizations due to missed HD.  "

## 2025-01-15 NOTE — PLAN OF CARE
Problem: Nutrition/Hydration-ADULT  Goal: Nutrient/Hydration intake appropriate for improving, restoring or maintaining nutritional needs  Description: Monitor and assess patient's nutrition/hydration status for malnutrition. Collaborate with interdisciplinary team and initiate plan and interventions as ordered.  Monitor patient's weight and dietary intake as ordered or per policy. Utilize nutrition screening tool and intervene as necessary. Determine patient's food preferences and provide high-protein, high-caloric foods as appropriate.     INTERVENTIONS:  - Monitor oral intake, urinary output, labs, and treatment plans  - Assess nutrition and hydration status and recommend course of action  - Evaluate amount of meals eaten  - Assist patient with eating if necessary   - Allow adequate time for meals  - Recommend/ encourage appropriate diets, oral nutritional supplements, and vitamin/mineral supplements  - Order, calculate, and assess calorie counts as needed  - Recommend, monitor, and adjust tube feedings and TPN/PPN based on assessed needs  - Assess need for intravenous fluids  - Provide specific nutrition/hydration education as appropriate  - Include patient/family/caregiver in decisions related to nutrition  1/15/2025 1252 by Babr Howell RN  Outcome: Adequate for Discharge  1/15/2025 0936 by Barb Howell RN  Outcome: Progressing     Problem: PAIN - ADULT  Goal: Verbalizes/displays adequate comfort level or baseline comfort level  Description: Interventions:  - Encourage patient to monitor pain and request assistance  - Assess pain using appropriate pain scale  - Administer analgesics based on type and severity of pain and evaluate response  - Implement non-pharmacological measures as appropriate and evaluate response  - Consider cultural and social influences on pain and pain management  - Notify physician/advanced practitioner if interventions unsuccessful or patient reports new pain  1/15/2025 1252 by Barb  INES Howell  Outcome: Adequate for Discharge  1/15/2025 0936 by Barb Howell RN  Outcome: Progressing     Problem: INFECTION - ADULT  Goal: Absence or prevention of progression during hospitalization  Description: INTERVENTIONS:  - Assess and monitor for signs and symptoms of infection  - Monitor lab/diagnostic results  - Monitor all insertion sites, i.e. indwelling lines, tubes, and drains  - Monitor endotracheal if appropriate and nasal secretions for changes in amount and color  - [place] appropriate cooling/warming therapies per order  - Administer medications as ordered  - Instruct and encourage patient and family to use good hand hygiene technique  - Identify and instruct in appropriate isolation precautions for identified infection/condition  1/15/2025 1252 by Barb Howell RN  Outcome: Adequate for Discharge  1/15/2025 0936 by Barb Howell RN  Outcome: Progressing  Goal: Absence of fever/infection during neutropenic period  Description: INTERVENTIONS:  - Monitor WBC    1/15/2025 1252 by Barb Howell RN  Outcome: Adequate for Discharge  1/15/2025 0936 by Barb Howell RN  Outcome: Progressing     Problem: SAFETY ADULT  Goal: Patient will remain free of falls  Description: INTERVENTIONS:  - Educate patient/family on patient safety including physical limitations  - Instruct patient to call for assistance with activity   - Consult OT/PT to assist with strengthening/mobility   - Keep Call bell within reach  - Keep bed low and locked with side rails adjusted as appropriate  - Keep care items and personal belongings within reach  - Initiate and maintain comfort rounds  - Make Fall Risk Sign visible to staff  - Offer Toileting every 2   Hours, in advance of need  - Initiate/Maintain bed/ chair  alarm  - Obtain necessary fall risk management equipment: walker, yellow socks  - Apply yellow socks and bracelet for high fall risk patients  - Consider moving patient to room near nurses station  1/15/2025 1252 by Barb Howell  RN  Outcome: Adequate for Discharge  1/15/2025 0936 by Barb Howell RN  Outcome: Progressing  Goal: Maintain or return to baseline ADL function  Description: INTERVENTIONS:  -  Assess patient's ability to carry out ADLs; assess patient's baseline for ADL function and identify physical deficits which impact ability to perform ADLs (bathing, care of mouth/teeth, toileting, grooming, dressing, etc.)  - Assess/evaluate cause of self-care deficits   - Assess range of motion  - Assess patient's mobility; develop plan if impaired  - Assess patient's need for assistive devices and provide as appropriate  - Encourage maximum independence but intervene and supervise when necessary  - Involve family in performance of ADLs  - Assess for home care needs following discharge   - Consider OT consult to assist with ADL evaluation and planning for discharge  - Provide patient education as appropriate  1/15/2025 1252 by Barb Howell RN  Outcome: Adequate for Discharge  1/15/2025 0936 by Barb Howell RN  Outcome: Progressing  Goal: Maintains/Returns to pre admission functional level  Description: INTERVENTIONS:  - Perform AM-PAC 6 Click Basic Mobility/ Daily Activity assessment daily.  - Set and communicate daily mobility goal to care team and patient/family/caregiver.   - Collaborate with rehabilitation services on mobility goals if consulted  - Perform Range of Motion 2 times a day.  - Reposition patient every 2 hours.  - Dangle patient 2 times a day  - Stand patient 2 times a day  - Ambulate patient 2 times a day  - Out of bed to chair 2 times a day   - Out of bed for meals 2 times a day  - Out of bed for toileting  - Record patient progress and toleration of activity level   1/15/2025 1252 by Barb Howell RN  Outcome: Adequate for Discharge  1/15/2025 0936 by Barb Howell RN  Outcome: Progressing     Problem: DISCHARGE PLANNING  Goal: Discharge to home or other facility with appropriate resources  Description: INTERVENTIONS:  - Identify  barriers to discharge w/patient and caregiver  - Arrange for needed discharge resources and transportation as appropriate  - Identify discharge learning needs (meds, wound care, etc.)  - Arrange for interpretive services to assist at discharge as needed  - Refer to Case Management Department for coordinating discharge planning if the patient needs post-hospital services based on physician/advanced practitioner order or complex needs related to functional status, cognitive ability, or social support system  1/15/2025 1252 by Barb Howell RN  Outcome: Adequate for Discharge  1/15/2025 0936 by Barb Howell RN  Outcome: Progressing     Problem: Knowledge Deficit  Goal: Patient/family/caregiver demonstrates understanding of disease process, treatment plan, medications, and discharge instructions  Description: Complete learning assessment and assess knowledge base.  Interventions:  - Provide teaching at level of understanding  - Provide teaching via preferred learning methods  1/15/2025 1252 by Barb Howell RN  Outcome: Adequate for Discharge  1/15/2025 0936 by Barb Howell RN  Outcome: Progressing     Problem: Prexisting or High Potential for Compromised Skin Integrity  Goal: Skin integrity is maintained or improved  Description: INTERVENTIONS:  - Identify patients at risk for skin breakdown  - Assess and monitor skin integrity  - Assess and monitor nutrition and hydration status  - Monitor labs   - Assess for incontinence   - Turn and reposition patient  - Assist with mobility/ambulation  - Relieve pressure over bony prominences  - Avoid friction and shearing  - Provide appropriate hygiene as needed including keeping skin clean and dry  - Evaluate need for skin moisturizer/barrier cream  - Collaborate with interdisciplinary team   - Patient/family teaching  - Consider wound care consult   1/15/2025 1252 by Barb Howell RN  Outcome: Adequate for Discharge  1/15/2025 0936 by Barb Howell RN  Outcome: Progressing     Problem:  METABOLIC, FLUID AND ELECTROLYTES - ADULT  Goal: Electrolytes maintained within normal limits  Description: INTERVENTIONS:  - Monitor labs and assess patient for signs and symptoms of electrolyte imbalances  - Administer electrolyte replacement as ordered  - Monitor response to electrolyte replacements, including repeat lab results as appropriate  - Instruct patient on fluid and nutrition as appropriate  1/15/2025 1252 by Barb Howell RN  Outcome: Adequate for Discharge  1/15/2025 0936 by Barb Howell RN  Outcome: Progressing  Goal: Fluid balance maintained  Description: INTERVENTIONS:  - Monitor labs   - Monitor I/O and WT  - Instruct patient on fluid and nutrition as appropriate  - Assess for signs & symptoms of volume excess or deficit  1/15/2025 1252 by Barb Howell RN  Outcome: Adequate for Discharge  1/15/2025 0936 by Barb Howell RN  Outcome: Progressing     Problem: PHYSICAL THERAPY ADULT  Goal: Performs mobility at highest level of function for planned discharge setting.  See evaluation for individualized goals.  Description: Treatment/Interventions: ADL retraining, Functional transfer training, LE strengthening/ROM, Elevations, Therapeutic exercise, Endurance training, Cognitive reorientation, Patient/family training, Equipment eval/education, Compensatory technique education, Gait training, Bed mobility, Spoke to nursing, Spoke to case management  Equipment Recommended:  (Rw 9 pt owns))       See flowsheet documentation for full assessment, interventions and recommendations.  Outcome: Adequate for Discharge

## 2025-01-15 NOTE — ASSESSMENT & PLAN NOTE
-Outpatient unit: [facility], on dialysis treatment since November 2024  -Schedule:  MWF  -Access: PermCath  -Plan: Patient had extra treatment with isolated ultrafiltration on 1/14 due to persistent lower extremity edema, ultrafiltration around 2.8 L fluid removed.  Postdialysis weight was 55.6 kg  -Patient was seen and examined on hemodialysis treatment today.  Blood pressure slightly on higher side and tolerating HD treatment, still with lower extremity edema.  Hemodialysis treatment today using 2K bath and plan for ultrafiltration 3.0 kg as tolerated.  Will need to adjust outpatient target weight.  Stressed on fluid restriction

## 2025-01-15 NOTE — CASE MANAGEMENT
Case Management Discharge Planning Note    Patient name Matthew Alvarez  Location /-01 MRN [id number]  : 1955 Date 1/15/2025       Current Admission Date: 1/10/2025  Current Admission Diagnosis:Acute on chronic systolic heart failure (HCC)   Patient Active Problem List    Diagnosis Date Noted Date Diagnosed    Acute hemodialysis patient (HCC) 2025     Pleural effusion 2024     Secondary hyperparathyroidism of renal origin (HCC) 12/15/2024     Ambulatory dysfunction 2024     Thrombocytopenia (HCC) 2024     Renal failure 11/15/2024     Goals of care, counseling/discussion 11/15/2024     Anemia due to chronic kidney disease, on chronic dialysis (HCC) 11/15/2024     Anemia of renal disease 11/15/2024     Vitamin D deficiency, unspecified 11/15/2024     Chronic systolic (congestive) heart failure (HCC) 2024     Oliguria 2024     Encounter for hemodialysis for ESRD (HCC) 2024     Hyponatremia 2024     Dilated cardiomyopathy (HCC) 2024     Elevated liver transaminase level 11/10/2024     Acute on chronic systolic heart failure (HCC) 2024     Coronary artery disease involving native coronary artery of native heart without angina pectoris 10/08/2021     Rheumatoid factor positive 2021     Protein calorie malnutrition (HCC) 2021     Elevated troponin 2021     Tobacco abuse 2021     Nonischemic cardiomyopathy (HCC) 2021     Primary hypertension 2021       LOS (days): 5  Geometric Mean LOS (GMLOS) (days): 3.7  Days to GMLOS:-1.4     OBJECTIVE:  Risk of Unplanned Readmission Score: 24.98         Current admission status: Inpatient   Preferred Pharmacy:   [id number] - [place] - [address]  [address] [place] [address]  Phone: [phone] Fax: [phone]    [facility] - [place] - [address]  [address]  [place]  [address]  Phone: [phone] Fax: [phone]    Primary Care Provider: Olive Rodriguez MD    Primary Insurance: [facility]  Secondary Insurance:     DISCHARGE DETAILS:    Discharge planning discussed with:: patient  Freedom of Choice: Yes  Comments - Freedom of Choice: Residential home care services   Call placed to pts PCP SUZANNA Rodriguez office, pt was a no call no show x 4 appointments, so they will not accept pt any longer. Discussed with pt, agreeable to make PCP appointment with another PCP, freedom of choice discussed with pt, he is agreeable to make a PCP appointment with Dr Lu in [place]. Dr Lu office called, appointment scheduled for 1/16/25 at 11:45AM. Appointment placed on follow up d/c instructions.            CM placed several call to spouse/son voicemail full unable to leave message to update on discharge and discuss transportation. Call placed to pts sister  Jovita, her phone is not accepting calls. Pt is aware and is trying to call his family without success.    8557- Nurse spoke with pts spouse, they are coming to pick him up to provide transportation home.  Residential home care aware pt is discharging, order uploaded.     1555- spoke with spouse aware pt has a PCP appointment on   1/16/25 at 11:45AM.    Contacts  Patient Contacts: Kellee Alvarez- spouse  Relationship to Patient:: Family  Contact Method: Phone  Phone Number: [phone]- unable to leave message- voicemail full  Reason/Outcome: Discharge Planning                        Treatment Team Recommendation: Home with Home Health Care  Discharge Destination Plan:: Home with Home Health Care  Transport at Discharge : Family                             IMM Given (Date):: 01/15/25  IMM Given to:: Patient   CM spoke to patient at the bedside, reviewed DC IMM with patient and informed that patient can stay an additional 4 hours for reconsidering appealing the discharge as the medicare rights were review on the day of discharge.  Pt verbalized understanding and feels ready to go home and does not intend to stay 4 hours to reconsider.

## 2025-01-15 NOTE — OCCUPATIONAL THERAPY NOTE
Occupational Therapy         Patient Name: Matthew Alvarez  Today's Date: 1/15/2025       01/15/25 1043   OT Last Visit   OT Visit Date 01/15/25   Note Type   Note type Evaluation;Cancelled Session   Cancel Reasons Patient off floor/hemodialysis         Chart reviewed. Attempted to see pt for OT session this AM. Pt actively receiving dialysis at this time. Will continue to follow case and address as appropriate and as time allows.     Antonio Santiago OTR/L

## 2025-01-15 NOTE — PLAN OF CARE
Problem: Nutrition/Hydration-ADULT  Goal: Nutrient/Hydration intake appropriate for improving, restoring or maintaining nutritional needs  Description: Monitor and assess patient's nutrition/hydration status for malnutrition. Collaborate with interdisciplinary team and initiate plan and interventions as ordered.  Monitor patient's weight and dietary intake as ordered or per policy. Utilize nutrition screening tool and intervene as necessary. Determine patient's food preferences and provide high-protein, high-caloric foods as appropriate.     INTERVENTIONS:  - Monitor oral intake, urinary output, labs, and treatment plans  - Assess nutrition and hydration status and recommend course of action  - Evaluate amount of meals eaten  - Assist patient with eating if necessary   - Allow adequate time for meals  - Recommend/ encourage appropriate diets, oral nutritional supplements, and vitamin/mineral supplements  - Order, calculate, and assess calorie counts as needed  - Recommend, monitor, and adjust tube feedings and TPN/PPN based on assessed needs  - Assess need for intravenous fluids  - Provide specific nutrition/hydration education as appropriate  - Include patient/family/caregiver in decisions related to nutrition  Outcome: Progressing     Problem: PAIN - ADULT  Goal: Verbalizes/displays adequate comfort level or baseline comfort level  Description: Interventions:  - Encourage patient to monitor pain and request assistance  - Assess pain using appropriate pain scale  - Administer analgesics based on type and severity of pain and evaluate response  - Implement non-pharmacological measures as appropriate and evaluate response  - Consider cultural and social influences on pain and pain management  - Notify physician/advanced practitioner if interventions unsuccessful or patient reports new pain  Outcome: Progressing     Problem: INFECTION - ADULT  Goal: Absence or prevention of progression during  hospitalization  Description: INTERVENTIONS:  - Assess and monitor for signs and symptoms of infection  - Monitor lab/diagnostic results  - Monitor all insertion sites, i.e. indwelling lines, tubes, and drains  - Monitor endotracheal if appropriate and nasal secretions for changes in amount and color  - [place] appropriate cooling/warming therapies per order  - Administer medications as ordered  - Instruct and encourage patient and family to use good hand hygiene technique  - Identify and instruct in appropriate isolation precautions for identified infection/condition  Outcome: Progressing  Goal: Absence of fever/infection during neutropenic period  Description: INTERVENTIONS:  - Monitor WBC    Outcome: Progressing     Problem: SAFETY ADULT  Goal: Patient will remain free of falls  Description: INTERVENTIONS:  - Educate patient/family on patient safety including physical limitations  - Instruct patient to call for assistance with activity   - Consult OT/PT to assist with strengthening/mobility   - Keep Call bell within reach  - Keep bed low and locked with side rails adjusted as appropriate  - Keep care items and personal belongings within reach  - Initiate and maintain comfort rounds  - Make Fall Risk Sign visible to staff  - Offer Toileting every 2   Hours, in advance of need  - Initiate/Maintain bed/ chair  alarm  - Obtain necessary fall risk management equipment: walker, yellow socks  - Apply yellow socks and bracelet for high fall risk patients  - Consider moving patient to room near nurses station  Outcome: Progressing  Goal: Maintain or return to baseline ADL function  Description: INTERVENTIONS:  -  Assess patient's ability to carry out ADLs; assess patient's baseline for ADL function and identify physical deficits which impact ability to perform ADLs (bathing, care of mouth/teeth, toileting, grooming, dressing, etc.)  - Assess/evaluate cause of self-care deficits   - Assess range of motion  - Assess  patient's mobility; develop plan if impaired  - Assess patient's need for assistive devices and provide as appropriate  - Encourage maximum independence but intervene and supervise when necessary  - Involve family in performance of ADLs  - Assess for home care needs following discharge   - Consider OT consult to assist with ADL evaluation and planning for discharge  - Provide patient education as appropriate  Outcome: Progressing  Goal: Maintains/Returns to pre admission functional level  Description: INTERVENTIONS:  - Perform AM-PAC 6 Click Basic Mobility/ Daily Activity assessment daily.  - Set and communicate daily mobility goal to care team and patient/family/caregiver.   - Collaborate with rehabilitation services on mobility goals if consulted  - Perform Range of Motion 2 times a day.  - Reposition patient every 2 hours.  - Dangle patient 2 times a day  - Stand patient 2 times a day  - Ambulate patient 2 times a day  - Out of bed to chair 2 times a day   - Out of bed for meals 2 times a day  - Out of bed for toileting  - Record patient progress and toleration of activity level   Outcome: Progressing     Problem: DISCHARGE PLANNING  Goal: Discharge to home or other facility with appropriate resources  Description: INTERVENTIONS:  - Identify barriers to discharge w/patient and caregiver  - Arrange for needed discharge resources and transportation as appropriate  - Identify discharge learning needs (meds, wound care, etc.)  - Arrange for interpretive services to assist at discharge as needed  - Refer to Case Management Department for coordinating discharge planning if the patient needs post-hospital services based on physician/advanced practitioner order or complex needs related to functional status, cognitive ability, or social support system  Outcome: Progressing     Problem: Knowledge Deficit  Goal: Patient/family/caregiver demonstrates understanding of disease process, treatment plan, medications, and  discharge instructions  Description: Complete learning assessment and assess knowledge base.  Interventions:  - Provide teaching at level of understanding  - Provide teaching via preferred learning methods  Outcome: Progressing     Problem: Prexisting or High Potential for Compromised Skin Integrity  Goal: Skin integrity is maintained or improved  Description: INTERVENTIONS:  - Identify patients at risk for skin breakdown  - Assess and monitor skin integrity  - Assess and monitor nutrition and hydration status  - Monitor labs   - Assess for incontinence   - Turn and reposition patient  - Assist with mobility/ambulation  - Relieve pressure over bony prominences  - Avoid friction and shearing  - Provide appropriate hygiene as needed including keeping skin clean and dry  - Evaluate need for skin moisturizer/barrier cream  - Collaborate with interdisciplinary team   - Patient/family teaching  - Consider wound care consult   Outcome: Progressing     Problem: METABOLIC, FLUID AND ELECTROLYTES - ADULT  Goal: Electrolytes maintained within normal limits  Description: INTERVENTIONS:  - Monitor labs and assess patient for signs and symptoms of electrolyte imbalances  - Administer electrolyte replacement as ordered  - Monitor response to electrolyte replacements, including repeat lab results as appropriate  - Instruct patient on fluid and nutrition as appropriate  Outcome: Progressing  Goal: Fluid balance maintained  Description: INTERVENTIONS:  - Monitor labs   - Monitor I/O and WT  - Instruct patient on fluid and nutrition as appropriate  - Assess for signs & symptoms of volume excess or deficit  Outcome: Progressing

## 2025-01-15 NOTE — DISCHARGE SUMMARY
Discharge Summary - Hospitalist   Name: Matthew Alvarez 69 y.o. male I MRN: [id number]  Unit/Bed#: -01 I Date of Admission: 1/10/2025   Date of Service: 1/15/2025 I Hospital Day: 5     Assessment & Plan  Acute on chronic systolic heart failure (HCC)  Wt Readings from Last 3 Encounters:   01/15/25 56.5 kg (124 lb 9.6 oz)   12/27/24 56 kg (123 lb 6.4 oz)   11/20/24 55.2 kg (121 lb 12.8 oz)     Missed dialysis on Wednesday or more, wife and him have a different story last EF was 20-24 in November 24 patient is on dialysis Monday Wednesday Friday and takes torsemide 100 mg daily  He has pulmonary edema with moderate pleural effusion past history of thoracocentesis   Will continue  his torsemide 100 mg nondialysis days per nephrology   Discussed with CC not enough fluid in abdomen for paracentesis   Continue his aspirin  Patient also has a LifeVest at home which she does not wear all the time he only wears it to dialysis here he is can to be on telemetry monitor throughout the hospital stay  S/p HD 1/11  Spoke to patient declines thoracocentesis   Patient underwent ultrafiltration on 01/14 and  HD on 01/15 prior to discharge  Anemia due to chronic kidney disease, on chronic dialysis (HCC)  Lab Results   Component Value Date    EGFR 9 01/15/2025    EGFR 12 01/14/2025    EGFR 10 01/13/2025    CREATININE 5.82 (H) 01/15/2025    CREATININE 4.46 (H) 01/14/2025    CREATININE 5.30 (H) 01/13/2025   He has acute on chronic anemia usually his hemoglobin is above 7.5 he was found to have a 6 on repeat here at 7 s/p 1 unit of prbc transfusion and hb stable at 8.4 currently dropped to 8.1 monitor closely.  Nephrology consent obtained.  Patient denies any melena or hematochezia.  Iron panel some evidence of iron deficiency anemia as well , s/p venofer 300 mg iv x1 1/11  Primary hypertension  Continue his hydralazine continue isosorbide continue Coreg  Still uncontrolled start Norvasc 5 mg daily  Tobacco abuse  Per wife still  "smokes 6 to 7 cigarettes  Elevated troponin  Complains of no chest pain always has elevated troponin suspect in light of ESRD.  And CHF exacerbation.  EKG nonischemic  Thrombocytopenia (HCC)  Chronic monitor while on heparin.   Hyponatremia  Hyponatremia (POA) in the setting of CHF with ESRD as evidenced by Sodium levels of 128 on admission and 130 today, 1/13 being treated with serial monitoring of his sodium level. Fluid restriction 1.5L  and  HD   Continue loop diuretics and ultrafiltration with HD  Goals of care, counseling/discussion  Discussed with patient about missing hemodialysis in the outpatient setting, he reports that he goes to hemodialysis \"when his sister takes him\".  Patient reported he does not know his own hemodialysis schedule.  Had extensive discussion with patient, he would like to continue all treatment and all life-saving measures.  Stressed the importance of patient continuing to ensure that he goes to HD sessions, as he has had multiple repeated hospitalizations due to missed HD.     Medical Problems       Resolved Problems  Date Reviewed: 1/13/2025          Resolved    ESRD (end stage renal disease) on dialysis (HCC) 1/15/2025     Resolved by  Speedy Barth MD          MESSAGE TO PCP (Olive Rodriguez MD) FOR FOLLOW UP:   Thank you for allowing us to participate in the care of your patient, Matthew Alvarez, who was hospitalized from 1/10/2025 through 01/15/25 with the admitting diagnosis of acute on chronic systolic heart failure and ESRD on HD.  Patient underwent extra ultrafiltration and hemodialysis sessions while hospitalized.  His dry weight was challenged and he was stable for discharge home.    Medication Changes:  None  Outpatient testing recommended:  Recommend patient follow-up with nephrology in the outpatient setting  If you have any additional questions or would like to discuss further, please feel free to contact me.  Amy Rodriguez MD  [facility] Internal Medicine, " Hospitalist, [phone]     Admission Date:   Admission Orders (From admission, onward)       Ordered        01/10/25 1235  INPATIENT ADMISSION  Once                          Discharge Date: 01/15/25    Consultations During Hospital Stay:  Nephrology    Procedures Performed:   None    Significant Findings / Test Results:   CT abdomen pelvis wo contrast   Final Result by Jayme Ramírez MD (01/11 0943)      1. No findings for hematoma.   2. Stable mild-moderate ascites. Diffuse subcutaneous infiltration suggesting anasarca similar to prior CT.   3. Partially visualized at least moderate-sized bilateral effusions, grossly stable.      Workstation performed: [id number]         XR chest 1 view portable   Final Result by Norma Avery MD (01/10 1239)      Persistent moderate pulmonary edema with moderate pleural effusions and bibasilar atelectasis.            Workstation performed: [id number]               Incidental Findings:   None      Test Results Pending at Discharge (will require follow up):   None     Complications: None    Reason for Admission: Fatigue    Hospital Course:   Matthew Alvarez is a 69 y.o. male patient who originally presented to the hospital on 1/10/2025 due to fatigue and swelling of the legs.  On admission patient denied any shortness of breath or chest pain.  No episode of melena or hematochezia.  Patient's wife reported that he only missed 1 session of dialysis, however patient reported on admission that he had not been taking dialysis in a while.  On admission patient was found to have a hemoglobin of 6 he was transfused with 1 unit PRBC, noted to have iron deficiency anemia therefore was given IV iron x 1.  Nephrology was consulted and patient underwent hemodialysis sessions with challenge of dry weight.  On day of discharge patient was medically stable for discharge.  Nephrology recommends to continue oral torsemide on nondialysis days.  Patient was started on amlodipine 5 mg daily on  "01/13.  If patient's blood pressure remains elevated, consider increasing dose of amlodipine.      Please see above list of diagnoses and related plan for additional information.     Condition at Discharge: stable    Discharge Day Visit / Exam:   Subjective: Patient seen and examined at bedside.  No acute events overnight.  Patient is agitated and states he would like to go home.  Noted to be cursing while stating he would like to go home.  Discussed with nephrology patient is stable for discharge after HD.  Discussed with patient strongly advised him to not miss any further dialysis sessions, he reports that he goes to dialysis when his sister takes him.  He does not know his own HD schedule.  Tried to call patient's sister, wife, and son unable to contact family.  No other acute complaints at this time.  Vitals: Blood Pressure: (!) 177/79 (01/15/25 1228)  Pulse: 87 (01/15/25 1222)  Temperature: 97.8 °F (36.6 °C) (01/15/25 1222)  Temp Source: Temporal (01/15/25 1222)  Respirations: 20 (01/15/25 1222)  Height: 5' 6\" (167.6 cm) (01/11/25 0540)  Weight - Scale: 56.5 kg (124 lb 9.6 oz) (01/15/25 0600)  SpO2: 96 % (01/15/25 1222)  Physical Exam  Constitutional:       General: He is not in acute distress.     Appearance: He is ill-appearing (Chronically). He is not toxic-appearing.   HENT:      Head: Normocephalic and atraumatic.   Eyes:      Extraocular Movements: Extraocular movements intact.      Pupils: Pupils are equal, round, and reactive to light.   Cardiovascular:      Rate and Rhythm: Normal rate and regular rhythm.   Pulmonary:      Effort: Pulmonary effort is normal.      Breath sounds: Rales present.   Musculoskeletal:         General: Swelling present.      Right lower leg: Edema present.      Left lower leg: Edema present.   Neurological:      General: No focal deficit present.      Mental Status: He is alert and oriented to person, place, and time. Mental status is at baseline.   Psychiatric:      " Comments: Agitated          Discussion with Family: Attempted to update  (wife, son, and sister) via phone. Unable to contact.    Discharge instructions/Information to patient and family:   See after visit summary for information provided to patient and family.      Provisions for Follow-Up Care:  See after visit summary for information related to follow-up care and any pertinent home health orders.      Mobility at time of Discharge:   Basic Mobility Inpatient Raw Score: 16  JH-HLM Goal: 5: Stand one or more mins  JH-HLM Achieved: 5: Stand (1 or more minutes)  HLM Goal achieved. Continue to encourage appropriate mobility.     Disposition:   Home with VNA Services (Reminder: Complete face to face encounter)    Planned Readmission: No    Discharge Medications:  See after visit summary for reconciled discharge medications provided to patient and/or family.      Administrative Statements         **Please Note: This note may have been constructed using a voice recognition system**

## 2025-01-16 NOTE — UTILIZATION REVIEW
NOTIFICATION OF ADMISSION DISCHARGE   This is a Notification of Discharge from [facility]. Please be advised that this patient has been discharge from our facility. Below you will find the admission and discharge date and time including the patient’s disposition.   UTILIZATION REVIEW CONTACT:  Leslye Qiu  Utilization   Network Utilization Review Department  Phone: [phone] x carefully listen to the prompts. All voicemails are confidential.  Email: [email or web address]     ADMISSION INFORMATION  PRESENTATION DATE: 1/10/2025 11:08 AM  OBERVATION ADMISSION DATE: N/A  INPATIENT ADMISSION DATE: 1/10/25 12:35 PM   DISCHARGE DATE: 1/15/2025  4:21 PM   DISPOSITION:Home with Home Health Care    Network Utilization Review Department  ATTENTION: Please call with any questions or concerns to [phone] and carefully listen to the prompts so that you are directed to the right person. All voicemails are confidential.   For Discharge needs, contact Care Management DC Support Team at [phone] opt. 2  Send all requests for admission clinical reviews, approved or denied determinations and any other requests to dedicated fax number below belonging to the campus where the patient is receiving treatment. List of dedicated fax numbers for the Facilities:  FACILITY NAME UR FAX NUMBER   ADMISSION DENIALS (Administrative/Medical Necessity) [phone]   DISCHARGE SUPPORT TEAM ([facility]) [phone]   PARENT CHILD HEALTH (Maternity/NICU/Pediatrics) [phone]   [facility] [phone]   [facility] [phone]   [facility] [phone]   [facility] [phone]   [facility] [phone]   [facility] [phone]   [facility] [phone]   [facility]  [facility] [phone]   [facility] [phone]   [facility] [phone]   [facility] [phone]   [facility] [phone]

## 2025-01-18 DIAGNOSIS — I50.23 ACUTE ON CHRONIC SYSTOLIC HEART FAILURE (HCC): ICD-10-CM

## 2025-01-18 DIAGNOSIS — I50.9 CHF (CONGESTIVE HEART FAILURE) (HCC): ICD-10-CM

## 2025-01-18 DIAGNOSIS — I42.8 NONISCHEMIC CARDIOMYOPATHY (HCC): ICD-10-CM

## 2025-01-18 DIAGNOSIS — I42.0 DILATED CARDIOMYOPATHY (HCC): ICD-10-CM

## 2025-01-20 ENCOUNTER — HOSPITAL ENCOUNTER (INPATIENT)
Facility: HOSPITAL | Age: 70
LOS: 9 days | Discharge: NURSING, REHAB OR LONG-TERM CARE | DRG: 314 | End: 2025-01-29
Attending: EMERGENCY MEDICINE | Admitting: FAMILY MEDICINE
Payer: COMMERCIAL

## 2025-01-20 ENCOUNTER — APPOINTMENT (EMERGENCY)
Dept: CT IMAGING | Facility: HOSPITAL | Age: 70
DRG: 314 | End: 2025-01-20
Payer: COMMERCIAL

## 2025-01-20 DIAGNOSIS — R78.81 BACTEREMIA: ICD-10-CM

## 2025-01-20 DIAGNOSIS — N18.6 ANEMIA DUE TO CHRONIC KIDNEY DISEASE, ON CHRONIC DIALYSIS (HCC): ICD-10-CM

## 2025-01-20 DIAGNOSIS — J90 PLEURAL EFFUSION: ICD-10-CM

## 2025-01-20 DIAGNOSIS — I50.23 ACUTE ON CHRONIC SYSTOLIC HEART FAILURE (HCC): ICD-10-CM

## 2025-01-20 DIAGNOSIS — T82.49XA COMPLICATIONS, MECHANICAL, CATHETER, DIALYSIS, INITIAL ENCOUNTER (HCC): ICD-10-CM

## 2025-01-20 DIAGNOSIS — I42.0 DILATED CARDIOMYOPATHY (HCC): ICD-10-CM

## 2025-01-20 DIAGNOSIS — Z99.2 ANEMIA DUE TO CHRONIC KIDNEY DISEASE, ON CHRONIC DIALYSIS (HCC): ICD-10-CM

## 2025-01-20 DIAGNOSIS — Z99.2 ESRD (END STAGE RENAL DISEASE) ON DIALYSIS (HCC): Primary | ICD-10-CM

## 2025-01-20 DIAGNOSIS — I48.91 ATRIAL FIBRILLATION WITH RVR (HCC): ICD-10-CM

## 2025-01-20 DIAGNOSIS — U07.1 COVID: ICD-10-CM

## 2025-01-20 DIAGNOSIS — I82.C11 THROMBOSIS OF RIGHT INTERNAL JUGULAR VEIN (HCC): ICD-10-CM

## 2025-01-20 DIAGNOSIS — Z00.8 ENCOUNTER FOR ASSESSMENT OF DECISION-MAKING CAPACITY: ICD-10-CM

## 2025-01-20 DIAGNOSIS — D63.1 ANEMIA DUE TO CHRONIC KIDNEY DISEASE, ON CHRONIC DIALYSIS (HCC): ICD-10-CM

## 2025-01-20 DIAGNOSIS — N18.6 ESRD (END STAGE RENAL DISEASE) ON DIALYSIS (HCC): Primary | ICD-10-CM

## 2025-01-20 DIAGNOSIS — A41.9 SEPSIS, DUE TO UNSPECIFIED ORGANISM, UNSPECIFIED WHETHER ACUTE ORGAN DYSFUNCTION PRESENT (HCC): ICD-10-CM

## 2025-01-20 DIAGNOSIS — I50.9 CHF (CONGESTIVE HEART FAILURE) (HCC): ICD-10-CM

## 2025-01-20 DIAGNOSIS — I42.8 NONISCHEMIC CARDIOMYOPATHY (HCC): ICD-10-CM

## 2025-01-20 LAB
2HR DELTA HS TROPONIN: -25 NG/L
4HR DELTA HS TROPONIN: -43 NG/L
ALBUMIN SERPL BCG-MCNC: 3 G/DL (ref 3.5–5)
ALP SERPL-CCNC: 126 U/L (ref 34–104)
ALT SERPL W P-5'-P-CCNC: 21 U/L (ref 7–52)
ANION GAP SERPL CALCULATED.3IONS-SCNC: 14 MMOL/L (ref 4–13)
APTT PPP: 32 SECONDS (ref 23–34)
AST SERPL W P-5'-P-CCNC: 57 U/L (ref 13–39)
BASOPHILS # BLD AUTO: 0.02 THOUSANDS/ΜL (ref 0–0.1)
BASOPHILS NFR BLD AUTO: 0 % (ref 0–1)
BILIRUB SERPL-MCNC: 1.85 MG/DL (ref 0.2–1)
BNP SERPL-MCNC: >4700 PG/ML (ref 0–100)
BUN SERPL-MCNC: 77 MG/DL (ref 5–25)
CALCIUM ALBUM COR SERPL-MCNC: 9.4 MG/DL (ref 8.3–10.1)
CALCIUM SERPL-MCNC: 8.6 MG/DL (ref 8.4–10.2)
CARDIAC TROPONIN I PNL SERPL HS: 222 NG/L (ref ?–50)
CARDIAC TROPONIN I PNL SERPL HS: 240 NG/L (ref ?–50)
CARDIAC TROPONIN I PNL SERPL HS: 265 NG/L (ref ?–50)
CHLORIDE SERPL-SCNC: 92 MMOL/L (ref 96–108)
CK SERPL-CCNC: 244 U/L (ref 39–308)
CO2 SERPL-SCNC: 24 MMOL/L (ref 21–32)
CREAT SERPL-MCNC: 8.07 MG/DL (ref 0.6–1.3)
CRP SERPL QL: 187.8 MG/L
EOSINOPHIL # BLD AUTO: 0 THOUSAND/ΜL (ref 0–0.61)
EOSINOPHIL NFR BLD AUTO: 0 % (ref 0–6)
ERYTHROCYTE [DISTWIDTH] IN BLOOD BY AUTOMATED COUNT: 20.5 % (ref 11.6–15.1)
FLUAV RNA RESP QL NAA+PROBE: NEGATIVE
FLUBV RNA RESP QL NAA+PROBE: NEGATIVE
GFR SERPL CREATININE-BSD FRML MDRD: 6 ML/MIN/1.73SQ M
GLUCOSE SERPL-MCNC: 104 MG/DL (ref 65–140)
HCT VFR BLD AUTO: 27.9 % (ref 36.5–49.3)
HGB BLD-MCNC: 9.2 G/DL (ref 12–17)
IMM GRANULOCYTES # BLD AUTO: 0.07 THOUSAND/UL (ref 0–0.2)
IMM GRANULOCYTES NFR BLD AUTO: 1 % (ref 0–2)
INR PPP: 1.34 (ref 0.85–1.19)
LACTATE SERPL-SCNC: 1 MMOL/L (ref 0.5–2)
LACTATE SERPL-SCNC: 3.2 MMOL/L (ref 0.5–2)
LIPASE SERPL-CCNC: 109 U/L (ref 11–82)
LYMPHOCYTES # BLD AUTO: 0.36 THOUSANDS/ΜL (ref 0.6–4.47)
LYMPHOCYTES NFR BLD AUTO: 4 % (ref 14–44)
MAGNESIUM SERPL-MCNC: 2.1 MG/DL (ref 1.9–2.7)
MCH RBC QN AUTO: 29.5 PG (ref 26.8–34.3)
MCHC RBC AUTO-ENTMCNC: 33 G/DL (ref 31.4–37.4)
MCV RBC AUTO: 89 FL (ref 82–98)
MONOCYTES # BLD AUTO: 0.47 THOUSAND/ΜL (ref 0.17–1.22)
MONOCYTES NFR BLD AUTO: 5 % (ref 4–12)
NEUTROPHILS # BLD AUTO: 7.84 THOUSANDS/ΜL (ref 1.85–7.62)
NEUTS SEG NFR BLD AUTO: 90 % (ref 43–75)
NRBC BLD AUTO-RTO: 0 /100 WBCS
PLATELET # BLD AUTO: 93 THOUSANDS/UL (ref 149–390)
PMV BLD AUTO: 11.5 FL (ref 8.9–12.7)
POTASSIUM SERPL-SCNC: 4.5 MMOL/L (ref 3.5–5.3)
PROCALCITONIN SERPL-MCNC: 2.72 NG/ML
PROT SERPL-MCNC: 6.6 G/DL (ref 6.4–8.4)
PROTHROMBIN TIME: 17 SECONDS (ref 12.3–15)
RBC # BLD AUTO: 3.12 MILLION/UL (ref 3.88–5.62)
RSV RNA RESP QL NAA+PROBE: NEGATIVE
SARS-COV-2 RNA RESP QL NAA+PROBE: POSITIVE
SODIUM SERPL-SCNC: 130 MMOL/L (ref 135–147)
WBC # BLD AUTO: 8.76 THOUSAND/UL (ref 4.31–10.16)

## 2025-01-20 PROCEDURE — XW033E5 INTRODUCTION OF REMDESIVIR ANTI-INFECTIVE INTO PERIPHERAL VEIN, PERCUTANEOUS APPROACH, NEW TECHNOLOGY GROUP 5: ICD-10-PCS | Performed by: FAMILY MEDICINE

## 2025-01-20 PROCEDURE — 99223 1ST HOSP IP/OBS HIGH 75: CPT | Performed by: FAMILY MEDICINE

## 2025-01-20 PROCEDURE — 71250 CT THORAX DX C-: CPT

## 2025-01-20 PROCEDURE — 87081 CULTURE SCREEN ONLY: CPT | Performed by: FAMILY MEDICINE

## 2025-01-20 PROCEDURE — 85610 PROTHROMBIN TIME: CPT | Performed by: EMERGENCY MEDICINE

## 2025-01-20 PROCEDURE — 82550 ASSAY OF CK (CPK): CPT

## 2025-01-20 PROCEDURE — 84484 ASSAY OF TROPONIN QUANT: CPT

## 2025-01-20 PROCEDURE — 96368 THER/DIAG CONCURRENT INF: CPT

## 2025-01-20 PROCEDURE — 70450 CT HEAD/BRAIN W/O DYE: CPT

## 2025-01-20 PROCEDURE — 99285 EMERGENCY DEPT VISIT HI MDM: CPT | Performed by: EMERGENCY MEDICINE

## 2025-01-20 PROCEDURE — 83880 ASSAY OF NATRIURETIC PEPTIDE: CPT | Performed by: EMERGENCY MEDICINE

## 2025-01-20 PROCEDURE — 87147 CULTURE TYPE IMMUNOLOGIC: CPT | Performed by: EMERGENCY MEDICINE

## 2025-01-20 PROCEDURE — 85730 THROMBOPLASTIN TIME PARTIAL: CPT | Performed by: EMERGENCY MEDICINE

## 2025-01-20 PROCEDURE — 80053 COMPREHEN METABOLIC PANEL: CPT | Performed by: EMERGENCY MEDICINE

## 2025-01-20 PROCEDURE — 83605 ASSAY OF LACTIC ACID: CPT | Performed by: EMERGENCY MEDICINE

## 2025-01-20 PROCEDURE — 93005 ELECTROCARDIOGRAM TRACING: CPT

## 2025-01-20 PROCEDURE — 96365 THER/PROPH/DIAG IV INF INIT: CPT

## 2025-01-20 PROCEDURE — 83690 ASSAY OF LIPASE: CPT | Performed by: EMERGENCY MEDICINE

## 2025-01-20 PROCEDURE — 84484 ASSAY OF TROPONIN QUANT: CPT | Performed by: EMERGENCY MEDICINE

## 2025-01-20 PROCEDURE — 36415 COLL VENOUS BLD VENIPUNCTURE: CPT | Performed by: EMERGENCY MEDICINE

## 2025-01-20 PROCEDURE — 87040 BLOOD CULTURE FOR BACTERIA: CPT | Performed by: EMERGENCY MEDICINE

## 2025-01-20 PROCEDURE — 86140 C-REACTIVE PROTEIN: CPT

## 2025-01-20 PROCEDURE — 0241U HB NFCT DS VIR RESP RNA 4 TRGT: CPT | Performed by: EMERGENCY MEDICINE

## 2025-01-20 PROCEDURE — 74176 CT ABD & PELVIS W/O CONTRAST: CPT

## 2025-01-20 PROCEDURE — 99285 EMERGENCY DEPT VISIT HI MDM: CPT

## 2025-01-20 PROCEDURE — 85025 COMPLETE CBC W/AUTO DIFF WBC: CPT | Performed by: EMERGENCY MEDICINE

## 2025-01-20 PROCEDURE — 87186 SC STD MICRODIL/AGAR DIL: CPT | Performed by: EMERGENCY MEDICINE

## 2025-01-20 PROCEDURE — 83605 ASSAY OF LACTIC ACID: CPT

## 2025-01-20 PROCEDURE — 87154 CUL TYP ID BLD PTHGN 6+ TRGT: CPT | Performed by: EMERGENCY MEDICINE

## 2025-01-20 PROCEDURE — 84145 PROCALCITONIN (PCT): CPT | Performed by: EMERGENCY MEDICINE

## 2025-01-20 PROCEDURE — 83735 ASSAY OF MAGNESIUM: CPT | Performed by: EMERGENCY MEDICINE

## 2025-01-20 RX ORDER — BUMETANIDE 0.25 MG/ML
2 INJECTION, SOLUTION INTRAMUSCULAR; INTRAVENOUS ONCE
Status: COMPLETED | OUTPATIENT
Start: 2025-01-20 | End: 2025-01-20

## 2025-01-20 RX ORDER — HYDRALAZINE HYDROCHLORIDE 100 MG/1
100 TABLET, FILM COATED ORAL EVERY 8 HOURS
Qty: 270 TABLET | Refills: 0 | Status: ON HOLD | OUTPATIENT
Start: 2025-01-20

## 2025-01-20 RX ORDER — CEFTRIAXONE 1 G/50ML
1000 INJECTION, SOLUTION INTRAVENOUS EVERY 24 HOURS
Status: DISCONTINUED | OUTPATIENT
Start: 2025-01-20 | End: 2025-01-21

## 2025-01-20 RX ORDER — ISOSORBIDE MONONITRATE 30 MG/1
120 TABLET, EXTENDED RELEASE ORAL DAILY
Status: DISCONTINUED | OUTPATIENT
Start: 2025-01-21 | End: 2025-01-29 | Stop reason: HOSPADM

## 2025-01-20 RX ORDER — AMLODIPINE BESYLATE 5 MG/1
5 TABLET ORAL DAILY
Status: DISCONTINUED | OUTPATIENT
Start: 2025-01-21 | End: 2025-01-28

## 2025-01-20 RX ORDER — HYDRALAZINE HYDROCHLORIDE 25 MG/1
100 TABLET, FILM COATED ORAL EVERY 8 HOURS SCHEDULED
Status: DISCONTINUED | OUTPATIENT
Start: 2025-01-20 | End: 2025-01-28

## 2025-01-20 RX ORDER — ATORVASTATIN CALCIUM 20 MG/1
20 TABLET, FILM COATED ORAL
Status: DISCONTINUED | OUTPATIENT
Start: 2025-01-20 | End: 2025-01-29 | Stop reason: HOSPADM

## 2025-01-20 RX ORDER — VANCOMYCIN HYDROCHLORIDE 750 MG/150ML
15 INJECTION, SOLUTION INTRAVENOUS ONCE
Status: COMPLETED | OUTPATIENT
Start: 2025-01-20 | End: 2025-01-20

## 2025-01-20 RX ORDER — ACETAMINOPHEN 10 MG/ML
1000 INJECTION, SOLUTION INTRAVENOUS ONCE
Status: COMPLETED | OUTPATIENT
Start: 2025-01-20 | End: 2025-01-20

## 2025-01-20 RX ORDER — VANCOMYCIN HYDROCHLORIDE 750 MG/150ML
15 INJECTION, SOLUTION INTRAVENOUS ONCE AS NEEDED
Status: DISCONTINUED | OUTPATIENT
Start: 2025-01-21 | End: 2025-01-21

## 2025-01-20 RX ORDER — FOLIC ACID 1 MG/1
1 TABLET ORAL DAILY
Status: DISCONTINUED | OUTPATIENT
Start: 2025-01-21 | End: 2025-01-29 | Stop reason: HOSPADM

## 2025-01-20 RX ORDER — ASPIRIN 81 MG/1
81 TABLET ORAL DAILY
Status: DISCONTINUED | OUTPATIENT
Start: 2025-01-21 | End: 2025-01-21

## 2025-01-20 RX ORDER — ISOSORBIDE MONONITRATE 120 MG/1
120 TABLET, EXTENDED RELEASE ORAL DAILY
Qty: 90 TABLET | Refills: 0 | Status: ON HOLD | OUTPATIENT
Start: 2025-01-20

## 2025-01-20 RX ORDER — VANCOMYCIN HYDROCHLORIDE 750 MG/150ML
15 INJECTION, SOLUTION INTRAVENOUS EVERY 12 HOURS
Status: DISCONTINUED | OUTPATIENT
Start: 2025-01-20 | End: 2025-01-20

## 2025-01-20 RX ORDER — ACETAMINOPHEN 325 MG/1
650 TABLET ORAL EVERY 6 HOURS PRN
Status: DISCONTINUED | OUTPATIENT
Start: 2025-01-20 | End: 2025-01-29 | Stop reason: HOSPADM

## 2025-01-20 RX ADMIN — BUMETANIDE 2 MG: 0.25 INJECTION INTRAMUSCULAR; INTRAVENOUS at 19:28

## 2025-01-20 RX ADMIN — PIPERACILLIN AND TAZOBACTAM 4.5 G: 36; 4.5 INJECTION, POWDER, FOR SOLUTION INTRAVENOUS at 15:53

## 2025-01-20 RX ADMIN — VANCOMYCIN HYDROCHLORIDE 750 MG: 750 INJECTION, SOLUTION INTRAVENOUS at 16:31

## 2025-01-20 RX ADMIN — ACETAMINOPHEN 1000 MG: 10 INJECTION, SOLUTION INTRAVENOUS at 15:56

## 2025-01-20 RX ADMIN — SODIUM CHLORIDE 250 ML: 0.9 INJECTION, SOLUTION INTRAVENOUS at 15:54

## 2025-01-20 RX ADMIN — Medication 6 MG: at 21:25

## 2025-01-20 RX ADMIN — CEFTRIAXONE 1000 MG: 1 INJECTION, SOLUTION INTRAVENOUS at 21:24

## 2025-01-20 RX ADMIN — HYDRALAZINE HYDROCHLORIDE 100 MG: 25 TABLET ORAL at 21:25

## 2025-01-20 RX ADMIN — REMDESIVIR 200 MG: 100 INJECTION, POWDER, LYOPHILIZED, FOR SOLUTION INTRAVENOUS at 20:23

## 2025-01-20 NOTE — ED PROVIDER NOTES
Time reflects when diagnosis was documented in both MDM as applicable and the Disposition within this note       Time User Action Codes Description Comment    1/20/2025  5:01 PM Nahum Casiano Add [U07.1] COVID     1/20/2025  5:02 PM Nahum Casiano Add [J90] Pleural effusion           ED Disposition       ED Disposition   Admit    Condition   Stable    Date/Time   Mon Jan 20, 2025  5:01 PM    Comment   Case was discussed with hospital medicine and the patient's admission status was agreed to be Admission Status: inpatient status to the service of Dr. Arias .               Assessment & Plan       Medical Decision Making  69-year-old male presents to the emergency department with generalized weakness, fatigue, fever.  Differential diagnosis include COVID, flu, RSV, pneumonia, bronchitis, pleural effusion, CHF, fluid overload, electrolyte abnormality, intra-abdominal infection, UTI, will perform CT head, chest abdomen pelvis, sepsis workup, and reassess.  Anticipate admission for further evaluation.    Amount and/or Complexity of Data Reviewed  Labs: ordered. Decision-making details documented in ED Course.  Radiology: ordered.    Risk  Prescription drug management.  Decision regarding hospitalization.        ED Course as of 01/20/25 1702   Mon Jan 20, 2025   1544 The 30ml/kg fluid bolus was not given to the patient despite hypotension and/or significantly elevated lactate of = 4 and/or presence of septic shock due to: Renal Failure. The patient will be administered 250 ml of crystalloid fluid instead. Orders for this have been placed in [facility]. The patient may receive additional colloid or crystalloid fluids thereafter based on clinical condition.     Nahum Casiano,      1643 SARS-COV-2(!): Positive   1643 hs TnI 0hr(!): 265   1643 LIPASE(!): 109   1643 Sodium(!): 130   1643    IMPRESSION:     1.  Moderate to large right and moderate left pleural effusions, with mild pulmonary alveolar edema.     1702  Discussed case with hospital medicine, patient will be admitted for further evaluation.  Patient will receive hemodialysis tomorrow.       Medications   vancomycin (VANCOCIN) IVPB (premix in dextrose) 750 mg 150 mL (750 mg Intravenous New Bag 1/20/25 1631)   piperacillin-tazobactam (ZOSYN) 4.5 g in sodium chloride 0.9 % 100 mL IVPB (0 g Intravenous Stopped 1/20/25 1649)   acetaminophen (Ofirmev) injection 1,000 mg (0 mg Intravenous Stopped 1/20/25 1649)   sodium chloride 0.9 % bolus 250 mL (0 mL Intravenous Stopped 1/20/25 1649)       ED Risk Strat Scores                          SBIRT 22yo+      Flowsheet Row Most Recent Value   Initial Alcohol Screen: US AUDIT-C     1. How often do you have a drink containing alcohol? 0 Filed at: 01/20/2025 1527   2. How many drinks containing alcohol do you have on a typical day you are drinking?  0 Filed at: 01/20/2025 1527   3b. FEMALE Any Age, or MALE 65+: How often do you have 4 or more drinks on one occassion? 0 Filed at: 01/20/2025 1527   Audit-C Score 0 Filed at: 01/20/2025 1527   ALONDRA: How many times in the past year have you...    Used an illegal drug or used a prescription medication for non-medical reasons? Never Filed at: 01/20/2025 1527                            History of Present Illness       Chief Complaint   Patient presents with    Weakness - Generalized     Pt presents from home for generalized weakness and cough. Did not go to scheduled dialysis appt today.        Past Medical History:   Diagnosis Date    Acute hemodialysis patient ([facility]) 12/14/2024    CAD (coronary artery disease)     HFrEF (heart failure with reduced ejection fraction) ([facility]) 09/2021    Hypertension     Rheumatoid factor positive 09/2021    Stage 3 chronic kidney disease (HCC)     Tobacco abuse       Past Surgical History:   Procedure Laterality Date    APPENDECTOMY  1965    CARDIAC CATHETERIZATION  9/16/2021    INGUINAL HERNIA REPAIR Right 1991    IR THORACENTESIS  12/18/2024    IR  "THORACENTESIS  12/23/2024    IR TUNNELED DIALYSIS CATHETER PLACEMENT  11/14/2024      Family History   Problem Relation Age of Onset    Heart failure Mother     Other Father         \"blood clot\"    COPD Sister     Heart failure Brother     Valvular heart disease Brother     Heart attack Son     No Known Problems Son       Social History     Tobacco Use    Smoking status: Every Day     Current packs/day: 0.50     Average packs/day: 0.5 packs/day for 51.1 years (25.5 ttl pk-yrs)     Types: Cigarettes     Start date: 1974    Smokeless tobacco: Never    Tobacco comments:     Began smoking in his early 20s, on average smoking 1+ packs daily. Quit in 08/2021 (Updated 09/15/2021).    Vaping Use    Vaping status: Never Used   Substance Use Topics    Alcohol use: Yes     Alcohol/week: 6.0 standard drinks of alcohol     Types: 6 Shots of liquor per week     Comment: rare    Drug use: Never      E-Cigarette/Vaping    E-Cigarette Use Never User       E-Cigarette/Vaping Substances      I have reviewed and agree with the history as documented.     69-year-old male with past medical history of end-stage renal disease on hemodialysis, CAD, heart failure, hypertension, presents to the emergency department for evaluation of generalized weakness associated with cough, productive of mucus, and fever.  Son states that on Friday, he received hemodialysis, came home, on Saturday, he was fine, however on Sunday, he was more tired than usual, and required more assistance going to and from the bathroom.  Patient was laying in bed.  Patient reports feeling fatigued, he has had cough.  He denies any chest pain, shortness of breath, GI or  complaints.  On initial evaluation, patient is febrile with temperature of 101.4, tachycardic with irregular rhythm on the monitor.  Sepsis workup initiated, patient started on fluids, broad-spectrum antibiotics.  Notified nephrology regarding patient potentially requiring dialysis today.          Review of " Systems   Constitutional:  Positive for activity change, chills, fatigue and fever.   HENT:  Negative for ear pain and sore throat.    Eyes:  Negative for pain and visual disturbance.   Respiratory:  Positive for cough and wheezing. Negative for shortness of breath.    Cardiovascular:  Negative for chest pain and palpitations.   Gastrointestinal:  Negative for abdominal pain and vomiting.   Genitourinary:  Negative for dysuria and hematuria.   Musculoskeletal:  Negative for arthralgias and back pain.   Skin:  Negative for color change and rash.   Neurological:  Negative for seizures and syncope.   All other systems reviewed and are negative.          Objective       ED Triage Vitals   Temperature Pulse Blood Pressure Respirations SpO2 Patient Position - Orthostatic VS   01/20/25 1531 01/20/25 1526 01/20/25 1526 01/20/25 1526 01/20/25 1526 01/20/25 1526   (!) 101.4 °F (38.6 °C) 94 133/70 18 95 % Lying      Temp Source Heart Rate Source BP Location FiO2 (%) Pain Score    01/20/25 1531 01/20/25 1526 01/20/25 1526 -- --    Temporal Monitor Left arm        Vitals      Date and Time Temp Pulse SpO2 Resp BP Pain Score FACES Pain Rating User   01/20/25 1645 -- 82 93 % 23 121/58 -- -- RR   01/20/25 1630 -- 79 92 % 25 125/60 -- -- RR   01/20/25 1600 -- 97 93 % 22 148/67 -- -- RR   01/20/25 1531 101.4 °F (38.6 °C) -- -- -- -- -- -- MB   01/20/25 1526 -- 94 95 % 18 133/70 -- -- MB            Physical Exam  Vitals and nursing note reviewed.   Constitutional:       General: He is not in acute distress.     Appearance: He is well-developed. He is ill-appearing.   HENT:      Head: Normocephalic and atraumatic.   Eyes:      Conjunctiva/sclera: Conjunctivae normal.   Cardiovascular:      Rate and Rhythm: Tachycardia present. Rhythm irregular.      Heart sounds: No murmur heard.  Pulmonary:      Effort: Pulmonary effort is normal. No respiratory distress.      Breath sounds: Wheezing, rhonchi and rales present.   Abdominal:       Palpations: Abdomen is soft.      Tenderness: There is no abdominal tenderness.   Musculoskeletal:         General: No swelling.      Cervical back: Neck supple.      Right lower leg: Edema present.      Left lower leg: Edema present.   Skin:     General: Skin is warm and dry.      Capillary Refill: Capillary refill takes less than 2 seconds.   Neurological:      Mental Status: He is alert.      Cranial Nerves: No cranial nerve deficit.      Sensory: No sensory deficit.      Motor: Weakness present.      Coordination: Coordination normal.      Comments: Generalized weakness throughout   Psychiatric:         Mood and Affect: Mood normal.         Results Reviewed       Procedure Component Value Units Date/Time    CK [[id number]] Collected: 01/20/25 1543    Lab Status: In process Specimen: Blood from Arm, Left Updated: 01/20/25 1701    C-reactive protein [[id number]] Collected: 01/20/25 1543    Lab Status: In process Specimen: Blood from Arm, Left Updated: 01/20/25 1701    FLU/RSV/COVID - if FLU/RSV clinically relevant [[id number]]  (Abnormal) Collected: 01/20/25 1543    Lab Status: Final result Specimen: Nares from Nose Updated: 01/20/25 1636     SARS-CoV-2 Positive     INFLUENZA A PCR Negative     INFLUENZA B PCR Negative     RSV PCR Negative    Narrative:      This test has been performed using the CoV-2/Flu/RSV plus assay on the [employer] GeneXpert platform. This test has been validated by the  and verified by the performing laboratory.     This test is designed to amplify and detect the following: nucleocapsid (N), envelope (E), and RNA-dependent RNA polymerase (RdRP) genes of the SARS-CoV-2 genome; matrix (M), basic polymerase (PB2), and acidic protein (PA) segments of the influenza A genome; matrix (M) and non-structural protein (NS) segments of the influenza B genome, and the nucleocapsid genes of RSV A and RSV B.     Positive results are indicative of the presence of Flu A, Flu B, RSV, and/or SARS-CoV-2  RNA. Positive results for SARS-CoV-2 or suspected novel influenza should be reported to state, local, or federal health departments according to local reporting requirements.      All results should be assessed in conjunction with clinical presentation and other laboratory markers for clinical management.     FOR PEDIATRIC PATIENTS - copy/paste COVID Guidelines URL to browser: [email or web address]       HS Troponin I 2hr [[id number]]     Lab Status: No result Specimen: Blood     HS Troponin I 4hr [[id number]]     Lab Status: No result Specimen: Blood     HS Troponin 0hr (reflex protocol) [[id number]]  (Abnormal) Collected: 01/20/25 1543    Lab Status: Final result Specimen: Blood from Arm, Left Updated: 01/20/25 1629     hs TnI 0hr 265 ng/L     Procalcitonin [[id number]]  (Abnormal) Collected: 01/20/25 1543    Lab Status: Final result Specimen: Blood from Arm, Left Updated: 01/20/25 1626     Procalcitonin 2.72 ng/ml     Comprehensive metabolic panel [[id number]]  (Abnormal) Collected: 01/20/25 1543    Lab Status: Final result Specimen: Blood from Arm, Left Updated: 01/20/25 1619     Sodium 130 mmol/L      Potassium 4.5 mmol/L      Chloride 92 mmol/L      CO2 24 mmol/L      ANION GAP 14 mmol/L      BUN 77 mg/dL      Creatinine 8.07 mg/dL      Glucose 104 mg/dL      Calcium 8.6 mg/dL      Corrected Calcium 9.4 mg/dL      AST 57 U/L      ALT 21 U/L      Alkaline Phosphatase 126 U/L      Total Protein 6.6 g/dL      Albumin 3.0 g/dL      Total Bilirubin 1.85 mg/dL      eGFR 6 ml/min/1.73sq m     Narrative:      National Kidney Disease Foundation guidelines for Chronic Kidney Disease (CKD):     Stage 1 with normal or high GFR (GFR > 90 mL/min/1.73 square meters)    Stage 2 Mild CKD (GFR = 60-89 mL/min/1.73 square meters)    Stage 3A Moderate CKD (GFR = 45-59 mL/min/1.73 square meters)    Stage 3B Moderate CKD (GFR = 30-44 mL/min/1.73 square meters)    Stage 4 Severe CKD  (GFR = 15-29 mL/min/1.73 square meters)    Stage 5 End Stage CKD (GFR <15 mL/min/1.73 square meters)  Note: GFR calculation is accurate only with a steady state creatinine    Magnesium [[id number]]  (Normal) Collected: 01/20/25 1543    Lab Status: Final result Specimen: Blood from Arm, Left Updated: 01/20/25 1619     Magnesium 2.1 mg/dL     Lipase [[id number]]  (Abnormal) Collected: 01/20/25 1543    Lab Status: Final result Specimen: Blood from Arm, Left Updated: 01/20/25 1619     Lipase 109 u/L     CBC and differential [[id number]]  (Abnormal) Collected: 01/20/25 1543    Lab Status: Final result Specimen: Blood from Arm, Left Updated: 01/20/25 1616     WBC 8.76 Thousand/uL      RBC 3.12 Million/uL      Hemoglobin 9.2 g/dL      Hematocrit 27.9 %      MCV 89 fL      MCH 29.5 pg      MCHC 33.0 g/dL      RDW 20.5 %      MPV 11.5 fL      Platelets 93 Thousands/uL      nRBC 0 /100 WBCs      Segmented % 90 %      Immature Grans % 1 %      Lymphocytes % 4 %      Monocytes % 5 %      Eosinophils Relative 0 %      Basophils Relative 0 %      Absolute Neutrophils 7.84 Thousands/µL      Absolute Immature Grans 0.07 Thousand/uL      Absolute Lymphocytes 0.36 Thousands/µL      Absolute Monocytes 0.47 Thousand/µL      Eosinophils Absolute 0.00 Thousand/µL      Basophils Absolute 0.02 Thousands/µL     Lactic acid [[id number]]  (Abnormal) Collected: 01/20/25 1543    Lab Status: Final result Specimen: Blood from Arm, Left Updated: 01/20/25 1613     LACTIC ACID 3.2 mmol/L     Narrative:      Result may be elevated if tourniquet was used during collection.    Lactic acid 2 Hours [[id number]]     Lab Status: No result Specimen: Blood     Protime-INR [[id number]]  (Abnormal) Collected: 01/20/25 1543    Lab Status: Final result Specimen: Blood from Arm, Left Updated: 01/20/25 1613     Protime 17.0 seconds      INR 1.34    Narrative:      INR Therapeutic Range    Indication                                             INR Range      Atrial  Fibrillation                                               2.0-3.0  Hypercoagulable State                                    2.0.2.3  Left Ventricular Asist Device                            2.0-3.0  Mechanical Heart Valve                                  -    Aortic(with afib, MI, embolism, HF, LA enlargement,    and/or coagulopathy)                                     2.0-3.0 (2.5-3.5)     Mitral                                                             2.5-3.5  Prosthetic/Bioprosthetic Heart Valve               2.0-3.0  Venous thromboembolism (VTE: VT, PE        2.0-3.0    APTT [[id number]]  (Normal) Collected: 01/20/25 1543    Lab Status: Final result Specimen: Blood from Arm, Left Updated: 01/20/25 1613     PTT 32 seconds     B-Type Natriuretic Peptide(BNP) [[id number]] Collected: 01/20/25 1543    Lab Status: In process Specimen: Blood from Arm, Left Updated: 01/20/25 1607    Blood culture #1 [[id number]] Collected: 01/20/25 1556    Lab Status: In process Specimen: Blood from Arm, Left Updated: 01/20/25 1601    Blood culture #2 [[id number]] Collected: 01/20/25 1543    Lab Status: In process Specimen: Blood from Arm, Left Updated: 01/20/25 1554    UA w Reflex to Microscopic w Reflex to Culture [[id number]]     Lab Status: No result Specimen: Urine             CT head without contrast   Final Interpretation by Enrico Quiroga DO (01/20 1624)      No acute intracranial abnormality.  Stable, moderate chronic microangiopathic changes within the brain.      Pansinus mild mucosal thickening.                  Workstation performed: [id number]         CT chest abdomen pelvis wo contrast   Final Interpretation by Jean Steinberg MD (01/20 1634)      1.  Moderate to large right and moderate left pleural effusions, with mild pulmonary alveolar edema.               Workstation performed: [id number]             Procedures    ED Medication and Procedure Management   Prior to Admission Medications   Prescriptions  Last Dose Informant Patient Reported? Taking?   amLODIPine (NORVASC) 5 mg tablet   No No   Sig: Take 1 tablet (5 mg total) by mouth daily   aspirin (ECOTRIN LOW STRENGTH) 81 mg EC tablet   No No   Sig: Take 1 tablet (81 mg total) by mouth daily   atorvastatin (LIPITOR) 20 mg tablet   No No   Sig: Take 1 tablet (20 mg total) by mouth daily with dinner   carvedilol (COREG) 25 mg tablet   No No   Sig: Take 1 tablet (25 mg total) by mouth 2 (two) times a day with meals   epoetin loida (EPOGEN,PROCRIT) 4,000 units/mL   No No   Sig: Inject 2 mL (8,000 Units total) under the skin 3 (three) times a week   folic acid (FOLVITE) 1 mg tablet   No No   Sig: Take 1 tablet (1 mg total) by mouth daily   hydrALAZINE (APRESOLINE) 100 MG tablet   No No   Sig: TAKE 1 TABLET BY MOUTH EVERY 8 HOURS.   iron sucrose 100 mg in sodium chloride 0.9 % 50 mL IVPB   No No   Sig: Inject 100 mg into a catheter in a vein over 60 minutes 3 (three) times a week for 5 doses   isosorbide mononitrate (IMDUR) 120 mg 24 hr tablet   No No   Sig: TAKE 1 TABLET BY MOUTH EVERY DAY   melatonin 3 mg   No No   Sig: Take 2 tablets (6 mg total) by mouth daily at bedtime   torsemide (DEMADEX) 100 mg tablet   No No   Sig: Take on nondialysis days (Sunday, Tuesday, Thursday, Saturday). Hold for systolic blood pressure less than 110 Do not start before January 16, 2025.      Facility-Administered Medications: None     Patient's Medications   Discharge Prescriptions    No medications on file     No discharge procedures on file.  ED SEPSIS DOCUMENTATION   Time reflects when diagnosis was documented in both MDM as applicable and the Disposition within this note       Time User Action Codes Description Comment    1/20/2025  5:01 PM Nahum Casiano [U07.1] COVID     1/20/2025  5:02 PM Nahum Casiano [J90] Pleural effusion                  Nahum Casiano, DO  01/20/25 4897

## 2025-01-20 NOTE — ASSESSMENT & PLAN NOTE
With chronic anemia secondary to renal disease  Is on Epogen and iron infusions with dialysis  Continue folic acid daily  Hemoglobin stable

## 2025-01-20 NOTE — SEPSIS NOTE
"  Sepsis Note   Matthew Alvarez 69 y.o. male MRN: [id number]  Unit/Bed#: ED 06 Encounter: [id number]       Initial Sepsis Screening       Row Name 01/20/25 1741                Is the patient's history suggestive of a new or worsening infection? Yes (Proceed)  -SS        Suspected source of infection pneumonia  Viral pneumonia with COVID/groundglass opacities on CT  -SS        Indicate SIRS criteria Tachycardia > 90 bpm;Hyperthemia > 38.3C (100.9F) OR Hypothermia <36C (96.8F)  -SS        Are two or more of the above signs & symptoms of infection both present and new to the patient? Yes (Proceed)  -SS        Assess for evidence of organ dysfunction: Are any of the below criteria present within 6 hours of suspected infection and SIRS criteria that are NOT considered to be chronic conditions? Lactate > 2.0  -SS        Date of presentation of severe sepsis 01/20/25  -        Time of presentation of severe sepsis 1613  -SS        Sepsis Note: Click \"NEXT\" below (NOT \"close\") to generate sepsis note based on above information. --                  User Key  (r) = Recorded By, (t) = Taken By, (c) = Cosigned By      Initials Name Provider Type    SS Kerri Chisholm PA-C Physician Assistant                  The 30ml/kg fluid bolus was not given to the patient despite severe sepsis and lactic   >2: Renal failure and heart failure with concern for volume overload. The patient will be administered 250 ml of crystalloid fluid instead. Orders for this have been placed in [facility]. The patient may receive additional colloid or crystalloid fluids thereafter based on clinical condition.     Kerri Chisholm PA-C         Body mass index is 19.71 kg/m².  Wt Readings from Last 1 Encounters:   01/20/25 55.4 kg (122 lb 2.2 oz)        Ideal body weight: 63.8 kg (140 lb 10.5 oz)    "

## 2025-01-20 NOTE — ASSESSMENT & PLAN NOTE
Mild COVID pathway as below   COVID19- positive on 1/20   CT chest bilateral pleural effusions and bilateral groundglass opacities  COVID mild pathway labs   CRP, Trop, CK, BNP -pending  Will give remdesivir x 3 days as is high risk patient   OOB TID; ambulation and mobilization strongly encouraged  PT/OT consult and evaluation   Supportive care

## 2025-01-20 NOTE — ASSESSMENT & PLAN NOTE
Lab Results   Component Value Date    EGFR 6 01/20/2025    EGFR 9 01/15/2025    EGFR 12 01/14/2025    CREATININE 8.07 (H) 01/20/2025    CREATININE 5.82 (H) 01/15/2025    CREATININE 4.46 (H) 01/14/2025   Patient on dialysis MWF -noncompliant with going, wife says he misses at least once a week  Presenting with bilateral pleural effusions  Continue torsemide on nondialysis days, today supplement with IV diuretics  HD in the a.m.  Nephrology consult

## 2025-01-20 NOTE — ASSESSMENT & PLAN NOTE
PTA regimen on Coreg 25 mg twice daily, torsemide 100 mg on nondialysis days, Norvasc 5 mg daily and hydralazine 100 mg 3 times daily  Blood pressure somewhat soft on arrival  Holding torsemide in place of IV diuresis -resume torsemide once volume status back to baseline  Continue amlodipine in the a.m., hydralazine with hold parameters-   Patient often times not taking Coreg at home as he does not like the way it makes him feel -hold for now in setting of soft BP

## 2025-01-20 NOTE — ASSESSMENT & PLAN NOTE
Meeting sepsis criteria secondary to temperature 101.4 °F, HR 97 bpm  Lactic acid 3.2  In setting of COVID  Blood cultures pending  Currently doubt bacterial infection, likely viral secondary to COVID-hold further antibiotics  See treatment for COVID for further detail  Hold further IV fluids in setting of acute CHF exacerbation and ESRD with bilateral moderate to large pleural effusions  IV fluids administered in total of 250 cc bolus  If blood pressure decreases with trial albumin

## 2025-01-20 NOTE — H&P
H&P - Hospitalist   Name: Matthew Alvarez 69 y.o. male I MRN: [id number]  Unit/Bed#: ED 06 I Date of Admission: 1/20/2025   Date of Service: 1/20/2025 I Hospital Day: 0     Assessment & Plan  Acute on chronic systolic heart failure (HCC)  Wt Readings from Last 3 Encounters:   01/20/25 55.4 kg (122 lb 2.2 oz)   01/15/25 56.5 kg (124 lb 9.6 oz)   12/27/24 56 kg (123 lb 6.4 oz)     Patient presents with worsening shortness of breath, missed dialysis session today with last session on Friday  Patient's wife endorses that he sometimes is not taking his medication, spits them out or throws them away  CT chest with moderate to large right pleural effusion and moderate left pleural effusion  Echo in November with EF of 20%  Wears LifeVest-currently wearing -will monitor on telemetry per policy  PTA regimen is on Coreg, torsemide (on nondialysis days)  BNP pending    Will give diuresis today IV  HD first thing in the morning tomorrow  Monitor electrolytes   Daily weights and I/O    COVID  Mild COVID pathway as below   COVID19- positive on 1/20   CT chest bilateral pleural effusions and bilateral groundglass opacities  COVID mild pathway labs   CRP, Trop, CK, BNP -pending  Will give remdesivir x 3 days as is high risk patient   OOB TID; ambulation and mobilization strongly encouraged  PT/OT consult and evaluation   Supportive care    Primary hypertension  PTA regimen on Coreg 25 mg twice daily, torsemide 100 mg on nondialysis days, Norvasc 5 mg daily and hydralazine 100 mg 3 times daily  Blood pressure somewhat soft on arrival  Holding torsemide in place of IV diuresis -resume torsemide once volume status back to baseline  Continue amlodipine in the a.m., hydralazine with hold parameters-   Patient often times not taking Coreg at home as he does not like the way it makes him feel -hold for now in setting of soft BP  Anemia due to chronic kidney disease, on chronic dialysis (HCC)  With chronic anemia secondary to renal  disease  Is on Epogen and iron infusions with dialysis  Continue folic acid daily  Hemoglobin stable  Thrombocytopenia (HCC)  Chronic stable thrombocytopenia in setting of chronic renal disease  ESRD (end stage renal disease) on dialysis (HCC)  Lab Results   Component Value Date    EGFR 6 01/20/2025    EGFR 9 01/15/2025    EGFR 12 01/14/2025    CREATININE 8.07 (H) 01/20/2025    CREATININE 5.82 (H) 01/15/2025    CREATININE 4.46 (H) 01/14/2025   Patient on dialysis MWF -noncompliant with going, wife says he misses at least once a week  Presenting with bilateral pleural effusions  Continue torsemide on nondialysis days, today supplement with IV diuretics  HD in the a.m.  Nephrology consult  Sepsis (HCC)  Meeting sepsis criteria secondary to temperature 101.4 °F, HR 97 bpm  Lactic acid 3.2  In setting of COVID  Blood cultures pending  Currently doubt bacterial infection, likely viral secondary to COVID-hold further antibiotics  See treatment for COVID for further detail  Hold further IV fluids in setting of acute CHF exacerbation and ESRD with bilateral moderate to large pleural effusions  IV fluids administered in total of 250 cc bolus  If blood pressure decreases with trial albumin      VTE Pharmacologic Prophylaxis:   Moderate Risk (Score 3-4) - Pharmacological DVT Prophylaxis Ordered: heparin.  Code Status: Prior   Discussion with family: Updated  (wife) via phone.    Anticipated Length of Stay: Patient will be admitted on an inpatient basis with an anticipated length of stay of greater than 2 midnights secondary to sepsis/COVID/CHF exacerbation.    History of Present Illness   Chief Complaint: Shortness of breath    Matthew Alvarez is a 69 y.o. male with a PMH of ESRD on HD MWF, HFrEF s/p LifeVest, HTN, CAD who presents with concern for worsening shortness of breath.  Had last dialysis session on Friday.  Wife states that he is started to become sluggish on Saturday with lack of appetite, she noticed  worsening shortness of breath and some mild confusion.  Today did not want to go to his dialysis session as he did not feel good and was feeling very short of breath.  Instead presented to the hospital.  Patient is alert, oriented to person, place and partially to time.  Knows some of his history but not all.  Wife states he has been noncompliant with his medications, often times refusing to take them or spitting them out and only goes to dialysis some days that he supposed to.  In the ED had fever, elevated heart rate meeting sepsis criteria in setting of COVID.  Found to have bilateral moderate to large pleural effusions.    Review of Systems   Constitutional:  Positive for appetite change and fever. Negative for activity change and chills.   HENT:  Positive for congestion. Negative for ear pain, rhinorrhea and sore throat.    Eyes:  Negative for pain and visual disturbance.   Respiratory:  Positive for cough and shortness of breath. Negative for chest tightness.    Cardiovascular:  Negative for chest pain and palpitations.   Gastrointestinal:  Negative for abdominal pain, constipation, diarrhea, nausea and vomiting.   Genitourinary:  Negative for difficulty urinating, dysuria, frequency, hematuria and urgency.   Musculoskeletal:  Negative for arthralgias, back pain and myalgias.   Skin:  Negative for color change and rash.   Neurological:  Positive for weakness (Generalized). Negative for seizures, syncope and headaches.   Psychiatric/Behavioral:  Positive for confusion.    All other systems reviewed and are negative.      Historical Information   Past Medical History:   Diagnosis Date    Acute hemodialysis patient ([facility]) 12/14/2024    CAD (coronary artery disease)     HFrEF (heart failure with reduced ejection fraction) ([facility]) 09/2021    Hypertension     Rheumatoid factor positive 09/2021    Stage 3 chronic kidney disease (HCC)     Tobacco abuse      Past Surgical History:   Procedure Laterality Date     APPENDECTOMY  1965    CARDIAC CATHETERIZATION  9/16/2021    INGUINAL HERNIA REPAIR Right 1991    IR THORACENTESIS  12/18/2024    IR THORACENTESIS  12/23/2024    IR TUNNELED DIALYSIS CATHETER PLACEMENT  11/14/2024     Social History     Tobacco Use    Smoking status: Every Day     Current packs/day: 0.50     Average packs/day: 0.5 packs/day for 51.1 years (25.5 ttl pk-yrs)     Types: Cigarettes     Start date: 1974    Smokeless tobacco: Never    Tobacco comments:     Began smoking in his early 20s, on average smoking 1+ packs daily. Quit in 08/2021 (Updated 09/15/2021).    Vaping Use    Vaping status: Never Used   Substance and Sexual Activity    Alcohol use: Yes     Alcohol/week: 6.0 standard drinks of alcohol     Types: 6 Shots of liquor per week     Comment: rare    Drug use: Never    Sexual activity: Not on file     Comment: defer     E-Cigarette/Vaping    E-Cigarette Use Never User      E-Cigarette/Vaping Substances       Social History:  Marital Status: /Civil Union   Occupation: None  Patient Pre-hospital Living Situation: Home, With spouse  Patient Pre-hospital Level of Mobility: walks with walker  Patient Pre-hospital Diet Restrictions: Does not follow a diet    Meds/Allergies   I have reviewed home medications with patient family member.  Prior to Admission medications    Medication Sig Start Date End Date Taking? Authorizing Provider   amLODIPine (NORVASC) 5 mg tablet Take 1 tablet (5 mg total) by mouth daily 1/15/25  Yes Amy Rodriguez MD   aspirin (ECOTRIN LOW STRENGTH) 81 mg EC tablet Take 1 tablet (81 mg total) by mouth daily 12/10/24  Yes YUE Griffin   atorvastatin (LIPITOR) 20 mg tablet Take 1 tablet (20 mg total) by mouth daily with dinner 12/27/24  Yes Chris Nolasco DO   carvedilol (COREG) 25 mg tablet Take 1 tablet (25 mg total) by mouth 2 (two) times a day with meals 1/15/25  Yes Amy Rodriguez MD   folic acid (FOLVITE) 1 mg tablet Take 1 tablet (1 mg total) by mouth  daily 12/27/24  Yes Chris Nolasco DO   hydrALAZINE (APRESOLINE) 100 MG tablet TAKE 1 TABLET BY MOUTH EVERY 8 HOURS. 1/20/25  Yes YUE Griffin   isosorbide mononitrate (IMDUR) 120 mg 24 hr tablet TAKE 1 TABLET BY MOUTH EVERY DAY 1/20/25  Yes YUE Griffin   melatonin 3 mg Take 2 tablets (6 mg total) by mouth daily at bedtime 1/15/25  Yes Amy Rodriguez MD   torsemide (DEMADEX) 100 mg tablet Take on nondialysis days (Sunday, Tuesday, Thursday, Saturday). Hold for systolic blood pressure less than 110 Do not start before January 16, 2025. 1/16/25  Yes Amy Rodriguez MD   epoetin loida (EPOGEN,PROCRIT) 4,000 units/mL Inject 2 mL (8,000 Units total) under the skin 3 (three) times a week 1/15/25   Amy Rodriguez MD   iron sucrose 100 mg in sodium chloride 0.9 % 50 mL IVPB Inject 100 mg into a catheter in a vein over 60 minutes 3 (three) times a week for 5 doses 1/17/25 1/28/25  Amy Rodriguez MD   hydrALAZINE (APRESOLINE) 100 MG tablet Take 1 tablet (100 mg total) by mouth every 8 (eight) hours 12/10/24 1/20/25  YUE Griffin   isosorbide mononitrate (IMDUR) 120 mg 24 hr tablet Take 1 tablet (120 mg total) by mouth daily 12/10/24 1/20/25  YUE Griffin     No Known Allergies    Objective :  Temp:  [99.6 °F (37.6 °C)-101.4 °F (38.6 °C)] 99.6 °F (37.6 °C)  HR:  [79-97] 81  BP: (113-148)/(58-70) 114/58  Resp:  [18-25] 22  SpO2:  [92 %-95 %] 93 %  O2 Device: None (Room air)    Physical Exam  Vitals and nursing note reviewed.   Constitutional:       General: He is not in acute distress.     Appearance: Normal appearance. He is ill-appearing.   HENT:      Head: Normocephalic and atraumatic.      Nose: No congestion.      Mouth/Throat:      Mouth: Mucous membranes are dry.      Pharynx: Oropharynx is clear.   Eyes:      Conjunctiva/sclera: Conjunctivae normal.   Cardiovascular:      Rate and Rhythm: Regular rhythm.      Pulses: Normal pulses.      Heart sounds: No murmur heard.      Comments: LifeVest in place  Pulmonary:      Effort: Pulmonary effort is normal. No respiratory distress.      Breath sounds: Rhonchi and rales present.   Abdominal:      General: Bowel sounds are normal.      Palpations: Abdomen is soft.      Tenderness: There is no abdominal tenderness.   Musculoskeletal:         General: Normal range of motion.      Cervical back: Normal range of motion.      Right lower leg: Edema present.      Left lower leg: Edema present.   Skin:     General: Skin is warm and dry.   Neurological:      Mental Status: He is alert and oriented to person, place, and time.   Psychiatric:         Mood and Affect: Mood normal.         Behavior: Behavior normal.          Lines/Drains:            Lab Results: I have reviewed the following results:  Results from last 7 days   Lab Units 01/20/25  1543   WBC Thousand/uL 8.76   HEMOGLOBIN g/dL 9.2*   HEMATOCRIT % 27.9*   PLATELETS Thousands/uL 93*   SEGS PCT % 90*   LYMPHO PCT % 4*   MONO PCT % 5   EOS PCT % 0     Results from last 7 days   Lab Units 01/20/25  1543   SODIUM mmol/L 130*   POTASSIUM mmol/L 4.5   CHLORIDE mmol/L 92*   CO2 mmol/L 24   BUN mg/dL 77*   CREATININE mg/dL 8.07*   ANION GAP mmol/L 14*   CALCIUM mg/dL 8.6   ALBUMIN g/dL 3.0*   TOTAL BILIRUBIN mg/dL 1.85*   ALK PHOS U/L 126*   ALT U/L 21   AST U/L 57*   GLUCOSE RANDOM mg/dL 104     Results from last 7 days   Lab Units 01/20/25  1543   INR  1.34*     Results from last 7 days   Lab Units 01/14/25  2103   POC GLUCOSE mg/dl 157*     Lab Results   Component Value Date    HGBA1C 5.7 (H) 09/15/2021     Results from last 7 days   Lab Units 01/20/25  1543   LACTIC ACID mmol/L 3.2*   PROCALCITONIN ng/ml 2.72*       Imaging Results Review: I reviewed radiology reports from this admission including: CT chest and CT abdomen/pelvis.  Other Study Results Review: EKG was reviewed.     Administrative Statements       ** Please Note: This note has been constructed using a voice recognition system.  **

## 2025-01-20 NOTE — ASSESSMENT & PLAN NOTE
Wt Readings from Last 3 Encounters:   01/20/25 55.4 kg (122 lb 2.2 oz)   01/15/25 56.5 kg (124 lb 9.6 oz)   12/27/24 56 kg (123 lb 6.4 oz)     Patient presents with worsening shortness of breath, missed dialysis session today with last session on Friday  Patient's wife endorses that he sometimes is not taking his medication, spits them out or throws them away  CT chest with moderate to large right pleural effusion and moderate left pleural effusion  Echo in November with EF of 20%  Wears LifeVest-currently wearing -will monitor on telemetry per policy  PTA regimen is on Coreg, torsemide (on nondialysis days)  BNP pending    Will give diuresis today IV  HD first thing in the morning tomorrow  Monitor electrolytes   Daily weights and I/O

## 2025-01-21 ENCOUNTER — APPOINTMENT (INPATIENT)
Dept: DIALYSIS | Facility: HOSPITAL | Age: 70
DRG: 314 | End: 2025-01-21
Payer: COMMERCIAL

## 2025-01-21 ENCOUNTER — APPOINTMENT (INPATIENT)
Dept: NON INVASIVE DIAGNOSTICS | Facility: HOSPITAL | Age: 70
DRG: 314 | End: 2025-01-21
Payer: COMMERCIAL

## 2025-01-21 PROBLEM — I48.91 ATRIAL FIBRILLATION WITH RVR (HCC): Status: ACTIVE | Noted: 2025-01-21

## 2025-01-21 PROBLEM — R78.81 BACTEREMIA: Status: ACTIVE | Noted: 2025-01-21

## 2025-01-21 LAB
ACANTHOCYTES BLD QL SMEAR: PRESENT
ALBUMIN SERPL BCG-MCNC: 2.6 G/DL (ref 3.5–5)
ALP SERPL-CCNC: 113 U/L (ref 34–104)
ALT SERPL W P-5'-P-CCNC: 20 U/L (ref 7–52)
ANION GAP SERPL CALCULATED.3IONS-SCNC: 13 MMOL/L (ref 4–13)
ANISOCYTOSIS BLD QL SMEAR: PRESENT
AORTIC ROOT: 2.9 CM
AST SERPL W P-5'-P-CCNC: 46 U/L (ref 13–39)
ATRIAL RATE: 96 BPM
BASOPHILS # BLD MANUAL: 0 THOUSAND/UL (ref 0–0.1)
BASOPHILS NFR MAR MANUAL: 0 % (ref 0–1)
BILIRUB SERPL-MCNC: 1.75 MG/DL (ref 0.2–1)
BSA FOR ECHO PROCEDURE: 1.6 M2
BUN SERPL-MCNC: 80 MG/DL (ref 5–25)
CALCIUM ALBUM COR SERPL-MCNC: 9.4 MG/DL (ref 8.3–10.1)
CALCIUM SERPL-MCNC: 8.3 MG/DL (ref 8.4–10.2)
CHLORIDE SERPL-SCNC: 94 MMOL/L (ref 96–108)
CO2 SERPL-SCNC: 26 MMOL/L (ref 21–32)
CREAT SERPL-MCNC: 8.44 MG/DL (ref 0.6–1.3)
EOSINOPHIL # BLD MANUAL: 0 THOUSAND/UL (ref 0–0.4)
EOSINOPHIL NFR BLD MANUAL: 0 % (ref 0–6)
ERYTHROCYTE [DISTWIDTH] IN BLOOD BY AUTOMATED COUNT: 19.9 % (ref 11.6–15.1)
FRACTIONAL SHORTENING: 8 (ref 28–44)
GFR SERPL CREATININE-BSD FRML MDRD: 5 ML/MIN/1.73SQ M
GIANT PLATELETS BLD QL SMEAR: PRESENT
GLUCOSE SERPL-MCNC: 93 MG/DL (ref 65–140)
HCT VFR BLD AUTO: 27.9 % (ref 36.5–49.3)
HGB BLD-MCNC: 9.6 G/DL (ref 12–17)
INTERVENTRICULAR SEPTUM IN DIASTOLE (PARASTERNAL SHORT AXIS VIEW): 0.9 CM
INTERVENTRICULAR SEPTUM: 0.9 CM (ref 0.6–1.1)
IVC: 12 MM
LEFT ATRIUM SIZE: 4.1 CM
LEFT INTERNAL DIMENSION IN SYSTOLE: 4.6 CM (ref 2.1–4)
LEFT VENTRICULAR INTERNAL DIMENSION IN DIASTOLE: 5 CM (ref 3.5–6)
LEFT VENTRICULAR POSTERIOR WALL IN END DIASTOLE: 1.2 CM
LEFT VENTRICULAR STROKE VOLUME: 24 ML
LVSV (TEICH): 24 ML
LYMPHOCYTES # BLD AUTO: 0.37 THOUSAND/UL (ref 0.6–4.47)
LYMPHOCYTES # BLD AUTO: 3 % (ref 14–44)
MACROCYTES BLD QL AUTO: PRESENT
MCH RBC QN AUTO: 29.8 PG (ref 26.8–34.3)
MCHC RBC AUTO-ENTMCNC: 34.4 G/DL (ref 31.4–37.4)
MCV RBC AUTO: 87 FL (ref 82–98)
MONOCYTES # BLD AUTO: 0.09 THOUSAND/UL (ref 0–1.22)
MONOCYTES NFR BLD: 1 % (ref 4–12)
NEUTROPHILS # BLD MANUAL: 8.68 THOUSAND/UL (ref 1.85–7.62)
NEUTS BAND NFR BLD MANUAL: 3 % (ref 0–8)
NEUTS SEG NFR BLD AUTO: 92 % (ref 43–75)
P AXIS: 49 DEGREES
PA SYSTOLIC PRESSURE: 48 MMHG
PLATELET # BLD AUTO: 87 THOUSANDS/UL (ref 149–390)
PLATELET BLD QL SMEAR: ABNORMAL
PMV BLD AUTO: 12.4 FL (ref 8.9–12.7)
POIKILOCYTOSIS BLD QL SMEAR: PRESENT
POLYCHROMASIA BLD QL SMEAR: PRESENT
POTASSIUM SERPL-SCNC: 4.3 MMOL/L (ref 3.5–5.3)
PR INTERVAL: 150 MS
PROCALCITONIN SERPL-MCNC: 3.06 NG/ML
PROT SERPL-MCNC: 5.8 G/DL (ref 6.4–8.4)
QRS AXIS: -18 DEGREES
QRS AXIS: -21 DEGREES
QRSD INTERVAL: 102 MS
QRSD INTERVAL: 88 MS
QT INTERVAL: 294 MS
QT INTERVAL: 360 MS
QTC INTERVAL: 420 MS
QTC INTERVAL: 454 MS
RA PRESSURE ESTIMATED: 3 MMHG
RBC # BLD AUTO: 3.22 MILLION/UL (ref 3.88–5.62)
RBC MORPH BLD: PRESENT
RV PSP: 48 MMHG
SL CV LV EF: 20
SL CV PED ECHO LEFT VENTRICLE DIASTOLIC VOLUME (MOD BIPLANE) 2D: 120 ML
SL CV PED ECHO LEFT VENTRICLE SYSTOLIC VOLUME (MOD BIPLANE) 2D: 96 ML
SODIUM SERPL-SCNC: 133 MMOL/L (ref 135–147)
T WAVE AXIS: 117 DEGREES
T WAVE AXIS: 84 DEGREES
TR MAX PG: 45 MMHG
TR PEAK VELOCITY: 3.3 M/S
TRICUSPID VALVE PEAK REGURGITATION VELOCITY: 3.34 M/S
VANCOMYCIN SERPL-MCNC: 11.7 UG/ML (ref 10–20)
VARIANT LYMPHS # BLD AUTO: 1 %
VENTRICULAR RATE: 123 BPM
VENTRICULAR RATE: 96 BPM
WBC # BLD AUTO: 9.14 THOUSAND/UL (ref 4.31–10.16)

## 2025-01-21 PROCEDURE — 87147 CULTURE TYPE IMMUNOLOGIC: CPT

## 2025-01-21 PROCEDURE — 93005 ELECTROCARDIOGRAM TRACING: CPT

## 2025-01-21 PROCEDURE — 0JPT3XZ REMOVAL OF TUNNELED VASCULAR ACCESS DEVICE FROM TRUNK SUBCUTANEOUS TISSUE AND FASCIA, PERCUTANEOUS APPROACH: ICD-10-PCS | Performed by: STUDENT IN AN ORGANIZED HEALTH CARE EDUCATION/TRAINING PROGRAM

## 2025-01-21 PROCEDURE — 93308 TTE F-UP OR LMTD: CPT

## 2025-01-21 PROCEDURE — 99232 SBSQ HOSP IP/OBS MODERATE 35: CPT

## 2025-01-21 PROCEDURE — 80202 ASSAY OF VANCOMYCIN: CPT | Performed by: FAMILY MEDICINE

## 2025-01-21 PROCEDURE — 80053 COMPREHEN METABOLIC PANEL: CPT

## 2025-01-21 PROCEDURE — 85007 BL SMEAR W/DIFF WBC COUNT: CPT

## 2025-01-21 PROCEDURE — 99255 IP/OBS CONSLTJ NEW/EST HI 80: CPT | Performed by: INTERNAL MEDICINE

## 2025-01-21 PROCEDURE — G0545 PR INHERENT VISIT TO INPT: HCPCS | Performed by: INTERNAL MEDICINE

## 2025-01-21 PROCEDURE — 84145 PROCALCITONIN (PCT): CPT

## 2025-01-21 PROCEDURE — 87070 CULTURE OTHR SPECIMN AEROBIC: CPT

## 2025-01-21 PROCEDURE — 85027 COMPLETE CBC AUTOMATED: CPT

## 2025-01-21 PROCEDURE — 5A1D70Z PERFORMANCE OF URINARY FILTRATION, INTERMITTENT, LESS THAN 6 HOURS PER DAY: ICD-10-PCS | Performed by: INTERNAL MEDICINE

## 2025-01-21 PROCEDURE — 87205 SMEAR GRAM STAIN: CPT

## 2025-01-21 PROCEDURE — 02PA33Z REMOVAL OF INFUSION DEVICE FROM HEART, PERCUTANEOUS APPROACH: ICD-10-PCS | Performed by: STUDENT IN AN ORGANIZED HEALTH CARE EDUCATION/TRAINING PROGRAM

## 2025-01-21 PROCEDURE — 87186 SC STD MICRODIL/AGAR DIL: CPT

## 2025-01-21 RX ORDER — VANCOMYCIN HYDROCHLORIDE 750 MG/150ML
15 INJECTION, SOLUTION INTRAVENOUS ONCE AS NEEDED
Status: DISCONTINUED | OUTPATIENT
Start: 2025-01-22 | End: 2025-01-22

## 2025-01-21 RX ORDER — ALBUMIN (HUMAN) 12.5 G/50ML
12.5 SOLUTION INTRAVENOUS ONCE
Status: COMPLETED | OUTPATIENT
Start: 2025-01-21 | End: 2025-01-21

## 2025-01-21 RX ORDER — METOPROLOL TARTRATE 25 MG/1
25 TABLET, FILM COATED ORAL EVERY 12 HOURS SCHEDULED
Status: DISCONTINUED | OUTPATIENT
Start: 2025-01-21 | End: 2025-01-29 | Stop reason: HOSPADM

## 2025-01-21 RX ORDER — METOPROLOL TARTRATE 1 MG/ML
2.5 INJECTION, SOLUTION INTRAVENOUS EVERY 6 HOURS PRN
Status: DISCONTINUED | OUTPATIENT
Start: 2025-01-21 | End: 2025-01-29 | Stop reason: HOSPADM

## 2025-01-21 RX ORDER — LIDOCAINE HYDROCHLORIDE AND EPINEPHRINE 10; 10 MG/ML; UG/ML
10 INJECTION, SOLUTION INFILTRATION; PERINEURAL ONCE
Status: COMPLETED | OUTPATIENT
Start: 2025-01-21 | End: 2025-01-21

## 2025-01-21 RX ORDER — VANCOMYCIN HYDROCHLORIDE 750 MG/150ML
15 INJECTION, SOLUTION INTRAVENOUS ONCE
Status: COMPLETED | OUTPATIENT
Start: 2025-01-21 | End: 2025-01-21

## 2025-01-21 RX ADMIN — ISOSORBIDE MONONITRATE 120 MG: 30 TABLET, EXTENDED RELEASE ORAL at 12:56

## 2025-01-21 RX ADMIN — REMDESIVIR 100 MG: 100 INJECTION, POWDER, LYOPHILIZED, FOR SOLUTION INTRAVENOUS at 19:15

## 2025-01-21 RX ADMIN — ATORVASTATIN CALCIUM 20 MG: 20 TABLET, FILM COATED ORAL at 17:18

## 2025-01-21 RX ADMIN — LIDOCAINE HYDROCHLORIDE,EPINEPHRINE BITARTRATE 10 ML: 10; .01 INJECTION, SOLUTION INFILTRATION; PERINEURAL at 15:56

## 2025-01-21 RX ADMIN — VANCOMYCIN HYDROCHLORIDE 750 MG: 750 INJECTION, SOLUTION INTRAVENOUS at 12:56

## 2025-01-21 RX ADMIN — FOLIC ACID 1 MG: 1 TABLET ORAL at 12:56

## 2025-01-21 RX ADMIN — METOPROLOL TARTRATE 25 MG: 25 TABLET, FILM COATED ORAL at 21:13

## 2025-01-21 RX ADMIN — Medication 6 MG: at 21:13

## 2025-01-21 RX ADMIN — METOPROLOL TARTRATE 25 MG: 25 TABLET, FILM COATED ORAL at 04:39

## 2025-01-21 RX ADMIN — ALBUMIN (HUMAN) 12.5 G: 0.25 INJECTION, SOLUTION INTRAVENOUS at 07:32

## 2025-01-21 NOTE — ASSESSMENT & PLAN NOTE
Wt Readings from Last 3 Encounters:   01/21/25 53.7 kg (118 lb 6.4 oz)   01/15/25 56.5 kg (124 lb 9.6 oz)   12/27/24 56 kg (123 lb 6.4 oz)     Has underlying nonischemic cardiomyopathy, ESRD, A-fib with RVR and chronic pleural effusions requiring thoracentesis in the past

## 2025-01-21 NOTE — CONSULTS
Consultation - General Surgery   Matthew Alvarez 69 y.o. male MRN: [id number]  Unit/Bed#: -01 Encounter: [id number]    Assessment:  69-year-old male with a history of end-stage renal disease on dialysis and CHF who presented due to worsening shortness of breath.  The patient has been found to be COVID-positive.  The patient was septic on presentation which was initially thought to be due to COVID-pneumonia.  On examination a pustule was noted over the path of his tunneled hemodialysis catheter.    Blood culture positive for Staph aureus    Suspected source of sepsis and positive blood culture is the hemodialysis catheter.  We have been asked to remove the catheter by the primary service.    On my examination the tunneled catheter is intact of the right chest, the catheter enters the right internal jugular vein.  As the catheter extends over the clavicle, there is a blistered area in this area with what appears to be underlying purulent drainage.  This area is tender.  The catheter is palpable in the area of the blister.      Plan:  Tunneled hemodialysis catheter removed at the bedside, please see separate procedure note for details  Pressure dressing placed over the right IJ and the catheter site.  Will be reevaluated by the surgical service tomorrow  Purulent drainage was obtained from the catheter site.  A swab was sent for culture.  The tip of the catheter was also sent for culture.    History of Present Illness       HPI:  Matthew Alvarez is a 69 y.o. male with past medical history significant for end-stage renal disease on Allises, CHF, and thrombocytopenia who initially presented to [facility] with complaints of increasing shortness of breath.  The patient does complain of a cough on our examination today.  He does have some tenderness in the area of the hemodialysis catheter.  He has no other complaints.    Review of Systems   Constitutional: Negative.    HENT: Negative.     Respiratory:  Positive for shortness  "of breath.    Cardiovascular: Negative.    Gastrointestinal:  Positive for abdominal pain.   Genitourinary: Negative.    Musculoskeletal: Negative.    Skin: Negative.    Neurological: Negative.    Hematological:  Bruises/bleeds easily.       Historical Information   Past Medical History:   Diagnosis Date    Acute hemodialysis patient ([facility]) 12/14/2024    CAD (coronary artery disease)     HFrEF (heart failure with reduced ejection fraction) ([facility]) 09/2021    Hypertension     Rheumatoid factor positive 09/2021    Stage 3 chronic kidney disease ([facility])     Tobacco abuse      Past Surgical History:   Procedure Laterality Date    APPENDECTOMY  1965    CARDIAC CATHETERIZATION  9/16/2021    INGUINAL HERNIA REPAIR Right 1991    IR THORACENTESIS  12/18/2024    IR THORACENTESIS  12/23/2024    IR TUNNELED DIALYSIS CATHETER PLACEMENT  11/14/2024     Social History   Social History     Substance and Sexual Activity   Alcohol Use Yes    Alcohol/week: 6.0 standard drinks of alcohol    Types: 6 Shots of liquor per week    Comment: rare     Social History     Substance and Sexual Activity   Drug Use Never     E-Cigarette/Vaping    E-Cigarette Use Never User      E-Cigarette/Vaping Substances     Social History     Tobacco Use   Smoking Status Every Day    Current packs/day: 0.50    Average packs/day: 0.5 packs/day for 51.1 years (25.5 ttl pk-yrs)    Types: Cigarettes    Start date: 1974   Smokeless Tobacco Never   Tobacco Comments    Began smoking in his early 20s, on average smoking 1+ packs daily. Quit in 08/2021 (Updated 09/15/2021).      Family History:   Family History   Problem Relation Age of Onset    Heart failure Mother     Other Father         \"blood clot\"    COPD Sister     Heart failure Brother     Valvular heart disease Brother     Heart attack Son     No Known Problems Son        Meds/Allergies   all current active meds have been reviewed, current meds:   Current Facility-Administered Medications:     acetaminophen " (TYLENOL) tablet 650 mg, Q6H PRN    [Held by provider] amLODIPine (NORVASC) tablet 5 mg, Daily    atorvastatin (LIPITOR) tablet 20 mg, Daily With Dinner    folic acid (FOLVITE) tablet 1 mg, Daily    [Held by provider] hydrALAZINE (APRESOLINE) tablet 100 mg, Q8H JEEVAN    isosorbide mononitrate (IMDUR) 24 hr tablet 120 mg, Daily    lidocaine-epinephrine (XYLOCAINE/EPINEPHRINE) 1 %-1:100,000 injection 10 mL, Once    melatonin tablet 6 mg, HS    metoprolol (LOPRESSOR) injection 2.5 mg, Q6H PRN    metoprolol tartrate (LOPRESSOR) tablet 25 mg, Q12H JEEVAN    [COMPLETED] remdesivir (Veklury) 200 mg in sodium chloride 0.9 % 290 mL IVPB, Q24H **FOLLOWED BY** remdesivir (Veklury) 100 mg in sodium chloride 0.9 % 270 mL IVPB, Q24H    [START ON 1/22/2025] vancomycin (VANCOCIN) IVPB (premix in dextrose) 750 mg 150 mL, Once PRN, and PTA meds:   Prior to Admission Medications   Prescriptions Last Dose Informant Patient Reported? Taking?   amLODIPine (NORVASC) 5 mg tablet 1/19/2025  No Yes   Sig: Take 1 tablet (5 mg total) by mouth daily   aspirin (ECOTRIN LOW STRENGTH) 81 mg EC tablet 1/19/2025  No Yes   Sig: Take 1 tablet (81 mg total) by mouth daily   atorvastatin (LIPITOR) 20 mg tablet 1/19/2025 Evening  No Yes   Sig: Take 1 tablet (20 mg total) by mouth daily with dinner   carvedilol (COREG) 25 mg tablet 1/19/2025  No Yes   Sig: Take 1 tablet (25 mg total) by mouth 2 (two) times a day with meals   epoetin loida (EPOGEN,PROCRIT) 4,000 units/mL   No No   Sig: Inject 2 mL (8,000 Units total) under the skin 3 (three) times a week   folic acid (FOLVITE) 1 mg tablet 1/19/2025  No Yes   Sig: Take 1 tablet (1 mg total) by mouth daily   hydrALAZINE (APRESOLINE) 100 MG tablet 1/19/2025  No Yes   Sig: TAKE 1 TABLET BY MOUTH EVERY 8 HOURS.   iron sucrose 100 mg in sodium chloride 0.9 % 50 mL IVPB   No No   Sig: Inject 100 mg into a catheter in a vein over 60 minutes 3 (three) times a week for 5 doses   isosorbide mononitrate (IMDUR) 120 mg 24 hr  "tablet 1/19/2025  No Yes   Sig: TAKE 1 TABLET BY MOUTH EVERY DAY   melatonin 3 mg 1/19/2025 Evening  No Yes   Sig: Take 2 tablets (6 mg total) by mouth daily at bedtime   torsemide (DEMADEX) 100 mg tablet 1/19/2025 Morning  No Yes   Sig: Take on nondialysis days (Sunday, Tuesday, Thursday, Saturday). Hold for systolic blood pressure less than 110 Do not start before January 16, 2025.      Facility-Administered Medications: None     No Known Allergies    Objective   First Vitals:   Blood Pressure: 133/70 (01/20/25 1526)  Pulse: 94 (01/20/25 1526)  Temperature: (!) 101.4 °F (38.6 °C) (01/20/25 1531)  Temp Source: Temporal (01/20/25 1531)  Respirations: 18 (01/20/25 1526)  Height: 5' 6\" (167.6 cm) (01/21/25 1241)  Weight - Scale: 55.4 kg (122 lb 2.2 oz) (01/20/25 1526)  SpO2: 95 % (01/20/25 1526)    Current Vitals:   Blood Pressure: 117/79 (01/21/25 1536)  Pulse: (!) 129 (01/21/25 1536)  Temperature: 98.2 °F (36.8 °C) (01/21/25 1536)  Temp Source: Temporal (01/21/25 1220)  Respirations: 20 (01/21/25 1536)  Height: 5' 6\" (167.6 cm) (01/21/25 1241)  Weight - Scale: 53.5 kg (118 lb) (01/21/25 1241)  SpO2: 91 % (01/21/25 1536)    Intake/Output Summary (Last 24 hours) at 1/21/2025 1544  Last data filed at 1/21/2025 1401  Gross per 24 hour   Intake 1060 ml   Output 1500 ml   Net -440 ml     Invasive Devices       Peripheral Intravenous Line  Duration             Peripheral IV 01/20/25 Left Antecubital 1 day    Peripheral IV 01/20/25 Left;Ventral (anterior) Forearm <1 day              Hemodialysis Catheter  Duration             HD Permanent Double Catheter 68 days                  Physical Exam  Constitutional:       Appearance: Normal appearance.   HENT:      Head: Normocephalic and atraumatic.      Mouth/Throat:      Mouth: Mucous membranes are moist.   Cardiovascular:      Rate and Rhythm: Normal rate.   Pulmonary:      Effort: Pulmonary effort is normal.   Abdominal:      Palpations: Abdomen is soft.   Skin:     General: " Skin is warm and dry.      Comments: + right chest hemodialysis catheter with overlying pustule   Neurological:      General: No focal deficit present.      Mental Status: He is alert and oriented to person, place, and time.         Lab Results: I have personally reviewed pertinent lab results.    CBC:   Lab Results   Component Value Date    WBC 9.14 01/21/2025    HGB 9.6 (L) 01/21/2025    HCT 27.9 (L) 01/21/2025    MCV 87 01/21/2025    PLT 87 (L) 01/21/2025    RBC 3.22 (L) 01/21/2025    MCH 29.8 01/21/2025    MCHC 34.4 01/21/2025    RDW 19.9 (H) 01/21/2025    MPV 12.4 01/21/2025   CMP:   Lab Results   Component Value Date    SODIUM 133 (L) 01/21/2025    K 4.3 01/21/2025    CL 94 (L) 01/21/2025    CO2 26 01/21/2025    BUN 80 (H) 01/21/2025    CREATININE 8.44 (H) 01/21/2025    CALCIUM 8.3 (L) 01/21/2025    AST 46 (H) 01/21/2025    ALT 20 01/21/2025    ALKPHOS 113 (H) 01/21/2025    EGFR 5 01/21/2025     Imaging: Results Review Statement: No pertinent imaging studies reviewed.  EKG, Pathology, and Other Studies: Results Review Statement: No pertinent imaging studies reviewed.    Code Status: Level 1 - Full Code  Advance Directive and Living Will:      Power of :    POLST:      Counseling / Coordination of Care  Total floor / unit time spent today 40 minutes.  Greater than 50% of total time was spent with the patient and / or family counseling and / or coordination of care.  A description of the counseling / coordination of care: review of chart, labs, prior history, imaging, discussion with patient to obtain history, perform physical, review assessment and plan.

## 2025-01-21 NOTE — ASSESSMENT & PLAN NOTE
With chronic anemia secondary to renal disease  Is on Epogen and iron infusions with dialysis  Would avoid IV iron due to findings of bacteremia  Continue folic acid daily  Hemoglobin stable

## 2025-01-21 NOTE — PROGRESS NOTES
Matthew Alvarez is a 69 y.o. male who is currently ordered Vancomycin IV with management by the Pharmacy Consult service.  Relevant clinical data and objective / subjective history reviewed.  Vancomycin Assessment:  Indication and Goal AUC/Trough: Soft tissue (goal -600, trough >10)  Clinical Status: stable  Micro:     Renal Function:  SCr: 8.44 mg/dL  CrCl: 6.2 mL/min  Renal replacement HD Dialysis   Days of Therapy: 2  Current Dose: 750mg iv once today post HD  Vancomycin Plan:  New Dosinmg iv daily prn level </=15 until dialysis schedule is set.  Pulse dosing  Next Level: 25 0600  Renal Function Monitoring: Daily BMP and UOP  Pharmacy will continue to follow closely for s/sx of nephrotoxicity, infusion reactions and appropriateness of therapy.  BMP and CBC will be ordered per protocol. We will continue to follow the patient’s culture results and clinical progress daily.    Harsh Rome, Pharmacist

## 2025-01-21 NOTE — UTILIZATION REVIEW
Initial Clinical Review    Admission: Date/Time/Statement:   Admission Orders (From admission, onward)       Ordered        01/20/25 1702  INPATIENT ADMISSION  Once                          Orders Placed This Encounter   Procedures    INPATIENT ADMISSION     Standing Status:   Standing     Number of Occurrences:   1     Level of Care:   Med Surg [16]     Estimated length of stay:   More than 2 Midnights     Certification:   I certify that inpatient services are medically necessary for this patient for a duration of greater than two midnights. See H&P and MD Progress Notes for additional information about the patient's course of treatment.     ED Arrival Information       Expected   -    Arrival   1/20/2025 15:19    Acuity   Emergent              Means of arrival   Wheelchair    Escorted by   Family Member    Service   Hospitalist    Admission type   Emergency              Arrival complaint   cough/ weakness             Chief Complaint   Patient presents with    Weakness - Generalized     Pt presents from home for generalized weakness and cough. Did not go to scheduled dialysis appt today.        Initial Presentation: 69 y.o. male to ED via WC from home  Present to ED with  worsening shortness of breath.  Had last dialysis session on Friday.  Wife states that he is started to become sluggish on Saturday with lack of appetite, she noticed worsening shortness of breath and some mild confusion.  Today did not want to go to his dialysis session as he did not feel good and was feeling very short of breath.    PMHX ESRD on HD MWF, HFrEF s/p LifeVest, HTN, CAD;  Echo in November with EF of 20%   Admitted to [facility] with DX: Acute on chronic systolic heart failure   on exam: T 101.4; tachpnea; lifevest in place; lungs with rhonchi and rales; BNP >4,700; lipase 109; Heme 9.2; Na 130; Cr 8.07; AG 14; Albumin 3.0; T bili 1.85; AST 57; LA 3.2; Procal 2.72; COVID19- positive on 1/20  Patient also has some pustule located along the  right clavicle close to the HD catheter   CT moderate to large right pleural effusion and moderate left pleural effusion   PLAN: cont iv abx; start iv remdesivir; recd bumex iv x1; monitor labs; tele monitoring; fluid / Na restriction; PT/ OT eval - tx; nephrology consult; f/u blood cx      Anticipated Length of Stay/Certification Statement: Patient will be admitted on an inpatient basis with an anticipated length of stay of greater than 2 midnights secondary to sepsis/COVID/CHF exacerbation.       Date: 1/21/25      Day 2   Pt was found to be in AfibRVR HR> 110, pt was started on metoprolol tartrate 25 mg BID, day team will discuss with the pt regarding needs for Anticoagulation. Now Rate is controlled.   Plan: Dialysis today; cont iv abx; cont iv remdesivir; recd albumin iv x1; monitor labs; tele monitoring; fluid / Na restriction; PT/ OT eval - tx; nephrology consult; f/u blood cx      NEPHROLOGY CONSULT   ESRD (end stage renal disease) on dialysis: The patient undergoes hemodialysis on a Monday Wednesday Friday basis at [facility] and follows with Dr. Nuñez the etiology of the patient'shese.  Likely end-stage renal disease at this time is secondary to cardiorenal syndrome.  He was initially started on hemodialysis in November 2024 and has been dialysis dependent since then with recurrent admissions.  Would likely state end-stage at this point.  His access is a right IJ tunneled dialysis catheter.   Acute on chronic systolic heart failure: UF goal again only 500 cc to 1 L given the lower blood pressure in the setting of sepsis with current blood pressure currently in the 90s. Patient is again noted to have an EF of 20%.   Plan: hemodialysis today and then tomorrow to get him back on his regular schedule. give intravenous albumin prior to hemodialysis today.       Date: 1/21/25     Day 3: Has surpassed a 2nd midnight with active treatments and services. Require additional inpatient hospital stay due to sepsis,  bacteremia, A-fib RVR   Patient states that he is feeling much better today than when he first came in. He said he is currently agreeable with the plan of getting temporary dialysis catheter and IV antibiotics. At that he does still have a cough which is slightly bothersome. Patient with 2 out of 2 blood cultures positive for gram-positive cocci in clusters. ID recommending transition to cefazolin 1 g every 24 hours.  No vegetation noted on echo. Will need anticoagulation -will need to be started on renally adjusted Eliquis when able, pending tunneled cath removal for source control will hold anticoagulation in wai-procedure period   Plan: cont iv abx; cont iv remdesivir; monitor labs; tele monitoring; fluid / Na restriction; f/u PT/ OT recom; f/u repeat blood cx; Will need removal of HD cath - Can replace PermaCath after blood cultures are negative for 48 to 72 hours; start fluid restriction along with continuing hemodialysis       ED Treatment-Medication Administration from 01/20/2025 1513 to 01/20/2025 1757         Date/Time Order Dose Route Action     01/20/2025 1631 vancomycin (VANCOCIN) IVPB (premix in dextrose) 750 mg 150 mL 750 mg Intravenous New Bag     01/20/2025 1553 piperacillin-tazobactam (ZOSYN) 4.5 g in sodium chloride 0.9 % 100 mL IVPB 4.5 g Intravenous New Bag     01/20/2025 1556 acetaminophen (Ofirmev) injection 1,000 mg 1,000 mg Intravenous New Bag     01/20/2025 1554 sodium chloride 0.9 % bolus 250 mL 250 mL Intravenous New Bag            Scheduled Medications:  [Held by provider] amLODIPine, 5 mg, Oral, Daily  aspirin, 81 mg, Oral, Daily  atorvastatin, 20 mg, Oral, Daily With Dinner  cefTRIAXone, 1,000 mg, Intravenous, Q24H  folic acid, 1 mg, Oral, Daily  [Held by provider] hydrALAZINE, 100 mg, Oral, Q8H JEEVAN  isosorbide mononitrate, 120 mg, Oral, Daily  melatonin, 6 mg, Oral, HS  metoprolol tartrate, 25 mg, Oral, Q12H JEEVAN  remdesivir, 100 mg, Intravenous, Q24H  vancomycin, 15 mg/kg,  Intravenous, Once    bumetanide (BUMEX) injection 2 mg  Dose: 2 mg  Freq: Once Route: IV  Start: 01/20/25 1800 End: 01/20/25 1928      albumin human (FLEXBUMIN) 25 % injection 12.5 g  Dose: 12.5 g  Freq: Once Route: IV  Start: 01/21/25 0700 End: 01/21/25 0732      Continuous IV Infusions: None       PRN Meds:  acetaminophen, 650 mg, Oral, Q6H PRN  metoprolol, 2.5 mg, Intravenous, Q6H PRN  [START ON 1/22/2025] vancomycin, 15 mg/kg (Adjusted), Intravenous, Once PRN      ED Triage Vitals   Temperature Pulse Respirations Blood Pressure SpO2 Pain Score   01/20/25 1531 01/20/25 1526 01/20/25 1526 01/20/25 1526 01/20/25 1526 01/20/25 1840   (!) 101.4 °F (38.6 °C) 94 18 133/70 95 % No Pain     Weight (last 2 days)       Date/Time Weight    01/22/25 0533 53.5 (118)    01/21/25 1241 53.5 (118)    01/21/25 0557 53.7 (118.4)    01/21/25 0431 53.7 (118.4)    01/20/25 1755 52.7 (116.1)    01/20/25 1526 55.4 (122.14)            Vital Signs (last 3 days)       Date/Time Temp Pulse Resp BP MAP (mmHg) SpO2 O2 Device Patient Position - Orthostatic VS Pain    01/22/25 11:09:42 97.7 °F (36.5 °C) 58 20 121/65 84 99 % None (Room air) Sitting --    01/22/25 0832 -- -- -- -- -- 97 % -- -- No Pain    01/22/25 0828 -- 65 -- 130/69 -- -- -- -- --    01/22/25 07:17:21 98.2 °F (36.8 °C) 63 22 129/69 89 97 % None (Room air) Lying --    01/22/25 03:32:51 -- 70 16 119/63 82 93 % -- -- --    01/21/25 22:47:42 98.8 °F (37.1 °C) 104 16 112/64 80 94 % -- -- --    01/21/25 21:14:55 -- 115 -- 122/69 87 94 % -- -- --    01/21/25 2113 -- 115 -- 122/69 -- -- -- -- --    01/21/25 1930 -- -- -- -- -- 94 % None (Room air) -- No Pain    01/21/25 19:21:21 98.6 °F (37 °C) 132 17 109/71 84 93 % -- -- --    01/21/25 15:36:38 98.2 °F (36.8 °C) 129 20 117/79 92 91 % -- -- --    01/21/25 1241 -- 110 -- 143/77 -- -- -- -- --    01/21/25 1220 99 °F (37.2 °C) 71 20 143/77 98 -- -- Lying --    01/21/25 1200 -- 61 20 109/74 83 -- -- -- --    01/21/25 1130 -- 95 20 125/108  113 94 % None (Room air) Lying --    01/21/25 1100 -- 80 18 120/92 100 -- -- -- --    01/21/25 1030 -- 123 18 134/89 103 -- -- -- --    01/21/25 1000 -- 116 18 121/79 90 95 % None (Room air) -- --    01/21/25 0930 -- 112 18 130/78 94 -- -- -- --    01/21/25 0910 -- 118 18 118/81 90 -- -- Lying --    01/21/25 0900 -- -- -- -- -- -- -- -- No Pain    01/21/25 0752 -- 117 -- 118/80 93 -- -- -- --    01/21/25 07:35:43 99.5 °F (37.5 °C) 118 20 122/71 88 93 % -- -- --    01/21/25 06:04:38 -- 84 -- 99/67 78 96 % -- -- --    01/21/25 0603 -- -- -- 99/67 -- -- -- -- --    01/21/25 04:31:07 -- 136 -- 105/70 82 93 % -- -- --    01/21/25 02:55:27 -- 118 -- 131/74 93 94 % -- -- --    01/21/25 02:55:07 -- -- -- 131/74 93 -- -- -- --    01/20/25 22:31:43 97.9 °F (36.6 °C) 103 16 114/56 75 88 % -- -- --    01/20/25 2125 -- -- -- 151/66 -- -- -- -- --    01/20/25 2030 -- -- -- -- -- 94 % None (Room air) -- No Pain    01/20/25 20:27:34 97.9 °F (36.6 °C) 68 -- 149/64 92 90 % -- -- --    01/20/25 20:27:19 97.9 °F (36.6 °C) 69 -- 149/64 92 87 % -- -- --    01/20/25 1840 -- -- -- -- -- 90 % None (Room air) -- No Pain    01/20/25 18:33:38 98.2 °F (36.8 °C) 73 18 141/62 88 89 % -- -- --    01/20/25 1715 -- 81 22 114/58 80 93 % None (Room air) Lying --    01/20/25 1700 99.6 °F (37.6 °C) 82 22 113/60 79 93 % None (Room air) Lying --    01/20/25 1645 -- 82 23 121/58 82 93 % None (Room air) Lying --    01/20/25 1630 -- 79 25 125/60 86 92 % None (Room air) Lying --    01/20/25 1600 -- 97 22 148/67 96 93 % None (Room air) Lying --    01/20/25 1531 101.4 °F (38.6 °C) -- -- -- -- -- -- -- --    01/20/25 1526 -- 94 18 133/70 -- 95 % None (Room air) Lying --              Pertinent Labs/Diagnostic Test Results:   Radiology:  CT head without contrast   Final Interpretation by Enrico Quiroga DO (01/20 1624)      No acute intracranial abnormality.  Stable, moderate chronic microangiopathic changes within the brain.      Pansinus mild mucosal  thickening.                  Workstation performed: [id number]         CT chest abdomen pelvis wo contrast   Final Interpretation by Jean Steinberg MD (01/20 7022)      1.  Moderate to large right and moderate left pleural effusions, with mild pulmonary alveolar edema.               Workstation performed: [id number]         IR temporary dialysis catheter placement    (Results Pending)     Cardiology:  Echo follow up/limited w/ contrast if indicated   Final Result by Shaina Delgado MD (01/21 5053)        Left Ventricle: Left ventricular cavity size is severely dilated. The    left ventricular ejection fraction is 20%. Systolic function is severely    reduced. There is severe global hypokinesis.     Mitral Valve: There is mild regurgitation.     Tricuspid Valve: There is mild regurgitation.     IVC/SVC: The inferior vena cava size is 12.0 mm. The right atrial    pressure is estimated at 3.0 mmHg. The inferior vena cava is normal in    size.     Pulmonary Artery: The estimated pulmonary artery systolic pressure is    48.0 mmHg. The pulmonary artery systolic pressure is moderately increased.     Pericardium: There is a small pericardial effusion. There is no    echocardiographic evidence of tamponade. The evidence against tamponade    includes: no right ventricular diastolic collapse. There is a moderately    sized left pleural effusion.         ECG 12 lead   Final Result by Shaina Delgado MD (01/21 9140)   Atrial fibrillation with rapid ventricular response   Minimal voltage criteria for LVH, may be normal variant ( [place] product    )   Nonspecific T wave abnormality   Abnormal ECG   When compared with ECG of 20-Jan-2025 15:30, (unconfirmed)   Atrial fibrillation has replaced Sinus rhythm      Confirmed by Shaina Delgado ([id number]) on 1/21/2025 7:58:16 AM      ECG 12 lead   Final Result by Shaina Delgado MD (01/21 3433)   Technically poor tracing   Sinus rhythm with Premature supraventricular complexes    Nonspecific ST and T wave abnormality   When compared with ECG of 10-Loco-2025 11:33,   Premature supraventricular complexes are now Present   Nonspecific T wave abnormality now evident in Anterior leads   T wave inversion no longer evident in Lateral leads   Confirmed by Shaina Delgado ([id number]) on 1/21/2025 8:16:40 AM          Results from last 7 days   Lab Units 01/20/25  1543   SARS-COV-2  Positive*     Results from last 7 days   Lab Units 01/22/25  0456 01/21/25  0439 01/20/25  1543   WBC Thousand/uL 8.75 9.14 8.76   HEMOGLOBIN g/dL 8.4* 9.6* 9.2*   HEMATOCRIT % 24.5* 27.9* 27.9*   PLATELETS Thousands/uL 75* 87* 93*   TOTAL NEUT ABS Thousands/µL  --   --  7.84*   BANDS PCT %  --  3  --         Results from last 7 days   Lab Units 01/22/25  0456 01/21/25  0439 01/20/25  1543   SODIUM mmol/L 132* 133* 130*   POTASSIUM mmol/L 4.0 4.3 4.5   CHLORIDE mmol/L 95* 94* 92*   CO2 mmol/L 27 26 24   ANION GAP mmol/L 10 13 14*   BUN mg/dL 53* 80* 77*   CREATININE mg/dL 5.84* 8.44* 8.07*   EGFR ml/min/1.73sq m 8 5 6   CALCIUM mg/dL 8.1* 8.3* 8.6   MAGNESIUM mg/dL 1.9  --  2.1     Results from last 7 days   Lab Units 01/21/25  0439 01/20/25  1543   AST U/L 46* 57*   ALT U/L 20 21   ALK PHOS U/L 113* 126*   TOTAL PROTEIN g/dL 5.8* 6.6   ALBUMIN g/dL 2.6* 3.0*   TOTAL BILIRUBIN mg/dL 1.75* 1.85*           Results from last 7 days   Lab Units 01/22/25  0456 01/21/25  0439 01/20/25  1543   GLUCOSE RANDOM mg/dL 100 93 104        Results from last 7 days   Lab Units 01/20/25  1543   CK TOTAL U/L 244     Results from last 7 days   Lab Units 01/20/25 2031 01/20/25  1816 01/20/25  1543   HS TNI 0HR ng/L  --   --  265*   HS TNI 2HR ng/L  --  240*  --    HSTNI D2 ng/L  --  -25  --    HS TNI 4HR ng/L 222*  --   --    HSTNI D4 ng/L -43  --   --         Results from last 7 days   Lab Units 01/20/25  1543   PROTIME seconds 17.0*   INR  1.34*   PTT seconds 32        Results from last 7 days   Lab Units 01/22/25  0456 01/21/25  0439  01/20/25  1543   PROCALCITONIN ng/ml 3.13* 3.06* 2.72*     Results from last 7 days   Lab Units 01/20/25  1816 01/20/25  1543   LACTIC ACID mmol/L 1.0 3.2*        Results from last 7 days   Lab Units 01/20/25  1543   BNP pg/mL >4,700*        Results from last 7 days   Lab Units 01/20/25  1543   LIPASE u/L 109*     Results from last 7 days   Lab Units 01/20/25  1543   CRP mg/L 187.8*        Results from last 7 days   Lab Units 01/20/25  1543   INFLUENZA A PCR  Negative   INFLUENZA B PCR  Negative   RSV PCR  Negative        Results from last 7 days   Lab Units 01/22/25  0457 01/21/25  1559 01/20/25  1556 01/20/25  1543   BLOOD CULTURE  Received in Microbiology Lab. Culture in Progress.  Received in Microbiology Lab. Culture in Progress.  --  Staphylococcus aureus* Staphylococcus aureus*   GRAM STAIN RESULT   --  4+ Polys*  4+ Gram positive cocci in clusters* Gram positive cocci in clusters* Gram positive cocci in clusters*        Results from last 7 days   Lab Units 01/22/25  0456 01/21/25  0439   VANCOMYCIN RM ug/mL 18.8 11.7       Past Medical History:   Diagnosis Date    Acute hemodialysis patient ([facility]ly [facility]) 12/14/2024    CAD (coronary artery disease)     HFrEF (heart failure with reduced ejection fraction) ([facility]ly [facility]) 09/2021    Hypertension     Rheumatoid factor positive 09/2021    Stage 3 chronic kidney disease ([facility]ly [facility])     Tobacco abuse      Present on Admission:   Thrombocytopenia ([facility])   Acute on chronic systolic heart failure ([facility])   Primary hypertension   Hyponatremia      Admitting Diagnosis: Weakness generalized [R53.1]  Pleural effusion [J90]  ESRD (end stage renal disease) on dialysis ([facility]) [N18.6, Z99.2]  COVID [U07.1]  Age/Sex: 69 y.o. male    Network Utilization Review Department  ATTENTION: Please call with any questions or concerns to [phone] and carefully listen to the prompts so that you are directed to the right person. All voicemails are confidential.   For Discharge needs, contact Care Management DC  Support Team at [phone] opt. 2  Send all requests for admission clinical reviews, approved or denied determinations and any other requests to dedicated fax number below belonging to the campus where the patient is receiving treatment. List of dedicated fax numbers for the Facilities:  FACILITY NAME UR FAX NUMBER   ADMISSION DENIALS (Administrative/Medical Necessity) [phone]   DISCHARGE SUPPORT TEAM (NETWORK) [phone]   PARENT CHILD HEALTH (Maternity/NICU/Pediatrics) [phone]   [facility] [phone]   [facility] [phone]   [facility] [phone]   [facility] [phone]   [facility] [phone]   [facility] [phone]   [facility] [phone]   [facility] [phone]   [facility] [phone]   [facility] [phone]   [facility] [phone]   [facility] [phone]

## 2025-01-21 NOTE — CONSULTS
Consultation - Infectious Disease   Name: Matthew Alvarez 69 y.o. male I MRN: [id number]  Unit/Bed#: -01 I Date of Admission: 1/20/2025   Date of Service: 1/21/2025 I Hospital Day: 1   Inpatient consult to Infectious Diseases  Consult performed by: Kailyn Alvarado MD  Consult ordered by: Kerri Chisholm PA-C            Administrative Statements   VIRTUAL CARE DOCUMENTATION:     1. This service was provided via Telemedicine using [employer]     2. Parties in the room with patient during teleconsult Patient only    3. Confidentiality My office door was closed     4. Participants No one else was in the room    5. Patient acknowledged consent and understanding of privacy and security of the  Telemedicine consult. I informed the patient that I have reviewed their record in Epic and presented the opportunity for them to ask any questions regarding the visit today.  The patient agreed to participate.    6. I have spent a total time of 90 minutes in caring for this patient on the day of the visit/encounter including Diagnostic results, Impressions, Counseling / Coordination of care, Documenting in the medical record, Reviewing / ordering tests, medicine, procedures  , Obtaining or reviewing history  , and Communicating with other healthcare professionals , not including the time spent for establishing the audio/video connection.     Physician Requesting Evaluation: Marina Arias MD   Reason for Evaluation / Principal Problem: Positive blood cx    Assessment & Plan  Sepsis (HCC)  Fever, tachycardia with lactic acidosis.  Suspect secondary to possible exit site and catheter related bloodstream infection.   Patient also noted to have acute COVID infection but without pneumonia on chest imaging  Afebrile overnight, hemodynamically stable without leukocytosis but continues in A-fib with RVR    Antibiotics and additional management as below   Check daily CBC, CMP while inpatient to monitor for any evolving antibiotic  toxicity or treatment failure   Continue supportive care, monitor clinical course    Bacteremia  Both sets of blood cultures from admission with gram-positive cocci in clusters, ID is pending.  Both sets are labeled from left arm so consider possibility of contaminant if the isolate is coagulase-negative staph.  However, suspect the pathogen is clinically relevant in the setting of sepsis and permacath.  Limited evaluation but per media pictures there is concern for exit site infection with erythema and pustule at catheter site. He also reports a recent hx weeping L shin wound   No evidence of bacterial pneumonia, no intra-abdominal focus of infection, no other indwelling vascular or prosthetic devices     Continue vancomycin, dosing per pharmacy protocol   Discontinue ceftriaxone   Recommend IR/surgical evaluation for catheter removal, would favor line holiday but if securing an alternative access is a concern then may consider temporary dialysis catheter placement   Fu blood c/s   Repeat blood cx in AM /following catheter removal   Check TTE   Final choice and duration of antibiotics to be determined   Request primary team evaluation to assess for recent left leg wound    COVID  SARS-CoV-2 PCR positive on 1/20 . CT chest with with diffuse patchy groundglass opacities throughout both lungs which may represent pneumonitis versus pulmonary edema.  Has underlying COPD, chronic tobacco use  Stable on room air     Continue remdesivir x 3 days, additional management per protocol   Continue isolation precautions  ESRD (end stage renal disease) on dialysis ([facility])  Lab Results   Component Value Date    EGFR 5 01/21/2025    EGFR 6 01/20/2025    EGFR 9 01/15/2025    CREATININE 8.44 (H) 01/21/2025    CREATININE 8.07 (H) 01/20/2025    CREATININE 5.82 (H) 01/15/2025     Currently receiving dialysis via right IJ permacath with concern for catheter infection/bacteremia.  Patient has had a functional decline and has had multiple  missed dialysis sessions as an outpatient     Catheter management as above, additional management per nephrology   Recommend ongoing goals of care discussion, consider palliative care referral as outpatient     Acute on chronic systolic heart failure (HCC)  Wt Readings from Last 3 Encounters:   01/21/25 53.7 kg (118 lb 6.4 oz)   01/15/25 56.5 kg (124 lb 9.6 oz)   12/27/24 56 kg (123 lb 6.4 oz)     Has underlying nonischemic cardiomyopathy, ESRD, A-fib with RVR and chronic pleural effusions requiring thoracentesis in the past          HPI: Matthew Alvarez is a 69 y.o. year old male with severe nonischemic cardiomyopathy with chronic systolic CHF with chronic pleural effusions requiring thoracentesis, chronic tobacco use, history of noncompliance.  He has had multiple recent hospitalizations for acute on chronic CHF and progression of CKD to end-stage renal disease requiring hemodialysis via R IJ permacath since November 2024.  His most recent hospitalization was from 1/10 to 1/15 due to patient missing his scheduled dialysis sessions.  He returned to the hospital on 1/20 with few days of worsening generalized weakness, fatigue and nonproductive cough resulting in a missed dialysis appointment.  In the ER patient was febrile to 101.4 with tachycardia triggering a sepsis alert.  On exam he was noted to have a pustule like lesion along the right clavicle close to the HD catheter.  CT chest abdomen noted moderate to large right and moderate left pleural effusions but no other infectious focus.  COVID testing was positive.  He was admitted on Vanco and ceftriaxone.  Blood cultures from admission are growing gram-positive cocci in clusters, ID pending.  Infectious disease is being consulted for diagnostic work up and antibiotic management.    Review of Systems  Pertinent positives and negatives as noted in HPI. Rest complete 12 point system-based review of systems is otherwise negative.    PAST MEDICAL HISTORY:  Past  Medical History:   Diagnosis Date    Acute hemodialysis patient ([facility]) 2024    CAD (coronary artery disease)     HFrEF (heart failure with reduced ejection fraction) ([facility]) 2021    Hypertension     Rheumatoid factor positive 2021    Stage 3 chronic kidney disease ([facility])     Tobacco abuse      Past Surgical History:   Procedure Laterality Date    APPENDECTOMY  1965    CARDIAC CATHETERIZATION  2021    INGUINAL HERNIA REPAIR Right 1991    IR THORACENTESIS  2024    IR THORACENTESIS  2024    IR TUNNELED DIALYSIS CATHETER PLACEMENT  2024       FAMILY HISTORY:  Non-contributory    SOCIAL HISTORY:  Social History   /Civil Union  Social History     Substance and Sexual Activity   Alcohol Use Yes    Alcohol/week: 6.0 standard drinks of alcohol    Types: 6 Shots of liquor per week    Comment: rare     Social History     Substance and Sexual Activity   Drug Use Never     Social History     Tobacco Use   Smoking Status Every Day    Current packs/day: 0.50    Average packs/day: 0.5 packs/day for 51.1 years (25.5 ttl pk-yrs)    Types: Cigarettes    Start date:    Smokeless Tobacco Never   Tobacco Comments    Began smoking in his early 20s, on average smoking 1+ packs daily. Quit in 2021 (Updated 09/15/2021).        ALLERGIES:  No Known Allergies    MEDICATIONS:  All current active medications have been reviewed.    Physical Exam     Temp:  [97.9 °F (36.6 °C)-101.4 °F (38.6 °C)] 99.5 °F (37.5 °C)  HR:  [] 117  Resp:  [16-25] 20  BP: ()/(56-80) 118/80  SpO2:  [87 %-96 %] 93 %  Temp (24hrs), Av.9 °F (37.2 °C), Min:97.9 °F (36.6 °C), Max:101.4 °F (38.6 °C)  Current: Temperature: 99.5 °F (37.5 °C)    Intake/Output Summary (Last 24 hours) at 2025 0842  Last data filed at 2025 1746  Gross per 24 hour   Intake 600 ml   Output --   Net 600 ml         Physical exam findings reported by bedside and primary medical team staff    General Appearance:  Appearing frail,  chronically ill, pale and icteric, nontoxic, and in no distress, appears stated age   Lungs:   Coarse and rhonchorous to auscultation bilaterally, no audible wheezes, rales, respirations unlabored   Chest Wall:  No tenderness or deformity, erythema and pustular lesion along right IJ catheter site   Heart:  Regular rate and rhythm, S1, S2 normal, no murmur, rub or gallop   Abdomen:   Soft, non-tender, non-distended, positive bowel sounds, no masses, no organomegaly    No CVA tenderness   Extremities: Extremities normal, atraumatic, no cyanosis, clubbing , b/l LE 3+ edema   Skin: Mild erythema and induration along permacath site with pustule, nonblanching erythema over sacrum, left shin wound per report, scattered bruises over back, boggy heels   Neurologic: Alert and oriented times 3, flat and withdrawn affect       Invasive Devices:   Peripheral IV 01/20/25 Left Antecubital (Active)   Site Assessment [facility] 01/20/25 2030   Dressing Type Transparent 01/20/25 2030   Line Status Flushed 01/20/25 2030   Dressing Status Clean;Dry;Intact 01/20/25 2030   Dressing Change Due 01/24/25 01/20/25 2030   Reason Not Rotated Not due 01/20/25 2030       Peripheral IV 01/20/25 Left;Ventral (anterior) Forearm (Active)   Site Assessment [facility] 01/20/25 2030   Dressing Type Transparent 01/20/25 2030   Line Status Flushed;Infusing 01/20/25 2030   Dressing Status Clean;Dry;Intact 01/20/25 2030   Dressing Change Due 01/24/25 01/20/25 2030   Reason Not Rotated Not due 01/20/25 2030       HD Permanent Double Catheter (Active)       Labs, Imaging, & Other Studies     Lab Results:    I have personally reviewed pertinent labs.    Results from last 7 days   Lab Units 01/21/25  0439 01/20/25  1543 01/15/25  0906   WBC Thousand/uL 9.14 8.76 9.23   HEMOGLOBIN g/dL 9.6* 9.2* 8.1*   PLATELETS Thousands/uL 87* 93* 82*     Results from last 7 days   Lab Units 01/21/25  0439 01/20/25  1543   POTASSIUM mmol/L 4.3 4.5   CHLORIDE mmol/L 94* 92*   CO2 mmol/L  26 24   BUN mg/dL 80* 77*   CREATININE mg/dL 8.44* 8.07*   EGFR ml/min/1.73sq m 5 6   CALCIUM mg/dL 8.3* 8.6   AST U/L 46* 57*   ALT U/L 20 21   ALK PHOS U/L 113* 126*     Results from last 7 days   Lab Units 01/20/25  1556 01/20/25  1543   GRAM STAIN RESULT  Gram positive cocci in clusters* Gram positive cocci in clusters*       Imaging Studies:   I have personally reviewed pertinent imaging study reports and images in PACS.      EKG, Pathology, and Other Studies:   I have personally reviewed pertinent reports and reviewed external records.    Counseling/Coordination of care:       Total 90 minutes in evaluation of the patient and communication with the patient via telehealth of which 50 minutes was in counseling/coordination of care.  Extensive review of the medical records in epic including review of the notes, radiographs, and laboratory results.  My recommendations were discussed with the patient in detail who verbalized understanding.

## 2025-01-21 NOTE — NURSING NOTE
Patient arrived to unit soiled with BM. Underwear marks on buttocks and thigh. Patient refusing life vest. Placed on tele. No complaints at this time.

## 2025-01-21 NOTE — QUICK NOTE
Pt was found to be in AfibRVR HR> 110, pt was started on metoprolol tartrate 25 mg BID, day team will discuss with the pt regarding needs for Anticoagulation. Now Rate is controlled.

## 2025-01-21 NOTE — ASSESSMENT & PLAN NOTE
Lab Results   Component Value Date    EGFR 5 01/21/2025    EGFR 6 01/20/2025    EGFR 9 01/15/2025    CREATININE 8.44 (H) 01/21/2025    CREATININE 8.07 (H) 01/20/2025    CREATININE 5.82 (H) 01/15/2025   The patient undergoes hemodialysis on a Monday Wednesday Friday basis at [facility] and follows with Dr. Nuñez the etiology of the patient'shese.  Likely end-stage renal disease at this time is secondary to cardiorenal syndrome.  He was initially started on hemodialysis in November 2024 and has been dialysis dependent since then with recurrent admissions.  Would likely state end-stage at this point.  His access is a right IJ tunneled dialysis catheter.    We will plan on doing hemodialysis today and then tomorrow to get him back on his regular schedule.  There was a shorter 3-hour treatment today in terms of UF my goal would only be 500 cc to 1 kg as tolerated as blood pressure is in the 90s this morning.  We will also decrease the temperature to 36 degrees with dialysis and will also give intravenous albumin prior to hemodialysis today.      Regarding the pustular appearance over the right clavicle, it was difficult for me to visualize via telehealth medium.  I will speak with the dialysis nurse this morning as we are going to do dialysis this morning to see where that pustule crosses with regards to the catheter and we can proceed from there to see if further evaluation and/or management as needed.  Blood cultures were already obtained at the time of admission.  Need to see if there is any pustular drainage as well as to get a closer bedside look at the exit site of the dialysis catheter itself.    With regards to dry weight, it is noted the patient has a history of a nonischemic cardiomyopathy with an EF of 20%.  When he left from his prior admission, his last dry weight was 53 kg.  This morning he is at 53.7 kg.  Again UF as noted above.  I suspect he is also lost total body weight as well as fluid volume as  well and his dry weight will need a more continuous adjustment.

## 2025-01-21 NOTE — ASSESSMENT & PLAN NOTE
Patient with 2 out of 2 blood cultures positive for gram-positive cocci in clusters  Seen under sepsis   Appreciate ID consult   Continue vancomycin  Pending TTE  Will need removal of HD cath -possibly with general surgery today, if unavailable then IR tomorrow  Repeat blood cultures in the morning

## 2025-01-21 NOTE — QUICK NOTE
I had communicated with the dialysis nurse this morning via epic secure chat.  With regards to the pustular area in question, the small pustule is noted to be above the catheter site near the incision line.  I appreciate the dialysis nurse's input.  Blood cultures have already been obtained.  I am going to ask IR to look at this pustular area to see how contiguous it is with the patient's dialysis catheter.  The patient had already received 1 dose of vancomycin on January 20.  The vancomycin level today is 11. Vancomycin dosing as per pharmacy

## 2025-01-21 NOTE — ASSESSMENT & PLAN NOTE
Developed in setting of sepsis  Home Coreg on hold secondary to low blood pressure-switch to metoprolol 25 twice daily  As needed low-dose 2.5 Lopressor IV for sustained heart rate greater than 130  Will need anticoagulation -will need to be started on renally adjusted Eliquis when able, pending tunneled cath removal for source control will hold anticoagulation in wai-procedure period

## 2025-01-21 NOTE — PROGRESS NOTES
Matthew Alvarez is a 69 y.o. male who is currently ordered Vancomycin IV with management by the Pharmacy Consult service.  Relevant clinical data and objective / subjective history reviewed.  Vancomycin Assessment:  Indication and Goal AUC/Trough: Soft tissue (goal -600, trough >10)  Clinical Status:  new start   Micro:   pending  Renal Function:  SCr: 8.07 mg/dL  CrCl: 6.3 mL/min  Renal replacement: HD  Days of Therapy: 1  Current Dose: 750mg IV once in ED  Vancomycin Plan:  New Dosinmg once as needed level <=15  Estimated AUC: NA in pulse dosing  Estimated Trough: NA in pulse dosing   Next Level: 22 @ 0600   Renal Function Monitoring: Daily BMP and UOP  Pharmacy will continue to follow closely for s/sx of nephrotoxicity, infusion reactions and appropriateness of therapy.  BMP and CBC will be ordered per protocol. We will continue to follow the patient’s culture results and clinical progress daily.    Sarahi Guo, Pharmacist

## 2025-01-21 NOTE — PROGRESS NOTES
Progress Note - Hospitalist   Name: Matthew Alvarez 69 y.o. male I MRN: [id number]  Unit/Bed#: -01 I Date of Admission: 1/20/2025   Date of Service: 1/21/2025 I Hospital Day: 1    Assessment & Plan  Sepsis (HCC)  Meeting sepsis criteria secondary to temperature 101.4 °F, HR 97 bpm  Lactic acid 3.2  In setting of COVID, bacteremia from unclear source at this time-possibly bacterial pneumonia versus skin infection (pustule over HD cath as well as chronic skin lesions of lower extremities)  Blood cultures positive for 2 out of 2 gram-positive cocci in clusters  Pending identification panel  Initially on ceftriaxone and vancomycin  DC ceftriaxone, continue vancomycin  MRSA culture pending  Bacteremia  Patient with 2 out of 2 blood cultures positive for gram-positive cocci in clusters  Seen under sepsis   Appreciate ID consult   Continue vancomycin  Pending TTE  Will need removal of HD cath -possibly with general surgery today, if unavailable then IR tomorrow  Repeat blood cultures in the morning  Acute on chronic systolic heart failure (HCC)  Wt Readings from Last 3 Encounters:   01/21/25 53.7 kg (118 lb 6.4 oz)   01/15/25 56.5 kg (124 lb 9.6 oz)   12/27/24 56 kg (123 lb 6.4 oz)     Patient presents with worsening shortness of breath, missed dialysis session today with last session on Friday  Patient's wife endorses that he sometimes is not taking his medication, spits them out or throws them away  CT chest with moderate to large right pleural effusion and moderate left pleural effusion  Echo in November with EF of 20%  Wears LifeVest-currently wearing -will monitor on telemetry per policy  PTA regimen is on Coreg, torsemide (on nondialysis days)  BNP >4700    Given 1 dose IV diuretic on admission, HD today  Monitor electrolytes   Daily weights and I/O    COVID  Mild COVID pathway as below   COVID19- positive on 1/20   CT chest bilateral pleural effusions and bilateral groundglass opacities  COVID mild pathway  labs   CRP, Trop, CK, BNP -pending  Will give remdesivir x 3 days as is high risk patient   OOB TID; ambulation and mobilization strongly encouraged  PT/OT consult and evaluation   Supportive care    Atrial fibrillation with RVR (HCC)  Developed in setting of sepsis  Home Coreg on hold secondary to low blood pressure-switch to metoprolol 25 twice daily  As needed low-dose 2.5 Lopressor IV for sustained heart rate greater than 130  Will need anticoagulation -will need to be started on renally adjusted Eliquis when able, pending tunneled cath removal for source control will hold anticoagulation in wai-procedure period   Primary hypertension  PTA regimen on Coreg 25 mg twice daily, torsemide 100 mg on nondialysis days, Norvasc 5 mg daily and hydralazine 100 mg 3 times daily  Blood pressure somewhat soft on arrival  BP meds from home on hold-Coreg switched to metoprolol to have less effect on blood pressure but to allow better heart rate control  Currently Lopressor 25 mg twice daily  Anemia due to chronic kidney disease, on chronic dialysis (HCC)  With chronic anemia secondary to renal disease  Is on Epogen and iron infusions with dialysis  Would avoid IV iron due to findings of bacteremia  Continue folic acid daily  Hemoglobin stable  Thrombocytopenia (HCC)  Chronic stable thrombocytopenia in setting of chronic renal disease  ESRD (end stage renal disease) on dialysis ([facility])  Lab Results   Component Value Date    EGFR 5 01/21/2025    EGFR 6 01/20/2025    EGFR 9 01/15/2025    CREATININE 8.44 (H) 01/21/2025    CREATININE 8.07 (H) 01/20/2025    CREATININE 5.82 (H) 01/15/2025   Patient on dialysis MWF -noncompliant with going, wife says he misses at least once a week  Presenting with bilateral pleural effusions  Currently on torsemide on nondialysis days  HD in the a.m.  Nephrology consult    VTE Pharmacologic Prophylaxis:   High Risk (Score >/= 5) - Pharmacological DVT Prophylaxis Ordered: apixaban (Eliquis). Sequential  Compression Devices Ordered.    Mobility:   Basic Mobility Inpatient Raw Score: 15  JH-HLM Goal: 4: Move to chair/commode  JH-HLM Achieved: 3: Sit at edge of bed  JH-HLM Goal NOT achieved. Continue with multidisciplinary rounding and encourage appropriate mobility to improve upon JH-HLM goals.    Patient Centered Rounds: I performed bedside rounds with nursing staff today.   Discussions with Specialists or Other Care Team Provider: CM, nephrology, ID, general surgery    Education and Discussions with Family / Patient:  Unable to contact patient's wife or son as voicemail box is full and they have the same phone number listed.     Current Length of Stay: 1 day(s)  Current Patient Status: Inpatient   Certification Statement: The patient will continue to require additional inpatient hospital stay due to sepsis, bacteremia  Discharge Plan: Anticipate discharge in >72 hrs to discharge location to be determined pending rehab evaluations.    Code Status: Level 1 - Full Code    Subjective   Seen and examined.  Patient remains confused.  Looks uncomfortable.  Denies shortness of breath though is visibly dyspneic    Objective :  Temp:  [97.9 °F (36.6 °C)-101.4 °F (38.6 °C)] 99.5 °F (37.5 °C)  HR:  [] 117  BP: ()/(56-80) 118/80  Resp:  [16-25] 20  SpO2:  [87 %-96 %] 93 %  O2 Device: None (Room air)    Body mass index is 19.11 kg/m².     Input and Output Summary (last 24 hours):     Intake/Output Summary (Last 24 hours) at 1/21/2025 0801  Last data filed at 1/20/2025 1746  Gross per 24 hour   Intake 600 ml   Output --   Net 600 ml       Physical Exam  Vitals and nursing note reviewed.   Constitutional:       General: He is not in acute distress.     Appearance: Normal appearance. He is ill-appearing.   HENT:      Head: Normocephalic and atraumatic.      Nose: No congestion.      Mouth/Throat:      Mouth: Mucous membranes are dry.      Pharynx: Oropharynx is clear.   Eyes:      Conjunctiva/sclera: Conjunctivae  normal.   Cardiovascular:      Rate and Rhythm: Tachycardia present. Rhythm irregular.      Pulses: Normal pulses.      Heart sounds: Normal heart sounds.   Pulmonary:      Effort: Pulmonary effort is normal.      Breath sounds: Rhonchi and rales present.   Abdominal:      General: Bowel sounds are normal.      Palpations: Abdomen is soft.      Tenderness: There is no abdominal tenderness.   Musculoskeletal:         General: Normal range of motion.      Cervical back: Normal range of motion.      Right lower leg: Edema present.      Left lower leg: Edema present.   Skin:     General: Skin is warm and dry.   Neurological:      Mental Status: He is alert. He is disoriented.   Psychiatric:         Mood and Affect: Mood normal.         Behavior: Behavior normal.           Lines/Drains:        Telemetry:  Telemetry Orders (From admission, onward)               24 Hour Telemetry Monitoring  Continuous x 24 Hours (Telem)        Expiring   Question:  Reason for 24 Hour Telemetry  Answer:  Decompensated CHF- and any one of the following: continuous diuretic infusion or total diuretic dose >200 mg daily, associated electrolyte derangement (I.e. K < 3.0), inotropic drip (continuous infusion), hx of ventricular arrhythmia, or new EF < 35%                     Telemetry Reviewed: Atrial fibrillation. HR averaging 110-140  Indication for Continued Telemetry Use: Arrthymias requiring medical therapy               Lab Results: I have reviewed the following results:   Results from last 7 days   Lab Units 01/21/25  0439 01/20/25  1543   WBC Thousand/uL 9.14 8.76   HEMOGLOBIN g/dL 9.6* 9.2*   HEMATOCRIT % 27.9* 27.9*   PLATELETS Thousands/uL 87* 93*   BANDS PCT % 3  --    SEGS PCT %  --  90*   LYMPHO PCT % 3* 4*   MONO PCT % 1* 5   EOS PCT % 0 0     Results from last 7 days   Lab Units 01/21/25  0439   SODIUM mmol/L 133*   POTASSIUM mmol/L 4.3   CHLORIDE mmol/L 94*   CO2 mmol/L 26   BUN mg/dL 80*   CREATININE mg/dL 8.44*   ANION GAP  mmol/L 13   CALCIUM mg/dL 8.3*   ALBUMIN g/dL 2.6*   TOTAL BILIRUBIN mg/dL 1.75*   ALK PHOS U/L 113*   ALT U/L 20   AST U/L 46*   GLUCOSE RANDOM mg/dL 93     Results from last 7 days   Lab Units 01/20/25  1543   INR  1.34*     Results from last 7 days   Lab Units 01/14/25  2103   POC GLUCOSE mg/dl 157*         Results from last 7 days   Lab Units 01/21/25  0439 01/20/25  1816 01/20/25  1543   LACTIC ACID mmol/L  --  1.0 3.2*   PROCALCITONIN ng/ml 3.06*  --  2.72*       Recent Cultures (last 7 days):   Results from last 7 days   Lab Units 01/20/25  1556 01/20/25  1543   GRAM STAIN RESULT  Gram positive cocci in clusters* Gram positive cocci in clusters*             Last 24 Hours Medication List:     Current Facility-Administered Medications:     acetaminophen (TYLENOL) tablet 650 mg, Q6H PRN    [Held by provider] amLODIPine (NORVASC) tablet 5 mg, Daily    aspirin (ECOTRIN LOW STRENGTH) EC tablet 81 mg, Daily    atorvastatin (LIPITOR) tablet 20 mg, Daily With Dinner    cefTRIAXone (ROCEPHIN) IVPB (premix in dextrose) 1,000 mg 50 mL, Q24H, Last Rate: 1,000 mg (01/20/25 2124)    folic acid (FOLVITE) tablet 1 mg, Daily    [Held by provider] hydrALAZINE (APRESOLINE) tablet 100 mg, Q8H JEEVAN    isosorbide mononitrate (IMDUR) 24 hr tablet 120 mg, Daily    melatonin tablet 6 mg, HS    metoprolol (LOPRESSOR) injection 2.5 mg, Q6H PRN    metoprolol tartrate (LOPRESSOR) tablet 25 mg, Q12H JEEVAN    [COMPLETED] remdesivir (Veklury) 200 mg in sodium chloride 0.9 % 290 mL IVPB, Q24H **FOLLOWED BY** remdesivir (Veklury) 100 mg in sodium chloride 0.9 % 270 mL IVPB, Q24H    [START ON 1/22/2025] vancomycin (VANCOCIN) IVPB (premix in dextrose) 750 mg 150 mL, Once PRN    vancomycin (VANCOCIN) IVPB (premix in dextrose) 750 mg 150 mL, Once    Administrative Statements   Today, Patient Was Seen By: Kerri Chisholm PA-C      **Please Note: This note may have been constructed using a voice recognition system.**

## 2025-01-21 NOTE — ASSESSMENT & PLAN NOTE
Lab Results   Component Value Date    EGFR 5 01/21/2025    EGFR 6 01/20/2025    EGFR 9 01/15/2025    CREATININE 8.44 (H) 01/21/2025    CREATININE 8.07 (H) 01/20/2025    CREATININE 5.82 (H) 01/15/2025   Hemoglobin is 9.6 today on January 21 continue to monitor.

## 2025-01-21 NOTE — PLAN OF CARE
Target UF Goal  0.5-1  L as tolerated. Patient dialyzing for 3 hours on 3 K bath for serum K of  4.3  per protocol. Treatment plan reviewed with Nephrology.     Problem: METABOLIC, FLUID AND ELECTROLYTES - ADULT  Goal: Electrolytes maintained within normal limits  Description: INTERVENTIONS:  - Monitor labs and assess patient for signs and symptoms of electrolyte imbalances  - Administer electrolyte replacement as ordered  - Monitor response to electrolyte replacements, including repeat lab results as appropriate  - Instruct patient on fluid and nutrition as appropriate  Outcome: Progressing     Problem: METABOLIC, FLUID AND ELECTROLYTES - ADULT  Goal: Fluid balance maintained  Description: INTERVENTIONS:  - Monitor labs   - Monitor I/O and WT  - Instruct patient on fluid and nutrition as appropriate  - Assess for signs & symptoms of volume excess or deficit  Outcome: Progressing

## 2025-01-21 NOTE — ASSESSMENT & PLAN NOTE
PTA regimen on Coreg 25 mg twice daily, torsemide 100 mg on nondialysis days, Norvasc 5 mg daily and hydralazine 100 mg 3 times daily  Blood pressure somewhat soft on arrival  BP meds from home on hold-Coreg switched to metoprolol to have less effect on blood pressure but to allow better heart rate control  Currently Lopressor 25 mg twice daily

## 2025-01-21 NOTE — ASSESSMENT & PLAN NOTE
Fever, tachycardia with lactic acidosis.  Suspect secondary to possible exit site and catheter related bloodstream infection.   Patient also noted to have acute COVID infection but without pneumonia on chest imaging  Afebrile overnight, hemodynamically stable without leukocytosis but continues in A-fib with RVR    Antibiotics and additional management as below   Check daily CBC, CMP while inpatient to monitor for any evolving antibiotic toxicity or treatment failure   Continue supportive care, monitor clinical course

## 2025-01-21 NOTE — ASSESSMENT & PLAN NOTE
Meeting sepsis criteria secondary to temperature 101.4 °F, HR 97 bpm  Lactic acid 3.2  In setting of COVID, bacteremia from unclear source at this time-possibly bacterial pneumonia versus skin infection (pustule over HD cath as well as chronic skin lesions of lower extremities)  Blood cultures positive for 2 out of 2 gram-positive cocci in clusters  Pending identification panel  Initially on ceftriaxone and vancomycin  DC ceftriaxone, continue vancomycin  MRSA culture pending

## 2025-01-21 NOTE — PLAN OF CARE
Problem: Nutrition/Hydration-ADULT  Goal: Nutrient/Hydration intake appropriate for improving, restoring or maintaining nutritional needs  Description: Monitor and assess patient's nutrition/hydration status for malnutrition. Collaborate with interdisciplinary team and initiate plan and interventions as ordered.  Monitor patient's weight and dietary intake as ordered or per policy. Utilize nutrition screening tool and intervene as necessary. Determine patient's food preferences and provide high-protein, high-caloric foods as appropriate.     INTERVENTIONS:  - Monitor oral intake, urinary output, labs, and treatment plans  - Assess nutrition and hydration status and recommend course of action  - Evaluate amount of meals eaten  - Assist patient with eating if necessary   - Allow adequate time for meals  - Recommend/ encourage appropriate diets, oral nutritional supplements, and vitamin/mineral supplements  - Order, calculate, and assess calorie counts as needed  - Recommend, monitor, and adjust tube feedings and TPN/PPN based on assessed needs  - Assess need for intravenous fluids  - Provide specific nutrition/hydration education as appropriate  - Include patient/family/caregiver in decisions related to nutrition  Outcome: Progressing     Problem: PAIN - ADULT  Goal: Verbalizes/displays adequate comfort level or baseline comfort level  Description: Interventions:  - Encourage patient to monitor pain and request assistance  - Assess pain using appropriate pain scale  - Administer analgesics based on type and severity of pain and evaluate response  - Implement non-pharmacological measures as appropriate and evaluate response  - Consider cultural and social influences on pain and pain management  - Notify physician/advanced practitioner if interventions unsuccessful or patient reports new pain  Outcome: Progressing     Problem: INFECTION - ADULT  Goal: Absence or prevention of progression during  hospitalization  Description: INTERVENTIONS:  - Assess and monitor for signs and symptoms of infection  - Monitor lab/diagnostic results  - Monitor all insertion sites, i.e. indwelling lines, tubes, and drains  - Monitor endotracheal if appropriate and nasal secretions for changes in amount and color  - Ideal appropriate cooling/warming therapies per order  - Administer medications as ordered  - Instruct and encourage patient and family to use good hand hygiene technique  - Identify and instruct in appropriate isolation precautions for identified infection/condition  Outcome: Progressing  Goal: Absence of fever/infection during neutropenic period  Description: INTERVENTIONS:  - Monitor WBC    Outcome: Progressing     Problem: SAFETY ADULT  Goal: Patient will remain free of falls  Description: INTERVENTIONS:  - Educate patient/family on patient safety including physical limitations  - Instruct patient to call for assistance with activity   - Consult OT/PT to assist with strengthening/mobility   - Keep Call bell within reach  - Keep bed low and locked with side rails adjusted as appropriate  - Keep care items and personal belongings within reach  - Initiate and maintain comfort rounds  - Make Fall Risk Sign visible to staff  - Offer Toileting every 2 Hours, in advance of need  - Initiate/Maintain bed alarm  - Obtain necessary fall risk management equipment: yellow bracelet, yellow socks  - Apply yellow socks and bracelet for high fall risk patients  - Consider moving patient to room near nurses station  Outcome: Progressing  Goal: Maintain or return to baseline ADL function  Description: INTERVENTIONS:  -  Assess patient's ability to carry out ADLs; assess patient's baseline for ADL function and identify physical deficits which impact ability to perform ADLs (bathing, care of mouth/teeth, toileting, grooming, dressing, etc.)  - Assess/evaluate cause of self-care deficits   - Assess range of motion  - Assess  patient's mobility; develop plan if impaired  - Assess patient's need for assistive devices and provide as appropriate  - Encourage maximum independence but intervene and supervise when necessary  - Involve family in performance of ADLs  - Assess for home care needs following discharge   - Consider OT consult to assist with ADL evaluation and planning for discharge  - Provide patient education as appropriate  Outcome: Progressing  Goal: Maintains/Returns to pre admission functional level  Description: INTERVENTIONS:  - Perform AM-PAC 6 Click Basic Mobility/ Daily Activity assessment daily.  - Set and communicate daily mobility goal to care team and patient/family/caregiver.   - Collaborate with rehabilitation services on mobility goals if consulted  - Perform Range of Motion 2 times a day.  - Reposition patient every 2 hours.  - Dangle patient 3 times a day  - Stand patient 3 times a day  - Ambulate patient 3 times a day  - Out of bed to chair 3 times a day   - Out of bed for meals 3 times a day  - Out of bed for toileting  - Record patient progress and toleration of activity level   Outcome: Progressing     Problem: DISCHARGE PLANNING  Goal: Discharge to home or other facility with appropriate resources  Description: INTERVENTIONS:  - Identify barriers to discharge w/patient and caregiver  - Arrange for needed discharge resources and transportation as appropriate  - Identify discharge learning needs (meds, wound care, etc.)  - Arrange for interpretive services to assist at discharge as needed  - Refer to Case Management Department for coordinating discharge planning if the patient needs post-hospital services based on physician/advanced practitioner order or complex needs related to functional status, cognitive ability, or social support system  Outcome: Progressing     Problem: Knowledge Deficit  Goal: Patient/family/caregiver demonstrates understanding of disease process, treatment plan, medications, and  discharge instructions  Description: Complete learning assessment and assess knowledge base.  Interventions:  - Provide teaching at level of understanding  - Provide teaching via preferred learning methods  Outcome: Progressing     Problem: METABOLIC, FLUID AND ELECTROLYTES - ADULT  Goal: Electrolytes maintained within normal limits  Description: INTERVENTIONS:  - Monitor labs and assess patient for signs and symptoms of electrolyte imbalances  - Administer electrolyte replacement as ordered  - Monitor response to electrolyte replacements, including repeat lab results as appropriate  - Instruct patient on fluid and nutrition as appropriate  Outcome: Progressing  Goal: Fluid balance maintained  Description: INTERVENTIONS:  - Monitor labs   - Monitor I/O and WT  - Instruct patient on fluid and nutrition as appropriate  - Assess for signs & symptoms of volume excess or deficit  Outcome: Progressing  Goal: Glucose maintained within target range  Description: INTERVENTIONS:  - Monitor Blood Glucose as ordered  - Assess for signs and symptoms of hyperglycemia and hypoglycemia  - Administer ordered medications to maintain glucose within target range  - Assess nutritional intake and initiate nutrition service referral as needed  Outcome: Progressing     Problem: SKIN/TISSUE INTEGRITY - ADULT  Goal: Skin Integrity remains intact(Skin Breakdown Prevention)  Description: Assess:  -Perform Omar assessment every    -Clean and moisturize skin every     -Inspect skin when repositioning, toileting, and assisting with ADLS  -Assess under medical devices such as   every    -Assess extremities for adequate circulation and sensation     Bed Management:  -Have minimal linens on bed & keep smooth, unwrinkled  -Change linens as needed when moist or perspiring  -Avoid sitting or lying in one position for more than   hours while in bed  -Keep HOB at  degrees     Toileting:  -Offer bedside commode  -Assess for incontinence every    -Use  incontinent care products after each incontinent episode such as      Activity:  -Mobilize patient   times a day  -Encourage activity and walks on unit  -Encourage or provide ROM exercises   -Turn and reposition patient every   Hours  -Use appropriate equipment to lift or move patient in bed  -Instruct/ Assist with weight shifting every   when out of bed in chair  -Consider limitation of chair time   hour intervals    Skin Care:  -Avoid use of baby powder, tape, friction and shearing, hot water or constrictive clothing  -Relieve pressure over bony prominences using    -Do not massage red bony areas    Next Steps:  -Teach patient strategies to minimize risks such as     -Consider consults to  interdisciplinary teams such as    Outcome: Progressing  Goal: Incision(s), wounds(s) or drain site(s) healing without S/S of infection  Description: INTERVENTIONS  - Assess and document dressing, incision, wound bed, drain sites and surrounding tissue  - Provide patient and family education  - Perform skin care/dressing changes every    Outcome: Progressing  Goal: Pressure injury heals and does not worsen  Description: Interventions:  - Implement low air loss mattress or specialty surface (Criteria met)  - Apply silicone foam dressing  - Instruct/assist with weight shifting every   minutes when in chair   - Limit chair time to   hour intervals  - Use special pressure reducing interventions such as   when in chair   - Apply fecal or urinary incontinence containment device   - Perform passive or active ROM every    - Turn and reposition patient & offload bony prominences every   hours   - Utilize friction reducing device or surface for transfers   - Consider consults to  interdisciplinary teams such as    - Use incontinent care products after each incontinent episode such as    - Consider nutrition services referral as needed  Outcome: Progressing

## 2025-01-21 NOTE — PROCEDURES
Procedure    The patient was seen and evaluated at the bedside.  The procedure of removal of tunneled hemodialysis catheter was discussed in detail with the patient.  Risks were discussed including bleeding and hematoma.  The patient expressed understanding and signed consent    A timeout was held and all present in the room agreed with the procedure    1% lidocaine with epinephrine was used for local anesthesia, no other anesthesia needed    The area of the tunneled catheter of the right chest was prepped and draped in you sterile fashion using Betadine.  Local anesthesia was infiltrated surrounding the catheter to the level of the clavicle.    1 Prolene suture was noted holding the catheter in place.  This was removed without difficulty.  An attempt was made to pull the catheter however, this was not successful.  The catheter site was further investigated using blunt dissection.  A cuff was noted in the subcutaneous tissue.  Blunt and sharp dissection was used to free the cuff from the surrounding subcutaneous tissue.  Once this was completed the catheter was able to be easily removed intact.  Pressure was held on the right internal jugular vein for hemostasis.  Purulent drainage emanated from the insertion site.  This was swabbed for culture.  The tip of the catheter was also sent for culture.  A clean sterile dressing was placed over the catheter site.  A pressure dressing was placed over the right internal jugular vein.    The patient tolerated the procedure well

## 2025-01-21 NOTE — ASSESSMENT & PLAN NOTE
This has been longstanding over the past 3 months.  Continue to monitor.  His most recent platelet count is 87 and has been in the 80s to 90s even from prior admission.  Will check LDH and peripheral smear.

## 2025-01-21 NOTE — ASSESSMENT & PLAN NOTE
The patient is being given focal directed treatment is to be given intravenous remdesivir.  The patient is also being given IV Rocephin for primary team given patient's presentation.

## 2025-01-21 NOTE — ASSESSMENT & PLAN NOTE
Wt Readings from Last 3 Encounters:   01/21/25 53.7 kg (118 lb 6.4 oz)   01/15/25 56.5 kg (124 lb 9.6 oz)   12/27/24 56 kg (123 lb 6.4 oz)   See plan of care under end-stage renal disease with regards to plans for HD/UF today on January 21.  UF goal again only 500 cc to 1 L given the lower blood pressure in the setting of sepsis with current blood pressure currently in the 90s.  Patient is again noted to have an EF of 20%.

## 2025-01-21 NOTE — CONSULTS
NEPHROLOGY HOSPITAL CONSULTATION   Matthew Alvarez 69 y.o. male MRN: [id number]  Unit/Bed#: -01 Encounter: [id number]        Assessment & Plan  Acute on chronic systolic heart failure (HCC)  Wt Readings from Last 3 Encounters:   01/21/25 53.7 kg (118 lb 6.4 oz)   01/15/25 56.5 kg (124 lb 9.6 oz)   12/27/24 56 kg (123 lb 6.4 oz)   See plan of care under end-stage renal disease with regards to plans for HD/UF today on January 21.  UF goal again only 500 cc to 1 L given the lower blood pressure in the setting of sepsis with current blood pressure currently in the 90s.  Patient is again noted to have an EF of 20%.    Primary hypertension  Blood pressure is on the lower side, adjusting blood pressure medications with hold parameters.  Anemia due to chronic kidney disease, on chronic dialysis (HCC)  Lab Results   Component Value Date    EGFR 5 01/21/2025    EGFR 6 01/20/2025    EGFR 9 01/15/2025    CREATININE 8.44 (H) 01/21/2025    CREATININE 8.07 (H) 01/20/2025    CREATININE 5.82 (H) 01/15/2025   Hemoglobin is 9.6 today on January 21 continue to monitor.  Thrombocytopenia ([facility])  This has been longstanding over the past 3 months.  Continue to monitor.  His most recent platelet count is 87 and has been in the 80s to 90s even from prior admission.  Will check LDH and peripheral smear.  ESRD (end stage renal disease) on dialysis ([facility])  Lab Results   Component Value Date    EGFR 5 01/21/2025    EGFR 6 01/20/2025    EGFR 9 01/15/2025    CREATININE 8.44 (H) 01/21/2025    CREATININE 8.07 (H) 01/20/2025    CREATININE 5.82 (H) 01/15/2025   The patient undergoes hemodialysis on a Monday Wednesday Friday basis at [facility] and follows with Dr. Nuñez the etiology of the patient'shese.  Likely end-stage renal disease at this time is secondary to cardiorenal syndrome.  He was initially started on hemodialysis in November 2024 and has been dialysis dependent since then with recurrent admissions.  Would likely state end-stage  at this point.  His access is a right IJ tunneled dialysis catheter.    We will plan on doing hemodialysis today and then tomorrow to get him back on his regular schedule.  There was a shorter 3-hour treatment today in terms of UF my goal would only be 500 cc to 1 kg as tolerated as blood pressure is in the 90s this morning.  We will also decrease the temperature to 36 degrees with dialysis and will also give intravenous albumin prior to hemodialysis today.      Regarding the pustular appearance over the right clavicle, it was difficult for me to visualize via telehealth medium.  I will speak with the dialysis nurse this morning as we are going to do dialysis this morning to see where that pustule crosses with regards to the catheter and we can proceed from there to see if further evaluation and/or management as needed.  Blood cultures were already obtained at the time of admission.  Need to see if there is any pustular drainage as well as to get a closer bedside look at the exit site of the dialysis catheter itself.    With regards to dry weight, it is noted the patient has a history of a nonischemic cardiomyopathy with an EF of 20%.  When he left from his prior admission, his last dry weight was 53 kg.  This morning he is at 53.7 kg.  Again UF as noted above.  I suspect he is also lost total body weight as well as fluid volume as well and his dry weight will need a more continuous adjustment.      COVID  The patient is being given focal directed treatment is to be given intravenous remdesivir.  The patient is also being given IV Rocephin for primary team given patient's presentation.  Sepsis (HCC)  Management per primary team.    Administrative Statements   VIRTUAL CARE DOCUMENTATION:     1. This service was provided via Telemedicine using [employer]     2. Parties in the room with patient during teleconsult Patient only    3. Confidentiality My office door was closed     4. Participants No one else was in the  room    5. Patient acknowledged consent and understanding of privacy and security of the  Telemedicine consult. I informed the patient that I have reviewed their record in Epic and presented the opportunity for them to ask any questions regarding the visit today.  The patient agreed to participate.    6. I have spent a total time of 50 minutes in caring for this patient on the day of the visit/encounter including review of the medical record, documentation of medical decision making and of medical complexity, modification of assessment and plan, placement of orders, patient visit, communication with patient's nurse via epic secure chat not including the time spent for establishing the audio/video connection.        HISTORY OF PRESENT ILLNESS:  Requesting Physician: Marina Arias MD  Reason for Consult: End-stage renal disease and hemodialysis    Matthew Alvarez is a 69 y.o. male with a recent admission from January 10 to January 15 for acute on chronic systolic heart failure for which the patient underwent hemodialysis and ultrafiltration to get his weight down to as low as 53 kg at the time of discharge.  On the patient's last dialysis here in the hospital on January 15th, the patient had a preweight of 55.9 kg and his weight post hemodialysis was 53 kg.    The patient presents on this admission with worsening shortness of breath on January 20.  His last hemodialysis session was on Friday.  It is noted the patient had increased sluggishness with decreased appetite as well as confusion.  The patient presented and was noted to have COVID.  He was also noted on CT imaging of the chest to have manifestations of centrilobular and paraseptal emphysema, diffuse patchy groundglass opacities throughout both lungs as well as moderate to large right and left pleural effusion similar to prior CT.  Nephrology is being consulted with regards to hemodialysis.  My understanding is that he missed hemodialysis on Monday.    The  patient is being admitted as well with sepsis as when the patient presented he was noted to have a fever 101.4 degrees as well as elevated lactic acid level of 3.2 in the setting of COVID.  The patient in the emergency room did receive a 250 cc bolus.      With regards to hemodynamics, the patient's weight via bed scale on admission is 53.7 kg.  His blood pressure range has been anywhere from  systolic since midnight and last blood pressure is 99/67.  His pulse oximetry is 96% on room air.    I had communicated with the patient's nurse via [employer] secure chat it was noted the patient did have some tachycardia overnight although it had improved and this morning it was 84 when I saw the patient.    PAST MEDICAL HISTORY:  Past Medical History:   Diagnosis Date    Acute hemodialysis patient ([facility]) 12/14/2024    CAD (coronary artery disease)     HFrEF (heart failure with reduced ejection fraction) ([facility]) 09/2021    Hypertension     Rheumatoid factor positive 09/2021    Stage 3 chronic kidney disease ([facility])     Tobacco abuse        PAST SURGICAL HISTORY:  Past Surgical History:   Procedure Laterality Date    APPENDECTOMY  1965    CARDIAC CATHETERIZATION  9/16/2021    INGUINAL HERNIA REPAIR Right 1991    IR THORACENTESIS  12/18/2024    IR THORACENTESIS  12/23/2024    IR TUNNELED DIALYSIS CATHETER PLACEMENT  11/14/2024       ALLERGIES:  No Known Allergies    SOCIAL HISTORY:  Social History     Substance and Sexual Activity   Alcohol Use Yes    Alcohol/week: 6.0 standard drinks of alcohol    Types: 6 Shots of liquor per week    Comment: rare     Social History     Substance and Sexual Activity   Drug Use Never     Social History     Tobacco Use   Smoking Status Every Day    Current packs/day: 0.50    Average packs/day: 0.5 packs/day for 51.1 years (25.5 ttl pk-yrs)    Types: Cigarettes    Start date: 1974   Smokeless Tobacco Never   Tobacco Comments    Began smoking in his early 20s, on average smoking 1+ packs daily. Quit  "in 08/2021 (Updated 09/15/2021).        FAMILY HISTORY:  Family History   Problem Relation Age of Onset    Heart failure Mother     Other Father         \"blood clot\"    COPD Sister     Heart failure Brother     Valvular heart disease Brother     Heart attack Son     No Known Problems Son        MEDICATIONS:    Current Facility-Administered Medications:     acetaminophen (TYLENOL) tablet 650 mg, 650 mg, Oral, Q6H PRN, Kerri Chisholm PA-C    albumin human (FLEXBUMIN) 25 % injection 12.5 g, 12.5 g, Intravenous, Once, Jensen Kelly,     amLODIPine (NORVASC) tablet 5 mg, 5 mg, Oral, Daily, Kerri Chisholm PA-C    aspirin (ECOTRIN LOW STRENGTH) EC tablet 81 mg, 81 mg, Oral, Daily, Kerri Chisholm PA-C    atorvastatin (LIPITOR) tablet 20 mg, 20 mg, Oral, Daily With Dinner, Kerri Chisholm PA-C    cefTRIAXone (ROCEPHIN) IVPB (premix in dextrose) 1,000 mg 50 mL, 1,000 mg, Intravenous, Q24H, Marina Arias MD, Last Rate: 100 mL/hr at 01/20/25 2124, 1,000 mg at 01/20/25 2124    folic acid (FOLVITE) tablet 1 mg, 1 mg, Oral, Daily, Kerri Chisholm PA-C    hydrALAZINE (APRESOLINE) tablet 100 mg, 100 mg, Oral, Q8H [facility], Kerri Chisholm PA-C, 100 mg at 01/20/25 2125    isosorbide mononitrate (IMDUR) 24 hr tablet 120 mg, 120 mg, Oral, Daily, Kerri Chisholm PA-C    melatonin tablet 6 mg, 6 mg, Oral, HS, Kerri Chisholm PA-C, 6 mg at 01/20/25 2125    metoprolol tartrate (LOPRESSOR) tablet 25 mg, 25 mg, Oral, Q12H [facility], Danny Degroot MD, 25 mg at 01/21/25 0439    [COMPLETED] remdesivir (Veklury) 200 mg in sodium chloride 0.9 % 290 mL IVPB, 200 mg, Intravenous, Q24H, 200 mg at 01/20/25 2023 **FOLLOWED BY** remdesivir (Veklury) 100 mg in sodium chloride 0.9 % 270 mL IVPB, 100 mg, Intravenous, Q24H, Kerri Chisholm PA-C    vancomycin (VANCOCIN) IVPB (premix in dextrose) 750 mg 150 mL, 15 mg/kg (Adjusted), Intravenous, Once PRN, Marina Arias MD    REVIEW OF SYSTEMS:  It is noted the patient had confusion as well as increased weakness and fatigue, cough " "and shortness of breath and decreased oral intake.  There was no nausea vomiting or diarrhea reported.      PHYSICAL EXAM:  Current Weight: Weight - Scale: 53.7 kg (118 lb 6.4 oz)  First Weight: Weight - Scale: 55.4 kg (122 lb 2.2 oz)  Vitals:    01/21/25 0431 01/21/25 0557 01/21/25 0603 01/21/25 0604   BP: 105/70  99/67 99/67   Pulse: (!) 136   84   Resp:       Temp:       TempSrc:       SpO2: 93%   96%   Weight:  53.7 kg (118 lb 6.4 oz)         Intake/Output Summary (Last 24 hours) at 1/21/2025 0659  Last data filed at 1/20/2025 1746  Gross per 24 hour   Intake 600 ml   Output --   Net 600 ml     Physical Exam  General Patient was lying flat in bed he was noted to be ill-appearing.  HEENT: Normocephalic atraumatic the patient was on room air  Neck: Appears to be supple  Pulmonary: Last respirations 16/min the patient did not appear to be in any acute distress it was noted that from admission it was noted the patient had rhonchi and rales present.  Extremities: There is lower extremity edema noted  Neurologic: No focal deficits the patient would open his eyes this morning when I spoke his name and then go back to sleep.    Invasive Devices:      Lab Results:   Results from last 7 days   Lab Units 01/21/25  0439 01/20/25  1543 01/15/25  0906 01/14/25  0825   WBC Thousand/uL  --  8.76 9.23 5.28   HEMOGLOBIN g/dL  --  9.2* 8.1* 8.0*   HEMATOCRIT %  --  27.9* 24.6* 24.2*   PLATELETS Thousands/uL  --  93* 82* 82*   POTASSIUM mmol/L 4.3 4.5 4.6 4.4   CHLORIDE mmol/L 94* 92* 94* 97   CO2 mmol/L 26 24 23 24   BUN mg/dL 80* 77* 50* 33*   CREATININE mg/dL 8.44* 8.07* 5.82* 4.46*   CALCIUM mg/dL 8.3* 8.6 8.4 8.5   MAGNESIUM mg/dL  --  2.1  --   --    ALK PHOS U/L 113* 126*  --   --    ALT U/L 20 21  --   --    AST U/L 46* 57*  --   --          Portions of the record may have been created with voice recognition software. Occasional wrong word or \"sound a like\" substitutions may have occurred due to the inherent limitations of " voice recognition software. Read the chart carefully and recognize, using context, where substitutions have occurred.If you have any questions, please contact the dictating provider.

## 2025-01-21 NOTE — ASSESSMENT & PLAN NOTE
Lab Results   Component Value Date    EGFR 5 01/21/2025    EGFR 6 01/20/2025    EGFR 9 01/15/2025    CREATININE 8.44 (H) 01/21/2025    CREATININE 8.07 (H) 01/20/2025    CREATININE 5.82 (H) 01/15/2025     Currently receiving dialysis via right IJ permacath with concern for catheter infection/bacteremia.  Patient has had a functional decline and has had multiple missed dialysis sessions as an outpatient     Catheter management as above, additional management per nephrology   Recommend ongoing goals of care discussion, consider palliative care referral as outpatient

## 2025-01-21 NOTE — ASSESSMENT & PLAN NOTE
Lab Results   Component Value Date    EGFR 5 01/21/2025    EGFR 6 01/20/2025    EGFR 9 01/15/2025    CREATININE 8.44 (H) 01/21/2025    CREATININE 8.07 (H) 01/20/2025    CREATININE 5.82 (H) 01/15/2025   Patient on dialysis MWF -noncompliant with going, wife says he misses at least once a week  Presenting with bilateral pleural effusions  Currently on torsemide on nondialysis days  HD in the a.m.  Nephrology consult

## 2025-01-21 NOTE — ASSESSMENT & PLAN NOTE
Wt Readings from Last 3 Encounters:   01/22/25 53.5 kg (118 lb)   01/15/25 56.5 kg (124 lb 9.6 oz)   12/27/24 56 kg (123 lb 6.4 oz)     Patient presents with worsening shortness of breath, missed dialysis session today with last session on Friday  Patient's wife endorses that he sometimes is not taking his medication, spits them out or throws them away  CT chest with moderate to large right pleural effusion and moderate left pleural effusion  Echo in November with EF of 20%  Wearing LifeVest initially on presentation, now refusing-monitoring on telemetry  PTA regimen is on Coreg, torsemide (on nondialysis days)  BNP >4700    Given 1 dose IV diuretic on admission, HD as scheduled. Getting temporary catheter in on 1/22; will repeat CXR on 1/23 to assess pleural effusions.   Monitor electrolytes   Daily weights and I/O

## 2025-01-21 NOTE — HEMODIALYSIS
Post-Dialysis RN Treatment Note    Blood Pressure:  Pre 118/81 mm/Hg  Post 143/77 mmHg   EDW:  tbd     Weight:  Pre 53.6 kg   Post 52.7 kg   Mode of weight measurement: Bed Scale   Volume Removed:  1000 ml    Treatment duration: 180 minutes    NS given:  No    Treatment shortened No   Medications given during Rx: None Reported   Estimated Kt/V:  0.97   Access type: Permacath/TDC   Needle Gauge:  n/a   Access Issues: Yes, describe: lines reversed for improved flow. Small pustule along tunneled portion of catheter above exit site, actual exit site looks ok, Dr. Kelly aware and photo provided due to remote visit     Report called to primary nurse:   Yes Hayle Bainbridge LPN

## 2025-01-21 NOTE — ASSESSMENT & PLAN NOTE
SARS-CoV-2 PCR positive on 1/20 . CT chest with with diffuse patchy groundglass opacities throughout both lungs which may represent pneumonitis versus pulmonary edema.  Has underlying COPD, chronic tobacco use  Stable on room air     Continue remdesivir x 3 days, additional management per protocol   Continue isolation precautions

## 2025-01-21 NOTE — ASSESSMENT & PLAN NOTE
Wt Readings from Last 3 Encounters:   01/21/25 53.7 kg (118 lb 6.4 oz)   01/15/25 56.5 kg (124 lb 9.6 oz)   12/27/24 56 kg (123 lb 6.4 oz)     Patient presents with worsening shortness of breath, missed dialysis session today with last session on Friday  Patient's wife endorses that he sometimes is not taking his medication, spits them out or throws them away  CT chest with moderate to large right pleural effusion and moderate left pleural effusion  Echo in November with EF of 20%  Wears LifeVest-currently wearing -will monitor on telemetry per policy  PTA regimen is on Coreg, torsemide (on nondialysis days)  BNP >4700    Given 1 dose IV diuretic on admission, HD today  Monitor electrolytes   Daily weights and I/O

## 2025-01-21 NOTE — ASSESSMENT & PLAN NOTE
Both sets of blood cultures from admission with gram-positive cocci in clusters, ID is pending.  Both sets are labeled from left arm so consider possibility of contaminant if the isolate is coagulase-negative staph.  However, suspect the pathogen is clinically relevant in the setting of sepsis and permacath.  Limited evaluation but per media pictures there is concern for exit site infection with erythema and pustule at catheter site. He also reports a recent hx weeping L shin wound   No evidence of bacterial pneumonia, no intra-abdominal focus of infection, no other indwelling vascular or prosthetic devices     Continue vancomycin, dosing per pharmacy protocol   Discontinue ceftriaxone   Recommend IR/surgical evaluation for catheter removal, would favor line holiday but if securing an alternative access is a concern then may consider temporary dialysis catheter placement   Fu blood c/s   Repeat blood cx in AM /following catheter removal   Check TTE   Final choice and duration of antibiotics to be determined   Request primary team evaluation to assess for recent left leg wound

## 2025-01-22 ENCOUNTER — APPOINTMENT (INPATIENT)
Dept: INTERVENTIONAL RADIOLOGY/VASCULAR | Facility: HOSPITAL | Age: 70
DRG: 314 | End: 2025-01-22
Attending: RADIOLOGY
Payer: COMMERCIAL

## 2025-01-22 PROBLEM — E83.9 CHRONIC KIDNEY DISEASE-MINERAL AND BONE DISORDER (CKD-MBD): Status: ACTIVE | Noted: 2025-01-22

## 2025-01-22 PROBLEM — N18.9 CHRONIC KIDNEY DISEASE-MINERAL AND BONE DISORDER (CKD-MBD): Status: ACTIVE | Noted: 2025-01-22

## 2025-01-22 PROBLEM — M89.9 CHRONIC KIDNEY DISEASE-MINERAL AND BONE DISORDER (CKD-MBD): Status: ACTIVE | Noted: 2025-01-22

## 2025-01-22 LAB
ANION GAP SERPL CALCULATED.3IONS-SCNC: 10 MMOL/L (ref 4–13)
BUN SERPL-MCNC: 53 MG/DL (ref 5–25)
CALCIUM SERPL-MCNC: 8.1 MG/DL (ref 8.4–10.2)
CHLORIDE SERPL-SCNC: 95 MMOL/L (ref 96–108)
CO2 SERPL-SCNC: 27 MMOL/L (ref 21–32)
CREAT SERPL-MCNC: 5.84 MG/DL (ref 0.6–1.3)
ERYTHROCYTE [DISTWIDTH] IN BLOOD BY AUTOMATED COUNT: 19.9 % (ref 11.6–15.1)
GFR SERPL CREATININE-BSD FRML MDRD: 8 ML/MIN/1.73SQ M
GLUCOSE SERPL-MCNC: 100 MG/DL (ref 65–140)
HCT VFR BLD AUTO: 24.5 % (ref 36.5–49.3)
HGB BLD-MCNC: 8.4 G/DL (ref 12–17)
MAGNESIUM SERPL-MCNC: 1.9 MG/DL (ref 1.9–2.7)
MCH RBC QN AUTO: 29.3 PG (ref 26.8–34.3)
MCHC RBC AUTO-ENTMCNC: 34.3 G/DL (ref 31.4–37.4)
MCV RBC AUTO: 85 FL (ref 82–98)
MRSA NOSE QL CULT: NORMAL
PLATELET # BLD AUTO: 75 THOUSANDS/UL (ref 149–390)
POTASSIUM SERPL-SCNC: 4 MMOL/L (ref 3.5–5.3)
PROCALCITONIN SERPL-MCNC: 3.13 NG/ML
RBC # BLD AUTO: 2.87 MILLION/UL (ref 3.88–5.62)
SODIUM SERPL-SCNC: 132 MMOL/L (ref 135–147)
VANCOMYCIN SERPL-MCNC: 18.8 UG/ML (ref 10–20)
WBC # BLD AUTO: 8.75 THOUSAND/UL (ref 4.31–10.16)

## 2025-01-22 PROCEDURE — 99232 SBSQ HOSP IP/OBS MODERATE 35: CPT | Performed by: INTERNAL MEDICINE

## 2025-01-22 PROCEDURE — C1894 INTRO/SHEATH, NON-LASER: HCPCS

## 2025-01-22 PROCEDURE — 76937 US GUIDE VASCULAR ACCESS: CPT

## 2025-01-22 PROCEDURE — 02H633Z INSERTION OF INFUSION DEVICE INTO RIGHT ATRIUM, PERCUTANEOUS APPROACH: ICD-10-PCS | Performed by: RADIOLOGY

## 2025-01-22 PROCEDURE — 83735 ASSAY OF MAGNESIUM: CPT

## 2025-01-22 PROCEDURE — 87040 BLOOD CULTURE FOR BACTERIA: CPT

## 2025-01-22 PROCEDURE — 76937 US GUIDE VASCULAR ACCESS: CPT | Performed by: RADIOLOGY

## 2025-01-22 PROCEDURE — 80048 BASIC METABOLIC PNL TOTAL CA: CPT

## 2025-01-22 PROCEDURE — 77001 FLUOROGUIDE FOR VEIN DEVICE: CPT | Performed by: RADIOLOGY

## 2025-01-22 PROCEDURE — NC001 PR NO CHARGE: Performed by: RADIOLOGY

## 2025-01-22 PROCEDURE — C1769 GUIDE WIRE: HCPCS

## 2025-01-22 PROCEDURE — 36556 INSERT NON-TUNNEL CV CATH: CPT | Performed by: RADIOLOGY

## 2025-01-22 PROCEDURE — 80202 ASSAY OF VANCOMYCIN: CPT | Performed by: FAMILY MEDICINE

## 2025-01-22 PROCEDURE — 77001 FLUOROGUIDE FOR VEIN DEVICE: CPT

## 2025-01-22 PROCEDURE — C1752 CATH,HEMODIALYSIS,SHORT-TERM: HCPCS

## 2025-01-22 PROCEDURE — 99233 SBSQ HOSP IP/OBS HIGH 50: CPT | Performed by: INTERNAL MEDICINE

## 2025-01-22 PROCEDURE — 84145 PROCALCITONIN (PCT): CPT

## 2025-01-22 PROCEDURE — G0545 PR INHERENT VISIT TO INPT: HCPCS | Performed by: INTERNAL MEDICINE

## 2025-01-22 PROCEDURE — 99232 SBSQ HOSP IP/OBS MODERATE 35: CPT

## 2025-01-22 PROCEDURE — 87077 CULTURE AEROBIC IDENTIFY: CPT

## 2025-01-22 PROCEDURE — 85027 COMPLETE CBC AUTOMATED: CPT

## 2025-01-22 PROCEDURE — 36556 INSERT NON-TUNNEL CV CATH: CPT

## 2025-01-22 RX ORDER — CEFAZOLIN SODIUM 1 G/50ML
1000 SOLUTION INTRAVENOUS EVERY 24 HOURS
Status: DISCONTINUED | OUTPATIENT
Start: 2025-01-23 | End: 2025-01-22

## 2025-01-22 RX ORDER — LIDOCAINE WITH 8.4% SOD BICARB 0.9%(10ML)
SYRINGE (ML) INJECTION AS NEEDED
Status: COMPLETED | OUTPATIENT
Start: 2025-01-22 | End: 2025-01-22

## 2025-01-22 RX ORDER — VANCOMYCIN HYDROCHLORIDE 750 MG/150ML
15 INJECTION, SOLUTION INTRAVENOUS ONCE AS NEEDED
Status: DISCONTINUED | OUTPATIENT
Start: 2025-01-23 | End: 2025-01-22

## 2025-01-22 RX ORDER — CEFAZOLIN SODIUM 2 G/50ML
2000 SOLUTION INTRAVENOUS EVERY 24 HOURS
Status: DISCONTINUED | OUTPATIENT
Start: 2025-01-22 | End: 2025-01-22

## 2025-01-22 RX ORDER — VANCOMYCIN HYDROCHLORIDE 750 MG/150ML
15 INJECTION, SOLUTION INTRAVENOUS ONCE
Status: DISCONTINUED | OUTPATIENT
Start: 2025-01-22 | End: 2025-01-22

## 2025-01-22 RX ORDER — CEFAZOLIN SODIUM 1 G/50ML
1000 SOLUTION INTRAVENOUS EVERY 24 HOURS
Status: DISCONTINUED | OUTPATIENT
Start: 2025-01-22 | End: 2025-01-29

## 2025-01-22 RX ADMIN — Medication 20 ML: at 14:00

## 2025-01-22 RX ADMIN — CEFAZOLIN SODIUM 1000 MG: 1 SOLUTION INTRAVENOUS at 12:16

## 2025-01-22 RX ADMIN — Medication 10 ML: at 14:10

## 2025-01-22 RX ADMIN — ATORVASTATIN CALCIUM 20 MG: 20 TABLET, FILM COATED ORAL at 17:49

## 2025-01-22 RX ADMIN — ISOSORBIDE MONONITRATE 120 MG: 30 TABLET, EXTENDED RELEASE ORAL at 08:27

## 2025-01-22 RX ADMIN — FOLIC ACID 1 MG: 1 TABLET ORAL at 08:28

## 2025-01-22 RX ADMIN — METOPROLOL TARTRATE 25 MG: 25 TABLET, FILM COATED ORAL at 21:16

## 2025-01-22 RX ADMIN — METOPROLOL TARTRATE 25 MG: 25 TABLET, FILM COATED ORAL at 08:28

## 2025-01-22 RX ADMIN — Medication 6 MG: at 21:16

## 2025-01-22 RX ADMIN — REMDESIVIR 100 MG: 100 INJECTION, POWDER, LYOPHILIZED, FOR SOLUTION INTRAVENOUS at 17:49

## 2025-01-22 NOTE — ASSESSMENT & PLAN NOTE
Meeting sepsis criteria secondary to temperature 101.4 °F, HR 97 bpm  Lactic acid 3.2  In setting of COVID, bacteremia from unclear source at this time-possibly bacterial pneumonia versus skin infection (pustule over HD cath as well as chronic skin lesions of lower extremities)  Blood cultures positive for 2 out of 2 gram-positive cocci in clusters  Identification panel with staph RES  Initially on ceftriaxone and vancomycin  DC ceftriaxone, continue vancomycin  MRSA culture pending  Repeat blood cultures on 1/22 pending  ID recommending transition to cefazolin 1 g every 24 hours

## 2025-01-22 NOTE — ASSESSMENT & PLAN NOTE
Secondary to ESRD, placed on fluid restrictions along with continuing hemodialysis per nephrology recommendations

## 2025-01-22 NOTE — ASSESSMENT & PLAN NOTE
Gram-positive cocci bacteremia currently on antibiotics followed by ID recommended removal of tunneled dialysis catheter which has been done, also, recommended TTE  Can replace tunneled dialysis catheter with negative blood cultures for 48 to 72 hours will plan for temporary IJ Saulo catheter as outlined

## 2025-01-22 NOTE — ASSESSMENT & PLAN NOTE
"MWF at [facility]  Received HD yesterday  Target weight to be determined  Access: Patient with infected tunneled dialysis catheter removed yesterday, plan for \"dialysis holiday and catheter holiday today \"and then placed temporary IJ Saulo catheter via IR in a.m. with HD in a.m. then get back on schedule Monday Wednesday and Friday    "

## 2025-01-22 NOTE — ASSESSMENT & PLAN NOTE
Lab Results   Component Value Date    EGFR 8 01/22/2025    EGFR 5 01/21/2025    EGFR 6 01/20/2025    CREATININE 5.84 (H) 01/22/2025    CREATININE 8.44 (H) 01/21/2025    CREATININE 8.07 (H) 01/20/2025     S/p right IJ permacath removal, will need access. Patient has had a functional decline and has had multiple missed dialysis sessions as an outpatient     Catheter management as above, additional management per nephrology   Recommend ongoing goals of care discussion, consider palliative care referral as outpatient

## 2025-01-22 NOTE — ASSESSMENT & PLAN NOTE
Mild COVID pathway as below   COVID19- positive on 1/20   CT chest bilateral pleural effusions and bilateral groundglass opacities  COVID mild pathway labs   CRP and BNP elevated   CK normal  Trop elevated but downtrended, no chest pain  Will give remdesivir x 3 days as is high risk patient   OOB TID; ambulation and mobilization strongly encouraged  PT/OT consult and evaluation   Supportive care

## 2025-01-22 NOTE — ASSESSMENT & PLAN NOTE
Target weight listed as 60 kg but will continue to challenge as able  Appears improved overall today comfortable with clear lungs will follow and challenges mentioned with UF  EF: 20%

## 2025-01-22 NOTE — ASSESSMENT & PLAN NOTE
Current hemoglobin slightly lower at 8.4  Will monitor: In place on Epogen 6000 units with each treatment  Cannot give intravenous iron with sepsis

## 2025-01-22 NOTE — ASSESSMENT & PLAN NOTE
Lab Results   Component Value Date    EGFR 8 01/22/2025    EGFR 5 01/21/2025    EGFR 6 01/20/2025    CREATININE 5.84 (H) 01/22/2025    CREATININE 8.44 (H) 01/21/2025    CREATININE 8.07 (H) 01/20/2025   Patient on dialysis MWF -noncompliant with going, wife says he misses at least once a week  Presenting with bilateral pleural effusions  Currently on torsemide on nondialysis days  HD as scheduled  Nephrology consult: Due to infected tunneled dialysis catheter removal on 1/21, plan to place temporary dialysis catheter with interventional radiology on 1/22 and then get back to schedule on Monday, Wednesday, Friday.  Can replace PermaCath after blood cultures are negative for 48 to 72 hours.

## 2025-01-22 NOTE — ASSESSMENT & PLAN NOTE
Patient with 2 out of 2 blood cultures positive for gram-positive cocci in clusters  Seen under sepsis   Continue vancomycin  No vegetation noted on echo  Will need removal of HD cath -possibly with general surgery today, if unavailable then IR tomorrow  Repeat BC ordered  ID consult: start cefazolin 1g q24h, follow up on blood culture, anticipate 4-6 weeks of abx. Once repeat BC are negative 48-72 can replace permacath, if more urgent access is needed ok to place temporary dialysis catheter.

## 2025-01-22 NOTE — PROGRESS NOTES
Progress Note - Infectious Disease   Name: Matthew Alvarez 69 y.o. male I MRN: [id number]  Unit/Bed#: -01 I Date of Admission: 1/20/2025   Date of Service: 1/22/2025 I Hospital Day: 2      Administrative Statements   VIRTUAL CARE DOCUMENTATION:     1. This service was provided via Telemedicine using Sensus Experience Kit     2. Parties in the room with patient during teleconsult Patient only    3. Confidentiality My office door was closed     4. Participants No one else was in the room    5. Patient acknowledged consent and understanding of privacy and security of the  Telemedicine consult. I informed the patient that I have reviewed their record in Epic and presented the opportunity for them to ask any questions regarding the visit today.  The patient agreed to participate.    6. I have spent a total time of 50 minutes in caring for this patient on the day of the visit/encounter including Diagnostic results, Risk factor reductions, Impressions, Counseling / Coordination of care, Documenting in the medical record, Reviewing / ordering tests, medicine, procedures  , Obtaining or reviewing history  , and Communicating with other healthcare professionals , not including the time spent for establishing the audio/video connection.       Assessment & Plan  Sepsis (HCC)  Fever, tachycardia with lactic acidosis.  Suspect secondary to catheter exit site infection and secondary bacteremia now s/p line removal  Patient also noted to have acute COVID infection but without pneumonia on chest imaging  Afebrile overnight, hemodynamically stable without leukocytosis     Antibiotics and additional management as below   Check daily CBC, CMP while inpatient to monitor for any evolving antibiotic toxicity or treatment failure   Continue supportive care, monitor clinical course    Bacteremia  Both sets of blood cultures from admission with probable MSSA. Secondary to catheter exit site infection s/p line removal on 1/21 with purulent  drainage , cx with GPC in clusters  TTE is without vegetation. Clinically improving     Discontinue vancomycin and start cefazolin 1g q24h   Fu blood c/s   FU repeat blood cx     Final duration of abx to be defined, anticipate 4-6 weeks of therapy    Once repeat blood cx are negative for 48-72 hrs can replace permacath, if more urgent access is needed then ok to place temporary dialysis catheter    COVID  SARS-CoV-2 PCR positive on 1/20 . CT chest with with diffuse patchy groundglass opacities throughout both lungs which may represent pneumonitis versus pulmonary edema.  Has underlying COPD, chronic tobacco use  Stable on room air     Continue remdesivir x 3 days, additional management per protocol   Continue isolation precautions  ESRD (end stage renal disease) on dialysis ([facility])  Lab Results   Component Value Date    EGFR 8 01/22/2025    EGFR 5 01/21/2025    EGFR 6 01/20/2025    CREATININE 5.84 (H) 01/22/2025    CREATININE 8.44 (H) 01/21/2025    CREATININE 8.07 (H) 01/20/2025     S/p right IJ permacath removal, will need access. Patient has had a functional decline and has had multiple missed dialysis sessions as an outpatient     Catheter management as above, additional management per nephrology   Recommend ongoing goals of care discussion, consider palliative care referral as outpatient     Acute on chronic systolic heart failure (HCC)  Wt Readings from Last 3 Encounters:   01/22/25 53.5 kg (118 lb)   01/15/25 56.5 kg (124 lb 9.6 oz)   12/27/24 56 kg (123 lb 6.4 oz)     Has underlying nonischemic cardiomyopathy, ESRD, A-fib with RVR and chronic pleural effusions requiring thoracentesis in the past        Subjective:  The patient has no complaints.  Denies fevers, chills, or sweats.  Denies nausea, vomiting, or diarrhea.    Antibiotics:  vancomycin    Physical Exam     Temp:  [98.2 °F (36.8 °C)-99 °F (37.2 °C)] 98.2 °F (36.8 °C)  HR:  [] 63  Resp:  [16-22] 22  BP: (109-143)/() 129/69  SpO2:  [91 %-97  %] 97 %  Temp (24hrs), Av.6 °F (37 °C), Min:98.2 °F (36.8 °C), Max:99 °F (37.2 °C)  Current: Temperature: 98.2 °F (36.8 °C)    Intake/Output Summary (Last 24 hours) at 2025 08  Last data filed at 2025 1915  Gross per 24 hour   Intake 580 ml   Output 1500 ml   Net -920 ml       Physical exam findings reported by bedside and primary medical team staff      General Appearance:  Appearing chronically ill, nontoxic, and in no distress, appears stated age   Lungs:   Coarse and rhonchorous to auscultation bilaterally, no audible wheezes, respirations unlabored   Heart:  Irregular rate and rhythm, S1, S2 normal, no murmur, rub or gallop   Abdomen:   Soft, non-tender, non-distended, positive bowel sounds, no masses, no organomegaly    No CVA tenderness   Extremities: Extremities normal, atraumatic, no cyanosis, clubbing , 3+ b/l LE edema   Skin: Scattered bruises, sacral erythema   Neurologic: Alert and oriented times 3,         Invasive Devices:   Peripheral IV 25 Left Antecubital (Active)   Site Assessment [facility] 25   Dressing Type Transparent 25   Line Status Flushed 25   Dressing Status Clean;Dry;Intact 25   Dressing Change Due 25   Reason Not Rotated Not due 25       Peripheral IV 25 Left;Ventral (anterior) Forearm (Active)   Site Assessment [facility] 25   Dressing Type Transparent 25   Line Status Flushed;Infusing 25   Dressing Status Clean;Dry;Intact 25   Dressing Change Due 25   Reason Not Rotated Not due 25       HD Permanent Double Catheter (Active)   Reasons to continue HD Cath Treatment Therapy 25 0910   Goal for Removal N/A- chronic HD catheter 25 0910   Line Necessity Reviewed Yes, reviewed with provider 25 0910   Site Assessment Other (Comment) 25 1215   Proximal Lumen Status Flushed & Clamped;Normal saline  locked;Passive disinfecting cap applied 01/21/25 1215   Distal Lumen Status Flushed & Clamped;Normal saline locked;Passive disinfecting cap applied 01/21/25 1215   Dressing Type Chlorhexidine dressing 01/21/25 1215   Dressing Status Clean;Dry;Intact 01/21/25 1215   Dressing Intervention Dressing changed 01/21/25 0910   Dressing Change Due 01/27/25 01/21/25 1215       Labs, Imaging, & Other Studies     Lab Results:    I have personally reviewed pertinent labs.    Results from last 7 days   Lab Units 01/22/25  0456 01/21/25  0439 01/20/25  1543   WBC Thousand/uL 8.75 9.14 8.76   HEMOGLOBIN g/dL 8.4* 9.6* 9.2*   PLATELETS Thousands/uL 75* 87* 93*     Results from last 7 days   Lab Units 01/22/25  0456 01/21/25  0439 01/20/25  1543   POTASSIUM mmol/L 4.0 4.3 4.5   CHLORIDE mmol/L 95* 94* 92*   CO2 mmol/L 27 26 24   BUN mg/dL 53* 80* 77*   CREATININE mg/dL 5.84* 8.44* 8.07*   EGFR ml/min/1.73sq m 8 5 6   CALCIUM mg/dL 8.1* 8.3* 8.6   AST U/L  --  46* 57*   ALT U/L  --  20 21   ALK PHOS U/L  --  113* 126*     Results from last 7 days   Lab Units 01/21/25  1559 01/20/25  1556 01/20/25  1543   GRAM STAIN RESULT  4+ Polys*  4+ Gram positive cocci in clusters* Gram positive cocci in clusters* Gram positive cocci in clusters*       Imaging Studies:   I have personally reviewed pertinent imaging study reports and images in PACS.      EKG, Pathology, and Other Studies:   I have personally reviewed pertinent reports.        Counseling/Coordination of care:       Total 50 minutes in evaluation of the patient and communication with the patient via telehealth of which 30 minutes was in counseling/coordination of care.  Extensive review of the medical records in epic including review of the notes, radiographs, and laboratory results.  My recommendations were discussed with the patient in detail who verbalized understanding.

## 2025-01-22 NOTE — ASSESSMENT & PLAN NOTE
Both sets of blood cultures from admission with probable MSSA. Secondary to catheter exit site infection s/p line removal on 1/21 with purulent drainage , cx with GPC in clusters  TTE is without vegetation. Clinically improving     Discontinue vancomycin and start cefazolin 1g q24h   Fu blood c/s   FU repeat blood cx     Final duration of abx to be defined, anticipate 4-6 weeks of therapy    Once repeat blood cx are negative for 48-72 hrs can replace permacath, if more urgent access is needed then ok to place temporary dialysis catheter

## 2025-01-22 NOTE — PROGRESS NOTES
Progress Note - Surgery-General   Name: Matthew Alvarez 69 y.o. male I MRN: [id number]  Unit/Bed#: -01 I Date of Admission: 1/20/2025   Date of Service: 1/22/2025 I Hospital Day: 2     Assessment & Plan  Bacteremia  69 y.o. male with hx of ESRD on dialysis with CHF who presented with worsening SOB, patient is COVID positive   - Septic on presentation which was initially thought to be due to COVID pneumonia   - Pustule noted over the path of his tunneled hemodialysis catheter    - Afebrile, episodes of tachycardia but improving this morning, VSS on room air  - No leukocytosis 8 (9, 8, 9)  - Hgb 8.4 (9.6, 9.2, 8.1)  - Body fluid culture and gram stain - 4+ polys, 4+ GPC in clusters  - Catheter tip culture pending  - Blood culture x 2 1/20 with probable MSSA growth   - Blood cultures x 2 1/22 pending     - ESRD - Cr - 5.84 (8.44, 8.07)     Plan:  - Pressure dressing previously removed from neck without any evidence of hematoma or active bleeding appreciated, catheter insertion site dressing taken down and exchanged, some purulence and dried blood noted on dressing - 4x4 replaced  - Will place order for daily & PRN dressing changes of the catheter insertion site for now as there is continued drainage  - Consider addition of non-adherent gauze if there is continued dried drainage sticking to dressing  - Patient's son is on the phone and asking for update on plan regarding discharge and COVID, SLIM aware     - General surgery will sign off at this time, reach out with any questions or concerns  ESRD (end stage renal disease) on dialysis (HCC)  Lab Results   Component Value Date    EGFR 8 01/22/2025    EGFR 5 01/21/2025    EGFR 6 01/20/2025    CREATININE 5.84 (H) 01/22/2025    CREATININE 8.44 (H) 01/21/2025    CREATININE 8.07 (H) 01/20/2025   - Per primary  COVID  - Per primary     Subjective   Patient denies any pain. With palpation of the right neck/clavicle area he has no discomfort or complaints. Pressure  dressing was taken down overnight. With removal of dressing over catheter insertion site patient does have some discomfort but tolerated well.     Objective :  Temp:  [98.2 °F (36.8 °C)-99 °F (37.2 °C)] 98.2 °F (36.8 °C)  HR:  [] 65  BP: (109-143)/() 130/69  Resp:  [16-22] 22  SpO2:  [91 %-97 %] 97 %  O2 Device: None (Room air)    I/O         01/20 0701 01/21 0700 01/21 0701 01/22 0700 01/22 0701 01/23 0700    P.O.  120 220    I.V. (mL/kg)  460 (8.6)     IV Piggyback 600      Total Intake(mL/kg) 600 (11.2) 580 (10.8) 220 (4.1)    Other  1500     Total Output  1500     Net +600 -920 +220                 Lines/Drains/Airways       Active Status       Name Placement date Placement time Site Days    HD Permanent Double Catheter 11/14/24  1352  Internal jugular  68                  Physical Exam  General: no acute distress, pt appears well and comfortable, out of bed in chair  Skin: warm and dry to touch  ENT: right side of neck with pressure dressing previously removed, no evidence of continued bleeding, no hematoma, dressing taken down from prior drain insertion site and some dried blood did stick to gauze - saline was used for removal, some purulence remains present at small opening, not actively draining, 4x4 taped back into place  Pulmonary: normal effort, wet cough   Neuro: alert and oriented     Lab Results: I have reviewed the following results:  Recent Labs     01/20/25  1543 01/20/25  1816 01/21/25  0439 01/22/25  0456   WBC 8.76  --  9.14 8.75   HGB 9.2*  --  9.6* 8.4*   HCT 27.9*  --  27.9* 24.5*   PLT 93*  --  87* 75*   BANDSPCT  --   --  3  --    SODIUM 130*  --  133* 132*   K 4.5  --  4.3 4.0   CL 92*  --  94* 95*   CO2 24  --  26 27   BUN 77*  --  80* 53*   CREATININE 8.07*  --  8.44* 5.84*   GLUC 104  --  93 100   MG 2.1  --   --  1.9   AST 57*  --  46*  --    ALT 21  --  20  --    ALB 3.0*  --  2.6*  --    TBILI 1.85*  --  1.75*  --    ALKPHOS 126*  --  113*  --    PTT 32  --   --   --     INR 1.34*  --   --   --    HSTNI0 265*  --   --   --    HSTNI2  --  240*  --   --    BNP >4,700*  --   --   --    LACTICACID 3.2* 1.0  --   --      VTE Pharmacologic Prophylaxis: Reason for no pharmacologic prophylaxis on hold pending procedure for placement of temporary cath for dialysis  VTE Mechanical Prophylaxis: sequential compression device    Katrina Elizabeth Billig, PA-C  1/22/2025

## 2025-01-22 NOTE — ASSESSMENT & PLAN NOTE
69 y.o. male with hx of ESRD on dialysis with CHF who presented with worsening SOB, patient is COVID positive   - Septic on presentation which was initially thought to be due to COVID pneumonia   - Pustule noted over the path of his tunneled hemodialysis catheter    - Afebrile, episodes of tachycardia but improving this morning, VSS on room air  - No leukocytosis 8 (9, 8, 9)  - Hgb 8.4 (9.6, 9.2, 8.1)  - Body fluid culture and gram stain - 4+ polys, 4+ GPC in clusters  - Catheter tip culture pending  - Blood culture x 2 1/20 with probable MSSA growth   - Blood cultures x 2 1/22 pending     - ESRD - Cr - 5.84 (8.44, 8.07)     Plan:  - Pressure dressing previously removed from neck without any evidence of hematoma or active bleeding appreciated, catheter insertion site dressing taken down and exchanged, some purulence and dried blood noted on dressing - 4x4 replaced  - Will place order for daily & PRN dressing changes of the catheter insertion site for now as there is continued drainage  - Consider addition of non-adherent gauze if there is continued dried drainage sticking to dressing  - Patient's son is on the phone and asking for update on plan regarding discharge and COVID, SLIM aware     - General surgery will sign off at this time, reach out with any questions or concerns

## 2025-01-22 NOTE — ASSESSMENT & PLAN NOTE
Wt Readings from Last 3 Encounters:   01/22/25 53.5 kg (118 lb)   01/15/25 56.5 kg (124 lb 9.6 oz)   12/27/24 56 kg (123 lb 6.4 oz)

## 2025-01-22 NOTE — CONSULTS
e-Consult (IPC)  - Interventional Radiology  Matthew Alvarez 69 y.o. male MRN: [id number]  Unit/Bed#: -01 Encounter: [id number]    Interventional Radiology has been consulted to evaluate Matthew Alvarez    We were consulted by Dr. Guerra concerning this patient with line sepsis.    Inpatient Consult to IR  Consult performed by: Leo Francisco MD  Consult ordered by: Kendall Guerra MD        01/22/25    Assessment/Recommendation:   Tunneled dialysis catheter placed on November now with an infected catheter removed yesterday.  IR consulted today for temporary catheter placement today and possible tunneled catheter placed after line holiday.     21-30 minutes, >50% of the total time devoted to medical consultative verbal/EMR discussion between providers. Written report will be generated in the EMR.     Thank you for allowing Interventional Radiology to participate in the care of Matthew Alvarez. Please don't hesitate to call or TigerText us with any questions.     Leo Francisco MD

## 2025-01-22 NOTE — PROCEDURES
Central Line    Date/Time: 1/22/2025 2:16 PM    Performed by: Leo Francisco MD  Authorized by: Leo Francisco MD    Patient location:  IR  Consent:     Consent obtained:  Written    Consent given by:  Patient    Risks discussed:  Bleeding and infection    Alternatives discussed:  No treatment and delayed treatment  Universal protocol:     Procedure explained and questions answered to patient or proxy's satisfaction: yes      Relevant documents present and verified: yes      Test results available and properly labeled: yes      Radiology Images displayed and confirmed.  If images not available, report reviewed: yes      Required blood products, implants, devices, and special equipment available: yes      Site/side marked: yes      Immediately prior to procedure, a time out was called: yes      Patient identity confirmed:  Verbally with patient and arm band  Pre-procedure details:     Hand hygiene: Hand hygiene performed prior to insertion      Sterile barrier technique: All elements of maximal sterile technique followed      Skin preparation:  2% chlorhexidine    Skin preparation agent: Skin preparation agent completely dried prior to procedure    Indications:     Central line indications: dialysis    Anesthesia (see MAR for exact dosages):     Anesthesia method:  Local infiltration    Local anesthetic:  Lidocaine 1% w/o epi  Procedure details:     Location:  Right internal jugular    Vessel type: vein      Laterality:  Right    Approach: percutaneous technique used      Patient position:  Flat    Catheter type:  Double lumen    Catheter size:  14 Fr    Landmarks identified: yes      Ultrasound guidance: yes      Sterile ultrasound techniques: Sterile gel and sterile probe covers were used      Number of attempts:  1    Successful placement: yes      Catheter tip vessel location: atriocaval junction    Post-procedure details:     Post-procedure:  Dressing applied and line sutured    Assessment:  Blood return  through all ports and placement verified by x-ray    Post-procedure complications: none      Patient tolerance of procedure:  Tolerated well, no immediate complications

## 2025-01-22 NOTE — PROGRESS NOTES
"Progress Note - Nephrology   Name: Matthew Alvarez 69 y.o. male I MRN: [id number]  Unit/Bed#: -01 I Date of Admission: 1/20/2025   Date of Service: 1/22/2025 I Hospital Day: 2     Assessment & Plan  ESRD (end stage renal disease) on dialysis (HCC)  MWF at [facility]  Received HD yesterday  Target weight to be determined  Access: Patient with infected tunneled dialysis catheter removed yesterday, plan for \"dialysis holiday and catheter holiday today \"and then placed temporary IJ Saulo catheter via IR in a.m. with HD in a.m. then get back on schedule Monday Wednesday and Friday    Primary hypertension  Blood pressure low normal hold meds  Home medications: Hydralazine 100 mg every 8 hours/carvedilol 25 twice a day/amlodipine 5 mg daily/torsemide 100 mg on nondialysis days being held at this time  Hyponatremia  Secondary to ESRD: Will place on fluid restriction along with HD/UF  Chronic kidney disease-mineral and bone disorder (CKD-MBD)  Will monitor phosphorus currently no binders will recheck in a.m.  Anemia due to chronic kidney disease, on chronic dialysis (HCC)  Current hemoglobin slightly lower at 8.4  Will monitor: In place on Epogen 6000 units with each treatment  Cannot give intravenous iron with sepsis  Acute on chronic systolic heart failure (HCC)  Target weight listed as 60 kg but will continue to challenge as able  Appears improved overall today comfortable with clear lungs will follow and challenges mentioned with UF  EF: 20%    COVID  The patient is being given focal directed treatment is to be given intravenous remdesivir.  The patient is also being given IV Rocephin for primary team given patient's presentation.  Sepsis (HCC)  Gram-positive cocci bacteremia currently on antibiotics followed by ID recommended removal of tunneled dialysis catheter which has been done, also, recommended TTE  Can replace tunneled dialysis catheter with negative blood cultures for 48 to 72 hours will plan for " temporary IJ Saulo catheter as outlined    I have reviewed the nephrology recommendations including plan for IR follow-up for temporary Saulo catheter in a.m. and subsequent HD, with SLIM, and we are in agreement with renal plan including the information outlined above.     Subjective   Brief History of Admission -ESRD now with sepsis    Patient complains of mild cough, slight loose stools, no nausea and vomiting today, no shortness of breath or chest pain    Objective :  Temp:  [98.2 °F (36.8 °C)-99 °F (37.2 °C)] 98.2 °F (36.8 °C)  HR:  [] 65  BP: (109-143)/() 130/69  Resp:  [16-22] 22  SpO2:  [91 %-97 %] 97 %  O2 Device: None (Room air)    Current Weight: Weight - Scale: 53.5 kg (118 lb)  First Weight: Weight - Scale: 55.4 kg (122 lb 2.2 oz)  I/O         01/20 0701  01/21 0700 01/21 0701  01/22 0700 01/22 0701  01/23 0700    P.O.  120 220    I.V. (mL/kg)  460 (8.6)     IV Piggyback 600      Total Intake(mL/kg) 600 (11.2) 580 (10.8) 220 (4.1)    Other  1500     Total Output  1500     Net +600 -920 +220                 Physical Exam  Physical Exam: General:  No acute distress  Skin:  No acute rash  Eyes:  No scleral icterus and noninjected  ENT:  Moist mucous membranes  Neck:  Supple, no jugular venous distention, trachea midline, overall appearance is normal  Chest: Perhaps mild rhonchi; right TDC  CVS:  Regular rate and rhythm, without a rub or gallops  Abdomen:  Normal bowel sounds, soft and nontender and nondistended  Extremities: Right lower extremity mild edema, left lower extremity wrapped, and no cyanosis, no significant arthritic changes  Neuro:  No gross focality  Psych:  Alert and oriented and appropriate    Medications:    Current Facility-Administered Medications:     acetaminophen (TYLENOL) tablet 650 mg, 650 mg, Oral, Q6H PRN, Kerri Chisholm PA-C    [Held by provider] amLODIPine (NORVASC) tablet 5 mg, 5 mg, Oral, Daily, Kerri Chisholm PA-C    atorvastatin (LIPITOR) tablet 20 mg, 20 mg,  "Oral, Daily With Dinner, Kerri Chisholm PA-C, 20 mg at 01/21/25 1718    [START ON 1/23/2025] ceFAZolin (ANCEF) IVPB (premix in dextrose) 1,000 mg 50 mL, 1,000 mg, Intravenous, Q24H, Leonarda Simmons PA-C    folic acid (FOLVITE) tablet 1 mg, 1 mg, Oral, Daily, Kerri Chisholm PA-C, 1 mg at 01/22/25 0828    [Held by provider] hydrALAZINE (APRESOLINE) tablet 100 mg, 100 mg, Oral, Q8H JEEVAN, Kerri Chisholm PA-C, 100 mg at 01/20/25 2125    isosorbide mononitrate (IMDUR) 24 hr tablet 120 mg, 120 mg, Oral, Daily, Kerri Chisholm PA-C, 120 mg at 01/22/25 0827    melatonin tablet 6 mg, 6 mg, Oral, HS, Kerri Chisholm PA-C, 6 mg at 01/21/25 2113    metoprolol (LOPRESSOR) injection 2.5 mg, 2.5 mg, Intravenous, Q6H PRN, Kerri Chisholm PA-C    metoprolol tartrate (LOPRESSOR) tablet 25 mg, 25 mg, Oral, Q12H [facility], Danny Degroot MD, 25 mg at 01/22/25 0828    [COMPLETED] remdesivir (Veklury) 200 mg in sodium chloride 0.9 % 290 mL IVPB, 200 mg, Intravenous, Q24H, 200 mg at 01/20/25 2023 **FOLLOWED BY** remdesivir (Veklury) 100 mg in sodium chloride 0.9 % 270 mL IVPB, 100 mg, Intravenous, Q24H, JASON Estrella-C, 100 mg at 01/21/25 1915      Lab Results: I have reviewed the following results:  Results from last 7 days   Lab Units 01/22/25  0456 01/21/25  0439 01/20/25  1543   WBC Thousand/uL 8.75 9.14 8.76   HEMOGLOBIN g/dL 8.4* 9.6* 9.2*   HEMATOCRIT % 24.5* 27.9* 27.9*   PLATELETS Thousands/uL 75* 87* 93*   POTASSIUM mmol/L 4.0 4.3 4.5   CHLORIDE mmol/L 95* 94* 92*   CO2 mmol/L 27 26 24   BUN mg/dL 53* 80* 77*   CREATININE mg/dL 5.84* 8.44* 8.07*   CALCIUM mg/dL 8.1* 8.3* 8.6   MAGNESIUM mg/dL 1.9  --  2.1   ALBUMIN g/dL  --  2.6* 3.0*       Administrative Statements     Portions of the record may have been created with voice recognition software. Occasional wrong word or \"sound a like\" substitutions may have occurred due to the inherent limitations of voice recognition software. Read the chart carefully and recognize, using context, where " substitutions have occurred.If you have any questions, please contact the dictating provider.

## 2025-01-22 NOTE — PROGRESS NOTES
Vancomycin has been discontinued.  Pharmacy will sign off.  Please contact or re-consult with questions.    Harsh Rome, Pharmacist

## 2025-01-22 NOTE — ASSESSMENT & PLAN NOTE
Lab Results   Component Value Date    EGFR 8 01/22/2025    EGFR 5 01/21/2025    EGFR 6 01/20/2025    CREATININE 5.84 (H) 01/22/2025    CREATININE 8.44 (H) 01/21/2025    CREATININE 8.07 (H) 01/20/2025   - Per primary

## 2025-01-22 NOTE — PROGRESS NOTES
Progress Note - Hospitalist   Name: Matthew Alvarez 69 y.o. male I MRN: [id number]  Unit/Bed#: -01 I Date of Admission: 1/20/2025   Date of Service: 1/22/2025 I Hospital Day: 2    Assessment & Plan  Sepsis (HCC)  Meeting sepsis criteria secondary to temperature 101.4 °F, HR 97 bpm  Lactic acid 3.2  In setting of COVID, bacteremia from unclear source at this time-possibly bacterial pneumonia versus skin infection (pustule over HD cath as well as chronic skin lesions of lower extremities)  Blood cultures positive for 2 out of 2 gram-positive cocci in clusters  Identification panel with staph RES  Initially on ceftriaxone and vancomycin  DC ceftriaxone, continue vancomycin  MRSA culture pending  Repeat blood cultures on 1/22 pending  ID recommending transition to cefazolin 1 g every 24 hours  Bacteremia  Patient with 2 out of 2 blood cultures positive for gram-positive cocci in clusters  Seen under sepsis   Continue vancomycin  No vegetation noted on echo  Will need removal of HD cath -possibly with general surgery today, if unavailable then IR tomorrow  Repeat BC ordered  ID consult: start cefazolin 1g q24h, follow up on blood culture, anticipate 4-6 weeks of abx. Once repeat BC are negative 48-72 can replace permacath, if more urgent access is needed ok to place temporary dialysis catheter.   Acute on chronic systolic heart failure (HCC)  Wt Readings from Last 3 Encounters:   01/22/25 53.5 kg (118 lb)   01/15/25 56.5 kg (124 lb 9.6 oz)   12/27/24 56 kg (123 lb 6.4 oz)     Patient presents with worsening shortness of breath, missed dialysis session today with last session on Friday  Patient's wife endorses that he sometimes is not taking his medication, spits them out or throws them away  CT chest with moderate to large right pleural effusion and moderate left pleural effusion  Echo in November with EF of 20%  Wearing LifeVest initially on presentation, now refusing-monitoring on telemetry  PTA regimen is on  Coreg, torsemide (on nondialysis days)  BNP >4700    Given 1 dose IV diuretic on admission, HD as scheduled. Getting temporary catheter in on 1/22; will repeat CXR on 1/23 to assess pleural effusions.   Monitor electrolytes   Daily weights and I/O  COVID  Mild COVID pathway as below   COVID19- positive on 1/20   CT chest bilateral pleural effusions and bilateral groundglass opacities  COVID mild pathway labs   CRP and BNP elevated   CK normal  Trop elevated but downtrended, no chest pain  Will give remdesivir x 3 days as is high risk patient   OOB TID; ambulation and mobilization strongly encouraged  PT/OT consult and evaluation   Supportive care  Atrial fibrillation with RVR (HCC)  Developed in setting of sepsis  Home Coreg on hold secondary to low blood pressure-switch to metoprolol 25 twice daily  As needed low-dose 2.5 Lopressor IV for sustained heart rate greater than 130  Will need anticoagulation -will need to be started on renally adjusted Eliquis when able, pending tunneled cath removal for source control will hold anticoagulation in wai-procedure period   Primary hypertension  PTA regimen on Coreg 25 mg twice daily, torsemide 100 mg on nondialysis days, Norvasc 5 mg daily and hydralazine 100 mg 3 times daily  Blood pressure somewhat soft on arrival  BP meds from home on hold-Coreg switched to metoprolol to have less effect on blood pressure but to allow better heart rate control  Currently Lopressor 25 mg twice daily  Anemia due to chronic kidney disease, on chronic dialysis (HCC)  With chronic anemia secondary to renal disease  Is on Epogen and iron infusions with dialysis  Would avoid IV iron due to findings of bacteremia  Continue folic acid daily  Hemoglobin stable  Thrombocytopenia (HCC)  Chronic stable thrombocytopenia in setting of chronic renal disease  ESRD (end stage renal disease) on dialysis ([facility])  Lab Results   Component Value Date    EGFR 8 01/22/2025    EGFR 5 01/21/2025    EGFR 6  01/20/2025    CREATININE 5.84 (H) 01/22/2025    CREATININE 8.44 (H) 01/21/2025    CREATININE 8.07 (H) 01/20/2025   Patient on dialysis MWF -noncompliant with going, wife says he misses at least once a week  Presenting with bilateral pleural effusions  Currently on torsemide on nondialysis days  HD as scheduled  Nephrology consult: Due to infected tunneled dialysis catheter removal on 1/21, plan to place temporary dialysis catheter with interventional radiology on 1/22 and then get back to schedule on Monday, Wednesday, Friday.  Can replace PermaCath after blood cultures are negative for 48 to 72 hours.  Hyponatremia  Secondary to ESRD, placed on fluid restrictions along with continuing hemodialysis per nephrology recommendations  Chronic kidney disease-mineral and bone disorder (CKD-MBD)  Lab Results   Component Value Date    EGFR 8 01/22/2025    EGFR 5 01/21/2025    EGFR 6 01/20/2025    CREATININE 5.84 (H) 01/22/2025    CREATININE 8.44 (H) 01/21/2025    CREATININE 8.07 (H) 01/20/2025       VTE Pharmacologic Prophylaxis:   High Risk (Score >/= 5) - Pharmacological DVT Prophylaxis Contraindicated. Sequential Compression Devices Ordered.    Mobility:   Basic Mobility Inpatient Raw Score: 19  JH-HLM Goal: 6: Walk 10 steps or more  JH-HLM Achieved: 2: Bed activities/Dependent transfer  JH-HLM Goal NOT achieved. Continue with multidisciplinary rounding and encourage appropriate mobility to improve upon JH-HLM goals.    Patient Centered Rounds: I performed bedside rounds with nursing staff today.   Discussions with Specialists or Other Care Team Provider: Nursing, case management, nephrology, infectious disease, interventional radiology    Education and Discussions with Family / Patient: Updated  (wife) via phone.    Current Length of Stay: 2 day(s)  Current Patient Status: Inpatient   Certification Statement: The patient will continue to require additional inpatient hospital stay due to sepsis, bacteremia,  A-fib RVR  Discharge Plan: Anticipate discharge in >72 hrs to discharge location to be determined pending rehab evaluations.    Code Status: Level 1 - Full Code    Subjective   Seen and examined at bedside today.  Patient states that he is feeling much better today than when he first came in.  Said that he is very hungry actually today.  No nausea, vomiting, diarrhea, constipation, fever, chills, chest pain, shortness of breath.  He said he is currently agreeable with the plan of getting temporary dialysis catheter and IV antibiotics.  At that he does still have a cough which is slightly bothersome to him but otherwise doing well.    Objective :  Temp:  [97.7 °F (36.5 °C)-98.8 °F (37.1 °C)] 97.7 °F (36.5 °C)  HR:  [] 58  BP: (109-143)/(63-79) 121/65  Resp:  [16-22] 20  SpO2:  [91 %-99 %] 99 %  O2 Device: None (Room air)    Body mass index is 19.05 kg/m².     Input and Output Summary (last 24 hours):     Intake/Output Summary (Last 24 hours) at 1/22/2025 1235  Last data filed at 1/22/2025 1109  Gross per 24 hour   Intake 580 ml   Output --   Net 580 ml       Physical Exam  Vitals reviewed.   Constitutional:       General: He is not in acute distress.     Appearance: He is ill-appearing. He is not toxic-appearing.   HENT:      Head: Normocephalic and atraumatic.      Mouth/Throat:      Mouth: Mucous membranes are moist.   Cardiovascular:      Rate and Rhythm: Normal rate. Rhythm irregular.      Heart sounds: No murmur heard.  Pulmonary:      Effort: No respiratory distress.      Breath sounds: No stridor. Rales present. No wheezing or rhonchi.   Abdominal:      General: Bowel sounds are normal. There is no distension.      Palpations: Abdomen is soft. There is no mass.      Tenderness: There is no abdominal tenderness.   Musculoskeletal:      Right lower leg: Edema present.      Left lower leg: Edema present.   Skin:     General: Skin is warm and dry.      Coloration: Skin is not pale.   Neurological:       Mental Status: He is alert and oriented to person, place, and time.   Psychiatric:         Mood and Affect: Mood normal.         Behavior: Behavior normal.           Lines/Drains:  Lines/Drains/Airways       Active Status       Name Placement date Placement time Site Days    HD Permanent Double Catheter 11/14/24  1352  Internal jugular  68                      Telemetry:  Telemetry Orders (From admission, onward)               24 Hour Telemetry Monitoring  Continuous x 24 Hours (Telem)        Expiring   Question:  Reason for 24 Hour Telemetry  Answer:  Decompensated CHF- and any one of the following: continuous diuretic infusion or total diuretic dose >200 mg daily, associated electrolyte derangement (I.e. K < 3.0), inotropic drip (continuous infusion), hx of ventricular arrhythmia, or new EF < 35%                     Telemetry Reviewed: Normal Sinus Rhythm  Indication for Continued Telemetry Use: Acute CHF on >200 mg lasix/day or equivalent dose or with new reduced EF.                Lab Results: I have reviewed the following results:   Results from last 7 days   Lab Units 01/22/25  0456 01/21/25  0439 01/20/25  1543   WBC Thousand/uL 8.75 9.14 8.76   HEMOGLOBIN g/dL 8.4* 9.6* 9.2*   HEMATOCRIT % 24.5* 27.9* 27.9*   PLATELETS Thousands/uL 75* 87* 93*   BANDS PCT %  --  3  --    SEGS PCT %  --   --  90*   LYMPHO PCT %  --  3* 4*   MONO PCT %  --  1* 5   EOS PCT %  --  0 0     Results from last 7 days   Lab Units 01/22/25  0456 01/21/25  0439   SODIUM mmol/L 132* 133*   POTASSIUM mmol/L 4.0 4.3   CHLORIDE mmol/L 95* 94*   CO2 mmol/L 27 26   BUN mg/dL 53* 80*   CREATININE mg/dL 5.84* 8.44*   ANION GAP mmol/L 10 13   CALCIUM mg/dL 8.1* 8.3*   ALBUMIN g/dL  --  2.6*   TOTAL BILIRUBIN mg/dL  --  1.75*   ALK PHOS U/L  --  113*   ALT U/L  --  20   AST U/L  --  46*   GLUCOSE RANDOM mg/dL 100 93     Results from last 7 days   Lab Units 01/20/25  1543   INR  1.34*             Results from last 7 days   Lab Units  01/22/25  0456 01/21/25  0439 01/20/25  1816 01/20/25  1543   LACTIC ACID mmol/L  --   --  1.0 3.2*   PROCALCITONIN ng/ml 3.13* 3.06*  --  2.72*       Recent Cultures (last 7 days):   Results from last 7 days   Lab Units 01/22/25  0457 01/21/25  1559 01/20/25  1556 01/20/25  1543   BLOOD CULTURE  Received in Microbiology Lab. Culture in Progress.  Received in Microbiology Lab. Culture in Progress.  --  Staphylococcus aureus* Staphylococcus aureus*   GRAM STAIN RESULT   --  4+ Polys*  4+ Gram positive cocci in clusters* Gram positive cocci in clusters* Gram positive cocci in clusters*       Imaging Results Review: I reviewed radiology reports from this admission including: CT chest, CT abdomen/pelvis, CT head, and Echocardiogram.  Other Study Results Review: No additional pertinent studies reviewed.    Last 24 Hours Medication List:     Current Facility-Administered Medications:     acetaminophen (TYLENOL) tablet 650 mg, Q6H PRN    [Held by provider] amLODIPine (NORVASC) tablet 5 mg, Daily    atorvastatin (LIPITOR) tablet 20 mg, Daily With Dinner    ceFAZolin (ANCEF) IVPB (premix in dextrose) 1,000 mg 50 mL, Q24H, Last Rate: 1,000 mg (01/22/25 1216)    [START ON 1/24/2025] epoetin loida (EPOGEN,PROCRIT) injection 5,000 Units, Once per day on Monday Wednesday Friday    folic acid (FOLVITE) tablet 1 mg, Daily    [Held by provider] hydrALAZINE (APRESOLINE) tablet 100 mg, Q8H JEEVAN    isosorbide mononitrate (IMDUR) 24 hr tablet 120 mg, Daily    melatonin tablet 6 mg, HS    metoprolol (LOPRESSOR) injection 2.5 mg, Q6H PRN    metoprolol tartrate (LOPRESSOR) tablet 25 mg, Q12H JEEVAN    [COMPLETED] remdesivir (Veklury) 200 mg in sodium chloride 0.9 % 290 mL IVPB, Q24H **FOLLOWED BY** remdesivir (Veklury) 100 mg in sodium chloride 0.9 % 270 mL IVPB, Q24H    Administrative Statements   Today, Patient Was Seen By: Leonarda Simmons PA-C    **Please Note: This note may have been constructed using a voice recognition system.**

## 2025-01-22 NOTE — PROGRESS NOTES
Matthew Alvarez is a 69 y.o. male who is currently ordered Vancomycin IV with management by the Pharmacy Consult service.  Relevant clinical data and objective / subjective history reviewed.  Vancomycin Assessment:  Indication and Goal AUC/Trough: Soft tissue (goal -600, trough >10)  Clinical Status: stable  Micro:     Renal Function:  SCr: 5.84 mg/dL  CrCl: 8.9 mL/min  Renal replacemeny: HD  Days of Therapy: 3  Current Dose: 750mg iv once after HD on 25  Vancomycin Plan:  New Dosinmg iv daily prn level</=15  Pulse dosing until scheduled dialysis ordered  Next Level: 25 0600  Renal Function Monitoring: Daily BMP and UOP  Pharmacy will continue to follow closely for s/sx of nephrotoxicity, infusion reactions and appropriateness of therapy.  BMP and CBC will be ordered per protocol. We will continue to follow the patient’s culture results and clinical progress daily.    Harsh Rome, Pharmacist

## 2025-01-22 NOTE — ASSESSMENT & PLAN NOTE
Fever, tachycardia with lactic acidosis.  Suspect secondary to catheter exit site infection and secondary bacteremia now s/p line removal  Patient also noted to have acute COVID infection but without pneumonia on chest imaging  Afebrile overnight, hemodynamically stable without leukocytosis     Antibiotics and additional management as below   Check daily CBC, CMP while inpatient to monitor for any evolving antibiotic toxicity or treatment failure   Continue supportive care, monitor clinical course

## 2025-01-22 NOTE — ASSESSMENT & PLAN NOTE
Wt Readings from Last 3 Encounters:   01/22/25 53.5 kg (118 lb)   01/15/25 56.5 kg (124 lb 9.6 oz)   12/27/24 56 kg (123 lb 6.4 oz)     Has underlying nonischemic cardiomyopathy, ESRD, A-fib with RVR and chronic pleural effusions requiring thoracentesis in the past

## 2025-01-22 NOTE — ASSESSMENT & PLAN NOTE
Lab Results   Component Value Date    EGFR 8 01/22/2025    EGFR 5 01/21/2025    EGFR 6 01/20/2025    CREATININE 5.84 (H) 01/22/2025    CREATININE 8.44 (H) 01/21/2025    CREATININE 8.07 (H) 01/20/2025

## 2025-01-22 NOTE — ASSESSMENT & PLAN NOTE
Blood pressure low normal hold meds  Home medications: Hydralazine 100 mg every 8 hours/carvedilol 25 twice a day/amlodipine 5 mg daily/torsemide 100 mg on nondialysis days being held at this time

## 2025-01-23 ENCOUNTER — APPOINTMENT (INPATIENT)
Dept: DIALYSIS | Facility: HOSPITAL | Age: 70
DRG: 314 | End: 2025-01-23
Attending: INTERNAL MEDICINE
Payer: COMMERCIAL

## 2025-01-23 ENCOUNTER — APPOINTMENT (INPATIENT)
Dept: RADIOLOGY | Facility: HOSPITAL | Age: 70
DRG: 314 | End: 2025-01-23
Payer: COMMERCIAL

## 2025-01-23 ENCOUNTER — APPOINTMENT (INPATIENT)
Dept: NON INVASIVE DIAGNOSTICS | Facility: HOSPITAL | Age: 70
DRG: 314 | End: 2025-01-23
Payer: COMMERCIAL

## 2025-01-23 LAB
ANION GAP SERPL CALCULATED.3IONS-SCNC: 12 MMOL/L (ref 4–13)
APTT PPP: 42 SECONDS (ref 23–34)
BACTERIA BLD CULT: ABNORMAL
BACTERIA BLD CULT: ABNORMAL
BACTERIA CATH TIP CULT: ABNORMAL
BACTERIA SPEC BFLD CULT: ABNORMAL
BUN SERPL-MCNC: 76 MG/DL (ref 5–25)
CALCIUM SERPL-MCNC: 8 MG/DL (ref 8.4–10.2)
CHLORIDE SERPL-SCNC: 93 MMOL/L (ref 96–108)
CO2 SERPL-SCNC: 25 MMOL/L (ref 21–32)
CREAT SERPL-MCNC: 6.82 MG/DL (ref 0.6–1.3)
ERYTHROCYTE [DISTWIDTH] IN BLOOD BY AUTOMATED COUNT: 20 % (ref 11.6–15.1)
GFR SERPL CREATININE-BSD FRML MDRD: 7 ML/MIN/1.73SQ M
GLUCOSE SERPL-MCNC: 111 MG/DL (ref 65–140)
GRAM STN SPEC: ABNORMAL
HCT VFR BLD AUTO: 22.2 % (ref 36.5–49.3)
HGB BLD-MCNC: 7.6 G/DL (ref 12–17)
INR PPP: 1.56 (ref 0.85–1.19)
MAGNESIUM SERPL-MCNC: 2 MG/DL (ref 1.9–2.7)
MCH RBC QN AUTO: 29.1 PG (ref 26.8–34.3)
MCHC RBC AUTO-ENTMCNC: 34.2 G/DL (ref 31.4–37.4)
MCV RBC AUTO: 85 FL (ref 82–98)
PLATELET # BLD AUTO: 79 THOUSANDS/UL (ref 149–390)
POTASSIUM SERPL-SCNC: 3.8 MMOL/L (ref 3.5–5.3)
PROCALCITONIN SERPL-MCNC: 2.83 NG/ML
PROTHROMBIN TIME: 19 SECONDS (ref 12.3–15)
RBC # BLD AUTO: 2.61 MILLION/UL (ref 3.88–5.62)
S AUREUS DNA BLD POS QL NAA+NON-PROBE: DETECTED
SODIUM SERPL-SCNC: 130 MMOL/L (ref 135–147)
WBC # BLD AUTO: 7.73 THOUSAND/UL (ref 4.31–10.16)

## 2025-01-23 PROCEDURE — 5A1D70Z PERFORMANCE OF URINARY FILTRATION, INTERMITTENT, LESS THAN 6 HOURS PER DAY: ICD-10-PCS | Performed by: INTERNAL MEDICINE

## 2025-01-23 PROCEDURE — 93971 EXTREMITY STUDY: CPT

## 2025-01-23 PROCEDURE — 90935 HEMODIALYSIS ONE EVALUATION: CPT

## 2025-01-23 PROCEDURE — 85027 COMPLETE CBC AUTOMATED: CPT

## 2025-01-23 PROCEDURE — 99232 SBSQ HOSP IP/OBS MODERATE 35: CPT

## 2025-01-23 PROCEDURE — 85730 THROMBOPLASTIN TIME PARTIAL: CPT | Performed by: FAMILY MEDICINE

## 2025-01-23 PROCEDURE — 87040 BLOOD CULTURE FOR BACTERIA: CPT

## 2025-01-23 PROCEDURE — 85730 THROMBOPLASTIN TIME PARTIAL: CPT

## 2025-01-23 PROCEDURE — 84145 PROCALCITONIN (PCT): CPT

## 2025-01-23 PROCEDURE — 97167 OT EVAL HIGH COMPLEX 60 MIN: CPT

## 2025-01-23 PROCEDURE — 99233 SBSQ HOSP IP/OBS HIGH 50: CPT | Performed by: INTERNAL MEDICINE

## 2025-01-23 PROCEDURE — 71045 X-RAY EXAM CHEST 1 VIEW: CPT

## 2025-01-23 PROCEDURE — 80048 BASIC METABOLIC PNL TOTAL CA: CPT

## 2025-01-23 PROCEDURE — 83735 ASSAY OF MAGNESIUM: CPT

## 2025-01-23 PROCEDURE — G0545 PR INHERENT VISIT TO INPT: HCPCS | Performed by: INTERNAL MEDICINE

## 2025-01-23 PROCEDURE — 97163 PT EVAL HIGH COMPLEX 45 MIN: CPT

## 2025-01-23 PROCEDURE — 85610 PROTHROMBIN TIME: CPT

## 2025-01-23 PROCEDURE — 93971 EXTREMITY STUDY: CPT | Performed by: SURGERY

## 2025-01-23 RX ORDER — HEPARIN SODIUM 1000 [USP'U]/ML
2000 INJECTION, SOLUTION INTRAVENOUS; SUBCUTANEOUS EVERY 6 HOURS PRN
Status: DISCONTINUED | OUTPATIENT
Start: 2025-01-23 | End: 2025-01-28

## 2025-01-23 RX ORDER — HEPARIN SODIUM 1000 [USP'U]/ML
4000 INJECTION, SOLUTION INTRAVENOUS; SUBCUTANEOUS ONCE
Status: COMPLETED | OUTPATIENT
Start: 2025-01-23 | End: 2025-01-23

## 2025-01-23 RX ORDER — HEPARIN SODIUM 1000 [USP'U]/ML
4000 INJECTION, SOLUTION INTRAVENOUS; SUBCUTANEOUS EVERY 6 HOURS PRN
Status: DISCONTINUED | OUTPATIENT
Start: 2025-01-23 | End: 2025-01-28

## 2025-01-23 RX ORDER — HEPARIN SODIUM 10000 [USP'U]/100ML
3-30 INJECTION, SOLUTION INTRAVENOUS
Status: DISCONTINUED | OUTPATIENT
Start: 2025-01-23 | End: 2025-01-28

## 2025-01-23 RX ADMIN — METOPROLOL TARTRATE 25 MG: 25 TABLET, FILM COATED ORAL at 22:54

## 2025-01-23 RX ADMIN — ISOSORBIDE MONONITRATE 120 MG: 30 TABLET, EXTENDED RELEASE ORAL at 14:54

## 2025-01-23 RX ADMIN — ATORVASTATIN CALCIUM 20 MG: 20 TABLET, FILM COATED ORAL at 17:18

## 2025-01-23 RX ADMIN — FOLIC ACID 1 MG: 1 TABLET ORAL at 14:54

## 2025-01-23 RX ADMIN — Medication 2 G: at 17:19

## 2025-01-23 RX ADMIN — CEFAZOLIN SODIUM 1000 MG: 1 SOLUTION INTRAVENOUS at 14:54

## 2025-01-23 RX ADMIN — Medication 2 G: at 22:54

## 2025-01-23 RX ADMIN — HEPARIN SODIUM 4000 UNITS: 1000 INJECTION, SOLUTION INTRAVENOUS; SUBCUTANEOUS at 17:24

## 2025-01-23 RX ADMIN — Medication 6 MG: at 22:54

## 2025-01-23 RX ADMIN — METOPROLOL TARTRATE 25 MG: 25 TABLET, FILM COATED ORAL at 15:15

## 2025-01-23 RX ADMIN — HEPARIN SODIUM 18 UNITS/KG/HR: 10000 INJECTION, SOLUTION INTRAVENOUS at 17:16

## 2025-01-23 NOTE — PROGRESS NOTES
Progress Note - Infectious Disease   Name: Matthew Alvarez 69 y.o. male I MRN: [id number]  Unit/Bed#: -01 I Date of Admission: 1/20/2025   Date of Service: 1/23/2025 I Hospital Day: 3      Administrative Statements   VIRTUAL CARE DOCUMENTATION:     1. This service was provided via Telemedicine using [employer]     2. Parties in the room with patient during teleconsult Patient only    3. Confidentiality My office door was closed     4. Participants No one else was in the room    5. Patient acknowledged consent and understanding of privacy and security of the  Telemedicine consult. I informed the patient that I have reviewed their record in Epic and presented the opportunity for them to ask any questions regarding the visit today.  The patient agreed to participate.    6. I have spent a total time of 50 minutes in caring for this patient on the day of the visit/encounter including Diagnostic results, Risk factor reductions, Impressions, Counseling / Coordination of care, Documenting in the medical record, Reviewing / ordering tests, medicine, procedures  , Obtaining or reviewing history  , and Communicating with other healthcare professionals , not including the time spent for establishing the audio/video connection.       Assessment & Plan  Sepsis (HCC)  Fever, tachycardia with lactic acidosis.  Suspect secondary to catheter exit site infection and secondary bacteremia now. Bacteremia is yet to clear despite line removal.  Patient also noted to have acute COVID infection but without pneumonia on chest imaging  Afebrile, hemodynamically stable without leukocytosis     Antibiotics and additional management as below   Check daily CBC, CMP while inpatient to monitor for any evolving antibiotic toxicity or treatment failure   Continue supportive care, monitor clinical course    Bacteremia  Both sets of blood cultures from admission with probable MSSA. Secondary to catheter exit site infection s/p line removal on  1/21 with purulent drainage from insertion site , cx with MSSA  TTE is without vegetation but fu blood cx after line removal remain positive. Temporary catheter placed in L IJ on 1/22   Patient has no other indwelling vascular or prosthetic devices. He has a superficial weeping wound on his LLE but without purulence or cellulitis appreciated on media pics.   Clinically improving     Continue cefazolin 1g q24h   Check repeat blood cx . If cx remain positive may require DEWEY   Check duplex RUE to rule out associated thrombosis which may cause delayed clearance of blood cx   Current temporary dialysis catheter in place in L IJ . Will need to await clearance of blood cx for 48-72 hrs before placing permacath   Anticipate 6 weeks of iv antibiotics   Continue wound care, serial extremity exams, if there is any worsening erythema or drainage of LLE would recommend CT LLE    COVID  SARS-CoV-2 PCR positive on 1/20 . CT chest with with diffuse patchy groundglass opacities throughout both lungs which may represent pneumonitis versus pulmonary edema.  Has underlying COPD, chronic tobacco use  Stable on room air     Completed remdesivir x 3 days, additional management per protocol   Continue isolation precautions  ESRD (end stage renal disease) on dialysis ([facility])  Lab Results   Component Value Date    EGFR 8 01/22/2025    EGFR 5 01/21/2025    EGFR 6 01/20/2025    CREATININE 5.84 (H) 01/22/2025    CREATININE 8.44 (H) 01/21/2025    CREATININE 8.07 (H) 01/20/2025     S/p right IJ permacath removal, temporary catheter replaced in R IJV. Patient has had a functional decline and has had multiple missed dialysis sessions as an outpatient     Catheter management as above, additional management per nephrology   Recommend ongoing goals of care discussion, consider palliative care referral as outpatient     Acute on chronic systolic heart failure (HCC)  Wt Readings from Last 3 Encounters:   01/23/25 54.7 kg (120 lb 9.6 oz)   01/15/25 56.5 kg  (124 lb 9.6 oz)   24 56 kg (123 lb 6.4 oz)     Has underlying nonischemic cardiomyopathy, ESRD, A-fib with RVR and chronic pleural effusions requiring thoracentesis in the past        Subjective:  The patient has no complaints.  Denies fevers, chills, or sweats.  Denies nausea, vomiting, or diarrhea.    Antibiotics:  cefazolin    Physical Exam     Temp:  [97.7 °F (36.5 °C)-98.8 °F (37.1 °C)] 98.2 °F (36.8 °C)  HR:  [58-73] 73  Resp:  [16-20] 16  BP: (121-146)/(61-69) 146/69  SpO2:  [94 %-100 %] 96 %  Temp (24hrs), Av.2 °F (36.8 °C), Min:97.7 °F (36.5 °C), Max:98.8 °F (37.1 °C)  Current: Temperature: 98.2 °F (36.8 °C)    Intake/Output Summary (Last 24 hours) at 2025 0804  Last data filed at 2025 1908  Gross per 24 hour   Intake 1040 ml   Output --   Net 1040 ml       Physical exam findings reported by bedside and primary medical team staff      General Appearance:  Appearing frail and chronically ill, nontoxic, and in no distress, appears stated age   Lungs:   Coarse and rhonchorous to auscultation bilaterally, no audible wheezes, respirations unlabored   Heart:  Irregular rate and rhythm, S1, S2 normal, no murmur, rub or gallop   Abdomen:   Soft, non-tender, non-distended, positive bowel sounds, no masses, no organomegaly    No CVA tenderness   Extremities: Extremities normal, atraumatic, no cyanosis, clubbing , 3+ b/l 2+ LE edema   Skin: Scattered bruises, sacral erythema, LLE in dressing   Neurologic: Alert and oriented times 2, flat and withdrawn affect         Invasive Devices:   Peripheral IV 25 Left Antecubital (Active)   Site Assessment WDL 25   Dressing Type Transparent 25   Line Status Flushed 25   Dressing Status Clean;Dry;Intact 25   Dressing Change Due 25   Reason Not Rotated Not due 25       Peripheral IV 25 Left;Ventral (anterior) Forearm (Active)   Site Assessment WDL 25   Dressing  Type Transparent 01/21/25 1930   Line Status Flushed;Infusing 01/21/25 1930   Dressing Status Clean;Dry;Intact 01/21/25 1930   Dressing Change Due 01/24/25 01/21/25 1930   Reason Not Rotated Not due 01/21/25 1930       HD Permanent Double Catheter (Active)   Reasons to continue HD Cath Treatment Therapy 01/21/25 0910   Goal for Removal N/A- chronic HD catheter 01/21/25 0910   Line Necessity Reviewed Yes, reviewed with provider 01/21/25 0910   Site Assessment Other (Comment) 01/21/25 1215   Proximal Lumen Status Flushed & Clamped;Normal saline locked;Passive disinfecting cap applied 01/21/25 1215   Distal Lumen Status Flushed & Clamped;Normal saline locked;Passive disinfecting cap applied 01/21/25 1215   Dressing Type Chlorhexidine dressing 01/21/25 1215   Dressing Status Clean;Dry;Intact 01/21/25 1215   Dressing Intervention Dressing changed 01/21/25 0910   Dressing Change Due 01/27/25 01/21/25 1215       Labs, Imaging, & Other Studies     Lab Results:    I have personally reviewed pertinent labs.    Results from last 7 days   Lab Units 01/22/25  0456 01/21/25  0439 01/20/25  1543   WBC Thousand/uL 8.75 9.14 8.76   HEMOGLOBIN g/dL 8.4* 9.6* 9.2*   PLATELETS Thousands/uL 75* 87* 93*     Results from last 7 days   Lab Units 01/22/25  0456 01/21/25  0439 01/20/25  1543   POTASSIUM mmol/L 4.0 4.3 4.5   CHLORIDE mmol/L 95* 94* 92*   CO2 mmol/L 27 26 24   BUN mg/dL 53* 80* 77*   CREATININE mg/dL 5.84* 8.44* 8.07*   EGFR ml/min/1.73sq m 8 5 6   CALCIUM mg/dL 8.1* 8.3* 8.6   AST U/L  --  46* 57*   ALT U/L  --  20 21   ALK PHOS U/L  --  113* 126*     Results from last 7 days   Lab Units 01/22/25  0457 01/21/25  1559 01/20/25  2026 01/20/25  1556 01/20/25  1543   BLOOD CULTURE   --   --   --  Staphylococcus aureus* Staphylococcus aureus*   GRAM STAIN RESULT  Gram positive cocci in clusters*  Gram positive cocci in clusters* 4+ Polys*  4+ Gram positive cocci in clusters*  --  Gram positive cocci in clusters* Gram positive  cocci in clusters*   BODY FLUID CULTURE, STERILE   --  3+ Growth of Staphylococcus aureus*  --   --   --    MRSA CULTURE ONLY   --   --  No Methicillin Resistant Staphlyococcus aureus (MRSA) isolated  --   --        Imaging Studies:   I have personally reviewed pertinent imaging study reports and images in PACS.      EKG, Pathology, and Other Studies:   I have personally reviewed pertinent reports.        Counseling/Coordination of care:       Total 50 minutes in evaluation of the patient and communication with the patient via telehealth of which 30 minutes was in counseling/coordination of care.  Extensive review of the medical records in epic including review of the notes, radiographs, and laboratory results.  My recommendations were discussed with the patient in detail who verbalized understanding.

## 2025-01-23 NOTE — ASSESSMENT & PLAN NOTE
Meeting sepsis criteria secondary to temperature 101.4 °F, HR 97 bpm  Lactic acid 3.2  In setting of COVID, bacteremia from unclear source at this time-possibly bacterial pneumonia versus skin infection (pustule over HD cath as well as chronic skin lesions of lower extremities)  Blood cultures positive for 2 out of 2 gram-positive cocci in clusters  Identification panel with staph RES  MRSA culture pending  ID recommending transition to cefazolin 1 g every 24 hours

## 2025-01-23 NOTE — PLAN OF CARE
Pre-tx:  UF 0.5-1L as tolerated to continue to challenge EDW per order.  BP stable to start HD.  Labs drawn prior to HD.  Pt initiated on 3K bath per serum K of 4 yesterday.      Post-Dialysis RN Treatment Note    Blood Pressure:  Pre 146/72 mm/Hg  Post  165/85 mmHg   EDW:  60 kg    Weight:  Pre 54.7 kg   Post 53.5 kg   Mode of weight measurement: Bed Scale   Volume Removed: 1200  ml    Treatment duration: 3 hours    NS given:  No    Treatment shortened No   Medications given during Rx: Not Applicable   Estimated Kt/V:  1.0   Access type: Temporary HD catheter   Needle Gauge:    Access Issues: No    Report called to primary nurse:   Yes             Problem: METABOLIC, FLUID AND ELECTROLYTES - ADULT  Goal: Electrolytes maintained within normal limits  Description: INTERVENTIONS:  - Monitor labs and assess patient for signs and symptoms of electrolyte imbalances  - Administer electrolyte replacement as ordered  - Monitor response to electrolyte replacements, including repeat lab results as appropriate  - Instruct patient on fluid and nutrition as appropriate  Outcome: Progressing  Goal: Fluid balance maintained  Description: INTERVENTIONS:  - Monitor labs   - Monitor I/O and WT  - Instruct patient on fluid and nutrition as appropriate  - Assess for signs & symptoms of volume excess or deficit  Outcome: Progressing

## 2025-01-23 NOTE — PLAN OF CARE
Problem: PHYSICAL THERAPY ADULT  Goal: Performs mobility at highest level of function for planned discharge setting.  See evaluation for individualized goals.  Description: Treatment/Interventions: ADL retraining, Functional transfer training, LE strengthening/ROM, Elevations, Therapeutic exercise, Endurance training, Patient/family training, Equipment eval/education, Bed mobility, Gait training, Compensatory technique education, Cognitive reorientation, Spoke to nursing, Spoke to case management, OT          See flowsheet documentation for full assessment, interventions and recommendations.  Note: Prognosis: Good  Problem List: Decreased strength, Decreased endurance, Impaired balance, Decreased mobility, Decreased range of motion, Decreased cognition, Impaired judgement, Decreased safety awareness, Decreased skin integrity, Pain  Assessment: Pt is a 69 y.o. male seen for PT evaluation s/p admission to [facility] on 1/20/2025 with Sepsis (HCC).  Order placed for PT services.  Upon evaluation: Pt is presenting with impaired functional mobility due to pain, decreased strength, decreased ROM, decreased endurance, impaired balance, gait deviations, impaired cognition, decreased safety awareness, impaired judgment, fall risk, LE edema, and impaired skin integrity requiring  minimal to maximal assistance for bed mobility, moderate assistance for transfers, and moderate assistance for ambulation with RW . Pt's clinical presentation is currently unpredictable given the functional mobility deficits above, especially weakness, decreased ROM, edema of extremities, decreased skin integrity, decreased endurance, impaired balance, gait deviations, pain, decreased activity tolerance, decreased functional mobility tolerance, decreased safety awareness, impaired judgement, and decreased cognition, coupled with fall risks as indicated by AM-PAC 6-Clicks: 11/24 as well as hx of falls, impaired balance,  polypharmacy, impaired judgement, decreased safety awareness, and decreased cognition and combined with medical complications of pain impacting overall mobility status, abnormal renal lab values, abnormal H&H, abnormal sodium values, multiple readmissions, need for input for mobility technique/safety, and abnormal platelets, abnormal procalcitonin, abnormal CRP, Covid-19, sepsis, s/p central line placement for HD 1/22/25, bactermia, acute CHF, Afib with RVR, anemia .  Pt's PMHx and comorbidities that may affect physical performance and progress include:  ESRD on HD, HTN, CHF (HFrEF), anemia, thrombocytopenia, CAD, rheumatoid factor positive . Personal factors affecting pt at time of IE include: inaccessible home environment, step(s) to enter environment, multi-level environment, limited home support, inability to perform IADLs, inability to perform ADLs, inability to navigate level surfaces without external assistance, inability to ambulate household distances, limited insight into impairments, recent fall(s)/fall history, and tobacco use. Pt will benefit from continued skilled PT services to address deficits as defined above and to maximize level of functional mobility to facilitate return toward PLOF and improved QOL. From PT/mobility standpoint, recommendation at time of d/c would be Level II (Moderate Resource Intensity in order to reduce fall risk and maximize pt's functional indepe  Barriers to Discharge: Inaccessible home environment, Decreased caregiver support  Barriers to Discharge Comments: requires assistance to complete mobility, frequent readmissions, fall risk  Rehab Resource Intensity Level, PT: II (Moderate Resource Intensity)    See flowsheet documentation for full assessment.

## 2025-01-23 NOTE — ASSESSMENT & PLAN NOTE
Gram-positive cocci bacteremia currently on antibiotics followed by ID recommended removal of tunneled dialysis catheter which has been done, also, recommended TTE  Can replace tunneled dialysis catheter with negative blood cultures for 48 to 72 hours will plan for temporary IJ Saulo catheter as outlined above

## 2025-01-23 NOTE — ASSESSMENT & PLAN NOTE
Lab Results   Component Value Date    EGFR 8 01/22/2025    EGFR 5 01/21/2025    EGFR 6 01/20/2025    CREATININE 5.84 (H) 01/22/2025    CREATININE 8.44 (H) 01/21/2025    CREATININE 8.07 (H) 01/20/2025     S/p right IJ permacath removal, temporary catheter replaced in R IJV. Patient has had a functional decline and has had multiple missed dialysis sessions as an outpatient     Catheter management as above, additional management per nephrology   Recommend ongoing goals of care discussion, consider palliative care referral as outpatient

## 2025-01-23 NOTE — MALNUTRITION/BMI
This medical record reflects one or more clinical indicators suggestive of malnutrition .    Malnutrition Findings:   Adult Malnutrition type: Chronic illness  Adult Degree of Malnutrition: Malnutrition of moderate degree  Malnutrition Characteristics: Muscle loss, Fat loss                360 Statement: malnutrition of moderate degree in setting of chronic illness, evidenced by hollowing orbitals/dark circles, muscle wasting/indented temples, protruding clavicles, treated with diet and supplements    BMI Findings:           Body mass index is 19.47 kg/m².     See Nutrition note dated 1/23/25 for additional details.  Completed nutrition assessment is viewable in the nutrition documentation

## 2025-01-23 NOTE — PLAN OF CARE
Problem: OCCUPATIONAL THERAPY ADULT  Goal: Performs self-care activities at highest level of function for planned discharge setting.  See evaluation for individualized goals.  Description: Treatment Interventions: ADL retraining, Functional transfer training, UE strengthening/ROM, Endurance training, Cognitive reorientation, Patient/family training, Energy conservation, Activityengagement, Compensatory technique education          See flowsheet documentation for full assessment, interventions and recommendations.   Note: Limitation: Decreased ADL status, Decreased UE ROM, Decreased UE strength, Decreased Safe judgement during ADL, Decreased cognition, Decreased endurance, Decreased self-care trans, Decreased high-level ADLs  Prognosis: Fair  Assessment: Pt is a 69 y.o. male, admitted to [facility] 1/20/2025 d/t experiencing worsening shortness of breath. Dx: sepsis. Pt with PMHx impacting their performance during ADL tasks, including: CKD III on HD, CAD, HFrEF with life vest, HTN, CAD. Prior to admission to the hospital Pt was performing ADLs with physical assistance. IADLs with physical assistance. Functional transfers/ambulation with physical assistance. Cognitive status was PTA was impaired. OT order placed to assess Pt's ADLs, cognitive status, and performance during functional tasks in order to maximize safety and independence while making most appropriate d/c recommendations. PT/OT co-evaluation completed at this time d/t significant mobility deficits and safety concerns. Pt's clinical presentation is currently unstable/unpredictable given new onset deficits that effect Pt's occupational performance and ability to safely return to PLOF including decrease activity tolerance, decrease standing balance, decrease sitting balance, decrease performance during ADL tasks, decrease cognition, decrease safety awareness , decrease BUE ROM, decrease UB MS, increased pain, decrease generalized strength, decrease activity  engagement, decrease performance during functional transfers, and limited insight to deficits combined with medical complications of incontinence and need for input for mobility technique/safety. Personal factors affecting Pt at time of initial evaluation include: step(s) to enter environment, inability to perform IADLs, inability to perform ADLs, inability to ambulate household distances, inability to navigate community distances, limited insight into impairments, decreased initiation and engagement, and questionable non-compliance. Pt will benefit from continued skilled OT services to address deficits as defined above and to maximize level independence/participation during ADLs and functional tasks to facilitate return toward PLOF and improved quality of life. From an occupational therapy standpoint, recommendation at time of d/c would be Level II: Moderate Resource Intensity.     Rehab Resource Intensity Level, OT: II (Moderate Resource Intensity)

## 2025-01-23 NOTE — ASSESSMENT & PLAN NOTE
Mild COVID pathway as below   COVID19- positive on 1/20   CT chest bilateral pleural effusions and bilateral groundglass opacities  COVID mild pathway labs   CRP and BNP elevated   CK normal  Trop elevated but downtrended, no chest pain  Completed remdesivir x3 days   OOB TID; ambulation and mobilization strongly encouraged  Supportive care

## 2025-01-23 NOTE — CASE MANAGEMENT
Case Management Assessment & Discharge Planning Note    Patient name Mtathew Alvarez  Location /-01 MRN [id number]  : 1955 Date 2025       Current Admission Date: 2025  Current Admission Diagnosis:Sepsis ([facility])   Patient Active Problem List    Diagnosis Date Noted Date Diagnosed    Chronic kidney disease-mineral and bone disorder (CKD-MBD) 2025     Atrial fibrillation with RVR ([facility]) 2025     Bacteremia 2025     COVID 2025     Sepsis ([facility]) 2025     Acute hemodialysis patient ([facility]) 2025     Pleural effusion 2024     Secondary hyperparathyroidism of renal origin ([facility]) 12/15/2024     ESRD (end stage renal disease) on dialysis ([facility]) 2024     Ambulatory dysfunction 2024     Thrombocytopenia ([facility]) 2024     Renal failure 11/15/2024     Goals of care, counseling/discussion 11/15/2024     Anemia due to chronic kidney disease, on chronic dialysis ([facility]) 11/15/2024     Anemia of renal disease 11/15/2024     Vitamin D deficiency, unspecified 11/15/2024     Chronic systolic (congestive) heart failure ([facility]) 2024     Oliguria 2024     Encounter for hemodialysis for ESRD ([facility]) 2024     Hyponatremia 2024     Dilated cardiomyopathy ([facility]) 2024     Elevated liver transaminase level 11/10/2024     Acute on chronic systolic heart failure ([facility]) 2024     Coronary artery disease involving native coronary artery of native heart without angina pectoris 10/08/2021     Rheumatoid factor positive 2021     Protein calorie malnutrition ([facility]) 2021     Elevated troponin 2021     Tobacco abuse 2021     Nonischemic cardiomyopathy ([facility]) 2021     Primary hypertension 2021       LOS (days): 3  Geometric Mean LOS (GMLOS) (days): 4.9  Days to GMLOS:1.9     OBJECTIVE:  PATIENT READMITTED TO HOSPITAL  Risk of Unplanned Readmission Score: 29.54         Current admission status: Inpatient        Preferred Pharmacy:   [facility]/pharm[facility] #[id number] - [place] - [address] [facility]  [address] [facility] [address]  Phone: [phone] Fax: [phone]    [facility] - [place], [place] - [address]  [address]  [place] [address]  Phone: [phone] Fax: [phone]    Primary Care Provider: Olive Rodriguez MD    Primary Insurance: [facility] REP  Secondary Insurance:     ASSESSMENT:  Active Health Care Proxies       Kellee Alvarez Health Care Agent - Spouse   Primary Phone: [phone] (Mobile)  Home Phone: [phone]                 Advance Directives  Does patient have a Health Care POA?: Yes  Does patient have Advance Directives?: Yes  Advance Directives: Power of  for health care, Living will  Primary Contact: Kellee Alvarez (Spouse)  [phone] (Mobile)    Readmission Root Cause  30 Day Readmission: Yes  During your hospital stay, did someone (provider, nurse, ) explain your care to you in a way you could understand?: Yes  Did you feel medically stable to leave the hospital?: Yes  Were you able to pay for your medication at the pharmacy?: Yes  Did you have reliable transportation to take you to your appointments?: Yes  During previous admission, was a post-acute recommendation made?: Yes  What post-acute resources were offered?: [facility]  Patient was readmitted due to: sepsis  Action Plan: PTOT, IV lasix    Patient Information  Admitted from:: Home  Mental Status: Alert  During Assessment patient was accompanied by: Not accompanied during assessment  Assessment information provided by:: Patient  Support Systems: Spouse/significant other, Family members  County of Residence: [facility]  What [facility] do you live in?: [place]  Home entry access options. Select all that apply.: Stairs  Number of steps to enter home.: 1  Type of Current Residence: 2 story home  Upon entering residence, is there a bedroom on the main floor (no  further steps)?: No (Pt sleeping on sofa in living room)  A bedroom is located on the following floor levels of residence (select all that apply):: 2nd Floor  Upon entering residence, is there a bathroom on the main floor (no further steps)?: No  Indicate which floors of current residence have a bathroom (select all the apply):: 2nd Floor  Number of steps to 2nd floor from main floor: One Flight  Living Arrangements: Lives w/ Spouse/significant other, Lives w/ Son  Is patient a ?: No    Activities of Daily Living Prior to Admission  Functional Status: Assistance  Completes ADLs independently?: No  Level of ADL dependence: Assistance  Ambulates independently?: Yes  Does patient use assisted devices?: Yes  Assisted Devices (DME) used: Hospital Bed, Walker, Wheelchair, Other (Comment) (Life Vest)  Does patient currently own DME?: Yes  What DME does the patient currently own?: Walker, Wheelchair, Other (Comment) (Life Vest)  Does patient have a history of Outpatient Therapy (PT/OT)?: No  Does the patient have a history of Short-Term Rehab?: No  Does patient have a history of HHC?: Yes ([facility])  Does patient currently have HHC?: No    Patient Information Continued  Income Source: SSI/SSD  Does patient have prescription coverage?: Yes  Does patient receive dialysis treatments?: Yes (MWF at [facility] 2pm chair time)  Does patient have a history of substance abuse?: No  Does patient have a history of Mental Health Diagnosis?: No    Means of Transportation  Means of Transport to Appts:: Family transport    DISCHARGE DETAILS:    Discharge planning discussed with:: Patient  Freedom of Choice: Yes  Comments - Freedom of Choice: agreeable to STR and blanket referral     Contacts  Patient Contacts: Kellee Alvarez- spouse  Relationship to Patient:: Family  Contact Method: Phone  Reason/Outcome: Emergency Contact, Discharge Planning    Other Referral/Resources/Interventions Provided:  Interventions: Short  Term Rehab  Referral Comments: Referral sent in Aidin    Treatment Team Recommendation: Short Term Rehab  Discharge Destination Plan:: Short Term Rehab         CM called and spoke to Pt via hospital room phone (due to COVID) to introduce self, explain role, complete CM assessment, and discuss DC planning.     Pt lives with spouse in 2 story home but sleeps on first floor and now has hospital bed. Pt requires assistance with ADLs. Uses walker and WC for ambualation. Upon DC last admission, [facility] was set up but per Pt they did not start yet. Pt gets dialysis at [facility] with a 2pm chair time.     Therapy recc is for STR. CM spoke to Pt and Pt is agreeable at thei time and does not have a preference. CM sent referral in Aidin. Choices needs to be reviewed with Pt.     CM will continue to follow for DC planning needs

## 2025-01-23 NOTE — PROGRESS NOTES
Progress Note - Hospitalist   Name: Matthew Alvarez 69 y.o. male I MRN: [id number]  Unit/Bed#: -01 I Date of Admission: 1/20/2025   Date of Service: 1/23/2025 I Hospital Day: 3    Assessment & Plan  Sepsis (HCC)  Meeting sepsis criteria secondary to temperature 101.4 °F, HR 97 bpm  Lactic acid 3.2  In setting of COVID, bacteremia from unclear source at this time-possibly bacterial pneumonia versus skin infection (pustule over HD cath as well as chronic skin lesions of lower extremities)  Blood cultures positive for 2 out of 2 gram-positive cocci in clusters  Identification panel with staph RES  MRSA culture pending  ID recommending transition to cefazolin 1 g every 24 hours  Bacteremia  Patient with 2 out of 2 blood cultures positive for gram-positive cocci in clusters  Continue vancomycin  No vegetation noted on echo  HD cath removed 1/21   Repeat BC 1/22 positive x2  Repeat BC 1/23 pending   ID consult: start cefazolin 1g q24h, follow up on blood culture, anticipate 6 weeks of abx. Once repeat BC are negative 48-72 can replace permacath.  RUE duplex pending to rule out thrombus   Acute on chronic systolic heart failure (HCC)  Wt Readings from Last 3 Encounters:   01/23/25 54.7 kg (120 lb 9.6 oz)   01/15/25 56.5 kg (124 lb 9.6 oz)   12/27/24 56 kg (123 lb 6.4 oz)     Patient presents with worsening shortness of breath, missed dialysis session today with last session on Friday  Patient's wife endorses that he sometimes is not taking his medication, spits them out or throws them away  CT chest with moderate to large right pleural effusion and moderate left pleural effusion  Echo in November with EF of 20%  Wearing LifeVest initially on presentation, now refusing-monitoring on telemetry  PTA regimen is Coreg, torsemide (on nondialysis days)  BNP >4700  Given 1 dose IV diuretic on admission, HD as scheduled. Getting temporary catheter in on 1/22; will repeat CXR on 1/23 to assess pleural effusions.   Daily  weights and I/O  COVID  Mild COVID pathway as below   COVID19- positive on 1/20   CT chest bilateral pleural effusions and bilateral groundglass opacities  COVID mild pathway labs   CRP and BNP elevated   CK normal  Trop elevated but downtrended, no chest pain  Completed remdesivir x3 days   OOB TID; ambulation and mobilization strongly encouraged  Supportive care  Atrial fibrillation with RVR (HCC)  Developed in setting of sepsis  Home Coreg on hold secondary to low blood pressure-switch to metoprolol 25 twice daily  As needed low-dose 2.5 Lopressor IV for sustained heart rate greater than 130  Will need anticoagulation -will need to be started on renally adjusted Eliquis when able. pending replacement for perm cath, will continue to hold per IR recommendations    Primary hypertension  PTA regimen on Coreg 25 mg twice daily, torsemide 100 mg on nondialysis days, Norvasc 5 mg daily and hydralazine 100 mg 3 times daily  Blood pressure somewhat soft on arrival  BP meds from home on hold-Coreg switched to metoprolol to have less effect on blood pressure but to allow better heart rate control  Currently Lopressor 25 mg twice daily  Anemia due to chronic kidney disease, on chronic dialysis (HCC)  With chronic anemia secondary to renal disease  Is on Epogen and iron infusions with dialysis  Would avoid IV iron due to findings of bacteremia  Continue folic acid daily  Hemoglobin stable  Thrombocytopenia (HCC)  Chronic stable thrombocytopenia in setting of chronic renal disease  ESRD (end stage renal disease) on dialysis ([facility])  Lab Results   Component Value Date    EGFR 7 01/23/2025    EGFR 8 01/22/2025    EGFR 5 01/21/2025    CREATININE 6.82 (H) 01/23/2025    CREATININE 5.84 (H) 01/22/2025    CREATININE 8.44 (H) 01/21/2025   Patient on dialysis MWF -noncompliant with going, wife says he misses at least once a week  Presenting with bilateral pleural effusions  Currently on torsemide on nondialysis days  HD as  scheduled  Nephrology consult: Due to infected tunneled dialysis catheter removal on 1/21, temporary dialysis catheter with interventional radiology placed on 1/22 and then get back to schedule on Monday, Wednesday, Friday.  Can replace PermaCath after blood cultures are negative for 48 to 72 hours.  Hyponatremia  Secondary to ESRD, placed on fluid restrictions along with continuing hemodialysis per nephrology recommendations  Chronic kidney disease-mineral and bone disorder (CKD-MBD)  Lab Results   Component Value Date    EGFR 7 01/23/2025    EGFR 8 01/22/2025    EGFR 5 01/21/2025    CREATININE 6.82 (H) 01/23/2025    CREATININE 5.84 (H) 01/22/2025    CREATININE 8.44 (H) 01/21/2025       VTE Pharmacologic Prophylaxis:   High Risk (Score >/= 5) - Pharmacological DVT Prophylaxis Contraindicated. Sequential Compression Devices Ordered. 2/2 anemia, holding pre procedure     Mobility:   Basic Mobility Inpatient Raw Score: 11  JH-HLM Goal: 4: Move to chair/commode  JH-HLM Achieved: 4: Move to chair/commode  JH-HLM Goal achieved. Continue to encourage appropriate mobility.    Patient Centered Rounds: I performed bedside rounds with nursing staff today.   Discussions with Specialists or Other Care Team Provider: nursing, cm, IR     Education and Discussions with Family / Patient: Attempted to update  (wife) via phone. Unable to contact.    Current Length of Stay: 3 day(s)  Current Patient Status: Inpatient   Certification Statement: The patient will continue to require additional inpatient hospital stay due to requiring iv antibiotics and monitoring of blood cultures   Discharge Plan: Anticipate discharge in >72 hrs to rehab facility.    Code Status: Level 1 - Full Code    Subjective   Patient states that he is tired since getting dialysis. Denies chest pain, shortness of breath or abdominal pain. Agreeable to current plan.     Objective :  Temp:  [97.5 °F (36.4 °C)-98.8 °F (37.1 °C)] 97.5 °F (36.4 °C)  HR:   [62-73] 62  BP: (134-168)/(58-78) 158/73  Resp:  [16-20] 20  SpO2:  [94 %-100 %] 98 %  O2 Device: None (Room air)  Nasal Cannula O2 Flow Rate (L/min):  [2 L/min] 2 L/min    Body mass index is 19.47 kg/m².     Input and Output Summary (last 24 hours):     Intake/Output Summary (Last 24 hours) at 1/23/2025 1232  Last data filed at 1/23/2025 0930  Gross per 24 hour   Intake 840 ml   Output --   Net 840 ml       Physical Exam  Vitals reviewed.   Constitutional:       General: He is not in acute distress.     Appearance: He is ill-appearing. He is not toxic-appearing.   HENT:      Head: Normocephalic and atraumatic.      Mouth/Throat:      Mouth: Mucous membranes are moist.   Cardiovascular:      Rate and Rhythm: Normal rate and regular rhythm.      Heart sounds: No murmur heard.  Pulmonary:      Effort: No respiratory distress.      Breath sounds: No stridor. Rhonchi and rales present. No wheezing.   Abdominal:      General: Bowel sounds are normal. There is no distension.      Palpations: Abdomen is soft. There is no mass.      Tenderness: There is no abdominal tenderness.   Musculoskeletal:      Right lower leg: No edema.      Left lower leg: No edema.   Skin:     General: Skin is warm and dry.      Coloration: Skin is not pale.   Neurological:      Mental Status: He is alert and oriented to person, place, and time.   Psychiatric:         Mood and Affect: Mood normal.         Behavior: Behavior normal.           Lines/Drains:  Lines/Drains/Airways       Active Status       Name Placement date Placement time Site Days    CVC Central Lines 01/22/25 Double 01/22/25  1416  --  less than 1    HD Temporary Double Catheter 01/22/25  1407  Internal jugular  less than 1    HD Permanent Double Catheter 11/14/24  1352  Internal jugular  69                    Central Line:  Goal for removal: N/A - Chronic PICC           Lab Results: I have reviewed the following results:   Results from last 7 days   Lab Units 01/23/25  9334  01/22/25 0456 01/21/25  0439 01/20/25  1543   WBC Thousand/uL 7.73   < > 9.14 8.76   HEMOGLOBIN g/dL 7.6*   < > 9.6* 9.2*   HEMATOCRIT % 22.2*   < > 27.9* 27.9*   PLATELETS Thousands/uL 79*   < > 87* 93*   BANDS PCT %  --   --  3  --    SEGS PCT %  --   --   --  90*   LYMPHO PCT %  --   --  3* 4*   MONO PCT %  --   --  1* 5   EOS PCT %  --   --  0 0    < > = values in this interval not displayed.     Results from last 7 days   Lab Units 01/23/25  0952 01/22/25 0456 01/21/25  0439   SODIUM mmol/L 130*   < > 133*   POTASSIUM mmol/L 3.8   < > 4.3   CHLORIDE mmol/L 93*   < > 94*   CO2 mmol/L 25   < > 26   BUN mg/dL 76*   < > 80*   CREATININE mg/dL 6.82*   < > 8.44*   ANION GAP mmol/L 12   < > 13   CALCIUM mg/dL 8.0*   < > 8.3*   ALBUMIN g/dL  --   --  2.6*   TOTAL BILIRUBIN mg/dL  --   --  1.75*   ALK PHOS U/L  --   --  113*   ALT U/L  --   --  20   AST U/L  --   --  46*   GLUCOSE RANDOM mg/dL 111   < > 93    < > = values in this interval not displayed.     Results from last 7 days   Lab Units 01/20/25  1543   INR  1.34*             Results from last 7 days   Lab Units 01/23/25  0952 01/22/25  0456 01/21/25  0439 01/20/25  1816 01/20/25  1543   LACTIC ACID mmol/L  --   --   --  1.0 3.2*   PROCALCITONIN ng/ml 2.83* 3.13* 3.06*  --  2.72*       Recent Cultures (last 7 days):   Results from last 7 days   Lab Units 01/22/25  0457 01/21/25  1559 01/20/25  1556 01/20/25  1543   BLOOD CULTURE   --   --  Staphylococcus aureus* Staphylococcus aureus*   GRAM STAIN RESULT  Gram positive cocci in clusters*  Gram positive cocci in clusters* 4+ Polys*  4+ Gram positive cocci in clusters* Gram positive cocci in clusters* Gram positive cocci in clusters*   BODY FLUID CULTURE, STERILE   --  3+ Growth of Staphylococcus aureus*  --   --          Last 24 Hours Medication List:     Current Facility-Administered Medications:     acetaminophen (TYLENOL) tablet 650 mg, Q6H PRN    [Held by provider] amLODIPine (NORVASC) tablet 5 mg, Daily     atorvastatin (LIPITOR) tablet 20 mg, Daily With Dinner    ceFAZolin (ANCEF) IVPB (premix in dextrose) 1,000 mg 50 mL, Q24H, Last Rate: 1,000 mg (01/22/25 1216)    [START ON 1/24/2025] epoetin loida (EPOGEN,PROCRIT) injection 5,000 Units, Once per day on Monday Wednesday Friday    folic acid (FOLVITE) tablet 1 mg, Daily    [Held by provider] hydrALAZINE (APRESOLINE) tablet 100 mg, Q8H JEEVAN    isosorbide mononitrate (IMDUR) 24 hr tablet 120 mg, Daily    melatonin tablet 6 mg, HS    metoprolol (LOPRESSOR) injection 2.5 mg, Q6H PRN    metoprolol tartrate (LOPRESSOR) tablet 25 mg, Q12H JEEVAN    Administrative Statements   Today, Patient Was Seen By: Caty Calvo PA-C    **Please Note: This note may have been constructed using a voice recognition system.**

## 2025-01-23 NOTE — ASSESSMENT & PLAN NOTE
Current hemoglobin slightly lower at 8.4. Repeat CBC pending on 1/23.   Will monitor: In place on Epogen 6000 units with each treatment  Cannot give intravenous iron with sepsis

## 2025-01-23 NOTE — ASSESSMENT & PLAN NOTE
Lab Results   Component Value Date    EGFR 7 01/23/2025    EGFR 8 01/22/2025    EGFR 5 01/21/2025    CREATININE 6.82 (H) 01/23/2025    CREATININE 5.84 (H) 01/22/2025    CREATININE 8.44 (H) 01/21/2025   Patient on dialysis MWF -noncompliant with going, wife says he misses at least once a week  Presenting with bilateral pleural effusions  Currently on torsemide on nondialysis days  HD as scheduled  Nephrology consult: Due to infected tunneled dialysis catheter removal on 1/21, temporary dialysis catheter with interventional radiology placed on 1/22 and then get back to schedule on Monday, Wednesday, Friday.  Can replace PermaCath after blood cultures are negative for 48 to 72 hours.

## 2025-01-23 NOTE — ASSESSMENT & PLAN NOTE
Both sets of blood cultures from admission with probable MSSA. Secondary to catheter exit site infection s/p line removal on 1/21 with purulent drainage from insertion site , cx with MSSA  TTE is without vegetation but fu blood cx after line removal remain positive. Temporary catheter placed in L IJ on 1/22   Patient has no other indwelling vascular or prosthetic devices. He has a superficial weeping wound on his LLE but without purulence or cellulitis appreciated on media pics.   Clinically improving     Continue cefazolin 1g q24h   Check repeat blood cx . If cx remain positive may require DEWEY   Check duplex RUE to rule out associated thrombosis which may cause delayed clearance of blood cx   Current temporary dialysis catheter in place in L IJ . Will need to await clearance of blood cx for 48-72 hrs before placing permacath   Anticipate 6 weeks of iv antibiotics   Continue wound care, serial extremity exams, if there is any worsening erythema or drainage of LLE would recommend CT LLE

## 2025-01-23 NOTE — ASSESSMENT & PLAN NOTE
Lab Results   Component Value Date    EGFR 7 01/23/2025    EGFR 8 01/22/2025    EGFR 5 01/21/2025    CREATININE 6.82 (H) 01/23/2025    CREATININE 5.84 (H) 01/22/2025    CREATININE 8.44 (H) 01/21/2025

## 2025-01-23 NOTE — ASSESSMENT & PLAN NOTE
Patient with 2 out of 2 blood cultures positive for gram-positive cocci in clusters  Continue vancomycin  No vegetation noted on echo  HD cath removed 1/21   Repeat BC 1/22 positive x2  Repeat BC 1/23 pending   ID consult: start cefazolin 1g q24h, follow up on blood culture, anticipate 6 weeks of abx. Once repeat BC are negative 48-72 can replace permacath.  RUE duplex pending to rule out thrombus

## 2025-01-23 NOTE — PHYSICAL THERAPY NOTE
PHYSICAL THERAPY EVALUATION  NAME:  Matthew Alvarez  DATE: 01/23/25    AGE:   69 y.o.  Mrn:   [id number]  ADMIT DX:  Weakness generalized [R53.1]  Pleural effusion [J90]  ESRD (end stage renal disease) on dialysis (Formerly [facility]) [N18.6, Z99.2]  COVID [U07.1]    Past Medical History:   Diagnosis Date    Acute hemodialysis patient (Formerly [facility]) 12/14/2024    CAD (coronary artery disease)     HFrEF (heart failure with reduced ejection fraction) ([facility]) 09/2021    Hypertension     Rheumatoid factor positive 09/2021    Stage 3 chronic kidney disease ([facility]ly [facility])     Tobacco abuse      Length Of Stay: 3  Performed at least 2 patient identifiers during session: Name and Birthday  PHYSICAL THERAPY EVALUATION :    01/23/25 0736   PT Last Visit   PT Visit Date 01/23/25   Note Type   Note type Evaluation   Pain Assessment   Pain Assessment Tool FLACC   Pain Location/Orientation Orientation: Left;Location: Knee   Pain Rating: FLACC (Rest) - Face 0   Pain Rating: FLACC (Rest) - Legs 0   Pain Rating: FLACC (Rest) - Activity 0   Pain Rating: FLACC (Rest) - Cry 0   Pain Rating: FLACC (Rest) - Consolability 0   Score: FLACC (Rest) 0   Pain Rating: FLACC (Activity) - Face 1   Pain Rating: FLACC (Activity) - Legs 1   Pain Rating: FLACC (Activity) - Activity 1   Pain Rating: FLACC (Activity) - Cry 1   Pain Rating: FLACC (Activity) - Consolability 0   Score: FLACC (Activity) 4   Restrictions/Precautions   Other Precautions Chair Alarm;Bed Alarm;Fall Risk;Pain;Contact/isolation;Airborne/isolation   Home Living   Type of Home House  (1 BRODY)   Home Layout Two level;Bed/bath upstairs;1/2 bath on main level;Performs ADLs on one level  (FF L HR)   Bathroom Shower/Tub Walk-in shower  (upstairs)   Bathroom Toilet Standard   Bathroom Equipment Shower chair   Home Equipment Walker;Cane;Wheelchair-manual;Hospital bed   Additional Comments in a 2 SH with 1 BRODY and sleeping on 1st floor   Prior Function   Level of [place] Needs assistance with IADLS   Lives With  "Spouse  (son)   Receives Help From Family   IADLs Independent with driving;Independent with meal prep;Independent with medication management   Falls in the last 6 months 1 to 4  (\"I don't remember\")   Comments Pt was requiring assistance with mobility, ADLs and IADLs prior to admision mallory rin the past was independent with mobility, ADLs and IADLs.   General   Additional Pertinent History inc B LE edema, left knee edema, impaired skin integrity left leg. Pt with multiple readmissions since 11/10/24. Admitted 11/10/24 to 11/20/24 with CHF, then admitted 12/14/24 to 12/27/24 with CHF, then admitted 1/10/25 to 1/15/25 with acute CHF. Pt noncompliant with dialysis.   Cognition   Orientation Level Oriented to person;Oriented to place;Disoriented to time;Disoriented to situation  (Month \"January, February, March\" year: 1954; Situation: \"To fly an airplane\")   Following Commands Follows one step commands with increased time or repetition   Comments repeated cues.   Subjective   Subjective \"I was flying an airplane.\"   RLE Assessment   RLE Assessment WFL  (except hip flexion just > 90 deg. strength 2+/5 at hip and knee and ankle 3/5)   LLE Assessment   LLE Assessment   (minimal AROM, inc pain, hip flex < 90 deg, knee ext AROM minimal due to pain, strength 2/5 hip, knee 2-/5)   Bed Mobility   Rolling R 4  Minimal assistance   Additional items Assist x 1;Increased time required;Verbal cues   Rolling L 3  Moderate assistance   Additional items Assist x 1;Increased time required;Verbal cues   Supine to Sit 2  Maximal assistance   Additional items Assist x 1;Increased time required;Verbal cues;LE management  (trunk management)   Additional Comments HOB elevated ~ 20 degrees inc time and effort to complete. manual cues for trunk and LE management.   Transfers   Sit to Stand 3  Moderate assistance   Additional items Assist x 1;Increased time required;Verbal cues   Stand to Sit 3  Moderate assistance   Additional items Assist x " "1;Increased time required;Verbal cues   Stand pivot 3  Moderate assistance   Additional items Assist x 1;Increased time required;Verbal cues   Additional Comments RW. inc time to ahcieve standing. verbal cues for tehcnique. spt with manual cues for wt shifting and verbal cues for turing completely, sequencing.   Ambulation/Elevation   Gait pattern Wide MELISSA;Improper Weight shift;Decreased foot clearance;Short stride;Excessively slow;Decreased L stance;Antalgic   Gait Assistance 3  Moderate assist   Additional items Assist x 1;Verbal cues   Assistive Device Rolling walker   Distance ambulated 3'x1 wiht chair follow with modAx1 with amnaul cues for wt shifting and RW management. further ambulaiton limited by fatigue, left knee pain.   Balance   Static Sitting Fair +   Dynamic Sitting Fair   Static Standing Poor +   Dynamic Standing Poor   Ambulatory Poor   Endurance Deficit   Endurance Deficit Yes   Endurance Deficit Description fatigue SpO2 >/= 91% on room air.   Activity Tolerance   Activity Tolerance Patient limited by fatigue;Patient limited by pain   Medical Staff Made Aware Antonio MARY   Nurse Made Aware Isabela CHACON   Assessment   Prognosis Good   Problem List Decreased strength;Decreased endurance;Impaired balance;Decreased mobility;Decreased range of motion;Decreased cognition;Impaired judgement;Decreased safety awareness;Decreased skin integrity;Pain   Barriers to Discharge Inaccessible home environment;Decreased caregiver support   Barriers to Discharge Comments requires assistance to complete mobility, frequent readmissions, fall risk   Goals   Patient Goals \"Go home\"   [facility] Expiration Date 02/06/25   PT Treatment Day 0   Plan   Treatment/Interventions ADL retraining;Functional transfer training;LE strengthening/ROM;Elevations;Therapeutic exercise;Endurance training;Patient/family training;Equipment eval/education;Bed mobility;Gait training;Compensatory technique education;Cognitive reorientation;Spoke to " nursing;Spoke to case management;OT   PT Frequency 3-5x/wk   Discharge Recommendation   Rehab Resource Intensity Level, PT II (Moderate Resource Intensity)   AM-PAC Basic Mobility Inpatient   Turning in Flat Bed Without Bedrails 2   Lying on Back to Sitting on Edge of Flat Bed Without Bedrails 2   Moving Bed to Chair 2   Standing Up From Chair Using Arms 2   Walk in Room 2   Climb 3-5 Stairs With Railing 1   Basic Mobility Inpatient Raw Score 11   Basic Mobility Standardized Score 30.25   [facility] Highest Level Of Mobility   -HL Goal 4: Move to chair/commode   -HLM Achieved 4: Move to chair/commode   End of Consult   Patient Position at End of Consult Bedside chair;Bed/Chair alarm activated;All needs within reach       Pt requires PT/OT co-eval due to medical complexity, safety concerns, fall risk, significant assistance with mobility and/or cognitive-behavioral impairments.    (Please find full objective findings from PT assessment regarding body systems outlined above).     Assessment: Pt is a 69 y.o. male seen for PT evaluation s/p admission to [facility] on 1/20/2025 with Sepsis (HCC).  Order placed for PT services.  Upon evaluation: Pt is presenting with impaired functional mobility due to pain, decreased strength, decreased ROM, decreased endurance, impaired balance, gait deviations, impaired cognition, decreased safety awareness, impaired judgment, fall risk, LE edema, and impaired skin integrity requiring  minimal to maximal assistance for bed mobility, moderate assistance for transfers, and moderate assistance for ambulation with RW . Pt's clinical presentation is currently unpredictable given the functional mobility deficits above, especially weakness, decreased ROM, edema of extremities, decreased skin integrity, decreased endurance, impaired balance, gait deviations, pain, decreased activity tolerance, decreased functional mobility tolerance, decreased safety awareness,  impaired judgement, and decreased cognition, coupled with fall risks as indicated by AM-PAC 6-Clicks: 11/24 as well as hx of falls, impaired balance, polypharmacy, impaired judgement, decreased safety awareness, and decreased cognition and combined with medical complications of pain impacting overall mobility status, abnormal renal lab values, abnormal H&H, abnormal sodium values, multiple readmissions, need for input for mobility technique/safety, and abnormal platelets, abnormal procalcitonin, abnormal CRP, Covid-19, sepsis, s/p central line placement for HD 1/22/25, bactermia, acute CHF, Afib with RVR, anemia .  Pt's PMHx and comorbidities that may affect physical performance and progress include:  ESRD on HD, HTN, CHF (HFrEF), anemia, thrombocytopenia, CAD, rheumatoid factor positive . Personal factors affecting pt at time of IE include: inaccessible home environment, step(s) to enter environment, multi-level environment, limited home support, inability to perform IADLs, inability to perform ADLs, inability to navigate level surfaces without external assistance, inability to ambulate household distances, limited insight into impairments, recent fall(s)/fall history, and tobacco use. Pt will benefit from continued skilled PT services to address deficits as defined above and to maximize level of functional mobility to facilitate return toward PLOF and improved QOL. From PT/mobility standpoint, recommendation at time of d/c would be Level II (Moderate Resource Intensity in order to reduce fall risk and maximize pt's functional independence and consistency with mobility. Recommend trial with walker next 1-2 sessions and ther ex next 1-2 sessions.       The patient's AM-PAC Basic Mobility Inpatient Short Form Raw Score is 11. A Raw score of less than or equal to 16 suggests the patient may benefit from discharge to post-acute rehabilitation services. Please also refer to the recommendation of the Physical Therapist  for safe discharge planning.       Goals: Pt will: Perform bed mobility tasks with modified Independent to reposition in bed and prepare for transfers. Pt will perform transfers with supervision to decrease risk for falls and improve activity tolerance and prepare for ambulation. Pt will ambulate with RW for >/= 100' with  supervision  to decrease burden of care, decrease risk for falls, improve activity tolerance, and improve gait quality and to access home environment. Pt will complete 1 step with LRAD and >/= 3 steps with unilateral handrail with consistent min A of 1 to decrease burden of care, decrease risk for falls, and improve activity tolerance. Pt will participate in objective balance assessment to determine baseline fall risk. Pt will participate in SSWS assessment to determine level of mobility. Pt will increase B LE strength >/= 1/2 MMT grade to facilitate functional mobility.      Maral Morris, PT,DPT

## 2025-01-23 NOTE — OCCUPATIONAL THERAPY NOTE
Occupational Therapy Evaluation     Patient Name: Matthew Alvarez  Today's Date: 1/23/2025  Problem List  Principal Problem:    Sepsis (HCC)  Active Problems:    Primary hypertension    Acute on chronic systolic heart failure (HCC)    Hyponatremia    Anemia due to chronic kidney disease, on chronic dialysis (HCC)    Thrombocytopenia (HCC)    ESRD (end stage renal disease) on dialysis (HCC)    COVID    Atrial fibrillation with RVR (HCC)    Bacteremia    Chronic kidney disease-mineral and bone disorder (CKD-MBD)    Past Medical History  Past Medical History:   Diagnosis Date    Acute hemodialysis patient ([facility]) 12/14/2024    CAD (coronary artery disease)     HFrEF (heart failure with reduced ejection fraction) ([facility]) 09/2021    Hypertension     Rheumatoid factor positive 09/2021    Stage 3 chronic kidney disease ([facility])     Tobacco abuse      Past Surgical History  Past Surgical History:   Procedure Laterality Date    APPENDECTOMY  1965    CARDIAC CATHETERIZATION  9/16/2021    INGUINAL HERNIA REPAIR Right 1991    IR TEMPORARY DIALYSIS CATHETER PLACEMENT  1/22/2025    IR THORACENTESIS  12/18/2024    IR THORACENTESIS  12/23/2024    IR TUNNELED DIALYSIS CATHETER PLACEMENT  11/14/2024 01/23/25 0735   OT Last Visit   OT Visit Date 01/23/25   Note Type   Note type Evaluation   Pain Assessment   Pain Assessment Tool 0-10   Pain Score No Pain   Pain Location/Orientation Orientation: Bilateral;Location: Knee  (L knee pain greater than R knee with movement in bed and ambulation)   Restrictions/Precautions   Weight Bearing Precautions Per Order No   Other Precautions Chair Alarm;Bed Alarm;Fall Risk;Pain;Contact/isolation;Airborne/isolation  (COVID-19)   Home Living   Type of Home House  (1 BRODY L HR)   Home Layout Multi-level;Performs ADLs on one level;Able to live on main level with bedroom/bathroom   Bathroom Shower/Tub Tub/shower unit  (Pt sponge abthes)   Bathroom Toilet Standard   Bathroom Equipment Shower chair    Home Equipment Walker;Cane;Wheelchair-manual   Prior Function   Level of [place] Needs assistance with ADLs;Needs assistance with functional mobility;Needs assistance with IADLS  (self-neglectful)   Lives With Spouse;Son   Receives Help From Family   IADLs Family/Friend/Other provides transportation;Family/Friend/Other provides medication management;Family/Friend/Other provides meals  (Sister drives)   Falls in the last 6 months 1 to 4   Comments Pt disoriented this AM, PLOF information taken from previous evaluation.   ADL   Where Assessed Edge of bed   UB Bathing Assistance 3  Moderate Assistance   UB Bathing Deficit   (with bath wipes sitting edge of bed)   LB Bathing Assistance 1  Total Assistance   LB Bathing Deficit   (with bath wipes supine/sidelying in bed)   UB Dressing Assistance 2  Maximal Assistance   UB Dressing Deficit   (donning/doffing gown)   LB Dressing Assistance 1  Total Assistance   LB Dressing Deficit   (donning/doffing B socks)   Toileting Assistance  1  Total Assistance   Toileting Deficit   (incontinent bowel episode)   Bed Mobility   Rolling R 4  Minimal assistance   Additional items Assist x 1;Increased time required;Verbal cues   Rolling L 3  Moderate assistance   Additional items Assist x 1;Increased time required;Verbal cues   Supine to Sit 2  Maximal assistance   Additional items Assist x 1;Increased time required;Verbal cues   Additional Comments Pt rolled side to side for assistance with bathing following an incontinent bowel episode. Pt reported pain in B knees while rolling.   Transfers   Sit to Stand 3  Moderate assistance   Additional items Assist x 1;Increased time required;Verbal cues   Stand to Sit 3  Moderate assistance   Additional items Assist x 1;Increased time required;Verbal cues   Stand pivot 3  Moderate assistance   Additional items Assist x 1;Increased time required;Verbal cues   Additional Comments Pt took a few steps with rolling walker during transfer from  "bed to recliner. Required constant hands-on assistance for navigating and maintaining balance.   Functional Mobility   Functional Mobility 3  Moderate assistance   Additional Comments Pt took a few steps with rolling walker during transfer from bed to recliner. Required constant hands-on assistance for navigating and maintaining balance.   Balance   Static Sitting Fair +   Dynamic Sitting Fair   Static Standing Poor   Dynamic Standing Poor   Activity Tolerance   Activity Tolerance Patient limited by fatigue;Patient limited by pain   Medical Staff Made Aware PT Maral   Nurse Made Aware INES Mar   RUE Assessment   RUE Assessment X   RUE Overall AROM   R Shoulder Flexion 0-60   R Elbow Flexion WFL   R Elbow Extension WFL   R Wrist Flexion WFL   R Wrist Extension WFL   R Mass Grasp WFL   R Finger Flexion WFL   RUE Strength   RUE Overall Strength Deficits   R Shoulder Flexion 2+/5   R Shoulder Extension 3-/5   R Elbow Flexion 3+/5   R Elbow Extension 3+/5   LUE Assessment   LUE Assessment X   LUE Overall AROM   L Shoulder Flexion 0-60   L Elbow Flexion WFL   L Elbow Extension WFL   L Wrist Flexion WFL   L Wrist Extension WFL   L Finger Flexion WFL   LUE Strength   LUE Overall Strength Deficits   L Shoulder Flexion 2+/5   L Shoulder Extension 3-/5   L Elbow Flexion 3+/5   L Elbow Extension 3+/5   Cognition   Overall Cognitive Status Impaired   Arousal/Participation Responsive;Cooperative   Attention Attends with cues to redirect   Orientation Level Oriented to person;Oriented to place;Disoriented to situation;Disoriented to time  (Month \"January, February, March\" year: 1954; Situation: \"To fly an airplane\")   Memory Decreased long term memory;Decreased short term memory;Decreased recall of recent events   Following Commands Follows one step commands with increased time or repetition   Assessment   Limitation Decreased ADL status;Decreased UE ROM;Decreased UE strength;Decreased Safe judgement during ADL;Decreased " cognition;Decreased endurance;Decreased self-care trans;Decreased high-level ADLs   Prognosis Fair   Assessment Pt is a 69 y.o. male, [facility] to [facility]/20/2025 d/t experiencing worsening shortness of breath. Dx: sepsis. Pt with PMHx impacting their performance during ADL tasks, including: CKD III on HD, CAD, HFrEF with life vest, HTN, CAD. Prior to admission to the hospital Pt was performing ADLs with physical assistance. IADLs with physical assistance. Functional transfers/ambulation with physical assistance. Cognitive status was PTA was impaired. OT order placed to assess Pt's ADLs, cognitive status, and performance during functional tasks in order to maximize safety and independence while making most appropriate d/c recommendations. PT/OT co-evaluation completed at this time d/t significant mobility deficits and safety concerns. Pt's clinical presentation is currently unstable/unpredictable given new onset deficits that effect Pt's occupational performance and ability to safely return to PLOF including decrease activity tolerance, decrease standing balance, decrease sitting balance, decrease performance during ADL tasks, decrease cognition, decrease safety awareness , decrease BUE ROM, decrease UB MS, increased pain, decrease generalized strength, decrease activity engagement, decrease performance during functional transfers, and limited insight to deficits combined with medical complications of incontinence and need for input for mobility technique/safety. Personal factors affecting Pt at time of initial evaluation include: step(s) to enter environment, inability to perform IADLs, inability to perform ADLs, inability to ambulate household distances, inability to navigate community distances, limited insight into impairments, decreased initiation and engagement, and questionable non-compliance. Pt will benefit from continued skilled OT services to address deficits as defined above and to maximize level  independence/participation during ADLs and functional tasks to facilitate return toward PLOF and improved quality of life. From an occupational therapy standpoint, recommendation at time of d/c would be Level II: Moderate Resource Intensity.   Plan   Treatment Interventions ADL retraining;Functional transfer training;UE strengthening/ROM;Endurance training;Cognitive reorientation;Patient/family training;Energy conservation;Activityengagement;Compensatory technique education   Goal Expiration Date 02/06/25   OT Frequency 2-3x/wk   Discharge Recommendation   Rehab Resource Intensity Level, OT II (Moderate Resource Intensity)   Additional Comments  Pt agreeable to rehab during session.   AM-PAC Daily Activity Inpatient   Lower Body Dressing 1   Bathing 2   Toileting 1   Upper Body Dressing 2   Grooming 2   Eating 3   Daily Activity Raw Score 11   Daily Activity Standardized Score (Calc for Raw Score >=11) 29.04   AM-PAC Applied Cognition Inpatient   Following a Speech/Presentation 3   Understanding Ordinary Conversation 3   Taking Medications 2   Remembering Where Things Are Placed or Put Away 2   Remembering List of 4-5 Errands 1   Taking Care of Complicated Tasks 1   Applied Cognition Raw Score 12   Applied Cognition Standardized Score 28.82   End of Consult   Education Provided Yes   Patient Position at End of Consult Bedside chair;Bed/Chair alarm activated;All needs within reach   Nurse Communication Nurse aware of consult     The patient's raw score on the AM-PAC Daily Activity Inpatient Short Form is 11. A raw score of less than 19 suggests the patient may benefit from discharge to post-acute rehabilitation services. Please refer to the recommendation of the Occupational Therapist for safe discharge planning.    Pt goals to be met by 2/6/2025    Pt will demonstrate ability to complete grooming/hygiene tasks @ independent after set-up.  Pt will demonstrate ability to complete supine<>sit @ SPV in order to increase  safety and independence during ADL tasks.  Pt will demonstrate ability to complete UB ADLs including washing/dressing @ SPV in order to increase performance and participation during meaningful tasks  Pt will demonstrate ability to complete LB dressing @ Min A in order to increase safety and independence during meaningful tasks.   Pt will demonstrate ability to complete toileting tasks including CM and pericare @ SPV in order to increase safety and independence during meaningful tasks.  Pt will demonstrate ability to complete EOB, chair, toilet/commode transfers @ SPV in order to increase performance and participation during functional tasks.  Pt will demonstrate ability to stand for 5 minutes while maintaining fair+ balance with use of a rolling walker for UB support PRN.  Pt will demonstrate ability to tolerate 30-35 minute OT session with no vc'ing for deep breathing or use of energy conservation techniques in order to increase activity tolerance during functional tasks.   Pt will demonstrate Good carryover of use of energy conservation/compensatory strategies during ADLs and functional tasks in order to increase safety and reduce risk for falls.   Pt will demonstrate Good attention and participation in continued evaluation of functional ambulation house hold distances in order to assist with safe d/c planning.  Pt will attend to continued cognitive assessments 100% of the time in order to provide most appropriate d/c recommendations.   Pt will follow 100% simple 2-step commands and be A&O x4 consistently with environmental cues to increase participation in functional activities.   Pt will identify 3 areas of interest/hobbies and 1 intervention on how to incorporate into daily life in order to increase interaction with environment and peers as well as increase participation in meaningful tasks.   Pt will demonstrate 100% carryover of BUE HEP in order to increase BUE MS and increase performance during functional  tasks upon d/c home.    Antonio Santiago, OTR/L

## 2025-01-23 NOTE — ASSESSMENT & PLAN NOTE
SARS-CoV-2 PCR positive on 1/20 . CT chest with with diffuse patchy groundglass opacities throughout both lungs which may represent pneumonitis versus pulmonary edema.  Has underlying COPD, chronic tobacco use  Stable on room air     Completed remdesivir x 3 days, additional management per protocol   Continue isolation precautions

## 2025-01-23 NOTE — PROGRESS NOTES
Progress Note - Nephrology   Name: Matthew Alvarez 69 y.o. male I MRN: [id number]  Unit/Bed#: -01 I Date of Admission: 1/20/2025   Date of Service: 1/23/2025 I Hospital Day: 3      HEMODIALYSIS PROCEDURE NOTE  The patient was seen and examined on hemodialysis.  Time: 3 hours  Sodium: 137 Blood flow: 400   Dialyzer: F160 Potassium: 4 Dialysate flow: 1.5x   Access: L temp cath Bicarbonate: 35 Ultrafiltration goal: 1L as tolerated   Medications on HD: none      Assessment & Plan  ESRD (end stage renal disease) on dialysis (HCC)  MWF at [facility]  Received HD 1/21  Target weight to be determined  Access: Patient with infected tunneled dialysis catheter removed 1/21 with plan for new temporary IJ Saulo catheter via IR in a.m. with HD in a.m. then get back on schedule Monday Wednesday and Friday. Patient with covid positive status, dialysis for 3 hour treatment as per protocol.    Primary hypertension  Blood pressure low normal hold meds  Home medications: Hydralazine 100 mg every 8 hours/carvedilol 25 twice a day/amlodipine 5 mg daily/torsemide 100 mg on nondialysis days being held at this time  Hyponatremia  Sodium 130 mmol/L, 1/23. Secondary to ESRD: Will place on fluid restriction along with HD/UF  Chronic kidney disease-mineral and bone disorder (CKD-MBD)  Will monitor phosphorus currently no binders, most recent phosphorus 2.9 mg/dL on 12/26.   Anemia due to chronic kidney disease, on chronic dialysis (HCC)  Current hemoglobin slightly lower at 8.4. Repeat CBC pending on 1/23.   Will monitor: In place on Epogen 6000 units with each treatment  Cannot give intravenous iron with sepsis  Acute on chronic systolic heart failure (HCC)  Target weight listed as 60 kg but will continue to challenge as able  Appears improved overall today comfortable with clear lungs will follow and challenges mentioned with UF  EF: 20%    COVID  The patient is being given focal directed treatment is to be given intravenous  remdesivir.  The patient is also being given IV Rocephin for primary team given patient's presentation.  Sepsis (HCC)  Gram-positive cocci bacteremia currently on antibiotics followed by ID recommended removal of tunneled dialysis catheter which has been done, also, recommended TTE  Can replace tunneled dialysis catheter with negative blood cultures for 48 to 72 hours will plan for temporary IJ Saulo catheter as outlined above    I have reviewed the nephrology recommendations including hemodialysis catheter exchanges and hemodialysis schedule hospitalist and we are in agreement with renal plan including the information outlined above.     Subjective   Brief History of Admission -  69 y.o. male with a PMH of ESRD on HD MWF, HFrEF s/p LifeVest, HTN, CAD who presented to [facility] 1/20 with concern for worsening shortness of breath. Wife advised he has been noncompliant with medications and outpatient hemodialysis schedule. Nephrology is following for ESRD on HD.     Patient is examined resting in bed on hemodialysis. States he is tired, otherwise denies complaints.    Objective :  Temp:  [97.5 °F (36.4 °C)-98.8 °F (37.1 °C)] 97.5 °F (36.4 °C)  HR:  [66-73] 67  BP: (134-168)/(58-78) 164/77  Resp:  [16-20] 20  SpO2:  [94 %-100 %] 97 %  O2 Device: None (Room air)  Nasal Cannula O2 Flow Rate (L/min):  [2 L/min] 2 L/min    Current Weight: Weight - Scale: 54.7 kg (120 lb 9.6 oz)  First Weight: Weight - Scale: 55.4 kg (122 lb 2.2 oz)  I/O         01/21 0701  01/22 0700 01/22 0701  01/23 0700 01/23 0701  01/24 0700    P.O. 120 1040 60    I.V. (mL/kg) 460 (8.6)  200 (3.7)    IV Piggyback       Total Intake(mL/kg) 580 (10.8) 1040 (19) 260 (4.8)    Other 1500      Total Output 1500      Net -920 +1040 +260                 Physical Exam  Vitals and nursing note reviewed.   Constitutional:       General: He is not in acute distress.     Appearance: He is well-developed. He is ill-appearing.      Comments: cachectic   HENT:      Head:  Normocephalic and atraumatic.      Right Ear: External ear normal.      Left Ear: External ear normal.      Nose: Nose normal.      Mouth/Throat:      Mouth: Mucous membranes are moist.      Pharynx: Oropharynx is clear.   Eyes:      Extraocular Movements: Extraocular movements intact.      Conjunctiva/sclera: Conjunctivae normal.      Pupils: Pupils are equal, round, and reactive to light.   Cardiovascular:      Rate and Rhythm: Normal rate and regular rhythm.      Heart sounds: Normal heart sounds. No murmur heard.  Pulmonary:      Effort: Pulmonary effort is normal. No respiratory distress.      Breath sounds: Normal breath sounds.   Abdominal:      Palpations: Abdomen is soft.      Tenderness: There is no abdominal tenderness.   Musculoskeletal:         General: No swelling.      Cervical back: Neck supple.      Right lower leg: No edema.      Left lower leg: No edema.   Skin:     General: Skin is warm and dry.      Capillary Refill: Capillary refill takes less than 2 seconds.   Neurological:      General: No focal deficit present.      Mental Status: He is alert and oriented to person, place, and time.   Psychiatric:         Mood and Affect: Mood normal.         Behavior: Behavior normal.       Medications:    Current Facility-Administered Medications:     acetaminophen (TYLENOL) tablet 650 mg, 650 mg, Oral, Q6H PRN, Kerri Chisholm PA-C    [Held by provider] amLODIPine (NORVASC) tablet 5 mg, 5 mg, Oral, Daily, Kerri Chisholm PA-C    atorvastatin (LIPITOR) tablet 20 mg, 20 mg, Oral, Daily With Dinner, Kerri Chisholm PA-C, 20 mg at 01/22/25 1749    ceFAZolin (ANCEF) IVPB (premix in dextrose) 1,000 mg 50 mL, 1,000 mg, Intravenous, Q24H, Leonarda Simmons PA-C, Last Rate: 100 mL/hr at 01/22/25 1216, 1,000 mg at 01/22/25 1216    [START ON 1/24/2025] epoetin loida (EPOGEN,PROCRIT) injection 5,000 Units, 5,000 Units, Intravenous, Once per day on Monday Wednesday Friday, Knedall Guerra MD    folic acid (FOLVITE) tablet 1  "mg, 1 mg, Oral, Daily, Kerri Chisholm PA-C, 1 mg at 01/22/25 0828    [Held by provider] hydrALAZINE (APRESOLINE) tablet 100 mg, 100 mg, Oral, Q8H JEEVAN, Kerri Chisholm PA-C, 100 mg at 01/20/25 2125    isosorbide mononitrate (IMDUR) 24 hr tablet 120 mg, 120 mg, Oral, Daily, Kerri Chisholm PA-C, 120 mg at 01/22/25 0827    melatonin tablet 6 mg, 6 mg, Oral, HS, Kerri Chisholm PA-C, 6 mg at 01/22/25 2116    metoprolol (LOPRESSOR) injection 2.5 mg, 2.5 mg, Intravenous, Q6H PRN, Kerri Chisholm PA-C    metoprolol tartrate (LOPRESSOR) tablet 25 mg, 25 mg, Oral, Q12H [facility], Danny Degroot MD, 25 mg at 01/22/25 2116      Lab Results: I have reviewed the following results:  Results from last 7 days   Lab Units 01/23/25  0952 01/22/25  0456 01/21/25  0439 01/20/25  1543   WBC Thousand/uL  --  8.75 9.14 8.76   HEMOGLOBIN g/dL  --  8.4* 9.6* 9.2*   HEMATOCRIT %  --  24.5* 27.9* 27.9*   PLATELETS Thousands/uL  --  75* 87* 93*   POTASSIUM mmol/L 3.8 4.0 4.3 4.5   CHLORIDE mmol/L 93* 95* 94* 92*   CO2 mmol/L 25 27 26 24   BUN mg/dL 76* 53* 80* 77*   CREATININE mg/dL 6.82* 5.84* 8.44* 8.07*   CALCIUM mg/dL 8.0* 8.1* 8.3* 8.6   MAGNESIUM mg/dL 2.0 1.9  --  2.1   ALBUMIN g/dL  --   --  2.6* 3.0*       Administrative Statements     Portions of the record may have been created with voice recognition software. Occasional wrong word or \"sound a like\" substitutions may have occurred due to the inherent limitations of voice recognition software. Read the chart carefully and recognize, using context, where substitutions have occurred.If you have any questions, please contact the dictating provider.  "

## 2025-01-23 NOTE — ASSESSMENT & PLAN NOTE
Wt Readings from Last 3 Encounters:   01/23/25 54.7 kg (120 lb 9.6 oz)   01/15/25 56.5 kg (124 lb 9.6 oz)   12/27/24 56 kg (123 lb 6.4 oz)     Patient presents with worsening shortness of breath, missed dialysis session today with last session on Friday  Patient's wife endorses that he sometimes is not taking his medication, spits them out or throws them away  CT chest with moderate to large right pleural effusion and moderate left pleural effusion  Echo in November with EF of 20%  Wearing LifeVest initially on presentation, now refusing-monitoring on telemetry  PTA regimen is Coreg, torsemide (on nondialysis days)  BNP >4700  Given 1 dose IV diuretic on admission, HD as scheduled. Getting temporary catheter in on 1/22; will repeat CXR on 1/23 to assess pleural effusions.   Daily weights and I/O

## 2025-01-23 NOTE — PLAN OF CARE
Problem: Nutrition/Hydration-ADULT  Goal: Nutrient/Hydration intake appropriate for improving, restoring or maintaining nutritional needs  Description: Monitor and assess patient's nutrition/hydration status for malnutrition. Collaborate with interdisciplinary team and initiate plan and interventions as ordered.  Monitor patient's weight and dietary intake as ordered or per policy. Utilize nutrition screening tool and intervene as necessary. Determine patient's food preferences and provide high-protein, high-caloric foods as appropriate.     INTERVENTIONS:  - Monitor oral intake, urinary output, labs, and treatment plans  - Assess nutrition and hydration status and recommend course of action  - Evaluate amount of meals eaten  - Assist patient with eating if necessary   - Allow adequate time for meals  - Recommend/ encourage appropriate diets, oral nutritional supplements, and vitamin/mineral supplements  - Order, calculate, and assess calorie counts as needed  - Recommend, monitor, and adjust tube feedings and TPN/PPN based on assessed needs  - Assess need for intravenous fluids  - Provide specific nutrition/hydration education as appropriate  - Include patient/family/caregiver in decisions related to nutrition  Outcome: Progressing     Problem: PAIN - ADULT  Goal: Verbalizes/displays adequate comfort level or baseline comfort level  Description: Interventions:  - Encourage patient to monitor pain and request assistance  - Assess pain using appropriate pain scale  - Administer analgesics based on type and severity of pain and evaluate response  - Implement non-pharmacological measures as appropriate and evaluate response  - Consider cultural and social influences on pain and pain management  - Notify physician/advanced practitioner if interventions unsuccessful or patient reports new pain  Outcome: Progressing     Problem: INFECTION - ADULT  Goal: Absence or prevention of progression during  hospitalization  Description: INTERVENTIONS:  - Assess and monitor for signs and symptoms of infection  - Monitor lab/diagnostic results  - Monitor all insertion sites, i.e. indwelling lines, tubes, and drains  - Monitor endotracheal if appropriate and nasal secretions for changes in amount and color  - [place] appropriate cooling/warming therapies per order  - Administer medications as ordered  - Instruct and encourage patient and family to use good hand hygiene technique  - Identify and instruct in appropriate isolation precautions for identified infection/condition  Outcome: Progressing  Goal: Absence of fever/infection during neutropenic period  Description: INTERVENTIONS:  - Monitor WBC    Outcome: Progressing     Problem: SAFETY ADULT  Goal: Patient will remain free of falls  Description: INTERVENTIONS:  - Educate patient/family on patient safety including physical limitations  - Instruct patient to call for assistance with activity   - Consult OT/PT to assist with strengthening/mobility   - Keep Call bell within reach  - Keep bed low and locked with side rails adjusted as appropriate  - Keep care items and personal belongings within reach  - Initiate and maintain comfort rounds  - Make Fall Risk Sign visible to staff  - Offer Toileting every 2 Hours, in advance of need  - Initiate/Maintain bed and chair alarm  - Obtain necessary fall risk management equipment: walker   - Apply yellow socks and bracelet for high fall risk patients  - Consider moving patient to room near nurses station  Outcome: Progressing  Goal: Maintain or return to baseline ADL function  Description: INTERVENTIONS:  -  Assess patient's ability to carry out ADLs; assess patient's baseline for ADL function and identify physical deficits which impact ability to perform ADLs (bathing, care of mouth/teeth, toileting, grooming, dressing, etc.)  - Assess/evaluate cause of self-care deficits   - Assess range of motion  - Assess patient's  mobility; develop plan if impaired  - Assess patient's need for assistive devices and provide as appropriate  - Encourage maximum independence but intervene and supervise when necessary  - Involve family in performance of ADLs  - Assess for home care needs following discharge   - Consider OT consult to assist with ADL evaluation and planning for discharge  - Provide patient education as appropriate  Outcome: Progressing  Goal: Maintains/Returns to pre admission functional level  Description: INTERVENTIONS:  - Perform AM-PAC 6 Click Basic Mobility/ Daily Activity assessment daily.  - Set and communicate daily mobility goal to care team and patient/family/caregiver.   - Collaborate with rehabilitation services on mobility goals if consulted  - Perform Range of Motion 3 times a day.  - Reposition patient every 2 hours.  - Dangle patient 3 times a day  - Stand patient 3 times a day  - Ambulate patient 3 times a day  - Out of bed to chair 3 times a day   - Out of bed for meals 3 times a day  - Out of bed for toileting  - Record patient progress and toleration of activity level   Outcome: Progressing     Problem: DISCHARGE PLANNING  Goal: Discharge to home or other facility with appropriate resources  Description: INTERVENTIONS:  - Identify barriers to discharge w/patient and caregiver  - Arrange for needed discharge resources and transportation as appropriate  - Identify discharge learning needs (meds, wound care, etc.)  - Arrange for interpretive services to assist at discharge as needed  - Refer to Case Management Department for coordinating discharge planning if the patient needs post-hospital services based on physician/advanced practitioner order or complex needs related to functional status, cognitive ability, or social support system  Outcome: Progressing     Problem: Knowledge Deficit  Goal: Patient/family/caregiver demonstrates understanding of disease process, treatment plan, medications, and discharge  instructions  Description: Complete learning assessment and assess knowledge base.  Interventions:  - Provide teaching at level of understanding  - Provide teaching via preferred learning methods  Outcome: Progressing     Problem: METABOLIC, FLUID AND ELECTROLYTES - ADULT  Goal: Electrolytes maintained within normal limits  Description: INTERVENTIONS:  - Monitor labs and assess patient for signs and symptoms of electrolyte imbalances  - Administer electrolyte replacement as ordered  - Monitor response to electrolyte replacements, including repeat lab results as appropriate  - Instruct patient on fluid and nutrition as appropriate  Outcome: Progressing  Goal: Fluid balance maintained  Description: INTERVENTIONS:  - Monitor labs   - Monitor I/O and WT  - Instruct patient on fluid and nutrition as appropriate  - Assess for signs & symptoms of volume excess or deficit  Outcome: Progressing  Goal: Glucose maintained within target range  Description: INTERVENTIONS:  - Monitor Blood Glucose as ordered  - Assess for signs and symptoms of hyperglycemia and hypoglycemia  - Administer ordered medications to maintain glucose within target range  - Assess nutritional intake and initiate nutrition service referral as needed  Outcome: Progressing       Problem: Prexisting or High Potential for Compromised Skin Integrity  Goal: Skin integrity is maintained or improved  Description: INTERVENTIONS:  - Identify patients at risk for skin breakdown  - Assess and monitor skin integrity  - Assess and monitor nutrition and hydration status  - Monitor labs   - Assess for incontinence   - Turn and reposition patient  - Assist with mobility/ambulation  - Relieve pressure over bony prominences  - Avoid friction and shearing  - Provide appropriate hygiene as needed including keeping skin clean and dry  - Evaluate need for skin moisturizer/barrier cream  - Collaborate with interdisciplinary team   - Patient/family teaching  - Consider wound care  consult   Outcome: Progressing

## 2025-01-23 NOTE — ASSESSMENT & PLAN NOTE
MWF at [facility]  Received HD 1/21  Target weight to be determined  Access: Patient with infected tunneled dialysis catheter removed 1/21 with plan for new temporary IJ Saulo catheter via IR in a.m. with HD in a.m. then get back on schedule Monday Wednesday and Friday. Patient with covid positive status, dialysis for 3 hour treatment as per protocol.     Clindamycin Pregnancy And Lactation Text: This medication can be used in pregnancy if certain situations. Clindamycin is also present in breast milk.

## 2025-01-23 NOTE — ASSESSMENT & PLAN NOTE
Developed in setting of sepsis  Home Coreg on hold secondary to low blood pressure-switch to metoprolol 25 twice daily  As needed low-dose 2.5 Lopressor IV for sustained heart rate greater than 130  Will need anticoagulation -will need to be started on renally adjusted Eliquis when able. pending replacement for perm cath, will continue to hold per IR recommendations

## 2025-01-23 NOTE — QUICK NOTE
Noted to have occlusive thrombus in right IJV at the proximal neck, in the area of the prior catheter, this does not extend to the innominate, or subclavian veins . Will start on heparin drip. Monitor closely for any S/S of bleeding with chronic anemia and thrombocytopenia.

## 2025-01-23 NOTE — ASSESSMENT & PLAN NOTE
Wt Readings from Last 3 Encounters:   01/23/25 54.7 kg (120 lb 9.6 oz)   01/15/25 56.5 kg (124 lb 9.6 oz)   12/27/24 56 kg (123 lb 6.4 oz)     Has underlying nonischemic cardiomyopathy, ESRD, A-fib with RVR and chronic pleural effusions requiring thoracentesis in the past

## 2025-01-23 NOTE — ASSESSMENT & PLAN NOTE
Fever, tachycardia with lactic acidosis.  Suspect secondary to catheter exit site infection and secondary bacteremia now. Bacteremia is yet to clear despite line removal.  Patient also noted to have acute COVID infection but without pneumonia on chest imaging  Afebrile, hemodynamically stable without leukocytosis     Antibiotics and additional management as below   Check daily CBC, CMP while inpatient to monitor for any evolving antibiotic toxicity or treatment failure   Continue supportive care, monitor clinical course

## 2025-01-24 ENCOUNTER — APPOINTMENT (INPATIENT)
Dept: DIALYSIS | Facility: HOSPITAL | Age: 70
DRG: 314 | End: 2025-01-24
Payer: COMMERCIAL

## 2025-01-24 PROBLEM — I82.C11 THROMBOSIS OF RIGHT INTERNAL JUGULAR VEIN (HCC): Status: ACTIVE | Noted: 2025-01-24

## 2025-01-24 PROBLEM — E44.0 MODERATE PROTEIN-CALORIE MALNUTRITION (HCC): Status: ACTIVE | Noted: 2025-01-24

## 2025-01-24 LAB
ANION GAP SERPL CALCULATED.3IONS-SCNC: 7 MMOL/L (ref 4–13)
APTT PPP: 124 SECONDS (ref 23–34)
APTT PPP: 52 SECONDS (ref 23–34)
APTT PPP: 75 SECONDS (ref 23–34)
APTT PPP: 76 SECONDS (ref 23–34)
BUN SERPL-MCNC: 45 MG/DL (ref 5–25)
CALCIUM SERPL-MCNC: 7.8 MG/DL (ref 8.4–10.2)
CHLORIDE SERPL-SCNC: 95 MMOL/L (ref 96–108)
CO2 SERPL-SCNC: 30 MMOL/L (ref 21–32)
CREAT SERPL-MCNC: 4.56 MG/DL (ref 0.6–1.3)
ERYTHROCYTE [DISTWIDTH] IN BLOOD BY AUTOMATED COUNT: 20.3 % (ref 11.6–15.1)
GFR SERPL CREATININE-BSD FRML MDRD: 12 ML/MIN/1.73SQ M
GLUCOSE SERPL-MCNC: 97 MG/DL (ref 65–140)
HCT VFR BLD AUTO: 21.7 % (ref 36.5–49.3)
HGB BLD-MCNC: 7.5 G/DL (ref 12–17)
MCH RBC QN AUTO: 29.1 PG (ref 26.8–34.3)
MCHC RBC AUTO-ENTMCNC: 34.6 G/DL (ref 31.4–37.4)
MCV RBC AUTO: 84 FL (ref 82–98)
PLATELET # BLD AUTO: 90 THOUSANDS/UL (ref 149–390)
POTASSIUM SERPL-SCNC: 3.7 MMOL/L (ref 3.5–5.3)
RBC # BLD AUTO: 2.58 MILLION/UL (ref 3.88–5.62)
SODIUM SERPL-SCNC: 132 MMOL/L (ref 135–147)
WBC # BLD AUTO: 7.8 THOUSAND/UL (ref 4.31–10.16)

## 2025-01-24 PROCEDURE — 99233 SBSQ HOSP IP/OBS HIGH 50: CPT | Performed by: INTERNAL MEDICINE

## 2025-01-24 PROCEDURE — 99232 SBSQ HOSP IP/OBS MODERATE 35: CPT

## 2025-01-24 PROCEDURE — 99232 SBSQ HOSP IP/OBS MODERATE 35: CPT | Performed by: INTERNAL MEDICINE

## 2025-01-24 PROCEDURE — 85027 COMPLETE CBC AUTOMATED: CPT

## 2025-01-24 PROCEDURE — G0545 PR INHERENT VISIT TO INPT: HCPCS | Performed by: INTERNAL MEDICINE

## 2025-01-24 PROCEDURE — 80048 BASIC METABOLIC PNL TOTAL CA: CPT

## 2025-01-24 PROCEDURE — 85730 THROMBOPLASTIN TIME PARTIAL: CPT | Performed by: FAMILY MEDICINE

## 2025-01-24 RX ADMIN — Medication 2 G: at 18:20

## 2025-01-24 RX ADMIN — Medication 2 G: at 21:23

## 2025-01-24 RX ADMIN — ISOSORBIDE MONONITRATE 120 MG: 30 TABLET, EXTENDED RELEASE ORAL at 10:03

## 2025-01-24 RX ADMIN — METOPROLOL TARTRATE 25 MG: 25 TABLET, FILM COATED ORAL at 08:03

## 2025-01-24 RX ADMIN — ATORVASTATIN CALCIUM 20 MG: 20 TABLET, FILM COATED ORAL at 18:19

## 2025-01-24 RX ADMIN — HEPARIN SODIUM 17 UNITS/KG/HR: 10000 INJECTION, SOLUTION INTRAVENOUS at 19:28

## 2025-01-24 RX ADMIN — FOLIC ACID 1 MG: 1 TABLET ORAL at 08:04

## 2025-01-24 RX ADMIN — HEPARIN SODIUM 2000 UNITS: 1000 INJECTION, SOLUTION INTRAVENOUS; SUBCUTANEOUS at 15:13

## 2025-01-24 RX ADMIN — CEFAZOLIN SODIUM 1000 MG: 1 SOLUTION INTRAVENOUS at 18:07

## 2025-01-24 RX ADMIN — Medication 2 G: at 14:12

## 2025-01-24 RX ADMIN — METOPROLOL TARTRATE 25 MG: 25 TABLET, FILM COATED ORAL at 21:19

## 2025-01-24 RX ADMIN — Medication 6 MG: at 21:19

## 2025-01-24 RX ADMIN — EPOETIN ALFA 5000 UNITS: 4000 SOLUTION INTRAVENOUS; SUBCUTANEOUS at 17:26

## 2025-01-24 RX ADMIN — Medication 2 G: at 08:10

## 2025-01-24 NOTE — ASSESSMENT & PLAN NOTE
Will monitor phosphorus currently no binders, most recent phosphorus level is 2.0 in January 23.  Encouraging oral intake.

## 2025-01-24 NOTE — ASSESSMENT & PLAN NOTE
GINGER at [facility]  The patient's last hemodialysis was on January 24, plan for hemodialysis today on January 25 and then after that his next dialysis would likely be on Monday.  We are further adjusting dry weight on this patient last weight being approximately 53 kg.  Access: Patient with infected tunneled dialysis catheter removed 1/21 and Saulo catheter was placed on Wednesday, January 22.  Appreciate surgical help with removal of tunneled dialysis catheter on January 21 as well as IR help with placement of the temporary Saulo dialysis catheter on January 22.  Will maintain current temporary dialysis catheter until blood cultures are negative.  Appreciate ID help in that regard.    The patient was also started on heparin January 23 given the presence of occlusive thrombus of the right IJ at the proximal neck.   appreciate hospitalist service help and input.

## 2025-01-24 NOTE — PROGRESS NOTES
Progress Note - Hospitalist   Name: Matthew Alvarez 69 y.o. male I MRN: [id number]  Unit/Bed#: -01 I Date of Admission: 1/20/2025   Date of Service: 1/24/2025 I Hospital Day: 4    Assessment & Plan  Sepsis (HCC)  Meeting sepsis criteria secondary to temperature 101.4 °F, HR 97 bpm  Lactic acid 3.2  In setting of COVID, bacteremia from unclear source at this time-possibly bacterial pneumonia versus skin infection (pustule over HD cath as well as chronic skin lesions of lower extremities)  Blood cultures positive for 2 out of 2 gram-positive cocci in clusters  Identification panel with staph aureus  MRSA negative  ID recommending transition to cefazolin 1 g every 24 hours  Bacteremia  Patient with 2 out of 2 blood cultures positive for gram-positive cocci in clusters  Continue vancomycin  No vegetation noted on echo  HD cath removed 1/21   Repeat BC 1/22 positive x2  Repeat BC 1/23 pending   ID consult: start cefazolin 1g q24h, follow up on blood culture, anticipate 6 weeks of abx. Once repeat BC are negative 48-72 can replace permacath.  RUE + for internal jugular vein thrombus - likely the cause of persistent bacteremia  Acute on chronic systolic heart failure (HCC)  Wt Readings from Last 3 Encounters:   01/24/25 54.1 kg (119 lb 4.8 oz)   01/15/25 56.5 kg (124 lb 9.6 oz)   12/27/24 56 kg (123 lb 6.4 oz)     Patient presents with worsening shortness of breath, missed dialysis session today with last session on Friday  Patient's wife endorses that he sometimes is not taking his medication, spits them out or throws them away  CT chest with moderate to large right pleural effusion and moderate left pleural effusion  Echo in November with EF of 20%  Wearing LifeVest initially on presentation, now refusing-monitoring on telemetry  PTA regimen is Coreg, torsemide (on nondialysis days)  BNP >4700  Given 1 dose IV diuretic on admission, HD as scheduled. Getting temporary catheter in on 1/22; will repeat CXR on 1/23 to  assess pleural effusions.   Daily weights and I/O  COVID  Mild COVID pathway as below   COVID19- positive on 1/20   CT chest bilateral pleural effusions and bilateral groundglass opacities  COVID mild pathway labs   CRP and BNP elevated   CK normal  Trop elevated but downtrended, no chest pain  Completed remdesivir x3 days   OOB TID; ambulation and mobilization strongly encouraged  Supportive care  Atrial fibrillation with RVR (HCC)  Developed in setting of sepsis  Home Coreg on hold secondary to low blood pressure-switch to metoprolol 25 twice daily  As needed low-dose 2.5 Lopressor IV for sustained heart rate greater than 130  Currently on heparin drip due to right IJV thrombus   Will need anticoagulation -will need to be started on renally adjusted Eliquis when able. pending replacement for perm cath, will continue to hold per IR recommendations    Primary hypertension  PTA regimen on Coreg 25 mg twice daily, torsemide 100 mg on nondialysis days, Norvasc 5 mg daily and hydralazine 100 mg 3 times daily  Blood pressure somewhat soft on arrival  BP meds from home on hold-Coreg switched to metoprolol to have less effect on blood pressure but to allow better heart rate control  Currently Lopressor 25 mg twice daily  Anemia due to chronic kidney disease, on chronic dialysis (HCC)  With chronic anemia secondary to renal disease  Is on Epogen and iron infusions with dialysis  Would avoid IV iron due to findings of bacteremia  Continue folic acid daily  Hemoglobin stable  Thrombocytopenia (HCC)  Chronic stable thrombocytopenia in setting of chronic renal disease  ESRD (end stage renal disease) on dialysis ([facility])  Lab Results   Component Value Date    EGFR 12 01/24/2025    EGFR 7 01/23/2025    EGFR 8 01/22/2025    CREATININE 4.56 (H) 01/24/2025    CREATININE 6.82 (H) 01/23/2025    CREATININE 5.84 (H) 01/22/2025   Patient on dialysis MWF -noncompliant with going, wife says he misses at least once a week  Presenting with  bilateral pleural effusions  Currently on torsemide on nondialysis days  HD as scheduled  Nephrology consult: Due to infected tunneled dialysis catheter removal on 1/21, temporary dialysis catheter with interventional radiology placed on 1/22 and then get back to schedule on Monday, Wednesday, Friday.  Can replace PermaCath after blood cultures are negative for 48 to 72 hours.  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    Mobility:   Basic Mobility Inpatient Raw Score: 11  JH-HLM Goal: 4: Move to chair/commode  JH-HLM Achieved: 4: Move to chair/commode  JH-HLM Goal achieved. Continue to encourage appropriate mobility.    Patient Centered Rounds: I performed bedside rounds with nursing staff today.   Discussions with Specialists or Other Care Team Provider: nursing, cm    Education and Discussions with Family / Patient: Attempted to update  (wife) via phone. Unable to contact.    Current Length of Stay: 4 day(s)  Current Patient Status: Inpatient   Certification Statement: The patient will continue to require additional inpatient hospital stay due to requiring iv antibiotics and heparin drip for ijv thrombosis and bacteremia  Discharge Plan: Anticipate discharge in >72 hrs to rehab facility.    Code  Status: Level 1 - Full Code    Subjective   Patient states that he is feeling tired today but is not having any pain or difficulty breathing. Agreeable to current plan of dialysis today, antibiotics and heparin drip for right IJV thrombus    Objective :  Temp:  [97.5 °F (36.4 °C)-99.3 °F (37.4 °C)] 97.9 °F (36.6 °C)  HR:  [62-76] 70  BP: (111-168)/(63-85) 134/63  Resp:  [16-22] 20  SpO2:  [94 %-98 %] 96 %  O2 Device: None (Room air)    Body mass index is 19.26 kg/m².     Input and Output Summary (last 24 hours):     Intake/Output Summary (Last 24 hours) at 1/24/2025 0859  Last data filed at 1/23/2025 1700  Gross per 24 hour   Intake 800 ml   Output 1610 ml   Net -810 ml       Physical Exam  Vitals reviewed.   Constitutional:       General: He is not in acute distress.     Appearance: He is ill-appearing. He is not toxic-appearing.   HENT:      Head: Normocephalic and atraumatic.      Mouth/Throat:      Mouth: Mucous membranes are moist.   Cardiovascular:      Rate and Rhythm: Normal rate and regular rhythm.      Heart sounds: No murmur heard.  Pulmonary:      Effort: No respiratory distress.      Breath sounds: No stridor. Rhonchi and rales present. No wheezing.   Abdominal:      General: Bowel sounds are normal. There is no distension.      Palpations: Abdomen is soft. There is no mass.      Tenderness: There is no abdominal tenderness.   Musculoskeletal:      Right lower leg: No edema.      Left lower leg: No edema.   Skin:     General: Skin is warm and dry.      Coloration: Skin is not pale.   Neurological:      Mental Status: He is alert and oriented to person, place, and time.   Psychiatric:         Mood and Affect: Mood normal.         Behavior: Behavior normal.         Lines/Drains:  Lines/Drains/Airways       Active Status       Name Placement date Placement time Site Days    CVC Central Lines 01/22/25 Double 01/22/25  1416  --  1    HD Temporary Double Catheter 01/22/25  1407  Internal jugular  1                     Central Line:  Goal for removal: N/A - Chronic PICC         Telemetry:  Telemetry Orders (From admission, onward)               24 Hour Telemetry Monitoring  Continuous x 24 Hours (Telem)        Expiring   Question:  Reason for 24 Hour Telemetry  Answer:  Decompensated CHF- and any one of the following: continuous diuretic infusion or total diuretic dose >200 mg daily, associated electrolyte derangement (I.e. K < 3.0), inotropic drip (continuous infusion), hx of ventricular arrhythmia, or new EF < 35%                     Telemetry Reviewed: Normal Sinus Rhythm  Indication for Continued Telemetry Use: Arrthymias requiring medical therapy               Lab Results: I have reviewed the following results:   Results from last 7 days   Lab Units 01/24/25  0755 01/22/25 0456 01/21/25  0439 01/20/25  1543   WBC Thousand/uL 7.80   < > 9.14 8.76   HEMOGLOBIN g/dL 7.5*   < > 9.6* 9.2*   HEMATOCRIT % 21.7*   < > 27.9* 27.9*   PLATELETS Thousands/uL 90*   < > 87* 93*   BANDS PCT %  --   --  3  --    SEGS PCT %  --   --   --  90*   LYMPHO PCT %  --   --  3* 4*   MONO PCT %  --   --  1* 5   EOS PCT %  --   --  0 0    < > = values in this interval not displayed.     Results from last 7 days   Lab Units 01/24/25  0755 01/22/25 0456 01/21/25  0439   SODIUM mmol/L 132*   < > 133*   POTASSIUM mmol/L 3.7   < > 4.3   CHLORIDE mmol/L 95*   < > 94*   CO2 mmol/L 30   < > 26   BUN mg/dL 45*   < > 80*   CREATININE mg/dL 4.56*   < > 8.44*   ANION GAP mmol/L 7   < > 13   CALCIUM mg/dL 7.8*   < > 8.3*   ALBUMIN g/dL  --   --  2.6*   TOTAL BILIRUBIN mg/dL  --   --  1.75*   ALK PHOS U/L  --   --  113*   ALT U/L  --   --  20   AST U/L  --   --  46*   GLUCOSE RANDOM mg/dL 97   < > 93    < > = values in this interval not displayed.     Results from last 7 days   Lab Units 01/23/25  1705   INR  1.56*             Results from last 7 days   Lab Units 01/23/25  0952 01/22/25  0456 01/21/25  0439 01/20/25  1816 01/20/25  1543   LACTIC ACID  mmol/L  --   --   --  1.0 3.2*   PROCALCITONIN ng/ml 2.83* 3.13* 3.06*  --  2.72*       Recent Cultures (last 7 days):   Results from last 7 days   Lab Units 01/23/25  1522 01/23/25  1520 01/22/25  0457 01/21/25  1559 01/20/25  1556 01/20/25  1543   BLOOD CULTURE  Received in Microbiology Lab. Culture in Progress. Received in Microbiology Lab. Culture in Progress.  --   --  Staphylococcus aureus* Staphylococcus aureus*   GRAM STAIN RESULT   --   --  Gram positive cocci in clusters*  Gram positive cocci in clusters* 4+ Polys*  4+ Gram positive cocci in clusters* Gram positive cocci in clusters* Gram positive cocci in clusters*   BODY FLUID CULTURE, STERILE   --   --   --  3+ Growth of Staphylococcus aureus*  --   --        Last 24 Hours Medication List:     Current Facility-Administered Medications:     acetaminophen (TYLENOL) tablet 650 mg, Q6H PRN    [Held by provider] amLODIPine (NORVASC) tablet 5 mg, Daily    atorvastatin (LIPITOR) tablet 20 mg, Daily With Dinner    ceFAZolin (ANCEF) IVPB (premix in dextrose) 1,000 mg 50 mL, Q24H, Last Rate: 1,000 mg (01/23/25 1454)    Diclofenac Sodium (VOLTAREN) 1 % topical gel 2 g, 4x Daily    epoetin loida (EPOGEN,PROCRIT) injection 5,000 Units, Once per day on Monday Wednesday Friday    folic acid (FOLVITE) tablet 1 mg, Daily    heparin (porcine) 25,000 units in 0.45% NaCl 250 mL infusion (premix), Titrated, Last Rate: 15 Units/kg/hr (01/24/25 0133)    heparin (porcine) injection 2,000 Units, Q6H PRN    heparin (porcine) injection 4,000 Units, Q6H PRN    [Held by provider] hydrALAZINE (APRESOLINE) tablet 100 mg, Q8H JEEVAN    isosorbide mononitrate (IMDUR) 24 hr tablet 120 mg, Daily    melatonin tablet 6 mg, HS    metoprolol (LOPRESSOR) injection 2.5 mg, Q6H PRN    metoprolol tartrate (LOPRESSOR) tablet 25 mg, Q12H JEEVAN    Administrative Statements   Today, Patient Was Seen By: Caty Calvo PA-C    **Please Note: This note may have been constructed using a voice recognition  system.**

## 2025-01-24 NOTE — ASSESSMENT & PLAN NOTE
Sodium 130 mmol/L, 1/23. Secondary to ESRD: Will place on fluid restriction along with HD/UF.  This is also likely an aspect of significant cardiomyopathy with an ejection fraction of 20% with global hypokinesis.

## 2025-01-24 NOTE — ASSESSMENT & PLAN NOTE
Lab Results   Component Value Date    EGFR 12 01/24/2025    EGFR 7 01/23/2025    EGFR 8 01/22/2025    CREATININE 4.56 (H) 01/24/2025    CREATININE 6.82 (H) 01/23/2025    CREATININE 5.84 (H) 01/22/2025

## 2025-01-24 NOTE — ASSESSMENT & PLAN NOTE
Wt Readings from Last 3 Encounters:   01/24/25 54.1 kg (119 lb 4.8 oz)   01/15/25 56.5 kg (124 lb 9.6 oz)   12/27/24 56 kg (123 lb 6.4 oz)     Patient presents with worsening shortness of breath, missed dialysis session today with last session on Friday  Patient's wife endorses that he sometimes is not taking his medication, spits them out or throws them away  CT chest with moderate to large right pleural effusion and moderate left pleural effusion  Echo in November with EF of 20%  Wearing LifeVest initially on presentation, now refusing-monitoring on telemetry  PTA regimen is Coreg, torsemide (on nondialysis days)  BNP >4700  Given 1 dose IV diuretic on admission, HD as scheduled. Getting temporary catheter in on 1/22; will repeat CXR on 1/23 to assess pleural effusions.   Daily weights and I/O

## 2025-01-24 NOTE — WOUND OSTOMY CARE
Consult Note - Wound   Matthew Alvarez 69 y.o. male MRN: [id number]  Unit/Bed#: -01 Encounter: [id number]      History and Present Illness:  Presented to [facility] ED with worsening shortness of breath. COVID (+) 1/20/25.  CKD chronic dialysis,ESRD    Assessment Findings:   Alert, min assist to turn and reposition. On HD. Has nutritional nepro supplementation  1)POA DTI to bilateral buttocks left and right buttocks with light purple deep red non blanchable tissue. Right buttock with linear light purple non blanchable tissue. DTI may evolve to stage 3 4 or unstageable wound.Triad applied, foam dressing, positioned on side using ATR and foam wedges  2)POA DTI to left upper posterior thigh. Linear deep red non blanchable tissue. Foam dressing applied   3)LLE partial thickness skin loss pink wound bed intact edges.unknown etiology  4)Right lateral back POA unknown etiology, partial thickness skin loss wound bed pink  5)Bilateral heels intact preventable foams in place   Skin care plans:  1-Cleanse sacro-buttocks with soap and water. Apply Triad Paste to Sacro-Buttocks Wound Bed. Cover with Silicone Bordered Foam Dressing (Mepilex). Tao with T for Treatment and change daily or PRN soilage/displacement.  2-Cleanse B/L Heels with soap and water. Pat dry. Apply Silicone Border Foam (Mepilex) to areas. Tao with P for Prevention and change every 3 days or PRN soilage/displacement. Peel back and inspect Q-shift.  3-Elevate heels to offload pressure  4-Ehob cushion when out of bed.  5-Turn/repoisiton q2h or when medically stable for pressure re-distribution on skin.  6-Place Patient on ATR Turning and repositioning system  7-Cleanse left lateral back wound with NSS apply xerorform gauze then foam dressing Change every 3 days and prn  Wound 12/14/24 Pretibial Left (Active)   Wound Image   01/24/25 1118   Wound Description [employer] 01/24/25 1118   [place]-wound Assessment Intact;Erythema 01/24/25 1118   Wound Length (cm) 1.5 cm  01/24/25 1118   Wound Width (cm) 1 cm 01/24/25 1118   Wound Depth (cm) 0.1 cm 01/24/25 1118   Wound Surface Area (cm^2) 1.5 cm^2 01/24/25 1118   Wound Volume (cm^3) 0.15 cm^3 01/24/25 1118   Calculated Wound Volume (cm^3) 0.15 cm^3 01/24/25 1118   Drainage Amount None 01/24/25 1118   Non-staged Wound Description Partial thickness 01/24/25 1118   Treatments Cleansed;Site care 01/24/25 1118   Dressing Foam, Silicon (eg. Allevyn, etc);Xeroform 01/24/25 1118   Dressing Changed Reinforced 01/24/25 1118   Patient Tolerance Tolerated well 01/24/25 1118   Dressing Status Dry;Intact;Clean 01/24/25 1118   Wound 01/24/25 Pressure Injury Buttocks Left;Right (Active)   Wound Image   01/24/25 1122   Wound Description Brown;Non-blanchable erythema;Light purple 01/24/25 1122   Pressure Injury Stage DTPI 01/24/25 1122   [place]-wound Assessment Intact 01/24/25 1122   Wound Length (cm) 14 cm 01/24/25 1122   Wound Width (cm) 14 cm 01/24/25 1122   Wound Depth (cm) 0.1 cm 01/24/25 1122   Wound Surface Area (cm^2) 196 cm^2 01/24/25 1122   Wound Volume (cm^3) 19.6 cm^3 01/24/25 1122   Calculated Wound Volume (cm^3) 19.6 cm^3 01/24/25 1122   Drainage Amount None 01/24/25 1122   Treatments Site care 01/24/25 1122   Dressing Foam, Silicon (eg. Allevyn, etc) 01/24/25 1122   Dressing Changed New 01/24/25 1122   Patient Tolerance Tolerated well 01/24/25 1122   Dressing Status Remoistened;Intact;Dry;Clean 01/24/25 1122       Wound 01/24/25 Back Right (Active)   Wound Image   01/24/25 1121   Wound Description Slough;Pink;Yellow 01/24/25 1121   [place]-wound Assessment Intact;Erythema 01/24/25 1121   Wound Length (cm) 1 cm 01/24/25 1121   Wound Width (cm) 1 cm 01/24/25 1121   Wound Depth (cm) 0.1 cm 01/24/25 1121   Wound Surface Area (cm^2) 1 cm^2 01/24/25 1121   Wound Volume (cm^3) 0.1 cm^3 01/24/25 1121   Calculated Wound Volume (cm^3) 0.1 cm^3 01/24/25 1121   Drainage Amount None 01/24/25 1121   Non-staged Wound Description Partial thickness 01/24/25  1121   Treatments Site care;Cleansed 01/24/25 1121   Dressing Foam, Silicon (eg. Allevyn, etc);Xeroform 01/24/25 1121   Dressing Changed New 01/24/25 1121   Patient Tolerance Tolerated well 01/24/25 1121   Dressing Status Clean;Dry;Intact 01/24/25 1121       Wound 01/24/25 Thigh Left;Posterior;Proximal (Active)   Wound Image   01/24/25 1123   Wound Description Non-blanchable erythema;Light purple 01/24/25 1123   [place]-wound Assessment Intact;Dry 01/24/25 1123   Wound Length (cm) 1 cm 01/24/25 1123   Wound Width (cm) 2 cm 01/24/25 1123   Wound Depth (cm) 0.1 cm 01/24/25 1123   Wound Surface Area (cm^2) 2 cm^2 01/24/25 1123   Wound Volume (cm^3) 0.2 cm^3 01/24/25 1123   Calculated Wound Volume (cm^3) 0.2 cm^3 01/24/25 1123   Drainage Amount None 01/24/25 1123   Treatments Cleansed;Site care 01/24/25 1123   Dressing Foam, Silicon (eg. Allevyn, etc) 01/24/25 1123   Dressing Changed New 01/24/25 1123   Patient Tolerance Tolerated well 01/24/25 1123   Dressing Status Clean;Dry;Intact 01/24/25 1123     Call or Secure chat with any questions  Wound Care will continue to follow weekly    Katie ONEALN RN CWON

## 2025-01-24 NOTE — ASSESSMENT & PLAN NOTE
Wt Readings from Last 3 Encounters:   01/24/25 54.1 kg (119 lb 4.8 oz)   01/15/25 56.5 kg (124 lb 9.6 oz)   12/27/24 56 kg (123 lb 6.4 oz)     Has underlying nonischemic cardiomyopathy, ESRD, A-fib with RVR and chronic pleural effusions requiring thoracentesis in the past

## 2025-01-24 NOTE — ASSESSMENT & PLAN NOTE
Lab Results   Component Value Date    EGFR 12 01/24/2025    EGFR 7 01/23/2025    EGFR 8 01/22/2025    CREATININE 4.56 (H) 01/24/2025    CREATININE 6.82 (H) 01/23/2025    CREATININE 5.84 (H) 01/22/2025     S/p right IJ permacath removal, temporary catheter replaced in left IJV. Patient has had a functional decline and has had multiple missed dialysis sessions as an outpatient     Catheter management as above, additional management per nephrology   Recommend ongoing goals of care discussion, consider palliative care referral as outpatient

## 2025-01-24 NOTE — PLAN OF CARE
Problem: Nutrition/Hydration-ADULT  Goal: Nutrient/Hydration intake appropriate for improving, restoring or maintaining nutritional needs  Description: Monitor and assess patient's nutrition/hydration status for malnutrition. Collaborate with interdisciplinary team and initiate plan and interventions as ordered.  Monitor patient's weight and dietary intake as ordered or per policy. Utilize nutrition screening tool and intervene as necessary. Determine patient's food preferences and provide high-protein, high-caloric foods as appropriate.     INTERVENTIONS:  - Monitor oral intake, urinary output, labs, and treatment plans  - Assess nutrition and hydration status and recommend course of action  - Evaluate amount of meals eaten  - Assist patient with eating if necessary   - Allow adequate time for meals  - Recommend/ encourage appropriate diets, oral nutritional supplements, and vitamin/mineral supplements  - Order, calculate, and assess calorie counts as needed  - Recommend, monitor, and adjust tube feedings and TPN/PPN based on assessed needs  - Assess need for intravenous fluids  - Provide specific nutrition/hydration education as appropriate  - Include patient/family/caregiver in decisions related to nutrition  Outcome: Progressing     Problem: PAIN - ADULT  Goal: Verbalizes/displays adequate comfort level or baseline comfort level  Description: Interventions:  - Encourage patient to monitor pain and request assistance  - Assess pain using appropriate pain scale  - Administer analgesics based on type and severity of pain and evaluate response  - Implement non-pharmacological measures as appropriate and evaluate response  - Consider cultural and social influences on pain and pain management  - Notify physician/advanced practitioner if interventions unsuccessful or patient reports new pain  Outcome: Progressing     Problem: INFECTION - ADULT  Goal: Absence or prevention of progression during  hospitalization  Description: INTERVENTIONS:  - Assess and monitor for signs and symptoms of infection  - Monitor lab/diagnostic results  - Monitor all insertion sites, i.e. indwelling lines, tubes, and drains  - Monitor endotracheal if appropriate and nasal secretions for changes in amount and color  - [place] appropriate cooling/warming therapies per order  - Administer medications as ordered  - Instruct and encourage patient and family to use good hand hygiene technique  - Identify and instruct in appropriate isolation precautions for identified infection/condition  Outcome: Progressing  Goal: Absence of fever/infection during neutropenic period  Description: INTERVENTIONS:  - Monitor WBC    Outcome: Progressing     Problem: SAFETY ADULT  Goal: Patient will remain free of falls  Description: INTERVENTIONS:  - Educate patient/family on patient safety including physical limitations  - Instruct patient to call for assistance with activity   - Consult OT/PT to assist with strengthening/mobility   - Keep Call bell within reach  - Keep bed low and locked with side rails adjusted as appropriate  - Keep care items and personal belongings within reach  - Initiate and maintain comfort rounds  - Make Fall Risk Sign visible to staff  - Offer Toileting every 2 Hours, in advance of need  - Initiate/Maintain bed alarm  - Obtain necessary fall risk management equipment:   - Apply yellow socks and bracelet for high fall risk patients  - Consider moving patient to room near nurses station  Outcome: Progressing  Goal: Maintain or return to baseline ADL function  Description: INTERVENTIONS:  -  Assess patient's ability to carry out ADLs; assess patient's baseline for ADL function and identify physical deficits which impact ability to perform ADLs (bathing, care of mouth/teeth, toileting, grooming, dressing, etc.)  - Assess/evaluate cause of self-care deficits   - Assess range of motion  - Assess patient's mobility; develop plan if  impaired  - Assess patient's need for assistive devices and provide as appropriate  - Encourage maximum independence but intervene and supervise when necessary  - Involve family in performance of ADLs  - Assess for home care needs following discharge   - Consider OT consult to assist with ADL evaluation and planning for discharge  - Provide patient education as appropriate  Outcome: Progressing  Goal: Maintains/Returns to pre admission functional level  Description: INTERVENTIONS:  - Perform AM-PAC 6 Click Basic Mobility/ Daily Activity assessment daily.  - Set and communicate daily mobility goal to care team and patient/family/caregiver.   - Collaborate with rehabilitation services on mobility goals if consulted  - Perform Range of Motion 3 times a day.  - Reposition patient every 2 hours.  - Dangle patient 3 times a day  - Stand patient 3 times a day  - Ambulate patient 3 times a day  - Out of bed to chair 3 times a day   - Out of bed for meals 3 times a day  - Out of bed for toileting  - Record patient progress and toleration of activity level   Outcome: Progressing     Problem: DISCHARGE PLANNING  Goal: Discharge to home or other facility with appropriate resources  Description: INTERVENTIONS:  - Identify barriers to discharge w/patient and caregiver  - Arrange for needed discharge resources and transportation as appropriate  - Identify discharge learning needs (meds, wound care, etc.)  - Arrange for interpretive services to assist at discharge as needed  - Refer to Case Management Department for coordinating discharge planning if the patient needs post-hospital services based on physician/advanced practitioner order or complex needs related to functional status, cognitive ability, or social support system  Outcome: Progressing     Problem: Knowledge Deficit  Goal: Patient/family/caregiver demonstrates understanding of disease process, treatment plan, medications, and discharge instructions  Description:  Complete learning assessment and assess knowledge base.  Interventions:  - Provide teaching at level of understanding  - Provide teaching via preferred learning methods  Outcome: Progressing     Problem: METABOLIC, FLUID AND ELECTROLYTES - ADULT  Goal: Electrolytes maintained within normal limits  Description: INTERVENTIONS:  - Monitor labs and assess patient for signs and symptoms of electrolyte imbalances  - Administer electrolyte replacement as ordered  - Monitor response to electrolyte replacements, including repeat lab results as appropriate  - Instruct patient on fluid and nutrition as appropriate  Outcome: Progressing  Goal: Fluid balance maintained  Description: INTERVENTIONS:  - Monitor labs   - Monitor I/O and WT  - Instruct patient on fluid and nutrition as appropriate  - Assess for signs & symptoms of volume excess or deficit  Outcome: Progressing  Goal: Glucose maintained within target range  Description: INTERVENTIONS:  - Monitor Blood Glucose as ordered  - Assess for signs and symptoms of hyperglycemia and hypoglycemia  - Administer ordered medications to maintain glucose within target range  - Assess nutritional intake and initiate nutrition service referral as needed  Outcome: Progressing     Problem: SKIN/TISSUE INTEGRITY - ADULT  Goal: Skin Integrity remains intact(Skin Breakdown Prevention)  Description: Assess:  -Perform Omar assessment every shift  -Clean and moisturize skin every shift  -Inspect skin when repositioning, toileting, and assisting with ADLS  -Assess under medical devices such as masimo every shift  -Assess extremities for adequate circulation and sensation     Bed Management:  -Have minimal linens on bed & keep smooth, unwrinkled  -Change linens as needed when moist or perspiring  -Avoid sitting or lying in one position for more than 2 hours while in bed  -Keep HOB at 30degrees     Toileting:  -Offer bedside commode  -Assess for incontinence every 2 hours  -Use incontinent care  products after each incontinent episode such as wipes    Activity:  -Mobilize patient 3 times a day  -Encourage activity and walks on unit  -Encourage or provide ROM exercises   -Turn and reposition patient every 2 Hours  -Use appropriate equipment to lift or move patient in bed  -Instruct/ Assist with weight shifting every 2 hours when out of bed in chair  -Consider limitation of chair time 4 hour intervals    Skin Care:  -Avoid use of baby powder, tape, friction and shearing, hot water or constrictive clothing  -Relieve pressure over bony prominences using foam  -Do not massage red bony areas    Next Steps:  -Teach patient strategies to minimize risks such as turn/reposition   -Consider consults to  interdisciplinary teams such as wound/nutrition.   Outcome: Progressing  Goal: Incision(s), wounds(s) or drain site(s) healing without S/S of infection  Description: INTERVENTIONS  - Assess and document dressing, incision, wound bed, drain sites and surrounding tissue  - Provide patient and family education  - Perform skin care/dressing changes every shift  Outcome: Progressing  Goal: Pressure injury heals and does not worsen  Description: Interventions:  - Implement low air loss mattress or specialty surface (Criteria met)  - Apply silicone foam dressing  - Instruct/assist with weight shifting every 15 minutes when in chair   - Limit chair time to 4 hour intervals  - Use special pressure reducing interventions such as cushions when in chair   - Apply fecal or urinary incontinence containment device   - Perform passive or active ROM every shift  - Turn and reposition patient & offload bony prominences every 2 hours   - Utilize friction reducing device or surface for transfers   - Consider consults to  interdisciplinary teams such as wound/nutrition  - Use incontinent care products after each incontinent episode such as wipes  - Consider nutrition services referral as needed  Outcome: Progressing     Problem: Prexisting  or High Potential for Compromised Skin Integrity  Goal: Skin integrity is maintained or improved  Description: INTERVENTIONS:  - Identify patients at risk for skin breakdown  - Assess and monitor skin integrity  - Assess and monitor nutrition and hydration status  - Monitor labs   - Assess for incontinence   - Turn and reposition patient  - Assist with mobility/ambulation  - Relieve pressure over bony prominences  - Avoid friction and shearing  - Provide appropriate hygiene as needed including keeping skin clean and dry  - Evaluate need for skin moisturizer/barrier cream  - Collaborate with interdisciplinary team   - Patient/family teaching  - Consider wound care consult   Outcome: Progressing

## 2025-01-24 NOTE — ASSESSMENT & PLAN NOTE
Fever, tachycardia with lactic acidosis.  Due to catheter exit site infection and secondary MSSA bacteremia. Bacteremia is yet to clear despite line removal, possibly due to seeding of associated RIJ thrombosis.  Patient also noted to have acute COVID infection but without pneumonia on chest imaging  Afebrile, hemodynamically stable without leukocytosis     Antibiotics and additional management as below   Check daily CBC, CMP while inpatient to monitor for any evolving antibiotic toxicity or treatment failure   Continue supportive care, monitor clinical course

## 2025-01-24 NOTE — ASSESSMENT & PLAN NOTE
The patient's current blood pressure on January 23 had been 111-160 systolic.  Maintain current medication regimen with blood pressure hold parameters.  The patient currently here is on metoprolol 25 mg every 12 hours and isosorbide mononitrate 120 mg daily.   Home medications: Hydralazine 100 mg every 8 hours/carvedilol 25 twice a day/amlodipine 5 mg daily/torsemide 100 mg on nondialysis days being held at this time

## 2025-01-24 NOTE — ASSESSMENT & PLAN NOTE
Current hemoglobin  is 7.6 on January 23.  Will monitor: In place on Epogen 6000 units with each treatment  Cannot give intravenous iron in the setting of sepsis; noted iron studies from January 10 showed ferritin 244 and iron saturation of 8.

## 2025-01-24 NOTE — PLAN OF CARE
Pre-tx:  UF 1-1.5L as tolerated per order.  BP stable to start HD.  4K bath per serum K of 3.7 today.  HD permcath accessed and running with lines reversed per protocol.    Post-Dialysis RN Treatment Note    Blood Pressure:  Pre 141/67 mm/Hg  Post  mmHg   EDW:  52.5-53 kg    Weight:  Pre 54.1 kg   Post 51.5 kg   Mode of weight measurement: Bed Scale   Volume Removed:  2600 ml    Treatment duration: 3 hours    NS given:  No    Treatment shortened No   Medications given during Rx: Epogen per order   Estimated Kt/V:  Not Applicable   Access type: Temporary HD catheter   Needle Gauge:    Access Issues: lines reversed     Report called to primary nurse:  Yes          Problem: METABOLIC, FLUID AND ELECTROLYTES - ADULT  Goal: Electrolytes maintained within normal limits  Description: INTERVENTIONS:  - Monitor labs and assess patient for signs and symptoms of electrolyte imbalances  - Administer electrolyte replacement as ordered  - Monitor response to electrolyte replacements, including repeat lab results as appropriate  - Instruct patient on fluid and nutrition as appropriate  Outcome: Progressing  Goal: Fluid balance maintained  Description: INTERVENTIONS:  - Monitor labs   - Monitor I/O and WT  - Instruct patient on fluid and nutrition as appropriate  - Assess for signs & symptoms of volume excess or deficit  Outcome: Progressing

## 2025-01-24 NOTE — PROGRESS NOTES
Progress Note - Infectious Disease   Name: Matthew Alvarez 69 y.o. male I MRN: [id number]  Unit/Bed#: -01 I Date of Admission: 1/20/2025   Date of Service: 1/24/2025 I Hospital Day: 4      Administrative Statements   VIRTUAL CARE DOCUMENTATION:     1. This service was provided via Telemedicine using [employer]     2. Parties in the room with patient during teleconsult Patient only    3. Confidentiality My office door was closed     4. Participants No one else was in the room    5. Patient acknowledged consent and understanding of privacy and security of the  Telemedicine consult. I informed the patient that I have reviewed their record in Epic and presented the opportunity for them to ask any questions regarding the visit today.  The patient agreed to participate.    6. I have spent a total time of 50 minutes in caring for this patient on the day of the visit/encounter including Diagnostic results, Risk factor reductions, Impressions, Counseling / Coordination of care, Documenting in the medical record, Reviewing / ordering tests, medicine, procedures  , Obtaining or reviewing history  , and Communicating with other healthcare professionals , not including the time spent for establishing the audio/video connection.       Assessment & Plan  Sepsis (HCC)  Fever, tachycardia with lactic acidosis.  Due to catheter exit site infection and secondary MSSA bacteremia. Bacteremia is yet to clear despite line removal, possibly due to seeding of associated RIJ thrombosis.  Patient also noted to have acute COVID infection but without pneumonia on chest imaging  Afebrile, hemodynamically stable without leukocytosis     Antibiotics and additional management as below   Check daily CBC, CMP while inpatient to monitor for any evolving antibiotic toxicity or treatment failure   Continue supportive care, monitor clinical course    Bacteremia  Both sets of blood cultures from admission with probable MSSA. Secondary to catheter  exit site infection s/p line removal on 1/21 with purulent drainage from insertion site , cx with MSSA  Doppler notes associated R IJ thrombosis which is likely cause for persistent bacteremia despite catheter removal  TTE is without vegetation   Temporary catheter placed in L IJ on 1/22   Patient has no other indwelling vascular or prosthetic devices. He has a superficial weeping wound on his LLE but without purulence or cellulitis appreciated on media pics. No other metastatic infection  Clinically improving     Continue cefazolin 1g q24h   FU repeat blood cx which may be slow to clear   Current temporary dialysis catheter in place in L IJ . 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 CT chest with with diffuse patchy groundglass opacities throughout both lungs which may represent pneumonitis versus pulmonary edema.  Has underlying COPD, chronic tobacco use  Stable on room air     Completed remdesivir x 3 days, additional management per protocol   Continue isolation precautions  ESRD (end stage renal disease) on dialysis ([facility])  Lab Results   Component Value Date    EGFR 12 01/24/2025    EGFR 7 01/23/2025    EGFR 8 01/22/2025    CREATININE 4.56 (H) 01/24/2025    CREATININE 6.82 (H) 01/23/2025    CREATININE 5.84 (H) 01/22/2025     S/p right IJ permacath removal, temporary catheter replaced in left IJV. Patient has had a functional decline and has had multiple missed dialysis sessions as an outpatient     Catheter management as above, additional management per nephrology   Recommend ongoing goals of care discussion, consider palliative care referral as outpatient     Acute on chronic systolic heart failure (HCC)  Wt  Readings from Last 3 Encounters:   25 54.1 kg (119 lb 4.8 oz)   01/15/25 56.5 kg (124 lb 9.6 oz)   24 56 kg (123 lb 6.4 oz)     Has underlying nonischemic cardiomyopathy, ESRD, A-fib with RVR and chronic pleural effusions requiring thoracentesis in the past        Subjective:  The patient has no complaints.  Denies fevers, chills, or sweats.  Denies nausea, vomiting, or diarrhea.    Antibiotics:  cefazolin    Physical Exam     Temp:  [97.5 °F (36.4 °C)-99.3 °F (37.4 °C)] 97.9 °F (36.6 °C)  HR:  [62-76] 70  Resp:  [16-22] 20  BP: (111-168)/(63-85) 134/63  SpO2:  [94 %-98 %] 96 %  Temp (24hrs), Av.3 °F (36.8 °C), Min:97.5 °F (36.4 °C), Max:99.3 °F (37.4 °C)  Current: Temperature: 97.9 °F (36.6 °C)    Intake/Output Summary (Last 24 hours) at 2025 0948  Last data filed at 2025 1700  Gross per 24 hour   Intake 540 ml   Output 1610 ml   Net -1070 ml       Physical exam findings reported by bedside and primary medical team staff      General Appearance:  Appearing frail and chronically ill, nontoxic, and in no distress, appears stated age   Lungs:   Coarse and rhonchorous to auscultation bilaterally, no audible wheezes, respirations unlabored   Heart:  Irregular rate and rhythm, S1, S2 normal, no murmur, rub or gallop   Abdomen:   Soft, non-tender, non-distended, positive bowel sounds, no masses, no organomegaly    No CVA tenderness   Extremities: Extremities normal, atraumatic, no cyanosis, clubbing , 3+ b/l 2+ LE edema   Skin: Scattered bruises, sacral erythema, LLE in dressing   Neurologic: Alert and oriented times 2, flat and withdrawn affect         Invasive Devices:   Peripheral IV 25 Left Antecubital (Active)   Site Assessment WDL 25   Dressing Type Transparent 25   Line Status Flushed 25   Dressing Status Clean;Dry;Intact 25   Dressing Change Due 25   Reason Not Rotated Not due 25       Peripheral IV  01/20/25 Left;Ventral (anterior) Forearm (Active)   Site Assessment WDL 01/21/25 1930   Dressing Type Transparent 01/21/25 1930   Line Status Flushed;Infusing 01/21/25 1930   Dressing Status Clean;Dry;Intact 01/21/25 1930   Dressing Change Due 01/24/25 01/21/25 1930   Reason Not Rotated Not due 01/21/25 1930       HD Permanent Double Catheter (Active)   Reasons to continue HD Cath Treatment Therapy 01/21/25 0910   Goal for Removal N/A- chronic HD catheter 01/21/25 0910   Line Necessity Reviewed Yes, reviewed with provider 01/21/25 0910   Site Assessment Other (Comment) 01/21/25 1215   Proximal Lumen Status Flushed & Clamped;Normal saline locked;Passive disinfecting cap applied 01/21/25 1215   Distal Lumen Status Flushed & Clamped;Normal saline locked;Passive disinfecting cap applied 01/21/25 1215   Dressing Type Chlorhexidine dressing 01/21/25 1215   Dressing Status Clean;Dry;Intact 01/21/25 1215   Dressing Intervention Dressing changed 01/21/25 0910   Dressing Change Due 01/27/25 01/21/25 1215       Labs, Imaging, & Other Studies     Lab Results:    I have personally reviewed pertinent labs.    Results from last 7 days   Lab Units 01/24/25  0755 01/23/25  0952 01/22/25  0456   WBC Thousand/uL 7.80 7.73 8.75   HEMOGLOBIN g/dL 7.5* 7.6* 8.4*   PLATELETS Thousands/uL 90* 79* 75*     Results from last 7 days   Lab Units 01/24/25  0755 01/22/25  0456 01/21/25  0439 01/20/25  1543   POTASSIUM mmol/L 3.7   < > 4.3 4.5   CHLORIDE mmol/L 95*   < > 94* 92*   CO2 mmol/L 30   < > 26 24   BUN mg/dL 45*   < > 80* 77*   CREATININE mg/dL 4.56*   < > 8.44* 8.07*   EGFR ml/min/1.73sq m 12   < > 5 6   CALCIUM mg/dL 7.8*   < > 8.3* 8.6   AST U/L  --   --  46* 57*   ALT U/L  --   --  20 21   ALK PHOS U/L  --   --  113* 126*    < > = values in this interval not displayed.     Results from last 7 days   Lab Units 01/23/25  1522 01/23/25  1520 01/22/25  0457 01/21/25  1559 01/20/25  2026 01/20/25  1556 01/20/25  1543   BLOOD CULTURE   Received in Microbiology Lab. Culture in Progress. Received in Microbiology Lab. Culture in Progress.  --   --   --  Staphylococcus aureus* Staphylococcus aureus*   GRAM STAIN RESULT   --   --  Gram positive cocci in clusters*  Gram positive cocci in clusters* 4+ Polys*  4+ Gram positive cocci in clusters*  --  Gram positive cocci in clusters* Gram positive cocci in clusters*   BODY FLUID CULTURE, STERILE   --   --   --  3+ Growth of Staphylococcus aureus*  --   --   --    MRSA CULTURE ONLY   --   --   --   --  No Methicillin Resistant Staphlyococcus aureus (MRSA) isolated  --   --        Imaging Studies:   I have personally reviewed pertinent imaging study reports and images in PACS.      EKG, Pathology, and Other Studies:   I have personally reviewed pertinent reports.        Counseling/Coordination of care:       Total 50 minutes in evaluation of the patient and communication with the patient via telehealth of which 30 minutes was in counseling/coordination of care.  Extensive review of the medical records in epic including review of the notes, radiographs, and laboratory results.  My recommendations were discussed with the patient in detail who verbalized understanding.

## 2025-01-24 NOTE — ASSESSMENT & PLAN NOTE
Subacute to chronic occlusive thrombus in the IJV of proximal neck on upper extremity venous duplex  Start heparin drip  Plan to transition to Eliquis once cleared by interventional radiology

## 2025-01-24 NOTE — CASE MANAGEMENT
Case Management Discharge Planning Note    Patient name Matthew Alvarez  Location /-01 MRN [id number]  : 1955 Date 2025       Current Admission Date: 2025  Current Admission Diagnosis:Sepsis ([facility])   Patient Active Problem List    Diagnosis Date Noted Date Diagnosed    Thrombosis of right internal jugular vein ([facility]) 2025     Moderate protein-calorie malnutrition ([facility]) 2025     Chronic kidney disease-mineral and bone disorder (CKD-MBD) 2025     Atrial fibrillation with RVR ([facility]) 2025     Bacteremia 2025     COVID 2025     Sepsis ([facility]) 2025     Acute hemodialysis patient ([facility]) 2025     Pleural effusion 2024     Secondary hyperparathyroidism of renal origin ([facility]) 12/15/2024     ESRD (end stage renal disease) on dialysis ([facility]) 2024     Ambulatory dysfunction 2024     Thrombocytopenia ([facility]) 2024     Renal failure 11/15/2024     Goals of care, counseling/discussion 11/15/2024     Anemia due to chronic kidney disease, on chronic dialysis ([facility]) 11/15/2024     Anemia of renal disease 11/15/2024     Vitamin D deficiency, unspecified 11/15/2024     Chronic systolic (congestive) heart failure ([facility]) 2024     Oliguria 2024     Encounter for hemodialysis for ESRD ([facility]) 2024     Hyponatremia 2024     Dilated cardiomyopathy ([facility]) 2024     Elevated liver transaminase level 11/10/2024     Acute on chronic systolic heart failure ([facility]) 2024     Coronary artery disease involving native coronary artery of native heart without angina pectoris 10/08/2021     Rheumatoid factor positive 2021     Protein calorie malnutrition ([facility]) 2021     Elevated troponin 2021     Tobacco abuse 2021     Nonischemic cardiomyopathy ([facility]) 2021     Primary hypertension 2021       LOS (days): 4  Geometric Mean LOS (GMLOS) (days): 4.9  Days to GMLOS:1     OBJECTIVE:  Risk of Unplanned  Readmission Score: 35.3         Current admission status: Inpatient   Preferred Pharmacy:   [id number] [place] - [address]  [facility]  [address] [place] [address]  Phone: [phone] Fax: [phone]    [facility] - [place] - [address]  [address]  [place] [address]  Phone: [phone] Fax: [phone]    Primary Care Provider: Olive Rodriguez MD    Primary Insurance: [facility] RE[facility]  Secondary Insurance:     DISCHARGE DETAILS:    Discharge planning discussed with:: patient and spouse Kellee Alvarez  Freedom of Choice: Yes  Comments - Freedom of Choice: patient choice list for STR referrals discussed with pt and spouse, pt choice is [facility], reserved in aidin.  CM contacted family/caregiver?: Yes  Were Treatment Team discharge recommendations reviewed with patient/caregiver?: Yes  Did patient/caregiver verbalize understanding of patient care needs?: Yes  Were patient/caregiver advised of the risks associated with not following Treatment Team discharge recommendations?: Yes    Contacts  Patient Contacts: Kellee Alvarez- spouse  Relationship to Patient:: Family  Contact Method: Phone  Phone Number: [phone]  Reason/Outcome: Discharge Planning              Other Referral/Resources/Interventions Provided:  Interventions: Short Term Rehab         Treatment Team Recommendation: Short Term Rehab  Discharge Destination Plan:: Short Term Rehab                                              Accepting Facility Name, City & State : [facility]  Receiving Facility/Agency Phone Number: [phone]  Facility/Agency Fax Number: [phone]

## 2025-01-24 NOTE — PROGRESS NOTES
NEPHROLOGY HOSPITAL PROGRESS NOTE   Mtathew Alvarez 69 y.o. male MRN: [id number]  Unit/Bed#: -01 Encounter: [id number]  Reason for Consult: End-stage renal disease and hemodialysis        Assessment & Plan  ESRD (end stage renal disease) on dialysis (HCC)  MWF at [facility]  The patient's last hemodialysis was on January 24, plan for hemodialysis today on January 25 and then after that his next dialysis would likely be on Monday.  We are further adjusting dry weight on this patient last weight being approximately 53 kg.  Access: Patient with infected tunneled dialysis catheter removed 1/21 and Saulo catheter was placed on Wednesday, January 22.  Appreciate surgical help with removal of tunneled dialysis catheter on January 21 as well as IR help with placement of the temporary Saulo dialysis catheter on January 22.  Will maintain current temporary dialysis catheter until blood cultures are negative.  Appreciate ID help in that regard.    The patient was also started on heparin January 23 given the presence of occlusive thrombus of the right IJ at the proximal neck.   appreciate hospitalist service help and input.  Primary hypertension  The patient's current blood pressure on January 23 had been 111-160 systolic.  Maintain current medication regimen with blood pressure hold parameters.  The patient currently here is on metoprolol 25 mg every 12 hours and isosorbide mononitrate 120 mg daily.   Home medications: Hydralazine 100 mg every 8 hours/carvedilol 25 twice a day/amlodipine 5 mg daily/torsemide 100 mg on nondialysis days being held at this time  Hyponatremia  Sodium 130 mmol/L, 1/23. Secondary to ESRD: Will place on fluid restriction along with HD/UF.  This is also likely an aspect of significant cardiomyopathy with an ejection fraction of 20% with global hypokinesis.  Chronic kidney disease-mineral and bone disorder (CKD-MBD)  Will monitor phosphorus currently no binders, most recent phosphorus  level is 2.0 in January 23.  Encouraging oral intake.  Anemia due to chronic kidney disease, on chronic dialysis (HCC)  Current hemoglobin  is 7.6 on January 23.  Will monitor: In place on Epogen 6000 units with each treatment  Cannot give intravenous iron in the setting of sepsis; noted iron studies from January 10 showed ferritin 244 and iron saturation of 8.  Acute on chronic systolic heart failure (HCC)  Over the past few admissions we have been able to decrease estimated dry weight to around 53 kg and we will challenged that today on January 24.  There is also likely an aspect of total body weight decrease as well as decrease in fluid weight.  I believe a few admissions ago the patient's weight had been as high as 73 kg and the patient had required HD/UF on almost daily basis during that time.  Will plan on HD UF today on January 24 challenging dry weight again.  EF: 20% by recent echocardiogram in January 21 with global hypokinesis.    COVID  The patient is being given focal directed treatment and is being given intravenous remdesivir.    Sepsis ([facility])  Gram-positive cocci bacteremia currently on antibiotics followed by ID recommended removal of tunneled dialysis catheter which has been done on January 21 with placement of temporary dialysis catheter on January 22.  It is noted that repeat blood cultures were positive from January 22 and a repeat set of blood cultures were ordered on January 23.  If they end up being positive as per ID likely will need DEWEY.  Until blood cultures are negative maintaining Saulo catheter for hemodialysis access.    Administrative Statements   VIRTUAL CARE DOCUMENTATION:     1. This service was provided via Telemedicine using [employer]     2. Parties in the room with patient during teleconsult Patient only    3. Confidentiality My office door was closed     4. Participants No one else was in the room    5. Patient acknowledged consent and understanding of privacy and security  of the  Telemedicine consult. I informed the patient that I have reviewed their record in Epic and presented the opportunity for them to ask any questions regarding the visit today.  The patient agreed to participate.    6. I have spent a total time of approximately 35 minutes in caring for this patient on the day of the visit/encounter including documentation of medical decision making, creation/modification of assessment and plan, placement of orders, review of the medical record, patient interview and visit, not including the time spent for establishing the audio/video connection.          SUBJECTIVE / 24H INTERVAL HISTORY:  Mr. Alvarez is seen follow-up for end-stage renal disease on hemodialysis.  Chart notes reviewed.  The patient had a Saulo catheter placed on January 22.  He underwent hemodialysis on January 23 and post weight he was 53.5 kg 1.2 kg removed.  Again this was via the Saulo catheter.  The temporary dialysis catheter had been removed earlier in the week given positive blood cultures.  Very much appreciate surgery and IR help.    Regarding hemodynamics, systolic blood pressure has been anywhere from 130-160 over the past 24 hours.  Pulse oximetry is 97% on room air and patient is afebrile.    When I spoke with the patient this morning, he was resting comfortably no acute distress and denied any acute complaints.  He denied any shortness of breath no chest pain, no nausea, vomiting or diarrhea.  He was able to tell me that he was at [facility] and knew that the year was 2025.      Chart notes were reviewed and is noted the patient was found to have an occlusive thrombus in the right IJ at the proximal neck in the area the prior catheter.  This did not extend to the innominate or subclavian veins.  Patient was started on heparin infusion on January 23.    OBJECTIVE:  Current Weight: Weight - Scale: 54.1 kg (119 lb 4.8 oz)  Vitals:    01/23/25 1936 01/23/25 2222 01/24/25 0312 01/24/25 0600   BP:  136/69 111/71 134/67    BP Location:       Pulse: 70 73 70    Resp: 20 16 20    Temp: 99.3 °F (37.4 °C) 98.6 °F (37 °C) 98.8 °F (37.1 °C)    TempSrc:       SpO2: 94% 95% 97%    Weight:    54.1 kg (119 lb 4.8 oz)   Height:           Intake/Output Summary (Last 24 hours) at 1/24/2025 0700  Last data filed at 1/23/2025 1700  Gross per 24 hour   Intake 800 ml   Output 1610 ml   Net -810 ml     General: Patient is resting comfortably no acute distress he is ill-appearing.  HEENT: Normocephalic atraumatic  Neck: Appears to be supple  Pulmonary: There is no accessory muscle use noted/there is no conversational dyspnea.  Respirations have been 16 to 20/min.  Cardiac: Pulse range is in the 70s.  Extremities: There is no edema.  Neurologic: No focal deficits the patient is alert and oriented x 3.  Medications:    Current Facility-Administered Medications:     acetaminophen (TYLENOL) tablet 650 mg, 650 mg, Oral, Q6H PRN, Kerri Chisholm PA-C    [Held by provider] amLODIPine (NORVASC) tablet 5 mg, 5 mg, Oral, Daily, Kerri Chisholm PA-C    atorvastatin (LIPITOR) tablet 20 mg, 20 mg, Oral, Daily With Dinner, Kerri Chisholm PA-C, 20 mg at 01/23/25 1718    ceFAZolin (ANCEF) IVPB (premix in dextrose) 1,000 mg 50 mL, 1,000 mg, Intravenous, Q24H, Leonarda Simmons PA-C, Last Rate: 100 mL/hr at 01/23/25 1454, 1,000 mg at 01/23/25 1454    Diclofenac Sodium (VOLTAREN) 1 % topical gel 2 g, 2 g, Topical, 4x Daily, Caty Calvo PA-C, 2 g at 01/23/25 2254    epoetin loida (EPOGEN,PROCRIT) injection 5,000 Units, 5,000 Units, Intravenous, Once per day on Monday Wednesday Friday, Kendall Guerra MD    folic acid (FOLVITE) tablet 1 mg, 1 mg, Oral, Daily, Kerri Chisholm PA-C, 1 mg at 01/23/25 1454    heparin (porcine) 25,000 units in 0.45% NaCl 250 mL infusion (premix), 3-30 Units/kg/hr (Order-Specific), Intravenous, Titrated, Leonarda Simmons PA-C, Last Rate: 7.5 mL/hr at 01/24/25 0133, 15 Units/kg/hr at 01/24/25 0133    heparin (porcine) injection  "2,000 Units, 2,000 Units, Intravenous, Q6H PRN, Leonarda Simmons PA-C    heparin (porcine) injection 4,000 Units, 4,000 Units, Intravenous, Q6H PRN, Leonarda Simmons PA-C    [Held by provider] hydrALAZINE (APRESOLINE) tablet 100 mg, 100 mg, Oral, Q8H JEEVAN, Kerri Chisholm PA-C, 100 mg at 01/20/25 2125    isosorbide mononitrate (IMDUR) 24 hr tablet 120 mg, 120 mg, Oral, Daily, Kerri Chisholm PA-C, 120 mg at 01/23/25 1454    melatonin tablet 6 mg, 6 mg, Oral, HS, JASON Estrella-MARYCRUZ, 6 mg at 01/23/25 2254    metoprolol (LOPRESSOR) injection 2.5 mg, 2.5 mg, Intravenous, Q6H PRN, Kerri Chisholm PA-C    metoprolol tartrate (LOPRESSOR) tablet 25 mg, 25 mg, Oral, Q12H [facility], Danny Degroot MD, 25 mg at 01/23/25 2254    Laboratory Results:  Results from last 7 days   Lab Units 01/23/25  0952 01/22/25  0456 01/21/25  0439 01/20/25  1543   WBC Thousand/uL 7.73 8.75 9.14 8.76   HEMOGLOBIN g/dL 7.6* 8.4* 9.6* 9.2*   HEMATOCRIT % 22.2* 24.5* 27.9* 27.9*   PLATELETS Thousands/uL 79* 75* 87* 93*   POTASSIUM mmol/L 3.8 4.0 4.3 4.5   CHLORIDE mmol/L 93* 95* 94* 92*   CO2 mmol/L 25 27 26 24   BUN mg/dL 76* 53* 80* 77*   CREATININE mg/dL 6.82* 5.84* 8.44* 8.07*   CALCIUM mg/dL 8.0* 8.1* 8.3* 8.6   MAGNESIUM mg/dL 2.0 1.9  --  2.1       Portions of the record may have been created with voice recognition software. Occasional wrong word or \"sound a like\" substitutions may have occurred due to the inherent limitations of voice recognition software. Read the chart carefully and recognize, using context, where substitutions have occurred.If you have any questions, please contact the dictating provider.   "

## 2025-01-24 NOTE — ASSESSMENT & PLAN NOTE
Developed in setting of sepsis  Home Coreg on hold secondary to low blood pressure-switch to metoprolol 25 twice daily  As needed low-dose 2.5 Lopressor IV for sustained heart rate greater than 130  Currently on heparin drip due to right IJV thrombus   Will need anticoagulation -will need to be started on renally adjusted Eliquis when able. pending replacement for perm cath, will continue to hold per IR recommendations

## 2025-01-24 NOTE — ASSESSMENT & PLAN NOTE
Lab Results   Component Value Date    EGFR 12 01/24/2025    EGFR 7 01/23/2025    EGFR 8 01/22/2025    CREATININE 4.56 (H) 01/24/2025    CREATININE 6.82 (H) 01/23/2025    CREATININE 5.84 (H) 01/22/2025   Patient on dialysis MWF -noncompliant with going, wife says he misses at least once a week  Presenting with bilateral pleural effusions  Currently on torsemide on nondialysis days  HD as scheduled  Nephrology consult: Due to infected tunneled dialysis catheter removal on 1/21, temporary dialysis catheter with interventional radiology placed on 1/22 and then get back to schedule on Monday, Wednesday, Friday.  Can replace PermaCath after blood cultures are negative for 48 to 72 hours.

## 2025-01-24 NOTE — ASSESSMENT & PLAN NOTE
In associated with infected R IJ catheter, Consider septic thrombosis      Antibiotics as above   Anticoagulation mngt per primary team

## 2025-01-24 NOTE — ASSESSMENT & PLAN NOTE
Meeting sepsis criteria secondary to temperature 101.4 °F, HR 97 bpm  Lactic acid 3.2  In setting of COVID, bacteremia from unclear source at this time-possibly bacterial pneumonia versus skin infection (pustule over HD cath as well as chronic skin lesions of lower extremities)  Blood cultures positive for 2 out of 2 gram-positive cocci in clusters  Identification panel with staph aureus  MRSA negative  ID recommending transition to cefazolin 1 g every 24 hours

## 2025-01-24 NOTE — NURSING NOTE
Provider updated regarding request to extend patient IV site change to left forearm due to patient getting stuck for labs q6 and 2 past attempts to place IV by Rn unsuccessful. Provider gave nursing order to extend for 1 day.

## 2025-01-24 NOTE — DISCHARGE INSTR - OTHER ORDERS
Skin care plans:  1-Cleanse sacro-buttocks with soap and water. Apply Triad Paste to Sacro-Buttocks Wound Bed. Cover with Silicone Bordered Foam Dressing (Mepilex). Tao with T for Treatment and change daily or PRN soilage/displacement.  2-Cleanse B/L Heels with soap and water. Pat dry. Apply Silicone Border Foam (Mepilex) to areas. Tao with P for Prevention and change every 3 days or PRN soilage/displacement. Peel back and inspect Q-shift.  3-Elevate heels to offload pressure  4-Ehob cushion when out of bed.  5-Turn/repoisiton q2h or when medically stable for pressure re-distribution on skin.  6-Place Patient on ATR Turning and repositioning system  7-Cleanse left lateral back wound with NSS apply xerorform gauze then foam dressing Change every 3 days and prn

## 2025-01-24 NOTE — ASSESSMENT & PLAN NOTE
Patient with 2 out of 2 blood cultures positive for gram-positive cocci in clusters  Continue vancomycin  No vegetation noted on echo  HD cath removed 1/21   Repeat BC 1/22 positive x2  Repeat BC 1/23 pending   ID consult: start cefazolin 1g q24h, follow up on blood culture, anticipate 6 weeks of abx. Once repeat BC are negative 48-72 can replace permacath.  RUE + for internal jugular vein thrombus - likely the cause of persistent bacteremia

## 2025-01-24 NOTE — PROGRESS NOTES
Patient:    MRN:  [id number]    Vanessa Request ID:  [id number]    Level of care reserved:  Skilled Nursing Facility    Partner Reserved:  [facility], [place] [phone]    Clinical needs requested:    Geography searched:  15 miles around [phone]    Start of Service:    Request sent:  9:47am EST on 1/23/2025 by Jermaine Dill    Partner reserved:  2:19pm EST on 1/24/2025 by Kira Estrada    Choice list shared:  2:16pm EST on 1/24/2025 by Kira Estrada

## 2025-01-24 NOTE — ASSESSMENT & PLAN NOTE
Gram-positive cocci bacteremia currently on antibiotics followed by ID recommended removal of tunneled dialysis catheter which has been done on January 21 with placement of temporary dialysis catheter on January 22.  It is noted that repeat blood cultures were positive from January 22 and a repeat set of blood cultures were ordered on January 23.  If they end up being positive as per ID likely will need DEWEY.  Until blood cultures are negative maintaining Saulo catheter for hemodialysis access.    Administrative Statements   VIRTUAL CARE DOCUMENTATION:     1. This service was provided via Telemedicine using [employer]     2. Parties in the room with patient during teleconsult Patient only    3. Confidentiality My office door was closed     4. Participants No one else was in the room    5. Patient acknowledged consent and understanding of privacy and security of the  Telemedicine consult. I informed the patient that I have reviewed their record in Epic and presented the opportunity for them to ask any questions regarding the visit today.  The patient agreed to participate.    6. I have spent a total time of approximately 35 minutes in caring for this patient on the day of the visit/encounter including documentation of medical decision making, creation/modification of assessment and plan, placement of orders, review of the medical record, patient interview and visit, not including the time spent for establishing the audio/video connection.

## 2025-01-25 LAB
ANION GAP SERPL CALCULATED.3IONS-SCNC: 7 MMOL/L (ref 4–13)
APTT PPP: 78 SECONDS (ref 23–34)
BACTERIA BLD CULT: ABNORMAL
BACTERIA BLD CULT: ABNORMAL
BUN SERPL-MCNC: 29 MG/DL (ref 5–25)
CALCIUM SERPL-MCNC: 7.7 MG/DL (ref 8.4–10.2)
CHLORIDE SERPL-SCNC: 96 MMOL/L (ref 96–108)
CO2 SERPL-SCNC: 29 MMOL/L (ref 21–32)
CREAT SERPL-MCNC: 3.27 MG/DL (ref 0.6–1.3)
ERYTHROCYTE [DISTWIDTH] IN BLOOD BY AUTOMATED COUNT: 20.8 % (ref 11.6–15.1)
GFR SERPL CREATININE-BSD FRML MDRD: 18 ML/MIN/1.73SQ M
GLUCOSE SERPL-MCNC: 93 MG/DL (ref 65–140)
GRAM STN SPEC: ABNORMAL
GRAM STN SPEC: ABNORMAL
HCT VFR BLD AUTO: 22.2 % (ref 36.5–49.3)
HGB BLD-MCNC: 7.6 G/DL (ref 12–17)
MCH RBC QN AUTO: 29.1 PG (ref 26.8–34.3)
MCHC RBC AUTO-ENTMCNC: 34.2 G/DL (ref 31.4–37.4)
MCV RBC AUTO: 85 FL (ref 82–98)
PLATELET # BLD AUTO: 96 THOUSANDS/UL (ref 149–390)
PMV BLD AUTO: 12.9 FL (ref 8.9–12.7)
POTASSIUM SERPL-SCNC: 3.6 MMOL/L (ref 3.5–5.3)
RBC # BLD AUTO: 2.61 MILLION/UL (ref 3.88–5.62)
SODIUM SERPL-SCNC: 132 MMOL/L (ref 135–147)
WBC # BLD AUTO: 6.63 THOUSAND/UL (ref 4.31–10.16)

## 2025-01-25 PROCEDURE — 85730 THROMBOPLASTIN TIME PARTIAL: CPT | Performed by: FAMILY MEDICINE

## 2025-01-25 PROCEDURE — 80048 BASIC METABOLIC PNL TOTAL CA: CPT | Performed by: FAMILY MEDICINE

## 2025-01-25 PROCEDURE — 85027 COMPLETE CBC AUTOMATED: CPT | Performed by: FAMILY MEDICINE

## 2025-01-25 PROCEDURE — 99232 SBSQ HOSP IP/OBS MODERATE 35: CPT

## 2025-01-25 RX ADMIN — METOPROLOL TARTRATE 25 MG: 25 TABLET, FILM COATED ORAL at 08:37

## 2025-01-25 RX ADMIN — Medication 2 G: at 17:24

## 2025-01-25 RX ADMIN — FOLIC ACID 1 MG: 1 TABLET ORAL at 08:37

## 2025-01-25 RX ADMIN — Medication 2 G: at 09:47

## 2025-01-25 RX ADMIN — HEPARIN SODIUM 17 UNITS/KG/HR: 10000 INJECTION, SOLUTION INTRAVENOUS at 22:48

## 2025-01-25 RX ADMIN — CEFAZOLIN SODIUM 1000 MG: 1 SOLUTION INTRAVENOUS at 17:23

## 2025-01-25 RX ADMIN — Medication 2 G: at 21:29

## 2025-01-25 RX ADMIN — ATORVASTATIN CALCIUM 20 MG: 20 TABLET, FILM COATED ORAL at 17:23

## 2025-01-25 RX ADMIN — Medication 6 MG: at 21:27

## 2025-01-25 RX ADMIN — ISOSORBIDE MONONITRATE 120 MG: 30 TABLET, EXTENDED RELEASE ORAL at 08:37

## 2025-01-25 RX ADMIN — Medication 2 G: at 11:47

## 2025-01-25 RX ADMIN — METOPROLOL TARTRATE 25 MG: 25 TABLET, FILM COATED ORAL at 21:27

## 2025-01-25 NOTE — ASSESSMENT & PLAN NOTE
Meeting sepsis criteria secondary to temperature 101.4 °F, HR 97 bpm  Lactic acid 3.2  In setting of COVID, bacteremia from unclear source at this time-possibly bacterial pneumonia versus skin infection (pustule over HD cath as well as chronic skin lesions of lower extremities)  Blood cultures positive for 2 out of 2 gram-positive cocci in clusters  Identification panel with staph aureus  MRSA negative  ID recommending transition to cefazolin 1 g every 24 hours   Detail Level: Zone Detail Level: Generalized Detail Level: Detailed Detail Level: Simple

## 2025-01-25 NOTE — PLAN OF CARE
Problem: Nutrition/Hydration-ADULT  Goal: Nutrient/Hydration intake appropriate for improving, restoring or maintaining nutritional needs  Description: Monitor and assess patient's nutrition/hydration status for malnutrition. Collaborate with interdisciplinary team and initiate plan and interventions as ordered.  Monitor patient's weight and dietary intake as ordered or per policy. Utilize nutrition screening tool and intervene as necessary. Determine patient's food preferences and provide high-protein, high-caloric foods as appropriate.     INTERVENTIONS:  - Monitor oral intake, urinary output, labs, and treatment plans  - Assess nutrition and hydration status and recommend course of action  - Evaluate amount of meals eaten  - Assist patient with eating if necessary   - Allow adequate time for meals  - Recommend/ encourage appropriate diets, oral nutritional supplements, and vitamin/mineral supplements  - Order, calculate, and assess calorie counts as needed  - Recommend, monitor, and adjust tube feedings and TPN/PPN based on assessed needs  - Assess need for intravenous fluids  - Provide specific nutrition/hydration education as appropriate  - Include patient/family/caregiver in decisions related to nutrition  Outcome: Progressing     Problem: PAIN - ADULT  Goal: Verbalizes/displays adequate comfort level or baseline comfort level  Description: Interventions:  - Encourage patient to monitor pain and request assistance  - Assess pain using appropriate pain scale  - Administer analgesics based on type and severity of pain and evaluate response  - Implement non-pharmacological measures as appropriate and evaluate response  - Consider cultural and social influences on pain and pain management  - Notify physician/advanced practitioner if interventions unsuccessful or patient reports new pain  Outcome: Progressing     Problem: INFECTION - ADULT  Goal: Absence or prevention of progression during  hospitalization  Description: INTERVENTIONS:  - Assess and monitor for signs and symptoms of infection  - Monitor lab/diagnostic results  - Monitor all insertion sites, i.e. indwelling lines, tubes, and drains  - Monitor endotracheal if appropriate and nasal secretions for changes in amount and color  - [place] appropriate cooling/warming therapies per order  - Administer medications as ordered  - Instruct and encourage patient and family to use good hand hygiene technique  - Identify and instruct in appropriate isolation precautions for identified infection/condition  Outcome: Progressing  Goal: Absence of fever/infection during neutropenic period  Description: INTERVENTIONS:  - Monitor WBC    Outcome: Progressing     Problem: SAFETY ADULT  Goal: Patient will remain free of falls  Description: INTERVENTIONS:  - Educate patient/family on patient safety including physical limitations  - Instruct patient to call for assistance with activity   - Consult OT/PT to assist with strengthening/mobility   - Keep Call bell within reach  - Keep bed low and locked with side rails adjusted as appropriate  - Keep care items and personal belongings within reach  - Initiate and maintain comfort rounds  - Make Fall Risk Sign visible to staff  - Offer Toileting every 2 Hours, in advance of need  - Initiate/Maintain alarm  - Obtain necessary fall risk management equipment  - Apply yellow socks and bracelet for high fall risk patients  - Consider moving patient to room near nurses station  Outcome: Progressing  Goal: Maintain or return to baseline ADL function  Description: INTERVENTIONS:  -  Assess patient's ability to carry out ADLs; assess patient's baseline for ADL function and identify physical deficits which impact ability to perform ADLs (bathing, care of mouth/teeth, toileting, grooming, dressing, etc.)  - Assess/evaluate cause of self-care deficits   - Assess range of motion  - Assess patient's mobility; develop plan if  impaired  - Assess patient's need for assistive devices and provide as appropriate  - Encourage maximum independence but intervene and supervise when necessary  - Involve family in performance of ADLs  - Assess for home care needs following discharge   - Consider OT consult to assist with ADL evaluation and planning for discharge  - Provide patient education as appropriate  Outcome: Progressing  Goal: Maintains/Returns to pre admission functional level  Description: INTERVENTIONS:  - Perform AM-PAC 6 Click Basic Mobility/ Daily Activity assessment daily.  - Set and communicate daily mobility goal to care team and patient/family/caregiver.   - Collaborate with rehabilitation services on mobility goals if consulted  - Reposition patient every 2 hours.  - Stand patient 3 times a day  - Ambulate patient 3 times a day  - Out of bed to chair 3 times a day   - Out of bed for meals 3 times a day  - Out of bed for toileting  - Record patient progress and toleration of activity level   Outcome: Progressing     Problem: DISCHARGE PLANNING  Goal: Discharge to home or other facility with appropriate resources  Description: INTERVENTIONS:  - Identify barriers to discharge w/patient and caregiver  - Arrange for needed discharge resources and transportation as appropriate  - Identify discharge learning needs (meds, wound care, etc.)  - Arrange for interpretive services to assist at discharge as needed  - Refer to Case Management Department for coordinating discharge planning if the patient needs post-hospital services based on physician/advanced practitioner order or complex needs related to functional status, cognitive ability, or social support system  Outcome: Progressing     Problem: Knowledge Deficit  Goal: Patient/family/caregiver demonstrates understanding of disease process, treatment plan, medications, and discharge instructions  Description: Complete learning assessment and assess knowledge base.  Interventions:  -  Provide teaching at level of understanding  - Provide teaching via preferred learning methods  Outcome: Progressing     Problem: METABOLIC, FLUID AND ELECTROLYTES - ADULT  Goal: Electrolytes maintained within normal limits  Description: INTERVENTIONS:  - Monitor labs and assess patient for signs and symptoms of electrolyte imbalances  - Administer electrolyte replacement as ordered  - Monitor response to electrolyte replacements, including repeat lab results as appropriate  - Instruct patient on fluid and nutrition as appropriate  Outcome: Progressing  Goal: Fluid balance maintained  Description: INTERVENTIONS:  - Monitor labs   - Monitor I/O and WT  - Instruct patient on fluid and nutrition as appropriate  - Assess for signs & symptoms of volume excess or deficit  Outcome: Progressing  Goal: Glucose maintained within target range  Description: INTERVENTIONS:  - Monitor Blood Glucose as ordered  - Assess for signs and symptoms of hyperglycemia and hypoglycemia  - Administer ordered medications to maintain glucose within target range  - Assess nutritional intake and initiate nutrition service referral as needed  Outcome: Progressing     Problem: SKIN/TISSUE INTEGRITY - ADULT  Goal: Skin Integrity remains intact(Skin Breakdown Prevention)  Description: Assess:  -Perform Omar assessment every   -Clean and moisturize skin every   -Inspect skin when repositioning, toileting, and assisting with ADLS  -Assess under medical devices such as  every   -Assess extremities for adequate circulation and sensation     Bed Management:  -Have minimal linens on bed & keep smooth, unwrinkled  -Change linens as needed when moist or perspiring  -Avoid sitting or lying in one position for more than  hours while in bed  -Keep HOB at degrees     Toileting:  -Offer bedside commode  -Assess for incontinence every   -Use incontinent care products after each incontinent episode such as     Activity:  -Mobilize patient  times a day  -Encourage  activity and walks on unit  -Encourage or provide ROM exercises   -Turn and reposition patient every  Hours  -Use appropriate equipment to lift or move patient in bed  -Instruct/ Assist with weight shifting every  when out of bed in chair  -Consider limitation of chair time  hour intervals    Skin Care:  -Avoid use of baby powder, tape, friction and shearing, hot water or constrictive clothing  -Relieve pressure over bony prominences using   -Do not massage red bony areas    Next Steps:  -Teach patient strategies to minimize risks such as    -Consider consults to  interdisciplinary teams such as   Outcome: Progressing  Goal: Incision(s), wounds(s) or drain site(s) healing without S/S of infection  Description: INTERVENTIONS  - Assess and document dressing, incision, wound bed, drain sites and surrounding tissue  - Provide patient and family education  - Perform skin care/dressing changes every   Outcome: Progressing  Goal: Pressure injury heals and does not worsen  Description: Interventions:  - Implement low air loss mattress or specialty surface (Criteria met)  - Apply silicone foam dressing  - Instruct/assist with weight shifting every  minutes when in chair   - Limit chair time to  hour intervals  - Use special pressure reducing interventions such as  when in chair   - Apply fecal or urinary incontinence containment device   - Perform passive or active ROM every   - Turn and reposition patient & offload bony prominences every  hours   - Utilize friction reducing device or surface for transfers   - Consider consults to  interdisciplinary teams such as   - Use incontinent care products after each incontinent episode such as   - Consider nutrition services referral as needed  Outcome: Progressing     Problem: Prexisting or High Potential for Compromised Skin Integrity  Goal: Skin integrity is maintained or improved  Description: INTERVENTIONS:  - Identify patients at risk for skin breakdown  - Assess and monitor  skin integrity  - Assess and monitor nutrition and hydration status  - Monitor labs   - Assess for incontinence   - Turn and reposition patient  - Assist with mobility/ambulation  - Relieve pressure over bony prominences  - Avoid friction and shearing  - Provide appropriate hygiene as needed including keeping skin clean and dry  - Evaluate need for skin moisturizer/barrier cream  - Collaborate with interdisciplinary team   - Patient/family teaching  - Consider wound care consult   Outcome: Progressing

## 2025-01-25 NOTE — ASSESSMENT & PLAN NOTE
Lab Results   Component Value Date    EGFR 18 01/25/2025    EGFR 12 01/24/2025    EGFR 7 01/23/2025    CREATININE 3.27 (H) 01/25/2025    CREATININE 4.56 (H) 01/24/2025    CREATININE 6.82 (H) 01/23/2025   Patient on dialysis MWF -noncompliant with going, wife says he misses at least once a week  Presenting with bilateral pleural effusions  Currently on torsemide on nondialysis days  HD as scheduled  Nephrology consult: Due to infected tunneled dialysis catheter removal on 1/21, temporary dialysis catheter with interventional radiology placed on 1/22 and then get back to schedule on Monday, Wednesday, Friday.  Can replace PermaCath after blood cultures are negative for 48 to 72 hours.

## 2025-01-25 NOTE — ASSESSMENT & PLAN NOTE
Developed in setting of sepsis  Home Coreg on hold secondary to low blood pressure-switch to metoprolol 25 twice daily  As needed low-dose 2.5 Lopressor IV for sustained heart rate greater than 130  Currently on heparin drip due to right IJV thrombus   Will need anticoagulation -will need to be started on renally adjusted Eliquis when able. pending replacement for perm cath, will continue to hold per IR recommendations and continue with heparin drip

## 2025-01-25 NOTE — ASSESSMENT & PLAN NOTE
Lab Results   Component Value Date    EGFR 18 01/25/2025    EGFR 12 01/24/2025    EGFR 7 01/23/2025    CREATININE 3.27 (H) 01/25/2025    CREATININE 4.56 (H) 01/24/2025    CREATININE 6.82 (H) 01/23/2025

## 2025-01-25 NOTE — ASSESSMENT & PLAN NOTE
Patient with 2 out of 2 blood cultures positive for gram-positive cocci in clusters  Continue vancomycin  No vegetation noted on echo  HD cath removed 1/21   Repeat BC 1/22 positive x2  Repeat BC 1/23 NG 24 hours   ID consult: start cefazolin 1g q24h, follow up on blood culture, anticipate 6 weeks of abx. Once repeat BC are negative 48-72 can replace permacath.  RUE + for internal jugular vein thrombus - likely the cause of persistent bacteremia

## 2025-01-25 NOTE — ASSESSMENT & PLAN NOTE
Malnutrition Findings:   Adult Malnutrition type: Chronic illness  Adult Degree of Malnutrition: Malnutrition of moderate degree  Malnutrition Characteristics: Muscle loss, Fat loss                  360 Statement: malnutrition of moderate degree in setting of chronic illness, evidenced by hollowing orbitals/dark circles, muscle wasting/indented temples, protruding clavicles, treated with diet and supplements    BMI Findings:           Body mass index is 19.05 kg/m².

## 2025-01-25 NOTE — PROGRESS NOTES
Progress Note - Hospitalist   Name: Matthew Alvarez 69 y.o. male I MRN: [id number]  Unit/Bed#: -01 I Date of Admission: 1/20/2025   Date of Service: 1/25/2025 I Hospital Day: 5    Assessment & Plan  Sepsis (HCC)  Meeting sepsis criteria secondary to temperature 101.4 °F, HR 97 bpm  Lactic acid 3.2  In setting of COVID, bacteremia from unclear source at this time-possibly bacterial pneumonia versus skin infection (pustule over HD cath as well as chronic skin lesions of lower extremities)  Blood cultures positive for 2 out of 2 gram-positive cocci in clusters  Identification panel with staph aureus  MRSA negative  ID recommending transition to cefazolin 1 g every 24 hours  Bacteremia  Patient with 2 out of 2 blood cultures positive for gram-positive cocci in clusters  Continue vancomycin  No vegetation noted on echo  HD cath removed 1/21   Repeat BC 1/22 positive x2  Repeat BC 1/23 NG 24 hours   ID consult: start cefazolin 1g q24h, follow up on blood culture, anticipate 6 weeks of abx. Once repeat BC are negative 48-72 can replace permacath.  RUE + for internal jugular vein thrombus - likely the cause of persistent bacteremia  Acute on chronic systolic heart failure (HCC)  Wt Readings from Last 3 Encounters:   01/25/25 53.5 kg (118 lb)   01/15/25 56.5 kg (124 lb 9.6 oz)   12/27/24 56 kg (123 lb 6.4 oz)     Patient presents with worsening shortness of breath, missed dialysis session today with last session on Friday  Patient's wife endorses that he sometimes is not taking his medication, spits them out or throws them away  CT chest with moderate to large right pleural effusion and moderate left pleural effusion  Echo in November with EF of 20%  Wearing LifeVest initially on presentation, now refusing-monitoring on telemetry  PTA regimen is Coreg, torsemide (on nondialysis days)  BNP >4700  Given 1 dose IV diuretic on admission, HD as scheduled. Getting temporary catheter in on 1/22  Daily weights and  I/O  COVID  Mild COVID pathway as below   COVID19- positive on 1/20   CT chest bilateral pleural effusions and bilateral groundglass opacities  COVID mild pathway labs   CRP and BNP elevated   CK normal  Trop elevated but downtrended, no chest pain  Completed remdesivir x3 days   OOB TID; ambulation and mobilization strongly encouraged  Supportive care  Atrial fibrillation with RVR (HCC)  Developed in setting of sepsis  Home Coreg on hold secondary to low blood pressure-switch to metoprolol 25 twice daily  As needed low-dose 2.5 Lopressor IV for sustained heart rate greater than 130  Currently on heparin drip due to right IJV thrombus   Will need anticoagulation -will need to be started on renally adjusted Eliquis when able. pending replacement for perm cath, will continue to hold per IR recommendations and continue with heparin drip   Primary hypertension  PTA regimen on Coreg 25 mg twice daily, torsemide 100 mg on nondialysis days, Norvasc 5 mg daily and hydralazine 100 mg 3 times daily  Blood pressure somewhat soft on arrival  BP meds from home on hold-Coreg switched to metoprolol to have less effect on blood pressure but to allow better heart rate control  Currently Lopressor 25 mg twice daily  Anemia due to chronic kidney disease, on chronic dialysis (HCC)  With chronic anemia secondary to renal disease  Is on Epogen and iron infusions with dialysis  Would avoid IV iron due to findings of bacteremia  Continue folic acid daily  Hemoglobin stable  Thrombocytopenia (HCC)  Chronic stable thrombocytopenia in setting of chronic renal disease  ESRD (end stage renal disease) on dialysis (HCC)  Lab Results   Component Value Date    EGFR 18 01/25/2025    EGFR 12 01/24/2025    EGFR 7 01/23/2025    CREATININE 3.27 (H) 01/25/2025    CREATININE 4.56 (H) 01/24/2025    CREATININE 6.82 (H) 01/23/2025   Patient on dialysis MWF -noncompliant with going, wife says he misses at least once a week  Presenting with bilateral pleural  effusions  Currently on torsemide on nondialysis days  HD as scheduled  Nephrology consult: Due to infected tunneled dialysis catheter removal on 1/21, temporary dialysis catheter with interventional radiology placed on 1/22 and then get back to schedule on Monday, Wednesday, Friday.  Can replace PermaCath after blood cultures are negative for 48 to 72 hours.  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)  Subacute to chronic occlusive thrombus in the IJV of proximal neck on upper extremity venous duplex  Start heparin drip  Plan to transition to Eliquis once cleared by interventional radiology  Moderate protein-calorie malnutrition (HCC)  Malnutrition Findings:   Adult Malnutrition type: Chronic illness  Adult Degree of Malnutrition: Malnutrition of moderate degree  Malnutrition Characteristics: Muscle loss, Fat loss                  360 Statement: malnutrition of moderate degree in setting of chronic illness, evidenced by hollowing orbitals/dark circles, muscle wasting/indented temples, protruding clavicles, treated with diet and supplements    BMI Findings:           Body mass index is 19.05 kg/m².       VTE Pharmacologic Prophylaxis:   High Risk (Score >/= 5) - Pharmacological DVT Prophylaxis Ordered: heparin drip. Sequential Compression Devices Ordered.    Mobility:   Basic Mobility Inpatient Raw Score: 11  JH-HLM Goal: 4: Move to chair/commode  JH-HLM Achieved: 5: Stand (1 or more minutes)  JH-HLM Goal achieved. Continue to encourage appropriate mobility.    Patient Centered Rounds: I performed bedside rounds with  nursing staff today.   Discussions with Specialists or Other Care Team Provider: nursing, cm    Education and Discussions with Family / Patient: Attempted to update  (wife) via phone. Unable to contact.    Current Length of Stay: 5 day(s)  Current Patient Status: Inpatient   Certification Statement: The patient will continue to require additional inpatient hospital stay due to requiring iv antibiotics for bacteremia and repeat perm cath on Monday   Discharge Plan: Anticipate discharge in 48-72 hrs to rehab facility.    Code Status: Level 1 - Full Code    Subjective   Patient states that he is feeling fine today. Denies chest pain, shortness of breath or abdominal pain. Agreeable to current plan.     Objective :  Temp:  [97.6 °F (36.4 °C)-99.1 °F (37.3 °C)] 97.9 °F (36.6 °C)  HR:  [66-81] 70  BP: (105-168)/(67-83) 168/76  Resp:  [16-18] 16  SpO2:  [96 %-99 %] 97 %  O2 Device: None (Room air)    Body mass index is 19.05 kg/m².     Input and Output Summary (last 24 hours):     Intake/Output Summary (Last 24 hours) at 1/25/2025 0847  Last data filed at 1/25/2025 0433  Gross per 24 hour   Intake 620 ml   Output 3047 ml   Net -2427 ml       Physical Exam  Vitals reviewed.   Constitutional:       General: He is not in acute distress.     Appearance: He is ill-appearing. He is not toxic-appearing.   HENT:      Head: Normocephalic and atraumatic.      Mouth/Throat:      Mouth: Mucous membranes are moist.   Cardiovascular:      Rate and Rhythm: Normal rate and regular rhythm.      Heart sounds: No murmur heard.  Pulmonary:      Effort: No respiratory distress.      Breath sounds: No stridor. Rhonchi present. No wheezing or rales.   Abdominal:      General: Bowel sounds are normal. There is no distension.      Palpations: Abdomen is soft. There is no mass.      Tenderness: There is no abdominal tenderness.   Musculoskeletal:      Right lower leg: No edema.      Left lower leg: No edema.   Skin:     General: Skin  is warm and dry.      Coloration: Skin is not pale.   Neurological:      Mental Status: He is alert and oriented to person, place, and time.   Psychiatric:         Mood and Affect: Mood normal.         Behavior: Behavior normal.           Lines/Drains:  Lines/Drains/Airways       Active Status       Name Placement date Placement time Site Days    CVC Central Lines 01/22/25 Double 01/22/25  1416  --  2    HD Temporary Double Catheter 01/22/25  1407  Internal jugular  2                    Central Line:  Goal for removal: N/A - Chronic PICC         Telemetry:  Telemetry Orders (From admission, onward)               24 Hour Telemetry Monitoring  Continuous x 24 Hours (Telem)        Expiring   Question:  Reason for 24 Hour Telemetry  Answer:  Decompensated CHF- and any one of the following: continuous diuretic infusion or total diuretic dose >200 mg daily, associated electrolyte derangement (I.e. K < 3.0), inotropic drip (continuous infusion), hx of ventricular arrhythmia, or new EF < 35%                     Telemetry Reviewed: Normal Sinus Rhythm  Indication for Continued Telemetry Use: Arrthymias requiring medical therapy               Lab Results: I have reviewed the following results:   Results from last 7 days   Lab Units 01/25/25 0343 01/22/25 0456 01/21/25 0439 01/20/25  1543   WBC Thousand/uL 6.63   < > 9.14 8.76   HEMOGLOBIN g/dL 7.6*   < > 9.6* 9.2*   HEMATOCRIT % 22.2*   < > 27.9* 27.9*   PLATELETS Thousands/uL 96*   < > 87* 93*   BANDS PCT %  --   --  3  --    SEGS PCT %  --   --   --  90*   LYMPHO PCT %  --   --  3* 4*   MONO PCT %  --   --  1* 5   EOS PCT %  --   --  0 0    < > = values in this interval not displayed.     Results from last 7 days   Lab Units 01/25/25 0343 01/22/25 0456 01/21/25  0439   SODIUM mmol/L 132*   < > 133*   POTASSIUM mmol/L 3.6   < > 4.3   CHLORIDE mmol/L 96   < > 94*   CO2 mmol/L 29   < > 26   BUN mg/dL 29*   < > 80*   CREATININE mg/dL 3.27*   < > 8.44*   ANION GAP mmol/L 7    < > 13   CALCIUM mg/dL 7.7*   < > 8.3*   ALBUMIN g/dL  --   --  2.6*   TOTAL BILIRUBIN mg/dL  --   --  1.75*   ALK PHOS U/L  --   --  113*   ALT U/L  --   --  20   AST U/L  --   --  46*   GLUCOSE RANDOM mg/dL 93   < > 93    < > = values in this interval not displayed.     Results from last 7 days   Lab Units 01/23/25  1705   INR  1.56*             Results from last 7 days   Lab Units 01/23/25  0952 01/22/25  0456 01/21/25  0439 01/20/25  1816 01/20/25  1543   LACTIC ACID mmol/L  --   --   --  1.0 3.2*   PROCALCITONIN ng/ml 2.83* 3.13* 3.06*  --  2.72*       Recent Cultures (last 7 days):   Results from last 7 days   Lab Units 01/23/25  1522 01/23/25  1520 01/22/25  0457 01/21/25  1559 01/20/25  1556 01/20/25  1543   BLOOD CULTURE  No Growth at 24 hrs. No Growth at 24 hrs. Staphylococcus aureus*  Staphylococcus aureus*  --  Staphylococcus aureus* Staphylococcus aureus*   GRAM STAIN RESULT   --   --  Gram positive cocci in clusters*  Gram positive cocci in clusters* 4+ Polys*  4+ Gram positive cocci in clusters* Gram positive cocci in clusters* Gram positive cocci in clusters*   BODY FLUID CULTURE, STERILE   --   --   --  3+ Growth of Staphylococcus aureus*  --   --        Last 24 Hours Medication List:     Current Facility-Administered Medications:     acetaminophen (TYLENOL) tablet 650 mg, Q6H PRN    [Held by provider] amLODIPine (NORVASC) tablet 5 mg, Daily    atorvastatin (LIPITOR) tablet 20 mg, Daily With Dinner    ceFAZolin (ANCEF) IVPB (premix in dextrose) 1,000 mg 50 mL, Q24H, Last Rate: 1,000 mg (01/24/25 1807)    Diclofenac Sodium (VOLTAREN) 1 % topical gel 2 g, 4x Daily    epoetin loida (EPOGEN,PROCRIT) injection 5,000 Units, Once per day on Monday Wednesday Friday    folic acid (FOLVITE) tablet 1 mg, Daily    heparin (porcine) 25,000 units in 0.45% NaCl 250 mL infusion (premix), Titrated, Last Rate: 17 Units/kg/hr (01/25/25 0401)    heparin (porcine) injection 2,000 Units, Q6H PRN    heparin (porcine)  injection 4,000 Units, Q6H PRN    [Held by provider] hydrALAZINE (APRESOLINE) tablet 100 mg, Q8H JEEVAN    isosorbide mononitrate (IMDUR) 24 hr tablet 120 mg, Daily    melatonin tablet 6 mg, HS    metoprolol (LOPRESSOR) injection 2.5 mg, Q6H PRN    metoprolol tartrate (LOPRESSOR) tablet 25 mg, Q12H JEEVAN    Administrative Statements   Today, Patient Was Seen By: Caty Calvo PA-C    **Please Note: This note may have been constructed using a voice recognition system.**

## 2025-01-25 NOTE — ASSESSMENT & PLAN NOTE
Wt Readings from Last 3 Encounters:   01/25/25 53.5 kg (118 lb)   01/15/25 56.5 kg (124 lb 9.6 oz)   12/27/24 56 kg (123 lb 6.4 oz)     Patient presents with worsening shortness of breath, missed dialysis session today with last session on Friday  Patient's wife endorses that he sometimes is not taking his medication, spits them out or throws them away  CT chest with moderate to large right pleural effusion and moderate left pleural effusion  Echo in November with EF of 20%  Wearing LifeVest initially on presentation, now refusing-monitoring on telemetry  PTA regimen is Coreg, torsemide (on nondialysis days)  BNP >4700  Given 1 dose IV diuretic on admission, HD as scheduled. Getting temporary catheter in on 1/22  Daily weights and I/O

## 2025-01-26 LAB
ANION GAP SERPL CALCULATED.3IONS-SCNC: 6 MMOL/L (ref 4–13)
APTT PPP: 76 SECONDS (ref 23–34)
BUN SERPL-MCNC: 45 MG/DL (ref 5–25)
CALCIUM SERPL-MCNC: 7.7 MG/DL (ref 8.4–10.2)
CHLORIDE SERPL-SCNC: 98 MMOL/L (ref 96–108)
CO2 SERPL-SCNC: 27 MMOL/L (ref 21–32)
CREAT SERPL-MCNC: 4.57 MG/DL (ref 0.6–1.3)
ERYTHROCYTE [DISTWIDTH] IN BLOOD BY AUTOMATED COUNT: 21.5 % (ref 11.6–15.1)
GFR SERPL CREATININE-BSD FRML MDRD: 12 ML/MIN/1.73SQ M
GLUCOSE SERPL-MCNC: 95 MG/DL (ref 65–140)
HCT VFR BLD AUTO: 22.1 % (ref 36.5–49.3)
HGB BLD-MCNC: 7.6 G/DL (ref 12–17)
MCH RBC QN AUTO: 29.2 PG (ref 26.8–34.3)
MCHC RBC AUTO-ENTMCNC: 34.4 G/DL (ref 31.4–37.4)
MCV RBC AUTO: 85 FL (ref 82–98)
PLATELET # BLD AUTO: 105 THOUSANDS/UL (ref 149–390)
POTASSIUM SERPL-SCNC: 3.5 MMOL/L (ref 3.5–5.3)
RBC # BLD AUTO: 2.6 MILLION/UL (ref 3.88–5.62)
SODIUM SERPL-SCNC: 131 MMOL/L (ref 135–147)
WBC # BLD AUTO: 6.96 THOUSAND/UL (ref 4.31–10.16)

## 2025-01-26 PROCEDURE — 99232 SBSQ HOSP IP/OBS MODERATE 35: CPT

## 2025-01-26 PROCEDURE — 85027 COMPLETE CBC AUTOMATED: CPT

## 2025-01-26 PROCEDURE — 80048 BASIC METABOLIC PNL TOTAL CA: CPT

## 2025-01-26 PROCEDURE — 85730 THROMBOPLASTIN TIME PARTIAL: CPT | Performed by: FAMILY MEDICINE

## 2025-01-26 RX ADMIN — FOLIC ACID 1 MG: 1 TABLET ORAL at 09:41

## 2025-01-26 RX ADMIN — METOPROLOL TARTRATE 25 MG: 25 TABLET, FILM COATED ORAL at 21:25

## 2025-01-26 RX ADMIN — Medication 2 G: at 17:18

## 2025-01-26 RX ADMIN — METOPROLOL TARTRATE 25 MG: 25 TABLET, FILM COATED ORAL at 09:41

## 2025-01-26 RX ADMIN — CEFAZOLIN SODIUM 1000 MG: 1 SOLUTION INTRAVENOUS at 17:18

## 2025-01-26 RX ADMIN — Medication 2 G: at 13:06

## 2025-01-26 RX ADMIN — Medication 2 G: at 09:42

## 2025-01-26 RX ADMIN — Medication 6 MG: at 21:25

## 2025-01-26 RX ADMIN — ATORVASTATIN CALCIUM 20 MG: 20 TABLET, FILM COATED ORAL at 17:35

## 2025-01-26 RX ADMIN — Medication 2 G: at 21:26

## 2025-01-26 RX ADMIN — ISOSORBIDE MONONITRATE 120 MG: 30 TABLET, EXTENDED RELEASE ORAL at 09:40

## 2025-01-26 NOTE — ASSESSMENT & PLAN NOTE
Wt Readings from Last 3 Encounters:   01/26/25 54.2 kg (119 lb 7.8 oz)   01/15/25 56.5 kg (124 lb 9.6 oz)   12/27/24 56 kg (123 lb 6.4 oz)     Patient presents with worsening shortness of breath, missed dialysis session today with last session on Friday  Patient's wife endorses that he sometimes is not taking his medication, spits them out or throws them away  CT chest with moderate to large right pleural effusion and moderate left pleural effusion  Echo in November with EF of 20%  Wearing LifeVest initially on presentation, now refusing-monitoring on telemetry  PTA regimen is Coreg, torsemide (on nondialysis days)  BNP >4700  Given 1 dose IV diuretic on admission, HD as scheduled  Daily weights and I/O

## 2025-01-26 NOTE — ASSESSMENT & PLAN NOTE
Gram-positive cocci bacteremia currently on antibiotics followed by ID recommended removal of tunneled dialysis catheter which has been done on January 21 with placement of temporary dialysis catheter on January 22.  It is noted that repeat blood cultures were positive from January 22 and a repeat set of blood cultures were ordered on January 23.  If they end up being positive as per ID likely will need DEWEY.  Until blood cultures are negative maintaining Saulo catheter for hemodialysis access.    Administrative Statements   VIRTUAL CARE DOCUMENTATION:     1. This service was provided via Telemedicine using [employer]     2. Parties in the room with patient during teleconsult Patient only    3. Confidentiality My office door was closed     4. Participants No one else was in the room    5. Patient acknowledged consent and understanding of privacy and security of the  Telemedicine consult. I informed the patient that I have reviewed their record in Epic and presented the opportunity for them to ask any questions regarding the visit today.  The patient agreed to participate.    6. I have spent a total time of approximately 35 minutes in caring for this patient on the day of the visit/encounter including documentation of medical decision making, creation/modification of assessment and plan, placement of orders, review of the medical record, patient interview and visit, not including the time spent for establishing the audio/video connection.

## 2025-01-26 NOTE — PROGRESS NOTES
Progress Note - Nephrology   Name: Matthew Alvarez 69 y.o. male I MRN: [id number]  Unit/Bed#: -01 I Date of Admission: 1/20/2025   Date of Service: 1/25/2025 I Hospital Day: 5    Assessment & Plan  ESRD (end stage renal disease) on dialysis (HCC)  MWF at [facility]  The patient's last hemodialysis was today, January 25 and his next dialysis would likely be on Monday to return to usual MWF schedule.  We are further adjusting dry weight on this patient last weight being approximately 53 kg.  Access: Patient with infected tunneled dialysis catheter removed 1/21 and Saulo catheter was placed on Wednesday, January 22.  Appreciate surgical help with removal of tunneled dialysis catheter on January 21 as well as IR help with placement of the temporary Saulo dialysis catheter on January 22.  Will maintain current temporary dialysis catheter until blood cultures are negative.  Appreciate ID help in that regard.    The patient was also started on heparin January 23 given the presence of occlusive thrombus of the right IJ at the proximal neck.  Appreciate hospitalist service help and input.  Primary hypertension  Blood pressure stable, -168 today, 1/25 Maintain current medication regimen with blood pressure hold parameters.  The patient currently here is on metoprolol 25 mg every 12 hours and isosorbide mononitrate 120 mg daily.   Home medications: Hydralazine 100 mg every 8 hours/carvedilol 25 twice a day/amlodipine 5 mg daily/torsemide 100 mg on nondialysis days being held at this time  Hyponatremia  Sodium 132 mmol/L, 1/25. Secondary to ESRD: Placed on 1.5 L fluid restriction along with HD/UF.  This is also likely an aspect of significant cardiomyopathy with an ejection fraction of 20% with global hypokinesis.  Chronic kidney disease-mineral and bone disorder (CKD-MBD)  Will monitor phosphorus currently no binders, most recent phosphorus level is 2.0 in January 23.  Encouraging oral intake.  Anemia due  to chronic kidney disease, on chronic dialysis ([facility])  Current hemoglobin  is 7.6 on January 53.  Will monitor: In place on Epogen 6000 units with each treatment  Cannot give intravenous iron in the setting of sepsis; noted iron studies from January 10 showed ferritin 244 and iron saturation of 8.  Acute on chronic systolic heart failure (HCC)  Over the past few admissions we have been able to decrease estimated dry weight to around 53 kg and we will challenged that today on January 25 with post dialysis weight 51.5 kg.  There is also likely an aspect of total body weight decrease as well as decrease in fluid weight.  I believe a few admissions ago the patient's weight had been as high as 73 kg and the patient had required HD/UF on almost daily basis during that time.  Will plan on HD UF today on January 24 challenging dry weight again.  EF: 20% by recent echocardiogram in January 21 with global hypokinesis.    COVID  The patient is being given focal directed treatment and is being given intravenous remdesivir.    Sepsis ([facility])  Gram-positive cocci bacteremia currently on antibiotics followed by ID recommended removal of tunneled dialysis catheter which has been done on January 21 with placement of temporary dialysis catheter on January 22.  It is noted that repeat blood cultures were positive from January 22 and a repeat set of blood cultures were ordered on January 23.  If they end up being positive as per ID likely will need DEWEY.  Until blood cultures are negative maintaining Saulo catheter for hemodialysis access.    Administrative Statements   VIRTUAL CARE DOCUMENTATION:     1. This service was provided via Telemedicine using [employer]     2. Parties in the room with patient during teleconsult Patient only    3. Confidentiality My office door was closed     4. Participants No one else was in the room    5. Patient acknowledged consent and understanding of privacy and security of the  Telemedicine consult. I  informed the patient that I have reviewed their record in Epic and presented the opportunity for them to ask any questions regarding the visit today.  The patient agreed to participate.    6. I have spent a total time of approximately 35 minutes in caring for this patient on the day of the visit/encounter including documentation of medical decision making, creation/modification of assessment and plan, placement of orders, review of the medical record, patient interview and visit, not including the time spent for establishing the audio/video connection.      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       I have reviewed the nephrology recommendations including hemodialysis schedule, with hospitalist, and we are in agreement with renal plan including the information outlined above.     Subjective   Brief History of Admission - 69 y.o. male with a PMH of ESRD on HD MWF, HFrEF s/p LifeVest, HTN, CAD who presented to [facility] 1/20 with concern for worsening shortness of breath. Wife advised he has been noncompliant with medications and outpatient hemodialysis schedule. Nephrology is following for ESRD on HD.      Patient is examined resting in bed on hemodialysis. States he is tired, otherwise denies complaints.  Patient states he feels much better today and is inquiring as to when he can go home.      Objective :  Temp:  [97.9 °F (36.6 °C)-99.1 °F (37.3 °C)] 98.6 °F (37 °C)  HR:  [68-81] 73  BP:  (150-168)/(68-83) 157/78  Resp:  [16-18] 16  SpO2:  [92 %-98 %] 98 %  O2 Device: None (Room air)    Current Weight: Weight - Scale: 53.5 kg (118 lb)  First Weight: Weight - Scale: 55.4 kg (122 lb 2.2 oz)  I/O         01/24 0701  01/25 0700 01/25 0701  01/26 0700    P.O. 120 180    I.V. (mL/kg) 500 (9.3)     Total Intake(mL/kg) 620 (11.6) 180 (3.4)    Other 3047     Stool 0     Total Output 3047     Net -2427 +180          Unmeasured Urine Occurrence 1 x     Unmeasured Stool Occurrence 2 x           Physical Exam  Vitals and nursing note reviewed.   Constitutional:       General: He is not in acute distress.     Appearance: He is well-developed. He is ill-appearing.   HENT:      Head: Normocephalic and atraumatic.      Right Ear: External ear normal.      Left Ear: External ear normal.      Nose: Nose normal.      Mouth/Throat:      Mouth: Mucous membranes are moist.      Pharynx: Oropharynx is clear.   Eyes:      Extraocular Movements: Extraocular movements intact.      Conjunctiva/sclera: Conjunctivae normal.      Pupils: Pupils are equal, round, and reactive to light.   Cardiovascular:      Comments: Unable to assess due to virtual visit  Pulmonary:      Effort: Pulmonary effort is normal. No respiratory distress.      Comments: Unable to assess due to virtual visit  Abdominal:      Comments: Unable to assess due to virtual visit   Musculoskeletal:      Cervical back: Normal range of motion.      Comments: Unable to assess due to virtual visit, patient states that he does not believe he has any edema   Skin:     Comments: Unable to assess due to virtual visit   Neurological:      General: No focal deficit present.      Mental Status: He is alert and oriented to person, place, and time.   Psychiatric:         Mood and Affect: Mood normal.         Behavior: Behavior normal.         Medications:    Current Facility-Administered Medications:     acetaminophen (TYLENOL) tablet 650 mg, 650 mg, Oral, Q6H PRN, Kerri  BETINA Chisholm    [Held by provider] amLODIPine (NORVASC) tablet 5 mg, 5 mg, Oral, Daily, Kerri Chisholm PA-C    atorvastatin (LIPITOR) tablet 20 mg, 20 mg, Oral, Daily With Dinner, Kerri Chisholm PA-C, 20 mg at 01/25/25 1723    ceFAZolin (ANCEF) IVPB (premix in dextrose) 1,000 mg 50 mL, 1,000 mg, Intravenous, Q24H, Leonarda Simmons PA-C, Last Rate: 100 mL/hr at 01/25/25 1723, 1,000 mg at 01/25/25 1723    Diclofenac Sodium (VOLTAREN) 1 % topical gel 2 g, 2 g, Topical, 4x Daily, Caty Calvo PA-C, 2 g at 01/25/25 1724    epoetin loida (EPOGEN,PROCRIT) injection 5,000 Units, 5,000 Units, Intravenous, Once per day on Monday Wednesday Friday, Kendall Guerra MD, 5,000 Units at 01/24/25 1726    folic acid (FOLVITE) tablet 1 mg, 1 mg, Oral, Daily, Kerri Chisholm PA-C, 1 mg at 01/25/25 0837    heparin (porcine) 25,000 units in 0.45% NaCl 250 mL infusion (premix), 3-30 Units/kg/hr (Order-Specific), Intravenous, Titrated, Leonarda Simmons PA-C, Last Rate: 8.5 mL/hr at 01/25/25 0401, 17 Units/kg/hr at 01/25/25 0401    heparin (porcine) injection 2,000 Units, 2,000 Units, Intravenous, Q6H PRN, Leonarda Simmons PA-C, 2,000 Units at 01/24/25 1513    heparin (porcine) injection 4,000 Units, 4,000 Units, Intravenous, Q6H PRN, Leonarda Simmons PA-C    [Held by provider] hydrALAZINE (APRESOLINE) tablet 100 mg, 100 mg, Oral, Q8H JEEVAN, Kerri Chisholm PA-C, 100 mg at 01/20/25 2125    isosorbide mononitrate (IMDUR) 24 hr tablet 120 mg, 120 mg, Oral, Daily, Kerri Chisholm PA-C, 120 mg at 01/25/25 0837    melatonin tablet 6 mg, 6 mg, Oral, HS, Kerri Chisholm PA-C, 6 mg at 01/24/25 2119    metoprolol (LOPRESSOR) injection 2.5 mg, 2.5 mg, Intravenous, Q6H PRN, Kerri Chisholm PA-C    metoprolol tartrate (LOPRESSOR) tablet 25 mg, 25 mg, Oral, Q12H [facility], Danny Degroot MD, 25 mg at 01/25/25 0837      Lab Results: I have reviewed the following results:  Results from last 7 days   Lab Units 01/25/25  0343 01/24/25  0755 01/23/25  0952 01/22/25  0451  "01/21/25  0439 01/20/25  1543   WBC Thousand/uL 6.63 7.80 7.73 8.75 9.14 8.76   HEMOGLOBIN g/dL 7.6* 7.5* 7.6* 8.4* 9.6* 9.2*   HEMATOCRIT % 22.2* 21.7* 22.2* 24.5* 27.9* 27.9*   PLATELETS Thousands/uL 96* 90* 79* 75* 87* 93*   POTASSIUM mmol/L 3.6 3.7 3.8 4.0 4.3 4.5   CHLORIDE mmol/L 96 95* 93* 95* 94* 92*   CO2 mmol/L 29 30 25 27 26 24   BUN mg/dL 29* 45* 76* 53* 80* 77*   CREATININE mg/dL 3.27* 4.56* 6.82* 5.84* 8.44* 8.07*   CALCIUM mg/dL 7.7* 7.8* 8.0* 8.1* 8.3* 8.6   MAGNESIUM mg/dL  --   --  2.0 1.9  --  2.1   ALBUMIN g/dL  --   --   --   --  2.6* 3.0*       Administrative Statements     Portions of the record may have been created with voice recognition software. Occasional wrong word or \"sound a like\" substitutions may have occurred due to the inherent limitations of voice recognition software. Read the chart carefully and recognize, using context, where substitutions have occurred.If you have any questions, please contact the dictating provider.  "

## 2025-01-26 NOTE — ASSESSMENT & PLAN NOTE
Meeting sepsis criteria secondary to temperature 101.4 °F, HR 97 bpm  Lactic acid 3.2  In setting of COVID, bacteremia from unclear source at this time-possibly bacterial pneumonia versus skin infection (pustule over HD cath as well as chronic skin lesions of lower extremities)  Blood cultures positive for 2 out of 2 gram-positive cocci in clusters  Identification panel with staph aureus  MRSA negative  ID recommending cefazolin 1 g every 24 hours   Progress Note - General Surgery   Kim Escort 78 y o  male MRN: [id number]  Unit/Bed#: [facility] 607-01 Encounter: [id number]    Assessment:  59-year-old male with history of perforated duodenal ulcer with bleeding and failed attempt at endoscopic control and IR embolization who subsequently underwent exploratory laparotomy, over-sewing of bleeding vessel, without patch repair with transverse colon, open gastrojejunostomy tube placement on 01/11/2020  Postop course complicated by intra-abdominal abscess status post percutaneous drainage, enterocutaneous fistula - low output  Plan:  Continue post gastrectomy diet as tolerated  Cycle tube feeds overnight  Maintain IR drain  Continue Reglan  DVT prophylaxis  Mobilize patient with PT/OT  Disposition planning, possible discharge to rehab today if successfully placed    Subjective/Objective   Chief Complaint:     Subjective:  No acute events overnight  Tolerating oral diet and overnight cycle tube feeds  Having bowel function  Objective:     Blood pressure 152/98, pulse 86, temperature 98 6 °F (37 °C), resp  rate 16, height 5' 8 5" (1 74 m), weight 72 3 kg (159 lb 6 3 oz), SpO2 94 %  ,Body mass index is 23 88 kg/m²  Intake/Output Summary (Last 24 hours) at 2/11/2020 0556  Last data filed at 2/10/2020 1948  Gross per 24 hour   Intake 1390 ml   Output 2760 ml   Net -1370 ml       Invasive Devices     Peripherally Inserted Central Catheter Line            PICC Line 66/23/31 Right Basilic 25 days          Drain            Gastrostomy/Enterostomy Gastrostomy-jejunostomy 22 Fr  LLQ 30 days    Closed/Suction Drain Left Abdomen Bulb 14 days    Open Drain Midline Abdomen 10 days    External Urinary Catheter 8 days                Physical Exam:   No acute distress  Regular rate and rhythm  Nonlabored respirations on room air  Abdomen soft, nontender, nondistended  CAMPBELL drain with purulent output  Incision with mild reactive erythema      Lab, Imaging and other studies:  CBC: No results found for: WBC, HGB, HCT, MCV, PLT, ADJUSTEDWBC, MCH, MCHC, RDW, MPV, NRBC, CMP:   Lab Results   Component Value Date    SODIUM 138 02/10/2020    K 4 0 02/10/2020     02/10/2020    CO2 29 02/10/2020    BUN 7 02/10/2020    CREATININE 0 58 (L) 02/10/2020    CALCIUM 8 4 02/10/2020    EGFR 105 02/10/2020   , Coagulation: No results found for: PT, INR, APTT, Urinalysis: No results found for: COLORU, CLARITYU, SPECGRAV, PHUR, LEUKOCYTESUR, NITRITE, PROTEINUA, GLUCOSEU, KETONESU, BILIRUBINUR, BLOODU, Amylase: No results found for: AMYLASE, Lipase: No results found for: LIPASE  VTE Pharmacologic Prophylaxis: Sequential compression device (Venodyne)   VTE Mechanical Prophylaxis: sequential compression device

## 2025-01-26 NOTE — ASSESSMENT & PLAN NOTE
Over the past few admissions we have been able to decrease estimated dry weight to around 53 kg and we will challenged that today on January 25 with post dialysis weight 51.5 kg.  There is also likely an aspect of total body weight decrease as well as decrease in fluid weight.  I believe a few admissions ago the patient's weight had been as high as 73 kg and the patient had required HD/UF on almost daily basis during that time.  Will plan on HD UF today on January 24 challenging dry weight again.  EF: 20% by recent echocardiogram in January 21 with global hypokinesis.

## 2025-01-26 NOTE — ASSESSMENT & PLAN NOTE
Sodium 131 mmol/L, 1/26. Secondary to ESRD: Placed on 1.5 L fluid restriction along with HD/UF.  This is also likely an aspect of significant cardiomyopathy with an ejection fraction of 20% with global hypokinesis.

## 2025-01-26 NOTE — PLAN OF CARE
Problem: Nutrition/Hydration-ADULT  Goal: Nutrient/Hydration intake appropriate for improving, restoring or maintaining nutritional needs  Description: Monitor and assess patient's nutrition/hydration status for malnutrition. Collaborate with interdisciplinary team and initiate plan and interventions as ordered.  Monitor patient's weight and dietary intake as ordered or per policy. Utilize nutrition screening tool and intervene as necessary. Determine patient's food preferences and provide high-protein, high-caloric foods as appropriate.     INTERVENTIONS:  - Monitor oral intake, urinary output, labs, and treatment plans  - Assess nutrition and hydration status and recommend course of action  - Evaluate amount of meals eaten  - Assist patient with eating if necessary   - Allow adequate time for meals  - Recommend/ encourage appropriate diets, oral nutritional supplements, and vitamin/mineral supplements  - Order, calculate, and assess calorie counts as needed  - Recommend, monitor, and adjust tube feedings and TPN/PPN based on assessed needs  - Assess need for intravenous fluids  - Provide specific nutrition/hydration education as appropriate  - Include patient/family/caregiver in decisions related to nutrition  Outcome: Progressing     Problem: PAIN - ADULT  Goal: Verbalizes/displays adequate comfort level or baseline comfort level  Description: Interventions:  - Encourage patient to monitor pain and request assistance  - Assess pain using appropriate pain scale  - Administer analgesics based on type and severity of pain and evaluate response  - Implement non-pharmacological measures as appropriate and evaluate response  - Consider cultural and social influences on pain and pain management  - Notify physician/advanced practitioner if interventions unsuccessful or patient reports new pain  Outcome: Progressing     Problem: INFECTION - ADULT  Goal: Absence or prevention of progression during  hospitalization  Description: INTERVENTIONS:  - Assess and monitor for signs and symptoms of infection  - Monitor lab/diagnostic results  - Monitor all insertion sites, i.e. indwelling lines, tubes, and drains  - Monitor endotracheal if appropriate and nasal secretions for changes in amount and color  - [place] appropriate cooling/warming therapies per order  - Administer medications as ordered  - Instruct and encourage patient and family to use good hand hygiene technique  - Identify and instruct in appropriate isolation precautions for identified infection/condition  Outcome: Progressing  Goal: Absence of fever/infection during neutropenic period  Description: INTERVENTIONS:  - Monitor WBC    Outcome: Progressing     Problem: SAFETY ADULT  Goal: Patient will remain free of falls  Description: INTERVENTIONS:  - Educate patient/family on patient safety including physical limitations  - Instruct patient to call for assistance with activity   - Consult OT/PT to assist with strengthening/mobility   - Keep Call bell within reach  - Keep bed low and locked with side rails adjusted as appropriate  - Keep care items and personal belongings within reach  - Initiate and maintain comfort rounds  - Make Fall Risk Sign visible to staff  - Offer Toileting every 2 Hours, in advance of need  - Initiate/Maintain bed/chair alarm  - Apply yellow socks and bracelet for high fall risk patients  - Consider moving patient to room near nurses station  Outcome: Progressing  Goal: Maintain or return to baseline ADL function  Description: INTERVENTIONS:  -  Assess patient's ability to carry out ADLs; assess patient's baseline for ADL function and identify physical deficits which impact ability to perform ADLs (bathing, care of mouth/teeth, toileting, grooming, dressing, etc.)  - Assess/evaluate cause of self-care deficits   - Assess range of motion  - Assess patient's mobility; develop plan if impaired  - Assess patient's need for  assistive devices and provide as appropriate  - Encourage maximum independence but intervene and supervise when necessary  - Involve family in performance of ADLs  - Assess for home care needs following discharge   - Consider OT consult to assist with ADL evaluation and planning for discharge  - Provide patient education as appropriate  Outcome: Progressing  Goal: Maintains/Returns to pre admission functional level  Description: INTERVENTIONS:  - Perform AM-PAC 6 Click Basic Mobility/ Daily Activity assessment daily.  - Set and communicate daily mobility goal to care team and patient/family/caregiver.   - Collaborate with rehabilitation services on mobility goals if consulted  - Perform Range of Motion 2 times a day.  - Reposition patient every 2 hours.  - Dangle patient 3 times a day  - Stand patient 3 times a day  - Ambulate patient 3 times a day  - Out of bed to chair 3 times a day   - Out of bed for meals 3 times a day  - Out of bed for toileting  - Record patient progress and toleration of activity level   Outcome: Progressing     Problem: DISCHARGE PLANNING  Goal: Discharge to home or other facility with appropriate resources  Description: INTERVENTIONS:  - Identify barriers to discharge w/patient and caregiver  - Arrange for needed discharge resources and transportation as appropriate  - Identify discharge learning needs (meds, wound care, etc.)  - Arrange for interpretive services to assist at discharge as needed  - Refer to Case Management Department for coordinating discharge planning if the patient needs post-hospital services based on physician/advanced practitioner order or complex needs related to functional status, cognitive ability, or social support system  Outcome: Progressing     Problem: Knowledge Deficit  Goal: Patient/family/caregiver demonstrates understanding of disease process, treatment plan, medications, and discharge instructions  Description: Complete learning assessment and assess  knowledge base.  Interventions:  - Provide teaching at level of understanding  - Provide teaching via preferred learning methods  Outcome: Progressing     Problem: METABOLIC, FLUID AND ELECTROLYTES - ADULT  Goal: Electrolytes maintained within normal limits  Description: INTERVENTIONS:  - Monitor labs and assess patient for signs and symptoms of electrolyte imbalances  - Administer electrolyte replacement as ordered  - Monitor response to electrolyte replacements, including repeat lab results as appropriate  - Instruct patient on fluid and nutrition as appropriate  Outcome: Progressing  Goal: Fluid balance maintained  Description: INTERVENTIONS:  - Monitor labs   - Monitor I/O and WT  - Instruct patient on fluid and nutrition as appropriate  - Assess for signs & symptoms of volume excess or deficit  Outcome: Progressing  Goal: Glucose maintained within target range  Description: INTERVENTIONS:  - Monitor Blood Glucose as ordered  - Assess for signs and symptoms of hyperglycemia and hypoglycemia  - Administer ordered medications to maintain glucose within target range  - Assess nutritional intake and initiate nutrition service referral as needed  Outcome: Progressing     Problem: SKIN/TISSUE INTEGRITY - ADULT  Goal: Skin Integrity remains intact(Skin Breakdown Prevention)  Description: Assess:  -Perform Omar assessment every shift  -Clean and moisturize skin every shift  -Inspect skin when repositioning, toileting, and assisting with ADLS    -Assess extremities for adequate circulation and sensation     Bed Management:  -Have minimal linens on bed & keep smooth, unwrinkled  -Change linens as needed when moist or perspiring  -Avoid sitting or lying in one position for more than 2 hours while in bed      Toileting:  -Offer bedside commode  -Assess for incontinence every2 hours  -Use incontinent care products after each incontinent episode such as moisture barrier    Activity:  -Mobilize patient 3 times a  day  -Encourage activity and walks on unit  -Encourage or provide ROM exercises   -Turn and reposition patient every 2 Hours  -Use appropriate equipment to lift or move patient in bed  -Instruct/ Assist with weight shifting every 1 when out of bed in chair  -Consider limitation of chair time 2 hour intervals    Skin Care:  -Avoid use of baby powder, tape, friction and shearing, hot water or constrictive clothing  -Relieve pressure over bony prominences using mepilex, pillows  -Do not massage red bony areas    Next Steps:  -Teach patient strategies to minimize risks such as repositioning-Consider consults to  interdisciplinary teams such as wound  Outcome: Progressing  Goal: Incision(s), wounds(s) or drain site(s) healing without S/S of infection  Description: INTERVENTIONS  - Assess and document dressing, incision, wound bed, drain sites and surrounding tissue  - Provide patient and family education  - Perform skin care/dressing changes every daily and prn  Outcome: Progressing  Goal: Pressure injury heals and does not worsen  Description: Interventions:  - Implement low air loss mattress or specialty surface (Criteria met)  - Apply silicone foam dressing  - Instruct/assist with weight shifting every 30 minutes when in chair   - Limit chair time to 2 hour intervals  - Use special pressure reducing interventions such as waffle cushion when in chair   - Apply fecal or urinary incontinence containment device   - Perform passive or active ROM every shift  - Turn and reposition patient & offload bony prominences every 2 hours   - Utilize friction reducing device or surface for transfers   - Consider consults to  interdisciplinary teams such as wound  - Use incontinent care products after each incontinent episode such as moisture barrier  - Consider nutrition services referral as needed  Outcome: Progressing     Problem: Prexisting or High Potential for Compromised Skin Integrity  Goal: Skin integrity is maintained or  improved  Description: INTERVENTIONS:  - Identify patients at risk for skin breakdown  - Assess and monitor skin integrity  - Assess and monitor nutrition and hydration status  - Monitor labs   - Assess for incontinence   - Turn and reposition patient  - Assist with mobility/ambulation  - Relieve pressure over bony prominences  - Avoid friction and shearing  - Provide appropriate hygiene as needed including keeping skin clean and dry  - Evaluate need for skin moisturizer/barrier cream  - Collaborate with interdisciplinary team   - Patient/family teaching  - Consider wound care consult   Outcome: Progressing

## 2025-01-26 NOTE — ASSESSMENT & PLAN NOTE
Blood pressure stable, -167 today, 1/26. Maintain current medication regimen with blood pressure hold parameters.  The patient currently on metoprolol 25 mg every 12 hours and isosorbide mononitrate 120 mg daily.   Home medications: Hydralazine 100 mg every 8 hours/carvedilol 25 twice a day/amlodipine 5 mg daily/torsemide 100 mg on nondialysis days being held at this time

## 2025-01-26 NOTE — ASSESSMENT & PLAN NOTE
Over the past few admissions we have been able to decrease estimated dry weight to around 53 kg and wt was challenged 1/25 with post dialysis weight 51.5 kg.  There is also likely an aspect of total body weight decrease as well as decrease in fluid weight.  I believe a few admissions ago the patient's weight had been as high as 73 kg and the patient had required HD/UF on almost daily basis during that time.  Next hemodialysis scheduled for Monday 1/27.  Continue to challenge dry weight as appropriate, may be nearing appropriate EDW with last postweight 51.5 kg.  EF: 20% by recent echocardiogram in January 21 with global hypokinesis.

## 2025-01-26 NOTE — ASSESSMENT & PLAN NOTE
Lab Results   Component Value Date    EGFR 12 01/26/2025    EGFR 18 01/25/2025    EGFR 12 01/24/2025    CREATININE 4.57 (H) 01/26/2025    CREATININE 3.27 (H) 01/25/2025    CREATININE 4.56 (H) 01/24/2025   Patient on dialysis MWF -noncompliant with going, wife says he misses at least once a week  Presenting with bilateral pleural effusions  Currently on torsemide on nondialysis days  HD as scheduled  Nephrology consult: Due to infected tunneled dialysis catheter removal on 1/21, temporary dialysis catheter with interventional radiology placed on 1/22 and then get back to schedule on Monday, Wednesday, Friday.  Can replace PermaCath after blood cultures are negative for 48 to 72 hours.

## 2025-01-26 NOTE — ASSESSMENT & PLAN NOTE
Current hemoglobin  is 7.6 on January 53.  Will monitor: In place on Epogen 6000 units with each treatment  Cannot give intravenous iron in the setting of sepsis; noted iron studies from January 10 showed ferritin 244 and iron saturation of 8.

## 2025-01-26 NOTE — ASSESSMENT & PLAN NOTE
Gram-positive cocci bacteremia currently on antibiotics followed by ID recommended removal of tunneled dialysis catheter which has been done on January 21 with placement of temporary dialysis catheter on January 22.  It is noted that repeat blood cultures were positive from January 22 and a repeat set of blood cultures were ordered on January 23.  If they end up being positive as per ID likely will need DEWEY.  Until blood cultures are negative maintaining Saulo catheter for hemodialysis access.    Administrative Statements   VIRTUAL CARE DOCUMENTATION:     1. This service was provided via Telemedicine using [employer]     2. Parties in the room with patient during teleconsult Patient only    3. Confidentiality My office door was closed     4. Participants No one else was in the room    5. Patient acknowledged consent and understanding of privacy and security of the  Telemedicine consult. I informed the patient that I have reviewed their record in Epic and presented the opportunity for them to ask any questions regarding the visit today.  The patient agreed to participate.    6. I have spent a total time of approximately 35 minutes in caring for this patient on the day of the visit/encounter including documentation of medical decision making, creation/modification of assessment and plan, placement of orders, review of the medical record, patient interview and visit, not including the time spent for establishing the audio/video connection.

## 2025-01-26 NOTE — ASSESSMENT & PLAN NOTE
Lab Results   Component Value Date    EGFR 12 01/26/2025    EGFR 18 01/25/2025    EGFR 12 01/24/2025    CREATININE 4.57 (H) 01/26/2025    CREATININE 3.27 (H) 01/25/2025    CREATININE 4.56 (H) 01/24/2025

## 2025-01-26 NOTE — ASSESSMENT & PLAN NOTE
Malnutrition Findings:   Adult Malnutrition type: Chronic illness  Adult Degree of Malnutrition: Malnutrition of moderate degree  Malnutrition Characteristics: Muscle loss, Fat loss                  360 Statement: malnutrition of moderate degree in setting of chronic illness, evidenced by hollowing orbitals/dark circles, muscle wasting/indented temples, protruding clavicles, treated with diet and supplements    BMI Findings:           Body mass index is 19.29 kg/m².

## 2025-01-26 NOTE — PROGRESS NOTES
Progress Note - Nephrology   Name: Matthew Alvarez 69 y.o. male I MRN: [id number]  Unit/Bed#: -01 I Date of Admission: 1/20/2025   Date of Service: 1/26/2025 I Hospital Day: 6    Assessment & Plan  ESRD (end stage renal disease) on dialysis (HCC)  MWF at [facility]  The patient's last hemodialysis was January 25 and his next dialysis would likely be on Monday to return to usual MWF schedule.  We are further adjusting dry weight on this patient last weight being approximately 51.5 kg.  Access: Patient with infected tunneled dialysis catheter removed 1/21 and Saulo catheter was placed on Wednesday, January 22.  Appreciate surgical help with removal of tunneled dialysis catheter on January 21 as well as IR help with placement of the temporary Saulo dialysis catheter on January 22.  Will maintain current temporary dialysis catheter until blood cultures are negative.  Most recent blood cultures negative on 1/23.  Most recent appreciate ID help in that regard.    The patient was also started on heparin January 23 given the presence of occlusive thrombus of the right IJ at the proximal neck.  Appreciate hospitalist service help and input.  Primary hypertension  Blood pressure stable, -167 today, 1/26. Maintain current medication regimen with blood pressure hold parameters.  The patient currently on metoprolol 25 mg every 12 hours and isosorbide mononitrate 120 mg daily.   Home medications: Hydralazine 100 mg every 8 hours/carvedilol 25 twice a day/amlodipine 5 mg daily/torsemide 100 mg on nondialysis days being held at this time  Hyponatremia  Sodium 131 mmol/L, 1/26. Secondary to ESRD: Placed on 1.5 L fluid restriction along with HD/UF.  This is also likely an aspect of significant cardiomyopathy with an ejection fraction of 20% with global hypokinesis.   Chronic kidney disease-mineral and bone disorder (CKD-MBD)  Will monitor phosphorus currently no binders, most recent phosphorus level is 2.0 in  January 23.  Encouraging oral intake.  Anemia due to chronic kidney disease, on chronic dialysis (HCC)  Current hemoglobin  is 7.6 on 1/26.  Will monitor: In place on Epogen 6000 units with each treatment  Cannot give intravenous iron in the setting of sepsis; noted iron studies from January 10 showed ferritin 244 and iron saturation of 8.  Acute on chronic systolic heart failure (HCC)  Over the past few admissions we have been able to decrease estimated dry weight to around 53 kg and wt was challenged 1/25 with post dialysis weight 51.5 kg.  There is also likely an aspect of total body weight decrease as well as decrease in fluid weight.  I believe a few admissions ago the patient's weight had been as high as 73 kg and the patient had required HD/UF on almost daily basis during that time.  Next hemodialysis scheduled for Monday 1/27.  Continue to challenge dry weight as appropriate, may be nearing appropriate EDW with last postweight 51.5 kg.  EF: 20% by recent echocardiogram in January 21 with global hypokinesis.    COVID  The patient is being given focal directed treatment and is being given intravenous remdesivir.    Sepsis ([facility])  Gram-positive cocci bacteremia currently on antibiotics followed by ID recommended removal of tunneled dialysis catheter which has been done on January 21 with placement of temporary dialysis catheter on January 22.  It is noted that repeat blood cultures were positive from January 22 and a repeat set of blood cultures were ordered on January 23.  If they end up being positive as per ID likely will need DEWEY.  Until blood cultures are negative maintaining Saulo catheter for hemodialysis access.    Administrative Statements   VIRTUAL CARE DOCUMENTATION:     1. This service was provided via Telemedicine using [employer]     2. Parties in the room with patient during teleconsult Patient only    3. Confidentiality My office door was closed     4. Participants No one else was in the  room    5. Patient acknowledged consent and understanding of privacy and security of the  Telemedicine consult. I informed the patient that I have reviewed their record in Epic and presented the opportunity for them to ask any questions regarding the visit today.  The patient agreed to participate.    6. I have spent a total time of approximately 35 minutes in caring for this patient on the day of the visit/encounter including documentation of medical decision making, creation/modification of assessment and plan, placement of orders, review of the medical record, patient interview and visit, not including the time spent for establishing the audio/video connection.      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       I have reviewed the nephrology recommendations including hemodialysis schedule and PermCath placed for last and we are in agreement with renal plan including the information outlined above.     Subjective   Brief History of Admission - 69 y.o. male with a PMH of ESRD on HD MWF, HFrEF s/p LifeVest, HTN, CAD who presented to [facility] 1/20 with concern for worsening shortness of breath. Wife advised he has been noncompliant with medications and outpatient hemodialysis schedule. Nephrology is following for ESRD on HD.      Patient is examined resting in bed.  Reiterated to patient that he will require a new PermCath and removal of temp cath prior to discharge when he has been cleared by  ID.      Objective :  Temp:  [97.6 °F (36.4 °C)-98.8 °F (37.1 °C)] 97.6 °F (36.4 °C)  HR:  [73-79] 73  BP: (122-168)/(68-81) 122/76  Resp:  [16-18] 18  SpO2:  [92 %-98 %] 95 %  O2 Device: None (Room air)    Current Weight: Weight - Scale: 54.2 kg (119 lb 7.8 oz)  First Weight: Weight - Scale: 55.4 kg (122 lb 2.2 oz)  I/O         01/24 0701  01/25 0700 01/25 0701  01/26 0700 01/26 0701  01/27 0700    P.O. 120 300     I.V. (mL/kg) 500 (9.3)      Total Intake(mL/kg) 620 (11.6) 300 (5.5)     Other 3047      Stool 0      Total Output 3047      Net -2427 +300            Unmeasured Urine Occurrence 1 x      Unmeasured Stool Occurrence 2 x            Physical Exam  Vitals and nursing note reviewed.   Constitutional:       General: He is not in acute distress.     Appearance: Normal appearance. He is well-developed and normal weight.   HENT:      Head: Normocephalic and atraumatic.      Right Ear: External ear normal.      Left Ear: External ear normal.      Nose: Nose normal.      Mouth/Throat:      Mouth: Mucous membranes are moist.      Pharynx: Oropharynx is clear.   Eyes:      Extraocular Movements: Extraocular movements intact.      Conjunctiva/sclera: Conjunctivae normal.      Pupils: Pupils are equal, round, and reactive to light.   Cardiovascular:      Comments: Virtual visit, unable to assess  Pulmonary:      Effort: Pulmonary effort is normal. No respiratory distress.      Comments: Virtual visit, unable to assess.  Patient denies shortness of breath, no dyspnea during conversation.  Per RN patient has rhonchi on auscultation.  Musculoskeletal:      Cervical back: Normal range of motion.      Right lower leg: Edema present.      Left lower leg: Edema present.      Comments: Virtual visit, unable to assess.  Per RN, patient has 1+ BLE edema.   Neurological:      General: No focal deficit present.      Mental Status: He is alert and oriented to person, place, and time.   Psychiatric:         Mood and Affect: Mood  normal.         Behavior: Behavior normal.         Medications:    Current Facility-Administered Medications:     acetaminophen (TYLENOL) tablet 650 mg, 650 mg, Oral, Q6H PRN, Kerri Chisholm PA-C    [Held by provider] amLODIPine (NORVASC) tablet 5 mg, 5 mg, Oral, Daily, Kerri Chisholm PA-C    atorvastatin (LIPITOR) tablet 20 mg, 20 mg, Oral, Daily With Dinner, Kerri Chisholm PA-C, 20 mg at 01/25/25 1723    ceFAZolin (ANCEF) IVPB (premix in dextrose) 1,000 mg 50 mL, 1,000 mg, Intravenous, Q24H, Leonarda Simmons PA-C, Last Rate: 100 mL/hr at 01/25/25 1723, 1,000 mg at 01/25/25 1723    Diclofenac Sodium (VOLTAREN) 1 % topical gel 2 g, 2 g, Topical, 4x Daily, Caty Calvo PA-C, 2 g at 01/26/25 1306    epoetin loida (EPOGEN,PROCRIT) injection 5,000 Units, 5,000 Units, Intravenous, Once per day on Monday Wednesday Friday, Kendall Guerra MD, 5,000 Units at 01/24/25 1726    folic acid (FOLVITE) tablet 1 mg, 1 mg, Oral, Daily, Kerri Chisholm PA-C, 1 mg at 01/26/25 0941    heparin (porcine) 25,000 units in 0.45% NaCl 250 mL infusion (premix), 3-30 Units/kg/hr (Order-Specific), Intravenous, Titrated, Leonarda Simmons PA-C, Last Rate: 8.5 mL/hr at 01/26/25 0527, 17 Units/kg/hr at 01/26/25 0527    heparin (porcine) injection 2,000 Units, 2,000 Units, Intravenous, Q6H PRN, Leonarda Simmons PA-C, 2,000 Units at 01/24/25 1513    heparin (porcine) injection 4,000 Units, 4,000 Units, Intravenous, Q6H PRN, Leonarda Simmons PA-C    [Held by provider] hydrALAZINE (APRESOLINE) tablet 100 mg, 100 mg, Oral, Q8H JEEVAN, Kerri Chisholm PA-C, 100 mg at 01/20/25 2125    isosorbide mononitrate (IMDUR) 24 hr tablet 120 mg, 120 mg, Oral, Daily, Kerri Chisholm PA-C, 120 mg at 01/26/25 0940    melatonin tablet 6 mg, 6 mg, Oral, HS, Kerri Chisholm PA-C, 6 mg at 01/25/25 2127    metoprolol (LOPRESSOR) injection 2.5 mg, 2.5 mg, Intravenous, Q6H PRN, Kerri Chisholm PA-C    metoprolol tartrate (LOPRESSOR) tablet 25 mg, 25 mg, Oral, Q12H [facility], Danny Degroot MD,  "25 mg at 01/26/25 0941      Lab Results: I have reviewed the following results:  Results from last 7 days   Lab Units 01/26/25  0442 01/26/25  0432 01/25/25  0343 01/24/25  0755 01/23/25  0952 01/22/25  0456 01/21/25  0439 01/20/25  1543   WBC Thousand/uL 6.96  --  6.63 7.80 7.73 8.75 9.14 8.76   HEMOGLOBIN g/dL 7.6*  --  7.6* 7.5* 7.6* 8.4* 9.6* 9.2*   HEMATOCRIT % 22.1*  --  22.2* 21.7* 22.2* 24.5* 27.9* 27.9*   PLATELETS Thousands/uL 105*  --  96* 90* 79* 75* 87* 93*   POTASSIUM mmol/L  --  3.5 3.6 3.7 3.8 4.0 4.3 4.5   CHLORIDE mmol/L  --  98 96 95* 93* 95* 94* 92*   CO2 mmol/L  --  27 29 30 25 27 26 24   BUN mg/dL  --  45* 29* 45* 76* 53* 80* 77*   CREATININE mg/dL  --  4.57* 3.27* 4.56* 6.82* 5.84* 8.44* 8.07*   CALCIUM mg/dL  --  7.7* 7.7* 7.8* 8.0* 8.1* 8.3* 8.6   MAGNESIUM mg/dL  --   --   --   --  2.0 1.9  --  2.1   ALBUMIN g/dL  --   --   --   --   --   --  2.6* 3.0*       Administrative Statements     Portions of the record may have been created with voice recognition software. Occasional wrong word or \"sound a like\" substitutions may have occurred due to the inherent limitations of voice recognition software. Read the chart carefully and recognize, using context, where substitutions have occurred.If you have any questions, please contact the dictating provider.  "

## 2025-01-26 NOTE — PROGRESS NOTES
Progress Note - Hospitalist   Name: Matthew Alvarez 69 y.o. male I MRN: [id number]  Unit/Bed#: -01 I Date of Admission: 1/20/2025   Date of Service: 1/26/2025 I Hospital Day: 6    Assessment & Plan  Sepsis (HCC)  Meeting sepsis criteria secondary to temperature 101.4 °F, HR 97 bpm  Lactic acid 3.2  In setting of COVID, bacteremia from unclear source at this time-possibly bacterial pneumonia versus skin infection (pustule over HD cath as well as chronic skin lesions of lower extremities)  Blood cultures positive for 2 out of 2 gram-positive cocci in clusters  Identification panel with staph aureus  MRSA negative  ID recommending cefazolin 1 g every 24 hours  Bacteremia  Patient with 2 out of 2 blood cultures positive for gram-positive cocci in clusters  Identification panel with Staph aureus   HD cath removed 1/21   Repeat BC 1/22 positive x2  Repeat BC 1/23 NG 48 hours   No vegetation noted on echo  RUE + for internal jugular vein thrombus - likely the cause of persistent bacteremia  ID consult: cefazolin 1g q24h, anticipate 6 weeks of abx. Once repeat BC are negative 48-72 can replace permacath.  IR consult placed for 1/27  Acute on chronic systolic heart failure (HCC)  Wt Readings from Last 3 Encounters:   01/26/25 54.2 kg (119 lb 7.8 oz)   01/15/25 56.5 kg (124 lb 9.6 oz)   12/27/24 56 kg (123 lb 6.4 oz)     Patient presents with worsening shortness of breath, missed dialysis session today with last session on Friday  Patient's wife endorses that he sometimes is not taking his medication, spits them out or throws them away  CT chest with moderate to large right pleural effusion and moderate left pleural effusion  Echo in November with EF of 20%  Wearing LifeVest initially on presentation, now refusing-monitoring on telemetry  PTA regimen is Coreg, torsemide (on nondialysis days)  BNP >4700  Given 1 dose IV diuretic on admission, HD as scheduled  Daily weights and I/O  COVID  Mild COVID pathway as below    COVID19- positive on 1/20   CT chest bilateral pleural effusions and bilateral groundglass opacities  COVID mild pathway labs   CRP and BNP elevated   CK normal  Trop elevated but downtrended, no chest pain  Completed remdesivir x3 days   OOB TID; ambulation and mobilization strongly encouraged  Supportive care  Atrial fibrillation with RVR (HCC)  Developed in setting of sepsis  Home Coreg on hold secondary to low blood pressure-switch to metoprolol 25 twice daily  As needed low-dose 2.5 Lopressor IV for sustained heart rate greater than 130  Currently on heparin drip due to right IJV thrombus   Will need anticoagulation -will need to be started on renally adjusted Eliquis when able. pending replacement for perm cath, will continue to hold per IR recommendations and continue with heparin drip   Primary hypertension  PTA regimen on Coreg 25 mg twice daily, torsemide 100 mg on nondialysis days, Norvasc 5 mg daily and hydralazine 100 mg 3 times daily  Blood pressure somewhat soft on arrival  BP meds from home on hold-Coreg switched to metoprolol to have less effect on blood pressure but to allow better heart rate control  Currently Lopressor 25 mg twice daily  Anemia due to chronic kidney disease, on chronic dialysis (HCC)  With chronic anemia secondary to renal disease  Is on Epogen and iron infusions with dialysis  Would avoid IV iron due to findings of bacteremia  Continue folic acid daily  Hemoglobin stable  Thrombocytopenia (HCC)  Chronic stable thrombocytopenia in setting of chronic renal disease  ESRD (end stage renal disease) on dialysis (HCC)  Lab Results   Component Value Date    EGFR 12 01/26/2025    EGFR 18 01/25/2025    EGFR 12 01/24/2025    CREATININE 4.57 (H) 01/26/2025    CREATININE 3.27 (H) 01/25/2025    CREATININE 4.56 (H) 01/24/2025   Patient on dialysis MWF -noncompliant with going, wife says he misses at least once a week  Presenting with bilateral pleural effusions  Currently on torsemide on  nondialysis days  HD as scheduled  Nephrology consult: Due to infected tunneled dialysis catheter removal on 1/21, temporary dialysis catheter with interventional radiology placed on 1/22 and then get back to schedule on Monday, Wednesday, Friday.  Can replace PermaCath after blood cultures are negative for 48 to 72 hours.  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)  Subacute to chronic occlusive thrombus in the IJV of proximal neck on upper extremity venous duplex  Start heparin drip  Plan to transition to Eliquis once cleared by interventional radiology  Moderate protein-calorie malnutrition (HCC)  Malnutrition Findings:   Adult Malnutrition type: Chronic illness  Adult Degree of Malnutrition: Malnutrition of moderate degree  Malnutrition Characteristics: Muscle loss, Fat loss                  360 Statement: malnutrition of moderate degree in setting of chronic illness, evidenced by hollowing orbitals/dark circles, muscle wasting/indented temples, protruding clavicles, treated with diet and supplements    BMI Findings:           Body mass index is 19.29 kg/m².       VTE Pharmacologic Prophylaxis:   High Risk (Score >/= 5) - Pharmacological DVT Prophylaxis Ordered: heparin drip. Sequential Compression Devices Ordered.    Mobility:   Basic Mobility Inpatient Raw Score: 11  JH-HLM Goal: 4: Move to chair/commode  JH-HLM Achieved: 2: Bed activities/Dependent transfer  JH-HLM Goal NOT achieved. Continue with multidisciplinary rounding and encourage appropriate mobility to improve upon JH-HLM goals.    Patient Centered Rounds:  I performed bedside rounds with nursing staff today.   Discussions with Specialists or Other Care Team Provider: BAKARI    Education and Discussions with Family / Patient:  Attempted call to patient's son/wife who share a home phone number-voicemail box is full unable to leave voicemail.     Current Length of Stay: 6 day(s)  Current Patient Status: Inpatient   Certification Statement: The patient will continue to require additional inpatient hospital stay due to bacteremia, pending PermCath  Discharge Plan: Anticipate discharge in 24-48 hrs to rehab facility.    Code Status: Level 1 - Full Code    Subjective   Seen and examined.  Patient hopeful to go home soon.  Understanding need for new PermCath and IV antibiotics long-term.  Denies any new issues today    Objective :  Temp:  [98.1 °F (36.7 °C)-98.8 °F (37.1 °C)] 98.2 °F (36.8 °C)  HR:  [68-79] 73  BP: (150-168)/(68-81) 167/78  Resp:  [16-18] 18  SpO2:  [92 %-98 %] 95 %  O2 Device: None (Room air)    Body mass index is 19.29 kg/m².     Input and Output Summary (last 24 hours):     Intake/Output Summary (Last 24 hours) at 1/26/2025 0801  Last data filed at 1/25/2025 2101  Gross per 24 hour   Intake 300 ml   Output --   Net 300 ml       Physical Exam  Vitals and nursing note reviewed.   Constitutional:       General: He is not in acute distress.     Appearance: Normal appearance. He is ill-appearing.   HENT:      Head: Normocephalic and atraumatic.      Nose: No congestion.      Mouth/Throat:      Mouth: Mucous membranes are moist.      Pharynx: Oropharynx is clear.   Eyes:      Conjunctiva/sclera: Conjunctivae normal.   Cardiovascular:      Rate and Rhythm: Normal rate.      Pulses: Normal pulses.   Pulmonary:      Effort: Pulmonary effort is normal. No respiratory distress.      Comments: Diminished breath sounds  Abdominal:      General: Bowel sounds are normal.      Palpations: Abdomen is soft.      Tenderness: There is no abdominal tenderness.   Musculoskeletal:          General: Normal range of motion.      Cervical back: Normal range of motion.      Right lower leg: No edema.      Left lower leg: No edema.   Skin:     General: Skin is warm and dry.   Neurological:      Mental Status: He is alert and oriented to person, place, and time.   Psychiatric:         Mood and Affect: Mood normal.         Behavior: Behavior normal.           Lines/Drains:  Lines/Drains/Airways       Active Status       Name Placement date Placement time Site Days    CVC Central Lines 01/22/25 Double 01/22/25  1416  --  3    HD Temporary Double Catheter 01/22/25  1407  Internal jugular  3                    Central Line:  Goal for removal: N/A - Discharging with PICC for IV ABX/medications         Telemetry:  Telemetry Orders (From admission, onward)               24 Hour Telemetry Monitoring  Continuous x 24 Hours (Telem)        Expiring   Question:  Reason for 24 Hour Telemetry  Answer:  Decompensated CHF- and any one of the following: continuous diuretic infusion or total diuretic dose >200 mg daily, associated electrolyte derangement (I.e. K < 3.0), inotropic drip (continuous infusion), hx of ventricular arrhythmia, or new EF < 35%                     Telemetry Reviewed: Normal Sinus Rhythm  Indication for Continued Telemetry Use: Arrthymias requiring medical therapy               Lab Results: I have reviewed the following results:   Results from last 7 days   Lab Units 01/26/25 0442 01/22/25 0456 01/21/25 0439 01/20/25  1543   WBC Thousand/uL 6.96   < > 9.14 8.76   HEMOGLOBIN g/dL 7.6*   < > 9.6* 9.2*   HEMATOCRIT % 22.1*   < > 27.9* 27.9*   PLATELETS Thousands/uL 105*   < > 87* 93*   BANDS PCT %  --   --  3  --    SEGS PCT %  --   --   --  90*   LYMPHO PCT %  --   --  3* 4*   MONO PCT %  --   --  1* 5   EOS PCT %  --   --  0 0    < > = values in this interval not displayed.     Results from last 7 days   Lab Units 01/26/25  0432 01/22/25 0456 01/21/25  0439   SODIUM mmol/L 131*   < > 133*    POTASSIUM mmol/L 3.5   < > 4.3   CHLORIDE mmol/L 98   < > 94*   CO2 mmol/L 27   < > 26   BUN mg/dL 45*   < > 80*   CREATININE mg/dL 4.57*   < > 8.44*   ANION GAP mmol/L 6   < > 13   CALCIUM mg/dL 7.7*   < > 8.3*   ALBUMIN g/dL  --   --  2.6*   TOTAL BILIRUBIN mg/dL  --   --  1.75*   ALK PHOS U/L  --   --  113*   ALT U/L  --   --  20   AST U/L  --   --  46*   GLUCOSE RANDOM mg/dL 95   < > 93    < > = values in this interval not displayed.     Results from last 7 days   Lab Units 01/23/25  1705   INR  1.56*             Results from last 7 days   Lab Units 01/23/25  0952 01/22/25  0456 01/21/25  0439 01/20/25  1816 01/20/25  1543   LACTIC ACID mmol/L  --   --   --  1.0 3.2*   PROCALCITONIN ng/ml 2.83* 3.13* 3.06*  --  2.72*       Recent Cultures (last 7 days):   Results from last 7 days   Lab Units 01/23/25  1522 01/23/25  1520 01/22/25  0457 01/21/25  1559 01/20/25  1556 01/20/25  1543   BLOOD CULTURE  No Growth at 48 hrs. No Growth at 48 hrs. Staphylococcus aureus*  Staphylococcus aureus*  --  Staphylococcus aureus* Staphylococcus aureus*   GRAM STAIN RESULT   --   --  Gram positive cocci in clusters*  Gram positive cocci in clusters* 4+ Polys*  4+ Gram positive cocci in clusters* Gram positive cocci in clusters* Gram positive cocci in clusters*   BODY FLUID CULTURE, STERILE   --   --   --  3+ Growth of Staphylococcus aureus*  --   --              Last 24 Hours Medication List:     Current Facility-Administered Medications:     acetaminophen (TYLENOL) tablet 650 mg, Q6H PRN    [Held by provider] amLODIPine (NORVASC) tablet 5 mg, Daily    atorvastatin (LIPITOR) tablet 20 mg, Daily With Dinner    ceFAZolin (ANCEF) IVPB (premix in dextrose) 1,000 mg 50 mL, Q24H, Last Rate: 1,000 mg (01/25/25 1723)    Diclofenac Sodium (VOLTAREN) 1 % topical gel 2 g, 4x Daily    epoetin loida (EPOGEN,PROCRIT) injection 5,000 Units, Once per day on Monday Wednesday Friday    folic acid (FOLVITE) tablet 1 mg, Daily    heparin (porcine)  25,000 units in 0.45% NaCl 250 mL infusion (premix), Titrated, Last Rate: 17 Units/kg/hr (01/26/25 0527)    heparin (porcine) injection 2,000 Units, Q6H PRN    heparin (porcine) injection 4,000 Units, Q6H PRN    [Held by provider] hydrALAZINE (APRESOLINE) tablet 100 mg, Q8H JEEVAN    isosorbide mononitrate (IMDUR) 24 hr tablet 120 mg, Daily    melatonin tablet 6 mg, HS    metoprolol (LOPRESSOR) injection 2.5 mg, Q6H PRN    metoprolol tartrate (LOPRESSOR) tablet 25 mg, Q12H JEEVAN    Administrative Statements   Today, Patient Was Seen By: Kerri Chisholm PA-C      **Please Note: This note may have been constructed using a voice recognition system.**

## 2025-01-26 NOTE — ASSESSMENT & PLAN NOTE
Sodium 132 mmol/L, 1/25. Secondary to ESRD: Placed on 1.5 L fluid restriction along with HD/UF.  This is also likely an aspect of significant cardiomyopathy with an ejection fraction of 20% with global hypokinesis.

## 2025-01-26 NOTE — ASSESSMENT & PLAN NOTE
Patient with 2 out of 2 blood cultures positive for gram-positive cocci in clusters  Identification panel with Staph aureus   HD cath removed 1/21   Repeat BC 1/22 positive x2  Repeat BC 1/23 NG 48 hours   No vegetation noted on echo  RUE + for internal jugular vein thrombus - likely the cause of persistent bacteremia  ID consult: cefazolin 1g q24h, anticipate 6 weeks of abx. Once repeat BC are negative 48-72 can replace permacath.  IR consult placed for 1/27

## 2025-01-26 NOTE — ASSESSMENT & PLAN NOTE
Current hemoglobin  is 7.6 on 1/26.  Will monitor: In place on Epogen 6000 units with each treatment  Cannot give intravenous iron in the setting of sepsis; noted iron studies from January 10 showed ferritin 244 and iron saturation of 8.

## 2025-01-26 NOTE — ASSESSMENT & PLAN NOTE
Blood pressure stable, -168 today, 1/25 Maintain current medication regimen with blood pressure hold parameters.  The patient currently here is on metoprolol 25 mg every 12 hours and isosorbide mononitrate 120 mg daily.   Home medications: Hydralazine 100 mg every 8 hours/carvedilol 25 twice a day/amlodipine 5 mg daily/torsemide 100 mg on nondialysis days being held at this time

## 2025-01-26 NOTE — ASSESSMENT & PLAN NOTE
MWF at [facility]  The patient's last hemodialysis was today, January 25 and his next dialysis would likely be on Monday to return to usual MWF schedule.  We are further adjusting dry weight on this patient last weight being approximately 53 kg.  Access: Patient with infected tunneled dialysis catheter removed 1/21 and Saulo catheter was placed on Wednesday, January 22.  Appreciate surgical help with removal of tunneled dialysis catheter on January 21 as well as IR help with placement of the temporary Saulo dialysis catheter on January 22.  Will maintain current temporary dialysis catheter until blood cultures are negative.  Appreciate ID help in that regard.    The patient was also started on heparin January 23 given the presence of occlusive thrombus of the right IJ at the proximal neck.  Appreciate hospitalist service help and input.

## 2025-01-26 NOTE — ASSESSMENT & PLAN NOTE
MWF at [facility]  The patient's last hemodialysis was January 25 and his next dialysis would likely be on Monday to return to usual MWF schedule.  We are further adjusting dry weight on this patient last weight being approximately 51.5 kg.  Access: Patient with infected tunneled dialysis catheter removed 1/21 and Saulo catheter was placed on Wednesday, January 22.  Appreciate surgical help with removal of tunneled dialysis catheter on January 21 as well as IR help with placement of the temporary Saulo dialysis catheter on January 22.  Will maintain current temporary dialysis catheter until blood cultures are negative.  Most recent blood cultures negative on 1/23.  Most recent appreciate ID help in that regard.    The patient was also started on heparin January 23 given the presence of occlusive thrombus of the right IJ at the proximal neck.  Appreciate hospitalist service help and input.

## 2025-01-27 ENCOUNTER — APPOINTMENT (OUTPATIENT)
Dept: INTERVENTIONAL RADIOLOGY/VASCULAR | Facility: HOSPITAL | Age: 70
End: 2025-01-27
Attending: RADIOLOGY
Payer: COMMERCIAL

## 2025-01-27 ENCOUNTER — APPOINTMENT (INPATIENT)
Dept: DIALYSIS | Facility: HOSPITAL | Age: 70
DRG: 314 | End: 2025-01-27
Attending: INTERNAL MEDICINE
Payer: COMMERCIAL

## 2025-01-27 ENCOUNTER — TELEPHONE (OUTPATIENT)
Age: 70
End: 2025-01-27

## 2025-01-27 LAB
ANION GAP SERPL CALCULATED.3IONS-SCNC: 9 MMOL/L (ref 4–13)
APTT PPP: 62 SECONDS (ref 23–34)
APTT PPP: 95 SECONDS (ref 23–34)
BUN SERPL-MCNC: 56 MG/DL (ref 5–25)
CALCIUM SERPL-MCNC: 8.3 MG/DL (ref 8.4–10.2)
CHLORIDE SERPL-SCNC: 96 MMOL/L (ref 96–108)
CO2 SERPL-SCNC: 27 MMOL/L (ref 21–32)
CREAT SERPL-MCNC: 5.77 MG/DL (ref 0.6–1.3)
ERYTHROCYTE [DISTWIDTH] IN BLOOD BY AUTOMATED COUNT: 21.9 % (ref 11.6–15.1)
GFR SERPL CREATININE-BSD FRML MDRD: 9 ML/MIN/1.73SQ M
GLUCOSE SERPL-MCNC: 87 MG/DL (ref 65–140)
HCT VFR BLD AUTO: 23.9 % (ref 36.5–49.3)
HGB BLD-MCNC: 8.3 G/DL (ref 12–17)
MAGNESIUM SERPL-MCNC: 1.9 MG/DL (ref 1.9–2.7)
MCH RBC QN AUTO: 29.5 PG (ref 26.8–34.3)
MCHC RBC AUTO-ENTMCNC: 34.7 G/DL (ref 31.4–37.4)
MCV RBC AUTO: 85 FL (ref 82–98)
PLATELET # BLD AUTO: 117 THOUSANDS/UL (ref 149–390)
PMV BLD AUTO: 12.1 FL (ref 8.9–12.7)
POTASSIUM SERPL-SCNC: 3.8 MMOL/L (ref 3.5–5.3)
RBC # BLD AUTO: 2.81 MILLION/UL (ref 3.88–5.62)
SODIUM SERPL-SCNC: 132 MMOL/L (ref 135–147)
WBC # BLD AUTO: 8.02 THOUSAND/UL (ref 4.31–10.16)

## 2025-01-27 PROCEDURE — 02PAX3Z REMOVAL OF INFUSION DEVICE FROM HEART, EXTERNAL APPROACH: ICD-10-PCS | Performed by: RADIOLOGY

## 2025-01-27 PROCEDURE — 99232 SBSQ HOSP IP/OBS MODERATE 35: CPT | Performed by: INTERNAL MEDICINE

## 2025-01-27 PROCEDURE — 77001 FLUOROGUIDE FOR VEIN DEVICE: CPT

## 2025-01-27 PROCEDURE — C1769 GUIDE WIRE: HCPCS

## 2025-01-27 PROCEDURE — 83735 ASSAY OF MAGNESIUM: CPT

## 2025-01-27 PROCEDURE — 36581 REPLACE TUNNELED CV CATH: CPT

## 2025-01-27 PROCEDURE — 99233 SBSQ HOSP IP/OBS HIGH 50: CPT | Performed by: INTERNAL MEDICINE

## 2025-01-27 PROCEDURE — 02H633Z INSERTION OF INFUSION DEVICE INTO RIGHT ATRIUM, PERCUTANEOUS APPROACH: ICD-10-PCS | Performed by: RADIOLOGY

## 2025-01-27 PROCEDURE — C1750 CATH, HEMODIALYSIS,LONG-TERM: HCPCS

## 2025-01-27 PROCEDURE — 99232 SBSQ HOSP IP/OBS MODERATE 35: CPT

## 2025-01-27 PROCEDURE — 85730 THROMBOPLASTIN TIME PARTIAL: CPT | Performed by: FAMILY MEDICINE

## 2025-01-27 PROCEDURE — G0545 PR INHERENT VISIT TO INPT: HCPCS | Performed by: INTERNAL MEDICINE

## 2025-01-27 PROCEDURE — 85027 COMPLETE CBC AUTOMATED: CPT

## 2025-01-27 PROCEDURE — 36558 INSERT TUNNELED CV CATH: CPT

## 2025-01-27 PROCEDURE — 80048 BASIC METABOLIC PNL TOTAL CA: CPT

## 2025-01-27 RX ORDER — MIDAZOLAM HYDROCHLORIDE 2 MG/2ML
INJECTION, SOLUTION INTRAMUSCULAR; INTRAVENOUS AS NEEDED
Status: COMPLETED | OUTPATIENT
Start: 2025-01-27 | End: 2025-01-27

## 2025-01-27 RX ORDER — CEFAZOLIN SODIUM 1 G/50ML
SOLUTION INTRAVENOUS
Qty: 1850 ML | Refills: 0
Start: 2025-01-27 | End: 2025-03-05

## 2025-01-27 RX ORDER — LIDOCAINE HYDROCHLORIDE AND EPINEPHRINE 10; 10 MG/ML; UG/ML
INJECTION, SOLUTION INFILTRATION; PERINEURAL AS NEEDED
Status: COMPLETED | OUTPATIENT
Start: 2025-01-27 | End: 2025-01-27

## 2025-01-27 RX ORDER — FENTANYL CITRATE 50 UG/ML
INJECTION, SOLUTION INTRAMUSCULAR; INTRAVENOUS AS NEEDED
Status: COMPLETED | OUTPATIENT
Start: 2025-01-27 | End: 2025-01-27

## 2025-01-27 RX ORDER — CEFAZOLIN SODIUM 1 G/50ML
SOLUTION INTRAVENOUS
Qty: 1850 ML | Refills: 0
Start: 2025-01-27 | End: 2025-01-27

## 2025-01-27 RX ORDER — TORSEMIDE 100 MG/1
100 TABLET ORAL 3 TIMES WEEKLY
Status: DISCONTINUED | OUTPATIENT
Start: 2025-01-28 | End: 2025-01-29 | Stop reason: HOSPADM

## 2025-01-27 RX ADMIN — Medication 6 MG: at 22:02

## 2025-01-27 RX ADMIN — ISOSORBIDE MONONITRATE 120 MG: 30 TABLET, EXTENDED RELEASE ORAL at 17:21

## 2025-01-27 RX ADMIN — FENTANYL CITRATE 50 MCG: 50 INJECTION, SOLUTION INTRAMUSCULAR; INTRAVENOUS at 14:20

## 2025-01-27 RX ADMIN — CEFAZOLIN SODIUM 1000 MG: 1 SOLUTION INTRAVENOUS at 17:21

## 2025-01-27 RX ADMIN — HEPARIN SODIUM 17 UNITS/KG/HR: 10000 INJECTION, SOLUTION INTRAVENOUS at 03:25

## 2025-01-27 RX ADMIN — METOPROLOL TARTRATE 25 MG: 25 TABLET, FILM COATED ORAL at 17:21

## 2025-01-27 RX ADMIN — FOLIC ACID 1 MG: 1 TABLET ORAL at 17:21

## 2025-01-27 RX ADMIN — Medication 2 G: at 17:22

## 2025-01-27 RX ADMIN — MIDAZOLAM HYDROCHLORIDE 1 MG: 1 INJECTION, SOLUTION INTRAMUSCULAR; INTRAVENOUS at 14:19

## 2025-01-27 RX ADMIN — ATORVASTATIN CALCIUM 20 MG: 20 TABLET, FILM COATED ORAL at 17:21

## 2025-01-27 RX ADMIN — Medication 2 G: at 22:02

## 2025-01-27 RX ADMIN — EPOETIN ALFA 5000 UNITS: 4000 SOLUTION INTRAVENOUS; SUBCUTANEOUS at 10:46

## 2025-01-27 RX ADMIN — LIDOCAINE HYDROCHLORIDE,EPINEPHRINE BITARTRATE 5 ML: 10; .01 INJECTION, SOLUTION INFILTRATION; PERINEURAL at 14:28

## 2025-01-27 NOTE — ASSESSMENT & PLAN NOTE
Gram-positive cocci bacteremia currently on antibiotics followed by ID recommended removal of tunneled dialysis catheter which has been done on January 21 with placement of temporary dialysis catheter on January 22.  It is noted that repeat blood cultures were positive from January 22 and a repeat set of blood cultures were ordered on January 23.  They are negative; this likely that the patient would need 6 weeks of intravenous antibiotics as per ID note with suspected MSSA.  This should be able to be given on hemodialysis at the dialysis unit but need to confirm this prior to discharge.  We are maintaining Saulo catheter for hemodialysis access and will likely be able to change to a tunneled dialysis catheter this week as blood cultures from January 23 have been negative.  It is noted that IR consult was placed for tunneled dialysis catheter today on January 27.  Appreciate ID input.    Administrative Statements   VIRTUAL CARE DOCUMENTATION:     1. This service was provided via Telemedicine using [employer]     2. Parties in the room with patient during teleconsult Patient only    3. Confidentiality My office door was closed     4. Participants No one else was in the room    5. Patient acknowledged consent and understanding of privacy and security of the  Telemedicine consult. I informed the patient that I have reviewed their record in Epic and presented the opportunity for them to ask any questions regarding the visit today.  The patient agreed to participate.    6. I have spent a total time of approximately 35 minutes in caring for this patient on the day of the visit/encounter including documentation of medical decision making, creation/modification of assessment and plan, placement of orders, review of the medical record, patient interview and visit, not including the time spent for establishing the audio/video connection.

## 2025-01-27 NOTE — PLAN OF CARE
Target UF Goal  2.5  L as tolerated. Patient dialyzing for 3 hours on 4 K bath for serum K of  3.8  per protocol. Treatment plan reviewed with Nephrology.       Post-Dialysis RN Treatment Note    Blood Pressure:  Pre 175/99 mm/Hg  Post 158/99 mmHg   EDW  51.5 kg    Weight:  Pre 50.4 kg   Post 48.2 kg   Mode of weight measurement: Bed Scale   Volume Removed  2000 ml    Treatment duration 3 hours   NS given  No    Treatment shortened? No   Medications given during Rx Epogen 5,000 units IV   Estimated Kt/V  1.22   Access type: Temporary HD catheter   Access Issues: Yes, describe: arterial resistance noted with breathing, ongoing alarm, had to reverse lines    Needle Gauge: N/A   Report called to primary nurse   Yes, Isabela RN via secure chat       Problem: METABOLIC, FLUID AND ELECTROLYTES - ADULT  Goal: Electrolytes maintained within normal limits  Description: INTERVENTIONS:  - Monitor labs and assess patient for signs and symptoms of electrolyte imbalances  - Administer electrolyte replacement as ordered  - Monitor response to electrolyte replacements, including repeat lab results as appropriate  - Instruct patient on fluid and nutrition as appropriate  Outcome: Progressing  Goal: Fluid balance maintained  Description: INTERVENTIONS:  - Monitor labs   - Monitor I/O and WT  - Instruct patient on fluid and nutrition as appropriate  - Assess for signs & symptoms of volume excess or deficit  Outcome: Progressing

## 2025-01-27 NOTE — ASSESSMENT & PLAN NOTE
Lab Results   Component Value Date    EGFR 9 01/27/2025    EGFR 12 01/26/2025    EGFR 18 01/25/2025    CREATININE 5.77 (H) 01/27/2025    CREATININE 4.57 (H) 01/26/2025    CREATININE 3.27 (H) 01/25/2025

## 2025-01-27 NOTE — ASSESSMENT & PLAN NOTE
Patient with 2 out of 2 blood cultures positive for gram-positive cocci in clusters  Identification panel with Staph aureus   HD cath removed 1/21   Repeat BC 1/22 positive x2  Repeat BC 1/23 NG 72 hours   No vegetation noted on echo  RUE + for internal jugular vein thrombus - likely the cause of persistent bacteremia  ID consult: 6-week IV antibiotic course as above  IR consult placed for 1/27 -PermCath to be placed today

## 2025-01-27 NOTE — ASSESSMENT & PLAN NOTE
The patient is being given focal directed treatment and is status post intravenous remdesivir during this admission.

## 2025-01-27 NOTE — PROGRESS NOTES
Progress Note - Infectious Disease   Name: Matthew Alvarez 69 y.o. male I MRN: [id number]  Unit/Bed#: -01 I Date of Admission: 1/20/2025   Date of Service: 1/27/2025 I Hospital Day: 7      Administrative Statements   VIRTUAL CARE DOCUMENTATION:     1. This service was provided via Telemedicine using [employer]     2. Parties in the room with patient during teleconsult Patient only    3. Confidentiality My office door was closed     4. Participants No one else was in the room    5. Patient acknowledged consent and understanding of privacy and security of the  Telemedicine consult. I informed the patient that I have reviewed their record in Epic and presented the opportunity for them to ask any questions regarding the visit today.  The patient agreed to participate.    6. I have spent a total time of 50 minutes in caring for this patient on the day of the visit/encounter including Diagnostic results, Risk factor reductions, Impressions, Counseling / Coordination of care, Documenting in the medical record, Reviewing / ordering tests, medicine, procedures  , Obtaining or reviewing history  , and Communicating with other healthcare professionals , not including the time spent for establishing the audio/video connection.       Assessment & Plan  Sepsis (HCC)  Fever, tachycardia with lactic acidosis.  Due to catheter exit site infection and secondary MSSA bacteremia. Bacteremia was slow to clear despite line removal, possibly due to seeding of associated RIJ thrombosis.  Patient also noted to have acute COVID infection but without pneumonia on chest imaging  Afebrile, hemodynamically stable without leukocytosis     Antibiotics and additional management as below   Check daily CBC, CMP while inpatient to monitor for any evolving antibiotic toxicity or treatment failure   Continue supportive care, monitor clinical course    Bacteremia  Both sets of blood cultures from admission with probable MSSA. Secondary to  catheter exit site infection s/p line removal on 1/21 with purulent drainage from insertion site , cx with MSSA  Doppler notes associated R IJ thrombosis which is likely cause for delayed clearance of bacteremia despite catheter removal  TTE is without vegetation   Temporary catheter placed in L IJ on 1/22   Patient has no other indwelling vascular or prosthetic devices. He has a superficial weeping wound on his LLE but without purulence or cellulitis appreciated on media pics. 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 CT chest with with diffuse patchy groundglass opacities throughout both lungs which may represent pneumonitis versus pulmonary edema.  Has underlying COPD, chronic tobacco use  Stable on room air     Completed remdesivir x 3 days, additional management per protocol   Continue isolation precautions  ESRD (end stage renal disease) on dialysis (HCC)  Lab Results   Component Value Date    EGFR 12 01/26/2025    EGFR 18 01/25/2025    EGFR 12 01/24/2025    CREATININE 4.57 (H) 01/26/2025    CREATININE 3.27 (H) 01/25/2025    CREATININE 4.56 (H) 01/24/2025     S/p right IJ permacath removal, temporary catheter replaced in left IJV. Patient has had a  functional decline and has had multiple missed dialysis sessions as an outpatient     Catheter management as above, additional management per nephrology   Recommend ongoing goals of care discussion, consider palliative care referral as outpatient     Acute on chronic systolic heart failure (HCC)  Wt Readings from Last 3 Encounters:   25 50.4 kg (111 lb 3.2 oz)   01/15/25 56.5 kg (124 lb 9.6 oz)   24 56 kg (123 lb 6.4 oz)     Has underlying nonischemic cardiomyopathy, ESRD, A-fib with RVR and chronic pleural effusions requiring thoracentesis in the past  Moderate protein-calorie malnutrition (HCC)  Malnutrition Findings:   Adult Malnutrition type: Chronic illness  Adult Degree of Malnutrition: Malnutrition of moderate degree  Malnutrition Characteristics: Muscle loss, Fat loss                  360 Statement: malnutrition of moderate degree in setting of chronic illness, evidenced by hollowing orbitals/dark circles, muscle wasting/indented temples, protruding clavicles, treated with diet and supplements    BMI Findings:           Body mass index is 17.95 kg/m².           Subjective:  The patient has no complaints. Tolerating dialysis today.  Denies fevers, chills, or sweats.  Denies nausea, vomiting, or diarrhea.    Antibiotics:  cefazolin    Physical Exam     Temp:  [97.6 °F (36.4 °C)-98.4 °F (36.9 °C)] 97.7 °F (36.5 °C)  HR:  [68-82] 69  Resp:  [17-19] 17  BP: (115-168)/(72-90) 160/81  SpO2:  [93 %-96 %] 93 %  Temp (24hrs), Av.9 °F (36.6 °C), Min:97.6 °F (36.4 °C), Max:98.4 °F (36.9 °C)  Current: Temperature: 97.7 °F (36.5 °C)    Intake/Output Summary (Last 24 hours) at 2025 0991  Last data filed at 2025 1700  Gross per 24 hour   Intake 120 ml   Output --   Net 120 ml       Physical exam findings reported by bedside and primary medical team staff      General Appearance:  Appearing frail and chronically ill, nontoxic, and in no distress, appears stated age   Lungs:   Coarse and rhonchorous  to auscultation bilaterally, no audible wheezes, respirations unlabored   Heart:  Irregular rate and rhythm, S1, S2 normal, no murmur, rub or gallop   Abdomen:   Soft, non-tender, non-distended, positive bowel sounds, no masses, no organomegaly    No CVA tenderness   Extremities: Extremities normal, atraumatic, no cyanosis, clubbing , 3+ b/l 2+ LE edema   Skin: Scattered bruises, sacral erythema, LLE in dressing   Neurologic: Alert and oriented times 2, flat and withdrawn affect         Invasive Devices:   Peripheral IV 01/20/25 Left Antecubital (Active)   Site Assessment [facility] 01/21/25 1930   Dressing Type Transparent 01/21/25 1930   Line Status Flushed 01/21/25 1930   Dressing Status Clean;Dry;Intact 01/21/25 1930   Dressing Change Due 01/24/25 01/21/25 1930   Reason Not Rotated Not due 01/21/25 1930       Peripheral IV 01/20/25 Left;Ventral (anterior) Forearm (Active)   Site Assessment [facility] 01/21/25 1930   Dressing Type Transparent 01/21/25 1930   Line Status Flushed;Infusing 01/21/25 1930   Dressing Status Clean;Dry;Intact 01/21/25 1930   Dressing Change Due 01/24/25 01/21/25 1930   Reason Not Rotated Not due 01/21/25 1930       HD Permanent Double Catheter (Active)   Reasons to continue HD Cath Treatment Therapy 01/21/25 0910   Goal for Removal N/A- chronic HD catheter 01/21/25 0910   Line Necessity Reviewed Yes, reviewed with provider 01/21/25 0910   Site Assessment Other (Comment) 01/21/25 1215   Proximal Lumen Status Flushed & Clamped;Normal saline locked;Passive disinfecting cap applied 01/21/25 1215   Distal Lumen Status Flushed & Clamped;Normal saline locked;Passive disinfecting cap applied 01/21/25 1215   Dressing Type Chlorhexidine dressing 01/21/25 1215   Dressing Status Clean;Dry;Intact 01/21/25 1215   Dressing Intervention Dressing changed 01/21/25 0910   Dressing Change Due 01/27/25 01/21/25 1215       Labs, Imaging, & Other Studies     Lab Results:    I have personally reviewed pertinent  labs.    Results from last 7 days   Lab Units 01/27/25  0909 01/26/25  0442 01/25/25  0343   WBC Thousand/uL 8.02 6.96 6.63   HEMOGLOBIN g/dL 8.3* 7.6* 7.6*   PLATELETS Thousands/uL 117* 105* 96*     Results from last 7 days   Lab Units 01/26/25  0432 01/22/25  0456 01/21/25  0439 01/20/25  1543   POTASSIUM mmol/L 3.5   < > 4.3 4.5   CHLORIDE mmol/L 98   < > 94* 92*   CO2 mmol/L 27   < > 26 24   BUN mg/dL 45*   < > 80* 77*   CREATININE mg/dL 4.57*   < > 8.44* 8.07*   EGFR ml/min/1.73sq m 12   < > 5 6   CALCIUM mg/dL 7.7*   < > 8.3* 8.6   AST U/L  --   --  46* 57*   ALT U/L  --   --  20 21   ALK PHOS U/L  --   --  113* 126*    < > = values in this interval not displayed.     Results from last 7 days   Lab Units 01/23/25  1522 01/23/25  1520 01/22/25  0457 01/21/25  1559 01/20/25  2026 01/20/25  1556 01/20/25  1543   BLOOD CULTURE  No Growth at 72 hrs. No Growth at 72 hrs. Staphylococcus aureus*  Staphylococcus aureus*  --   --  Staphylococcus aureus* Staphylococcus aureus*   GRAM STAIN RESULT   --   --  Gram positive cocci in clusters*  Gram positive cocci in clusters* 4+ Polys*  4+ Gram positive cocci in clusters*  --  Gram positive cocci in clusters* Gram positive cocci in clusters*   BODY FLUID CULTURE, STERILE   --   --   --  3+ Growth of Staphylococcus aureus*  --   --   --    MRSA CULTURE ONLY   --   --   --   --  No Methicillin Resistant Staphlyococcus aureus (MRSA) isolated  --   --        Imaging Studies:   I have personally reviewed pertinent imaging study reports and images in PACS.      EKG, Pathology, and Other Studies:   I have personally reviewed pertinent reports.        Counseling/Coordination of care:       Total 50 minutes in evaluation of the patient and communication with the patient via telehealth of which 30 minutes was in counseling/coordination of care.  Extensive review of the medical records in epic including review of the notes, radiographs, and laboratory results.  My recommendations  were discussed with the patient in detail who verbalized understanding.

## 2025-01-27 NOTE — ASSESSMENT & PLAN NOTE
Malnutrition Findings:   Adult Malnutrition type: Chronic illness  Adult Degree of Malnutrition: Malnutrition of moderate degree  Malnutrition Characteristics: Muscle loss, Fat loss                  360 Statement: malnutrition of moderate degree in setting of chronic illness, evidenced by hollowing orbitals/dark circles, muscle wasting/indented temples, protruding clavicles, treated with diet and supplements    BMI Findings:           Body mass index is 17.95 kg/m².

## 2025-01-27 NOTE — ASSESSMENT & PLAN NOTE
Both sets of blood cultures from admission with probable MSSA. Secondary to catheter exit site infection s/p line removal on 1/21 with purulent drainage from insertion site , cx with MSSA  Doppler notes associated R IJ thrombosis which is likely cause for delayed clearance of bacteremia despite catheter removal  TTE is without vegetation   Temporary catheter placed in L IJ on 1/22   Patient has no other indwelling vascular or prosthetic devices. He has a superficial weeping wound on his LLE but without purulence or cellulitis appreciated on media pics. No other metastatic infection  Clinically improving     Continue cefazolin 1g q24h   FU repeat blood cx    OK to replace permacath   Anticipate 6 weeks of iv antibiotics through 3/5, as outpatient cefazolin can be transitioned to be administered with dialysis as 2g/2g/3g on patient's outpatient schedule of M/W/F or Tu/Th/Sat   Will need weekly CBC, CMP while on iv abx   Will need ID fu within 2 weeks of discharge   Patient has a hx missed dialysis sessions and intermittent compliance, will need to stress importance of adherence to dialysis and abx as well as goals of care discussion

## 2025-01-27 NOTE — ASSESSMENT & PLAN NOTE
Over the past few admissions we have been able to decrease estimated dry weight to around 53 kg and wt was challenged 1/25 with post dialysis weight 51.5 kg.  There is also likely an aspect of total body weight decrease as well as decrease in fluid weight.  few admissions ago the patient's weight had been as high as 73 kg and the patient had required HD/UF on almost daily basis during that time.  Will continue to challenge and adjust dry weight as needed.  Next hemodialysis scheduled for today on Monday 1/27.  Continue to challenge dry weight as appropriate, may be nearing appropriate EDW with last postweight 51.5 kg.  It is noted that this morning his weight is 50.5 kg although it is via bed scale.  Will see what it is later this morning and adjust further EDW accordingly.  EF: 20% by recent echocardiogram in January 21 with global hypokinesis.

## 2025-01-27 NOTE — PLAN OF CARE
Problem: Nutrition/Hydration-ADULT  Goal: Nutrient/Hydration intake appropriate for improving, restoring or maintaining nutritional needs  Description: Monitor and assess patient's nutrition/hydration status for malnutrition. Collaborate with interdisciplinary team and initiate plan and interventions as ordered.  Monitor patient's weight and dietary intake as ordered or per policy. Utilize nutrition screening tool and intervene as necessary. Determine patient's food preferences and provide high-protein, high-caloric foods as appropriate.     INTERVENTIONS:  - Monitor oral intake, urinary output, labs, and treatment plans  - Assess nutrition and hydration status and recommend course of action  - Evaluate amount of meals eaten  - Assist patient with eating if necessary   - Allow adequate time for meals  - Recommend/ encourage appropriate diets, oral nutritional supplements, and vitamin/mineral supplements  - Order, calculate, and assess calorie counts as needed  - Recommend, monitor, and adjust tube feedings and TPN/PPN based on assessed needs  - Assess need for intravenous fluids  - Provide specific nutrition/hydration education as appropriate  - Include patient/family/caregiver in decisions related to nutrition  Outcome: Progressing     Problem: PAIN - ADULT  Goal: Verbalizes/displays adequate comfort level or baseline comfort level  Description: Interventions:  - Encourage patient to monitor pain and request assistance  - Assess pain using appropriate pain scale  - Administer analgesics based on type and severity of pain and evaluate response  - Implement non-pharmacological measures as appropriate and evaluate response  - Consider cultural and social influences on pain and pain management  - Notify physician/advanced practitioner if interventions unsuccessful or patient reports new pain  Outcome: Progressing     Problem: INFECTION - ADULT  Goal: Absence or prevention of progression during  hospitalization  Description: INTERVENTIONS:  - Assess and monitor for signs and symptoms of infection  - Monitor lab/diagnostic results  - Monitor all insertion sites, i.e. indwelling lines, tubes, and drains  - Monitor endotracheal if appropriate and nasal secretions for changes in amount and color  - [place] appropriate cooling/warming therapies per order  - Administer medications as ordered  - Instruct and encourage patient and family to use good hand hygiene technique  - Identify and instruct in appropriate isolation precautions for identified infection/condition  Outcome: Progressing  Goal: Absence of fever/infection during neutropenic period  Description: INTERVENTIONS:  - Monitor WBC    Outcome: Progressing     Problem: SAFETY ADULT  Goal: Patient will remain free of falls  Description: INTERVENTIONS:  - Educate patient/family on patient safety including physical limitations  - Instruct patient to call for assistance with activity   - Consult OT/PT to assist with strengthening/mobility   - Keep Call bell within reach  - Keep bed low and locked with side rails adjusted as appropriate  - Keep care items and personal belongings within reach  - Initiate and maintain comfort rounds  - Make Fall Risk Sign visible to staff  - Offer Toileting every 2 Hours, in advance of need  - Initiate/Maintain bed and chair alarm  - Obtain necessary fall risk management equipment: walker   - Apply yellow socks and bracelet for high fall risk patients  - Consider moving patient to room near nurses station  Outcome: Progressing  Goal: Maintain or return to baseline ADL function  Description: INTERVENTIONS:  -  Assess patient's ability to carry out ADLs; assess patient's baseline for ADL function and identify physical deficits which impact ability to perform ADLs (bathing, care of mouth/teeth, toileting, grooming, dressing, etc.)  - Assess/evaluate cause of self-care deficits   - Assess range of motion  - Assess patient's  mobility; develop plan if impaired  - Assess patient's need for assistive devices and provide as appropriate  - Encourage maximum independence but intervene and supervise when necessary  - Involve family in performance of ADLs  - Assess for home care needs following discharge   - Consider OT consult to assist with ADL evaluation and planning for discharge  - Provide patient education as appropriate  Outcome: Progressing  Goal: Maintains/Returns to pre admission functional level  Description: INTERVENTIONS:  - Perform AM-PAC 6 Click Basic Mobility/ Daily Activity assessment daily.  - Set and communicate daily mobility goal to care team and patient/family/caregiver.   - Collaborate with rehabilitation services on mobility goals if consulted  - Perform Range of Motion 3 times a day.  - Reposition patient every 2 hours.  - Dangle patient 3 times a day  - Stand patient 3 times a day  - Ambulate patient 3 times a day  - Out of bed to chair 3 times a day   - Out of bed for meals 3 times a day  - Out of bed for toileting  - Record patient progress and toleration of activity level   Outcome: Progressing     Problem: DISCHARGE PLANNING  Goal: Discharge to home or other facility with appropriate resources  Description: INTERVENTIONS:  - Identify barriers to discharge w/patient and caregiver  - Arrange for needed discharge resources and transportation as appropriate  - Identify discharge learning needs (meds, wound care, etc.)  - Arrange for interpretive services to assist at discharge as needed  - Refer to Case Management Department for coordinating discharge planning if the patient needs post-hospital services based on physician/advanced practitioner order or complex needs related to functional status, cognitive ability, or social support system  Outcome: Progressing     Problem: Knowledge Deficit  Goal: Patient/family/caregiver demonstrates understanding of disease process, treatment plan, medications, and discharge  instructions  Description: Complete learning assessment and assess knowledge base.  Interventions:  - Provide teaching at level of understanding  - Provide teaching via preferred learning methods  Outcome: Progressing     Problem: METABOLIC, FLUID AND ELECTROLYTES - ADULT  Goal: Electrolytes maintained within normal limits  Description: INTERVENTIONS:  - Monitor labs and assess patient for signs and symptoms of electrolyte imbalances  - Administer electrolyte replacement as ordered  - Monitor response to electrolyte replacements, including repeat lab results as appropriate  - Instruct patient on fluid and nutrition as appropriate  Outcome: Progressing  Goal: Fluid balance maintained  Description: INTERVENTIONS:  - Monitor labs   - Monitor I/O and WT  - Instruct patient on fluid and nutrition as appropriate  - Assess for signs & symptoms of volume excess or deficit  Outcome: Progressing  Goal: Glucose maintained within target range  Description: INTERVENTIONS:  - Monitor Blood Glucose as ordered  - Assess for signs and symptoms of hyperglycemia and hypoglycemia  - Administer ordered medications to maintain glucose within target range  - Assess nutritional intake and initiate nutrition service referral as needed  Outcome: Progressing     Problem: Prexisting or High Potential for Compromised Skin Integrity  Goal: Skin integrity is maintained or improved  Description: INTERVENTIONS:  - Identify patients at risk for skin breakdown  - Assess and monitor skin integrity  - Assess and monitor nutrition and hydration status  - Monitor labs   - Assess for incontinence   - Turn and reposition patient  - Assist with mobility/ambulation  - Relieve pressure over bony prominences  - Avoid friction and shearing  - Provide appropriate hygiene as needed including keeping skin clean and dry  - Evaluate need for skin moisturizer/barrier cream  - Collaborate with interdisciplinary team   - Patient/family teaching  - Consider wound care  consult   Outcome: Progressing

## 2025-01-27 NOTE — ASSESSMENT & PLAN NOTE
Will monitor phosphorus currently no binders, most recent phosphorus level is 2.0 in January 23.  Encouraging oral intake.  Check phosphorus level today.  Add Neupro in terms of protein supplementation.

## 2025-01-27 NOTE — BRIEF OP NOTE (RAD/CATH)
INTERVENTIONAL RADIOLOGY PROCEDURE NOTE    Date: 1/27/2025    Procedure: Permacath placeemnt  Procedure Summary       Date:  Room / Location:     Anesthesia Start:  Anesthesia Stop:     Procedure:  Diagnosis:     Scheduled Providers:  Responsible Provider:     Anesthesia Type: Not recorded ASA Status: Not recorded            Preoperative diagnosis:   1. ESRD (end stage renal disease) on dialysis ([facility])    2. COVID    3. Pleural effusion    4. Sepsis, due to unspecified organism, unspecified whether acute organ dysfunction present ([facility])    5. Bacteremia    6. Complications, mechanical, catheter, dialysis, initial encounter ([facility])    7. Encounter for assessment of decision-making capacity         Postoperative diagnosis: Same.    Surgeon: Juan Alberto Collins MD     Assistant: None. No qualified resident was available.    Blood loss: Minimal    Specimens: None     Findings: Left neck temp catheter replaced for Permacath. OK to use.    Complications: None immediate.    Anesthesia: conscious sedation

## 2025-01-27 NOTE — ASSESSMENT & PLAN NOTE
The patient is on intravenous heparin as per primary hospitalist team.  Appreciate help and input.  Would likely need a separate site for placement of temporary dialysis catheter given IJ thrombus.

## 2025-01-27 NOTE — CASE MANAGEMENT
Case Management Discharge Planning Note    Patient name Matthew Alvarez  Location /-01 MRN [id number]  : 1955 Date 2025       Current Admission Date: 2025  Current Admission Diagnosis:Sepsis ([facility])   Patient Active Problem List    Diagnosis Date Noted Date Diagnosed    Thrombosis of right internal jugular vein ([facility]) 2025     Moderate protein-calorie malnutrition ([facility]) 2025     Chronic kidney disease-mineral and bone disorder (CKD-MBD) 2025     Atrial fibrillation with RVR ([facility]) 2025     Bacteremia 2025     COVID 2025     Sepsis ([facility]) 2025     Acute hemodialysis patient ([facility]) 2025     Pleural effusion 2024     Secondary hyperparathyroidism of renal origin ([facility]) 12/15/2024     ESRD (end stage renal disease) on dialysis ([facility]) 2024     Ambulatory dysfunction 2024     Thrombocytopenia ([facility]) 2024     Renal failure 11/15/2024     Goals of care, counseling/discussion 11/15/2024     Anemia due to chronic kidney disease, on chronic dialysis ([facility]) 11/15/2024     Anemia of renal disease 11/15/2024     Vitamin D deficiency, unspecified 11/15/2024     Chronic systolic (congestive) heart failure ([facility]) 2024     Oliguria 2024     Encounter for hemodialysis for ESRD ([facility]) 2024     Hyponatremia 2024     Dilated cardiomyopathy ([facility]) 2024     Elevated liver transaminase level 11/10/2024     Acute on chronic systolic heart failure ([facility]) 2024     Coronary artery disease involving native coronary artery of native heart without angina pectoris 10/08/2021     Rheumatoid factor positive 2021     Protein calorie malnutrition ([facility]) 2021     Elevated troponin 2021     Tobacco abuse 2021     Nonischemic cardiomyopathy ([facility]) 2021     Primary hypertension 2021       LOS (days): 7  Geometric Mean LOS (GMLOS) (days): 4.9  Days to GMLOS:-1.9     OBJECTIVE:  Risk of Unplanned  Readmission Score: 33.94         Current admission status: Inpatient   Preferred Pharmacy:   [facility]pharm[facility] #[id number] - [place] - [address]  [address] [place] [address]  Phone: [phone] Fax: [phone]    [facility] - [place] - [address]  [address]  [place] [address]  Phone: [phone] Fax: [phone]    Primary Care Provider: Olive Rodriguez MD    Primary Insurance: [facility] RE[facility]  Secondary Insurance:     DISCHARGE DETAILS:     Call placed to [facility] dialysis center, spoke with Joleen, discussed if [facility] is able to administer IV ABT Cefazolin 2g after dialysis on Monday, 2g after dialysis on Wednesday, and 3g after dialysis on Friday. Per Joleen, yes they are able to administer IV ABT at dialysis center on dialysis days. [facility] requesting to fax script for IV ABT to [phone] Attn: Joleen.        Scripted for IV ABT faxed to [facility] [phone].

## 2025-01-27 NOTE — ASSESSMENT & PLAN NOTE
Meeting sepsis criteria secondary to temperature 101.4 °F, HR 97 bpm  Lactic acid 3.2  In setting of COVID, bacteremia from unclear source at this time-possibly bacterial pneumonia versus skin infection (pustule over HD cath as well as chronic skin lesions of lower extremities)  Blood cultures positive for 2 out of 2 gram-positive cocci in clusters  Identification panel with staph aureus  MRSA negative  ID recommending cefazolin 1 g every 24 hours -can transition to dosing postdialysis M/w/f (2g/2g/3g) through 3/5 to complete 6-week course

## 2025-01-27 NOTE — TELEPHONE ENCOUNTER
Caller: Sagrario from [facility]     Doctor/Office: Janett Ewing     CB#: [phone]      What needs to be faxed: Office notes from 9/3/24 and Echo from 1/21/25    ATTN to: Sagrario     Fax#: [phone]

## 2025-01-27 NOTE — ASSESSMENT & PLAN NOTE
GINGER at [facility]  The patient's last hemodialysis was on Friday, January 24 and plan for dialysis this morning today on Monday, January 27.  We are further adjusting dry weight on this patient last weight being approximately 51.5 kg.  Would try to aim for 1.5 to 2 kg and hemodialysis as tolerated today on January 27.  Access: Patient with infected tunneled dialysis catheter removed 1/21 and Saulo catheter was placed on Wednesday, January 22.  Appreciate surgical help with removal of tunneled dialysis catheter on January 21 as well as IR help with placement of the temporary Saulo dialysis catheter on January 22.  Will maintain current temporary dialysis catheter until blood cultures are negative.  Most recent blood culture from January 23 are still negative showing no growth in 72 hours..  Most recent appreciate ID help in that regard.  IR is consulted for placement of tunneled dialysis catheter for today on January 27 the patient has been n.p.o. since midnight.  The patient was also started on heparin January 23 given the presence of occlusive thrombus of the right IJ at the proximal neck.  Appreciate hospitalist service help and input.

## 2025-01-27 NOTE — ASSESSMENT & PLAN NOTE
Blood pressure stable, -160s range today on January 27.  Maintain current medication regimen with blood pressure hold parameters.  The patient currently on metoprolol 25 mg every 12 hours and isosorbide mononitrate 120 mg daily.  Will see what blood pressure post hemodialysis will likely will be able to react torsemide this week back to his and hospital regimen.  Home medications: Hydralazine 100 mg every 8 hours/carvedilol 25 twice a day/amlodipine 5 mg daily/torsemide 100 mg on nondialysis days being held at this time

## 2025-01-27 NOTE — ASSESSMENT & PLAN NOTE
Fever, tachycardia with lactic acidosis.  Due to catheter exit site infection and secondary MSSA bacteremia. Bacteremia was slow to clear despite line removal, possibly due to seeding of associated RIJ thrombosis.  Patient also noted to have acute COVID infection but without pneumonia on chest imaging  Afebrile, hemodynamically stable without leukocytosis     Antibiotics and additional management as below   Check daily CBC, CMP while inpatient to monitor for any evolving antibiotic toxicity or treatment failure   Continue supportive care, monitor clinical course

## 2025-01-27 NOTE — ASSESSMENT & PLAN NOTE
Wt Readings from Last 3 Encounters:   01/27/25 50.4 kg (111 lb 3.2 oz)   01/15/25 56.5 kg (124 lb 9.6 oz)   12/27/24 56 kg (123 lb 6.4 oz)     Has underlying nonischemic cardiomyopathy, ESRD, A-fib with RVR and chronic pleural effusions requiring thoracentesis in the past

## 2025-01-27 NOTE — PROGRESS NOTES
NEPHROLOGY HOSPITAL PROGRESS NOTE   Matthew Alvarez 69 y.o. male MRN: [id number]  Unit/Bed#: -01 Encounter: [id number]    Assessment & Plan  ESRD (end stage renal disease) on dialysis (HCC)  MWF at [facility]  The patient's last hemodialysis was on Friday, January 24 and plan for dialysis this morning today on Monday, January 27.  We are further adjusting dry weight on this patient last weight being approximately 51.5 kg.  Would try to aim for 1.5 to 2 kg and hemodialysis as tolerated today on January 27.  Access: Patient with infected tunneled dialysis catheter removed 1/21 and Saulo catheter was placed on Wednesday, January 22.  Appreciate surgical help with removal of tunneled dialysis catheter on January 21 as well as IR help with placement of the temporary Saulo dialysis catheter on January 22.  Will maintain current temporary dialysis catheter until blood cultures are negative.  Most recent blood culture from January 23 are still negative showing no growth in 72 hours..  Most recent appreciate ID help in that regard.  IR is consulted for placement of tunneled dialysis catheter for today on January 27 the patient has been n.p.o. since midnight.  The patient was also started on heparin January 23 given the presence of occlusive thrombus of the right IJ at the proximal neck.  Appreciate hospitalist service help and input.  Primary hypertension  Blood pressure stable, -160s range today on January 27.  Maintain current medication regimen with blood pressure hold parameters.  The patient currently on metoprolol 25 mg every 12 hours and isosorbide mononitrate 120 mg daily.  Will see what blood pressure post hemodialysis will likely will be able to react torsemide this week back to his and hospital regimen.  Home medications: Hydralazine 100 mg every 8 hours/carvedilol 25 twice a day/amlodipine 5 mg daily/torsemide 100 mg on nondialysis days being held at this time  Hyponatremia  Sodium 131  mmol/L, 1/26. Secondary to ESRD: Placed on 1.5 L fluid restriction along with HD/UF.  This is also likely an aspect of significant cardiomyopathy with an ejection fraction of 20% with global hypokinesis.   Chronic kidney disease-mineral and bone disorder (CKD-MBD)  Will monitor phosphorus currently no binders, most recent phosphorus level is 2.0 in January 23.  Encouraging oral intake.  Check phosphorus level today.  Add Neupro in terms of protein supplementation.  Anemia due to chronic kidney disease, on chronic dialysis (HCC)  Current hemoglobin  is 7.6 on 1/26.  Will monitor: In place on Epogen 6000 units with each treatment  Cannot give intravenous iron in the setting of sepsis; noted iron studies from January 10 showed ferritin 244 and iron saturation of 8.  Acute on chronic systolic heart failure (HCC)  Over the past few admissions we have been able to decrease estimated dry weight to around 53 kg and wt was challenged 1/25 with post dialysis weight 51.5 kg.  There is also likely an aspect of total body weight decrease as well as decrease in fluid weight.  few admissions ago the patient's weight had been as high as 73 kg and the patient had required HD/UF on almost daily basis during that time.  Will continue to challenge and adjust dry weight as needed.  Next hemodialysis scheduled for today on Monday 1/27.  Continue to challenge dry weight as appropriate, may be nearing appropriate EDW with last postweight 51.5 kg.  It is noted that this morning his weight is 50.5 kg although it is via bed scale.  Will see what it is later this morning and adjust further EDW accordingly.  EF: 20% by recent echocardiogram in January 21 with global hypokinesis.    COVID  The patient is being given focal directed treatment and is status post intravenous remdesivir during this admission.  Sepsis (HCC)  Gram-positive cocci bacteremia currently on antibiotics followed by ID recommended removal of tunneled dialysis catheter which  has been done on January 21 with placement of temporary dialysis catheter on January 22.  It is noted that repeat blood cultures were positive from January 22 and a repeat set of blood cultures were ordered on January 23.  They are negative; this likely that the patient would need 6 weeks of intravenous antibiotics as per ID note with suspected MSSA.  This should be able to be given on hemodialysis at the dialysis unit but need to confirm this prior to discharge.  We are maintaining Saulo catheter for hemodialysis access and will likely be able to change to a tunneled dialysis catheter this week as blood cultures from January 23 have been negative.  It is noted that IR consult was placed for tunneled dialysis catheter today on January 27.  Appreciate ID input.    Administrative Statements   VIRTUAL CARE DOCUMENTATION:     1. This service was provided via Telemedicine using [employer]     2. Parties in the room with patient during teleconsult Patient only    3. Confidentiality My office door was closed     4. Participants No one else was in the room    5. Patient acknowledged consent and understanding of privacy and security of the  Telemedicine consult. I informed the patient that I have reviewed their record in Epic and presented the opportunity for them to ask any questions regarding the visit today.  The patient agreed to participate.    6. I have spent a total time of approximately 35 minutes in caring for this patient on the day of the visit/encounter including documentation of medical decision making, creation/modification of assessment and plan, placement of orders, review of the medical record, patient interview and visit, not including the time spent for establishing the audio/video connection.      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  Appreciate help and input.  Would likely need a separate site for placement of  temporary dialysis catheter given IJ thrombus.  Moderate protein-calorie malnutrition (HCC)  Malnutrition Findings:   Adult Malnutrition type: Chronic illness  Adult Degree of Malnutrition: Malnutrition of moderate degree  Malnutrition Characteristics: Muscle loss, Fat loss                  360 Statement: malnutrition of moderate degree in setting of chronic illness, evidenced by hollowing orbitals/dark circles, muscle wasting/indented temples, protruding clavicles, treated with diet and supplements    BMI Findings:           Body mass index is 17.95 kg/m².     Administrative Statements   VIRTUAL CARE DOCUMENTATION:     1. This service was provided via Telemedicine using [employer] Kit     2. Parties in the room with patient during teleconsult Patient only    3. Confidentiality My office door was closed     4. Participants No one else was in the room    5. Patient acknowledged consent and understanding of privacy and security of the  Telemedicine consult. I informed the patient that I have reviewed their record in Epic and presented the opportunity for them to ask any questions regarding the visit today.  The patient agreed to participate.    6. I have spent a total time of approximately 25 minutes in caring for this patient on the day of the visit/encounter including patient visit, documentation of medical decision making, modification of assessment and plan, reviewed the patient record, placement of orders, not including the time spent for establishing the audio/video connection.          SUBJECTIVE / 24H INTERVAL HISTORY:  Mr. Alvarez is seen in follow-up for end-stage renal disease on hemodialysis and fluid management.  He had his last hemodialysis on Friday and post weight was 51.5 kg.  He denies any chest pressure or shortness of breath.  No acute events noted overnight.  Systolic blood pressure has been anywhere from 115-160s systolic pulse oximetry is 94% on room air.  Weight this morning is 50.4 kg.  This is  via bed scale.  The patient is currently NPO in anticipation of IR consult for tunneled dialysis catheter placement today.    OBJECTIVE:  Current Weight: Weight - Scale: 50.4 kg (111 lb 3.2 oz)  Vitals:    01/26/25 2128 01/26/25 2235 01/27/25 0326 01/27/25 0558   BP: 115/77 166/80 168/90    BP Location:       Pulse: 82 81 77    Resp:  17 19    Temp:  98.4 °F (36.9 °C) 97.7 °F (36.5 °C)    TempSrc:       SpO2: 95% 96% 94%    Weight:    50.4 kg (111 lb 3.2 oz)   Height:           Intake/Output Summary (Last 24 hours) at 1/27/2025 0654  Last data filed at 1/26/2025 1700  Gross per 24 hour   Intake 120 ml   Output --   Net 120 ml     General: Patient is lying comfortably no acute distress  HEENT: Normocephalic atraumatic  Neck: Appears to be supple  Pulmonary: There is no accessory muscle use/no conversational dyspnea  Cardiac: Pulse range 70s to 80s  Extremities: Improved lower extremity edema from admission  Neurologic: No focal deficits; patient was able to answer questions appropriately for me this morning.  Medications:    Current Facility-Administered Medications:     acetaminophen (TYLENOL) tablet 650 mg, 650 mg, Oral, Q6H PRN, Kerri Chisholm PA-C    [Held by provider] amLODIPine (NORVASC) tablet 5 mg, 5 mg, Oral, Daily, Kerri Chisholm PA-C    atorvastatin (LIPITOR) tablet 20 mg, 20 mg, Oral, Daily With Dinner, Kerri Chisholm PA-C, 20 mg at 01/26/25 1735    ceFAZolin (ANCEF) IVPB (premix in dextrose) 1,000 mg 50 mL, 1,000 mg, Intravenous, Q24H, Leonarda Simmons PA-C, Last Rate: 100 mL/hr at 01/26/25 1718, 1,000 mg at 01/26/25 1718    Diclofenac Sodium (VOLTAREN) 1 % topical gel 2 g, 2 g, Topical, 4x Daily, Caty Calvo PA-C, 2 g at 01/26/25 2126    epoetin loida (EPOGEN,PROCRIT) injection 5,000 Units, 5,000 Units, Intravenous, Once per day on Monday Wednesday Friday, Kendall Guerra MD, 5,000 Units at 01/24/25 1722    folic acid (FOLVITE) tablet 1 mg, 1 mg, Oral, Daily, Kerri Chisholm PA-C, 1 mg at 01/26/25 2212     "heparin (porcine) 25,000 units in 0.45% NaCl 250 mL infusion (premix), 3-30 Units/kg/hr (Order-Specific), Intravenous, Titrated, Leonarda Simmons PA-C, Last Rate: 8.5 mL/hr at 01/27/25 0325, 17 Units/kg/hr at 01/27/25 0325    heparin (porcine) injection 2,000 Units, 2,000 Units, Intravenous, Q6H PRN, JASON Deluna-MARYCRUZ, 2,000 Units at 01/24/25 1513    heparin (porcine) injection 4,000 Units, 4,000 Units, Intravenous, Q6H PRN, Leonarda Simmons PA-C    [Held by provider] hydrALAZINE (APRESOLINE) tablet 100 mg, 100 mg, Oral, Q8H JEEVAN, JASON Estrella-C, 100 mg at 01/20/25 2125    isosorbide mononitrate (IMDUR) 24 hr tablet 120 mg, 120 mg, Oral, Daily, Kerri Chisholm PA-C, 120 mg at 01/26/25 0940    melatonin tablet 6 mg, 6 mg, Oral, HS, JASON Estrella-C, 6 mg at 01/26/25 2125    metoprolol (LOPRESSOR) injection 2.5 mg, 2.5 mg, Intravenous, Q6H PRN, Kerri Chisholm PA-C    metoprolol tartrate (LOPRESSOR) tablet 25 mg, 25 mg, Oral, Q12H [facility], Danny Degroot MD, 25 mg at 01/26/25 2125    Laboratory Results:  Results from last 7 days   Lab Units 01/26/25  0442 01/26/25  0432 01/25/25  0343 01/24/25  0755 01/23/25  0952 01/22/25  0456 01/21/25  0439 01/20/25  1543   WBC Thousand/uL 6.96  --  6.63 7.80 7.73 8.75 9.14 8.76   HEMOGLOBIN g/dL 7.6*  --  7.6* 7.5* 7.6* 8.4* 9.6* 9.2*   HEMATOCRIT % 22.1*  --  22.2* 21.7* 22.2* 24.5* 27.9* 27.9*   PLATELETS Thousands/uL 105*  --  96* 90* 79* 75* 87* 93*   POTASSIUM mmol/L  --  3.5 3.6 3.7 3.8 4.0 4.3 4.5   CHLORIDE mmol/L  --  98 96 95* 93* 95* 94* 92*   CO2 mmol/L  --  27 29 30 25 27 26 24   BUN mg/dL  --  45* 29* 45* 76* 53* 80* 77*   CREATININE mg/dL  --  4.57* 3.27* 4.56* 6.82* 5.84* 8.44* 8.07*   CALCIUM mg/dL  --  7.7* 7.7* 7.8* 8.0* 8.1* 8.3* 8.6   MAGNESIUM mg/dL  --   --   --   --  2.0 1.9  --  2.1       Portions of the record may have been created with voice recognition software. Occasional wrong word or \"sound a like\" substitutions may have occurred due to the " inherent limitations of voice recognition software. Read the chart carefully and recognize, using context, where substitutions have occurred.If you have any questions, please contact the dictating provider.

## 2025-01-27 NOTE — ASSESSMENT & PLAN NOTE
Wt Readings from Last 3 Encounters:   01/27/25 50.4 kg (111 lb 3.2 oz)   01/15/25 56.5 kg (124 lb 9.6 oz)   12/27/24 56 kg (123 lb 6.4 oz)     Patient presents with worsening shortness of breath, missed dialysis session today with last session on Friday  Patient's wife endorses that he sometimes is not taking his medication, spits them out or throws them away  CT chest with moderate to large right pleural effusion and moderate left pleural effusion  Echo in November with EF of 20%  Wearing LifeVest initially on presentation, now refusing-monitoring on telemetry  PTA regimen is Coreg, torsemide (on nondialysis days)  BNP >4700  Given 1 dose IV diuretic on admission, HD as scheduled  Daily weights and I/O

## 2025-01-27 NOTE — ASSESSMENT & PLAN NOTE
Lab Results   Component Value Date    EGFR 9 01/27/2025    EGFR 12 01/26/2025    EGFR 18 01/25/2025    CREATININE 5.77 (H) 01/27/2025    CREATININE 4.57 (H) 01/26/2025    CREATININE 3.27 (H) 01/25/2025   Patient on dialysis MWF -noncompliant with going, wife says he misses at least once a week  Presenting with bilateral pleural effusions  Currently on torsemide on nondialysis days  HD as scheduled  Nephrology consult: Due to infected tunneled dialysis catheter removal on 1/21, temporary dialysis catheter with interventional radiology placed on 1/22 and then get back to schedule on Monday, Wednesday, Friday.  Can replace PermaCath after blood cultures are negative for 48 to 72 hours.

## 2025-01-27 NOTE — PROGRESS NOTES
Progress Note - Hospitalist   Name: Matthew Alvarez 69 y.o. male I MRN: [id number]  Unit/Bed#: -01 I Date of Admission: 1/20/2025   Date of Service: 1/27/2025 I Hospital Day: 7    Assessment & Plan  Sepsis (HCC)  Meeting sepsis criteria secondary to temperature 101.4 °F, HR 97 bpm  Lactic acid 3.2  In setting of COVID, bacteremia from unclear source at this time-possibly bacterial pneumonia versus skin infection (pustule over HD cath as well as chronic skin lesions of lower extremities)  Blood cultures positive for 2 out of 2 gram-positive cocci in clusters  Identification panel with staph aureus  MRSA negative  ID recommending cefazolin 1 g every 24 hours -can transition to dosing postdialysis M/w/f (2g/2g/3g) through 3/5 to complete 6-week course  Bacteremia  Patient with 2 out of 2 blood cultures positive for gram-positive cocci in clusters  Identification panel with Staph aureus   HD cath removed 1/21   Repeat BC 1/22 positive x2  Repeat BC 1/23 NG 72 hours   No vegetation noted on echo  RUE + for internal jugular vein thrombus - likely the cause of persistent bacteremia  ID consult: 6-week IV antibiotic course as above  IR consult placed for 1/27 -PermCath to be placed today  Acute on chronic systolic heart failure (HCC)  Wt Readings from Last 3 Encounters:   01/27/25 50.4 kg (111 lb 3.2 oz)   01/15/25 56.5 kg (124 lb 9.6 oz)   12/27/24 56 kg (123 lb 6.4 oz)     Patient presents with worsening shortness of breath, missed dialysis session today with last session on Friday  Patient's wife endorses that he sometimes is not taking his medication, spits them out or throws them away  CT chest with moderate to large right pleural effusion and moderate left pleural effusion  Echo in November with EF of 20%  Wearing LifeVest initially on presentation, now refusing-monitoring on telemetry  PTA regimen is Coreg, torsemide (on nondialysis days)  BNP >4700  Given 1 dose IV diuretic on admission, HD as scheduled  Daily  weights and I/O  COVID  Mild COVID pathway as below   COVID19- positive on 1/20   CT chest bilateral pleural effusions and bilateral groundglass opacities  COVID mild pathway labs   CRP and BNP elevated   CK normal  Trop elevated but downtrended, no chest pain  Completed remdesivir x3 days   OOB TID; ambulation and mobilization strongly encouraged  Supportive care  Atrial fibrillation with RVR (HCC)  Developed in setting of sepsis  Home Coreg on hold secondary to low blood pressure-switch to metoprolol 25 twice daily  As needed low-dose 2.5 Lopressor IV for sustained heart rate greater than 130  Currently on heparin drip due to right IJV thrombus   Will need anticoagulation -will need to be started on renally adjusted Eliquis when able. pending replacement for perm cath, will continue to hold per IR recommendations and continue with heparin drip   Potentially later today resume Eliquis  Primary hypertension  PTA regimen on Coreg 25 mg twice daily, torsemide 100 mg on nondialysis days, Norvasc 5 mg daily and hydralazine 100 mg 3 times daily  Blood pressure somewhat soft on arrival  BP meds from home on hold-Coreg switched to metoprolol to have less effect on blood pressure but to allow better heart rate control  Currently Lopressor 25 mg twice daily  Anemia due to chronic kidney disease, on chronic dialysis (HCC)  With chronic anemia secondary to renal disease  Is on Epogen and iron infusions with dialysis  Would avoid IV iron due to findings of bacteremia  Continue folic acid daily  Hemoglobin stable  Thrombocytopenia (HCC)  Chronic stable thrombocytopenia in setting of chronic renal disease  ESRD (end stage renal disease) on dialysis (HCC)  Lab Results   Component Value Date    EGFR 9 01/27/2025    EGFR 12 01/26/2025    EGFR 18 01/25/2025    CREATININE 5.77 (H) 01/27/2025    CREATININE 4.57 (H) 01/26/2025    CREATININE 3.27 (H) 01/25/2025   Patient on dialysis MWF -noncompliant with going, wife says he misses at  least once a week  Presenting with bilateral pleural effusions  Currently on torsemide on nondialysis days  HD as scheduled  Nephrology consult: Due to infected tunneled dialysis catheter removal on 1/21, temporary dialysis catheter with interventional radiology placed on 1/22 and then get back to schedule on Monday, Wednesday, Friday.  Can replace PermaCath after blood cultures are negative for 48 to 72 hours.  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)  Subacute to chronic occlusive thrombus in the IJV of proximal neck on upper extremity venous duplex  Start heparin drip  Plan to transition to Eliquis once cleared by interventional radiology  Moderate protein-calorie malnutrition (HCC)  Malnutrition Findings:   Adult Malnutrition type: Chronic illness  Adult Degree of Malnutrition: Malnutrition of moderate degree  Malnutrition Characteristics: Muscle loss, Fat loss                  360 Statement: malnutrition of moderate degree in setting of chronic illness, evidenced by hollowing orbitals/dark circles, muscle wasting/indented temples, protruding clavicles, treated with diet and supplements    BMI Findings:           Body mass index is 17.95 kg/m².       VTE Pharmacologic Prophylaxis:   High Risk (Score >/= 5) - Pharmacological DVT Prophylaxis Ordered: heparin drip. Sequential Compression Devices Ordered.    Mobility:   Basic Mobility Inpatient Raw Score: 11  JH-HLM Goal: 4: Move to chair/commode  JH-HLM Achieved: 4: Move to chair/commode  JH-HLM Goal achieved. Continue to encourage appropriate mobility.    Patient  Centered Rounds: I performed bedside rounds with nursing staff today.   Discussions with Specialists or Other Care Team Provider: CM, nephrology, ID, IR    Education and Discussions with Family / Patient:  Attempted call patient's son/wife-voicemail box full.     Current Length of Stay: 7 day(s)  Current Patient Status: Inpatient   Certification Statement: The patient will continue to require additional inpatient hospital stay due to pending placement for rehab, authorization from insurance, PermCath placement  Discharge Plan: Anticipate discharge in 24-48 hrs to rehab facility.    Code Status: Level 1 - Full Code    Subjective   Seen and examined.  Patient endorses that he is feeling well.  Is confused, asking about dinner.    Objective :  Temp:  [97.7 °F (36.5 °C)-98.4 °F (36.9 °C)] 97.7 °F (36.5 °C)  HR:  [60-82] 67  BP: (115-175)/() 164/101  Resp:  [17-19] 18  SpO2:  [93 %-96 %] 93 %  O2 Device: None (Room air)    Body mass index is 17.95 kg/m².     Input and Output Summary (last 24 hours):     Intake/Output Summary (Last 24 hours) at 1/27/2025 1238  Last data filed at 1/27/2025 0927  Gross per 24 hour   Intake 320 ml   Output --   Net 320 ml       Physical Exam  Vitals and nursing note reviewed.   Constitutional:       General: He is not in acute distress.     Appearance: Normal appearance. He is ill-appearing.   HENT:      Head: Normocephalic and atraumatic.      Nose: No congestion.      Mouth/Throat:      Mouth: Mucous membranes are moist.      Pharynx: Oropharynx is clear.   Eyes:      Conjunctiva/sclera: Conjunctivae normal.   Cardiovascular:      Rate and Rhythm: Normal rate.      Pulses: Normal pulses.      Heart sounds: Normal heart sounds.   Pulmonary:      Effort: Pulmonary effort is normal. No respiratory distress.      Comments: Diminished breath sounds  Abdominal:      General: Bowel sounds are normal.      Palpations: Abdomen is soft.      Tenderness: There is no abdominal tenderness.    Musculoskeletal:         General: Normal range of motion.      Cervical back: Normal range of motion.      Right lower leg: No edema.      Left lower leg: No edema.   Skin:     General: Skin is warm and dry.   Neurological:      Mental Status: He is alert and oriented to person, place, and time.   Psychiatric:         Mood and Affect: Mood normal.         Behavior: Behavior normal.           Lines/Drains:  Lines/Drains/Airways       Active Status       Name Placement date Placement time Site Days    CVC Central Lines 01/22/25 Double 01/22/25  1416  --  4    HD Temporary Double Catheter 01/22/25  1407  Internal jugular  4                    Central Line:  Goal for removal:  For HD         Telemetry:  Telemetry Orders (From admission, onward)               24 Hour Telemetry Monitoring  Continuous x 24 Hours (Telem)        Expiring   Question:  Reason for 24 Hour Telemetry  Answer:  Decompensated CHF- and any one of the following: continuous diuretic infusion or total diuretic dose >200 mg daily, associated electrolyte derangement (I.e. K < 3.0), inotropic drip (continuous infusion), hx of ventricular arrhythmia, or new EF < 35%                     Telemetry Reviewed: Normal Sinus Rhythm  Indication for Continued Telemetry Use: Arrthymias requiring medical therapy               Lab Results: I have reviewed the following results:   Results from last 7 days   Lab Units 01/27/25  0909 01/22/25 0456 01/21/25 0439 01/20/25  1543   WBC Thousand/uL 8.02   < > 9.14 8.76   HEMOGLOBIN g/dL 8.3*   < > 9.6* 9.2*   HEMATOCRIT % 23.9*   < > 27.9* 27.9*   PLATELETS Thousands/uL 117*   < > 87* 93*   BANDS PCT %  --   --  3  --    SEGS PCT %  --   --   --  90*   LYMPHO PCT %  --   --  3* 4*   MONO PCT %  --   --  1* 5   EOS PCT %  --   --  0 0    < > = values in this interval not displayed.     Results from last 7 days   Lab Units 01/27/25  0909 01/22/25 0456 01/21/25 0439   SODIUM mmol/L 132*   < > 133*   POTASSIUM mmol/L 3.8    < > 4.3   CHLORIDE mmol/L 96   < > 94*   CO2 mmol/L 27   < > 26   BUN mg/dL 56*   < > 80*   CREATININE mg/dL 5.77*   < > 8.44*   ANION GAP mmol/L 9   < > 13   CALCIUM mg/dL 8.3*   < > 8.3*   ALBUMIN g/dL  --   --  2.6*   TOTAL BILIRUBIN mg/dL  --   --  1.75*   ALK PHOS U/L  --   --  113*   ALT U/L  --   --  20   AST U/L  --   --  46*   GLUCOSE RANDOM mg/dL 87   < > 93    < > = values in this interval not displayed.     Results from last 7 days   Lab Units 01/23/25  1705   INR  1.56*             Results from last 7 days   Lab Units 01/23/25  0952 01/22/25  0456 01/21/25  0439 01/20/25  1816 01/20/25  1543   LACTIC ACID mmol/L  --   --   --  1.0 3.2*   PROCALCITONIN ng/ml 2.83* 3.13* 3.06*  --  2.72*       Recent Cultures (last 7 days):   Results from last 7 days   Lab Units 01/23/25  1522 01/23/25  1520 01/22/25  0457 01/21/25  1559 01/20/25  1556 01/20/25  1543   BLOOD CULTURE  No Growth at 72 hrs. No Growth at 72 hrs. Staphylococcus aureus*  Staphylococcus aureus*  --  Staphylococcus aureus* Staphylococcus aureus*   GRAM STAIN RESULT   --   --  Gram positive cocci in clusters*  Gram positive cocci in clusters* 4+ Polys*  4+ Gram positive cocci in clusters* Gram positive cocci in clusters* Gram positive cocci in clusters*   BODY FLUID CULTURE, STERILE   --   --   --  3+ Growth of Staphylococcus aureus*  --   --              Last 24 Hours Medication List:     Current Facility-Administered Medications:     acetaminophen (TYLENOL) tablet 650 mg, Q6H PRN    [Held by provider] amLODIPine (NORVASC) tablet 5 mg, Daily    atorvastatin (LIPITOR) tablet 20 mg, Daily With Dinner    ceFAZolin (ANCEF) IVPB (premix in dextrose) 1,000 mg 50 mL, Q24H, Last Rate: 1,000 mg (01/26/25 1718)    Diclofenac Sodium (VOLTAREN) 1 % topical gel 2 g, 4x Daily    epoetin loida (EPOGEN,PROCRIT) injection 5,000 Units, Once per day on Monday Wednesday Friday    folic acid (FOLVITE) tablet 1 mg, Daily    heparin (porcine) 25,000 units in 0.45%  NaCl 250 mL infusion (premix), Titrated, Last Rate: 15 Units/kg/hr (01/27/25 1200)    heparin (porcine) injection 2,000 Units, Q6H PRN    heparin (porcine) injection 4,000 Units, Q6H PRN    [Held by provider] hydrALAZINE (APRESOLINE) tablet 100 mg, Q8H JEEVAN    isosorbide mononitrate (IMDUR) 24 hr tablet 120 mg, Daily    melatonin tablet 6 mg, HS    metoprolol (LOPRESSOR) injection 2.5 mg, Q6H PRN    metoprolol tartrate (LOPRESSOR) tablet 25 mg, Q12H JEEVAN    Administrative Statements   Today, Patient Was Seen By: Kerri Chisholm PA-C      **Please Note: This note may have been constructed using a voice recognition system.**

## 2025-01-27 NOTE — ASSESSMENT & PLAN NOTE
Lab Results   Component Value Date    EGFR 12 01/26/2025    EGFR 18 01/25/2025    EGFR 12 01/24/2025    CREATININE 4.57 (H) 01/26/2025    CREATININE 3.27 (H) 01/25/2025    CREATININE 4.56 (H) 01/24/2025     S/p right IJ permacath removal, temporary catheter replaced in left IJV. Patient has had a functional decline and has had multiple missed dialysis sessions as an outpatient     Catheter management as above, additional management per nephrology   Recommend ongoing goals of care discussion, consider palliative care referral as outpatient

## 2025-01-27 NOTE — ASSESSMENT & PLAN NOTE
Developed in setting of sepsis  Home Coreg on hold secondary to low blood pressure-switch to metoprolol 25 twice daily  As needed low-dose 2.5 Lopressor IV for sustained heart rate greater than 130  Currently on heparin drip due to right IJV thrombus   Will need anticoagulation -will need to be started on renally adjusted Eliquis when able. pending replacement for perm cath, will continue to hold per IR recommendations and continue with heparin drip   Potentially later today resume Eliquis

## 2025-01-27 NOTE — PLAN OF CARE
Problem: Nutrition/Hydration-ADULT  Goal: Nutrient/Hydration intake appropriate for improving, restoring or maintaining nutritional needs  Description: Monitor and assess patient's nutrition/hydration status for malnutrition. Collaborate with interdisciplinary team and initiate plan and interventions as ordered.  Monitor patient's weight and dietary intake as ordered or per policy. Utilize nutrition screening tool and intervene as necessary. Determine patient's food preferences and provide high-protein, high-caloric foods as appropriate.     INTERVENTIONS:  - Monitor oral intake, urinary output, labs, and treatment plans  - Assess nutrition and hydration status and recommend course of action  - Evaluate amount of meals eaten  - Assist patient with eating if necessary   - Allow adequate time for meals  - Recommend/ encourage appropriate diets, oral nutritional supplements, and vitamin/mineral supplements  - Order, calculate, and assess calorie counts as needed  - Recommend, monitor, and adjust tube feedings and TPN/PPN based on assessed needs  - Assess need for intravenous fluids  - Provide specific nutrition/hydration education as appropriate  - Include patient/family/caregiver in decisions related to nutrition  Outcome: Progressing     Problem: PAIN - ADULT  Goal: Verbalizes/displays adequate comfort level or baseline comfort level  Description: Interventions:  - Encourage patient to monitor pain and request assistance  - Assess pain using appropriate pain scale  - Administer analgesics based on type and severity of pain and evaluate response  - Implement non-pharmacological measures as appropriate and evaluate response  - Consider cultural and social influences on pain and pain management  - Notify physician/advanced practitioner if interventions unsuccessful or patient reports new pain  Outcome: Progressing     Problem: INFECTION - ADULT  Goal: Absence or prevention of progression during  hospitalization  Description: INTERVENTIONS:  - Assess and monitor for signs and symptoms of infection  - Monitor lab/diagnostic results  - Monitor all insertion sites, i.e. indwelling lines, tubes, and drains  - Monitor endotracheal if appropriate and nasal secretions for changes in amount and color  - [facility] appropriate cooling/warming therapies per order  - Administer medications as ordered  - Instruct and encourage patient and family to use good hand hygiene technique  - Identify and instruct in appropriate isolation precautions for identified infection/condition  Outcome: Progressing  Goal: Absence of fever/infection during neutropenic period  Description: INTERVENTIONS:  - Monitor WBC    Outcome: Progressing     Problem: SAFETY ADULT  Goal: Patient will remain free of falls  Description: INTERVENTIONS:  - Educate patient/family on patient safety including physical limitations  - Instruct patient to call for assistance with activity   - Consult OT/PT to assist with strengthening/mobility   - Keep Call bell within reach  - Keep bed low and locked with side rails adjusted as appropriate  - Keep care items and personal belongings within reach  - Initiate and maintain comfort rounds  - Make Fall Risk Sign visible to staff  - Offer Toileting every 2 Hours, in advance of need  - Initiate/Maintain bed alarm  - Obtain necessary fall risk management equipment: yellow bracelet, yellow socks  - Apply yellow socks and bracelet for high fall risk patients  - Consider moving patient to room near nurses station  Outcome: Progressing  Goal: Maintain or return to baseline ADL function  Description: INTERVENTIONS:  -  Assess patient's ability to carry out ADLs; assess patient's baseline for ADL function and identify physical deficits which impact ability to perform ADLs (bathing, care of mouth/teeth, toileting, grooming, dressing, etc.)  - Assess/evaluate cause of self-care deficits   - Assess range of motion  - Assess  patient's mobility; develop plan if impaired  - Assess patient's need for assistive devices and provide as appropriate  - Encourage maximum independence but intervene and supervise when necessary  - Involve family in performance of ADLs  - Assess for home care needs following discharge   - Consider OT consult to assist with ADL evaluation and planning for discharge  - Provide patient education as appropriate  Outcome: Progressing  Goal: Maintains/Returns to pre admission functional level  Description: INTERVENTIONS:  - Perform AM-PAC 6 Click Basic Mobility/ Daily Activity assessment daily.  - Set and communicate daily mobility goal to care team and patient/family/caregiver.   - Collaborate with rehabilitation services on mobility goals if consulted  - Perform Range of Motion 2 times a day.  - Reposition patient every 2 hours.  - Dangle patient 3 times a day  - Stand patient 3 times a day  - Ambulate patient 3 times a day  - Out of bed to chair 3 times a day   - Out of bed for meals 3 times a day  - Out of bed for toileting  - Record patient progress and toleration of activity level   Outcome: Progressing     Problem: DISCHARGE PLANNING  Goal: Discharge to home or other facility with appropriate resources  Description: INTERVENTIONS:  - Identify barriers to discharge w/patient and caregiver  - Arrange for needed discharge resources and transportation as appropriate  - Identify discharge learning needs (meds, wound care, etc.)  - Arrange for interpretive services to assist at discharge as needed  - Refer to Case Management Department for coordinating discharge planning if the patient needs post-hospital services based on physician/advanced practitioner order or complex needs related to functional status, cognitive ability, or social support system  Outcome: Progressing     Problem: Knowledge Deficit  Goal: Patient/family/caregiver demonstrates understanding of disease process, treatment plan, medications, and  discharge instructions  Description: Complete learning assessment and assess knowledge base.  Interventions:  - Provide teaching at level of understanding  - Provide teaching via preferred learning methods  Outcome: Progressing     Problem: METABOLIC, FLUID AND ELECTROLYTES - ADULT  Goal: Electrolytes maintained within normal limits  Description: INTERVENTIONS:  - Monitor labs and assess patient for signs and symptoms of electrolyte imbalances  - Administer electrolyte replacement as ordered  - Monitor response to electrolyte replacements, including repeat lab results as appropriate  - Instruct patient on fluid and nutrition as appropriate  Outcome: Progressing  Goal: Fluid balance maintained  Description: INTERVENTIONS:  - Monitor labs   - Monitor I/O and WT  - Instruct patient on fluid and nutrition as appropriate  - Assess for signs & symptoms of volume excess or deficit  Outcome: Progressing  Goal: Glucose maintained within target range  Description: INTERVENTIONS:  - Monitor Blood Glucose as ordered  - Assess for signs and symptoms of hyperglycemia and hypoglycemia  - Administer ordered medications to maintain glucose within target range  - Assess nutritional intake and initiate nutrition service referral as needed  Outcome: Progressing     Problem: SKIN/TISSUE INTEGRITY - ADULT  Goal: Skin Integrity remains intact(Skin Breakdown Prevention)  Description: Assess:  -Perform Omar assessment every    -Clean and moisturize skin every    -Inspect skin when repositioning, toileting, and assisting with ADLS  -Assess under medical devices such as   every    -Assess extremities for adequate circulation and sensation     Bed Management:  -Have minimal linens on bed & keep smooth, unwrinkled  -Change linens as needed when moist or perspiring  -Avoid sitting or lying in one position for more than   hours while in bed  -Keep HOB at  degrees     Toileting:  -Offer bedside commode  -Assess for incontinence every    -Use  incontinent care products after each incontinent episode such as      Activity:  -Mobilize patient   times a day  -Encourage activity and walks on unit  -Encourage or provide ROM exercises   -Turn and reposition patient every   Hours  -Use appropriate equipment to lift or move patient in bed  -Instruct/ Assist with weight shifting every   when out of bed in chair  -Consider limitation of chair time   hour intervals    Skin Care:  -Avoid use of baby powder, tape, friction and shearing, hot water or constrictive clothing  -Relieve pressure over bony prominences using    -Do not massage red bony areas    Next Steps:  -Teach patient strategies to minimize risks such as     -Consider consults to  interdisciplinary teams such as    Outcome: Progressing  Goal: Incision(s), wounds(s) or drain site(s) healing without S/S of infection  Description: INTERVENTIONS  - Assess and document dressing, incision, wound bed, drain sites and surrounding tissue  - Provide patient and family education  - Perform skin care/dressing changes every    Outcome: Progressing  Goal: Pressure injury heals and does not worsen  Description: Interventions:  - Implement low air loss mattress or specialty surface (Criteria met)  - Apply silicone foam dressing  - Instruct/assist with weight shifting every   minutes when in chair   - Limit chair time to   hour intervals  - Use special pressure reducing interventions such as   when in chair   - Apply fecal or urinary incontinence containment device   - Perform passive or active ROM every    - Turn and reposition patient & offload bony prominences every   hours   - Utilize friction reducing device or surface for transfers   - Consider consults to  interdisciplinary teams such as    - Use incontinent care products after each incontinent episode such as      - Consider nutrition services referral as needed  Outcome: Progressing     Problem: Prexisting or High Potential for Compromised Skin Integrity  Goal:  Skin integrity is maintained or improved  Description: INTERVENTIONS:  - Identify patients at risk for skin breakdown  - Assess and monitor skin integrity  - Assess and monitor nutrition and hydration status  - Monitor labs   - Assess for incontinence   - Turn and reposition patient  - Assist with mobility/ambulation  - Relieve pressure over bony prominences  - Avoid friction and shearing  - Provide appropriate hygiene as needed including keeping skin clean and dry  - Evaluate need for skin moisturizer/barrier cream  - Collaborate with interdisciplinary team   - Patient/family teaching  - Consider wound care consult   Outcome: Progressing

## 2025-01-27 NOTE — TELEMEDICINE
e-Consult (IPC)  - Interventional Radiology  Matthew Alvarez 69 y.o. male MRN: [id number]  Unit/Bed#: -01 Encounter: [id number]          Interventional Radiology has been consulted to evaluate Matthew Alvarez    We were consulted by Nephrology concerning this patient with renal failure.    Inpatient Consult to IR  Consult performed by: Juan Alberto Collins MD  Consult ordered by: Kerri Chisohlm PA-C      Inpatient Consult to IR  Consult performed by: Juan Alberto Collins MD  Consult ordered by: Jensen Kelly DO        01/27/25    Assessment/Recommendation:   69 yr old male with dialysis catheter site infection and possible right IJ clot infection on dialysis. Post removal of right IJ permacath and placement of left IJ temp dialysis catheter. Now cleared for permacth placement by ID.  Patient also requires IV antibiotics with plan for giving the medication during dialysis via permacath    5-10 minutes, >50% of the total time devoted to medical consultative verbal/EMR discussion between providers. Written report will be generated in the EMR.     Thank you for allowing Interventional Radiology to participate in the care of Matthew Alvarez. Please don't hesitate to call or TigerText us with any questions.     Juan Alberto Collins MD

## 2025-01-28 ENCOUNTER — TELEPHONE (OUTPATIENT)
Age: 70
End: 2025-01-28

## 2025-01-28 ENCOUNTER — APPOINTMENT (INPATIENT)
Dept: RADIOLOGY | Facility: HOSPITAL | Age: 70
DRG: 314 | End: 2025-01-28
Payer: COMMERCIAL

## 2025-01-28 PROBLEM — R53.1 GENERALIZED WEAKNESS: Status: ACTIVE | Noted: 2025-01-28

## 2025-01-28 LAB
ANION GAP SERPL CALCULATED.3IONS-SCNC: 9 MMOL/L (ref 4–13)
APTT PPP: 70 SECONDS (ref 23–34)
BACTERIA BLD CULT: NORMAL
BACTERIA BLD CULT: NORMAL
BUN SERPL-MCNC: 29 MG/DL (ref 5–25)
CALCIUM SERPL-MCNC: 7.9 MG/DL (ref 8.4–10.2)
CHLORIDE SERPL-SCNC: 97 MMOL/L (ref 96–108)
CO2 SERPL-SCNC: 27 MMOL/L (ref 21–32)
CREAT SERPL-MCNC: 3.59 MG/DL (ref 0.6–1.3)
ERYTHROCYTE [DISTWIDTH] IN BLOOD BY AUTOMATED COUNT: 22.5 % (ref 11.6–15.1)
GFR SERPL CREATININE-BSD FRML MDRD: 16 ML/MIN/1.73SQ M
GLUCOSE SERPL-MCNC: 81 MG/DL (ref 65–140)
HCT VFR BLD AUTO: 25.6 % (ref 36.5–49.3)
HGB BLD-MCNC: 8.4 G/DL (ref 12–17)
MCH RBC QN AUTO: 28.5 PG (ref 26.8–34.3)
MCHC RBC AUTO-ENTMCNC: 32.8 G/DL (ref 31.4–37.4)
MCV RBC AUTO: 87 FL (ref 82–98)
PLATELET # BLD AUTO: 112 THOUSANDS/UL (ref 149–390)
PMV BLD AUTO: 11.5 FL (ref 8.9–12.7)
POTASSIUM SERPL-SCNC: 4.2 MMOL/L (ref 3.5–5.3)
RBC # BLD AUTO: 2.95 MILLION/UL (ref 3.88–5.62)
SODIUM SERPL-SCNC: 133 MMOL/L (ref 135–147)
WBC # BLD AUTO: 7.41 THOUSAND/UL (ref 4.31–10.16)

## 2025-01-28 PROCEDURE — 71045 X-RAY EXAM CHEST 1 VIEW: CPT

## 2025-01-28 PROCEDURE — 85027 COMPLETE CBC AUTOMATED: CPT

## 2025-01-28 PROCEDURE — 99232 SBSQ HOSP IP/OBS MODERATE 35: CPT | Performed by: INTERNAL MEDICINE

## 2025-01-28 PROCEDURE — 80048 BASIC METABOLIC PNL TOTAL CA: CPT

## 2025-01-28 PROCEDURE — 97116 GAIT TRAINING THERAPY: CPT

## 2025-01-28 PROCEDURE — 97535 SELF CARE MNGMENT TRAINING: CPT

## 2025-01-28 PROCEDURE — 85730 THROMBOPLASTIN TIME PARTIAL: CPT | Performed by: FAMILY MEDICINE

## 2025-01-28 PROCEDURE — 99232 SBSQ HOSP IP/OBS MODERATE 35: CPT

## 2025-01-28 RX ORDER — AMLODIPINE BESYLATE 5 MG/1
5 TABLET ORAL DAILY
Status: DISCONTINUED | OUTPATIENT
Start: 2025-01-29 | End: 2025-01-29 | Stop reason: HOSPADM

## 2025-01-28 RX ORDER — HYDRALAZINE HYDROCHLORIDE 25 MG/1
100 TABLET, FILM COATED ORAL EVERY 8 HOURS SCHEDULED
Status: DISCONTINUED | OUTPATIENT
Start: 2025-01-28 | End: 2025-01-28

## 2025-01-28 RX ORDER — FERROUS SULFATE 325(65) MG
325 TABLET ORAL EVERY OTHER DAY
Status: DISCONTINUED | OUTPATIENT
Start: 2025-01-28 | End: 2025-01-29 | Stop reason: HOSPADM

## 2025-01-28 RX ORDER — HYDRALAZINE HYDROCHLORIDE 25 MG/1
25 TABLET, FILM COATED ORAL EVERY 8 HOURS SCHEDULED
Status: DISCONTINUED | OUTPATIENT
Start: 2025-01-28 | End: 2025-01-29 | Stop reason: HOSPADM

## 2025-01-28 RX ADMIN — Medication 6 MG: at 22:24

## 2025-01-28 RX ADMIN — CEFAZOLIN SODIUM 1000 MG: 1 SOLUTION INTRAVENOUS at 18:19

## 2025-01-28 RX ADMIN — ISOSORBIDE MONONITRATE 120 MG: 30 TABLET, EXTENDED RELEASE ORAL at 07:58

## 2025-01-28 RX ADMIN — METOPROLOL TARTRATE 25 MG: 25 TABLET, FILM COATED ORAL at 22:24

## 2025-01-28 RX ADMIN — AMLODIPINE BESYLATE 5 MG: 5 TABLET ORAL at 07:59

## 2025-01-28 RX ADMIN — FOLIC ACID 1 MG: 1 TABLET ORAL at 07:59

## 2025-01-28 RX ADMIN — APIXABAN 10 MG: 5 TABLET, FILM COATED ORAL at 18:19

## 2025-01-28 RX ADMIN — HYDRALAZINE HYDROCHLORIDE 25 MG: 25 TABLET ORAL at 22:24

## 2025-01-28 RX ADMIN — ATORVASTATIN CALCIUM 20 MG: 20 TABLET, FILM COATED ORAL at 18:19

## 2025-01-28 RX ADMIN — TORSEMIDE 100 MG: 100 TABLET ORAL at 08:01

## 2025-01-28 RX ADMIN — METOPROLOL TARTRATE 25 MG: 25 TABLET, FILM COATED ORAL at 07:59

## 2025-01-28 RX ADMIN — FERROUS SULFATE TAB 325 MG (65 MG ELEMENTAL FE) 325 MG: 325 (65 FE) TAB at 14:30

## 2025-01-28 NOTE — PLAN OF CARE
Problem: Nutrition/Hydration-ADULT  Goal: Nutrient/Hydration intake appropriate for improving, restoring or maintaining nutritional needs  Description: Monitor and assess patient's nutrition/hydration status for malnutrition. Collaborate with interdisciplinary team and initiate plan and interventions as ordered.  Monitor patient's weight and dietary intake as ordered or per policy. Utilize nutrition screening tool and intervene as necessary. Determine patient's food preferences and provide high-protein, high-caloric foods as appropriate.     INTERVENTIONS:  - Monitor oral intake, urinary output, labs, and treatment plans  - Assess nutrition and hydration status and recommend course of action  - Evaluate amount of meals eaten  - Assist patient with eating if necessary   - Allow adequate time for meals  - Recommend/ encourage appropriate diets, oral nutritional supplements, and vitamin/mineral supplements  - Order, calculate, and assess calorie counts as needed  - Recommend, monitor, and adjust tube feedings and TPN/PPN based on assessed needs  - Assess need for intravenous fluids  - Provide specific nutrition/hydration education as appropriate  - Include patient/family/caregiver in decisions related to nutrition  Outcome: Progressing     Problem: PAIN - ADULT  Goal: Verbalizes/displays adequate comfort level or baseline comfort level  Description: Interventions:  - Encourage patient to monitor pain and request assistance  - Assess pain using appropriate pain scale  - Administer analgesics based on type and severity of pain and evaluate response  - Implement non-pharmacological measures as appropriate and evaluate response  - Consider cultural and social influences on pain and pain management  - Notify physician/advanced practitioner if interventions unsuccessful or patient reports new pain  Outcome: Progressing     Problem: INFECTION - ADULT  Goal: Absence or prevention of progression during  hospitalization  Description: INTERVENTIONS:  - Assess and monitor for signs and symptoms of infection  - Monitor lab/diagnostic results  - Monitor all insertion sites, i.e. indwelling lines, tubes, and drains  - Monitor endotracheal if appropriate and nasal secretions for changes in amount and color  - [place] appropriate cooling/warming therapies per order  - Administer medications as ordered  - Instruct and encourage patient and family to use good hand hygiene technique  - Identify and instruct in appropriate isolation precautions for identified infection/condition  Outcome: Progressing  Goal: Absence of fever/infection during neutropenic period  Description: INTERVENTIONS:  - Monitor WBC    Outcome: Progressing     Problem: SAFETY ADULT  Goal: Patient will remain free of falls  Description: INTERVENTIONS:  - Educate patient/family on patient safety including physical limitations  - Instruct patient to call for assistance with activity   - Consult OT/PT to assist with strengthening/mobility   - Keep Call bell within reach  - Keep bed low and locked with side rails adjusted as appropriate  - Keep care items and personal belongings within reach  - Initiate and maintain comfort rounds  - Make Fall Risk Sign visible to staff  - Offer Toileting every 2 Hours, in advance of need  - Initiate/Maintain bed alarm  - Obtain necessary fall risk management equipment:   - Apply yellow socks and bracelet for high fall risk patients  - Consider moving patient to room near nurses station  Outcome: Progressing  Goal: Maintain or return to baseline ADL function  Description: INTERVENTIONS:  -  Assess patient's ability to carry out ADLs; assess patient's baseline for ADL function and identify physical deficits which impact ability to perform ADLs (bathing, care of mouth/teeth, toileting, grooming, dressing, etc.)  - Assess/evaluate cause of self-care deficits   - Assess range of motion  - Assess patient's mobility; develop plan if  impaired  - Assess patient's need for assistive devices and provide as appropriate  - Encourage maximum independence but intervene and supervise when necessary  - Involve family in performance of ADLs  - Assess for home care needs following discharge   - Consider OT consult to assist with ADL evaluation and planning for discharge  - Provide patient education as appropriate  Outcome: Progressing  Goal: Maintains/Returns to pre admission functional level  Description: INTERVENTIONS:  - Perform AM-PAC 6 Click Basic Mobility/ Daily Activity assessment daily.  - Set and communicate daily mobility goal to care team and patient/family/caregiver.   - Collaborate with rehabilitation services on mobility goals if consulted  - Perform Range of Motion 3 times a day.  - Reposition patient every 2 hours.  - Dangle patient 3 times a day  - Stand patient 3 times a day  - Ambulate patient 3 times a day  - Out of bed to chair 3 times a day   - Out of bed for meals 3 times a day  - Out of bed for toileting  - Record patient progress and toleration of activity level   Outcome: Progressing     Problem: DISCHARGE PLANNING  Goal: Discharge to home or other facility with appropriate resources  Description: INTERVENTIONS:  - Identify barriers to discharge w/patient and caregiver  - Arrange for needed discharge resources and transportation as appropriate  - Identify discharge learning needs (meds, wound care, etc.)  - Arrange for interpretive services to assist at discharge as needed  - Refer to Case Management Department for coordinating discharge planning if the patient needs post-hospital services based on physician/advanced practitioner order or complex needs related to functional status, cognitive ability, or social support system  Outcome: Progressing     Problem: Knowledge Deficit  Goal: Patient/family/caregiver demonstrates understanding of disease process, treatment plan, medications, and discharge instructions  Description:  Complete learning assessment and assess knowledge base.  Interventions:  - Provide teaching at level of understanding  - Provide teaching via preferred learning methods  Outcome: Progressing     Problem: METABOLIC, FLUID AND ELECTROLYTES - ADULT  Goal: Electrolytes maintained within normal limits  Description: INTERVENTIONS:  - Monitor labs and assess patient for signs and symptoms of electrolyte imbalances  - Administer electrolyte replacement as ordered  - Monitor response to electrolyte replacements, including repeat lab results as appropriate  - Instruct patient on fluid and nutrition as appropriate  Outcome: Progressing  Goal: Fluid balance maintained  Description: INTERVENTIONS:  - Monitor labs   - Monitor I/O and WT  - Instruct patient on fluid and nutrition as appropriate  - Assess for signs & symptoms of volume excess or deficit  Outcome: Progressing  Goal: Glucose maintained within target range  Description: INTERVENTIONS:  - Monitor Blood Glucose as ordered  - Assess for signs and symptoms of hyperglycemia and hypoglycemia  - Administer ordered medications to maintain glucose within target range  - Assess nutritional intake and initiate nutrition service referral as needed  Outcome: Progressing     Problem: SKIN/TISSUE INTEGRITY - ADULT  Goal: Skin Integrity remains intact(Skin Breakdown Prevention)  Description: Assess:  -Perform Omar assessment every shift  -Clean and moisturize skin every shift  -Inspect skin when repositioning, toileting, and assisting with ADLS  -Assess under medical devices such as  every   -Assess extremities for adequate circulation and sensation     Bed Management:  -Have minimal linens on bed & keep smooth, unwrinkled  -Change linens as needed when moist or perspiring  -Avoid sitting or lying in one position for more than 2 hours while in bed  -Keep HOB at 30degrees     Toileting:  -Offer bedside commode  -Assess for incontinence every 2 hours  -Use incontinent care products  after each incontinent episode such as wipes    Activity:  -Mobilize patient 3 times a day  -Encourage activity and walks on unit  -Encourage or provide ROM exercises   -Turn and reposition patient every 2 Hours  -Use appropriate equipment to lift or move patient in bed  -Instruct/ Assist with weight shifting every 2 hours when out of bed in chair  -Consider limitation of chair time 4 hour intervals    Skin Care:  -Avoid use of baby powder, tape, friction and shearing, hot water or constrictive clothing  -Relieve pressure over bony prominences using foam  -Do not massage red bony areas    Next Steps:  -Teach patient strategies to minimize risks such as turn/reposition   -Consider consults to  interdisciplinary teams such as wound/nutriiton  Outcome: Progressing  Goal: Incision(s), wounds(s) or drain site(s) healing without S/S of infection  Description: INTERVENTIONS  - Assess and document dressing, incision, wound bed, drain sites and surrounding tissue  - Provide patient and family education  - Perform skin care/dressing changes every shift  Outcome: Progressing  Goal: Pressure injury heals and does not worsen  Description: Interventions:  - Implement low air loss mattress or specialty surface (Criteria met)  - Apply silicone foam dressing  - Instruct/assist with weight shifting every 15 minutes when in chair   - Limit chair time to 4 hour intervals  - Use special pressure reducing interventions such as cushions when in chair   - Apply fecal or urinary incontinence containment device   - Perform passive or active ROM every shift  - Turn and reposition patient & offload bony prominences every 2 hours   - Utilize friction reducing device or surface for transfers   - Consider consults to  interdisciplinary teams such as wound/nutrition  - Use incontinent care products after each incontinent episode such as wipes  - Consider nutrition services referral as needed  Outcome: Progressing     Problem: Prexisting or High  Potential for Compromised Skin Integrity  Goal: Skin integrity is maintained or improved  Description: INTERVENTIONS:  - Identify patients at risk for skin breakdown  - Assess and monitor skin integrity  - Assess and monitor nutrition and hydration status  - Monitor labs   - Assess for incontinence   - Turn and reposition patient  - Assist with mobility/ambulation  - Relieve pressure over bony prominences  - Avoid friction and shearing  - Provide appropriate hygiene as needed including keeping skin clean and dry  - Evaluate need for skin moisturizer/barrier cream  - Collaborate with interdisciplinary team   - Patient/family teaching  - Consider wound care consult   Outcome: Progressing

## 2025-01-28 NOTE — DISCHARGE SUMMARY
Discharge Summary - Hospitalist   Name: Matthew Alvarez 69 y.o. male I MRN: [id number]  Unit/Bed#: -01 I Date of Admission: 1/20/2025   Date of Service: 1/29/2025 I Hospital Day: 9     Assessment & Plan  Sepsis (HCC)  Meeting sepsis criteria secondary to temperature 101.4 °F, HR 97 bpm  Lactic acid 3.2  In setting of COVID, bacteremia from unclear source at this time-possibly bacterial pneumonia versus skin infection (pustule over HD cath as well as chronic skin lesions of lower extremities)  Blood cultures positive for 2 out of 2 gram-positive cocci in clusters  Identification panel with staph aureus  MRSA negative  ID recommending cefazolin 1 g every 24 hours -can transition to dosing postdialysis M/w/f (2g/2g/3g) through 3/5 to complete 6-week course  Bacteremia  Patient with 2 out of 2 blood cultures positive for gram-positive cocci in clusters  Identification panel with Staph aureus   HD cath removed 1/21   Repeat BC 1/22 positive x2  Repeat BC 1/23 NG 72 hours   No vegetation noted on echo  RUE + for internal jugular vein thrombus - likely the cause of persistent bacteremia  ID consult: 6-week IV antibiotic course as above  IR consult placed for 1/27 -PermCath placed  Acute on chronic systolic heart failure (HCC)  Wt Readings from Last 3 Encounters:   01/29/25 49.4 kg (109 lb)   01/15/25 56.5 kg (124 lb 9.6 oz)   12/27/24 56 kg (123 lb 6.4 oz)     Patient presents with worsening shortness of breath, missed dialysis session today with last session on Friday  Patient's wife endorses that he sometimes is not taking his medication, spits them out or throws them away  CT chest with moderate to large right pleural effusion and moderate left pleural effusion  Echo in November with EF of 20%  Wearing LifeVest initially on presentation, now refusing-monitoring on telemetry  PTA regimen is Coreg, torsemide (on nondialysis days)  BNP >4700  Given 1 dose IV diuretic on admission, HD as scheduled  Daily weights and  I/O  COVID  Mild COVID pathway as below   COVID19- positive on 1/20   CT chest bilateral pleural effusions and bilateral groundglass opacities  COVID mild pathway labs   CRP and BNP elevated   CK normal  Trop elevated but downtrended, no chest pain  Completed remdesivir x3 days   OOB TID; ambulation and mobilization strongly encouraged  Supportive care  Atrial fibrillation with RVR (HCC)  Developed in setting of sepsis  Home Coreg on hold secondary to low blood pressure-switch to metoprolol 25 twice daily  As needed low-dose 2.5 Lopressor IV for sustained heart rate greater than 130  Currently on heparin drip due to right IJV thrombus   Will need anticoagulation for stroke prevention as well as acute VTE  Heparin drip transition to Eliquis on 1/28 at VTE dosing  Primary hypertension  PTA regimen on Coreg 25 mg twice daily, torsemide 100 mg on nondialysis days, Norvasc 5 mg daily and hydralazine 100 mg 3 times daily  Current medications include: Amlodipine 5 mg daily/hydralazine 25 mg every 8 hours/Imdur 120 mg daily/metoprolol tartrate 25 every 12 hours/torsemide 100 mg 3 days a week on nondialysis days   Anemia due to chronic kidney disease, on chronic dialysis (HCC)  With chronic anemia secondary to renal disease  Is on Epogen and iron infusions with dialysis  Would avoid IV iron due to findings of bacteremia  Continue folic acid daily  Hemoglobin stable  Thrombocytopenia (HCC)  Chronic stable thrombocytopenia in setting of chronic renal disease  ESRD (end stage renal disease) on dialysis (HCC)  Lab Results   Component Value Date    EGFR 16 01/28/2025    EGFR 9 01/27/2025    EGFR 12 01/26/2025    CREATININE 3.59 (H) 01/28/2025    CREATININE 5.77 (H) 01/27/2025    CREATININE 4.57 (H) 01/26/2025   Patient on dialysis MWF -noncompliant with going, wife says he misses at least once a week  Presenting with bilateral pleural effusions  Currently on torsemide on nondialysis days  HD as scheduled  Nephrology consult: Due  to infected tunneled dialysis catheter removal on 1/21, temporary dialysis catheter with interventional radiology placed on 1/22 and then get back to schedule on Monday, Wednesday, Friday.  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)  Subacute to chronic occlusive thrombus in the IJV of proximal neck on upper extremity venous duplex  Heparin drip to be transitioned to Eliquis VTE dosing 1/28  Moderate protein-calorie malnutrition (HCC)  Malnutrition Findings:   Adult Malnutrition type: Chronic illness  Adult Degree of Malnutrition: Malnutrition of moderate degree  Malnutrition Characteristics: Muscle loss, Fat loss                  360 Statement: malnutrition of moderate degree in setting of chronic illness, evidenced by hollowing orbitals/dark circles, muscle wasting/indented temples, protruding clavicles, treated with diet and supplements    BMI Findings:           Body mass index is 17.59 kg/m².     Generalized weakness  Patient with generalized weakness and recurrent hospitalization  PT/OT initially recommending rehab  Patient typically agreeable, occasionally disagreeable-neuropsychiatric assessment completed and patient does not have capacity to make his own medical decisions -family agreeable with rehab  Submitted for insurance authorization-initially denied, after peer to peer, approved for a few days     Medical Problems       Resolved Problems  Date Reviewed: 1/22/2025   None         MESSAGE TO PCP (Olive Rodriguez MD) FOR FOLLOW UP:   Thank you for  allowing us to participate in the care of your patient, Matthew Alvarez, who was hospitalized from 1/20/2025 through 01/29/25 with the admitting diagnosis of acute on chronic heart failure with bacteremia.  Heart failure treated with hemodialysis.  Bacteremia source was from HD cath, removed and given IV antibiotics.  Once blood cultures cleared able to replace PermCath.  He will have antibiotics for 6 weeks during dialysis sessions    Medication Changes:  Cefazolin x 6 weeks administered after dialysis Monday Wednesday Friday  Hydralazine dose adjusted to 25 mg 3 times daily  Added Eliquis for VTE -currently 10 mg twice daily x 2 more days then transition to 5 mg    If you have any additional questions or would like to discuss further, please feel free to contact me.  Kerri Chisholm PA-C  [facility], Hospitalist, [phone]     Admission Date:   Admission Orders (From admission, onward)       Ordered        01/20/25 1702  INPATIENT ADMISSION  Once                          Discharge Date: 01/29/25    Consultations During Hospital Stay:  Nephrology   Infectious disease  PT/OT  Case Management  Nutrition    Procedures Performed:   1/21 removal of tunneled cath  1/22 placement of temp cath  1/28 placement of PermCath    Significant Findings / Test Results:   X-ray chest portable 1/28/2025   Cardiomegaly with pulmonary vascular congestion, mild pulmonary edema and moderate-sized bilateral pleural effusions. Lung bases obscured by pleural fluid and basilar atelectasis or consolidation cannot be excluded.    Procedure: VAS upper limb venous duplex scan, unilateral/limited - Result Date: 1/23/2025   Impression RIGHT UPPER LIMB: There is subacute to chronic occlusive thrombus in the IJV of the proximal neck. No evidence of superficial thrombophlebitis noted. Doppler evaluation shows a normal response to augmentation maneuvers.  LEFT UPPER LIMB LIMITED: Evaluation shows no evidence of thrombus in the  internal jugular vein, subclavian vein, and the innominate vein.      Procedure: XR chest portable - Result Date: 1/23/2025  Impression: Right-sided pleural effusion diminished in size, left-sided effusion without significant change. Persistent pulmonary congestion     CT chest abdomen pelvis wo contrast - Result Date: 1/20/2025  1.  Moderate to large right and moderate left pleural effusions, with mild pulmonary alveolar edema.     CT head without contrast - Result Date: 1/20/2025  No acute intracranial abnormality.  Stable, moderate chronic microangiopathic changes within the brain. Pansinus mild mucosal thickening.   Lab Results   Component Value Date    BLOODCX No Growth After 5 Days. 01/23/2025    BLOODCX No Growth After 5 Days. 01/23/2025    BLOODCX Staphylococcus aureus (A) 01/22/2025    BLOODCX Staphylococcus aureus (A) 01/22/2025    BLOODCX Staphylococcus aureus (A) 01/20/2025    BLOODCX Staphylococcus aureus (A) 01/20/2025     COVID-positive on 1/20/2025    Incidental Findings:   None     Test Results Pending at Discharge (will require follow up):   None     Complications:  None    Reason for Admission: Acute on chronic CHF with sepsis    Hospital Course:   Matthew Alvarez is a 69 y.o. male patient with past medical history of ESRD on HD MWF, HFrEF, s/p LifeVest, hypertension, CAD who originally presented to the hospital on 1/20/2025 due to concern for worsening shortness of breath.  Patient had skipped dialysis and found to be in acute on chronic CHF exacerbation.  Patient also meeting sepsis criteria.  Tested positive for COVID and had concern for PermCath infection as had pustule overlying skin.  Nephrology following for dialysis.  Blood cultures positive 2 out of 2 for Staph aureus, infectious disease consulted and manage antibiotics.  PermCath removed by general surgery for source control and temp cath placed in order to continue with HD.  Once blood cultures were clear x 72 hours temp cath removed and  "PermCath placed once again.  For COVID treated with 3-day course of remdesivir . PT/OT recommended rehab, initially declined by insurance however after peer to peer a few days of coverage granted.       Please see above list of diagnoses and related plan for additional information.     Condition at Discharge: stable    Discharge Day Visit / Exam:   Subjective: Seen and examined, breakfast set up.  Denies any acute issues today.  Feels no shortness of breath  Vitals: Blood Pressure: 158/85 (01/29/25 0302)  Pulse: 80 (01/29/25 0302)  Temperature: 98.4 °F (36.9 °C) (01/29/25 0302)  Temp Source: Temporal (01/28/25 1430)  Respirations: 20 (01/29/25 0302)  Height: 5' 6\" (167.6 cm) (01/21/25 1241)  Weight - Scale: 49.4 kg (109 lb) (01/29/25 0600)  SpO2: 94 % (01/29/25 0302)  Physical Exam  Vitals and nursing note reviewed.   Constitutional:       General: He is not in acute distress.     Appearance: Normal appearance. He is ill-appearing.   HENT:      Head: Normocephalic and atraumatic.      Nose: No congestion.      Mouth/Throat:      Mouth: Mucous membranes are moist.      Pharynx: Oropharynx is clear.   Eyes:      Conjunctiva/sclera: Conjunctivae normal.   Cardiovascular:      Rate and Rhythm: Normal rate.      Pulses: Normal pulses.      Heart sounds: Normal heart sounds.      Comments: PermCath placed left sided, with bloody but dried drainage surrounding bandage  Pulmonary:      Effort: Pulmonary effort is normal. No respiratory distress.      Comments: Diminished breath sounds  Abdominal:      General: Bowel sounds are normal.      Palpations: Abdomen is soft.      Tenderness: There is no abdominal tenderness.   Musculoskeletal:         General: Normal range of motion.      Cervical back: Normal range of motion.      Right lower leg: No edema.      Left lower leg: No edema.   Skin:     General: Skin is warm and dry.   Neurological:      Mental Status: He is alert and oriented to person, place, and time. "   Psychiatric:         Mood and Affect: Mood normal.         Behavior: Behavior normal.          Discussion with Family:  Attempted call to patient's wife/son however no answer and voicemail box full.     Discharge instructions/Information to patient and family:   See after visit summary for information provided to patient and family.      Provisions for Follow-Up Care:  See after visit summary for information related to follow-up care and any pertinent home health orders.      Mobility at time of Discharge:   Basic Mobility Inpatient Raw Score: 18  JH-HLM Goal: 6: Walk 10 steps or more  JH-HLM Achieved: 6: Walk 10 steps or more  HLM Goal achieved. Continue to encourage appropriate mobility.     Disposition:   Other Skilled Nursing Facility at [facility]    Planned Readmission: None    Discharge Medications:  See after visit summary for reconciled discharge medications provided to patient and/or family.      Administrative Statements   Discharge Statement:  I have spent a total time of 45 minutes in caring for this patient on the day of the visit/encounter. >30 minutes of time was spent on: Diagnostic results, Prognosis, Risks and benefits of tx options, Instructions for management, Patient and family education, Importance of tx compliance, Risk factor reductions, Impressions, Counseling / Coordination of care, Documenting in the medical record, Reviewing / ordering tests, medicine, procedures  , and Communicating with other healthcare professionals .    **Please Note: This note may have been constructed using a voice recognition system**

## 2025-01-28 NOTE — TELEPHONE ENCOUNTER
Received call from home care- asking if provider Dr. Lu would be willing to sign home care orders. I advised he has not yet established care with Dr. Lu he had a new pt apt on 1/16 but was a no show.    I advised I could send a message back and ask, she stated she would reach out to her clinicians and see if they can get her the orders she needs for this patient.

## 2025-01-28 NOTE — CASE MANAGEMENT
[facility] received request for authorization from Care Manager.  Authorization request submitted for: [facility]  Facility Name:[facility]    NPI: [id number]  Facility MD: dr kaitlyn dent  NPI: [id number]  Authorization initiated by contacting insurance:  [employer]   Via: [employer]   Clinicals submitted via Portal attachment   Pending Reference #: [id number]     Care Manager notified: asiya molina     Updates to authorization status will be noted in chart. Please reach out to CM for updates on any clinical information.

## 2025-01-28 NOTE — ASSESSMENT & PLAN NOTE
Wt Readings from Last 3 Encounters:   01/29/25 49.4 kg (109 lb)   01/15/25 56.5 kg (124 lb 9.6 oz)   12/27/24 56 kg (123 lb 6.4 oz)     Patient presents with worsening shortness of breath, missed dialysis session today with last session on Friday  Patient's wife endorses that he sometimes is not taking his medication, spits them out or throws them away  CT chest with moderate to large right pleural effusion and moderate left pleural effusion  Echo in November with EF of 20%  Wearing LifeVest initially on presentation, now refusing-monitoring on telemetry  PTA regimen is Coreg, torsemide (on nondialysis days)  BNP >4700  Given 1 dose IV diuretic on admission, HD as scheduled  Daily weights and I/O

## 2025-01-28 NOTE — ASSESSMENT & PLAN NOTE
Malnutrition Findings:   Adult Malnutrition type: Chronic illness  Adult Degree of Malnutrition: Malnutrition of moderate degree  Malnutrition Characteristics: Muscle loss, Fat loss                  360 Statement: malnutrition of moderate degree in setting of chronic illness, evidenced by hollowing orbitals/dark circles, muscle wasting/indented temples, protruding clavicles, treated with diet and supplements    BMI Findings:           Body mass index is 17.59 kg/m².

## 2025-01-28 NOTE — ASSESSMENT & PLAN NOTE
Patient with generalized weakness and recurrent hospitalization  PT/OT initially recommending rehab  Patient typically agreeable, occasionally disagreeable-neuropsychiatric assessment completed and patient does not have capacity to make his own medical decisions -family agreeable with rehab  Submitted for insurance authorization-initially denied, after peer to peer, approved for a few days

## 2025-01-28 NOTE — ASSESSMENT & PLAN NOTE
Lab Results   Component Value Date    EGFR 16 01/28/2025    EGFR 9 01/27/2025    EGFR 12 01/26/2025    CREATININE 3.59 (H) 01/28/2025    CREATININE 5.77 (H) 01/27/2025    CREATININE 4.57 (H) 01/26/2025

## 2025-01-28 NOTE — CASE MANAGEMENT
Case Management Discharge Planning Note    Patient name Matthew Alvarez  Location /-01 MRN [id number]  : 1955 Date 2025       Current Admission Date: 2025  Current Admission Diagnosis:Sepsis (Formerly [facility])   Patient Active Problem List    Diagnosis Date Noted Date Diagnosed    Generalized weakness 2025     Thrombosis of right internal jugular vein ([facility]) 2025     Moderate protein-calorie malnutrition ([facility]) 2025     Chronic kidney disease-mineral and bone disorder (CKD-MBD) 2025     Atrial fibrillation with RVR ([facility]) 2025     Bacteremia 2025     COVID 2025     Sepsis (Formerly [facility]) 2025     Acute hemodialysis patient (Formerly [facility]) 2025     Pleural effusion 2024     Secondary hyperparathyroidism of renal origin ([facility]) 12/15/2024     ESRD (end stage renal disease) on dialysis ([facility]ly [facility]) 2024     Ambulatory dysfunction 2024     Thrombocytopenia ([facility]) 2024     Renal failure 11/15/2024     Goals of care, counseling/discussion 11/15/2024     Anemia due to chronic kidney disease, on chronic dialysis ([facility]) 11/15/2024     Anemia of renal disease 11/15/2024     Vitamin D deficiency, unspecified 11/15/2024     Chronic systolic (congestive) heart failure ([facility]) 2024     Oliguria 2024     Encounter for hemodialysis for ESRD ([facility]) 2024     Hyponatremia 2024     Dilated cardiomyopathy ([facility]) 2024     Elevated liver transaminase level 11/10/2024     Acute on chronic systolic heart failure ([facility]) 2024     Coronary artery disease involving native coronary artery of native heart without angina pectoris 10/08/2021     Rheumatoid factor positive 2021     Protein calorie malnutrition ([facility]) 2021     Elevated troponin 2021     Tobacco abuse 2021     Nonischemic cardiomyopathy ([facility]) 2021     Primary hypertension 2021       LOS (days): 8  Geometric Mean LOS (GMLOS) (days): 4.9  Days to GMLOS:-3.1      OBJECTIVE:  Risk of Unplanned Readmission Score: 34.24         Current admission status: Inpatient   Preferred Pharmacy:   [facility]/pharmacy #[id number] - [place] - [address]  [address] [place] [address]  Phone: [phone] Fax: [phone]    [facility] - [place] - [address]  [address]  [place] [address]  Phone: [phone] Fax: [phone]    Primary Care Provider: Olive Rodriguez MD    Primary Insurance: [employer] REP  Secondary Insurance:     DISCHARGE DETAILS:        Pts [employer] insurance has denied SNF authorization.  Attending called to complete P2P, as per insurance, they will call attending back to discuss appeal.       1623 Denial of SNF, has been overturned.  Auth SOC is 1/29/25.  CM placing transportation request to [facility] in [place]

## 2025-01-28 NOTE — ASSESSMENT & PLAN NOTE
Patient with generalized weakness and recurrent hospitalization  PT/OT initially recommending rehab  Patient typically agreeable, occasionally disagreeable-neuropsychiatric assessment completed and patient does not have capacity to make his own medical decisions -family agreeable with rehab  Submitted for insurance authorization-initially denied, pending peer to peer review

## 2025-01-28 NOTE — OCCUPATIONAL THERAPY NOTE
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)    Moderate protein-calorie malnutrition (HCC)          01/28/25 0913   OT Last Visit   OT Visit Date 01/28/25   Note Type   Note Type Treatment   Pain Assessment   Pain Assessment Tool 0-10   Pain Score No Pain   Restrictions/Precautions   Weight Bearing Precautions Per Order No   Other Precautions Chair Alarm;Bed Alarm   ADL   Where Assessed Chair   Eating Assistance 6  Modified independent   Eating Comments breakfast   Grooming Assistance 5  Supervision/Setup   Grooming Comments used mouthwash for oral hygiene while seated   UB Bathing Assistance 5  Supervision/Setup   LB Bathing Assistance 3  Moderate Assistance   UB Dressing Assistance 4  Minimal Assistance   LB Dressing Assistance 2  Maximal Assistance   Toileting Assistance  2  Maximal Assistance   Toileting Comments continent bowel episode   Bed Mobility   Additional Comments Pt OOB in recliner upon therapy arrival.   Transfers   Sit to Stand 5  Supervision   Additional items Increased time required;Verbal cues   Stand to Sit 5  Supervision   Additional items Increased time required;Verbal cues   Stand pivot 5  Supervision   Additional items Increased time required;Verbal cues  (close supervision)   Additional Comments Pt used rolling walker to walk moderate household distances.   Functional Mobility   Functional Mobility 5  Supervision   Additional Comments Pt used rolling walker to walk moderate  "household distances.   Cognition   Overall Cognitive Status Impaired   Arousal/Participation Alert;Responsive   Attention Within functional limits   Orientation Level Oriented to person;Oriented to place;Oriented to time;Disoriented to situation  (Situation:  \"I don't remember\")   Memory Decreased short term memory;Decreased recall of recent events   Following Commands Follows one step commands with increased time or repetition   Comments Poor insight   Activity Tolerance   Activity Tolerance Patient limited by fatigue   Medical Staff Made Aware PT INES Negron Isabela   Assessment   Assessment Pt completed OT tx session #1 focused on self-care ADLs and functional mobility. Pt alert and agreeable to participate. PT/OT co-treat completed d/t significant mobility deficits and safety concerns. Pt demonstrated improvements in endurance and functional transfers this session but continues to be limited by activity tolerance, cognition, decreased strength. Pt currently completing UB ADLs @ SPV-Min A, LB ADLs @ Mod - Max A, and functional mobility with RW @ SPV. Pt demonstrating fair participation and fair potential to achieve goals but is currently demonstrating deficits in decrease activity tolerance, decrease standing balance, decrease sitting balance, decrease performance during ADL tasks, decrease cognition, decrease safety awareness , decrease UB MS, decrease generalized strength, decrease activity engagement, decrease performance during functional transfers, and limited insight to deficits. Continue to recommend moderate resource intensity upon discharge to increase safety and independence in ADL tasks and functional mobility.   Plan   Treatment Interventions ADL retraining;Visual perceptual retraining;Functional transfer training;UE strengthening/ROM;Endurance training;Cognitive reorientation;Patient/family training;Compensatory technique education;Energy conservation;Activityengagement   Goal Expiration Date 02/06/25 "   OT Treatment Day 1   OT Frequency 2-3x/wk   Discharge Recommendation   Rehab Resource Intensity Level, OT II (Moderate Resource Intensity)   AM-PAC Daily Activity Inpatient   Lower Body Dressing 2   Bathing 3   Toileting 2   Upper Body Dressing 3   Grooming 3   Eating 4   Daily Activity Raw Score 17   Daily Activity Standardized Score (Calc for Raw Score >=11) 37.26   AM-PAC Applied Cognition Inpatient   Following a Speech/Presentation 4   Understanding Ordinary Conversation 4   Taking Medications 2   Remembering Where Things Are Placed or Put Away 2   Remembering List of 4-5 Errands 2   Taking Care of Complicated Tasks 2   Applied Cognition Raw Score 16   Applied Cognition Standardized Score 35.03   End of Consult   Education Provided Yes   Patient Position at End of Consult Bedside chair;Bed/Chair alarm activated;All needs within reach   Nurse Communication Nurse aware of consult     The patient's raw score on the AM-PAC Daily Activity Inpatient Short Form is 17. A raw score of less than 19 suggests the patient may benefit from discharge to post-acute rehabilitation services. Please refer to the recommendation of the Occupational Therapist for safe discharge planning.    Pt goals to be met by 2/6/2025     Pt will demonstrate ability to complete grooming/hygiene tasks @ independent after set-up.  Pt will demonstrate ability to complete supine<>sit @ SPV in order to increase safety and independence during ADL tasks.  Pt will demonstrate ability to complete UB ADLs including washing/dressing @ SPV in order to increase performance and participation during meaningful tasks  Pt will demonstrate ability to complete LB dressing @ Min A in order to increase safety and independence during meaningful tasks.   Pt will demonstrate ability to complete toileting tasks including CM and pericare @ SPV in order to increase safety and independence during meaningful tasks.  Pt will demonstrate ability to complete EOB, chair,  toilet/commode transfers @ SPV in order to increase performance and participation during functional tasks.  Pt will demonstrate ability to stand for 5 minutes while maintaining fair+ balance with use of a rolling walker for UB support PRN.  Pt will demonstrate ability to tolerate 30-35 minute OT session with no vc'ing for deep breathing or use of energy conservation techniques in order to increase activity tolerance during functional tasks.   Pt will demonstrate Good carryover of use of energy conservation/compensatory strategies during ADLs and functional tasks in order to increase safety and reduce risk for falls.   Pt will demonstrate Good attention and participation in continued evaluation of functional ambulation house hold distances in order to assist with safe d/c planning.  Pt will attend to continued cognitive assessments 100% of the time in order to provide most appropriate d/c recommendations.   Pt will follow 100% simple 2-step commands and be A&O x4 consistently with environmental cues to increase participation in functional activities.   Pt will identify 3 areas of interest/hobbies and 1 intervention on how to incorporate into daily life in order to increase interaction with environment and peers as well as increase participation in meaningful tasks.   Pt will demonstrate 100% carryover of BUE HEP in order to increase BUE MS and increase performance during functional tasks upon d/c home.      Antonio Santiago, OTR/L

## 2025-01-28 NOTE — PROGRESS NOTES
Progress Note - Hospitalist   Name: Matthew Alvarez 69 y.o. male I MRN: [id number]  Unit/Bed#: -01 I Date of Admission: 1/20/2025   Date of Service: 1/28/2025 I Hospital Day: 8    Assessment & Plan  Sepsis (HCC)  Meeting sepsis criteria secondary to temperature 101.4 °F, HR 97 bpm  Lactic acid 3.2  In setting of COVID, bacteremia from unclear source at this time-possibly bacterial pneumonia versus skin infection (pustule over HD cath as well as chronic skin lesions of lower extremities)  Blood cultures positive for 2 out of 2 gram-positive cocci in clusters  Identification panel with staph aureus  MRSA negative  ID recommending cefazolin 1 g every 24 hours -can transition to dosing postdialysis M/w/f (2g/2g/3g) through 3/5 to complete 6-week course  Bacteremia  Patient with 2 out of 2 blood cultures positive for gram-positive cocci in clusters  Identification panel with Staph aureus   HD cath removed 1/21   Repeat BC 1/22 positive x2  Repeat BC 1/23 NG 72 hours   No vegetation noted on echo  RUE + for internal jugular vein thrombus - likely the cause of persistent bacteremia  ID consult: 6-week IV antibiotic course as above  IR consult placed for 1/27 -PermCath placed  Acute on chronic systolic heart failure (HCC)  Wt Readings from Last 3 Encounters:   01/28/25 51.3 kg (113 lb)   01/15/25 56.5 kg (124 lb 9.6 oz)   12/27/24 56 kg (123 lb 6.4 oz)     Patient presents with worsening shortness of breath, missed dialysis session today with last session on Friday  Patient's wife endorses that he sometimes is not taking his medication, spits them out or throws them away  CT chest with moderate to large right pleural effusion and moderate left pleural effusion  Echo in November with EF of 20%  Wearing LifeVest initially on presentation, now refusing-monitoring on telemetry  PTA regimen is Coreg, torsemide (on nondialysis days)  BNP >4700  Given 1 dose IV diuretic on admission, HD as scheduled  Daily weights and  I/O  COVID  Mild COVID pathway as below   COVID19- positive on 1/20   CT chest bilateral pleural effusions and bilateral groundglass opacities  COVID mild pathway labs   CRP and BNP elevated   CK normal  Trop elevated but downtrended, no chest pain  Completed remdesivir x3 days   OOB TID; ambulation and mobilization strongly encouraged  Supportive care  Atrial fibrillation with RVR (HCC)  Developed in setting of sepsis  Home Coreg on hold secondary to low blood pressure-switch to metoprolol 25 twice daily  As needed low-dose 2.5 Lopressor IV for sustained heart rate greater than 130  Currently on heparin drip due to right IJV thrombus   Will need anticoagulation for stroke prevention as well as acute VTE  Heparin drip transition to Eliquis on 1/28 at VTE dosing  Primary hypertension  PTA regimen on Coreg 25 mg twice daily, torsemide 100 mg on nondialysis days, Norvasc 5 mg daily and hydralazine 100 mg 3 times daily  Current medications include: Amlodipine 5 mg daily/hydralazine 25 mg every 8 hours/Imdur 120 mg daily/metoprolol tartrate 25 every 12 hours/torsemide 100 mg 3 days a week on nondialysis days   Anemia due to chronic kidney disease, on chronic dialysis (HCC)  With chronic anemia secondary to renal disease  Is on Epogen and iron infusions with dialysis  Would avoid IV iron due to findings of bacteremia  Continue folic acid daily  Hemoglobin stable  Thrombocytopenia (HCC)  Chronic stable thrombocytopenia in setting of chronic renal disease  ESRD (end stage renal disease) on dialysis (HCC)  Lab Results   Component Value Date    EGFR 16 01/28/2025    EGFR 9 01/27/2025    EGFR 12 01/26/2025    CREATININE 3.59 (H) 01/28/2025    CREATININE 5.77 (H) 01/27/2025    CREATININE 4.57 (H) 01/26/2025   Patient on dialysis MWF -noncompliant with going, wife says he misses at least once a week  Presenting with bilateral pleural effusions  Currently on torsemide on nondialysis days  HD as scheduled  Nephrology consult: Due  to infected tunneled dialysis catheter removal on 1/21, temporary dialysis catheter with interventional radiology placed on 1/22 and then get back to schedule on Monday, Wednesday, Friday.  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)  Subacute to chronic occlusive thrombus in the IJV of proximal neck on upper extremity venous duplex  Heparin drip to be transitioned to Eliquis VTE dosing 1/28  Moderate protein-calorie malnutrition (HCC)  Malnutrition Findings:   Adult Malnutrition type: Chronic illness  Adult Degree of Malnutrition: Malnutrition of moderate degree  Malnutrition Characteristics: Muscle loss, Fat loss                  360 Statement: malnutrition of moderate degree in setting of chronic illness, evidenced by hollowing orbitals/dark circles, muscle wasting/indented temples, protruding clavicles, treated with diet and supplements    BMI Findings:           Body mass index is 18.24 kg/m².     Generalized weakness  Patient with generalized weakness and recurrent hospitalization  PT/OT initially recommending rehab  Patient typically agreeable, occasionally disagreeable-neuropsychiatric assessment completed and patient does not have capacity to make his own medical decisions -family agreeable with rehab  Submitted for insurance authorization-initially denied, pending peer to peer review    VTE Pharmacologic Prophylaxis:   High Risk (Score >/= 5) - Pharmacological DVT Prophylaxis Ordered: apixaban (Eliquis). Sequential Compression Devices  Ordered.    Mobility:   Basic Mobility Inpatient Raw Score: 18  JH-HLM Goal: 6: Walk 10 steps or more  JH-HLM Achieved: 7: Walk 25 feet or more  JH-HLM Goal achieved. Continue to encourage appropriate mobility.    Patient Centered Rounds: I performed bedside rounds with nursing staff today.   Discussions with Specialists or Other Care Team Provider: CM, nephrology    Education and Discussions with Family / Patient:  Attempted to reach patient's wife/son, voicemail box full.     Current Length of Stay: 8 day(s)  Current Patient Status: Inpatient   Certification Statement: The patient will continue to require additional inpatient hospital stay due to pending peer to peer for SNF rehab, monitoring on Eliquis  Discharge Plan: Anticipate discharge in 24-48 hrs to rehab facility.    Code Status: Level 1 - Full Code    Subjective   Seen and examined.  Patient is alert and oriented x 4.  Endorses that he would be agreeable to rehab.  Overall has no acute concerns today other than feeling cold    Objective :  Temp:  [97.6 °F (36.4 °C)-98.4 °F (36.9 °C)] 97.9 °F (36.6 °C)  HR:  [69-78] 78  BP: ()/(63-85) 96/63  Resp:  [16-18] 18  SpO2:  [92 %-97 %] 93 %  O2 Device: None (Room air)    Body mass index is 18.24 kg/m².     Input and Output Summary (last 24 hours):   No intake or output data in the 24 hours ending 01/28/25 1602    Physical Exam  Vitals and nursing note reviewed.   Constitutional:       General: He is not in acute distress.     Appearance: Normal appearance. He is ill-appearing.   HENT:      Head: Normocephalic and atraumatic.      Nose: No congestion.      Mouth/Throat:      Mouth: Mucous membranes are moist.      Pharynx: Oropharynx is clear.   Eyes:      Conjunctiva/sclera: Conjunctivae normal.   Cardiovascular:      Rate and Rhythm: Normal rate.      Pulses: Normal pulses.      Heart sounds: Normal heart sounds.      Comments: PermCath placed left sided, with bloody but dried drainage surrounding  bandage  Pulmonary:      Effort: Pulmonary effort is normal. No respiratory distress.      Comments: Diminished breath sounds  Abdominal:      General: Bowel sounds are normal.      Palpations: Abdomen is soft.      Tenderness: There is no abdominal tenderness.   Musculoskeletal:         General: Normal range of motion.      Cervical back: Normal range of motion.      Right lower leg: No edema.      Left lower leg: No edema.   Skin:     General: Skin is warm and dry.   Neurological:      Mental Status: He is alert and oriented to person, place, and time.   Psychiatric:         Mood and Affect: Mood normal.         Behavior: Behavior normal.           Lines/Drains:  Lines/Drains/Airways       Active Status       Name Placement date Placement time Site Days    HD Permanent Double Catheter 01/27/25  1441  Internal jugular  1                      Telemetry:  Telemetry Orders (From admission, onward)               24 Hour Telemetry Monitoring  Continuous x 24 Hours (Telem)        Expiring   Question:  Reason for 24 Hour Telemetry  Answer:  Decompensated CHF- and any one of the following: continuous diuretic infusion or total diuretic dose >200 mg daily, associated electrolyte derangement (I.e. K < 3.0), inotropic drip (continuous infusion), hx of ventricular arrhythmia, or new EF < 35%                       Indication for Continued Telemetry Use: Arrthymias requiring medical therapy               Lab Results: I have reviewed the following results:   Results from last 7 days   Lab Units 01/28/25  0511   WBC Thousand/uL 7.41   HEMOGLOBIN g/dL 8.4*   HEMATOCRIT % 25.6*   PLATELETS Thousands/uL 112*     Results from last 7 days   Lab Units 01/28/25  0511   SODIUM mmol/L 133*   POTASSIUM mmol/L 4.2   CHLORIDE mmol/L 97   CO2 mmol/L 27   BUN mg/dL 29*   CREATININE mg/dL 3.59*   ANION GAP mmol/L 9   CALCIUM mg/dL 7.9*   GLUCOSE RANDOM mg/dL 81     Results from last 7 days   Lab Units 01/23/25  1705   INR  1.56*              Results from last 7 days   Lab Units 01/23/25  0952 01/22/25  0456   PROCALCITONIN ng/ml 2.83* 3.13*       Recent Cultures (last 7 days):   Results from last 7 days   Lab Units 01/23/25  1522 01/23/25  1520 01/22/25  0457   BLOOD CULTURE  No Growth After 4 Days. No Growth After 4 Days. Staphylococcus aureus*  Staphylococcus aureus*   GRAM STAIN RESULT   --   --  Gram positive cocci in clusters*  Gram positive cocci in clusters*             Last 24 Hours Medication List:     Current Facility-Administered Medications:     acetaminophen (TYLENOL) tablet 650 mg, Q6H PRN    [START ON 1/29/2025] amLODIPine (NORVASC) tablet 5 mg, Daily    apixaban (ELIQUIS) tablet 10 mg, BID **FOLLOWED BY** [START ON 1/30/2025] apixaban (ELIQUIS) tablet 5 mg, BID    atorvastatin (LIPITOR) tablet 20 mg, Daily With Dinner    ceFAZolin (ANCEF) IVPB (premix in dextrose) 1,000 mg 50 mL, Q24H, Last Rate: 1,000 mg (01/27/25 1721)    Diclofenac Sodium (VOLTAREN) 1 % topical gel 2 g, 4x Daily    epoetin loida (EPOGEN,PROCRIT) injection 5,000 Units, Once per day on Monday Wednesday Friday    ferrous sulfate tablet 325 mg, Every Other Day    folic acid (FOLVITE) tablet 1 mg, Daily    hydrALAZINE (APRESOLINE) tablet 25 mg, Q8H JEEVAN    isosorbide mononitrate (IMDUR) 24 hr tablet 120 mg, Daily    melatonin tablet 6 mg, HS    metoprolol (LOPRESSOR) injection 2.5 mg, Q6H PRN    metoprolol tartrate (LOPRESSOR) tablet 25 mg, Q12H JEEVAN    torsemide (DEMADEX) tablet 100 mg, Once per day on Tuesday Thursday Saturday    Administrative Statements   Today, Patient Was Seen By: Kerri Chisholm PA-C      **Please Note: This note may have been constructed using a voice recognition system.**

## 2025-01-28 NOTE — ASSESSMENT & PLAN NOTE
Malnutrition Findings:   Adult Malnutrition type: Chronic illness  Adult Degree of Malnutrition: Malnutrition of moderate degree  Malnutrition Characteristics: Muscle loss, Fat loss                  360 Statement: malnutrition of moderate degree in setting of chronic illness, evidenced by hollowing orbitals/dark circles, muscle wasting/indented temples, protruding clavicles, treated with diet and supplements    BMI Findings:           Body mass index is 18.24 kg/m².

## 2025-01-28 NOTE — PHYSICAL THERAPY NOTE
" PHYSICAL THERAPY TREATMENT NOTE        NAME:  Matthew Alvarez  DATE: 01/28/25    Length Of Stay: 8  Performed at least 2 patient identifiers during session: Name and Birthday            TREATMENT FLOW SHEET:       01/28/25 0912   PT Last Visit   PT Visit Date 01/28/25   Note Type   Note Type Treatment   Pain Assessment   Pain Assessment Tool 0-10   Pain Score No Pain   Restrictions/Precautions   Weight Bearing Precautions Per Order No   Other Precautions Chair Alarm;Bed Alarm;Fall Risk;Contact/isolation;Airborne/isolation;Cognitive  (Covid-19; impaired insight)   General   Chart Reviewed Yes   Additional Pertinent History hx of being non-compliant with dialysis   Response to Previous Treatment Patient reporting fatigue but able to participate.   Family/Caregiver Present No   Cognition   Overall Cognitive Status Impaired   Arousal/Participation Alert   Attention Attends with cues to redirect   Orientation Level Oriented to person;Oriented to place;Oriented to time;Disoriented to situation  (Situation:  \"I don't remember\")   Memory Decreased short term memory;Decreased recall of recent events   Following Commands Follows one step commands with increased time or repetition   Subjective   Subjective \"I feel wonderful\"   Transfers   Sit to Stand 5  Supervision   Additional items Verbal cues;Increased time required  (repetitive cues needed for each trial sit > stand; poor carryover trial to trial)   Stand to Sit 5  Supervision   Additional items Verbal cues;Increased time required   Stand pivot 5  Supervision   Additional items Verbal cues;Increased time required   Additional Comments RW   Ambulation/Elevation   Gait pattern Excessively slow;Decreased hip extension;Decreased heel strike;Decreased toe off   Gait Assistance 5  Supervision  (close SPV)   Additional items Verbal cues   Assistive Device Rolling walker   Distance 25 ft x 1; 20 ft x 1   Ambulation/Elevation Additional Comments cues to avoid running into objects " "with RW on R side   Balance   Static Sitting Good   Dynamic Sitting Fair   Static Standing Fair   Dynamic Standing Fair -   Ambulatory Fair -  (with RW)   Endurance Deficit   Endurance Deficit Yes   Activity Tolerance   Activity Tolerance Patient limited by fatigue   Medical Staff Made Aware OT Antonio   Nurse Made Aware Yes   Assessment   Prognosis Fair   Problem List Decreased strength;Decreased endurance;Impaired balance;Decreased mobility;Decreased cognition;Impaired judgement;Decreased safety awareness;Decreased skin integrity   Assessment Pt tolerates tx session fair.  He is agreeable to mobilization this morning, but perseverates on being cold.  Pt requires verbal cues for proper transfer technique each trial.  During ambulation, he is noted to \"wander,\" forgetting exact task at hand that was originally requested of pt.  He denies SOB, dizziness, or lightheadedness throughout.  PT educates pt on benefits of mobilization i.e. ambulation.   Barriers to Discharge Other (Comment)   Barriers to Discharge Comments high fall risk; high re-admission risk; cognitive deficits; impaired insight; 4 admissions to hospital since beginning of November 2024; questionable compliance with medical tx & recommendations   Goals   STG Expiration Date 02/06/25   PT Treatment Day 1   Plan   Progress Slow progress, decreased activity tolerance   PT Frequency 3-5x/wk   Discharge Recommendation   Rehab Resource Intensity Level, PT II (Moderate Resource Intensity)  (PAR)   AM-PAC Basic Mobility Inpatient   Turning in Flat Bed Without Bedrails 3   Lying on Back to Sitting on Edge of Flat Bed Without Bedrails 3   Moving Bed to Chair 3   Standing Up From Chair Using Arms 3   Walk in Room 3   Climb 3-5 Stairs With Railing 3   Basic Mobility Inpatient Raw Score 18   Basic Mobility Standardized Score 41.05   [facility] Level Of Mobility   -HLM Goal 6: Walk 10 steps or more   -HLM Achieved 7: Walk 25 feet or more   Education "   Education Provided Mobility training;Assistive device  (benefits of mobility)   Patient Reinforcement needed   End of Consult   Patient Position at End of Consult   (in bathroom with OT)       The patient's AM-PAC Basic Mobility Inpatient Short Form Raw Score is 18. A Raw score of greater than 16 suggests the patient may benefit from discharge to home, however D/C recommendation is PAR. Please also refer to the recommendation of the Physical Therapist for safe discharge planning.         Jamil Ramirez, PT,DPT

## 2025-01-28 NOTE — ASSESSMENT & PLAN NOTE
Developed in setting of sepsis  Home Coreg on hold secondary to low blood pressure-switch to metoprolol 25 twice daily  As needed low-dose 2.5 Lopressor IV for sustained heart rate greater than 130  Currently on heparin drip due to right IJV thrombus   Will need anticoagulation for stroke prevention as well as acute VTE  Heparin drip transition to Eliquis on 1/28 at VTE dosing

## 2025-01-28 NOTE — ASSESSMENT & PLAN NOTE
Current hemoglobin is 8.4 from 1/28/2025  Continue on Epogen 6000 units with each treatment  Cannot give intravenous iron in the setting of sepsis; noted iron studies from January 10 showed ferritin 244 and iron saturation of 8 therefore we will continue with oral iron

## 2025-01-28 NOTE — CASE MANAGEMENT
Received call from catalina [employer] ([phone]) stating that the auth was going to be sent to the medical director . Vu notified asiya molina

## 2025-01-28 NOTE — ASSESSMENT & PLAN NOTE
Lab Results   Component Value Date    EGFR 16 01/28/2025    EGFR 9 01/27/2025    EGFR 12 01/26/2025    CREATININE 3.59 (H) 01/28/2025    CREATININE 5.77 (H) 01/27/2025    CREATININE 4.57 (H) 01/26/2025   Patient on dialysis MWF -noncompliant with going, wife says he misses at least once a week  Presenting with bilateral pleural effusions  Currently on torsemide on nondialysis days  HD as scheduled  Nephrology consult: Due to infected tunneled dialysis catheter removal on 1/21, temporary dialysis catheter with interventional radiology placed on 1/22 and then get back to schedule on Monday, Wednesday, Friday.  PermCath placed on 1/27 after blood cultures negative x 72 hours

## 2025-01-28 NOTE — ASSESSMENT & PLAN NOTE
PTA regimen on Coreg 25 mg twice daily, torsemide 100 mg on nondialysis days, Norvasc 5 mg daily and hydralazine 100 mg 3 times daily  Current medications include: Amlodipine 5 mg daily/hydralazine 25 mg every 8 hours/Imdur 120 mg daily/metoprolol tartrate 25 every 12 hours/torsemide 100 mg 3 days a week on nondialysis days

## 2025-01-28 NOTE — ASSESSMENT & PLAN NOTE
Patient with 2 out of 2 blood cultures positive for gram-positive cocci in clusters  Identification panel with Staph aureus   HD cath removed 1/21   Repeat BC 1/22 positive x2  Repeat BC 1/23 NG 72 hours   No vegetation noted on echo  RUE + for internal jugular vein thrombus - likely the cause of persistent bacteremia  ID consult: 6-week IV antibiotic course as above  IR consult placed for 1/27 -PermCath placed

## 2025-01-28 NOTE — ASSESSMENT & PLAN NOTE
Wt Readings from Last 3 Encounters:   01/28/25 51.3 kg (113 lb)   01/15/25 56.5 kg (124 lb 9.6 oz)   12/27/24 56 kg (123 lb 6.4 oz)     Patient presents with worsening shortness of breath, missed dialysis session today with last session on Friday  Patient's wife endorses that he sometimes is not taking his medication, spits them out or throws them away  CT chest with moderate to large right pleural effusion and moderate left pleural effusion  Echo in November with EF of 20%  Wearing LifeVest initially on presentation, now refusing-monitoring on telemetry  PTA regimen is Coreg, torsemide (on nondialysis days)  BNP >4700  Given 1 dose IV diuretic on admission, HD as scheduled  Daily weights and I/O

## 2025-01-28 NOTE — ASSESSMENT & PLAN NOTE
Staph aureus sepsis most likely related to tunneled dialysis catheter as well as chronic skin lesions of lower extremities: Cefazolin 1 g every 24 hours to be transition to 2 g Monday and Wednesday and 3 g Friday per infectious disease upon discharge through 3/5/2025 to complete a 6-week course

## 2025-01-28 NOTE — PROGRESS NOTES
Progress Note - Nephrology   Name: Matthew Alvarez 69 y.o. male I MRN: [id number]  Unit/Bed#: -01 I Date of Admission: 1/20/2025   Date of Service: 1/28/2025 I Hospital Day: 8     Assessment & Plan  ESRD (end stage renal disease) on dialysis (HCC)  MWF at [facility]  Next HD in a.m.  New access placed 1/27/2025 left TDC  Monitor and adjust target weight as able to he appears euvolemic today  The patient was also started on heparin January 23 given the presence of occlusive thrombus of the right IJ at the proximal neck.   Primary hypertension  Overall quite stable continue to monitor on the current regimen  Current medications include: Amlodipine 5 mg daily/hydralazine 100 mg every 8 hours/Imdur 110 mg daily/metoprolol tartrate 25 every 12 hours/torsemide 100 mg 3 days a week on nondialysis days  Hyponatremia  Sodium doing better at 133 continue to monitor and modest fluid restriction along with HD/UF  Chronic kidney disease-mineral and bone disorder (CKD-MBD)  Phosphorus most recently was 2.9 I will recheck in a.m. off binders  Anemia due to chronic kidney disease, on chronic dialysis (HCC)  Current hemoglobin is 8.4 from 1/28/2025  Continue on Epogen 6000 units with each treatment  Cannot give intravenous iron in the setting of sepsis; noted iron studies from January 10 showed ferritin 244 and iron saturation of 8 therefore we will continue with oral iron  Acute on chronic systolic heart failure (HCC)  Appears euvolemic: Post weight 48 .2 kg on bed scale will push for standing scale  EF: 20% by recent echocardiogram in January 21 with global hypokinesis.  Recheck chest x-ray    COVID  The patient is being given focal directed treatment and is status post intravenous remdesivir during this admission.  Sepsis (HCC)  Staph aureus sepsis most likely related to tunneled dialysis catheter as well as chronic skin lesions of lower extremities: Cefazolin 1 g every 24 hours to be transition to 2 g Monday and  Wednesday and 3 g Friday per infectious disease upon discharge through 3/5/2025 to complete a 6-week course      I have reviewed the nephrology recommendations including challenging target weight rechecking chest x-ray, with SLIM, and we are in agreement with renal plan including the information outlined above.     Subjective   Brief History of Admission -ESRD admitted with sepsis/COVID who we are seeing for ESRD    Patient denies any shortness of breath or chest pain  Slight cough  No nausea vomiting or diarrhea    Objective :  Temp:  [97.5 °F (36.4 °C)-98.4 °F (36.9 °C)] 97.9 °F (36.6 °C)  HR:  [60-81] 70  BP: (154-195)/() 155/76  Resp:  [16-22] 18  SpO2:  [92 %-100 %] 93 %  O2 Device: None (Room air)  Nasal Cannula O2 Flow Rate (L/min):  [4 L/min] 4 L/min    Current Weight: Weight - Scale: 51.3 kg (113 lb)  First Weight: Weight - Scale: 55.4 kg (122 lb 2.2 oz)  I/O         01/26 0701  01/27 0700 01/27 0701  01/28 0700 01/28 0701  01/29 0700    P.O. 120      I.V. (mL/kg)  430 (8.4)     Total Intake(mL/kg) 120 (2.4) 430 (8.4)     Other  2500     Total Output  2500     Net +120 -2070            Unmeasured Stool Occurrence  1 x           Physical Exam  Physical Exam: General:  No acute distress, weak appearing  Skin:  No acute rash  Eyes:  No scleral icterus and noninjected  ENT:  Moist mucous membranes  Neck:  Supple, no jugular venous distention, trachea midline, overall appearance is normal  Chest: Minimal rhonchi diffusely but no rales; left TDC with slight hematoma underneath the bandage  CVS:  Regular rate and rhythm, without a rub or gallops  Abdomen:  Normal bowel sounds, soft and nontender and nondistended  Extremities:  No edema, and no cyanosis, moderate arthritic changes  Neuro:  No gross focality  Psych:  Alert and oriented and appropriate    Medications:    Current Facility-Administered Medications:     acetaminophen (TYLENOL) tablet 650 mg, 650 mg, Oral, Q6H PRN, Kerri Chisholm PA-C    amLODIPine  (NORVASC) tablet 5 mg, 5 mg, Oral, Daily, Kerri Chisholm PA-C, 5 mg at 01/28/25 0759    atorvastatin (LIPITOR) tablet 20 mg, 20 mg, Oral, Daily With Dinner, Kerri Chisholm PA-C, 20 mg at 01/27/25 1721    ceFAZolin (ANCEF) IVPB (premix in dextrose) 1,000 mg 50 mL, 1,000 mg, Intravenous, Q24H, Leonarda Simmons PA-C, Last Rate: 100 mL/hr at 01/27/25 1721, 1,000 mg at 01/27/25 1721    Diclofenac Sodium (VOLTAREN) 1 % topical gel 2 g, 2 g, Topical, 4x Daily, Caty Calvo PA-C, 2 g at 01/27/25 2202    epoetin loida (EPOGEN,PROCRIT) injection 5,000 Units, 5,000 Units, Intravenous, Once per day on Monday Wednesday Friday, Kendall Guerra MD, 5,000 Units at 01/27/25 1046    folic acid (FOLVITE) tablet 1 mg, 1 mg, Oral, Daily, Kerri Chisholm PA-C, 1 mg at 01/28/25 0759    hydrALAZINE (APRESOLINE) tablet 100 mg, 100 mg, Oral, Q8H JEEVAN, Kerri Chisholm PA-C, 100 mg at 01/20/25 2125    isosorbide mononitrate (IMDUR) 24 hr tablet 120 mg, 120 mg, Oral, Daily, Kerri Chisholm PA-C, 120 mg at 01/28/25 0758    melatonin tablet 6 mg, 6 mg, Oral, HS, Kerri Chisholm PA-C, 6 mg at 01/27/25 2202    metoprolol (LOPRESSOR) injection 2.5 mg, 2.5 mg, Intravenous, Q6H PRN, Kerri Chisholm PA-C    metoprolol tartrate (LOPRESSOR) tablet 25 mg, 25 mg, Oral, Q12H JEEVAN, Danny Degroot MD, 25 mg at 01/28/25 0759    torsemide (DEMADEX) tablet 100 mg, 100 mg, Oral, Once per day on Tuesday Thursday Saturday, Jensen Kelly DO, 100 mg at 01/28/25 0801      Lab Results: I have reviewed the following results:  Results from last 7 days   Lab Units 01/28/25  0511 01/27/25  0909 01/26/25  0442 01/26/25  0432 01/25/25  0343 01/24/25  0755 01/23/25  0952 01/22/25  0456   WBC Thousand/uL 7.41 8.02 6.96  --  6.63 7.80 7.73 8.75   HEMOGLOBIN g/dL 8.4* 8.3* 7.6*  --  7.6* 7.5* 7.6* 8.4*   HEMATOCRIT % 25.6* 23.9* 22.1*  --  22.2* 21.7* 22.2* 24.5*   PLATELETS Thousands/uL 112* 117* 105*  --  96* 90* 79* 75*   POTASSIUM mmol/L 4.2 3.8  --  3.5 3.6 3.7 3.8 4.0   CHLORIDE mmol/L 97  "96  --  98 96 95* 93* 95*   CO2 mmol/L 27 27  --  27 29 30 25 27   BUN mg/dL 29* 56*  --  45* 29* 45* 76* 53*   CREATININE mg/dL 3.59* 5.77*  --  4.57* 3.27* 4.56* 6.82* 5.84*   CALCIUM mg/dL 7.9* 8.3*  --  7.7* 7.7* 7.8* 8.0* 8.1*   MAGNESIUM mg/dL  --  1.9  --   --   --   --  2.0 1.9       Administrative Statements     Portions of the record may have been created with voice recognition software. Occasional wrong word or \"sound a like\" substitutions may have occurred due to the inherent limitations of voice recognition software. Read the chart carefully and recognize, using context, where substitutions have occurred.If you have any questions, please contact the dictating provider.  "

## 2025-01-28 NOTE — CASE MANAGEMENT
Support Center has received DENIAL for SNF Authorization.   Insurance:   Denial obtained via Insurance Rep: catalina P:[phone]  Denial Reason: does not meet cms guidelines   Facility: :[facility]   Denial #: [id number]   Peer to Peer Phone#: [phone]        Deadline:1/29 @ 4:00pm  CM to task P2P to Attending to complete if desired.      Please notify Discharge Support if P2P will not be completed.     Care Manager notified:asiya molina     Please reach out to CM for updates on any clinical information.

## 2025-01-28 NOTE — ASSESSMENT & PLAN NOTE
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

## 2025-01-28 NOTE — ASSESSMENT & PLAN NOTE
GINGER at [facility]  Next HD in a.m.  New access placed 1/27/2025 left TDC  Monitor and adjust target weight as able to he appears euvolemic today  The patient was also started on heparin January 23 given the presence of occlusive thrombus of the right IJ at the proximal neck.

## 2025-01-28 NOTE — ASSESSMENT & PLAN NOTE
Appears euvolemic: Post weight 48 .2 kg on bed scale will push for standing scale  EF: 20% by recent echocardiogram in January 21 with global hypokinesis.  Recheck chest x-ray

## 2025-01-28 NOTE — PLAN OF CARE
Problem: OCCUPATIONAL THERAPY ADULT  Goal: Performs self-care activities at highest level of function for planned discharge setting.  See evaluation for individualized goals.  Description: Treatment Interventions: ADL retraining, Functional transfer training, UE strengthening/ROM, Endurance training, Cognitive reorientation, Patient/family training, Energy conservation, Activityengagement, Compensatory technique education          See flowsheet documentation for full assessment, interventions and recommendations.   Outcome: Progressing  Note: Limitation: Decreased ADL status, Decreased UE ROM, Decreased UE strength, Decreased Safe judgement during ADL, Decreased cognition, Decreased endurance, Decreased self-care trans, Decreased high-level ADLs  Prognosis: Fair  Assessment: Pt completed OT tx session #1 focused on self-care ADLs and functional mobility. Pt alert and agreeable to participate. PT/OT co-treat completed d/t significant mobility deficits and safety concerns. Pt demonstrated improvements in endurance and functional transfers this session but continues to be limited by activity tolerance, cognition, decreased strength. Pt currently completing UB ADLs @ SPV-Min A, LB ADLs @ Mod - Max A, and functional mobility with RW @ SPV. Pt demonstrating fair participation and fair potential to achieve goals but is currently demonstrating deficits in decrease activity tolerance, decrease standing balance, decrease sitting balance, decrease performance during ADL tasks, decrease cognition, decrease safety awareness , decrease UB MS, decrease generalized strength, decrease activity engagement, decrease performance during functional transfers, and limited insight to deficits. Continue to recommend moderate resource intensity upon discharge to increase safety and independence in ADL tasks and functional mobility.     Rehab Resource Intensity Level, OT: II (Moderate Resource Intensity)

## 2025-01-28 NOTE — ASSESSMENT & PLAN NOTE
Overall quite stable continue to monitor on the current regimen  Current medications include: Amlodipine 5 mg daily/hydralazine 100 mg every 8 hours/Imdur 110 mg daily/metoprolol tartrate 25 every 12 hours/torsemide 100 mg 3 days a week on nondialysis days

## 2025-01-28 NOTE — CASE MANAGEMENT
Case Management Discharge Planning Note    Patient name Matthew Alvarez  Location /-01 MRN [id number]  : 1955 Date 2025       Current Admission Date: 2025  Current Admission Diagnosis:Sepsis ([facility])   Patient Active Problem List    Diagnosis Date Noted Date Diagnosed    Thrombosis of right internal jugular vein ([facility]) 2025     Moderate protein-calorie malnutrition ([facility]) 2025     Chronic kidney disease-mineral and bone disorder (CKD-MBD) 2025     Atrial fibrillation with RVR ([facility]) 2025     Bacteremia 2025     COVID 2025     Sepsis ([facility]) 2025     Acute hemodialysis patient ([facility]) 2025     Pleural effusion 2024     Secondary hyperparathyroidism of renal origin ([facility]) 12/15/2024     ESRD (end stage renal disease) on dialysis ([facility]) 2024     Ambulatory dysfunction 2024     Thrombocytopenia ([facility]) 2024     Renal failure 11/15/2024     Goals of care, counseling/discussion 11/15/2024     Anemia due to chronic kidney disease, on chronic dialysis ([facility]) 11/15/2024     Anemia of renal disease 11/15/2024     Vitamin D deficiency, unspecified 11/15/2024     Chronic systolic (congestive) heart failure ([facility]) 2024     Oliguria 2024     Encounter for hemodialysis for ESRD ([facility]) 2024     Hyponatremia 2024     Dilated cardiomyopathy ([facility]) 2024     Elevated liver transaminase level 11/10/2024     Acute on chronic systolic heart failure ([facility]) 2024     Coronary artery disease involving native coronary artery of native heart without angina pectoris 10/08/2021     Rheumatoid factor positive 2021     Protein calorie malnutrition ([facility]) 2021     Elevated troponin 2021     Tobacco abuse 2021     Nonischemic cardiomyopathy ([facility]) 2021     Primary hypertension 2021       LOS (days): 8  Geometric Mean LOS (GMLOS) (days): 4.9  Days to GMLOS:-2.9     OBJECTIVE:  Risk of Unplanned  Readmission Score: 33.12         Current admission status: Inpatient   Preferred Pharmacy:   [id number] [place] - [address]  [address] [place] [address]  Phone: [phone] Fax: [phone]    [facility] - [place] - [address]  [address]  [place] [address]  Phone: [phone] Fax: [phone]    Primary Care Provider: Olive Rodriguez MD    Primary Insurance: [employer]  REP  Secondary Insurance:     DISCHARGE DETAILS:              CM initiating prior authorization through pts insurance

## 2025-01-28 NOTE — CONSULTS
Consultation - Neuropsychology/Psychology Department  Matthew Alvarez 69 y.o. male MRN: [id number]  Unit/Bed#: -01 Encounter: [id number]        Reason for Consultation:  Matthew Alvarez is a 69 y.o. year old male who was referred for a Neuropsychological Exam to assess cognitive functioning and comment on capacity to make informed medical decisions.      History of Present Illness  Sepsis  Physician Requesting Consult: Shari Arceo MD    PROBLEM LIST:  Patient Active Problem List   Diagnosis    Nonischemic cardiomyopathy (HCC)    Primary hypertension    Elevated troponin    Tobacco abuse    Protein calorie malnutrition (HCC)    Rheumatoid factor positive    Coronary artery disease involving native coronary artery of native heart without angina pectoris    Acute on chronic systolic heart failure (HCC)    Elevated liver transaminase level    Dilated cardiomyopathy (HCC)    Hyponatremia    Oliguria    Encounter for hemodialysis for ESRD ([facility])    Renal failure    Goals of care, counseling/discussion    Anemia due to chronic kidney disease, on chronic dialysis (HCC)    Thrombocytopenia (HCC)    Anemia of renal disease    Vitamin D deficiency, unspecified    Chronic systolic (congestive) heart failure (HCC)    ESRD (end stage renal disease) on dialysis ([facility])    Ambulatory dysfunction    Secondary hyperparathyroidism of renal origin (HCC)    Pleural effusion    Acute hemodialysis patient ([facility])    COVID    Sepsis (HCC)    Atrial fibrillation with RVR (HCC)    Bacteremia    Chronic kidney disease-mineral and bone disorder (CKD-MBD)    Thrombosis of right internal jugular vein (HCC)    Moderate protein-calorie malnutrition (HCC)         Historical Information   Past Medical History:   Diagnosis Date    Acute hemodialysis patient ([facility]) 12/14/2024    CAD (coronary artery disease)     HFrEF (heart failure with reduced ejection fraction) ([facility]) 09/2021    Hypertension     Rheumatoid factor positive 09/2021    Stage 3  "chronic kidney disease (HCC)     Tobacco abuse      Past Surgical History:   Procedure Laterality Date    APPENDECTOMY  1965    CARDIAC CATHETERIZATION  9/16/2021    INGUINAL HERNIA REPAIR Right 1991    IR TEMPORARY DIALYSIS CATHETER PLACEMENT  1/22/2025    IR THORACENTESIS  12/18/2024    IR THORACENTESIS  12/23/2024    IR TUNNELED DIALYSIS CATHETER PLACEMENT  11/14/2024    IR TUNNELED DIALYSIS CATHETER PLACEMENT  1/27/2025     Social History   Social History     Substance and Sexual Activity   Alcohol Use Yes    Alcohol/week: 6.0 standard drinks of alcohol    Types: 6 Shots of liquor per week    Comment: rare     Social History     Substance and Sexual Activity   Drug Use Never     Social History     Tobacco Use   Smoking Status Every Day    Current packs/day: 0.50    Average packs/day: 0.5 packs/day for 51.1 years (25.5 ttl pk-yrs)    Types: Cigarettes    Start date: 1974   Smokeless Tobacco Never   Tobacco Comments    Began smoking in his early 20s, on average smoking 1+ packs daily. Quit in 08/2021 (Updated 09/15/2021).      Family History:   Family History   Problem Relation Age of Onset    Heart failure Mother     Other Father         \"blood clot\"    COPD Sister     Heart failure Brother     Valvular heart disease Brother     Heart attack Son     No Known Problems Son        Meds/Allergies   current meds:   Current Facility-Administered Medications:     acetaminophen (TYLENOL) tablet 650 mg, Q6H PRN    amLODIPine (NORVASC) tablet 5 mg, Daily    atorvastatin (LIPITOR) tablet 20 mg, Daily With Dinner    ceFAZolin (ANCEF) IVPB (premix in dextrose) 1,000 mg 50 mL, Q24H, Last Rate: 1,000 mg (01/27/25 1721)    Diclofenac Sodium (VOLTAREN) 1 % topical gel 2 g, 4x Daily    epoetin loida (EPOGEN,PROCRIT) injection 5,000 Units, Once per day on Monday Wednesday Friday    folic acid (FOLVITE) tablet 1 mg, Daily    hydrALAZINE (APRESOLINE) tablet 100 mg, Q8H JEEVAN    isosorbide mononitrate (IMDUR) 24 hr tablet 120 mg, Daily   "  melatonin tablet 6 mg, HS    metoprolol (LOPRESSOR) injection 2.5 mg, Q6H PRN    metoprolol tartrate (LOPRESSOR) tablet 25 mg, Q12H JEEVAN    torsemide (DEMADEX) tablet 100 mg, Once per day on Tuesday Thursday Saturday    No Known Allergies      Family and Social Support:   No data recorded    Behavioral Observations: Patient was alert, UNABLE to accurately state the season, month, day/week, day/month, state; denied depressed mood, anxiety and there was no overt evidence of psychotic process; patient was unable to recall medicla history and does not know what he is being treated for in hospital    Cognitive Examination    General Cognitive Functioning MMSE =Deficits in recall, orientation and working memory    Attention/Concentration Auditory Selective Attention = Impaired;  Auditory Vigilance = Impaired; Information Processing Speed = Impaired    Frontal Systems/Executive Functioning Mental Flexibility/Cognitive Control = Impaired; Working Memory = Impaired Abstract Reasoning = Impaired; Generative Ability = Impaired,     Language Functioning Phonemic Fluency = Impaired; Semantic Retrieval = Impaired; Comprehension of Complex Ideational Material = Impaired;     Memory Functioning Three word recall = Impaired 0/3    Visuo-Spatial Abilities Not Assessed    Functional Knowledge  Health & Safety Knowledge = Impaired;     Summary/Impression:  Results of Neuropsychological Exam revealed diffuse cognitive dysfunction and on a measure assessing awareness of personal health status and ability to evaluate health problems, handle medical emergencies and take safety precautions, patient performed in the IMPAIRED range of functioning. During this encounter, patient does not appear to have capacity to make fully informed medical decisions.

## 2025-01-28 NOTE — CASE MANAGEMENT
Case Management Discharge Planning Note    Patient name Matthew Alvarez  Location /-01 MRN [id number]  : 1955 Date 2025       Current Admission Date: 2025  Current Admission Diagnosis:Sepsis ([facility])   Patient Active Problem List    Diagnosis Date Noted Date Diagnosed    Generalized weakness 2025     Thrombosis of right internal jugular vein ([facility]) 2025     Moderate protein-calorie malnutrition ([facility]) 2025     Chronic kidney disease-mineral and bone disorder (CKD-MBD) 2025     Atrial fibrillation with RVR ([facility]) 2025     Bacteremia 2025     COVID 2025     Sepsis ([facility]) 2025     Acute hemodialysis patient ([facility]) 2025     Pleural effusion 2024     Secondary hyperparathyroidism of renal origin ([facility]) 12/15/2024     ESRD (end stage renal disease) on dialysis ([facility]) 2024     Ambulatory dysfunction 2024     Thrombocytopenia ([facility]) 2024     Renal failure 11/15/2024     Goals of care, counseling/discussion 11/15/2024     Anemia due to chronic kidney disease, on chronic dialysis ([facility]) 11/15/2024     Anemia of renal disease 11/15/2024     Vitamin D deficiency, unspecified 11/15/2024     Chronic systolic (congestive) heart failure ([facility]) 2024     Oliguria 2024     Encounter for hemodialysis for ESRD ([facility]) 2024     Hyponatremia 2024     Dilated cardiomyopathy ([facility]) 2024     Elevated liver transaminase level 11/10/2024     Acute on chronic systolic heart failure ([facility]) 2024     Coronary artery disease involving native coronary artery of native heart without angina pectoris 10/08/2021     Rheumatoid factor positive 2021     Protein calorie malnutrition ([facility]) 2021     Elevated troponin 2021     Tobacco abuse 2021     Nonischemic cardiomyopathy ([facility]) 2021     Primary hypertension 2021       LOS (days): 8  Geometric Mean LOS (GMLOS) (days): 4.9  Days to GMLOS:-3.1      OBJECTIVE:  Risk of Unplanned Readmission Score: 34.24         Current admission status: Inpatient   Preferred Pharmacy:   [id number] - [place] - [address]  [address] [place] [address]  Phone: [phone] Fax: [phone]    [facility] - [place] - [address]  [address]  [place] [address]  Phone: [phone] Fax: [phone]    Primary Care Provider: Olive Rodriguez MD    [facility]  Secondary Insurance:     DISCHARGE DETAILS:                                                                                                               Facility Insurance Auth Number: [id number]

## 2025-01-28 NOTE — CASE MANAGEMENT
[facility] has received APPROVED authorization.  Insurance:   [employer]   Auth obtained via portal.  Authorization received for: [facility]  Facility: [facility]   Authorization #:[id number]   Start of Care:1/29  Next Review Date: 2 business days   Continued Stay Care Coordinator:  n/a  Submit next review to: fax [phone]     Care Manager notified:asiya molina     Please reach out to CM for updates on any clinical information.

## 2025-01-28 NOTE — PLAN OF CARE
"  Problem: PHYSICAL THERAPY ADULT  Goal: Performs mobility at highest level of function for planned discharge setting.  See evaluation for individualized goals.  Description: Treatment/Interventions: ADL retraining, Functional transfer training, LE strengthening/ROM, Elevations, Therapeutic exercise, Endurance training, Patient/family training, Equipment eval/education, Bed mobility, Gait training, Compensatory technique education, Cognitive reorientation, Spoke to nursing, Spoke to case management, OT          See flowsheet documentation for full assessment, interventions and recommendations.  Outcome: Progressing  Note: Prognosis: Fair  Problem List: Decreased strength, Decreased endurance, Impaired balance, Decreased mobility, Decreased cognition, Impaired judgement, Decreased safety awareness, Decreased skin integrity  Assessment: Pt tolerates tx session fair.  He is agreeable to mobilization this morning, but perseverates on being cold.  Pt requires verbal cues for proper transfer technique each trial.  During ambulation, he is noted to \"wander,\" forgetting exact task at hand that was originally requested of pt.  He denies SOB, dizziness, or lightheadedness throughout.  PT educates pt on benefits of mobilization i.e. ambulation.  Barriers to Discharge: Other (Comment)  Barriers to Discharge Comments: high fall risk; high re-admission risk; cognitive deficits; impaired insight; 4 admissions to hospital since beginning of November 2024; questionable compliance with medical tx & recommendations  Rehab Resource Intensity Level, PT: II (Moderate Resource Intensity) (PAR)    See flowsheet documentation for full assessment.        "

## 2025-01-28 NOTE — WOUND OSTOMY CARE
Progress Note - Wound   Matthew Alvarez 69 y.o. male MRN: [id number]  Unit/Bed#: -01 Encounter: [id number]    History and Present Illness:  Presented to [facility] ED with worsening shortness of breath. COVID (+) 1/20/25.  CKD chronic dialysis,ESRD    Assessment:   Alert, min assist to turn and reposition. On HD. Has nutritional nepro supplementation  1)POA DTI to bilateral buttocks left and right buttocks with light purple deep red non blanchable tissue is resolving   2)POA DTI to left upper posterior thigh. Linear deep red non blanchable tissue. Foam dressing applied   3)LLE partial thickness skin loss pink wound bed intact edges.unknown etiology  4)Right lateral back POA unknown etiology, partial thickness skin loss wound bed pink  5)Bilateral heels intact preventable foams in place   Had wounds redressed by RN prior to wound care,discussed wounds with RN  Skin care plans:  1-Cleanse sacro-buttocks with soap and water. Apply Triad Paste to Sacro-Buttocks Wound Bed. Cover with Silicone Bordered Foam Dressing (Mepilex). Tao with T for Treatment and change daily or PRN soilage/displacement.  2-Cleanse B/L Heels with soap and water. Pat dry. Apply Silicone Border Foam (Mepilex) to areas. Tao with P for Prevention and change every 3 days or PRN soilage/displacement. Peel back and inspect Q-shift.  3-Elevate heels to offload pressure  4-Ehob cushion when out of bed.  5-Turn/repoisiton q2h or when medically stable for pressure re-distribution on skin.  6-Place Patient on ATR Turning and repositioning system  7-Cleanse left lateral back wound with NSS apply xerorform gauze then foam dressing Change every 3 days and prn    Call or Secure chat  with any questions  Wound Care will continue to follow weekly    Katie ONEALN RN   CWON

## 2025-01-29 ENCOUNTER — APPOINTMENT (INPATIENT)
Dept: DIALYSIS | Facility: HOSPITAL | Age: 70
DRG: 314 | End: 2025-01-29
Attending: INTERNAL MEDICINE
Payer: COMMERCIAL

## 2025-01-29 VITALS
SYSTOLIC BLOOD PRESSURE: 158 MMHG | HEART RATE: 75 BPM | OXYGEN SATURATION: 97 % | WEIGHT: 109 LBS | RESPIRATION RATE: 18 BRPM | BODY MASS INDEX: 17.52 KG/M2 | TEMPERATURE: 97.9 F | HEIGHT: 66 IN | DIASTOLIC BLOOD PRESSURE: 76 MMHG

## 2025-01-29 LAB
ANION GAP SERPL CALCULATED.3IONS-SCNC: 7 MMOL/L (ref 4–13)
APTT PPP: 48 SECONDS (ref 23–34)
BASOPHILS # BLD AUTO: 0.01 THOUSANDS/ΜL (ref 0–0.1)
BASOPHILS NFR BLD AUTO: 0 % (ref 0–1)
BUN SERPL-MCNC: 41 MG/DL (ref 5–25)
CALCIUM SERPL-MCNC: 8.1 MG/DL (ref 8.4–10.2)
CHLORIDE SERPL-SCNC: 97 MMOL/L (ref 96–108)
CO2 SERPL-SCNC: 29 MMOL/L (ref 21–32)
CREAT SERPL-MCNC: 4.85 MG/DL (ref 0.6–1.3)
EOSINOPHIL # BLD AUTO: 0 THOUSAND/ΜL (ref 0–0.61)
EOSINOPHIL NFR BLD AUTO: 0 % (ref 0–6)
ERYTHROCYTE [DISTWIDTH] IN BLOOD BY AUTOMATED COUNT: 22.6 % (ref 11.6–15.1)
GFR SERPL CREATININE-BSD FRML MDRD: 11 ML/MIN/1.73SQ M
GLUCOSE SERPL-MCNC: 134 MG/DL (ref 65–140)
HCT VFR BLD AUTO: 24.3 % (ref 36.5–49.3)
HGB BLD-MCNC: 8.2 G/DL (ref 12–17)
IMM GRANULOCYTES # BLD AUTO: 0.06 THOUSAND/UL (ref 0–0.2)
IMM GRANULOCYTES NFR BLD AUTO: 1 % (ref 0–2)
LYMPHOCYTES # BLD AUTO: 0.6 THOUSANDS/ΜL (ref 0.6–4.47)
LYMPHOCYTES NFR BLD AUTO: 8 % (ref 14–44)
MCH RBC QN AUTO: 29.5 PG (ref 26.8–34.3)
MCHC RBC AUTO-ENTMCNC: 33.7 G/DL (ref 31.4–37.4)
MCV RBC AUTO: 87 FL (ref 82–98)
MONOCYTES # BLD AUTO: 0.39 THOUSAND/ΜL (ref 0.17–1.22)
MONOCYTES NFR BLD AUTO: 5 % (ref 4–12)
NEUTROPHILS # BLD AUTO: 6.44 THOUSANDS/ΜL (ref 1.85–7.62)
NEUTS SEG NFR BLD AUTO: 86 % (ref 43–75)
NRBC BLD AUTO-RTO: 0 /100 WBCS
PHOSPHATE SERPL-MCNC: 4.2 MG/DL (ref 2.3–4.1)
PLATELET # BLD AUTO: 112 THOUSANDS/UL (ref 149–390)
PMV BLD AUTO: 11.9 FL (ref 8.9–12.7)
POTASSIUM SERPL-SCNC: 4 MMOL/L (ref 3.5–5.3)
RBC # BLD AUTO: 2.78 MILLION/UL (ref 3.88–5.62)
SODIUM SERPL-SCNC: 133 MMOL/L (ref 135–147)
WBC # BLD AUTO: 7.5 THOUSAND/UL (ref 4.31–10.16)

## 2025-01-29 PROCEDURE — 85730 THROMBOPLASTIN TIME PARTIAL: CPT | Performed by: FAMILY MEDICINE

## 2025-01-29 PROCEDURE — 99239 HOSP IP/OBS DSCHRG MGMT >30: CPT

## 2025-01-29 PROCEDURE — 90935 HEMODIALYSIS ONE EVALUATION: CPT | Performed by: INTERNAL MEDICINE

## 2025-01-29 PROCEDURE — 85025 COMPLETE CBC W/AUTO DIFF WBC: CPT | Performed by: INTERNAL MEDICINE

## 2025-01-29 PROCEDURE — 80048 BASIC METABOLIC PNL TOTAL CA: CPT | Performed by: INTERNAL MEDICINE

## 2025-01-29 PROCEDURE — 84100 ASSAY OF PHOSPHORUS: CPT | Performed by: INTERNAL MEDICINE

## 2025-01-29 RX ORDER — HYDRALAZINE HYDROCHLORIDE 25 MG/1
25 TABLET, FILM COATED ORAL EVERY 8 HOURS
Start: 2025-01-29

## 2025-01-29 RX ORDER — METOPROLOL TARTRATE 25 MG/1
25 TABLET, FILM COATED ORAL EVERY 12 HOURS SCHEDULED
Start: 2025-01-29

## 2025-01-29 RX ORDER — FERROUS SULFATE 325(65) MG
325 TABLET ORAL EVERY OTHER DAY
Start: 2025-01-30

## 2025-01-29 RX ORDER — CEFAZOLIN SODIUM 2 G/50ML
2000 SOLUTION INTRAVENOUS ONCE
Status: COMPLETED | OUTPATIENT
Start: 2025-01-29 | End: 2025-01-29

## 2025-01-29 RX ORDER — CEFAZOLIN SODIUM 2 G/50ML
2000 SOLUTION INTRAVENOUS ONCE
Status: DISCONTINUED | OUTPATIENT
Start: 2025-01-29 | End: 2025-01-29

## 2025-01-29 RX ADMIN — CEFAZOLIN SODIUM 2000 MG: 2 SOLUTION INTRAVENOUS at 12:01

## 2025-01-29 RX ADMIN — HYDRALAZINE HYDROCHLORIDE 25 MG: 25 TABLET ORAL at 12:58

## 2025-01-29 RX ADMIN — EPOETIN ALFA 5000 UNITS: 4000 SOLUTION INTRAVENOUS; SUBCUTANEOUS at 11:34

## 2025-01-29 RX ADMIN — AMLODIPINE BESYLATE 5 MG: 5 TABLET ORAL at 12:58

## 2025-01-29 RX ADMIN — APIXABAN 10 MG: 5 TABLET, FILM COATED ORAL at 11:15

## 2025-01-29 RX ADMIN — METOPROLOL TARTRATE 25 MG: 25 TABLET, FILM COATED ORAL at 12:58

## 2025-01-29 RX ADMIN — ISOSORBIDE MONONITRATE 120 MG: 30 TABLET, EXTENDED RELEASE ORAL at 12:58

## 2025-01-29 RX ADMIN — FOLIC ACID 1 MG: 1 TABLET ORAL at 12:58

## 2025-01-29 NOTE — PLAN OF CARE
Problem: Nutrition/Hydration-ADULT  Goal: Nutrient/Hydration intake appropriate for improving, restoring or maintaining nutritional needs  Description: Monitor and assess patient's nutrition/hydration status for malnutrition. Collaborate with interdisciplinary team and initiate plan and interventions as ordered.  Monitor patient's weight and dietary intake as ordered or per policy. Utilize nutrition screening tool and intervene as necessary. Determine patient's food preferences and provide high-protein, high-caloric foods as appropriate.     INTERVENTIONS:  - Monitor oral intake, urinary output, labs, and treatment plans  - Assess nutrition and hydration status and recommend course of action  - Evaluate amount of meals eaten  - Assist patient with eating if necessary   - Allow adequate time for meals  - Recommend/ encourage appropriate diets, oral nutritional supplements, and vitamin/mineral supplements  - Order, calculate, and assess calorie counts as needed  - Recommend, monitor, and adjust tube feedings and TPN/PPN based on assessed needs  - Assess need for intravenous fluids  - Provide specific nutrition/hydration education as appropriate  - Include patient/family/caregiver in decisions related to nutrition  Outcome: Progressing     Problem: PAIN - ADULT  Goal: Verbalizes/displays adequate comfort level or baseline comfort level  Description: Interventions:  - Encourage patient to monitor pain and request assistance  - Assess pain using appropriate pain scale  - Administer analgesics based on type and severity of pain and evaluate response  - Implement non-pharmacological measures as appropriate and evaluate response  - Consider cultural and social influences on pain and pain management  - Notify physician/advanced practitioner if interventions unsuccessful or patient reports new pain  Outcome: Progressing     Problem: INFECTION - ADULT  Goal: Absence or prevention of progression during  hospitalization  Description: INTERVENTIONS:  - Assess and monitor for signs and symptoms of infection  - Monitor lab/diagnostic results  - Monitor all insertion sites, i.e. indwelling lines, tubes, and drains  - Monitor endotracheal if appropriate and nasal secretions for changes in amount and color  - [place] appropriate cooling/warming therapies per order  - Administer medications as ordered  - Instruct and encourage patient and family to use good hand hygiene technique  - Identify and instruct in appropriate isolation precautions for identified infection/condition  Outcome: Progressing  Goal: Absence of fever/infection during neutropenic period  Description: INTERVENTIONS:  - Monitor WBC    Outcome: Progressing

## 2025-01-29 NOTE — CASE MANAGEMENT
Case Management Discharge Planning Note    Patient name Matthew Alvarez  Location /-01 MRN [id number]  : 1955 Date 2025       Current Admission Date: 2025  Current Admission Diagnosis:Sepsis ([facility])   Patient Active Problem List    Diagnosis Date Noted Date Diagnosed    Generalized weakness 2025     Thrombosis of right internal jugular vein ([facility]) 2025     Moderate protein-calorie malnutrition ([facility]) 2025     Chronic kidney disease-mineral and bone disorder (CKD-MBD) 2025     Atrial fibrillation with RVR ([facility]) 2025     Bacteremia 2025     COVID 2025     Sepsis ([facility]) 2025     Acute hemodialysis patient ([facility]) 2025     Pleural effusion 2024     Secondary hyperparathyroidism of renal origin ([facility]) 12/15/2024     ESRD (end stage renal disease) on dialysis ([facility]) 2024     Ambulatory dysfunction 2024     Thrombocytopenia ([facility]) 2024     Renal failure 11/15/2024     Goals of care, counseling/discussion 11/15/2024     Anemia due to chronic kidney disease, on chronic dialysis ([facility]) 11/15/2024     Anemia of renal disease 11/15/2024     Vitamin D deficiency, unspecified 11/15/2024     Chronic systolic (congestive) heart failure ([facility]) 2024     Oliguria 2024     Encounter for hemodialysis for ESRD ([facility]) 2024     Hyponatremia 2024     Dilated cardiomyopathy ([facility]) 2024     Elevated liver transaminase level 11/10/2024     Acute on chronic systolic heart failure ([facility]) 2024     Coronary artery disease involving native coronary artery of native heart without angina pectoris 10/08/2021     Rheumatoid factor positive 2021     Protein calorie malnutrition ([facility]) 2021     Elevated troponin 2021     Tobacco abuse 2021     Nonischemic cardiomyopathy ([facility]) 2021     Primary hypertension 2021       LOS (days): 9  Geometric Mean LOS (GMLOS) (days): 4.9  Days to GMLOS:-3.9      OBJECTIVE:  Risk of Unplanned Readmission Score: 35.3         Current admission status: Inpatient   Preferred Pharmacy:   [id number] - [place], [place] - [address] [facility]  [address]  [place] [address]  Phone: [phone] Fax: [phone]    [facility] [place] - [address]  [address]  [place] [address]  Phone: [phone] Fax: [phone]    Primary Care Provider: Olive Rodriguez MD    Primary Insurance: [facility] REP  Secondary Insurance:     DISCHARGE DETAILS:    Discharge planning discussed with:: Sister Jovita  Freedom of Choice: Yes  Comments - [place] of Choice: [facility]  CM contacted family/caregiver?: Yes   Pt is medically cleared for discharge today, pt will receive dialysis and IV ABT after dialysis today at hospital and then will resume previous schedule on Friday at [facility] in [place]. Call placed to [facility], aware pt is discharging to [facility] today after dialysis, [facility] has him on their schedule for Friday 3pm with IV ABT to be given after dialysis. Spoke with pts sister Jovita, she is able to provide transport to/from dialysis on Friday.  [facility] aware. Transport confirmed for 1430 to [facility].           Contacts  Patient Contacts: Kellee Alvarez- spouse  Relationship to Patient:: Family  Contact Method: Phone  Phone Number: [phone]  Reason/Outcome: Discharge Planning              Other Referral/Resources/Interventions Provided:  Interventions: Short Term Rehab         Treatment Team Recommendation: Short Term Rehab  Discharge Destination Plan:: Short Term Rehab  Transport at Discharge : Wheelchair van     Number/Name of Dispatcher: [facility]     ETA of Transport (Date): 01/29/25  ETA of Transport (Time): 1430              IMM Given (Date):: 01/29/25  IMM Given to:: Family  Family notified:: Kellee Alvarez- spouse

## 2025-01-29 NOTE — ASSESSMENT & PLAN NOTE
The patient is end-stage renal disease receives intermittent hemodialysis Monday Wednesday Friday he was seen while on treatment.  The nurse was very familiar with him says that his lower extremity swelling is much improved.  He was tolerating procedure with no complaints.    Labs today potassium was 4.  Details of today's treatment outlined below.    HEMODIALYSIS PROCEDURE NOTE  The patient was seen and examined on hemodialysis.  Time: 3 hours  Sodium: 137 Blood flow: 400   Dialyzer: F160 Potassium: 3 Dialysate flow: 600   Access: catheter Bicarbonate: 35 Ultrafiltration goal: 2 as tolerated        Monitor hemodynamically.  Volume status improved clinically.    The patient had Staph aureus bacteremia surveillance cultures were negative new tunneled dialysis catheter was placed by IR.

## 2025-01-29 NOTE — PLAN OF CARE
Target UF Goal  2.5  L as tolerated. Patient dialyzing for 3 hours on 3 K bath for serum K of  4.0  per protocol. Treatment plan reviewed with Nephrology.       Post-Dialysis RN Treatment Note    Blood Pressure:  Pre 157/78 mm/Hg  Post 171/95 mmHg   EDW  51.5 kg    Weight:  Pre 49.4 kg   Post 48.0 kg   Mode of weight measurement: Bed Scale   Volume Removed  1400 ml    Treatment duration 3 hours   NS given  No    Treatment shortened? No   Medications given during Rx Epogen 5,000 units IV   Estimated Kt/V  1.12   Access type: Permacath/TDC   Access Issues: No   Needle Gauge: N/A   Report called to primary nurse   Yes, Prerna RN via secure chat        Problem: METABOLIC, FLUID AND ELECTROLYTES - ADULT  Goal: Electrolytes maintained within normal limits  Description: INTERVENTIONS:  - Monitor labs and assess patient for signs and symptoms of electrolyte imbalances  - Administer electrolyte replacement as ordered  - Monitor response to electrolyte replacements, including repeat lab results as appropriate  - Instruct patient on fluid and nutrition as appropriate  Outcome: Progressing  Goal: Fluid balance maintained  Description: INTERVENTIONS:  - Monitor labs   - Monitor I/O and WT  - Instruct patient on fluid and nutrition as appropriate  - Assess for signs & symptoms of volume excess or deficit  Outcome: Progressing

## 2025-01-29 NOTE — ASSESSMENT & PLAN NOTE
Sodium concentration 133 mill equivalents per liter related to end-stage renal disease.  No changes.

## 2025-01-29 NOTE — PROGRESS NOTES
Progress Note - Nephrology   Name: Matthew Alvarez 69 y.o. male I MRN: [id number]  Unit/Bed#: -01 I Date of Admission: 1/20/2025   Date of Service: 1/29/2025 I Hospital Day: 9     Assessment & Plan  ESRD (end stage renal disease) on dialysis ([facility])    The patient is end-stage renal disease receives intermittent hemodialysis Monday Wednesday Friday he was seen while on treatment.  The nurse was very familiar with him says that his lower extremity swelling is much improved.  He was tolerating procedure with no complaints.    Labs today potassium was 4.  Details of today's treatment outlined below.    HEMODIALYSIS PROCEDURE NOTE  The patient was seen and examined on hemodialysis.  Time: 3 hours  Sodium: 137 Blood flow: 400   Dialyzer: F160 Potassium: 3 Dialysate flow: 600   Access: catheter Bicarbonate: 35 Ultrafiltration goal: 2 as tolerated        Monitor hemodynamically.  Volume status improved clinically.    The patient had Staph aureus bacteremia surveillance cultures were negative new tunneled dialysis catheter was placed by IR.  Hyponatremia    Sodium concentration 133 mill equivalents per liter related to end-stage renal disease.  No changes.  Chronic kidney disease-mineral and bone disorder (CKD-MBD)  Phos 4.2 as eats may require to initiate binders with meals for phosphorus.  Follow.    Anemia due to chronic kidney disease, on chronic dialysis (HCC)    Continue Epogen on dialysis for anemia.  Eventual target hemoglobin 10-12.  Hemoglobin today 8.2.        Subjective     The patient was seen while receiving his dialysis treatment was in good spirits.  Says he is feeling better.  Still weak but says that he wants to try and get stronger.  No complaints for me.    No fever chills  Positive generalized weakness  No nausea vomiting diarrhea abdominal pain  No chest pain shortness of breath orthopnea  No cough or wheezing    Objective :  Temp:  [97 °F (36.1 °C)-98.4 °F (36.9 °C)] 97.7 °F (36.5 °C)  HR:   [72-82] 74  BP: ()/(63-95) 167/95  Resp:  [18-20] 18  SpO2:  [94 %-98 %] 98 %  O2 Device: None (Room air)    Current Weight: Weight - Scale: 49.4 kg (109 lb)  First Weight: Weight - Scale: 55.4 kg (122 lb 2.2 oz)  I/O         01/27 0701  01/28 0700 01/28 0701  01/29 0700 01/29 0701 01/30 0700    P.O.  480     I.V. (mL/kg) 430 (8.4)  200 (4)    Total Intake(mL/kg) 430 (8.4) 480 (9.7) 200 (4)    Other 2500      Total Output 2500      Net -2070 +480 +200           Unmeasured Stool Occurrence 1 x  1 x          Physical Exam  Constitutional:       General: He is not in acute distress.     Appearance: He is not toxic-appearing.   HENT:      Head: Normocephalic and atraumatic.      Mouth/Throat:      Mouth: Mucous membranes are dry.   Eyes:      General: No scleral icterus.  Cardiovascular:      Rate and Rhythm: Normal rate and regular rhythm.      Heart sounds:      No friction rub. No gallop.      Comments: No significant lower extremity edema.  Pulmonary:      Effort: Pulmonary effort is normal. No respiratory distress.      Breath sounds: No rales.   Abdominal:      General: Bowel sounds are normal. There is no distension.      Palpations: Abdomen is soft.      Tenderness: There is no abdominal tenderness.   Neurological:      Mental Status: He is alert and oriented to person, place, and time.   Psychiatric:         Mood and Affect: Mood normal.         Behavior: Behavior normal.         Medications:    Current Facility-Administered Medications:     acetaminophen (TYLENOL) tablet 650 mg, 650 mg, Oral, Q6H PRN, Kerri Chisholm PA-C    amLODIPine (NORVASC) tablet 5 mg, 5 mg, Oral, Daily, Kendall Guerra MD    apixaban (ELIQUIS) tablet 10 mg, 10 mg, Oral, BID, 10 mg at 01/28/25 1819 **FOLLOWED BY** [START ON 1/30/2025] apixaban (ELIQUIS) tablet 5 mg, 5 mg, Oral, BID, Kerri Chisholm PA-C    atorvastatin (LIPITOR) tablet 20 mg, 20 mg, Oral, Daily With Dinner, Kerri Chisholm PA-C, 20 mg at 01/28/25 1819    ceFAZolin (ANCEF)  IVPB (premix in dextrose) 2,000 mg 50 mL, 2,000 mg, Intravenous, Once, Shari Arceo MD    Diclofenac Sodium (VOLTAREN) 1 % topical gel 2 g, 2 g, Topical, 4x Daily, Caty Calvo PA-C, 2 g at 01/27/25 2202    epoetin loida (EPOGEN,PROCRIT) injection 5,000 Units, 5,000 Units, Intravenous, Once per day on Monday Wednesday Friday, Kendall Guerra MD, 5,000 Units at 01/27/25 1046    ferrous sulfate tablet 325 mg, 325 mg, Oral, Every Other Day, Kendall Guerra MD, 325 mg at 01/28/25 1430    folic acid (FOLVITE) tablet 1 mg, 1 mg, Oral, Daily, Kerri Chisholm PA-C, 1 mg at 01/28/25 0759    hydrALAZINE (APRESOLINE) tablet 25 mg, 25 mg, Oral, Q8H JEEVAN, Kendall Guerra MD, 25 mg at 01/28/25 2224    isosorbide mononitrate (IMDUR) 24 hr tablet 120 mg, 120 mg, Oral, Daily, Kerri Chisholm PA-C, 120 mg at 01/28/25 0758    melatonin tablet 6 mg, 6 mg, Oral, HS, Kerri Chisholm PA-C, 6 mg at 01/28/25 2224    metoprolol (LOPRESSOR) injection 2.5 mg, 2.5 mg, Intravenous, Q6H PRN, Kerri Chisholm PA-C    metoprolol tartrate (LOPRESSOR) tablet 25 mg, 25 mg, Oral, Q12H [facility], Danny Degroot MD, 25 mg at 01/28/25 2224    torsemide (DEMADEX) tablet 100 mg, 100 mg, Oral, Once per day on Tuesday Thursday Saturday, Jensen Kelly DO, 100 mg at 01/28/25 0801      Lab Results: I have reviewed the following results:  Results from last 7 days   Lab Units 01/29/25  0912 01/28/25  0511 01/27/25  0909 01/26/25  0442 01/26/25  0432 01/25/25  0343 01/24/25  0755 01/23/25  0952   WBC Thousand/uL 7.50 7.41 8.02 6.96  --  6.63 7.80 7.73   HEMOGLOBIN g/dL 8.2* 8.4* 8.3* 7.6*  --  7.6* 7.5* 7.6*   HEMATOCRIT % 24.3* 25.6* 23.9* 22.1*  --  22.2* 21.7* 22.2*   PLATELETS Thousands/uL 112* 112* 117* 105*  --  96* 90* 79*   POTASSIUM mmol/L 4.0 4.2 3.8  --  3.5 3.6 3.7 3.8   CHLORIDE mmol/L 97 97 96  --  98 96 95* 93*   CO2 mmol/L 29 27 27  --  27 29 30 25   BUN mg/dL 41* 29* 56*  --  45* 29* 45* 76*   CREATININE mg/dL 4.85* 3.59* 5.77*  --  4.57* 3.27* 4.56* 6.82*  "  CALCIUM mg/dL 8.1* 7.9* 8.3*  --  7.7* 7.7* 7.8* 8.0*   MAGNESIUM mg/dL  --   --  1.9  --   --   --   --  2.0   PHOSPHORUS mg/dL 4.2*  --   --   --   --   --   --   --        Administrative Statements     Portions of the record may have been created with voice recognition software. Occasional wrong word or \"sound a like\" substitutions may have occurred due to the inherent limitations of voice recognition software. Read the chart carefully and recognize, using context, where substitutions have occurred.If you have any questions, please contact the dictating provider.  "

## 2025-01-29 NOTE — CASE MANAGEMENT
Case Management Discharge Planning Note    Patient name Matthew Alvarez  Location /-01 MRN [id number]  : 1955 Date 2025       Current Admission Date: 2025  Current Admission Diagnosis:Sepsis ([facility])   Patient Active Problem List    Diagnosis Date Noted Date Diagnosed    Generalized weakness 2025     Thrombosis of right internal jugular vein ([facility]) 2025     Moderate protein-calorie malnutrition ([facility]) 2025     Chronic kidney disease-mineral and bone disorder (CKD-MBD) 2025     Atrial fibrillation with RVR ([facility]) 2025     Bacteremia 2025     COVID 2025     Sepsis ([facility]) 2025     Acute hemodialysis patient ([facility]) 2025     Pleural effusion 2024     Secondary hyperparathyroidism of renal origin ([facility]) 12/15/2024     ESRD (end stage renal disease) on dialysis ([facility]) 2024     Ambulatory dysfunction 2024     Thrombocytopenia ([facility]) 2024     Renal failure 11/15/2024     Goals of care, counseling/discussion 11/15/2024     Anemia due to chronic kidney disease, on chronic dialysis ([facility]) 11/15/2024     Anemia of renal disease 11/15/2024     Vitamin D deficiency, unspecified 11/15/2024     Chronic systolic (congestive) heart failure ([facility]) 2024     Oliguria 2024     Encounter for hemodialysis for ESRD ([facility]) 2024     Hyponatremia 2024     Dilated cardiomyopathy ([facility]) 2024     Elevated liver transaminase level 11/10/2024     Acute on chronic systolic heart failure ([facility]) 2024     Coronary artery disease involving native coronary artery of native heart without angina pectoris 10/08/2021     Rheumatoid factor positive 2021     Protein calorie malnutrition ([facility]) 2021     Elevated troponin 2021     Tobacco abuse 2021     Nonischemic cardiomyopathy ([facility]) 2021     Primary hypertension 2021       LOS (days): 9  Geometric Mean LOS (GMLOS) (days): 4.9  Days to GMLOS:-3.7      OBJECTIVE:  Risk of Unplanned Readmission Score: 35.3         Current admission status: Inpatient   Preferred Pharmacy:   [id number] [place] - [address]  [address] [place] [address]  Phone: [phone] Fax: [phone]    [facility] - [place] - [address]  [address]  [place] [address]  Phone: [phone] Fax: [phone]    Primary Care Provider: Olive Rodriguez MD    Primary Insurance: [employer]  Secondary Insurance:     DISCHARGE DETAILS:        As per [facility] will  pt at 1000 to transport to [facility] via

## 2025-01-29 NOTE — NURSING NOTE
"RN and PCA entered patient room to assist patient back to bed. Patient inappropriately touched PCA on buttocks. Patient educated that this was inappropriate behavior. Patient began laughing saying, \"I was trying to touch her back\". RN suggested to patient that we refrain from touching staff members . Patient verbalized understanding. Patient assisted safely into bed. Fall alarm and call bell within reach.   "

## 2025-01-29 NOTE — NURSING NOTE
Report called to Marina at receiving facility. Patient declining to wear life vest, also does not have it at bedside. Provider notified that patient declined to wear it and that it is not present.

## 2025-01-29 NOTE — ASSESSMENT & PLAN NOTE
Continue Epogen on dialysis for anemia.  Eventual target hemoglobin 10-12.  Hemoglobin today 8.2.

## 2025-01-29 NOTE — PLAN OF CARE
Problem: Nutrition/Hydration-ADULT  Goal: Nutrient/Hydration intake appropriate for improving, restoring or maintaining nutritional needs  Description: Monitor and assess patient's nutrition/hydration status for malnutrition. Collaborate with interdisciplinary team and initiate plan and interventions as ordered.  Monitor patient's weight and dietary intake as ordered or per policy. Utilize nutrition screening tool and intervene as necessary. Determine patient's food preferences and provide high-protein, high-caloric foods as appropriate.     INTERVENTIONS:  - Monitor oral intake, urinary output, labs, and treatment plans  - Assess nutrition and hydration status and recommend course of action  - Evaluate amount of meals eaten  - Assist patient with eating if necessary   - Allow adequate time for meals  - Recommend/ encourage appropriate diets, oral nutritional supplements, and vitamin/mineral supplements  - Order, calculate, and assess calorie counts as needed  - Recommend, monitor, and adjust tube feedings and TPN/PPN based on assessed needs  - Assess need for intravenous fluids  - Provide specific nutrition/hydration education as appropriate  - Include patient/family/caregiver in decisions related to nutrition  Outcome: Progressing     Problem: PAIN - ADULT  Goal: Verbalizes/displays adequate comfort level or baseline comfort level  Description: Interventions:  - Encourage patient to monitor pain and request assistance  - Assess pain using appropriate pain scale  - Administer analgesics based on type and severity of pain and evaluate response  - Implement non-pharmacological measures as appropriate and evaluate response  - Consider cultural and social influences on pain and pain management  - Notify physician/advanced practitioner if interventions unsuccessful or patient reports new pain  Outcome: Progressing     Problem: INFECTION - ADULT  Goal: Absence or prevention of progression during  hospitalization  Description: INTERVENTIONS:  - Assess and monitor for signs and symptoms of infection  - Monitor lab/diagnostic results  - Monitor all insertion sites, i.e. indwelling lines, tubes, and drains  - Monitor endotracheal if appropriate and nasal secretions for changes in amount and color  - [place] appropriate cooling/warming therapies per order  - Administer medications as ordered  - Instruct and encourage patient and family to use good hand hygiene technique  - Identify and instruct in appropriate isolation precautions for identified infection/condition  Outcome: Progressing  Goal: Absence of fever/infection during neutropenic period  Description: INTERVENTIONS:  - Monitor WBC    Outcome: Progressing     Problem: SAFETY ADULT  Goal: Patient will remain free of falls  Description: INTERVENTIONS:  - Educate patient/family on patient safety including physical limitations  - Instruct patient to call for assistance with activity   - Consult OT/PT to assist with strengthening/mobility   - Keep Call bell within reach  - Keep bed low and locked with side rails adjusted as appropriate  - Keep care items and personal belongings within reach  - Initiate and maintain comfort rounds  - Make Fall Risk Sign visible to staff  - Offer Toileting every 2 Hours, in advance of need  - Initiate/Maintain bed and chair alarm  - Obtain necessary fall risk management equipment: walker   - Apply yellow socks and bracelet for high fall risk patients  - Consider moving patient to room near nurses station  Outcome: Progressing  Goal: Maintain or return to baseline ADL function  Description: INTERVENTIONS:  -  Assess patient's ability to carry out ADLs; assess patient's baseline for ADL function and identify physical deficits which impact ability to perform ADLs (bathing, care of mouth/teeth, toileting, grooming, dressing, etc.)  - Assess/evaluate cause of self-care deficits   - Assess range of motion  - Assess patient's  mobility; develop plan if impaired  - Assess patient's need for assistive devices and provide as appropriate  - Encourage maximum independence but intervene and supervise when necessary  - Involve family in performance of ADLs  - Assess for home care needs following discharge   - Consider OT consult to assist with ADL evaluation and planning for discharge  - Provide patient education as appropriate  Outcome: Progressing  Goal: Maintains/Returns to pre admission functional level  Description: INTERVENTIONS:  - Perform AM-PAC 6 Click Basic Mobility/ Daily Activity assessment daily.  - Set and communicate daily mobility goal to care team and patient/family/caregiver.   - Collaborate with rehabilitation services on mobility goals if consulted  - Perform Range of Motion 3 times a day.  - Reposition patient every 2 hours.  - Dangle patient 3 times a day  - Stand patient 3 times a day  - Ambulate patient 3 times a day  - Out of bed to chair 3 times a day   - Out of bed for meals 3 times a day  - Out of bed for toileting  - Record patient progress and toleration of activity level   Outcome: Progressing     Problem: DISCHARGE PLANNING  Goal: Discharge to home or other facility with appropriate resources  Description: INTERVENTIONS:  - Identify barriers to discharge w/patient and caregiver  - Arrange for needed discharge resources and transportation as appropriate  - Identify discharge learning needs (meds, wound care, etc.)  - Arrange for interpretive services to assist at discharge as needed  - Refer to Case Management Department for coordinating discharge planning if the patient needs post-hospital services based on physician/advanced practitioner order or complex needs related to functional status, cognitive ability, or social support system  Outcome: Progressing     Problem: Knowledge Deficit  Goal: Patient/family/caregiver demonstrates understanding of disease process, treatment plan, medications, and discharge  instructions  Description: Complete learning assessment and assess knowledge base.  Interventions:  - Provide teaching at level of understanding  - Provide teaching via preferred learning methods  Outcome: Progressing     Problem: METABOLIC, FLUID AND ELECTROLYTES - ADULT  Goal: Electrolytes maintained within normal limits  Description: INTERVENTIONS:  - Monitor labs and assess patient for signs and symptoms of electrolyte imbalances  - Administer electrolyte replacement as ordered  - Monitor response to electrolyte replacements, including repeat lab results as appropriate  - Instruct patient on fluid and nutrition as appropriate  Outcome: Progressing  Goal: Fluid balance maintained  Description: INTERVENTIONS:  - Monitor labs   - Monitor I/O and WT  - Instruct patient on fluid and nutrition as appropriate  - Assess for signs & symptoms of volume excess or deficit  Outcome: Progressing  Goal: Glucose maintained within target range  Description: INTERVENTIONS:  - Monitor Blood Glucose as ordered  - Assess for signs and symptoms of hyperglycemia and hypoglycemia  - Administer ordered medications to maintain glucose within target range  - Assess nutritional intake and initiate nutrition service referral as needed  Outcome: Progressing    Problem: Prexisting or High Potential for Compromised Skin Integrity  Goal: Skin integrity is maintained or improved  Description: INTERVENTIONS:  - Identify patients at risk for skin breakdown  - Assess and monitor skin integrity  - Assess and monitor nutrition and hydration status  - Monitor labs   - Assess for incontinence   - Turn and reposition patient  - Assist with mobility/ambulation  - Relieve pressure over bony prominences  - Avoid friction and shearing  - Provide appropriate hygiene as needed including keeping skin clean and dry  - Evaluate need for skin moisturizer/barrier cream  - Collaborate with interdisciplinary team   - Patient/family teaching  - Consider wound care  consult   Outcome: Progressing

## 2025-01-30 ENCOUNTER — TELEPHONE (OUTPATIENT)
Dept: INFECTIOUS DISEASES | Facility: CLINIC | Age: 70
End: 2025-01-30

## 2025-01-30 DIAGNOSIS — R78.81 BACTEREMIA: Primary | ICD-10-CM

## 2025-01-30 NOTE — PROGRESS NOTES
OPAT NOTE    AP ONLY CAMPUSES ARE: [facility].   In these cases, physician is only cosigning notes.    Supervising/Discharge provider: Christiano    Diagnosis: Bacteremia    Drug: Cefazolin     Dose/Route/Frequency: After HD: 2g Monday/2g Wednesday/3g Friday.    Labs/Frequency: CBCD CMP weekly    End Date: 3/5    Infusion/VNA/SNF contact: [facility]/Jenifer Guerrero    Next appointment:  2/6 at 1PM ([facility] - [place] for virtual)

## 2025-01-30 NOTE — TELEPHONE ENCOUNTER
"Contacted [facility] today to discuss patient's abx plan. Faxed over to HD center OPAT, lab orders. Spoke with nurse Chris. He confirms understanding of the orders and does not have any questions at this time.    Faxed also OPAT, Lab orders, Follow up to [facility]. Called the facility and spoke with Lynn CHACON. She confirms receipt of this paperwork and is not sure that they can do a virtual visit. She does state family brings patient to and from HD sessions, but that she does not know exactly which family as she has \"not met the norma yet\". She is going to look into whether they can staff a virtual visit or what the family wants in terms of follow up. She is aware that if this appointment date/time does not work we can certainly reschedule to date/time that works better. She will give us a call back after she speaks with the family.  "

## 2025-01-30 NOTE — UTILIZATION REVIEW
NOTIFICATION OF ADMISSION DISCHARGE   This is a Notification of Discharge from [facility]. Please be advised that this patient has been discharge from our facility. Below you will find the admission and discharge date and time including the patient’s disposition.   UTILIZATION REVIEW CONTACT:  Leslye Qiu  Utilization   Network Utilization Review Department  Phone: [phone] x carefully listen to the prompts. All voicemails are confidential.  Email: [email or web address]     ADMISSION INFORMATION  PRESENTATION DATE: 1/20/2025  3:20 PM  OBERVATION ADMISSION DATE: N/A  INPATIENT ADMISSION DATE: 1/20/25  5:02 PM   DISCHARGE DATE: 1/29/2025  3:33 PM   [facility]    Network Utilization Review Department  ATTENTION: Please call with any questions or concerns to [phone] and carefully listen to the prompts so that you are directed to the right person. All voicemails are confidential.   For Discharge needs, contact Care Management DC Support Team at [phone] opt. 2  Send all requests for admission clinical reviews, approved or denied determinations and any other requests to dedicated fax number below belonging to the campus where the patient is receiving treatment. List of dedicated fax numbers for the Facilities:  FACILITY NAME UR FAX NUMBER   ADMISSION DENIALS (Administrative/Medical Necessity) [phone]   DISCHARGE SUPPORT TEAM ([facility]) [phone]   PARENT CHILD HEALTH (Maternity/NICU/Pediatrics) [phone]   [facility] [phone]   [facility] [phone]   [facility] [phone]   [facility] [phone]   [facility] [phone]   [facility] [phone]   [facility] [phone]   [facility]  [place] [phone]   [facility] [phone]   Critical access hospital [phone]   [facility] [phone]   [facility] [phone]

## 2025-02-14 ENCOUNTER — TELEPHONE (OUTPATIENT)
Dept: INFECTIOUS DISEASES | Facility: CLINIC | Age: 70
End: 2025-02-14

## 2025-02-14 NOTE — TELEPHONE ENCOUNTER
Spoke with Dr. Sanchez via EPIC chat today. Informed Dr. Sanchez patient was discharged 2/13/25 from [facility]. Per Dr. Sanchez no change to patients antibiotic plan.     Called [facility] and spoke with Pawan today.   Informed pawan THOMAS was calling to follow up after patient was discharged from [facility]. Pawan states patient is doing well since he has been discharged. Informed Pawan there is no change in patients antibiotic plan. Pawan confirms patient will continue M/W/F HD. Informed Pawan patient does need to be scheduled for follow up and patient scheduled at this time.   Informed Pawan if there are any questions to call the office.   Pawan verbalizes understanding at this time.     Called [facility] and spoke with Joleen, nurse at facility today.   Informed Joleen there is not change to  patients antibiotic plan. Joleen confirms Cefazolin orders and weekly labs orders at this time.   Informed Joleen if there are any further questions to call the office.   Joleen verbalizes understanding at this time.

## 2025-02-18 NOTE — PROGRESS NOTES
Name: Matthew Alvarez      : 1955      MRN: [id number]  Encounter Provider: Horacio Ingram PA-C  Encounter Date: 2025   Encounter department: [facility] [place]    Assessment & Plan  Bacteremia  Persistent MSSA bacteremia. Recent admission to [facility] with sepsis, COVID-19. Both sets of blood cultures from admission with MSSA. Secondary to catheter exit site infection s/p line removal on  with purulent drainage from insertion site, cx with MSSA. Catheter tip also positive for MSSA. Doppler notes associated R IJ thrombosis which is likely cause for delayed clearance of bacteremia despite catheter removal. TTE is without vegetation   Patient has no other indwelling vascular or prosthetic devices. He has a superficial weeping wound on his LLE but without purulence or cellulitis appreciated on media pics. Plan for 6 week course of IV abx dosed with HD.   Recent labs  reviewed with stable leukopenia and cr.  Recent admitted to [facility] for bleeding from permcath requiring blood txn.  Continue IV cefazolin dosed post HD 2g/2g/3g for 6 week course through 3/5/25  Continue to encourage compliance with HD so he can receive abx   Will need weekly CBC, CMP while on iv abx  Continue to encourage compliance with HD and abx   No additional ID follow up required, call PRN  Thrombosis of right internal jugular vein (HCC)  Associated with infected R IJ catheter, Consider septic thrombosis   ESRD (end stage renal disease) on dialysis (HCC)  S/p right IJ permacath removal, temporary catheter replaced in left IJV . Patient has had a functional decline and has had multiple missed dialysis sessions as an outpatient.     Above assessment and plan discussed in detail with patient during examination.     Antibiotics:  IV cefazolin     Subjective:  Patient presents to outpatient ID office for ongoing management of mssa bacteremia. He was dropped off at office in wheelchair from  transport co arranged my [facility]. Patient states he is going to HD two or three times a week. We discussed that he gets abx at HD and that he should comply with 3 times a week hd sessions. He expressed understanding. He reports no med side effects other than fatigue. No fevers or chills. No n/v/d. Was recently admitted to [facility] for bleeding from HD cath and required blood transfusion and adjustment in BP meds.     Objective:    Vitals:   Vitals:    02/20/25 1307   BP: 142/74   Pulse: 62   Temp: (!) 97 °F (36.1 °C)   SpO2: 95%     Physical Exam:     General Appearance:  Alert, interactive, nontoxic, no acute distress. Elderly. Chronically ill appearing. Sitting in wheelchair.    HEENT: Oropharynx dry without lesions.    Lungs:   Clear to auscultation bilaterally; no wheezes, rhonchi or rales; respirations unlabored   Heart:  RRR; no murmur   Abdomen:   Soft, non-tender, non-distended, positive bowel sounds. No palpable organomegaly.   Extremities: Mild LE edema    Vascular: L chest wall permcath clean, dry and intact without evidence of surrounding erythema, drainage.    Skin: No new rashes or lesions on exposed skin        Labs, Imaging, & Other studies:   All pertinent labs and imaging studies were personally reviewed by me from 2/12.    Results from last 7 days   Lab Units 02/12/25  1159   HEMOGLOBIN g/dL 8.4*     Results from last 7 days   Lab Units 02/12/25  0739   SODIUM mmol/L 134*   POTASSIUM mmol/L 3.9   CHLORIDE mmol/L 105   CO2 mmol/L 23   BUN mg/dL 33*   CREATININE mg/dL 4.81*   EGFR  12*   CALCIUM mg/dL 7.6*                       The following portions of the patient's history were reviewed and updated as appropriate: allergies, current medications, past family history, past medical history, past social history, past surgical history and problem list.

## 2025-02-18 NOTE — ASSESSMENT & PLAN NOTE
Persistent MSSA bacteremia. Recent admission to [facility] with sepsis, COVID-19. Both sets of blood cultures from admission with MSSA. Secondary to catheter exit site infection s/p line removal on 1/21 with purulent drainage from insertion site, cx with MSSA. Catheter tip also positive for MSSA. Doppler notes associated R IJ thrombosis which is likely cause for delayed clearance of bacteremia despite catheter removal. TTE is without vegetation   Patient has no other indwelling vascular or prosthetic devices. He has a superficial weeping wound on his LLE but without purulence or cellulitis appreciated on media pics. Plan for 6 week course of IV abx dosed with HD.   Recent labs 2/17 reviewed with stable leukopenia and cr.  Recent admitted to [facility] for bleeding from permcath requiring blood txn.  Continue IV cefazolin dosed post HD 2g/2g/3g for 6 week course through 3/5/25  Continue to encourage compliance with HD so he can receive abx   Will need weekly CBC, CMP while on iv abx  Continue to encourage compliance with HD and abx   No additional ID follow up required, call PRN

## 2025-02-18 NOTE — ASSESSMENT & PLAN NOTE
S/p right IJ permacath removal, temporary catheter replaced in left IJV 1/22. Patient has had a functional decline and has had multiple missed dialysis sessions as an outpatient.

## 2025-02-20 ENCOUNTER — OFFICE VISIT (OUTPATIENT)
Age: 70
End: 2025-02-20
Payer: COMMERCIAL

## 2025-02-20 VITALS
SYSTOLIC BLOOD PRESSURE: 142 MMHG | DIASTOLIC BLOOD PRESSURE: 74 MMHG | HEART RATE: 62 BPM | TEMPERATURE: 97 F | WEIGHT: 117.6 LBS | HEIGHT: 66 IN | OXYGEN SATURATION: 95 % | BODY MASS INDEX: 18.9 KG/M2

## 2025-02-20 DIAGNOSIS — N18.6 ESRD (END STAGE RENAL DISEASE) ON DIALYSIS (HCC): ICD-10-CM

## 2025-02-20 DIAGNOSIS — I82.C11 THROMBOSIS OF RIGHT INTERNAL JUGULAR VEIN (HCC): Primary | ICD-10-CM

## 2025-02-20 DIAGNOSIS — R78.81 BACTEREMIA: ICD-10-CM

## 2025-02-20 DIAGNOSIS — Z99.2 ESRD (END STAGE RENAL DISEASE) ON DIALYSIS (HCC): ICD-10-CM

## 2025-02-20 PROBLEM — A41.9 SEPSIS (HCC): Status: RESOLVED | Noted: 2025-01-20 | Resolved: 2025-02-20

## 2025-02-20 PROCEDURE — 99214 OFFICE O/P EST MOD 30 MIN: CPT | Performed by: PHYSICIAN ASSISTANT

## 2025-02-20 RX ORDER — VALSARTAN 160 MG/1
160 TABLET ORAL DAILY
COMMUNITY

## 2025-02-20 RX ORDER — CARVEDILOL 6.25 MG/1
6.25 TABLET ORAL 2 TIMES DAILY WITH MEALS
COMMUNITY

## 2025-02-20 NOTE — PATIENT INSTRUCTIONS
Continue IV cefazolin dosed post HD 2g/2g/3g for 6 week course through 3/5/25  Will need weekly CBC, CMP while on iv abx  Continue to encourage compliance with HD and abx   No additional ID follow up required, call PRN

## 2025-02-25 ENCOUNTER — TELEPHONE (OUTPATIENT)
Age: 70
End: 2025-02-25

## 2025-02-25 NOTE — TELEPHONE ENCOUNTER
The nursing home is aware.     [facility] customer service states that they will have someone stop in to  the vest.

## 2025-02-25 NOTE — TELEPHONE ENCOUNTER
If refusing to wear and not cognitively intact I would suggest it be returned. Please contact [employer] - they will need to mail a box to the nursing home to have it mailed back to them.

## 2025-02-25 NOTE — TELEPHONE ENCOUNTER
Received call from Fatimah with [facility] regarding patient's life vest. She states the life vest was deactivated for a period of time, but was recently reactivated on 2/21/25. Fatimah states patient refuses to wear the life vest consistently, and she also states he is cognitively impaired, so she is unsure of patient's ability to use it properly on his own. She would like a call back to discuss.     [facility] unit

## 2025-03-05 ENCOUNTER — APPOINTMENT (EMERGENCY)
Dept: RADIOLOGY | Facility: HOSPITAL | Age: 70
DRG: 291 | End: 2025-03-05
Payer: COMMERCIAL

## 2025-03-05 ENCOUNTER — HOSPITAL ENCOUNTER (EMERGENCY)
Facility: HOSPITAL | Age: 70
Discharge: HOME | DRG: 291 | End: 2025-03-05
Attending: EMERGENCY MEDICINE
Payer: COMMERCIAL

## 2025-03-05 ENCOUNTER — HOSPITAL ENCOUNTER (INPATIENT)
Facility: HOSPITAL | Age: 70
LOS: 3 days | Discharge: NURSING, REHAB OR LONG-TERM CARE | DRG: 291 | End: 2025-03-08
Attending: EMERGENCY MEDICINE | Admitting: FAMILY MEDICINE
Payer: COMMERCIAL

## 2025-03-05 VITALS
HEART RATE: 84 BPM | WEIGHT: 143.08 LBS | RESPIRATION RATE: 18 BRPM | TEMPERATURE: 98.2 F | DIASTOLIC BLOOD PRESSURE: 77 MMHG | HEIGHT: 66 IN | SYSTOLIC BLOOD PRESSURE: 176 MMHG | OXYGEN SATURATION: 96 % | BODY MASS INDEX: 22.99 KG/M2

## 2025-03-05 DIAGNOSIS — I50.23 ACUTE ON CHRONIC SYSTOLIC HEART FAILURE (HCC): ICD-10-CM

## 2025-03-05 DIAGNOSIS — R53.1 GENERALIZED WEAKNESS: ICD-10-CM

## 2025-03-05 DIAGNOSIS — R09.02 HYPOXIA: ICD-10-CM

## 2025-03-05 DIAGNOSIS — N18.6 END STAGE RENAL DISEASE (HCC): Primary | ICD-10-CM

## 2025-03-05 DIAGNOSIS — N18.6 ESRD (END STAGE RENAL DISEASE) (HCC): ICD-10-CM

## 2025-03-05 DIAGNOSIS — R07.89 ATYPICAL CHEST PAIN: Primary | ICD-10-CM

## 2025-03-05 PROBLEM — J96.01 ACUTE HYPOXEMIC RESPIRATORY FAILURE (HCC): Status: ACTIVE | Noted: 2025-03-05

## 2025-03-05 PROBLEM — I25.10 CORONARY ARTERY DISEASE INVOLVING NATIVE CORONARY ARTERY OF NATIVE HEART WITHOUT ANGINA PECTORIS: Status: RESOLVED | Noted: 2021-10-08 | Resolved: 2025-03-05

## 2025-03-05 PROBLEM — I48.0 PAROXYSMAL ATRIAL FIBRILLATION (HCC): Status: ACTIVE | Noted: 2025-01-21

## 2025-03-05 LAB
2HR DELTA HS TROPONIN: -7 NG/L
ALBUMIN SERPL BCG-MCNC: 2.4 G/DL (ref 3.5–5)
ALBUMIN SERPL BCG-MCNC: 2.5 G/DL (ref 3.5–5)
ALP SERPL-CCNC: 101 U/L (ref 34–104)
ALP SERPL-CCNC: 116 U/L (ref 34–104)
ALT SERPL W P-5'-P-CCNC: <3 U/L (ref 7–52)
ALT SERPL W P-5'-P-CCNC: <3 U/L (ref 7–52)
ANION GAP SERPL CALCULATED.3IONS-SCNC: 5 MMOL/L (ref 4–13)
ANION GAP SERPL CALCULATED.3IONS-SCNC: 7 MMOL/L (ref 4–13)
AST SERPL W P-5'-P-CCNC: 18 U/L (ref 13–39)
AST SERPL W P-5'-P-CCNC: 19 U/L (ref 13–39)
BASOPHILS # BLD AUTO: 0.04 THOUSANDS/ÂΜL (ref 0–0.1)
BASOPHILS # BLD AUTO: 0.05 THOUSANDS/ÂΜL (ref 0–0.1)
BASOPHILS NFR BLD AUTO: 1 % (ref 0–1)
BASOPHILS NFR BLD AUTO: 1 % (ref 0–1)
BILIRUB SERPL-MCNC: 0.65 MG/DL (ref 0.2–1)
BILIRUB SERPL-MCNC: 0.78 MG/DL (ref 0.2–1)
BUN SERPL-MCNC: 34 MG/DL (ref 5–25)
BUN SERPL-MCNC: 44 MG/DL (ref 5–25)
CALCIUM ALBUM COR SERPL-MCNC: 8.9 MG/DL (ref 8.3–10.1)
CALCIUM ALBUM COR SERPL-MCNC: 9 MG/DL (ref 8.3–10.1)
CALCIUM SERPL-MCNC: 7.7 MG/DL (ref 8.4–10.2)
CALCIUM SERPL-MCNC: 7.7 MG/DL (ref 8.4–10.2)
CARDIAC TROPONIN I PNL SERPL HS: 72 NG/L (ref ?–50)
CARDIAC TROPONIN I PNL SERPL HS: 79 NG/L (ref ?–50)
CHLORIDE SERPL-SCNC: 100 MMOL/L (ref 96–108)
CHLORIDE SERPL-SCNC: 99 MMOL/L (ref 96–108)
CO2 SERPL-SCNC: 31 MMOL/L (ref 21–32)
CO2 SERPL-SCNC: 33 MMOL/L (ref 21–32)
CREAT SERPL-MCNC: 4.56 MG/DL (ref 0.6–1.3)
CREAT SERPL-MCNC: 5.75 MG/DL (ref 0.6–1.3)
EOSINOPHIL # BLD AUTO: 0.39 THOUSAND/ÂΜL (ref 0–0.61)
EOSINOPHIL # BLD AUTO: 0.51 THOUSAND/ÂΜL (ref 0–0.61)
EOSINOPHIL NFR BLD AUTO: 7 % (ref 0–6)
EOSINOPHIL NFR BLD AUTO: 9 % (ref 0–6)
ERYTHROCYTE [DISTWIDTH] IN BLOOD BY AUTOMATED COUNT: 19.4 % (ref 11.6–15.1)
ERYTHROCYTE [DISTWIDTH] IN BLOOD BY AUTOMATED COUNT: 19.9 % (ref 11.6–15.1)
GFR SERPL CREATININE-BSD FRML MDRD: 12 ML/MIN/1.73SQ M
GFR SERPL CREATININE-BSD FRML MDRD: 9 ML/MIN/1.73SQ M
GLUCOSE SERPL-MCNC: 109 MG/DL (ref 65–140)
GLUCOSE SERPL-MCNC: 119 MG/DL (ref 65–140)
HCT VFR BLD AUTO: 25.2 % (ref 36.5–49.3)
HCT VFR BLD AUTO: 25.3 % (ref 36.5–49.3)
HGB BLD-MCNC: 7.8 G/DL (ref 12–17)
HGB BLD-MCNC: 7.9 G/DL (ref 12–17)
IMM GRANULOCYTES # BLD AUTO: 0.03 THOUSAND/UL (ref 0–0.2)
IMM GRANULOCYTES # BLD AUTO: 0.04 THOUSAND/UL (ref 0–0.2)
IMM GRANULOCYTES NFR BLD AUTO: 1 % (ref 0–2)
IMM GRANULOCYTES NFR BLD AUTO: 1 % (ref 0–2)
LACTATE SERPL-SCNC: 1.2 MMOL/L (ref 0.5–2)
LYMPHOCYTES # BLD AUTO: 0.49 THOUSANDS/ÂΜL (ref 0.6–4.47)
LYMPHOCYTES # BLD AUTO: 0.51 THOUSANDS/ÂΜL (ref 0.6–4.47)
LYMPHOCYTES NFR BLD AUTO: 8 % (ref 14–44)
LYMPHOCYTES NFR BLD AUTO: 9 % (ref 14–44)
MACROCYTES BLD QL AUTO: PRESENT
MAGNESIUM SERPL-MCNC: 1.7 MG/DL (ref 1.9–2.7)
MCH RBC QN AUTO: 30.7 PG (ref 26.8–34.3)
MCH RBC QN AUTO: 31 PG (ref 26.8–34.3)
MCHC RBC AUTO-ENTMCNC: 30.8 G/DL (ref 31.4–37.4)
MCHC RBC AUTO-ENTMCNC: 31.3 G/DL (ref 31.4–37.4)
MCV RBC AUTO: 100 FL (ref 82–98)
MCV RBC AUTO: 98 FL (ref 82–98)
MONOCYTES # BLD AUTO: 0.5 THOUSAND/ÂΜL (ref 0.17–1.22)
MONOCYTES # BLD AUTO: 0.56 THOUSAND/ÂΜL (ref 0.17–1.22)
MONOCYTES NFR BLD AUTO: 10 % (ref 4–12)
MONOCYTES NFR BLD AUTO: 8 % (ref 4–12)
NEUTROPHILS # BLD AUTO: 4.19 THOUSANDS/ÂΜL (ref 1.85–7.62)
NEUTROPHILS # BLD AUTO: 4.42 THOUSANDS/ÂΜL (ref 1.85–7.62)
NEUTS SEG NFR BLD AUTO: 72 % (ref 43–75)
NEUTS SEG NFR BLD AUTO: 73 % (ref 43–75)
NRBC BLD AUTO-RTO: 0 /100 WBCS
NRBC BLD AUTO-RTO: 0 /100 WBCS
PHOSPHATE SERPL-MCNC: 2.7 MG/DL (ref 2.3–4.1)
PLATELET # BLD AUTO: 106 THOUSANDS/UL (ref 149–390)
PLATELET # BLD AUTO: 94 THOUSANDS/UL (ref 149–390)
PLATELET BLD QL SMEAR: ABNORMAL
PMV BLD AUTO: 9.9 FL (ref 8.9–12.7)
PMV BLD AUTO: 9.9 FL (ref 8.9–12.7)
POTASSIUM SERPL-SCNC: 3.7 MMOL/L (ref 3.5–5.3)
POTASSIUM SERPL-SCNC: 3.8 MMOL/L (ref 3.5–5.3)
PROT SERPL-MCNC: 5.5 G/DL (ref 6.4–8.4)
PROT SERPL-MCNC: 5.7 G/DL (ref 6.4–8.4)
RBC # BLD AUTO: 2.52 MILLION/UL (ref 3.88–5.62)
RBC # BLD AUTO: 2.57 MILLION/UL (ref 3.88–5.62)
RBC MORPH BLD: PRESENT
SODIUM SERPL-SCNC: 137 MMOL/L (ref 135–147)
SODIUM SERPL-SCNC: 138 MMOL/L (ref 135–147)
WBC # BLD AUTO: 5.74 THOUSAND/UL (ref 4.31–10.16)
WBC # BLD AUTO: 5.99 THOUSAND/UL (ref 4.31–10.16)

## 2025-03-05 PROCEDURE — 80053 COMPREHEN METABOLIC PANEL: CPT | Performed by: EMERGENCY MEDICINE

## 2025-03-05 PROCEDURE — 93005 ELECTROCARDIOGRAM TRACING: CPT

## 2025-03-05 PROCEDURE — 87040 BLOOD CULTURE FOR BACTERIA: CPT | Performed by: EMERGENCY MEDICINE

## 2025-03-05 PROCEDURE — 85025 COMPLETE CBC W/AUTO DIFF WBC: CPT | Performed by: PHYSICIAN ASSISTANT

## 2025-03-05 PROCEDURE — 99285 EMERGENCY DEPT VISIT HI MDM: CPT | Performed by: EMERGENCY MEDICINE

## 2025-03-05 PROCEDURE — 99285 EMERGENCY DEPT VISIT HI MDM: CPT

## 2025-03-05 PROCEDURE — 84484 ASSAY OF TROPONIN QUANT: CPT | Performed by: FAMILY MEDICINE

## 2025-03-05 PROCEDURE — 87081 CULTURE SCREEN ONLY: CPT | Performed by: FAMILY MEDICINE

## 2025-03-05 PROCEDURE — 71045 X-RAY EXAM CHEST 1 VIEW: CPT

## 2025-03-05 PROCEDURE — 84484 ASSAY OF TROPONIN QUANT: CPT | Performed by: PHYSICIAN ASSISTANT

## 2025-03-05 PROCEDURE — 99285 EMERGENCY DEPT VISIT HI MDM: CPT | Performed by: PHYSICIAN ASSISTANT

## 2025-03-05 PROCEDURE — 36415 COLL VENOUS BLD VENIPUNCTURE: CPT | Performed by: EMERGENCY MEDICINE

## 2025-03-05 PROCEDURE — 84100 ASSAY OF PHOSPHORUS: CPT | Performed by: PHYSICIAN ASSISTANT

## 2025-03-05 PROCEDURE — 83605 ASSAY OF LACTIC ACID: CPT | Performed by: EMERGENCY MEDICINE

## 2025-03-05 PROCEDURE — 99222 1ST HOSP IP/OBS MODERATE 55: CPT | Performed by: FAMILY MEDICINE

## 2025-03-05 PROCEDURE — 80053 COMPREHEN METABOLIC PANEL: CPT | Performed by: PHYSICIAN ASSISTANT

## 2025-03-05 PROCEDURE — 85025 COMPLETE CBC W/AUTO DIFF WBC: CPT | Performed by: EMERGENCY MEDICINE

## 2025-03-05 PROCEDURE — 83735 ASSAY OF MAGNESIUM: CPT | Performed by: PHYSICIAN ASSISTANT

## 2025-03-05 RX ORDER — AMLODIPINE BESYLATE 5 MG/1
5 TABLET ORAL DAILY
Status: DISCONTINUED | OUTPATIENT
Start: 2025-03-06 | End: 2025-03-06

## 2025-03-05 RX ORDER — AMOXICILLIN 250 MG
1 CAPSULE ORAL 2 TIMES DAILY
Status: DISCONTINUED | OUTPATIENT
Start: 2025-03-05 | End: 2025-03-08 | Stop reason: HOSPADM

## 2025-03-05 RX ORDER — HYDRALAZINE HYDROCHLORIDE 20 MG/ML
10 INJECTION INTRAMUSCULAR; INTRAVENOUS EVERY 6 HOURS PRN
Status: DISCONTINUED | OUTPATIENT
Start: 2025-03-05 | End: 2025-03-08 | Stop reason: HOSPADM

## 2025-03-05 RX ORDER — ISOSORBIDE MONONITRATE 60 MG/1
120 TABLET, EXTENDED RELEASE ORAL DAILY
Status: DISCONTINUED | OUTPATIENT
Start: 2025-03-06 | End: 2025-03-08 | Stop reason: HOSPADM

## 2025-03-05 RX ORDER — CARVEDILOL 6.25 MG/1
6.25 TABLET ORAL 2 TIMES DAILY WITH MEALS
Status: DISCONTINUED | OUTPATIENT
Start: 2025-03-05 | End: 2025-03-08 | Stop reason: HOSPADM

## 2025-03-05 RX ORDER — ISOSORBIDE MONONITRATE 30 MG/1
120 TABLET, EXTENDED RELEASE ORAL DAILY
Status: DISCONTINUED | OUTPATIENT
Start: 2025-03-05 | End: 2025-03-05 | Stop reason: HOSPADM

## 2025-03-05 RX ORDER — FERROUS SULFATE 325(65) MG
325 TABLET ORAL EVERY OTHER DAY
Status: DISCONTINUED | OUTPATIENT
Start: 2025-03-05 | End: 2025-03-08 | Stop reason: HOSPADM

## 2025-03-05 RX ORDER — AMOXICILLIN 250 MG
1 CAPSULE ORAL 2 TIMES DAILY
COMMUNITY

## 2025-03-05 RX ORDER — HYDRALAZINE HYDROCHLORIDE 25 MG/1
25 TABLET, FILM COATED ORAL DAILY
Status: DISCONTINUED | OUTPATIENT
Start: 2025-03-05 | End: 2025-03-06

## 2025-03-05 RX ORDER — HYDRALAZINE HYDROCHLORIDE 25 MG/1
25 TABLET, FILM COATED ORAL DAILY
COMMUNITY

## 2025-03-05 RX ORDER — NICOTINE 21 MG/24HR
1 PATCH, TRANSDERMAL 24 HOURS TRANSDERMAL DAILY
Status: DISCONTINUED | OUTPATIENT
Start: 2025-03-06 | End: 2025-03-05

## 2025-03-05 RX ORDER — ATORVASTATIN CALCIUM 20 MG/1
20 TABLET, FILM COATED ORAL
Status: DISCONTINUED | OUTPATIENT
Start: 2025-03-05 | End: 2025-03-08 | Stop reason: HOSPADM

## 2025-03-05 RX ORDER — TORSEMIDE 100 MG/1
100 TABLET ORAL DAILY
Status: ON HOLD | COMMUNITY
End: 2025-03-08

## 2025-03-05 RX ORDER — CARVEDILOL 6.25 MG/1
6.25 TABLET ORAL 2 TIMES DAILY WITH MEALS
Status: DISCONTINUED | OUTPATIENT
Start: 2025-03-05 | End: 2025-03-05 | Stop reason: HOSPADM

## 2025-03-05 RX ORDER — FOLIC ACID 1 MG/1
1 TABLET ORAL DAILY
Status: DISCONTINUED | OUTPATIENT
Start: 2025-03-06 | End: 2025-03-08 | Stop reason: HOSPADM

## 2025-03-05 RX ORDER — LOSARTAN POTASSIUM 50 MG/1
100 TABLET ORAL DAILY
Status: DISCONTINUED | OUTPATIENT
Start: 2025-03-06 | End: 2025-03-06

## 2025-03-05 RX ORDER — AMLODIPINE BESYLATE 2.5 MG/1
5 TABLET ORAL DAILY
Status: DISCONTINUED | OUTPATIENT
Start: 2025-03-05 | End: 2025-03-05 | Stop reason: HOSPADM

## 2025-03-05 RX ADMIN — Medication 6 MG: at 20:53

## 2025-03-05 RX ADMIN — APIXABAN 2.5 MG: 2.5 TABLET, FILM COATED ORAL at 19:02

## 2025-03-05 RX ADMIN — CARVEDILOL 6.25 MG: 6.25 TABLET, FILM COATED ORAL at 08:06

## 2025-03-05 RX ADMIN — AMLODIPINE BESYLATE 5 MG: 2.5 TABLET ORAL at 08:06

## 2025-03-05 RX ADMIN — FERROUS SULFATE TAB 325 MG (65 MG ELEMENTAL FE) 325 MG: 325 (65 FE) TAB at 19:02

## 2025-03-05 RX ADMIN — ATORVASTATIN CALCIUM 20 MG: 20 TABLET, FILM COATED ORAL at 19:02

## 2025-03-05 RX ADMIN — ISOSORBIDE MONONITRATE 120 MG: 30 TABLET, EXTENDED RELEASE ORAL at 08:06

## 2025-03-05 RX ADMIN — HYDRALAZINE HYDROCHLORIDE 25 MG: 25 TABLET ORAL at 20:52

## 2025-03-05 RX ADMIN — CARVEDILOL 6.25 MG: 6.25 TABLET, FILM COATED ORAL at 19:01

## 2025-03-05 NOTE — H&P
"H&P - Hospitalist   Name: Matthew Alvarez 70 y.o. male I MRN: [id number]  Unit/Bed#: -01 I Date of Admission: 3/5/2025   Date of Service: 3/5/2025 I Hospital Day: 0     Assessment & Plan  Acute hypoxemic respiratory failure (HCC)  Patient presents today (3/5) from dialysis center with reports of shortness of breath which began during the session  Patient noted associated \"twinge\" of chest pain which has since subsided  Patient denies lightheadedness, dizziness, changes in vision or hearing, loss of consciousness    In ED, patient desaturating into the mid 80s on room air requiring up to 3 L nasal cannula oxygen which is not his baseline  Plain film imaging revealed pulmonary edema  Index suspicion for pulmonary edema in the setting of incomplete HD session  Patient also with history of systolic congestive heart failure with EF 20%  It appears that his dry weight is 55 to 56 kg-presents at 59.9 kg    Plan:  Admit to hospitalist service  Wean oxygen to baseline (off)  Treat underlying contributing factors-ESRD/HFrEF (see below)  Spot check O2  Low index suspicion for PE given systemic anticoagulation with compliance  In absence of SIRS criteria with low index suspicion for infectious infiltrate  COVID flu negative    Nonischemic cardiomyopathy (HCC)  Patient with noted extended hospitalization in late December 2024  Prior to that hospitalization, in the outpatient setting, gross noncompliance with medication regimen and recommendation for LifeVest  Since, has been maintained in skilled nursing facility with strict adherence to medication regimen however still refuses LifeVest  Maintained on Coreg, Imdur  Follows regularly with cardiology outpatient setting  Appears volume overloaded upon presentation with weight up from previous, pitting edema, pulmonary edema  Volume unload with HD/UF per nephrology recommendations  Nephrology consulted  Most recent echocardiogram reviewed (1/2025)-global hypokinesis with EF of " 20%  Acute on chronic systolic heart failure (HCC)  Wt Readings from Last 3 Encounters:   03/05/25 59.9 kg (132 lb 0.9 oz)   03/05/25 64.9 kg (143 lb 1.3 oz)   02/20/25 53.3 kg (117 lb 9.6 oz)   As above          ESRD (end stage renal disease) on dialysis (HCC)  Lab Results   Component Value Date    EGFR 12 03/05/2025    EGFR 9 03/05/2025    EGFR 12 (L) 02/12/2025    CREATININE 4.56 (H) 03/05/2025    CREATININE 5.75 (H) 03/05/2025    CREATININE 4.81 (H) 02/12/2025   Follows regularly with nephrology in the outpatient setting and undergoes HD Monday Wednesday Friday  Today, inability to tolerate HD session secondary to shortness of breath and episode of chest pressure  Will undergo HD/UF tomorrow (3/6)-verified with nephrology  Primary hypertension  Elevated systolic blood pressure upon presentation  Multifactorial-missed medications today in addition volume overloaded  Anticipate improvement with UF  Reinitiate home regimen including Imdur-proceed with as needed intravenous hydralazine  Protein calorie malnutrition (HCC)  Malnutrition Findings:    Bitemporal wasting, superior clavicular muscle wasting                             BMI Findings:           Body mass index is 21.31 kg/m².     Paroxysmal atrial fibrillation (HCC)  Rate controlled and systemically anticoagulated  Continue beta-blocker and Eliquis      VTE Pharmacologic Prophylaxis:   High Risk (Score >/= 5) - Pharmacological DVT Prophylaxis Ordered: apixaban (Eliquis). Sequential Compression Devices Ordered.  Code Status: Level 1 - Full Code   Discussion with family: Patient declined call to .     Anticipated Length of Stay: Patient will be admitted on an inpatient basis with an anticipated length of stay of greater than 2 midnights secondary to acute hypoxemic respiratory failure with volume overload.    History of Present Illness   Chief Complaint: Shortness of breath    Matthew Alvarez is a 70 y.o. male with a PMH of cardiomyopathy, most  "recent ejection fraction 20%, refusal to wear LifeVest, atrial fibrillation, systemically anticoagulated, hypertension who presents from hemodialysis with complaints of shortness of breath and a \"twinge\" of chest pain.    In regard to chest complaint, nonischemic EKG without elevation of cardiac enzymes.    However, patient with noted hypoxia upon presentation with saturation in the mid 80s on room air.  Required up to 3 L nasal cannula oxygen for adequate saturation.  Imaging revealed pulmonary edema.  Evidence of total body volume overload with weight gain and pitting edema raising concern over exacerbation of underlying heart failure.    Furthermore, patient unable to complete HD session today.  Therefore, request for admission/observation secondary to volume overload.  Review of Systems    Historical Information   Past Medical History:   Diagnosis Date    Acute hemodialysis patient ([facility]) 12/14/2024    CAD (coronary artery disease)     HFrEF (heart failure with reduced ejection fraction) ([facility]) 09/2021    Hypertension     Rheumatoid factor positive 09/2021    Stage 3 chronic kidney disease ([facility])     Tobacco abuse      Past Surgical History:   Procedure Laterality Date    APPENDECTOMY  1965    CARDIAC CATHETERIZATION  9/16/2021    INGUINAL HERNIA REPAIR Right 1991    IR TEMPORARY DIALYSIS CATHETER PLACEMENT  1/22/2025    IR THORACENTESIS  12/18/2024    IR THORACENTESIS  12/23/2024    IR TUNNELED DIALYSIS CATHETER PLACEMENT  11/14/2024    IR TUNNELED DIALYSIS CATHETER PLACEMENT  1/27/2025     Social History     Tobacco Use    Smoking status: Every Day     Current packs/day: 0.50     Average packs/day: 0.5 packs/day for 51.2 years (25.6 ttl pk-yrs)     Types: Cigarettes     Start date: 1974    Smokeless tobacco: Never    Tobacco comments:     Began smoking in his early 20s, on average smoking 1+ packs daily. Quit in 08/2021 (Updated 09/15/2021).    Vaping Use    Vaping status: Never Used   Substance and Sexual " Activity    Alcohol use: Yes     Alcohol/week: 6.0 standard drinks of alcohol     Types: 6 Shots of liquor per week     Comment: rare    Drug use: Never    Sexual activity: Not on file     Comment: defer     E-Cigarette/Vaping    E-Cigarette Use Never User      E-Cigarette/Vaping Substances     Family history non-contributory  Social History:  Marital Status: /Civil Union     Meds/Allergies   I have reviewed home medications with patient personally.  Prior to Admission medications    Medication Sig Start Date End Date Taking? Authorizing Provider   amLODIPine (NORVASC) 5 mg tablet Take 1 tablet (5 mg total) by mouth daily 1/15/25  Yes Amy Rodriguez MD   apixaban (Eliquis) 2.5 mg Take 2.5 mg by mouth 2 (two) times a day   Yes Historical Provider, MD   atorvastatin (LIPITOR) 20 mg tablet Take 1 tablet (20 mg total) by mouth daily with dinner 12/27/24  Yes Chris Nolasco DO   carvedilol (COREG) 6.25 mg tablet Take 6.25 mg by mouth 2 (two) times a day with meals   Yes Historical Provider, MD   ferrous sulfate 325 (65 Fe) mg tablet Take 1 tablet (325 mg total) by mouth every other day 1/30/25  Yes Kerri Chisholm PA-C   folic acid (FOLVITE) 1 mg tablet Take 1 tablet (1 mg total) by mouth daily 12/27/24  Yes Chris Nolasco DO   hydrALAZINE (APRESOLINE) 25 mg tablet Take 25 mg by mouth in the morning   Yes Historical Provider, MD   isosorbide mononitrate (IMDUR) 120 mg 24 hr tablet TAKE 1 TABLET BY MOUTH EVERY DAY 1/20/25  Yes YUE Griffin   melatonin 3 mg Take 2 tablets (6 mg total) by mouth daily at bedtime 1/15/25  Yes Amy Rodriguez MD   metoprolol tartrate (LOPRESSOR) 25 mg tablet Take 1 tablet (25 mg total) by mouth every 12 (twelve) hours 1/29/25  Yes Kerri Chisholm PA-C   senna-docusate sodium (SENOKOT S) 8.6-50 mg per tablet Take 1 tablet by mouth 2 (two) times a day   Yes Historical Provider, MD   torsemide (DEMADEX) 100 mg tablet Take 100 mg by mouth daily   Yes Historical Provider,  MD   valsartan (DIOVAN) 160 mg tablet Take 80 mg by mouth daily   Yes Historical Provider, MD   apixaban (ELIQUIS) 5 mg Take 10 mg (2 tablets ) twice a day for 1 day, then switch to 5 mg (1 tablet) twice a day  Patient not taking: Reported on 3/5/2025 1/29/25 3/5/25  Kerri Chisholm PA-C   aspirin (ECOTRIN LOW STRENGTH) 81 mg EC tablet Take 1 tablet (81 mg total) by mouth daily  Patient not taking: Reported on 2/20/2025 12/10/24 3/5/25  YUE Griffin   ceFAZolin (ANCEF) 1000 mg IVPB Administer cefazolin 2 g (100mL) IV after dialysis on Monday and Wednesday and administer cefazolin 3 g (150mL) IV after dialysis on Friday  Patient not taking: Reported on 3/5/2025 1/27/25 3/5/25  Kerri Chisholm PA-C   ceFAZolin Sodium (CEFAZOLIN 3 G IN DEXTROSE 5% 100 ML IVPB, CMPD ORDER,) Inject 3,000 mg into a catheter in a vein 2 (two) times a week  3/5/25  Historical Provider, MD   epoetin loida (EPOGEN,PROCRIT) 4,000 units/mL Inject 2 mL (8,000 Units total) under the skin 3 (three) times a week  Patient not taking: Reported on 3/5/2025 1/15/25 3/5/25  Amy Rodriguez MD   hydrALAZINE (APRESOLINE) 25 mg tablet Take 1 tablet (25 mg total) by mouth every 8 (eight) hours  Patient not taking: Reported on 2/20/2025 1/29/25 3/5/25  Kerri Chisholm PA-C   torsemide (DEMADEX) 100 mg tablet Take on nondialysis days (Sunday, Tuesday, Thursday, Saturday). Hold for systolic blood pressure less than 110 Do not start before January 16, 2025.  Patient not taking: Reported on 2/20/2025 1/16/25 3/5/25  Amy Rodriguez MD     No Known Allergies    Objective :  Temp:  [97.5 °F (36.4 °C)-98.6 °F (37 °C)] 97.5 °F (36.4 °C)  HR:  [79-94] 88  BP: (161-192)/(63-88) 161/63  Resp:  [16-24] 19  SpO2:  [86 %-97 %] 94 %  O2 Device: Nasal cannula  Nasal Cannula O2 Flow Rate (L/min):  [2 L/min-86 L/min] 2.5 L/min    Physical Exam  Constitutional:       General: He is not in acute distress.     Appearance: Normal appearance. He is ill-appearing (Chronically). He  is not toxic-appearing or diaphoretic.   HENT:      Head: Normocephalic and atraumatic.      Right Ear: External ear normal.      Left Ear: External ear normal.      Nose: Nose normal.      Mouth/Throat:      Pharynx: Oropharynx is clear.   Eyes:      General: No scleral icterus.     Conjunctiva/sclera: Conjunctivae normal.   Cardiovascular:      Rate and Rhythm: Normal rate and regular rhythm.      Heart sounds: Murmur heard.      No friction rub. No gallop.   Pulmonary:      Effort: No respiratory distress.      Breath sounds: Rales present. No wheezing.   Chest:      Chest wall: No tenderness.   Abdominal:      General: Abdomen is flat. Bowel sounds are normal.      Palpations: Abdomen is soft.   Musculoskeletal:         General: No tenderness.      Cervical back: Normal range of motion.      Right lower leg: Edema present.      Left lower leg: Edema present.   Skin:     Capillary Refill: Capillary refill takes less than 2 seconds.      Coloration: Skin is pale. Skin is not jaundiced.      Findings: No lesion.   Neurological:      General: No focal deficit present.      Mental Status: He is alert and oriented to person, place, and time. Mental status is at baseline.   Psychiatric:         Mood and Affect: Mood normal.         Thought Content: Thought content normal.         Judgment: Judgment normal.            Lines/Drains:            Lab Results: I have reviewed the following results:  Results from last 7 days   Lab Units 03/05/25  1647   WBC Thousand/uL 5.99   HEMOGLOBIN g/dL 7.9*   HEMATOCRIT % 25.2*   PLATELETS Thousands/uL 106*   SEGS PCT % 73   LYMPHO PCT % 8*   MONO PCT % 8   EOS PCT % 9*     Results from last 7 days   Lab Units 03/05/25  1647   SODIUM mmol/L 137   POTASSIUM mmol/L 3.7   CHLORIDE mmol/L 99   CO2 mmol/L 33*   BUN mg/dL 34*   CREATININE mg/dL 4.56*   ANION GAP mmol/L 5   CALCIUM mg/dL 7.7*   ALBUMIN g/dL 2.4*   TOTAL BILIRUBIN mg/dL 0.78   ALK PHOS U/L 101   ALT U/L <3*   AST U/L 18    GLUCOSE RANDOM mg/dL 109             Lab Results   Component Value Date    HGBA1C 5.7 (H) 09/15/2021     Results from last 7 days   Lab Units 03/05/25  0253   LACTIC ACID mmol/L 1.2       Imaging Results Review: I reviewed radiology reports from this admission including: chest xray.  Other Study Results Review: EKG was reviewed.     Administrative Statements   I have spent a total time of 45 minutes in caring for this patient on the day of the visit/encounter including Prognosis, Instructions for management, Risk factor reductions, Impressions, Documenting in the medical record, and Reviewing/placing orders in the medical record (including tests, medications, and/or procedures).    ** Please Note: This note has been constructed using a voice recognition system. **

## 2025-03-05 NOTE — ED NOTES
Attempted to call [facility] with update on disposition and pending transportation. No answer at this time.     Allison A Schoener, RN  03/05/25 0768

## 2025-03-05 NOTE — DISCHARGE INSTRUCTIONS
Patient was afebrile in the emergency department both by oral temperature and rectal temperature.    Vital signs were otherwise normal as well.  He had no complaints.  Blood work was at his baseline.  Chest x-ray revealed findings consistent with known history of end-stage renal disease requiring dialysis but no pneumonia.    Please keep dialysis appointment as scheduled.  No further emergency interventions required

## 2025-03-05 NOTE — Clinical Note
Case was discussed with  and the patient's admission status was agreed to be Admission Status: observation status to the service of Dr. Blanc

## 2025-03-05 NOTE — ASSESSMENT & PLAN NOTE
Patient with noted extended hospitalization in late December 2024  Prior to that hospitalization, in the outpatient setting, gross noncompliance with medication regimen and recommendation for LifeVest  Since, has been maintained in skilled nursing facility with strict adherence to medication regimen however still refuses LifeVest  Maintained on Coreg, Imdur  Follows regularly with cardiology outpatient setting  Appears volume overloaded upon presentation with weight up from previous, pitting edema, pulmonary edema  Volume unload with HD/UF per nephrology recommendations  Nephrology consulted  Most recent echocardiogram reviewed (1/2025)-global hypokinesis with EF of 20%

## 2025-03-05 NOTE — PLAN OF CARE
Problem: Potential for Falls  Goal: Patient will remain free of falls  Description: INTERVENTIONS:  - Educate patient/family on patient safety including physical limitations  - Instruct patient to call for assistance with activity   - Consult OT/PT to assist with strengthening/mobility   - Keep Call bell within reach  - Keep bed low and locked with side rails adjusted as appropriate  - Keep care items and personal belongings within reach  - Initiate and maintain comfort rounds  - Make Fall Risk Sign visible to staff  - Offer Toileting every 2 Hours, in advance of need  - Initiate/Maintain bed alarm  - Obtain necessary fall risk management equipment: bed check  - Apply yellow socks and bracelet for high fall risk patients  - Consider moving patient to room near nurses station  Outcome: Progressing     Problem: MOBILITY - ADULT  Goal: Maintain or return to baseline ADL function  Description: INTERVENTIONS:  -  Assess patient's ability to carry out ADLs; assess patient's baseline for ADL function and identify physical deficits which impact ability to perform ADLs (bathing, care of mouth/teeth, toileting, grooming, dressing, etc.)  - Assess/evaluate cause of self-care deficits   - Assess range of motion  - Assess patient's mobility; develop plan if impaired  - Assess patient's need for assistive devices and provide as appropriate  - Encourage maximum independence but intervene and supervise when necessary  - Involve family in performance of ADLs  - Assess for home care needs following discharge   - Consider OT consult to assist with ADL evaluation and planning for discharge  - Provide patient education as appropriate  Outcome: Progressing  Goal: Maintains/Returns to pre admission functional level  Description: INTERVENTIONS:  - Perform AM-PAC 6 Click Basic Mobility/ Daily Activity assessment daily.  - Set and communicate daily mobility goal to care team and patient/family/caregiver.   - Collaborate with rehabilitation  services on mobility goals if consulted    - Reposition patient every 2 hours.    - Record patient progress and toleration of activity level   Outcome: Progressing     Problem: PAIN - ADULT  Goal: Verbalizes/displays adequate comfort level or baseline comfort level  Description: Interventions:  - Encourage patient to monitor pain and request assistance  - Assess pain using appropriate pain scale  - Administer analgesics based on type and severity of pain and evaluate response  - Implement non-pharmacological measures as appropriate and evaluate response  - Consider cultural and social influences on pain and pain management  - Notify physician/advanced practitioner if interventions unsuccessful or patient reports new pain  Outcome: Progressing     Problem: INFECTION - ADULT  Goal: Absence or prevention of progression during hospitalization  Description: INTERVENTIONS:  - Assess and monitor for signs and symptoms of infection  - Monitor lab/diagnostic results  - Monitor all insertion sites, i.e. indwelling lines, tubes, and drains  - Monitor endotracheal if appropriate and nasal secretions for changes in amount and color  - Jewell appropriate cooling/warming therapies per order  - Administer medications as ordered  - Instruct and encourage patient and family to use good hand hygiene technique  - Identify and instruct in appropriate isolation precautions for identified infection/condition  Outcome: Progressing     Problem: SAFETY ADULT  Goal: Patient will remain free of falls  Description: INTERVENTIONS:  - Educate patient/family on patient safety including physical limitations  - Instruct patient to call for assistance with activity   - Consult OT/PT to assist with strengthening/mobility   - Keep Call bell within reach  - Keep bed low and locked with side rails adjusted as appropriate  - Keep care items and personal belongings within reach  - Initiate and maintain comfort rounds  - Make Fall Risk Sign visible to  staff  - Offer Toileting every 2 Hours, in advance of need  - Initiate/Maintain bed alarm  - Obtain necessary fall risk management equipment: bed check  - Apply yellow socks and bracelet for high fall risk patients  - Consider moving patient to room near nurses station  Outcome: Progressing  Goal: Maintain or return to baseline ADL function  Description: INTERVENTIONS:  -  Assess patient's ability to carry out ADLs; assess patient's baseline for ADL function and identify physical deficits which impact ability to perform ADLs (bathing, care of mouth/teeth, toileting, grooming, dressing, etc.)  - Assess/evaluate cause of self-care deficits   - Assess range of motion  - Assess patient's mobility; develop plan if impaired  - Assess patient's need for assistive devices and provide as appropriate  - Encourage maximum independence but intervene and supervise when necessary  - Involve family in performance of ADLs  - Assess for home care needs following discharge   - Consider OT consult to assist with ADL evaluation and planning for discharge  - Provide patient education as appropriate  Outcome: Progressing  Goal: Maintains/Returns to pre admission functional level  Description: INTERVENTIONS:  - Perform AM-PAC 6 Click Basic Mobility/ Daily Activity assessment daily.  - Set and communicate daily mobility goal to care team and patient/family/caregiver.   - Collaborate with rehabilitation services on mobility goals if consulted    - Reposition patient every 2 hours.    - Record patient progress and toleration of activity level   Outcome: Progressing     Problem: DISCHARGE PLANNING  Goal: Discharge to home or other facility with appropriate resources  Description: INTERVENTIONS:  - Identify barriers to discharge w/patient and caregiver  - Arrange for needed discharge resources and transportation as appropriate  - Identify discharge learning needs (meds, wound care, etc.)  - Arrange for interpretive services to assist at  discharge as needed  - Refer to Case Management Department for coordinating discharge planning if the patient needs post-hospital services based on physician/advanced practitioner order or complex needs related to functional status, cognitive ability, or social support system  Outcome: Progressing     Problem: Knowledge Deficit  Goal: Patient/family/caregiver demonstrates understanding of disease process, treatment plan, medications, and discharge instructions  Description: Complete learning assessment and assess knowledge base.  Interventions:  - Provide teaching at level of understanding  - Provide teaching via preferred learning methods  Outcome: Progressing     Problem: CARDIOVASCULAR - ADULT  Goal: Maintains optimal cardiac output and hemodynamic stability  Description: INTERVENTIONS:  - Monitor I/O, vital signs and rhythm  - Monitor for S/S and trends of decreased cardiac output  - Administer and titrate ordered vasoactive medications to optimize hemodynamic stability  - Assess quality of pulses, skin color and temperature  - Assess for signs of decreased coronary artery perfusion  - Instruct patient to report change in severity of symptoms  Outcome: Progressing     Problem: RESPIRATORY - ADULT  Goal: Achieves optimal ventilation and oxygenation  Description: INTERVENTIONS:  - Assess for changes in respiratory status  - Assess for changes in mentation and behavior  - Position to facilitate oxygenation and minimize respiratory effort  - Oxygen administered by appropriate delivery if ordered  - Initiate smoking cessation education as indicated  - Encourage broncho-pulmonary hygiene including cough, deep breathe, Incentive Spirometry  - Assess the need for suctioning and aspirate as needed  - Assess and instruct to report SOB or any respiratory difficulty  - Respiratory Therapy support as indicated  Outcome: Progressing

## 2025-03-05 NOTE — ASSESSMENT & PLAN NOTE
Lab Results   Component Value Date    EGFR 12 03/05/2025    EGFR 9 03/05/2025    EGFR 12 (L) 02/12/2025    CREATININE 4.56 (H) 03/05/2025    CREATININE 5.75 (H) 03/05/2025    CREATININE 4.81 (H) 02/12/2025   Follows regularly with nephrology in the outpatient setting and undergoes HD Monday Wednesday Friday  Today, inability to tolerate HD session secondary to shortness of breath and episode of chest pressure  Will undergo HD/UF tomorrow (3/6)-verified with nephrology

## 2025-03-05 NOTE — ASSESSMENT & PLAN NOTE
Elevated systolic blood pressure upon presentation  Multifactorial-missed medications today in addition volume overloaded  Anticipate improvement with UF  Reinitiate home regimen including Imdur-proceed with as needed intravenous hydralazine

## 2025-03-05 NOTE — ASSESSMENT & PLAN NOTE
"Patient presents today (3/5) from dialysis center with reports of shortness of breath which began during the session  Patient noted associated \"twinge\" of chest pain which has since subsided  Patient denies lightheadedness, dizziness, changes in vision or hearing, loss of consciousness    In ED, patient desaturating into the mid 80s on room air requiring up to 3 L nasal cannula oxygen which is not his baseline  Plain film imaging revealed pulmonary edema  Index suspicion for pulmonary edema in the setting of incomplete HD session  Patient also with history of systolic congestive heart failure with EF 20%  It appears that his dry weight is 55 to 56 kg-presents at 59.9 kg    Plan:  Admit to hospitalist service  Wean oxygen to baseline (off)  Treat underlying contributing factors-ESRD/HFrEF (see below)  Spot check O2  Low index suspicion for PE given systemic anticoagulation with compliance  In absence of SIRS criteria with low index suspicion for infectious infiltrate  COVID flu negative    "

## 2025-03-05 NOTE — ASSESSMENT & PLAN NOTE
Wt Readings from Last 3 Encounters:   03/05/25 59.9 kg (132 lb 0.9 oz)   03/05/25 64.9 kg (143 lb 1.3 oz)   02/20/25 53.3 kg (117 lb 9.6 oz)   As above

## 2025-03-05 NOTE — ED NOTES
Attempted to call [facility] with update on disposition and pending transportation. No answer at this time.     Allison A Schoener, RN  03/05/25 [id number]

## 2025-03-05 NOTE — ED NOTES
Phone call with patient's wife, Darlene, given update on disposition and pending transportion     Allison A Schoener, RN  03/05/25 [phone]

## 2025-03-05 NOTE — ASSESSMENT & PLAN NOTE
Malnutrition Findings:    Bitemporal wasting, superior clavicular muscle wasting                             BMI Findings:           Body mass index is 21.31 kg/m².

## 2025-03-05 NOTE — ED PROVIDER NOTES
"Time reflects when diagnosis was documented in both MDM as applicable and the Disposition within this note       Time User Action Codes Description Comment    3/5/2025  5:42 PM Javy English Add [R07.89] Atypical chest pain     3/5/2025  5:42 PM Javy English Add [N18.6] ESRD (end stage renal disease) (HCC)     3/5/2025  5:53 PM Javy English Add [R09.02] Hypoxia     3/5/2025  6:23 PM Chris Nolasco Add [R53.1] Generalized weakness           ED Disposition       ED Disposition   Admit    Condition   Stable    Date/Time   Wed Mar 5, 2025  5:53 PM    Comment   Case was discussed with Danielle and the patient's admission status was agreed to be Admission Status: observation status to the service of Dr. Santos                Assessment & Plan       Medical Decision Making  Patient is a 70-year-old male presents with the complaint of chest pain.  The patient had chest pain for a few seconds during his dialysis treatment.  The patient at dialysis, only received 1 hour of dialysis\" prior to being sent here by ambulance.  Patient is pain-free and feels \"normal\".  He not remember the last time had dialysis but he states he is supposed to go Monday Wednesday and Friday.  No longer produces urine.    Stray from earlier did show interstitial edema, reaching out to nephrology to see if we can get the patient dialysis    Repeat lab work obtained.  Patient was requiring oxygen in the emergency department.  Patient was not tachypneic.  Patient's chest x-ray from earlier today did show interstitial edema.  Patient did have checked February 20 and patient did have weight gain from then.  Unknown what patient's dry weight is.  The patient's family stated that he did have dialysis on Monday of this week.  Patient's troponin was elevated but was elevated in the past within similar range, likely secondary to end-stage renal disease.    Patient's case was discussed with nephrology unable to do dialysis today will need to stay till tomorrow " for dialysis.  Because the patient was hypoxic and in fluid overload no longer produces urine did discuss observation with hospitalist.  Was accepted to their service        Amount and/or Complexity of Data Reviewed  Labs: ordered. Decision-making details documented in ED Course.  Radiology: ordered and independent interpretation performed. Decision-making details documented in ED Course.  ECG/medicine tests: ordered and independent interpretation performed. Decision-making details documented in ED Course.    Risk  Decision regarding hospitalization.        ED Course as of 03/05/25 2229   Wed Mar 05, 2025   1726 hs TnI 0hr(!): 79  50-60, 240 in the past lab draws   1805 Nephrology can performed dialysis tomorrow        Medications   amLODIPine (NORVASC) tablet 5 mg (has no administration in time range)   carvedilol (COREG) tablet 6.25 mg (has no administration in time range)   isosorbide mononitrate (IMDUR) 24 hr tablet 120 mg (has no administration in time range)   atorvastatin (LIPITOR) tablet 20 mg (has no administration in time range)   ferrous sulfate tablet 325 mg (has no administration in time range)   folic acid (FOLVITE) tablet 1 mg (has no administration in time range)   melatonin tablet 6 mg (has no administration in time range)   losartan (COZAAR) tablet 100 mg (has no administration in time range)       ED Risk Strat Scores                            SBIRT 20yo+      Flowsheet Row Most Recent Value   Initial Alcohol Screen: US AUDIT-C     1. How often do you have a drink containing alcohol? 0 Filed at: 03/05/2025 1643   2. How many drinks containing alcohol do you have on a typical day you are drinking?  0 Filed at: 03/05/2025 1643   3a. Male UNDER 65: How often do you have five or more drinks on one occasion? 0 Filed at: 03/05/2025 1643   3b. FEMALE Any Age, or MALE 65+: How often do you have 4 or more drinks on one occassion? 0 Filed at: 03/05/2025 1643   Audit-C Score 0 Filed at: 03/05/2025 1643  "  ALONDRA: How many times in the past year have you...    Used an illegal drug or used a prescription medication for non-medical reasons? Never Filed at: 03/05/2025 1642                            History of Present Illness       Chief Complaint   Patient presents with    Chest Pain     Chest pain for 30 seconds when having HD today. Was seen last night in this ER for fevers. Pt had a 11.3 kg weight gain and only received 1hr of HD.       Past Medical History:   Diagnosis Date    Acute hemodialysis patient (Formerly [facility]) 12/14/2024    CAD (coronary artery disease)     HFrEF (heart failure with reduced ejection fraction) (Formerly [facility]) 09/2021    Hypertension     Rheumatoid factor positive 09/2021    Stage 3 chronic kidney disease (Formerly [facility])     Tobacco abuse       Past Surgical History:   Procedure Laterality Date    APPENDECTOMY  1965    CARDIAC CATHETERIZATION  9/16/2021    INGUINAL HERNIA REPAIR Right 1991    IR TEMPORARY DIALYSIS CATHETER PLACEMENT  1/22/2025    IR THORACENTESIS  12/18/2024    IR THORACENTESIS  12/23/2024    IR TUNNELED DIALYSIS CATHETER PLACEMENT  11/14/2024    IR TUNNELED DIALYSIS CATHETER PLACEMENT  1/27/2025      Family History   Problem Relation Age of Onset    Heart failure Mother     Other Father         \"blood clot\"    COPD Sister     Heart failure Brother     Valvular heart disease Brother     Heart attack Son     No Known Problems Son       Social History     Tobacco Use    Smoking status: Every Day     Current packs/day: 0.50     Average packs/day: 0.5 packs/day for 51.2 years (25.6 ttl pk-yrs)     Types: Cigarettes     Start date: 1974    Smokeless tobacco: Never    Tobacco comments:     Began smoking in his early 20s, on average smoking 1+ packs daily. Quit in 08/2021 (Updated 09/15/2021).    Vaping Use    Vaping status: Never Used   Substance Use Topics    Alcohol use: Yes     Alcohol/week: 6.0 standard drinks of alcohol     Types: 6 Shots of liquor per week     Comment: rare    Drug use: Never    " "  E-Cigarette/Vaping    E-Cigarette Use Never User       E-Cigarette/Vaping Substances      I have reviewed and agree with the history as documented.     Patient is a 70-year-old male presents with the complaint of chest pain.  The patient had chest pain for a few seconds during his dialysis treatment.  The patient at dialysis, only received 1 hour of dialysis\" prior to being sent here by ambulance.  Patient is pain-free and feels \"normal\".  He not remember the last time had dialysis but he states he is supposed to go Monday Wednesday and Friday.  No longer produces urine.          Review of Systems   All other systems reviewed and are negative.          Objective       ED Triage Vitals [03/05/25 1638]   Temperature Pulse Blood Pressure Respirations SpO2 Patient Position - Orthostatic VS   98.6 °F (37 °C) 94 (!) 192/88 18 93 % Lying      Temp Source Heart Rate Source BP Location FiO2 (%) Pain Score    Temporal Monitor Right arm -- No Pain      Vitals      Date and Time Temp Pulse SpO2 Resp BP Pain Score FACES Pain Rating User   03/05/25 2133 97.9 °F (36.6 °C) 87 88 % -- 149/110 -- -- DII   03/05/25 2132 97.9 °F (36.6 °C) 88 89 % -- 147/112 -- -- DII   03/05/25 2056 -- -- 90 % -- -- -- -- AP   03/05/25 2052 97.9 °F (36.6 °C) 89 -- 18 147/55 No Pain -- AP   03/05/25 1822 97.5 °F (36.4 °C) 88 94 % 19 161/63 -- -- DII   03/05/25 1822 -- -- -- -- -- No Pain -- HD   03/05/25 1800 -- 89 96 % 24 178/78 -- -- SS   03/05/25 1746 -- -- 93 % -- -- -- -- SB   03/05/25 1745 -- 92 90 % 24 182/86 -- -- SS   03/05/25 1740 -- -- 86 % -- -- -- -- SB   03/05/25 1715 -- 88 -- 23 173/81 -- -- SS   03/05/25 1700 -- 87 89 % 20 172/80 -- --    03/05/25 1645 -- 90 92 % 22 184/86 -- --    03/05/25 1638 98.6 °F (37 °C) 94 93 % 18 192/88 No Pain -- MD            Physical Exam  Vitals and nursing note reviewed.   Constitutional:       General: He is not in acute distress.  HENT:      Head: Normocephalic and atraumatic.   Eyes:      " Extraocular Movements: Extraocular movements intact.      Pupils: Pupils are equal, round, and reactive to light.   Cardiovascular:      Rate and Rhythm: Normal rate and regular rhythm.      Heart sounds: No murmur heard.  Pulmonary:      Effort: Pulmonary effort is normal. No respiratory distress.      Breath sounds: Rales present.   Chest:      Chest wall: No tenderness.   Abdominal:      General: Bowel sounds are normal.      Palpations: Abdomen is soft.      Tenderness: There is no abdominal tenderness.   Musculoskeletal:      Cervical back: Normal range of motion.   Skin:     General: Skin is warm and dry.      Capillary Refill: Capillary refill takes less than 2 seconds.   Neurological:      Mental Status: He is alert and oriented to person, place, and time.   Psychiatric:         Behavior: Behavior normal.         Results Reviewed       Procedure Component Value Units Date/Time    HS Troponin I 2hr [[id number]]  (Abnormal) Collected: 03/05/25 1858    Lab Status: Final result Specimen: Blood from Arm, Right Updated: 03/05/25 1932     hs TnI 2hr 72 ng/L      Delta 2hr hsTnI -7 ng/L     HS Troponin 0hr (reflex protocol) [[id number]]  (Abnormal) Collected: 03/05/25 1647    Lab Status: Final result Specimen: Blood from Arm, Left Updated: 03/05/25 1717     hs TnI 0hr 79 ng/L     Comprehensive metabolic panel [[id number]]  (Abnormal) Collected: 03/05/25 1647    Lab Status: Final result Specimen: Blood from Arm, Left Updated: 03/05/25 1713     Sodium 137 mmol/L      Potassium 3.7 mmol/L      Chloride 99 mmol/L      CO2 33 mmol/L      ANION GAP 5 mmol/L      BUN 34 mg/dL      Creatinine 4.56 mg/dL      Glucose 109 mg/dL      Calcium 7.7 mg/dL      Corrected Calcium 9.0 mg/dL      AST 18 U/L      ALT <3 U/L      Alkaline Phosphatase 101 U/L      Total Protein 5.5 g/dL      Albumin 2.4 g/dL      Total Bilirubin 0.78 mg/dL      eGFR 12 ml/min/1.73sq m     Narrative:      National Kid[employer] Foundation guidelines for  Chronic Kidney Disease (CKD):     Stage 1 with normal or high GFR (GFR > 90 mL/min/1.73 square meters)    Stage 2 Mild CKD (GFR = 60-89 mL/min/1.73 square meters)    Stage 3A Moderate CKD (GFR = 45-59 mL/min/1.73 square meters)    Stage 3B Moderate CKD (GFR = 30-44 mL/min/1.73 square meters)    Stage 4 Severe CKD (GFR = 15-29 mL/min/1.73 square meters)    Stage 5 End Stage CKD (GFR <15 mL/min/1.73 square meters)  Note: GFR calculation is accurate only with a steady state creatinine    Magnesium [[id number]]  (Abnormal) Collected: 03/05/25 1647    Lab Status: Final result Specimen: Blood from Arm, Left Updated: 03/05/25 1713     Magnesium 1.7 mg/dL     Phosphorus [[id number]]  (Normal) Collected: 03/05/25 1647    Lab Status: Final result Specimen: Blood from Arm, Left Updated: 03/05/25 1713     Phosphorus 2.7 mg/dL     CBC and differential [[id number]]  (Abnormal) Collected: 03/05/25 1647    Lab Status: Final result Specimen: Blood from Arm, Left Updated: 03/05/25 1653     WBC 5.99 Thousand/uL      RBC 2.57 Million/uL      Hemoglobin 7.9 g/dL      Hematocrit 25.2 %      MCV 98 fL      MCH 30.7 pg      MCHC 31.3 g/dL      RDW 19.4 %      MPV 9.9 fL      Platelets 106 Thousands/uL      nRBC 0 /100 WBCs      Segmented % 73 %      Immature Grans % 1 %      Lymphocytes % 8 %      Monocytes % 8 %      Eosinophils Relative 9 %      Basophils Relative 1 %      Absolute Neutrophils 4.42 Thousands/µL      Absolute Immature Grans 0.03 Thousand/uL      Absolute Lymphocytes 0.49 Thousands/µL      Absolute Monocytes 0.50 Thousand/µL      Eosinophils Absolute 0.51 Thousand/µL      Basophils Absolute 0.04 Thousands/µL             No orders to display       ECG 12 Lead Documentation Only    Date/Time: 3/5/2025 4:49 PM    Performed by: Javy English PA-C  Authorized by: Javy English PA-C    Indications / Diagnosis:  Cp  ECG reviewed by me, the ED Provider: yes    Patient location:  ED  Interpretation:     Interpretation:  non-specific    Rate:     ECG rate:  90      ED Medication and Procedure Management   Prior to Admission Medications   Prescriptions Last Dose Informant Patient Reported? Taking?   amLODIPine (NORVASC) 5 mg tablet 3/4/2025 Morning  No Yes   Sig: Take 1 tablet (5 mg total) by mouth daily   apixaban (Eliquis) 2.5 mg 3/4/2025 Evening  Yes Yes   Sig: Take 2.5 mg by mouth 2 (two) times a day   atorvastatin (LIPITOR) 20 mg tablet 3/4/2025 Morning  No Yes   Sig: Take 1 tablet (20 mg total) by mouth daily with dinner   carvedilol (COREG) 6.25 mg tablet 3/4/2025 Evening  Yes Yes   Sig: Take 6.25 mg by mouth 2 (two) times a day with meals   ferrous sulfate 325 (65 Fe) mg tablet 3/4/2025  No Yes   Sig: Take 1 tablet (325 mg total) by mouth every other day   folic acid (FOLVITE) 1 mg tablet 3/4/2025  No Yes   Sig: Take 1 tablet (1 mg total) by mouth daily   hydrALAZINE (APRESOLINE) 25 mg tablet 3/4/2025 Morning  Yes Yes   Sig: Take 25 mg by mouth in the morning   isosorbide mononitrate (IMDUR) 120 mg 24 hr tablet 3/4/2025 Morning  No Yes   Sig: TAKE 1 TABLET BY MOUTH EVERY DAY   melatonin 3 mg 3/4/2025 Bedtime  No Yes   Sig: Take 2 tablets (6 mg total) by mouth daily at bedtime   metoprolol tartrate (LOPRESSOR) 25 mg tablet 3/4/2025 Evening  No Yes   Sig: Take 1 tablet (25 mg total) by mouth every 12 (twelve) hours   senna-docusate sodium (SENOKOT S) 8.6-50 mg per tablet 3/4/2025 Evening  Yes Yes   Sig: Take 1 tablet by mouth 2 (two) times a day   torsemide (DEMADEX) 100 mg tablet 3/4/2025  Yes Yes   Sig: Take 100 mg by mouth daily   valsartan (DIOVAN) 160 mg tablet 3/4/2025 Morning  Yes Yes   Sig: Take 80 mg by mouth daily      Facility-Administered Medications: None     Current Discharge Medication List        CONTINUE these medications which have NOT CHANGED    Details   amLODIPine (NORVASC) 5 mg tablet Take 1 tablet (5 mg total) by mouth daily  Qty: 30 tablet, Refills: 0    Associated Diagnoses: Primary hypertension       apixaban (Eliquis) 2.5 mg Take 2.5 mg by mouth 2 (two) times a day      atorvastatin (LIPITOR) 20 mg tablet Take 1 tablet (20 mg total) by mouth daily with dinner  Qty: 30 tablet, Refills: 0    Associated Diagnoses: Chronic systolic (congestive) heart failure (HCC)      carvedilol (COREG) 6.25 mg tablet Take 6.25 mg by mouth 2 (two) times a day with meals      ferrous sulfate 325 (65 Fe) mg tablet Take 1 tablet (325 mg total) by mouth every other day    Associated Diagnoses: Anemia due to chronic kidney disease, on chronic dialysis (Formerly [facility])      folic acid (FOLVITE) 1 mg tablet Take 1 tablet (1 mg total) by mouth daily  Qty: 30 tablet, Refills: 0    Associated Diagnoses: Chronic systolic (congestive) heart failure (Formerly [facility])      hydrALAZINE (APRESOLINE) 25 mg tablet Take 25 mg by mouth in the morning      isosorbide mononitrate (IMDUR) 120 mg 24 hr tablet TAKE 1 TABLET BY MOUTH EVERY DAY  Qty: 90 tablet, Refills: 0    Associated Diagnoses: CHF (congestive heart failure) (Formerly [facility]); Dilated cardiomyopathy (HCC); Acute on chronic systolic heart failure (HCC); Nonischemic cardiomyopathy (HCC)      melatonin 3 mg Take 2 tablets (6 mg total) by mouth daily at bedtime  Qty: 60 tablet, Refills: 0    Associated Diagnoses: Insomnia      metoprolol tartrate (LOPRESSOR) 25 mg tablet Take 1 tablet (25 mg total) by mouth every 12 (twelve) hours    Associated Diagnoses: Atrial fibrillation with RVR (Formerly [facility])      senna-docusate sodium (SENOKOT S) 8.6-50 mg per tablet Take 1 tablet by mouth 2 (two) times a day      torsemide (DEMADEX) 100 mg tablet Take 100 mg by mouth daily      valsartan (DIOVAN) 160 mg tablet Take 80 mg by mouth daily           No discharge procedures on file.  ED SEPSIS DOCUMENTATION   Time reflects when diagnosis was documented in both MDM as applicable and the Disposition within this note       Time User Action Codes Description Comment    3/5/2025  5:42 PM Javy English Add [R07.89] Atypical chest pain     3/5/2025  5:42  PM Javy English Add [N18.6] ESRD (end stage renal disease) (HCC)     3/5/2025  5:53 PM Javy English Add [R09.02] Hypoxia     3/5/2025  6:23 PM Chris Nolasco Add [R53.1] Generalized weakness                  Javy English PA-C  03/05/25 2226

## 2025-03-05 NOTE — ED PROVIDER NOTES
Time reflects when diagnosis was documented in both MDM as applicable and the Disposition within this note       Time User Action Codes Description Comment    3/5/2025  3:50 AM Boni Hay Add [R50.9] Fever     3/5/2025  3:50 AM Boni Hay Remove [R50.9] Fever     3/5/2025  3:50 AM Boni Hay Add [N18.6] End stage renal disease (HCC)           ED Disposition       ED Disposition   Discharge    Condition   Stable    Date/Time   Wed Mar 5, 2025  3:50 AM    Comment   Matthew Alvarez discharge to home/self care.                   Assessment & Plan       Medical Decision Making  Based on the history and medical screening exam performed the patient may be at risk for pneumonia, viral illness, enteritis, other fever.    Based on the work-up performed in the emergency room which includes physical examination, and which may include laboratory studies and imaging as warranted including advanced imaging such as CT scan or ultrasound, the diagnostic considerations are narrowed to exclude limb or life-threatening process.    The patient is stable for discharge.  Patient hemodynamically stable without abnormal vital sign.  No complaints.  Benign examination.  X-ray consistent with need for dialysis.  Patient scheduled for dialysis later today.  No fever in the emergency department.  Lab work at baseline with chronic anemia and chronic elevation in the creatinine consistent with known history of end-stage renal disease.  No indication for further intervention at this time.    Amount and/or Complexity of Data Reviewed  Labs: ordered. Decision-making details documented in ED Course.     Details: At patient baseline  Radiology: ordered and independent interpretation performed. Decision-making details documented in ED Course.     Details: Consistent with pulmonary vascular congestion and need for dialysis on a nonemergent basis  ECG/medicine tests: ordered and independent interpretation performed. Decision-making  "details documented in ED Course.             Medications - No data to display    ED Risk Strat Scores                            SBIRT 22yo+      Flowsheet Row Most Recent Value   Initial Alcohol Screen: US AUDIT-C     1. How often do you have a drink containing alcohol? 0 Filed at: 03/05/2025 0247   2. How many drinks containing alcohol do you have on a typical day you are drinking?  0 Filed at: 03/05/2025 0247   3a. Male UNDER 65: How often do you have five or more drinks on one occasion? 0 Filed at: 03/05/2025 0246   3b. FEMALE Any Age, or MALE 65+: How often do you have 4 or more drinks on one occassion? 0 Filed at: 03/05/2025 0247   Audit-C Score 0 Filed at: 03/05/2025 0247   ALONDRA: How many times in the past year have you...    Used an illegal drug or used a prescription medication for non-medical reasons? Never Filed at: 03/05/2025 0247                            History of Present Illness       Chief Complaint   Patient presents with    Fever     Pt coming from [facility] states that pt had a temp of 100.8. Temp currently is 98.7.       Past Medical History:   Diagnosis Date    Acute hemodialysis patient ([facility]) 12/14/2024    CAD (coronary artery disease)     HFrEF (heart failure with reduced ejection fraction) ([facility]) 09/2021    Hypertension     Rheumatoid factor positive 09/2021    Stage 3 chronic kidney disease ([facility])     Tobacco abuse       Past Surgical History:   Procedure Laterality Date    APPENDECTOMY  1965    CARDIAC CATHETERIZATION  9/16/2021    INGUINAL HERNIA REPAIR Right 1991    IR TEMPORARY DIALYSIS CATHETER PLACEMENT  1/22/2025    IR THORACENTESIS  12/18/2024    IR THORACENTESIS  12/23/2024    IR TUNNELED DIALYSIS CATHETER PLACEMENT  11/14/2024    IR TUNNELED DIALYSIS CATHETER PLACEMENT  1/27/2025      Family History   Problem Relation Age of Onset    Heart failure Mother     Other Father         \"blood clot\"    COPD Sister     Heart failure Brother     Valvular heart " disease Brother     Heart attack Son     No Known Problems Son       Social History     Tobacco Use    Smoking status: Every Day     Current packs/day: 0.50     Average packs/day: 0.5 packs/day for 51.2 years (25.6 ttl pk-yrs)     Types: Cigarettes     Start date: 1974    Smokeless tobacco: Never    Tobacco comments:     Began smoking in his early 20s, on average smoking 1+ packs daily. Quit in 08/2021 (Updated 09/15/2021).    Vaping Use    Vaping status: Never Used   Substance Use Topics    Alcohol use: Yes     Alcohol/week: 6.0 standard drinks of alcohol     Types: 6 Shots of liquor per week     Comment: rare    Drug use: Never      E-Cigarette/Vaping    E-Cigarette Use Never User       E-Cigarette/Vaping Substances      I have reviewed and agree with the history as documented.     Patient with history of end-stage renal disease on dialysis sent from [facility] for evaluation of reported temperature of 100.8.  Nurse at [facility] states that the temperature in the patient's room was 85 degrees when the patient's temperature was checked.  The patient reports no complaints.  Does not feel ill.  No coughing nausea or vomiting.  No abdominal pain or chest pain or shortness of breath.  No other complaints at all.  EMS state the patient was afebrile when they arrived.  Upon arrival to the emergency room the patient is also afebrile both orally and rectally.      History provided by:  Patient   used: No    Fever  Quality:  Reported fever 100.8, patient without complaint and afebrile in ED  Severity:  Mild  Onset quality:  Unable to specify  Timing:  Unable to specify  Progression:  Resolved  Context:  Resolved without any antipyretic administration  Relieved by:  Nothing  Worsened by:  Nothing  Associated symptoms: no abdominal pain, no chest pain, no congestion, no cough, no diarrhea, no ear pain, no fever, no headaches, no nausea, no rash, no shortness of breath, no sore throat, no  vomiting and no wheezing        Review of Systems   Constitutional:  Negative for chills and fever.   HENT:  Negative for congestion, ear pain, hearing loss, sore throat, trouble swallowing and voice change.    Eyes:  Negative for pain and discharge.   Respiratory:  Negative for cough, shortness of breath and wheezing.    Cardiovascular:  Negative for chest pain and palpitations.   Gastrointestinal:  Negative for abdominal pain, blood in stool, constipation, diarrhea, nausea and vomiting.   Genitourinary:  Negative for dysuria, flank pain, frequency and hematuria.   Musculoskeletal:  Negative for joint swelling, neck pain and neck stiffness.   Skin:  Negative for rash and wound.   Neurological:  Negative for dizziness, seizures, syncope, facial asymmetry and headaches.   Psychiatric/Behavioral:  Negative for hallucinations, self-injury and suicidal ideas.    All other systems reviewed and are negative.          Objective       ED Triage Vitals [03/05/25 0241]   Temperature Pulse Blood Pressure Respirations SpO2 Patient Position - Orthostatic VS   98.2 °F (36.8 °C) 89 (!) 182/81 16 95 % Lying      Temp Source Heart Rate Source BP Location FiO2 (%) Pain Score    Oral Monitor Right arm -- No Pain      Vitals      Date and Time Temp Pulse SpO2 Resp BP Pain Score FACES Pain Rating User   03/05/25 0300 -- 82 94 % 21 181/82 -- -- BA   03/05/25 0241 98.2 °F (36.8 °C) 89 95 % 16 182/81 No Pain -- MD            Physical Exam  Vitals and nursing note reviewed.   Constitutional:       General: He is not in acute distress.     Appearance: He is well-developed.   HENT:      Head: Normocephalic and atraumatic.      Right Ear: External ear normal.      Left Ear: External ear normal.   Eyes:      General: No scleral icterus.        Right eye: No discharge.         Left eye: No discharge.      Extraocular Movements: Extraocular movements intact.      Conjunctiva/sclera: Conjunctivae normal.   Cardiovascular:      Rate and Rhythm:  Normal rate and regular rhythm.      Heart sounds: Normal heart sounds. No murmur heard.  Pulmonary:      Effort: Pulmonary effort is normal.      Breath sounds: Normal breath sounds. No wheezing or rales.   Abdominal:      General: Bowel sounds are normal. There is no distension.      Palpations: Abdomen is soft.      Tenderness: There is no abdominal tenderness. There is no guarding or rebound.   Musculoskeletal:         General: No deformity. Normal range of motion.      Cervical back: Normal range of motion and neck supple.   Skin:     General: Skin is warm and dry.      Findings: No rash.   Neurological:      General: No focal deficit present.      Mental Status: He is alert and oriented to person, place, and time.      Cranial Nerves: No cranial nerve deficit.   Psychiatric:         Mood and Affect: Mood normal.         Behavior: Behavior normal.         Thought Content: Thought content normal.         Judgment: Judgment normal.         Results Reviewed       Procedure Component Value Units Date/Time    Blood culture #1 [[id number]] Collected: 03/05/25 0253    Lab Status: In process Specimen: Blood from Arm, Left Updated: 03/05/25 0344    Smear Review(Phlebs Do Not Order) [[id number]]  (Abnormal) Collected: 03/05/25 0253    Lab Status: Final result Specimen: Blood from Arm, Left Updated: 03/05/25 0344     RBC Morphology Present     Platelet Estimate Decreased     Macrocytes Present    CBC and differential [[id number]]  (Abnormal) Collected: 03/05/25 0253    Lab Status: Final result Specimen: Blood from Arm, Left Updated: 03/05/25 0344     WBC 5.74 Thousand/uL      RBC 2.52 Million/uL      Hemoglobin 7.8 g/dL      Hematocrit 25.3 %       fL      MCH 31.0 pg      MCHC 30.8 g/dL      RDW 19.9 %      MPV 9.9 fL      Platelets 94 Thousands/uL      nRBC 0 /100 WBCs      Segmented % 72 %      Immature Grans % 1 %      Lymphocytes % 9 %      Monocytes % 10 %      Eosinophils Relative 7 %      Basophils Relative 1  %      Absolute Neutrophils 4.19 Thousands/µL      Absolute Immature Grans 0.04 Thousand/uL      Absolute Lymphocytes 0.51 Thousands/µL      Absolute Monocytes 0.56 Thousand/µL      Eosinophils Absolute 0.39 Thousand/µL      Basophils Absolute 0.05 Thousands/µL     Narrative:      This is an appended report.  These results have been appended to a previously verified report.    Comprehensive metabolic panel [[id number]]  (Abnormal) Collected: 03/05/25 0253    Lab Status: Final result Specimen: Blood from Arm, Left Updated: 03/05/25 0329     Sodium 138 mmol/L      Potassium 3.8 mmol/L      Chloride 100 mmol/L      CO2 31 mmol/L      ANION GAP 7 mmol/L      BUN 44 mg/dL      Creatinine 5.75 mg/dL      Glucose 119 mg/dL      Calcium 7.7 mg/dL      Corrected Calcium 8.9 mg/dL      AST 19 U/L      ALT <3 U/L      Alkaline Phosphatase 116 U/L      Total Protein 5.7 g/dL      Albumin 2.5 g/dL      Total Bilirubin 0.65 mg/dL      eGFR 9 ml/min/1.73sq m     Narrative:      [employer] guidelines for Chronic Kidney Disease (CKD):     Stage 1 with normal or high GFR (GFR > 90 mL/min/1.73 square meters)    Stage 2 Mild CKD (GFR = 60-89 mL/min/1.73 square meters)    Stage 3A Moderate CKD (GFR = 45-59 mL/min/1.73 square meters)    Stage 3B Moderate CKD (GFR = 30-44 mL/min/1.73 square meters)    Stage 4 Severe CKD (GFR = 15-29 mL/min/1.73 square meters)    Stage 5 End Stage CKD (GFR <15 mL/min/1.73 square meters)  Note: GFR calculation is accurate only with a steady state creatinine    Lactic acid, plasma (w/reflex if result > 2.0) [[id number]]  (Normal) Collected: 03/05/25 0253    Lab Status: Final result Specimen: Blood from Arm, Left Updated: 03/05/25 0319     LACTIC ACID 1.2 mmol/L     Narrative:      Result may be elevated if tourniquet was used during collection.    Blood culture #2 [[id number]] Collected: 03/05/25 0253    Lab Status: In process Specimen: Blood from Arm, Right Updated: 03/05/25 0300             XR chest portable   ED Interpretation by Boni Hay MD (03/05 5117)   Pulmonary vascular congestion and left-sided pleural effusion consistent with patient's known history of end-stage renal disease          Procedures    ED Medication and Procedure Management   Prior to Admission Medications   Prescriptions Last Dose Informant Patient Reported? Taking?   amLODIPine (NORVASC) 5 mg tablet   No No   Sig: Take 1 tablet (5 mg total) by mouth daily   apixaban (ELIQUIS) 5 mg   No No   Sig: Take 10 mg (2 tablets ) twice a day for 1 day, then switch to 5 mg (1 tablet) twice a day   Patient taking differently: Take 2.5 mg by mouth 2 (two) times a day Take 10 mg (2 tablets ) twice a day for 1 day, then switch to 5 mg (1 tablet) twice a day   aspirin (ECOTRIN LOW STRENGTH) 81 mg EC tablet   No No   Sig: Take 1 tablet (81 mg total) by mouth daily   Patient not taking: Reported on 2/20/2025   atorvastatin (LIPITOR) 20 mg tablet   No No   Sig: Take 1 tablet (20 mg total) by mouth daily with dinner   carvedilol (COREG) 6.25 mg tablet   Yes No   Sig: Take 6.25 mg by mouth 2 (two) times a day with meals   ceFAZolin (ANCEF) 1000 mg IVPB   No No   Sig: Administer cefazolin 2 g (100mL) IV after dialysis on Monday and Wednesday and administer cefazolin 3 g (150mL) IV after dialysis on Friday   epoetin loida (EPOGEN,PROCRIT) 4,000 units/mL   No No   Sig: Inject 2 mL (8,000 Units total) under the skin 3 (three) times a week   ferrous sulfate 325 (65 Fe) mg tablet   No No   Sig: Take 1 tablet (325 mg total) by mouth every other day   folic acid (FOLVITE) 1 mg tablet   No No   Sig: Take 1 tablet (1 mg total) by mouth daily   hydrALAZINE (APRESOLINE) 25 mg tablet   No No   Sig: Take 1 tablet (25 mg total) by mouth every 8 (eight) hours   Patient not taking: Reported on 2/20/2025   isosorbide mononitrate (IMDUR) 120 mg 24 hr tablet   No No   Sig: TAKE 1 TABLET BY MOUTH EVERY DAY   melatonin 3 mg   No No   Sig: Take 2 tablets (6 mg  total) by mouth daily at bedtime   metoprolol tartrate (LOPRESSOR) 25 mg tablet   No No   Sig: Take 1 tablet (25 mg total) by mouth every 12 (twelve) hours   Patient not taking: Reported on 2/20/2025   torsemide (DEMADEX) 100 mg tablet   No No   Sig: Take on nondialysis days (Sunday, Tuesday, Thursday, Saturday). Hold for systolic blood pressure less than 110 Do not start before January 16, 2025.   Patient not taking: Reported on 2/20/2025   valsartan (DIOVAN) 160 mg tablet   Yes No   Sig: Take 160 mg by mouth daily      Facility-Administered Medications: None     Patient's Medications   Discharge Prescriptions    No medications on file     No discharge procedures on file.  ED SEPSIS DOCUMENTATION   Time reflects when diagnosis was documented in both MDM as applicable and the Disposition within this note       Time User Action Codes Description Comment    3/5/2025  3:50 AM Boni Hay Add [R50.9] Fever     3/5/2025  3:50 AM Boni Hay Remove [R50.9] Fever     3/5/2025  3:50 AM Boni Hay Add [N18.6] End stage renal disease (HCC)                  Boni Hay MD  03/05/25 0352

## 2025-03-06 ENCOUNTER — APPOINTMENT (INPATIENT)
Dept: DIALYSIS | Facility: HOSPITAL | Age: 70
DRG: 291 | End: 2025-03-06
Payer: COMMERCIAL

## 2025-03-06 LAB
ALBUMIN SERPL BCG-MCNC: 2.6 G/DL (ref 3.5–5)
ALP SERPL-CCNC: 89 U/L (ref 34–104)
ALT SERPL W P-5'-P-CCNC: <3 U/L (ref 7–52)
ANION GAP SERPL CALCULATED.3IONS-SCNC: 7 MMOL/L (ref 4–13)
AST SERPL W P-5'-P-CCNC: 17 U/L (ref 13–39)
BILIRUB SERPL-MCNC: 0.79 MG/DL (ref 0.2–1)
BUN SERPL-MCNC: 39 MG/DL (ref 5–25)
CALCIUM ALBUM COR SERPL-MCNC: 9.1 MG/DL (ref 8.3–10.1)
CALCIUM SERPL-MCNC: 8 MG/DL (ref 8.4–10.2)
CHLORIDE SERPL-SCNC: 100 MMOL/L (ref 96–108)
CO2 SERPL-SCNC: 31 MMOL/L (ref 21–32)
CREAT SERPL-MCNC: 5.65 MG/DL (ref 0.6–1.3)
ERYTHROCYTE [DISTWIDTH] IN BLOOD BY AUTOMATED COUNT: 19.5 % (ref 11.6–15.1)
GFR SERPL CREATININE-BSD FRML MDRD: 9 ML/MIN/1.73SQ M
GLUCOSE SERPL-MCNC: 103 MG/DL (ref 65–140)
HCT VFR BLD AUTO: 26.7 % (ref 36.5–49.3)
HGB BLD-MCNC: 8.4 G/DL (ref 12–17)
MAGNESIUM SERPL-MCNC: 1.8 MG/DL (ref 1.9–2.7)
MCH RBC QN AUTO: 31.1 PG (ref 26.8–34.3)
MCHC RBC AUTO-ENTMCNC: 31.5 G/DL (ref 31.4–37.4)
MCV RBC AUTO: 99 FL (ref 82–98)
PHOSPHATE SERPL-MCNC: 3.5 MG/DL (ref 2.3–4.1)
PLATELET # BLD AUTO: 114 THOUSANDS/UL (ref 149–390)
PMV BLD AUTO: 10.4 FL (ref 8.9–12.7)
POTASSIUM SERPL-SCNC: 3.8 MMOL/L (ref 3.5–5.3)
PROT SERPL-MCNC: 5.9 G/DL (ref 6.4–8.4)
RBC # BLD AUTO: 2.7 MILLION/UL (ref 3.88–5.62)
SODIUM SERPL-SCNC: 138 MMOL/L (ref 135–147)
WBC # BLD AUTO: 6.09 THOUSAND/UL (ref 4.31–10.16)

## 2025-03-06 PROCEDURE — 99223 1ST HOSP IP/OBS HIGH 75: CPT | Performed by: INTERNAL MEDICINE

## 2025-03-06 PROCEDURE — 85027 COMPLETE CBC AUTOMATED: CPT | Performed by: FAMILY MEDICINE

## 2025-03-06 PROCEDURE — 5A1D70Z PERFORMANCE OF URINARY FILTRATION, INTERMITTENT, LESS THAN 6 HOURS PER DAY: ICD-10-PCS | Performed by: INTERNAL MEDICINE

## 2025-03-06 PROCEDURE — 80053 COMPREHEN METABOLIC PANEL: CPT | Performed by: FAMILY MEDICINE

## 2025-03-06 PROCEDURE — 83735 ASSAY OF MAGNESIUM: CPT | Performed by: FAMILY MEDICINE

## 2025-03-06 PROCEDURE — 84100 ASSAY OF PHOSPHORUS: CPT | Performed by: FAMILY MEDICINE

## 2025-03-06 PROCEDURE — 99232 SBSQ HOSP IP/OBS MODERATE 35: CPT

## 2025-03-06 RX ORDER — HYDRALAZINE HYDROCHLORIDE 25 MG/1
25 TABLET, FILM COATED ORAL DAILY
Status: DISCONTINUED | OUTPATIENT
Start: 2025-03-07 | End: 2025-03-08 | Stop reason: HOSPADM

## 2025-03-06 RX ORDER — LOSARTAN POTASSIUM 50 MG/1
100 TABLET ORAL ONCE
Status: COMPLETED | OUTPATIENT
Start: 2025-03-06 | End: 2025-03-06

## 2025-03-06 RX ORDER — HYDRALAZINE HYDROCHLORIDE 25 MG/1
25 TABLET, FILM COATED ORAL ONCE
Status: COMPLETED | OUTPATIENT
Start: 2025-03-06 | End: 2025-03-06

## 2025-03-06 RX ORDER — AMLODIPINE BESYLATE 5 MG/1
5 TABLET ORAL DAILY
Status: DISCONTINUED | OUTPATIENT
Start: 2025-03-07 | End: 2025-03-08 | Stop reason: HOSPADM

## 2025-03-06 RX ORDER — LOSARTAN POTASSIUM 50 MG/1
100 TABLET ORAL DAILY
Status: DISCONTINUED | OUTPATIENT
Start: 2025-03-07 | End: 2025-03-08 | Stop reason: HOSPADM

## 2025-03-06 RX ORDER — AMLODIPINE BESYLATE 5 MG/1
5 TABLET ORAL ONCE
Status: COMPLETED | OUTPATIENT
Start: 2025-03-06 | End: 2025-03-06

## 2025-03-06 RX ADMIN — CARVEDILOL 6.25 MG: 6.25 TABLET, FILM COATED ORAL at 13:36

## 2025-03-06 RX ADMIN — ISOSORBIDE MONONITRATE 120 MG: 60 TABLET, EXTENDED RELEASE ORAL at 13:36

## 2025-03-06 RX ADMIN — Medication 6 MG: at 21:05

## 2025-03-06 RX ADMIN — AMLODIPINE BESYLATE 5 MG: 5 TABLET ORAL at 13:36

## 2025-03-06 RX ADMIN — HYDRALAZINE HYDROCHLORIDE 25 MG: 25 TABLET ORAL at 13:36

## 2025-03-06 RX ADMIN — LOSARTAN POTASSIUM 100 MG: 50 TABLET, FILM COATED ORAL at 13:36

## 2025-03-06 RX ADMIN — FOLIC ACID 1 MG: 1 TABLET ORAL at 13:36

## 2025-03-06 RX ADMIN — SENNOSIDES AND DOCUSATE SODIUM 1 TABLET: 8.6; 5 TABLET ORAL at 13:36

## 2025-03-06 RX ADMIN — APIXABAN 2.5 MG: 2.5 TABLET, FILM COATED ORAL at 13:36

## 2025-03-06 RX ADMIN — ATORVASTATIN CALCIUM 20 MG: 20 TABLET, FILM COATED ORAL at 18:05

## 2025-03-06 RX ADMIN — APIXABAN 2.5 MG: 2.5 TABLET, FILM COATED ORAL at 21:05

## 2025-03-06 NOTE — PROGRESS NOTES
"Progress Note - Hospitalist   Name: Matthew Alvarez 70 y.o. male I MRN: [id number]  Unit/Bed#: -01 I Date of Admission: 3/5/2025   Date of Service: 3/6/2025 I Hospital Day: 1    Assessment & Plan  Acute hypoxemic respiratory failure (HCC)  Patient presents today (3/5) from dialysis center with reports of shortness of breath which began during the session  Patient noted associated \"twinge\" of chest pain which has since subsided  Patient denies lightheadedness, dizziness, changes in vision or hearing, loss of consciousness    In ED, patient desaturating into the mid 80s on room air requiring up to 3 L nasal cannula oxygen which is not his baseline  Plain film imaging revealed pulmonary edema  Index suspicion for pulmonary edema in the setting of incomplete HD session  Patient also with history of systolic congestive heart failure with EF 20%  It appears that his dry weight is 55 to 56 kg-presents at 59.9 kg    Plan:  Wean oxygen to baseline (off)  Treat underlying contributing factors-ESRD/HFrEF (see below)  Spot check O2  Low index suspicion for PE given systemic anticoagulation with compliance  In absence of SIRS criteria with low index suspicion for infectious infiltrate  COVID flu negative    Nonischemic cardiomyopathy (HCC)  Patient with noted extended hospitalization in late December 2024  Prior to that hospitalization, in the outpatient setting, gross noncompliance with medication regimen and recommendation for LifeVest  Since, has been maintained in skilled nursing facility with strict adherence to medication regimen however still refuses LifeVest  Maintained on Coreg, Imdur  Follows regularly with cardiology outpatient setting  Appears volume overloaded upon presentation with weight up from previous, pitting edema, pulmonary edema  Volume unload with HD/UF per nephrology recommendations  Nephrology consulted  Most recent echocardiogram reviewed (1/2025)-global hypokinesis with EF of 20%  Acute on " chronic systolic heart failure (HCC)  Wt Readings from Last 3 Encounters:   03/05/25 59.9 kg (132 lb 0.9 oz)   03/05/25 64.9 kg (143 lb 1.3 oz)   02/20/25 53.3 kg (117 lb 9.6 oz)   As above          ESRD (end stage renal disease) on dialysis (HCC)  Lab Results   Component Value Date    EGFR 9 03/06/2025    EGFR 12 03/05/2025    EGFR 9 03/05/2025    CREATININE 5.65 (H) 03/06/2025    CREATININE 4.56 (H) 03/05/2025    CREATININE 5.75 (H) 03/05/2025   Follows regularly with nephrology in the outpatient setting and undergoes HD Monday Wednesday Friday  Was unable to undergo dialysis prior to admission  Nephrology consulted-significantly above baseline weight, will attempt ultrafiltration today for 4 L, will likely attempt ultrafiltration again tomorrow  Primary hypertension  Elevated systolic blood pressure upon presentation  Multifactorial-missed medications today in addition volume overloaded  Anticipate improvement with UF  Reinitiate home regimen including Imdur-proceed with as needed intravenous hydralazine  Protein calorie malnutrition (HCC)  Malnutrition Findings:    Bitemporal wasting, superior clavicular muscle wasting    BMI Findings:           Body mass index is 21.31 kg/m².     Paroxysmal atrial fibrillation (HCC)  Rate controlled and systemically anticoagulated  Continue beta-blocker and Eliquis  Anemia due to chronic kidney disease, on chronic dialysis (HCC)  Lab Results   Component Value Date    EGFR 9 03/06/2025    EGFR 12 03/05/2025    EGFR 9 03/05/2025    CREATININE 5.65 (H) 03/06/2025    CREATININE 4.56 (H) 03/05/2025    CREATININE 5.75 (H) 03/05/2025   Hemoglobin today 8.4, baseline between 7 and 8  Obtain iron panel  Continue to monitor  Chronic kidney disease-mineral and bone disorder (CKD-MBD)  Lab Results   Component Value Date    EGFR 9 03/06/2025    EGFR 12 03/05/2025    EGFR 9 03/05/2025    CREATININE 5.65 (H) 03/06/2025    CREATININE 4.56 (H) 03/05/2025    CREATININE 5.75 (H) 03/05/2025        VTE Pharmacologic Prophylaxis:   Moderate Risk (Score 3-4) - Pharmacological DVT Prophylaxis Ordered: apixaban (Eliquis).    Mobility:   Basic Mobility Inpatient Raw Score: 13  JH-HLM Goal: 4: Move to chair/commode  JH-HLM Achieved: 6: Walk 10 steps or more  JH-HLM Goal achieved. Continue to encourage appropriate mobility.    Patient Centered Rounds: I performed bedside rounds with nursing staff today.   Discussions with Specialists or Other Care Team Provider: CM, nephro    Education and Discussions with Family / Patient: Updated  (wife) via phone.    Current Length of Stay: 1 day(s)  Current Patient Status: Inpatient   Certification Statement: The patient will continue to require additional inpatient hospital stay due to continued need for dialysis and PT/OT  Discharge Plan: Anticipate discharge in 24-48 hrs to discharge location to be determined pending rehab evaluations.    Code Status: Level 1 - Full Code    Subjective   Patient reports to be feeling improved from the days prior.  However still having shortness of breath.  He currently denies any chest pain/pressure, palpitations, lightheadedness, nausea, or chills.    Objective :  Temp:  [97.5 °F (36.4 °C)-98.6 °F (37 °C)] 97.9 °F (36.6 °C)  HR:  [76-94] 76  BP: (147-192)/() 158/77  Resp:  [18-24] 18  SpO2:  [86 %-99 %] 98 %  O2 Device: Nasal cannula  Nasal Cannula O2 Flow Rate (L/min):  [2 L/min-86 L/min] 2 L/min    Body mass index is 21.31 kg/m².     Input and Output Summary (last 24 hours):     Intake/Output Summary (Last 24 hours) at 3/6/2025 1158  Last data filed at 3/6/2025 0845  Gross per 24 hour   Intake 200 ml   Output --   Net 200 ml       Physical Exam  Vitals and nursing note reviewed.   Constitutional:       General: He is not in acute distress.     Appearance: He is normal weight. He is not ill-appearing, toxic-appearing or diaphoretic.   HENT:      Head: Normocephalic.      Nose: Nose normal.      Mouth/Throat:       Mouth: Mucous membranes are moist.      Pharynx: Oropharynx is clear.   Eyes:      General: No scleral icterus.     Conjunctiva/sclera: Conjunctivae normal.      Pupils: Pupils are equal, round, and reactive to light.   Cardiovascular:      Rate and Rhythm: Normal rate. Rhythm irregular.      Heart sounds: No murmur heard.     No friction rub. No gallop.   Pulmonary:      Effort: Pulmonary effort is normal. No respiratory distress.      Breath sounds: Normal breath sounds. No stridor. No wheezing, rhonchi or rales.   Abdominal:      General: Abdomen is flat.      Palpations: Abdomen is soft.   Musculoskeletal:         General: Normal range of motion.      Cervical back: Normal range of motion and neck supple.      Right lower leg: Edema (+1 pitting) present.      Left lower leg: Edema (+1 pitting) present.   Lymphadenopathy:      Cervical: No cervical adenopathy.   Skin:     General: Skin is warm.      Coloration: Skin is not jaundiced or pale.      Findings: No bruising, erythema or lesion.   Neurological:      General: No focal deficit present.      Mental Status: He is alert and oriented to person, place, and time. Mental status is at baseline.      Cranial Nerves: No cranial nerve deficit.      Motor: No weakness.   Psychiatric:         Mood and Affect: Mood normal.         Behavior: Behavior normal.         Thought Content: Thought content normal.           Lines/Drains:  Lines/Drains/Airways       Active Status       Name Placement date Placement time Site Days    HD Permanent Double Catheter 01/27/25  1441  Internal jugular  37                            Lab Results: I have reviewed the following results:   Results from last 7 days   Lab Units 03/06/25  0454 03/05/25  1647   WBC Thousand/uL 6.09 5.99   HEMOGLOBIN g/dL 8.4* 7.9*   HEMATOCRIT % 26.7* 25.2*   PLATELETS Thousands/uL 114* 106*   SEGS PCT %  --  73   LYMPHO PCT %  --  8*   MONO PCT %  --  8   EOS PCT %  --  9*     Results from last 7 days   Lab  Units 03/06/25  0454   SODIUM mmol/L 138   POTASSIUM mmol/L 3.8   CHLORIDE mmol/L 100   CO2 mmol/L 31   BUN mg/dL 39*   CREATININE mg/dL 5.65*   ANION GAP mmol/L 7   CALCIUM mg/dL 8.0*   ALBUMIN g/dL 2.6*   TOTAL BILIRUBIN mg/dL 0.79   ALK PHOS U/L 89   ALT U/L <3*   AST U/L 17   GLUCOSE RANDOM mg/dL 103                 Results from last 7 days   Lab Units 03/05/25  0253   LACTIC ACID mmol/L 1.2       Recent Cultures (last 7 days):   Results from last 7 days   Lab Units 03/05/25  0253   BLOOD CULTURE  No Growth at 24 hrs.  No Growth at 24 hrs.       Imaging Results Review: I reviewed radiology reports from this admission including: chest xray.  Other Study Results Review: No additional pertinent studies reviewed.    Last 24 Hours Medication List:     Current Facility-Administered Medications:     [START ON 3/7/2025] amLODIPine (NORVASC) tablet 5 mg, Daily    apixaban (ELIQUIS) tablet 2.5 mg, BID    atorvastatin (LIPITOR) tablet 20 mg, Daily With Dinner    carvedilol (COREG) tablet 6.25 mg, BID With Meals    ferrous sulfate tablet 325 mg, Every Other Day    folic acid (FOLVITE) tablet 1 mg, Daily    hydrALAZINE (APRESOLINE) injection 10 mg, Q6H PRN    [START ON 3/7/2025] hydrALAZINE (APRESOLINE) tablet 25 mg, Daily    isosorbide mononitrate (IMDUR) 24 hr tablet 120 mg, Daily    [START ON 3/7/2025] losartan (COZAAR) tablet 100 mg, Daily    melatonin tablet 6 mg, HS    senna-docusate sodium (SENOKOT S) 8.6-50 mg per tablet 1 tablet, BID    Administrative Statements   Today, Patient Was Seen By: Per Wang PA-C  I have spent a total time of 35 minutes in caring for this patient on the day of the visit/encounter including Documenting in the medical record, Reviewing/placing orders in the medical record (including tests, medications, and/or procedures), Obtaining or reviewing history  , and Communicating with other healthcare professionals .    **Please Note: This note may have been constructed using a voice  recognition system.**

## 2025-03-06 NOTE — ASSESSMENT & PLAN NOTE
MARYCRUZ Woods: DAVIDF  Access: Left TDC  Target weight: 52 kg currently well above but bed weight  HD today ultrafilter 4 L as possible  - Plan for HD/UF in a.m.

## 2025-03-06 NOTE — NURSING NOTE
Pt becoming increasingly restless and mildly confused. Able to follow commands but refusing to, trying to climb out of bed and violate the bed alarm. Yelling at staff and using profanities. Attempted to reorient patient to environment and situation. Bed check in place. Monitoring continues.

## 2025-03-06 NOTE — CONSULTS
Consultation - Nephrology   Name: Matthew Alvarez 70 y.o. male I MRN: [id number]  Unit/Bed#: -01 I Date of Admission: 3/5/2025   Date of Service: 3/6/2025 I Hospital Day: 1   Inpatient consult to Nephrology  Consult performed by: Kendall Guerra MD  Consult ordered by: Chris Nolasco DO        Physician Requesting Evaluation: Chris Nolasco DO   Reason for Evaluation / Principal Problem: ESRD on HD    Assessment & Plan  ESRD (end stage renal disease) on dialysis (HCC)  [facility] [place]: MWF  Access: Left TDC  Target weight: 52 kg currently well above but bed weight  HD today ultrafilter 4 L as possible  - Plan for HD/UF in a.m.  Primary hypertension  Elevated: On amlodipine 5 mg daily/carvedilol 6.25 mg twice a day/torsemide 100 mg daily?  Lopressor 25 every 12 hours/hydralazine 25 mg in the morning/isosorbide 120 mg daily/valsartan 160 mg daily  - Monitor as we challenge target weight  - Continue above meds but no Lopressor that is redundant  Chronic kidney disease-mineral and bone disorder (CKD-MBD)  Will check magnesium and phosphorus  Currently on no binders and no restrictions may have to adjust accordingly  Anemia due to chronic kidney disease, on chronic dialysis (HCC)  Current hemoglobin 8.4 slightly better  Monitor for now on Mircera as an outpatient  Acute hypoxemic respiratory failure (HCC)  -Related to volume overload please see above  - Plan for HD/UF in a.m.  Nonischemic cardiomyopathy (HCC)  EF 20% refusing LifeVest per cardiology  Paroxysmal atrial fibrillation (HCC)  Per primary service  - On beta-blocker and Eliquis      I have reviewed the nephrology recommendations including HD/UF, with SLIM, and we are in agreement with renal plan including the information outlined above.     History of Present Illness   Matthew Alvarez is a 70 y.o. male : With PMH: Cardiomyopathy EF 20%/ESRD on HD/atrial fibrillation/hypertension/refusing LifeVest who was admitted to [facility] after presenting with  "\"twinge of chest pain lasting 30 seconds \"and some shortness of breath with a productive cough and hypoxemia chest x-ray revealing acute pulmonary edema. A renal consultation is requested today for assistance in the management of ESRD on HD    Maintained on HD 3 days a week Monday Wednesday and Friday but is cut treatment short including yesterday.  Noted lower extremity edema.    Review of Systems  General: No fevers or chills  Skin:  No acute rash but chronic changes of lower extremities which he has said has been there for a while  GI:  No nausea vomiting or diarrhea.  No abdominal pain.  :  Please see HPI  Cardiac: Please see HPI, had 30 seconds of chest pain according to him midline no radiation, some shortness of breath  Pulmonary: Complaining of productive cough for the last few days yellow sputum  Neuro:  No headaches    The rest review of systems is otherwise completely reviewed with the patient are negative    Historical Information   Past Medical History:   Diagnosis Date    Acute hemodialysis patient ([facility]) 12/14/2024    CAD (coronary artery disease)     HFrEF (heart failure with reduced ejection fraction) ([facility]) 09/2021    Hypertension     Rheumatoid factor positive 09/2021    Stage 3 chronic kidney disease ([facility])     Tobacco abuse      Past Surgical History:   Procedure Laterality Date    APPENDECTOMY  1965    CARDIAC CATHETERIZATION  9/16/2021    INGUINAL HERNIA REPAIR Right 1991    IR TEMPORARY DIALYSIS CATHETER PLACEMENT  1/22/2025    IR THORACENTESIS  12/18/2024    IR THORACENTESIS  12/23/2024    IR TUNNELED DIALYSIS CATHETER PLACEMENT  11/14/2024    IR TUNNELED DIALYSIS CATHETER PLACEMENT  1/27/2025     Social History     Tobacco Use    Smoking status: Every Day     Current packs/day: 0.50     Average packs/day: 0.5 packs/day for 51.2 years (25.6 ttl pk-yrs)     Types: Cigarettes     Start date: 1974    Smokeless tobacco: Never    Tobacco comments:     Began smoking in his early 20s, on average " smoking 1+ packs daily. Quit in 08/2021 (Updated 09/15/2021).    Vaping Use    Vaping status: Never Used   Substance and Sexual Activity    Alcohol use: Yes     Alcohol/week: 6.0 standard drinks of alcohol     Types: 6 Shots of liquor per week     Comment: rare    Drug use: Never    Sexual activity: Not on file     Comment: defer     E-Cigarette/Vaping    E-Cigarette Use Never User      E-Cigarette/Vaping Substances     Family history non-contributory  Social History     Tobacco Use    Smoking status: Every Day     Current packs/day: 0.50     Average packs/day: 0.5 packs/day for 51.2 years (25.6 ttl pk-yrs)     Types: Cigarettes     Start date: 1974    Smokeless tobacco: Never    Tobacco comments:     Began smoking in his early 20s, on average smoking 1+ packs daily. Quit in 08/2021 (Updated 09/15/2021).    Vaping Use    Vaping status: Never Used   Substance and Sexual Activity    Alcohol use: Yes     Alcohol/week: 6.0 standard drinks of alcohol     Types: 6 Shots of liquor per week     Comment: rare    Drug use: Never    Sexual activity: Not on file     Comment: defer       Current Facility-Administered Medications:     amLODIPine (NORVASC) tablet 5 mg, Daily    apixaban (ELIQUIS) tablet 2.5 mg, BID    atorvastatin (LIPITOR) tablet 20 mg, Daily With Dinner    carvedilol (COREG) tablet 6.25 mg, BID With Meals    ferrous sulfate tablet 325 mg, Every Other Day    folic acid (FOLVITE) tablet 1 mg, Daily    hydrALAZINE (APRESOLINE) injection 10 mg, Q6H PRN    hydrALAZINE (APRESOLINE) tablet 25 mg, Daily    isosorbide mononitrate (IMDUR) 24 hr tablet 120 mg, Daily    losartan (COZAAR) tablet 100 mg, Daily    melatonin tablet 6 mg, HS    senna-docusate sodium (SENOKOT S) 8.6-50 mg per tablet 1 tablet, BID  Prior to Admission Medications   Prescriptions Last Dose Informant Patient Reported? Taking?   amLODIPine (NORVASC) 5 mg tablet 3/4/2025 Morning  No Yes   Sig: Take 1 tablet (5 mg total) by mouth daily   apixaban  (Eliquis) 2.5 mg 3/4/2025 Evening  Yes Yes   Sig: Take 2.5 mg by mouth 2 (two) times a day   atorvastatin (LIPITOR) 20 mg tablet 3/4/2025 Morning  No Yes   Sig: Take 1 tablet (20 mg total) by mouth daily with dinner   carvedilol (COREG) 6.25 mg tablet 3/4/2025 Evening  Yes Yes   Sig: Take 6.25 mg by mouth 2 (two) times a day with meals   ferrous sulfate 325 (65 Fe) mg tablet 3/4/2025  No Yes   Sig: Take 1 tablet (325 mg total) by mouth every other day   folic acid (FOLVITE) 1 mg tablet 3/4/2025  No Yes   Sig: Take 1 tablet (1 mg total) by mouth daily   hydrALAZINE (APRESOLINE) 25 mg tablet 3/4/2025 Morning  Yes Yes   Sig: Take 25 mg by mouth in the morning   isosorbide mononitrate (IMDUR) 120 mg 24 hr tablet 3/4/2025 Morning  No Yes   Sig: TAKE 1 TABLET BY MOUTH EVERY DAY   melatonin 3 mg 3/4/2025 Bedtime  No Yes   Sig: Take 2 tablets (6 mg total) by mouth daily at bedtime   metoprolol tartrate (LOPRESSOR) 25 mg tablet 3/4/2025 Evening  No Yes   Sig: Take 1 tablet (25 mg total) by mouth every 12 (twelve) hours   senna-docusate sodium (SENOKOT S) 8.6-50 mg per tablet 3/4/2025 Evening  Yes Yes   Sig: Take 1 tablet by mouth 2 (two) times a day   torsemide (DEMADEX) 100 mg tablet 3/4/2025  Yes Yes   Sig: Take 100 mg by mouth daily   valsartan (DIOVAN) 160 mg tablet 3/4/2025 Morning  Yes Yes   Sig: Take 80 mg by mouth daily      Facility-Administered Medications: None     Patient has no known allergies.    Objective :  Temp:  [97.5 °F (36.4 °C)-98.6 °F (37 °C)] 97.9 °F (36.6 °C)  HR:  [76-94] 76  BP: (147-192)/() 158/77  Resp:  [18-24] 18  SpO2:  [86 %-99 %] 98 %  O2 Device: Nasal cannula  Nasal Cannula O2 Flow Rate (L/min):  [2 L/min-86 L/min] 2 L/min    Current Weight: Weight - Scale: 59.9 kg (132 lb 0.9 oz)  First Weight: Weight - Scale: 63.2 kg (139 lb 5.3 oz)  I/O         03/04 0701  03/05 0700 03/05 0701  03/06 0700 03/06 0701  03/07 0700    I.V. (mL/kg)   200 (3.3)    Total Intake(mL/kg)   200 (3.3)    Net    +200           Unmeasured Urine Occurrence  1 x           Physical Exam  General: Well-developed well-nourished, no acute distress  Skin: Scaly appearance of lower extremities  Eyes: No scleral icterus and noninjected  ENT: Normocephalic/atraumatic moist mucous membranes  Neck:  Supple, no jugular venous distention, trachea midline, overall appearance is normal; no  carotid bruits, 1+ carotid upstroke  Back: No CVA tenderness   Chest: Decreased breath sounds at the left base and mild dullness to percussion; slight rhonchi at both bases good respiratory effort no use of accessory respiratory muscles  CVS:  Regular rate and rhythm, without a rub or gallops or murmurs, normal S3 and S4  Abdomen:  Normal bowel sounds, soft and nontender and nondistended; no overt hepatosplenomegaly or bruits appreciable  Extremities: No clubbing or cyanosis, 1-2+ lower extremity edema to the knees slight in the thighs with a scaly changes as noted above poor distal pulses, no arthritic changes  Neuro:  No gross focality  Psych:  Alert and oriented and appropriate   Medications:    Current Facility-Administered Medications:     amLODIPine (NORVASC) tablet 5 mg, 5 mg, Oral, Daily, Chris Nolasco DO    apixaban (ELIQUIS) tablet 2.5 mg, 2.5 mg, Oral, BID, Chris Nolasco DO, 2.5 mg at 03/05/25 1902    atorvastatin (LIPITOR) tablet 20 mg, 20 mg, Oral, Daily With Dinner, Chris Nolasco DO, 20 mg at 03/05/25 1902    carvedilol (COREG) tablet 6.25 mg, 6.25 mg, Oral, BID With Meals, Chris Nolasco DO, 6.25 mg at 03/05/25 1901    ferrous sulfate tablet 325 mg, 325 mg, Oral, Every Other Day, Chris Nolasco DO, 325 mg at 03/05/25 1902    folic acid (FOLVITE) tablet 1 mg, 1 mg, Oral, Daily, Chris Nolasco DO    hydrALAZINE (APRESOLINE) injection 10 mg, 10 mg, Intravenous, Q6H PRN, Chris Nolasco DO    hydrALAZINE (APRESOLINE) tablet 25 mg, 25 mg, Oral, Daily, Chris Nolasco DO, 25 mg at 03/05/25  "2052    isosorbide mononitrate (IMDUR) 24 hr tablet 120 mg, 120 mg, Oral, Daily, Chris Nolasco DO    losartan (COZAAR) tablet 100 mg, 100 mg, Oral, Daily, Chris Nolasco DO    melatonin tablet 6 mg, 6 mg, Oral, HS, Chris Nolasco DO, 6 mg at 03/05/25 2053    senna-docusate sodium (SENOKOT S) 8.6-50 mg per tablet 1 tablet, 1 tablet, Oral, BID, Chris Nolasco DO      Lab Results: I have reviewed the following results:  Results from last 7 days   Lab Units 03/06/25  0454 03/05/25  1647 03/05/25  0253   WBC Thousand/uL 6.09 5.99 5.74   HEMOGLOBIN g/dL 8.4* 7.9* 7.8*   HEMATOCRIT % 26.7* 25.2* 25.3*   PLATELETS Thousands/uL 114* 106* 94*   POTASSIUM mmol/L 3.8 3.7 3.8   CHLORIDE mmol/L 100 99 100   CO2 mmol/L 31 33* 31   BUN mg/dL 39* 34* 44*   CREATININE mg/dL 5.65* 4.56* 5.75*   CALCIUM mg/dL 8.0* 7.7* 7.7*   MAGNESIUM mg/dL 1.8* 1.7*  --    PHOSPHORUS mg/dL 3.5 2.7  --    ALBUMIN g/dL 2.6* 2.4* 2.5*       Administrative Statements     Portions of the record may have been created with voice recognition software. Occasional wrong word or \"sound a like\" substitutions may have occurred due to the inherent limitations of voice recognition software. Read the chart carefully and recognize, using context, where substitutions have occurred.If you have any questions, please contact the dictating provider.  "

## 2025-03-06 NOTE — PLAN OF CARE
Target UF Goal  3-4  L as tolerated. Patient dialyzing for 4 hours on 4 K bath for serum K of  3.8  per protocol. Treatment plan reviewed with Nephrology.      Problem: METABOLIC, FLUID AND ELECTROLYTES - ADULT  Goal: Electrolytes maintained within normal limits  Description: INTERVENTIONS:  - Monitor labs and assess patient for signs and symptoms of electrolyte imbalances  - Administer electrolyte replacement as ordered  - Monitor response to electrolyte replacements, including repeat lab results as appropriate  - Instruct patient on fluid and nutrition as appropriate  Outcome: Progressing     Problem: METABOLIC, FLUID AND ELECTROLYTES - ADULT  Goal: Fluid balance maintained  Description: INTERVENTIONS:  - Monitor labs   - Monitor I/O and WT  - Instruct patient on fluid and nutrition as appropriate  - Assess for signs & symptoms of volume excess or deficit  Outcome: Progressing

## 2025-03-06 NOTE — ASSESSMENT & PLAN NOTE
Lab Results   Component Value Date    EGFR 9 03/06/2025    EGFR 12 03/05/2025    EGFR 9 03/05/2025    CREATININE 5.65 (H) 03/06/2025    CREATININE 4.56 (H) 03/05/2025    CREATININE 5.75 (H) 03/05/2025

## 2025-03-06 NOTE — ASSESSMENT & PLAN NOTE
Lab Results   Component Value Date    EGFR 9 03/06/2025    EGFR 12 03/05/2025    EGFR 9 03/05/2025    CREATININE 5.65 (H) 03/06/2025    CREATININE 4.56 (H) 03/05/2025    CREATININE 5.75 (H) 03/05/2025   Follows regularly with nephrology in the outpatient setting and undergoes HD Monday Wednesday Friday  Was unable to undergo dialysis prior to admission  Nephrology consulted-significantly above baseline weight, will attempt ultrafiltration today for 4 L, will likely attempt ultrafiltration again tomorrow

## 2025-03-06 NOTE — ASSESSMENT & PLAN NOTE
Lab Results   Component Value Date    EGFR 9 03/06/2025    EGFR 12 03/05/2025    EGFR 9 03/05/2025    CREATININE 5.65 (H) 03/06/2025    CREATININE 4.56 (H) 03/05/2025    CREATININE 5.75 (H) 03/05/2025   Hemoglobin today 8.4, baseline between 7 and 8  Obtain iron panel  Continue to monitor

## 2025-03-06 NOTE — HEMODIALYSIS
Post-Dialysis RN Treatment Note    Blood Pressure:  Pre 184/74 mm/Hg  Post 168/71 mmHg   EDW:  52 kg    Weight:  Pre 61 kg bed scale.  Post 56.1 kg Standing scale   Mode of weight measurement: Standing Scale   Volume Removed:  4000 ml    Treatment duration: 240 minutes    NS given:  No    Treatment shortened No   Medications given during Rx: None Reported   Estimated Kt/V:  1.39   Access type: Permacath/TDC   Needle Gauge:  n/a   Access Issues: No    Report called to primary nurse:   Yes Carl Patrick RN

## 2025-03-06 NOTE — ASSESSMENT & PLAN NOTE
"Patient presents today (3/5) from dialysis center with reports of shortness of breath which began during the session  Patient noted associated \"twinge\" of chest pain which has since subsided  Patient denies lightheadedness, dizziness, changes in vision or hearing, loss of consciousness    In ED, patient desaturating into the mid 80s on room air requiring up to 3 L nasal cannula oxygen which is not his baseline  Plain film imaging revealed pulmonary edema  Index suspicion for pulmonary edema in the setting of incomplete HD session  Patient also with history of systolic congestive heart failure with EF 20%  It appears that his dry weight is 55 to 56 kg-presents at 59.9 kg    Plan:  Wean oxygen to baseline (off)  Treat underlying contributing factors-ESRD/HFrEF (see below)  Spot check O2  Low index suspicion for PE given systemic anticoagulation with compliance  In absence of SIRS criteria with low index suspicion for infectious infiltrate  COVID flu negative    "

## 2025-03-06 NOTE — PLAN OF CARE
Problem: Potential for Falls  Goal: Patient will remain free of falls  Description: INTERVENTIONS:  - Educate patient/family on patient safety including physical limitations  - Instruct patient to call for assistance with activity   - Consult OT/PT to assist with strengthening/mobility   - Keep Call bell within reach  - Keep bed low and locked with side rails adjusted as appropriate  - Keep care items and personal belongings within reach  - Initiate and maintain comfort rounds  - Make Fall Risk Sign visible to staff  - Offer Toileting every 2 Hours, in advance of need  - Initiate/Maintain alarm  - Obtain necessary fall risk management equipment  - Apply yellow socks and bracelet for high fall risk patients  - Consider moving patient to room near nurses station  Outcome: Progressing     Problem: MOBILITY - ADULT  Goal: Maintain or return to baseline ADL function  Description: INTERVENTIONS:  -  Assess patient's ability to carry out ADLs; assess patient's baseline for ADL function and identify physical deficits which impact ability to perform ADLs (bathing, care of mouth/teeth, toileting, grooming, dressing, etc.)  - Assess/evaluate cause of self-care deficits   - Assess range of motion  - Assess patient's mobility; develop plan if impaired  - Assess patient's need for assistive devices and provide as appropriate  - Encourage maximum independence but intervene and supervise when necessary  - Involve family in performance of ADLs  - Assess for home care needs following discharge   - Consider OT consult to assist with ADL evaluation and planning for discharge  - Provide patient education as appropriate  Outcome: Progressing  Goal: Maintains/Returns to pre admission functional level  Description: INTERVENTIONS:  - Perform AM-PAC 6 Click Basic Mobility/ Daily Activity assessment daily.  - Set and communicate daily mobility goal to care team and patient/family/caregiver.   - Collaborate with rehabilitation services on  mobility goals if consulted  - Perform Range of Motion - times a day.  - Reposition patient every - hours.  - Dangle patient - times a day  - Stand patient - times a day  - Ambulate patient - times a day  - Out of bed to chair - times a day   - Out of bed for meals - times a day  - Out of bed for toileting  - Record patient progress and toleration of activity level   Outcome: Progressing     Problem: PAIN - ADULT  Goal: Verbalizes/displays adequate comfort level or baseline comfort level  Description: Interventions:  - Encourage patient to monitor pain and request assistance  - Assess pain using appropriate pain scale  - Administer analgesics based on type and severity of pain and evaluate response  - Implement non-pharmacological measures as appropriate and evaluate response  - Consider cultural and social influences on pain and pain management  - Notify physician/advanced practitioner if interventions unsuccessful or patient reports new pain  Outcome: Progressing     Problem: INFECTION - ADULT  Goal: Absence or prevention of progression during hospitalization  Description: INTERVENTIONS:  - Assess and monitor for signs and symptoms of infection  - Monitor lab/diagnostic results  - Monitor all insertion sites, i.e. indwelling lines, tubes, and drains  - Monitor endotracheal if appropriate and nasal secretions for changes in amount and color  - [place] appropriate cooling/warming therapies per order  - Administer medications as ordered  - Instruct and encourage patient and family to use good hand hygiene technique  - Identify and instruct in appropriate isolation precautions for identified infection/condition  Outcome: Progressing     Problem: SAFETY ADULT  Goal: Patient will remain free of falls  Description: INTERVENTIONS:  - Educate patient/family on patient safety including physical limitations  - Instruct patient to call for assistance with activity   - Consult OT/PT to assist with strengthening/mobility   -  Keep Call bell within reach  - Keep bed low and locked with side rails adjusted as appropriate  - Keep care items and personal belongings within reach  - Initiate and maintain comfort rounds  - Make Fall Risk Sign visible to staff  - Offer Toileting every 2 Hours, in advance of need  - Initiate/Maintain alarm  - Obtain necessary fall risk management equipment  - Apply yellow socks and bracelet for high fall risk patients  - Consider moving patient to room near nurses station  Outcome: Progressing  Goal: Maintain or return to baseline ADL function  Description: INTERVENTIONS:  -  Assess patient's ability to carry out ADLs; assess patient's baseline for ADL function and identify physical deficits which impact ability to perform ADLs (bathing, care of mouth/teeth, toileting, grooming, dressing, etc.)  - Assess/evaluate cause of self-care deficits   - Assess range of motion  - Assess patient's mobility; develop plan if impaired  - Assess patient's need for assistive devices and provide as appropriate  - Encourage maximum independence but intervene and supervise when necessary  - Involve family in performance of ADLs  - Assess for home care needs following discharge   - Consider OT consult to assist with ADL evaluation and planning for discharge  - Provide patient education as appropriate  Outcome: Progressing  Goal: Maintains/Returns to pre admission functional level  Description: INTERVENTIONS:  - Perform AM-PAC 6 Click Basic Mobility/ Daily Activity assessment daily.  - Set and communicate daily mobility goal to care team and patient/family/caregiver.   - Collaborate with rehabilitation services on mobility goals if consulted  - Perform Range of Motion - times a day.  - Reposition patient every - hours.  - Dangle patient - times a day  - Stand patient - times a day  - Ambulate patient - times a day  - Out of bed to chair - times a day   - Out of bed for meals - times a day  - Out of bed for toileting  - Record patient  progress and toleration of activity level   Outcome: Progressing     Problem: DISCHARGE PLANNING  Goal: Discharge to home or other facility with appropriate resources  Description: INTERVENTIONS:  - Identify barriers to discharge w/patient and caregiver  - Arrange for needed discharge resources and transportation as appropriate  - Identify discharge learning needs (meds, wound care, etc.)  - Arrange for interpretive services to assist at discharge as needed  - Refer to Case Management Department for coordinating discharge planning if the patient needs post-hospital services based on physician/advanced practitioner order or complex needs related to functional status, cognitive ability, or social support system  Outcome: Progressing     Problem: Knowledge Deficit  Goal: Patient/family/caregiver demonstrates understanding of disease process, treatment plan, medications, and discharge instructions  Description: Complete learning assessment and assess knowledge base.  Interventions:  - Provide teaching at level of understanding  - Provide teaching via preferred learning methods  Outcome: Progressing     Problem: RESPIRATORY - ADULT  Goal: Achieves optimal ventilation and oxygenation  Description: INTERVENTIONS:  - Assess for changes in respiratory status  - Assess for changes in mentation and behavior  - Position to facilitate oxygenation and minimize respiratory effort  - Oxygen administered by appropriate delivery if ordered  - Initiate smoking cessation education as indicated  - Encourage broncho-pulmonary hygiene including cough, deep breathe, Incentive Spirometry  - Assess the need for suctioning and aspirate as needed  - Assess and instruct to report SOB or any respiratory difficulty  - Respiratory Therapy support as indicated  Outcome: Progressing     Problem: CARDIOVASCULAR - ADULT  Goal: Maintains optimal cardiac output and hemodynamic stability  Description: INTERVENTIONS:  - Monitor I/O, vital signs and  rhythm  - Monitor for S/S and trends of decreased cardiac output  - Administer and titrate ordered vasoactive medications to optimize hemodynamic stability  - Assess quality of pulses, skin color and temperature  - Assess for signs of decreased coronary artery perfusion  - Instruct patient to report change in severity of symptoms  Outcome: Progressing     Problem: Nutrition/Hydration-ADULT  Goal: Nutrient/Hydration intake appropriate for improving, restoring or maintaining nutritional needs  Description: Monitor and assess patient's nutrition/hydration status for malnutrition. Collaborate with interdisciplinary team and initiate plan and interventions as ordered.  Monitor patient's weight and dietary intake as ordered or per policy. Utilize nutrition screening tool and intervene as necessary. Determine patient's food preferences and provide high-protein, high-caloric foods as appropriate.     INTERVENTIONS:  - Monitor oral intake, urinary output, labs, and treatment plans  - Assess nutrition and hydration status and recommend course of action  - Evaluate amount of meals eaten  - Assist patient with eating if necessary   - Allow adequate time for meals  - Recommend/ encourage appropriate diets, oral nutritional supplements, and vitamin/mineral supplements  - Order, calculate, and assess calorie counts as needed  - Recommend, monitor, and adjust tube feedings and TPN/PPN based on assessed needs  - Assess need for intravenous fluids  - Provide specific nutrition/hydration education as appropriate  - Include patient/family/caregiver in decisions related to nutrition  Outcome: Progressing

## 2025-03-06 NOTE — UTILIZATION REVIEW
"Initial Clinical Review    WAS OBSERVATION 3/5/25 @ 1754 CONVERTED TO INPATIENT ADMISSION 3/5/25 @ 1900 DUE TO CONTINUED STAY REQUIRED TO CARE FOR PATIENT WITH DX: HYPOXEMIA.     Admission: Date/Time/Statement:   Admission Orders (From admission, onward)       Ordered        03/05/25 1900  INPATIENT ADMISSION  Once            03/05/25 1754  Place in Observation  Once                          Orders Placed This Encounter   Procedures    INPATIENT ADMISSION     Standing Status:   Standing     Number of Occurrences:   1     Level of Care:   Med Surg [16]     Estimated length of stay:   More than 2 Midnights     Certification:   I certify that inpatient services are medically necessary for this patient for a duration of greater than two midnights. See H&P and MD Progress Notes for additional information about the patient's course of treatment.     ED Arrival Information       Expected   -    Arrival   3/5/2025 16:35    Acuity   Urgent              Means of arrival   Ambulance    Escorted by   [facility]    Service   Hospitalist    Admission type   Emergency              Arrival complaint   -             Chief Complaint   Patient presents with    Chest Pain     Chest pain for 30 seconds when having HD today. Was seen last night in this ER for fevers. Pt had a 11.3 kg weight gain and only received 1hr of HD.       Initial Presentation: 70 y.o. male to ED via EMS from HD  Presents from hemodialysis with complaints of shortness of breath and a \"twinge\" of chest pain (unable to complete HD session today)  In regard to chest complaint, nonischemic EKG without elevation of cardiac enzymes. However, patient with noted hypoxia upon presentation with saturation in the mid 80s on room air. Required up to 3 L nasal cannula oxygen for adequate saturation.   PMHX cardiomyopathy, most recent ejection fraction 20%, refusal to wear LifeVest, atrial fibrillation, systemically anticoagulated, hypertension;  ESRD/HFrEF (HD " M-W-F)  Admitted to [facility] with DX: Acute hypoxemic respiratory failure   on exam: hypertensive; tachypnea; wt gain; B/L LE edema; lungs with rales; Heme 7.9; Cr 4.56; Ca 7.7; albumin 2.4  CT pulmonary edema.   PLAN: continuous pulse ox; titrate O2; fluid / Na restriction; monitor labs; nephrology consult - plan HD tomorrow 3/6      Anticipated Length of Stay/Certification Statement: Patient will be admitted on an inpatient basis with an anticipated length of stay of greater than 2 midnights secondary to acute hypoxemic respiratory failure with volume overload.       Date: 3/6/25      Day 2   Patient reports to be feeling improved from the days prior. However still having shortness of breath.   Plan: HD today; spot check O2; titrate O2; fluid / Na restriction; monitor labs      NEPHROLOGY CONSULT   ESRD (end stage renal disease) on dialysis: [facility]: MWF. Access: Left TDC. Target weight: 52 kg currently well above but bed weight. HD today ultrafilter 4 L as possible.   Plan for HD/UF in a.m.  Primary hypertension: Elevated: On amlodipine 5 mg daily/carvedilol 6.25 mg twice a day/torsemide 100 mg daily?  Lopressor 25 every 12 hours/hydralazine 25 mg in the morning/isosorbide 120 mg daily/valsartan 160 mg daily  Plan: Monitor as we challenge target weight. Continue above meds but no Lopressor that is redundant      Date: 3/7/25     Day 3: Has surpassed a 2nd midnight with active treatments and services. Require additional inpatient hospital stay due to plan for HD today, pending safe dispo , PT/OT eval   No acute events overnight. States he is feeling well. Now on room air with adequate saturations. PT/ OT recom rehab. Exam: Mg 1.7; I/O net -3800; lungs with rales; heme 8.7; Na 134; Cr 3.77; Ca 8.0  david: recd Mg iv x1;  Plan for HD/UF today ;spot check O2; fluid / Na restriction; monitor labs; CM consulted - placement         Scheduled Medications:  [START ON 3/7/2025] amLODIPine, 5 mg, Oral, Daily  apixaban, 2.5  mg, Oral, BID  atorvastatin, 20 mg, Oral, Daily With Dinner  carvedilol, 6.25 mg, Oral, BID With Meals  ferrous sulfate, 325 mg, Oral, Every Other Day  folic acid, 1 mg, Oral, Daily  [START ON 3/7/2025] hydrALAZINE, 25 mg, Oral, Daily  isosorbide mononitrate, 120 mg, Oral, Daily  [START ON 3/7/2025] losartan, 100 mg, Oral, Daily  melatonin, 6 mg, Oral, HS  senna-docusate sodium, 1 tablet, Oral, BID    magnesium sulfate 2 g/50 mL IVPB (premix) 2 g  Dose: 2 g  Freq: Once Route: IV  Last Dose: 2 g (03/07/25 0852)  Start: 03/07/25 0800 End: 03/07/25 1052      Continuous IV Infusions:  None       PRN Meds:  hydrALAZINE, 10 mg, Intravenous, Q6H PRN      ED Triage Vitals [03/05/25 1638]   Temperature Pulse Respirations Blood Pressure SpO2 Pain Score   98.6 °F (37 °C) 94 18 (!) 192/88 93 % No Pain     Weight (last 2 days)       Date/Time Weight    03/05/25 1815 59.9 (132.06)    03/05/25 1638 63.2 (139.33)            Vital Signs (last 3 days)       Date/Time Temp Pulse Resp BP MAP (mmHg) SpO2 Calculated FIO2 (%) - Nasal Cannula Nasal Cannula O2 Flow Rate (L/min) O2 Device Patient Position - Orthostatic VS [place] Coma Scale Score Pain    03/07/25 0915 -- -- -- 136/98 -- -- -- -- -- -- -- --    03/07/25 09:10:59 -- 87 -- 134/108 117 96 % -- -- -- -- -- --    03/07/25 0904 -- -- -- -- -- -- -- -- -- -- -- No Pain    03/07/25 0740 -- -- -- -- -- -- -- -- None (Room air) -- -- --    03/07/25 07:21:10 97.3 °F (36.3 °C) 85 17 162/83 109 94 % -- -- -- -- -- --    03/07/25 0200 -- -- -- -- -- 90 % 28 2 L/min Nasal cannula -- 15 --    03/07/25 0100 -- -- -- -- -- -- -- -- -- -- -- No Pain    03/06/25 21:42:37 97.5 °F (36.4 °C) 84 18 164/66 99 94 % -- -- -- -- -- --    03/06/25 1700 -- -- -- -- -- -- -- -- -- -- -- No Pain    03/06/25 1530 -- -- -- -- -- -- 28 2 L/min Nasal cannula -- 15 No Pain    03/06/25 15:02:02 97.7 °F (36.5 °C) 88 18 99/52 68 92 % -- -- -- -- -- --    03/06/25 13:18:41 -- 91 -- 129/67 88 96 % -- -- -- -- -- --     03/06/25 1250 -- 83 18 168/71 103 93 % -- -- -- Lying -- --    03/06/25 1230 -- 84 18 131/66 88 94 % -- -- -- -- -- --    03/06/25 1200 -- 86 18 110/56 74 94 % -- -- -- -- -- --    03/06/25 1130 -- 75 18 159/79 106 99 % -- -- -- -- -- --    03/06/25 1100 -- 76 18 158/77 104 98 % -- -- -- -- -- --    03/06/25 1030 -- 78 18 159/76 104 99 % 28 2 L/min Nasal cannula -- -- --    03/06/25 1005 -- 78 18 163/79 107 -- -- -- -- -- -- --    03/06/25 0935 -- 82 18 161/81 108 99 % -- -- -- -- -- --    03/06/25 0900 -- 83 18 171/80 110 99 % -- -- -- -- -- --    03/06/25 0840 -- 83 18 184/74 111 97 % 28 2 L/min Nasal cannula Lying -- --    03/05/25 21:33:22 97.9 °F (36.6 °C) 87 -- 149/110 123 88 % -- -- -- -- -- --    03/05/25 21:32:01 97.9 °F (36.6 °C) 88 -- 147/112 124 89 % -- -- -- -- -- --    03/05/25 2056 -- -- -- -- -- 90 % 28 2 L/min Nasal cannula -- -- --    03/05/25 2052 97.9 °F (36.6 °C) 89 18 147/55 86 -- -- -- -- Lying -- No Pain    03/05/25 2005 -- -- -- -- -- 90 % 28 2 L/min Nasal cannula -- -- No Pain    03/05/25 18:22:29 97.5 °F (36.4 °C) 88 19 161/63 96 94 % -- -- -- -- -- --    03/05/25 1822 -- -- -- -- -- -- -- -- -- -- -- No Pain    03/05/25 1800 -- 89 24 178/78 120 96 % -- -- Nasal cannula -- -- --    03/05/25 1750 -- -- -- -- -- -- -- -- Nasal cannula -- -- --    03/05/25 1746 -- -- -- -- -- 93 % 30 2.5 L/min Nasal cannula -- -- --    03/05/25 1745 -- 92 24 182/86 123 90 % 30 2.5 L/min Nasal cannula -- -- --    03/05/25 1740 -- -- -- -- -- 86 % 364 86 L/min -- -- -- --    03/05/25 1715 -- 88 23 173/81 116 -- -- -- -- -- -- --    03/05/25 1700 -- 87 20 172/80 115 89 % -- -- None (Room air) -- -- --    03/05/25 1645 -- 90 22 184/86 123 92 % -- -- None (Room air) -- -- --    03/05/25 1638 98.6 °F (37 °C) 94 18 192/88 127 93 % -- -- None (Room air) Lying -- No Pain              Pertinent Labs/Diagnostic Test Results:      Results from last 7 days   Lab Units 03/07/25  0713 03/06/25  0454 03/05/25  1195  03/05/25  0253   WBC Thousand/uL 5.85 6.09 5.99 5.74   HEMOGLOBIN g/dL 8.7* 8.4* 7.9* 7.8*   HEMATOCRIT % 27.7* 26.7* 25.2* 25.3*   PLATELETS Thousands/uL 118* 114* 106* 94*   TOTAL NEUT ABS Thousands/µL 3.97  --  4.42 4.19        Results from last 7 days   Lab Units 03/07/25  0713 03/06/25  0454 03/05/25 1647 03/05/25  0253   SODIUM mmol/L 134* 138 137 138   POTASSIUM mmol/L 3.9 3.8 3.7 3.8   CHLORIDE mmol/L 99 100 99 100   CO2 mmol/L 30 31 33* 31   ANION GAP mmol/L 5 7 5 7   BUN mg/dL 23 39* 34* 44*   CREATININE mg/dL 3.77* 5.65* 4.56* 5.75*   EGFR ml/min/1.73sq m 15 9 12 9   CALCIUM mg/dL 8.0* 8.0* 7.7* 7.7*   MAGNESIUM mg/dL 1.7* 1.8* 1.7*  --    PHOSPHORUS mg/dL 2.6 3.5 2.7  --      Results from last 7 days   Lab Units 03/06/25 0454 03/05/25 1647 03/05/25  0253   AST U/L 17 18 19   ALT U/L <3* <3* <3*   ALK PHOS U/L 89 101 116*   TOTAL PROTEIN g/dL 5.9* 5.5* 5.7*   ALBUMIN g/dL 2.6* 2.4* 2.5*   TOTAL BILIRUBIN mg/dL 0.79 0.78 0.65        Results from last 7 days   Lab Units 03/07/25  0713 03/06/25 0454 03/05/25 1647 03/05/25  0253   GLUCOSE RANDOM mg/dL 95 103 109 119        Results from last 7 days   Lab Units 03/05/25  1858 03/05/25 1647   HS TNI 0HR ng/L  --  79*   HS TNI 2HR ng/L 72*  --    HSTNI D2 ng/L -7  --         Results from last 7 days   Lab Units 03/05/25  0253   LACTIC ACID mmol/L 1.2        Results from last 7 days   Lab Units 03/05/25  0253   BLOOD CULTURE  No Growth at 48 hrs.  No Growth at 48 hrs.          Past Medical History:   Diagnosis Date    Acute hemodialysis patient ([facility]) 12/14/2024    CAD (coronary artery disease)     HFrEF (heart failure with reduced ejection fraction) (HCC) 09/2021    Hypertension     Rheumatoid factor positive 09/2021    Stage 3 chronic kidney disease (HCC)     Tobacco abuse      Present on Admission:   Acute on chronic systolic heart failure (HCC)   Nonischemic cardiomyopathy (HCC)   Primary hypertension   Protein calorie malnutrition (HCC)   (Resolved)  Coronary artery disease involving native coronary artery of native heart without angina pectoris   Acute hypoxemic respiratory failure (HCC)   Paroxysmal atrial fibrillation (HCC)   Chronic kidney disease-mineral and bone disorder (CKD-MBD)      Admitting Diagnosis: Chest pain [R07.9]  Atypical chest pain [R07.89]  Hypoxia [R09.02]  ESRD (end stage renal disease) (HCC) [N18.6]  Age/Sex: 70 y.o. male    Network Utilization Review Department  ATTENTION: Please call with any questions or concerns to [phone] and carefully listen to the prompts so that you are directed to the right person. All voicemails are confidential.   For Discharge needs, contact Care Management DC Support Team at [phone] opt. 2  Send all requests for admission clinical reviews, approved or denied determinations and any other requests to dedicated fax number below belonging to the campus where the patient is receiving treatment. List of dedicated fax numbers for the Facilities:  FACILITY NAME UR FAX NUMBER   ADMISSION DENIALS (Administrative/Medical Necessity) [phone]   DISCHARGE SUPPORT TEAM (NETWORK) [phone]   PARENT CHILD HEALTH (Maternity/NICU/Pediatrics) [phone]   [facility] [phone]   [facility] [phone]   [facility] [phone]   [facility] [phone]   [facility] [phone]   [facility] [phone]   [facility] [phone]   [facility] [phone]   [facility] [phone]   [facility] [phone]   [facility] [phone]   [facility] [phone]

## 2025-03-06 NOTE — ASSESSMENT & PLAN NOTE
Will check magnesium and phosphorus  Currently on no binders and no restrictions may have to adjust accordingly

## 2025-03-06 NOTE — ASSESSMENT & PLAN NOTE
Elevated: On amlodipine 5 mg daily/carvedilol 6.25 mg twice a day/torsemide 100 mg daily?  Lopressor 25 every 12 hours/hydralazine 25 mg in the morning/isosorbide 120 mg daily/valsartan 160 mg daily  - Monitor as we challenge target weight  - Continue above meds but no Lopressor that is redundant

## 2025-03-06 NOTE — CASE MANAGEMENT
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     Moderate protein-calorie malnutrition (HCC) 2025     Chronic kidney disease-mineral and bone disorder (CKD-MBD) 2025     Paroxysmal atrial fibrillation ([facility]) 2025     Bacteremia 2025     COVID 2025     Acute hemodialysis patient ([facility]) 2025     Pleural effusion 2024     Secondary hyperparathyroidism of renal origin ([facility]) 12/15/2024     ESRD (end stage renal disease) on dialysis ([facility]) 2024     Ambulatory dysfunction 2024     Thrombocytopenia ([facility]) 2024     Renal failure 11/15/2024     Goals of care, counseling/discussion 11/15/2024     Anemia due to chronic kidney disease, on chronic dialysis ([facility]) 11/15/2024     Anemia of renal disease 11/15/2024     Vitamin D deficiency, unspecified 11/15/2024     Chronic systolic (congestive) heart failure ([facility]) 2024     Oliguria 2024     Encounter for hemodialysis for ESRD ([facility]) 2024     Hyponatremia 2024     Dilated cardiomyopathy ([facility]) 2024     Elevated liver transaminase level 11/10/2024     Acute on chronic systolic heart failure (HCC) 2024     Rheumatoid factor positive 2021     Protein calorie malnutrition (HCC) 2021     Elevated troponin 2021     Tobacco abuse 2021     Nonischemic cardiomyopathy ([facility]) 2021     Primary hypertension 2021       LOS (days): 1  Geometric Mean LOS (GMLOS) (days): 3.9  Days to GMLOS:3.3     OBJECTIVE:    Risk of Unplanned  Readmission Score: 39.04         Current admission status: Inpatient       Preferred Pharmacy:   [facility]pharm[id number] - [place] - [address]  [facility]  [address] [place] [address]  Phone: [phone] Fax: [phone]    [facility] [place] - [address]  [address]  [place] [address]  Phone: [phone] Fax: [phone]    Primary Care Provider: Olive Rodriguez MD    Primary Insurance: [facility] RE[facility]  Secondary Insurance:     ASSESSMENT:  Active Health Care Proxies       Grover Alvarez Health Care Agent - Spouse   Primary Phone: [phone] (Home)                 Advance Directives  Does patient have a Health Care POA?: No  Was patient offered paperwork?: Yes (declined)  Does patient currently have a Health Care decision maker?: No  Does patient have Advance Directives?: No  Was patient offered paperwork?: Yes (declined)  Primary Contact: grover, wife              Patient Information  Admitted from:: Facility ([facility])  Mental Status: Alert  During Assessment patient was accompanied by: Not accompanied during assessment  Assessment information provided by:: Patient  Primary Caregiver: Other (Comment) ([facility])  Caregiver's Name:: [facility]  Caregiver's Relationship to Patient:: Facility Staff  Support Systems: Spouse/significant other, Family members  [facility] Residence: [facility]  Home entry access options. Select all that apply.: No steps to enter home  Type of Current Residence: Facility ([facility])  Upon entering residence, is there a bedroom on the main floor (no further steps)?: Yes  Upon entering residence, is there a bathroom on the main floor (no further steps)?: Yes  Living Arrangements: Lives in Facility    Activities of Daily Living Prior to Admission  Functional Status: Assistance  Completes ADLs independently?: No  Level of ADL dependence: Assistance  Ambulates independently?: No  Level of ambulatory  dependence: Assistance  Does patient use assisted devices?: Yes  Assisted Devices (DME) used: Walker  Does patient currently own DME?: Yes  What DME does the patient currently own?: Wheelchair, Walker  Does patient have a history of Outpatient Therapy (PT/OT)?: No  Does the patient have a history of Short-Term Rehab?: Yes ([facility])  Does patient have a history of HHC?: No  Does patient currently have HHC?: No         Patient Information Continued  Income Source: SSI/SSD  Does patient have prescription coverage?: Yes  Does patient receive dialysis treatments?: Yes ([facility] M W F 1400)  Does patient have a history of substance abuse?: No  Does patient have a history of Mental Health Diagnosis?: No         Means of Transportation  Means of Transport to Appts:: Family transport          DISCHARGE DETAILS:    Discharge planning discussed with:: patient  Freedom of Choice: Yes  Comments - Freedom of Choice: pt wishes to return to [facility], if rec  CM contacted family/caregiver?: No- see comments (declined)             Contacts  Patient Contacts: Kellee Alvarez- spouse  Relationship to Patient:: Family                   Would you like to participate in our [facility] service program?  : No - Declined           Pt is from [facility] where he has been since 1/29/25 for STR.  Pt sts he Is able to get up from his bed, on his own and ambulate with a walker.    Pt wishes to return to STR, if recommended by therapy.  Pt will require prior authorization through his insurance.       4401 CM messaging facility to inquire pts PLOF, as per facility:  Pt is Independent for bed mobility, limited assist for eating, limited assist for transfers, max assist for ambulation, max assist for dressing, extensive assist for toileting

## 2025-03-07 ENCOUNTER — APPOINTMENT (INPATIENT)
Dept: DIALYSIS | Facility: HOSPITAL | Age: 70
DRG: 291 | End: 2025-03-07
Payer: COMMERCIAL

## 2025-03-07 ENCOUNTER — APPOINTMENT (INPATIENT)
Dept: RADIOLOGY | Facility: HOSPITAL | Age: 70
DRG: 291 | End: 2025-03-07
Payer: COMMERCIAL

## 2025-03-07 PROBLEM — E87.8 ELECTROLYTE ABNORMALITY: Status: ACTIVE | Noted: 2025-03-07

## 2025-03-07 LAB
ANION GAP SERPL CALCULATED.3IONS-SCNC: 5 MMOL/L (ref 4–13)
ATRIAL RATE: 90 BPM
BASOPHILS # BLD AUTO: 0.07 THOUSANDS/ÂΜL (ref 0–0.1)
BASOPHILS NFR BLD AUTO: 1 % (ref 0–1)
BUN SERPL-MCNC: 23 MG/DL (ref 5–25)
CALCIUM SERPL-MCNC: 8 MG/DL (ref 8.4–10.2)
CHLORIDE SERPL-SCNC: 99 MMOL/L (ref 96–108)
CO2 SERPL-SCNC: 30 MMOL/L (ref 21–32)
CREAT SERPL-MCNC: 3.77 MG/DL (ref 0.6–1.3)
EOSINOPHIL # BLD AUTO: 0.54 THOUSAND/ÂΜL (ref 0–0.61)
EOSINOPHIL NFR BLD AUTO: 9 % (ref 0–6)
ERYTHROCYTE [DISTWIDTH] IN BLOOD BY AUTOMATED COUNT: 18.7 % (ref 11.6–15.1)
FERRITIN SERPL-MCNC: 434 NG/ML (ref 24–336)
GFR SERPL CREATININE-BSD FRML MDRD: 15 ML/MIN/1.73SQ M
GLUCOSE SERPL-MCNC: 95 MG/DL (ref 65–140)
HCT VFR BLD AUTO: 27.7 % (ref 36.5–49.3)
HGB BLD-MCNC: 8.7 G/DL (ref 12–17)
IMM GRANULOCYTES # BLD AUTO: 0.04 THOUSAND/UL (ref 0–0.2)
IMM GRANULOCYTES NFR BLD AUTO: 1 % (ref 0–2)
IRON SATN MFR SERPL: 34 % (ref 15–50)
IRON SERPL-MCNC: 64 UG/DL (ref 50–212)
LYMPHOCYTES # BLD AUTO: 0.54 THOUSANDS/ÂΜL (ref 0.6–4.47)
LYMPHOCYTES NFR BLD AUTO: 9 % (ref 14–44)
MAGNESIUM SERPL-MCNC: 1.7 MG/DL (ref 1.9–2.7)
MCH RBC QN AUTO: 30.4 PG (ref 26.8–34.3)
MCHC RBC AUTO-ENTMCNC: 31.4 G/DL (ref 31.4–37.4)
MCV RBC AUTO: 97 FL (ref 82–98)
MONOCYTES # BLD AUTO: 0.69 THOUSAND/ÂΜL (ref 0.17–1.22)
MONOCYTES NFR BLD AUTO: 12 % (ref 4–12)
MRSA NOSE QL CULT: NORMAL
NEUTROPHILS # BLD AUTO: 3.97 THOUSANDS/ÂΜL (ref 1.85–7.62)
NEUTS SEG NFR BLD AUTO: 68 % (ref 43–75)
NRBC BLD AUTO-RTO: 0 /100 WBCS
P AXIS: 65 DEGREES
PHOSPHATE SERPL-MCNC: 2.6 MG/DL (ref 2.3–4.1)
PLATELET # BLD AUTO: 118 THOUSANDS/UL (ref 149–390)
PMV BLD AUTO: 9.9 FL (ref 8.9–12.7)
POTASSIUM SERPL-SCNC: 3.9 MMOL/L (ref 3.5–5.3)
PR INTERVAL: 162 MS
QRS AXIS: -8 DEGREES
QRSD INTERVAL: 96 MS
QT INTERVAL: 392 MS
QTC INTERVAL: 479 MS
RBC # BLD AUTO: 2.86 MILLION/UL (ref 3.88–5.62)
SODIUM SERPL-SCNC: 134 MMOL/L (ref 135–147)
T WAVE AXIS: 106 DEGREES
TIBC SERPL-MCNC: 189 UG/DL (ref 250–450)
TRANSFERRIN SERPL-MCNC: 135 MG/DL (ref 203–362)
UIBC SERPL-MCNC: 125 UG/DL (ref 155–355)
VENTRICULAR RATE: 90 BPM
WBC # BLD AUTO: 5.85 THOUSAND/UL (ref 4.31–10.16)

## 2025-03-07 PROCEDURE — 83735 ASSAY OF MAGNESIUM: CPT | Performed by: INTERNAL MEDICINE

## 2025-03-07 PROCEDURE — 83550 IRON BINDING TEST: CPT | Performed by: INTERNAL MEDICINE

## 2025-03-07 PROCEDURE — 71045 X-RAY EXAM CHEST 1 VIEW: CPT

## 2025-03-07 PROCEDURE — 5A1D70Z PERFORMANCE OF URINARY FILTRATION, INTERMITTENT, LESS THAN 6 HOURS PER DAY: ICD-10-PCS | Performed by: INTERNAL MEDICINE

## 2025-03-07 PROCEDURE — 97167 OT EVAL HIGH COMPLEX 60 MIN: CPT

## 2025-03-07 PROCEDURE — 80048 BASIC METABOLIC PNL TOTAL CA: CPT | Performed by: INTERNAL MEDICINE

## 2025-03-07 PROCEDURE — 83540 ASSAY OF IRON: CPT | Performed by: INTERNAL MEDICINE

## 2025-03-07 PROCEDURE — 84100 ASSAY OF PHOSPHORUS: CPT | Performed by: INTERNAL MEDICINE

## 2025-03-07 PROCEDURE — 99232 SBSQ HOSP IP/OBS MODERATE 35: CPT | Performed by: INTERNAL MEDICINE

## 2025-03-07 PROCEDURE — 97163 PT EVAL HIGH COMPLEX 45 MIN: CPT

## 2025-03-07 PROCEDURE — 99232 SBSQ HOSP IP/OBS MODERATE 35: CPT

## 2025-03-07 PROCEDURE — 82728 ASSAY OF FERRITIN: CPT | Performed by: INTERNAL MEDICINE

## 2025-03-07 PROCEDURE — 85025 COMPLETE CBC W/AUTO DIFF WBC: CPT | Performed by: INTERNAL MEDICINE

## 2025-03-07 RX ORDER — MAGNESIUM SULFATE HEPTAHYDRATE 40 MG/ML
2 INJECTION, SOLUTION INTRAVENOUS ONCE
Status: COMPLETED | OUTPATIENT
Start: 2025-03-07 | End: 2025-03-07

## 2025-03-07 RX ADMIN — MAGNESIUM SULFATE HEPTAHYDRATE 2 G: 40 INJECTION, SOLUTION INTRAVENOUS at 08:52

## 2025-03-07 RX ADMIN — APIXABAN 2.5 MG: 2.5 TABLET, FILM COATED ORAL at 07:42

## 2025-03-07 RX ADMIN — HYDRALAZINE HYDROCHLORIDE 25 MG: 25 TABLET ORAL at 07:41

## 2025-03-07 RX ADMIN — APIXABAN 2.5 MG: 2.5 TABLET, FILM COATED ORAL at 17:10

## 2025-03-07 RX ADMIN — AMLODIPINE BESYLATE 5 MG: 5 TABLET ORAL at 07:42

## 2025-03-07 RX ADMIN — SENNOSIDES AND DOCUSATE SODIUM 1 TABLET: 8.6; 5 TABLET ORAL at 07:41

## 2025-03-07 RX ADMIN — FOLIC ACID 1 MG: 1 TABLET ORAL at 07:42

## 2025-03-07 RX ADMIN — LOSARTAN POTASSIUM 100 MG: 50 TABLET, FILM COATED ORAL at 07:42

## 2025-03-07 RX ADMIN — CARVEDILOL 6.25 MG: 6.25 TABLET, FILM COATED ORAL at 07:41

## 2025-03-07 RX ADMIN — FERROUS SULFATE TAB 325 MG (65 MG ELEMENTAL FE) 325 MG: 325 (65 FE) TAB at 07:41

## 2025-03-07 RX ADMIN — ATORVASTATIN CALCIUM 20 MG: 20 TABLET, FILM COATED ORAL at 17:10

## 2025-03-07 RX ADMIN — Medication 6 MG: at 21:05

## 2025-03-07 RX ADMIN — ISOSORBIDE MONONITRATE 120 MG: 60 TABLET, EXTENDED RELEASE ORAL at 07:41

## 2025-03-07 RX ADMIN — CARVEDILOL 6.25 MG: 6.25 TABLET, FILM COATED ORAL at 17:10

## 2025-03-07 NOTE — PLAN OF CARE
Problem: PAIN - ADULT  Goal: Verbalizes/displays adequate comfort level or baseline comfort level  Description: Interventions:  - Encourage patient to monitor pain and request assistance  - Assess pain using appropriate pain scale  - Administer analgesics based on type and severity of pain and evaluate response  - Implement non-pharmacological measures as appropriate and evaluate response  - Consider cultural and social influences on pain and pain management  - Notify physician/advanced practitioner if interventions unsuccessful or patient reports new pain  Outcome: Progressing     Problem: INFECTION - ADULT  Goal: Absence or prevention of progression during hospitalization  Description: INTERVENTIONS:  - Assess and monitor for signs and symptoms of infection  - Monitor lab/diagnostic results  - Monitor all insertion sites, i.e. indwelling lines, tubes, and drains  - Monitor endotracheal if appropriate and nasal secretions for changes in amount and color  - [place] appropriate cooling/warming therapies per order  - Administer medications as ordered  - Instruct and encourage patient and family to use good hand hygiene technique  - Identify and instruct in appropriate isolation precautions for identified infection/condition  Outcome: Progressing     Problem: DISCHARGE PLANNING  Goal: Discharge to home or other facility with appropriate resources  Description: INTERVENTIONS:  - Identify barriers to discharge w/patient and caregiver  - Arrange for needed discharge resources and transportation as appropriate  - Identify discharge learning needs (meds, wound care, etc.)  - Arrange for interpretive services to assist at discharge as needed  - Refer to Case Management Department for coordinating discharge planning if the patient needs post-hospital services based on physician/advanced practitioner order or complex needs related to functional status, cognitive ability, or social support system  Outcome: Progressing      Problem: Knowledge Deficit  Goal: Patient/family/caregiver demonstrates understanding of disease process, treatment plan, medications, and discharge instructions  Description: Complete learning assessment and assess knowledge base.  Interventions:  - Provide teaching at level of understanding  - Provide teaching via preferred learning methods  Outcome: Progressing     Problem: CARDIOVASCULAR - ADULT  Goal: Maintains optimal cardiac output and hemodynamic stability  Description: INTERVENTIONS:  - Monitor I/O, vital signs and rhythm  - Monitor for S/S and trends of decreased cardiac output  - Administer and titrate ordered vasoactive medications to optimize hemodynamic stability  - Assess quality of pulses, skin color and temperature  - Assess for signs of decreased coronary artery perfusion  - Instruct patient to report change in severity of symptoms  Outcome: Progressing     Problem: RESPIRATORY - ADULT  Goal: Achieves optimal ventilation and oxygenation  Description: INTERVENTIONS:  - Assess for changes in respiratory status  - Assess for changes in mentation and behavior  - Position to facilitate oxygenation and minimize respiratory effort  - Oxygen administered by appropriate delivery if ordered  - Initiate smoking cessation education as indicated  - Encourage broncho-pulmonary hygiene including cough, deep breathe, Incentive Spirometry  - Assess the need for suctioning and aspirate as needed  - Assess and instruct to report SOB or any respiratory difficulty  - Respiratory Therapy support as indicated  Outcome: Progressing     Problem: METABOLIC, FLUID AND ELECTROLYTES - ADULT  Goal: Electrolytes maintained within normal limits  Description: INTERVENTIONS:  - Monitor labs and assess patient for signs and symptoms of electrolyte imbalances  - Administer electrolyte replacement as ordered  - Monitor response to electrolyte replacements, including repeat lab results as appropriate  - Instruct patient on fluid and  nutrition as appropriate  Outcome: Progressing  Goal: Fluid balance maintained  Description: INTERVENTIONS:  - Monitor labs   - Monitor I/O and WT  - Instruct patient on fluid and nutrition as appropriate  - Assess for signs & symptoms of volume excess or deficit  Outcome: Progressing     Problem: Nutrition/Hydration-ADULT  Goal: Nutrient/Hydration intake appropriate for improving, restoring or maintaining nutritional needs  Description: Monitor and assess patient's nutrition/hydration status for malnutrition. Collaborate with interdisciplinary team and initiate plan and interventions as ordered.  Monitor patient's weight and dietary intake as ordered or per policy. Utilize nutrition screening tool and intervene as necessary. Determine patient's food preferences and provide high-protein, high-caloric foods as appropriate.     INTERVENTIONS:  - Monitor oral intake, urinary output, labs, and treatment plans  - Assess nutrition and hydration status and recommend course of action  - Evaluate amount of meals eaten  - Assist patient with eating if necessary   - Allow adequate time for meals  - Recommend/ encourage appropriate diets, oral nutritional supplements, and vitamin/mineral supplements  - Order, calculate, and assess calorie counts as needed  - Recommend, monitor, and adjust tube feedings and TPN/PPN based on assessed needs  - Assess need for intravenous fluids  - Provide specific nutrition/hydration education as appropriate  - Include patient/family/caregiver in decisions related to nutrition  Outcome: Progressing

## 2025-03-07 NOTE — CASE MANAGEMENT
Support Center has received DENIAL for SNF Authorization.   Insurance: [employer]   Denial obtained via Insurance Rep: Kim  Denial Reason: doesn't meet the 's guidelines   Facility: : [facility]   Denial #: [id number]   Peer to Peer Phone#:   [phone]    Deadline:03/10 4:00  CM to task P2P to Attending to complete if desired.      Please notify Discharge Support if P2P will not be completed.     Care Manager notified:Scarlet OLSON     Please reach out to CM for updates on any clinical information.

## 2025-03-07 NOTE — PROGRESS NOTES
Patient:    MRN:  [id number]    Vanessa Request ID:  [id number]    Level of care reserved:  Skilled Nursing Facility    Partner Reserved:  [facility], [place] [address] [phone]    Clinical needs requested:    Geography searched:  10 miles around [id number]    Start of Service:    Request sent:  9:02am EST on 3/6/2025 by Scarlet Carrizales    Partner reserved:  12:10pm EST on 3/7/2025 by Scarlet Carrizales    Choice list shared:

## 2025-03-07 NOTE — ASSESSMENT & PLAN NOTE
Current hemoglobin 8.7 , trending up from admission hgb 7.9 g/dL on 3/5  Monitor for now on Mircera as an outpatient

## 2025-03-07 NOTE — PHYSICAL THERAPY NOTE
PHYSICAL THERAPY EVALUATION        NAME:  Matthew Alvarez  DATE: 03/07/25    AGE:   70 y.o.  Mrn:   [id number]  ADMIT DX:  Chest pain [R07.9]  Atypical chest pain [R07.89]  Hypoxia [R09.02]  ESRD (end stage renal disease) ([facility]) [N18.6]    Past Medical History:   Diagnosis Date    Acute hemodialysis patient ([facility]) 12/14/2024    CAD (coronary artery disease)     HFrEF (heart failure with reduced ejection fraction) ([facility]) 09/2021    Hypertension     Rheumatoid factor positive 09/2021    Stage 3 chronic kidney disease ([facility])     Tobacco abuse      Length Of Stay: 2  Performed at least 2 patient identifiers during session: Name and Birthday         PHYSICAL THERAPY EVALUATION:       03/07/25 0904   PT Last Visit   PT Visit Date 03/07/25   Note Type   Note type Evaluation   Pain Assessment   Pain Assessment Tool 0-10   Pain Score No Pain   Restrictions/Precautions   Weight Bearing Precautions Per Order No   Other Precautions Chair Alarm;Bed Alarm;Fall Risk;Multiple lines;Impulsive;Cognitive   Home Living   Type of Home House   Home Layout Two level;Bed/bath upstairs;1/2 bath on main level  (1 BRODY; FF stairs to 2nd flr L rail)   Bathroom Shower/Tub Walk-in shower   Bathroom Toilet Standard   Bathroom Equipment Shower chair   Home Equipment Walker;Cane;Wheelchair-manual;Hospital bed   Additional Comments Arrives to [facility] this admission from STR   Prior Function   Level of Boyd Needs assistance with ADLs;Needs assistance with functional mobility;Needs assistance with IADLS  (however prior to January of this year, pt supposedly IND with mobility & ADLs - was receiving assist with IADLs from spouse)   Lives With Spouse   Receives Help From Family   IADLs Family/Friend/Other provides transportation;Family/Friend/Other provides meals;Family/Friend/Other provides medication management   Falls in the last 6 months 1 to 4   General   Additional Pertinent History 9 ED/hospital encounters over the last year.  Back in January of  "this year:  Neuropsychiatric assessment completed and patient deemed not to have capacity to make his own medical decisions.  CT Head 1/20/25: Stable, moderate chronic microangiopathic changes within the brain.  Documentation also indicates that pt has refused recommended LifeVest.   Family/Caregiver Present No   Cognition   Overall Cognitive Status Impaired   Arousal/Participation Alert   Orientation Level Oriented X4   Memory Decreased recall of recent events   Following Commands Follows one step commands with increased time or repetition   Comments noted to be impulsive; on 2 seperate ocassions during this evaluation pt stands impulsively and attemptes to ambulate in room when PT clearly stated not to get up without assistance   RLE Assessment   RLE Assessment WFL   LLE Assessment   LLE Assessment WFL   Bed Mobility   Supine to Sit 5  Supervision   Additional items HOB elevated;Increased time required   Transfers   Sit to Stand 5  Supervision   Additional items Verbal cues;Impulsive   Stand to Sit 4  Minimal assistance   Additional items Verbal cues   Stand pivot 4  Minimal assistance   Additional items Impulsive;Verbal cues  (using RW)   Additional Comments Pt noted to be incontinent of bowel - he was unaware - PT completes hygiene and brief change for pt   Ambulation/Elevation   Gait pattern Improper Weight shift;Inconsistent jennifer;Decreased hip extension   Gait Assistance 4  Minimal assist   Additional items Verbal cues   Assistive Device None   Distance 24 ft  (begins with RW and then pushes to side and states \"I don't need this\")   Balance   Static Sitting Good   Dynamic Sitting Fair +   Static Standing Fair   Dynamic Standing Fair -   Ambulatory Fair -   Endurance Deficit   Endurance Deficit Yes   Activity Tolerance   Activity Tolerance   (pt limited by impulsivity/impaired safety awareness)   Nurse Made Aware RN Kia   Assessment   Prognosis Good   Problem List Decreased strength;Decreased " "endurance;Impaired balance;Decreased mobility;Impaired judgement;Decreased safety awareness   Barriers to Discharge   (No barriers to return to STR)   Barriers to Discharge Comments Barriers to return home:  high fall risk; hx of non-compliance; impaired safety awareness   Goals   Patient Goals \"I want to go home\"   STG Expiration Date 03/21/25   Plan   Treatment/Interventions Functional transfer training;LE strengthening/ROM;Elevations;Therapeutic exercise;Endurance training;Patient/family training;Equipment eval/education;Bed mobility;Gait training;Compensatory technique education;Spoke to case management;OT;Spoke to nursing   PT Frequency 2-3x/wk   Discharge Recommendation   Rehab Resource Intensity Level, PT II (Moderate Resource Intensity)   Additional Comments IF family were to decline return to STR pt should have 24/7 SPV due to impulsivity, lack of safety awareness   AM-PAC Basic Mobility Inpatient   Turning in Flat Bed Without Bedrails 3   Lying on Back to Sitting on Edge of Flat Bed Without Bedrails 3   Moving Bed to Chair 3   Standing Up From Chair Using Arms 3   Walk in Room 3   Climb 3-5 Stairs With Railing 3   Basic Mobility Inpatient Raw Score 18   Basic Mobility Standardized Score 41.05   [facility] Highest Level Of Mobility   -HLM Goal 6: Walk 10 steps or more   -HLM Achieved 6: Walk 10 steps or more   End of Consult   Patient Position at End of Consult Bedside chair;Bed/Chair alarm activated;All needs within reach     (Please find full objective findings from PT assessment regarding body systems outlined above).     Assessment: Pt is a 70 y.o. male seen for PT evaluation s/p admission to [facility] on 3/5/2025 with Acute hypoxemic respiratory failure (HCC).  Order placed for PT services.  Upon evaluation: Pt is presenting with impaired functional mobility due to decreased strength, decreased endurance, impaired balance, gait deviations, impaired cognition, decreased safety " awareness, impaired judgment, and fall risk requiring  SPV assistance for bed mobility and min assistance for transfers and ambulation with no AD . Pt's clinical presentation is currently unstable/unpredictable given the functional mobility deficits above, especially decreased endurance, decreased activity tolerance, and decreased safety awareness, coupled with fall risks as indicated by -PAC 6-Clicks: 18/24 as well as hx of falls, impaired balance, and polypharmacy and combined with medical complications of hypertension , abnormal renal lab values, abnormal H&H, abnormal WBCs, abnormal sodium values, and need for input for mobility technique/safety.  Pt's PMHx and comorbidities that may affect physical performance and progress include: A fib, CHF, CKD, HTN, and cardiomyopathy . Personal factors affecting pt at time of IE include: hx of non-compliance, step(s) to enter environment, multi-level environment, inability to perform IADLs, inability to perform ADLs, inability to navigate level surfaces without external assistance, inability to navigate community distances, limited insight into impairments, and recent fall(s)/fall history. Pt will benefit from continued skilled PT services to address deficits as defined above and to maximize level of functional mobility to facilitate return toward PLOF and improved QOL. From PT/mobility standpoint, recommendation at time of d/c would be Level II (Moderate Resource Intensity pending progress in order to reduce fall risk and maximize pt's functional independence and consistency with mobility in order to facilitate return to PLOF.  Recommend trial with walker next 1-2 sessions.     The patient's -[facility] Basic Mobility Inpatient Short Form Raw Score is 18. A Raw score of greater than 16 suggests the patient may benefit from discharge to home. Please also refer to the recommendation of the Physical Therapist for safe discharge planning.       Goals: Pt will perform bed  mobility tasks with modified I to reposition in bed and prepare for transfers. Pt will perform transfers with modified I to decrease burden of care and prepare for ambulation. Pt will ambulate with RW for >/= 150 ft with  Supervision  to decrease risk for falls, improve activity tolerance, and improve gait quality and to access home environment. Pt will complete >/= 12 steps with with unilateral handrail with  min A  to return to home with BRODY and return to [facility] home.          Jamil Ramirez, PT,DPT

## 2025-03-07 NOTE — ASSESSMENT & PLAN NOTE
Home regimen: amldodpine 5mg daily, imdur 120mg daily, lopressor 25mg q12 hours, carvedilol 6.25mg BID, hydralazine 25mg daily, demadex 100mg daily, valsartan 80mg daily   Elevated systolic blood pressure upon presentation  Multifactorial-missed medications today in addition volume overloaded  Anticipate improvement with UF  Lopressor held as patient is on carvedilol  Torsemide and valsartan on hold. Plan to resume valsartan today pending BP after HD

## 2025-03-07 NOTE — ASSESSMENT & PLAN NOTE
Lab Results   Component Value Date    EGFR 15 03/07/2025    EGFR 9 03/06/2025    EGFR 12 03/05/2025    CREATININE 3.77 (H) 03/07/2025    CREATININE 5.65 (H) 03/06/2025    CREATININE 4.56 (H) 03/05/2025   Follows regularly with nephrology in the outpatient setting and undergoes HD Monday Wednesday Friday  Was unable to undergo dialysis prior to admission  Nephrology consulted-significantly above baseline weight  S/p ultrafiltration 3/7 for 4 L  Plan for HD/UF today

## 2025-03-07 NOTE — CASE MANAGEMENT
Rec'd ERIN from Kim at [facility] requesting additional clinicals to support a skilled level of care for the patient P:[phone]  CM notified Scarlet OLSON

## 2025-03-07 NOTE — ASSESSMENT & PLAN NOTE
Improving, -162 mmHg on 3/6.   OP regimen: Amlodipine 5 mg daily/carvedilol 6.25 mg twice a day/torsemide 100 mg daily?  Lopressor 25 every 12 hours/hydralazine 25 mg in the morning/isosorbide 120 mg daily/valsartan 80 mg daily  IP regimen: Amlodipine 5 mg daily, carvedilol 6.25 mg daily, hydralazine 25 mg three times daily, isosorbide 120 mg daily, metoprolol 25 mg twice daily, valsartan 100 mg daily. .   - Monitor as we challenge target weight  - Patient is unsure if he takes torsemide outpatient  - Lopressor discontinued by nephrologist 3/6 due to redundant

## 2025-03-07 NOTE — ASSESSMENT & PLAN NOTE
Lab Results   Component Value Date    EGFR 15 03/07/2025    EGFR 9 03/06/2025    EGFR 12 03/05/2025    CREATININE 3.77 (H) 03/07/2025    CREATININE 5.65 (H) 03/06/2025    CREATININE 4.56 (H) 03/05/2025   Hemoglobin today 8.4, baseline between 7 and 8  Obtain iron panel  Continue to monitor

## 2025-03-07 NOTE — ASSESSMENT & PLAN NOTE
Magnesium 1.7 mg/dL, repleted with magnesium 2 gm IV on 3/7.   Phosphorus 2.6  mg/dL, 3/7.   Currently on no binders and no restrictions.

## 2025-03-07 NOTE — PLAN OF CARE
Problem: PHYSICAL THERAPY ADULT  Goal: Performs mobility at highest level of function for planned discharge setting.  See evaluation for individualized goals.  Description: Treatment/Interventions: Functional transfer training, LE strengthening/ROM, Elevations, Therapeutic exercise, Endurance training, Patient/family training, Equipment eval/education, Bed mobility, Gait training, Compensatory technique education, Spoke to case management, OT, Spoke to nursing          See flowsheet documentation for full assessment, interventions and recommendations.  Note: Prognosis: Good  Problem List: Decreased strength, Decreased endurance, Impaired balance, Decreased mobility, Impaired judgement, Decreased safety awareness  Assessment: Pt is a 70 y.o. male seen for PT evaluation s/p admission to [facility] on 3/5/2025 with Acute hypoxemic respiratory failure (HCC).  Order placed for PT services.  Upon evaluation: Pt is presenting with impaired functional mobility due to decreased strength, decreased endurance, impaired balance, gait deviations, impaired cognition, decreased safety awareness, impaired judgment, and fall risk requiring  SPV assistance for bed mobility and min assistance for transfers and ambulation with no AD . Pt's clinical presentation is currently unstable/unpredictable given the functional mobility deficits above, especially decreased endurance, decreased activity tolerance, and decreased safety awareness, coupled with fall risks as indicated by AM-PAC 6-Clicks: 18/24 as well as hx of falls, impaired balance, and polypharmacy and combined with medical complications of hypertension , abnormal renal lab values, abnormal H&H, abnormal WBCs, abnormal sodium values, and need for input for mobility technique/safety.  Pt's PMHx and comorbidities that may affect physical performance and progress include: A fib, CHF, CKD, HTN, and cardiomyopathy . Personal factors affecting pt at time of IE  include: hx of non-compliance, step(s) to enter environment, multi-level environment, inability to perform IADLs, inability to perform ADLs, inability to navigate level surfaces without external assistance, inability to navigate community distances, limited insight into impairments, and recent fall(s)/fall history. Pt will benefit from continued skilled PT services to address deficits as defined above and to maximize level of functional mobility to facilitate return toward PLOF and improved QOL. From PT/mobility standpoint, recommendation at time of d/c would be Level II (Moderate Resource Intensity pending progress in order to reduce fall risk and maximize pt's functional independence and consistency with mobility in order to facilitate return to PLOF.  Recommend trial with walker next 1-2 sessions.  Barriers to Discharge:  (No barriers to return to STR)  Barriers to Discharge Comments: Barriers to return home:  high fall risk; hx of non-compliance; impaired safety awareness  Rehab Resource Intensity Level, PT: II (Moderate Resource Intensity)    See flowsheet documentation for full assessment.

## 2025-03-07 NOTE — PROGRESS NOTES
Progress Note - Nephrology   Name: Matthew Alvarez 70 y.o. male I MRN: [id number]  Unit/Bed#: -01 I Date of Admission: 3/5/2025   Date of Service: 3/7/2025 I Hospital Day: 2    Assessment & Plan  ESRD (end stage renal disease) on dialysis (HCC)  [facility]: MWF  Access: Left TDC  Target weight: 52 kg currently well above but bed weight  HD 3/6 for 3.9L UF. Preweight 61 kg, post weight 56.1 kg.   - Plan for HD/UF today 3/7.   Primary hypertension  Improving, -162 mmHg on 3/6.   OP regimen: Amlodipine 5 mg daily/carvedilol 6.25 mg twice a day/torsemide 100 mg daily?  Lopressor 25 every 12 hours/hydralazine 25 mg in the morning/isosorbide 120 mg daily/valsartan 80 mg daily  IP regimen: Amlodipine 5 mg daily, carvedilol 6.25 mg daily, hydralazine 25 mg three times daily, isosorbide 120 mg daily, metoprolol 25 mg twice daily, valsartan 100 mg daily. .   - Monitor as we challenge target weight  - Patient is unsure if he takes torsemide outpatient  - Lopressor discontinued by nephrologist 3/6 due to redundant  Chronic kidney disease-mineral and bone disorder (CKD-MBD)  Magnesium 1.7 mg/dL, repleted with magnesium 2 gm IV on 3/7.   Phosphorus 2.6  mg/dL, 3/7.   Currently on no binders and no restrictions.  Anemia due to chronic kidney disease, on chronic dialysis (HCC)  Current hemoglobin 8.7 , trending up from admission hgb 7.9 g/dL on 3/5  Monitor for now on Mircera as an outpatient  Acute hypoxemic respiratory failure (HCC)  -Related to volume overload please see above  - Plan for HD/UF today  Nonischemic cardiomyopathy (HCC)  EF 20% refusing LifeVest per cardiology  Paroxysmal atrial fibrillation (HCC)  Per primary service  - On beta-blocker and Eliquis  Electrolyte abnormality      I have reviewed the nephrology recommendations including hemodialysis schedule with hospitalist,, and we are in agreement with renal plan including the information outlined above.     Subjective   Brief History of Admission  "- Matthew Alvarez is a 70 y.o. male with PMH of cardiomyopathy EF 20%/ESRD on HD/atrial fibrillation/hypertension/refusing LifeVest who was admitted to [facility] 3/5 after presenting with \"twinge of chest pain lasting 30 seconds \"and some shortness of breath with a productive cough and hypoxemia chest x-ray revealing acute pulmonary edema. Nephrology is following for assistance in the management of ESRD on HD     Examined resting out of bed to chair. States he is feeling better today than yesterday.         Objective :  Temp:  [97.3 °F (36.3 °C)-97.7 °F (36.5 °C)] 97.3 °F (36.3 °C)  HR:  [83-91] 87  BP: ()/() 136/98  Resp:  [17-18] 17  SpO2:  [90 %-96 %] 96 %  O2 Device: None (Room air)  Nasal Cannula O2 Flow Rate (L/min):  [2 L/min] 2 L/min    Current Weight: Weight - Scale: 59.9 kg (132 lb 0.9 oz)  First Weight: Weight - Scale: 63.2 kg (139 lb 5.3 oz)  I/O         03/05 0701  03/06 0700 03/06 0701  03/07 0700 03/07 0701  03/08 0700    P.O.  240 240    I.V. (mL/kg)  460 (7.7)     Total Intake(mL/kg)  700 (11.7) 240 (4)    Other  4500     Stool  0     Total Output  4500     Net  -3800 +240           Unmeasured Urine Occurrence 1 x      Unmeasured Stool Occurrence  5 x           Physical Exam  Nursing note reviewed.   Constitutional:       General: He is not in acute distress.     Appearance: Normal appearance. He is well-developed and normal weight. He is ill-appearing.   HENT:      Head: Normocephalic and atraumatic.      Right Ear: External ear normal.      Left Ear: External ear normal.      Nose: Nose normal.      Mouth/Throat:      Mouth: Mucous membranes are moist.      Pharynx: Oropharynx is clear.   Eyes:      Extraocular Movements: Extraocular movements intact.      Conjunctiva/sclera: Conjunctivae normal.      Pupils: Pupils are equal, round, and reactive to light.   Cardiovascular:      Rate and Rhythm: Normal rate and regular rhythm.      Heart sounds: Normal heart sounds. No murmur " heard.  Pulmonary:      Effort: Pulmonary effort is normal. No respiratory distress.      Breath sounds: Normal breath sounds.      Comments: Decreased breath sounds  Abdominal:      Palpations: Abdomen is soft.      Tenderness: There is no abdominal tenderness.   Musculoskeletal:         General: No swelling.      Cervical back: Neck supple.      Right lower leg: Edema present.      Left lower leg: Edema present.      Comments: 1+ BLE edema   Skin:     General: Skin is warm and dry.      Capillary Refill: Capillary refill takes less than 2 seconds.   Neurological:      General: No focal deficit present.      Mental Status: He is alert and oriented to person, place, and time.   Psychiatric:         Mood and Affect: Mood normal.         Behavior: Behavior normal.       Medications:    Current Facility-Administered Medications:     amLODIPine (NORVASC) tablet 5 mg, 5 mg, Oral, Daily, Kendall Guerra MD, 5 mg at 03/07/25 0742    apixaban (ELIQUIS) tablet 2.5 mg, 2.5 mg, Oral, BID, Chris Nolasco DO, 2.5 mg at 03/07/25 0742    atorvastatin (LIPITOR) tablet 20 mg, 20 mg, Oral, Daily With Dinner, Chris Nolasco DO, 20 mg at 03/06/25 1805    carvedilol (COREG) tablet 6.25 mg, 6.25 mg, Oral, BID With Meals, Chris Nolasco DO, 6.25 mg at 03/07/25 0741    ferrous sulfate tablet 325 mg, 325 mg, Oral, Every Other Day, Chris Nolasco DO, 325 mg at 03/07/25 0741    folic acid (FOLVITE) tablet 1 mg, 1 mg, Oral, Daily, Chris Nolasco DO, 1 mg at 03/07/25 0742    hydrALAZINE (APRESOLINE) injection 10 mg, 10 mg, Intravenous, Q6H PRN, Chris Nolasco DO    hydrALAZINE (APRESOLINE) tablet 25 mg, 25 mg, Oral, Daily, Kendall Guerra MD, 25 mg at 03/07/25 0741    isosorbide mononitrate (IMDUR) 24 hr tablet 120 mg, 120 mg, Oral, Daily, Chris Nolasco DO, 120 mg at 03/07/25 0741    losartan (COZAAR) tablet 100 mg, 100 mg, Oral, Daily, Kendall Guerra MD, 100 mg at 03/07/25 0765    melatonin  "tablet 6 mg, 6 mg, Oral, HS, Chris Nolasco, , 6 mg at 03/06/25 2105    senna-docusate sodium (SENOKOT S) 8.6-50 mg per tablet 1 tablet, 1 tablet, Oral, BID, Chris Nolasco DO, 1 tablet at 03/07/25 0741      Lab Results: I have reviewed the following results:  Results from last 7 days   Lab Units 03/07/25  0713 03/06/25  0454 03/05/25  1647 03/05/25  0253   WBC Thousand/uL 5.85 6.09 5.99 5.74   HEMOGLOBIN g/dL 8.7* 8.4* 7.9* 7.8*   HEMATOCRIT % 27.7* 26.7* 25.2* 25.3*   PLATELETS Thousands/uL 118* 114* 106* 94*   POTASSIUM mmol/L 3.9 3.8 3.7 3.8   CHLORIDE mmol/L 99 100 99 100   CO2 mmol/L 30 31 33* 31   BUN mg/dL 23 39* 34* 44*   CREATININE mg/dL 3.77* 5.65* 4.56* 5.75*   CALCIUM mg/dL 8.0* 8.0* 7.7* 7.7*   MAGNESIUM mg/dL 1.7* 1.8* 1.7*  --    PHOSPHORUS mg/dL 2.6 3.5 2.7  --    ALBUMIN g/dL  --  2.6* 2.4* 2.5*       Administrative Statements     Portions of the record may have been created with voice recognition software. Occasional wrong word or \"sound a like\" substitutions may have occurred due to the inherent limitations of voice recognition software. Read the chart carefully and recognize, using context, where substitutions have occurred.If you have any questions, please contact the dictating provider.  "

## 2025-03-07 NOTE — PLAN OF CARE
Target UF Goal  3.5  L as tolerated. Patient dialyzing for  3.5hrs  on 4 K bath for serum K of  3.9  per protocol. Treatment plan reviewed with Nephrology.       Post-Dialysis RN Treatment Note    Blood Pressure:  Pre 158/83 mm/Hg  Post 152/97 mmHg   EDW  52 kg    Weight:  Pre 56.2 kg   Post 53.8 kg   Mode of weight measurement: Bed Scale for pre-weight, standing for post tx weight   Volume Removed  2400 ml    Treatment duration 3 hours   NS given  No    Treatment shortened? Yes, describe: 30 mins; pt very restless, dangling legs over the side of the bed trying to stand, forgetful not remembering that HD tx still ongoing, then wanted to eat upright with feet on the ground and unwilling to wait until the end of HD treatment to do so.    Medications given during Rx None Reported   Estimated Kt/V  1.19   Access type: Permacath/TDC   Access Issues: No   Needle Gauge: N/A   Report called to primary nurse   Yes, Kia RN at bedside       Problem: METABOLIC, FLUID AND ELECTROLYTES - ADULT  Goal: Electrolytes maintained within normal limits  Description: INTERVENTIONS:  - Monitor labs and assess patient for signs and symptoms of electrolyte imbalances  - Administer electrolyte replacement as ordered  - Monitor response to electrolyte replacements, including repeat lab results as appropriate  - Instruct patient on fluid and nutrition as appropriate  Outcome: Progressing  Goal: Fluid balance maintained  Description: INTERVENTIONS:  - Monitor labs   - Monitor I/O and WT  - Instruct patient on fluid and nutrition as appropriate  - Assess for signs & symptoms of volume excess or deficit  Outcome: Progressing

## 2025-03-07 NOTE — CASE MANAGEMENT
[facility] received request for authorization from Care Manager.  Authorization request submitted for: [facility]  Facility Name: [facility]  NPI: [id number]  Facility MD:  dr alcira luis  NPI:  [id number]  Authorization initiated by contacting insurance:  [employer]   Via: [employer]   Clinicals submitted via Portal attachment   Pending Reference #: [id number]     Care Manager notified: Scarlet OLSON      Updates to authorization status will be noted in chart. Please reach out to CM for updates on any clinical information.

## 2025-03-07 NOTE — PROGRESS NOTES
"Progress Note - Hospitalist   Name: Matthew Alvarez 70 y.o. male I MRN: [id number]  Unit/Bed#: -01 I Date of Admission: 3/5/2025   Date of Service: 3/7/2025 I Hospital Day: 2    Assessment & Plan  Acute hypoxemic respiratory failure (HCC)  Patient presents today (3/5) from dialysis center with reports of shortness of breath which began during the session  Patient noted associated \"twinge\" of chest pain which has since subsided  Patient denies lightheadedness, dizziness, changes in vision or hearing, loss of consciousness  In ED, patient desaturating into the mid 80s on room air requiring up to 3 L nasal cannula oxygen which is not his baseline  Plain film imaging revealed pulmonary edema  Index suspicion for pulmonary edema in the setting of incomplete HD session  Patient also with history of systolic congestive heart failure with EF 20%  It appears that his dry weight is 55 to 56 kg-presents at 59.9 kg    Wean oxygen to baseline - Now on room air with adequate saturations   Treat underlying contributing factors-ESRD/HFrEF (see below)  Spot check O2  Low index suspicion for PE given systemic anticoagulation with compliance and improvement in symptoms w/ fluid management   In absence of SIRS criteria with low index suspicion for infectious infiltrate  COVID flu negative    Nonischemic cardiomyopathy (HCC)  Patient with noted extended hospitalization in late December 2024  Prior to that hospitalization, in the outpatient setting, gross noncompliance with medication regimen and recommendation for LifeVest  Since, has been maintained in skilled nursing facility with strict adherence to medication regimen however still refuses LifeVest  Maintained on Coreg, Imdur  Follows regularly with cardiology outpatient setting  Appears volume overloaded upon presentation with weight up from previous, pitting edema, pulmonary edema  Volume unload with HD/UF per nephrology recommendations  Nephrology consulted  Most recent " echocardiogram reviewed (1/2025)-global hypokinesis with EF of 20%  Acute on chronic systolic heart failure (HCC)  Wt Readings from Last 3 Encounters:   03/05/25 59.9 kg (132 lb 0.9 oz)   03/05/25 64.9 kg (143 lb 1.3 oz)   02/20/25 53.3 kg (117 lb 9.6 oz)   As above  ESRD (end stage renal disease) on dialysis ([facility])  Lab Results   Component Value Date    EGFR 15 03/07/2025    EGFR 9 03/06/2025    EGFR 12 03/05/2025    CREATININE 3.77 (H) 03/07/2025    CREATININE 5.65 (H) 03/06/2025    CREATININE 4.56 (H) 03/05/2025   Follows regularly with nephrology in the outpatient setting and undergoes HD Monday Wednesday Friday  Was unable to undergo dialysis prior to admission  Nephrology consulted-significantly above baseline weight  S/p ultrafiltration 3/7 for 4 L  Plan for HD/UF today   Primary hypertension  Home regimen: amldodpine 5mg daily, imdur 120mg daily, lopressor 25mg q12 hours, carvedilol 6.25mg BID, hydralazine 25mg daily, demadex 100mg daily, valsartan 80mg daily   Elevated systolic blood pressure upon presentation  Multifactorial-missed medications today in addition volume overloaded  Anticipate improvement with UF  Lopressor held as patient is on carvedilol  Torsemide and valsartan on hold. Plan to resume valsartan today pending BP after HD  Protein calorie malnutrition (HCC)  Malnutrition Findings:    Bitemporal wasting, superior clavicular muscle wasting    BMI Findings:           Body mass index is 21.31 kg/m².     Paroxysmal atrial fibrillation (HCC)  Rate controlled and systemically anticoagulated  Continue beta-blocker and Eliquis  Anemia due to chronic kidney disease, on chronic dialysis ([facility])  Lab Results   Component Value Date    EGFR 15 03/07/2025    EGFR 9 03/06/2025    EGFR 12 03/05/2025    CREATININE 3.77 (H) 03/07/2025    CREATININE 5.65 (H) 03/06/2025    CREATININE 4.56 (H) 03/05/2025   Hemoglobin today 8.4, baseline between 7 and 8  Obtain iron panel  Continue to monitor  Chronic kidney  disease-mineral and bone disorder (CKD-MBD)  Lab Results   Component Value Date    EGFR 15 03/07/2025    EGFR 9 03/06/2025    EGFR 12 03/05/2025    CREATININE 3.77 (H) 03/07/2025    CREATININE 5.65 (H) 03/06/2025    CREATININE 4.56 (H) 03/05/2025     Electrolyte abnormality  Mg 1.7  Replete with Mg 2g  Continue to monitor electrolytes and replete as indicated     VTE Pharmacologic Prophylaxis:   Moderate Risk (Score 3-4) - Pharmacological DVT Prophylaxis Ordered: apixaban (Eliquis).    Mobility:   Basic Mobility Inpatient Raw Score: 15  JH-HLM Goal: 4: Move to chair/commode  JH-HLM Achieved: 6: Walk 10 steps or more  JH-HLM Goal achieved. Continue to encourage appropriate mobility.    Patient Centered Rounds: I performed bedside rounds with nursing staff today.   Discussions with Specialists or Other Care Team Provider: None     Education and Discussions with Family / Patient:  will update spouse.     Current Length of Stay: 2 day(s)  Current Patient Status: Inpatient   Certification Statement: The patient will continue to require additional inpatient hospital stay due to plan for HD today, pending safe dispo , PT/OT eval  Discharge Plan: Anticipate discharge tomorrow to discharge location to be determined pending rehab evaluations.    Code Status: Level 1 - Full Code    Subjective   Seen and examined. No acute events overnight. States he is feeling well and wants to go home. Denies chest pain, SOB, N/V/D    Objective :  Temp:  [97.3 °F (36.3 °C)-97.7 °F (36.5 °C)] 97.3 °F (36.3 °C)  HR:  [75-91] 85  BP: ()/(52-83) 162/83  Resp:  [17-18] 17  SpO2:  [90 %-99 %] 94 %  O2 Device: None (Room air)  Nasal Cannula O2 Flow Rate (L/min):  [2 L/min] 2 L/min    Body mass index is 21.31 kg/m².     Input and Output Summary (last 24 hours):     Intake/Output Summary (Last 24 hours) at 3/7/2025 0752  Last data filed at 3/6/2025 1700  Gross per 24 hour   Intake 700 ml   Output 4500 ml   Net -3800 ml       Physical  Exam  Constitutional:       General: He is not in acute distress.     Appearance: He is ill-appearing (chronically).   HENT:      Head: Normocephalic and atraumatic.      Nose: Nose normal.      Mouth/Throat:      Mouth: Mucous membranes are moist.   Eyes:      Conjunctiva/sclera: Conjunctivae normal.   Cardiovascular:      Rate and Rhythm: Normal rate.      Heart sounds: Normal heart sounds.   Pulmonary:      Effort: Pulmonary effort is normal.      Breath sounds: Rales present.      Comments: On room air with saturations 95%  Abdominal:      General: There is no distension.      Palpations: Abdomen is soft.      Tenderness: There is no abdominal tenderness.   Musculoskeletal:      Right lower leg: Edema present.      Left lower leg: Edema present.      Comments: Trace edema   Skin:     General: Skin is warm and dry.   Neurological:      Mental Status: Mental status is at baseline.   Psychiatric:         Mood and Affect: Mood normal.       Lines/Drains:  Lines/Drains/Airways       Active Status       Name Placement date Placement time Site Days    HD Permanent Double Catheter 01/27/25  1441  Internal jugular  38                            Lab Results: I have reviewed the following results:   Results from last 7 days   Lab Units 03/07/25  0713   WBC Thousand/uL 5.85   HEMOGLOBIN g/dL 8.7*   HEMATOCRIT % 27.7*   PLATELETS Thousands/uL 118*   SEGS PCT % 68   LYMPHO PCT % 9*   MONO PCT % 12   EOS PCT % 9*     Results from last 7 days   Lab Units 03/07/25  0713 03/06/25  0454   SODIUM mmol/L 134* 138   POTASSIUM mmol/L 3.9 3.8   CHLORIDE mmol/L 99 100   CO2 mmol/L 30 31   BUN mg/dL 23 39*   CREATININE mg/dL 3.77* 5.65*   ANION GAP mmol/L 5 7   CALCIUM mg/dL 8.0* 8.0*   ALBUMIN g/dL  --  2.6*   TOTAL BILIRUBIN mg/dL  --  0.79   ALK PHOS U/L  --  89   ALT U/L  --  <3*   AST U/L  --  17   GLUCOSE RANDOM mg/dL 95 103                 Results from last 7 days   Lab Units 03/05/25  0253   LACTIC ACID mmol/L 1.2       Recent  Cultures (last 7 days):   Results from last 7 days   Lab Units 03/05/25  0253   BLOOD CULTURE  No Growth at 24 hrs.  No Growth at 24 hrs.       Imaging Results Review: I reviewed radiology reports from this admission including: chest xray.  Other Study Results Review: No additional pertinent studies reviewed.    Last 24 Hours Medication List:     Current Facility-Administered Medications:     amLODIPine (NORVASC) tablet 5 mg, Daily    apixaban (ELIQUIS) tablet 2.5 mg, BID    atorvastatin (LIPITOR) tablet 20 mg, Daily With Dinner    carvedilol (COREG) tablet 6.25 mg, BID With Meals    ferrous sulfate tablet 325 mg, Every Other Day    folic acid (FOLVITE) tablet 1 mg, Daily    hydrALAZINE (APRESOLINE) injection 10 mg, Q6H PRN    hydrALAZINE (APRESOLINE) tablet 25 mg, Daily    isosorbide mononitrate (IMDUR) 24 hr tablet 120 mg, Daily    losartan (COZAAR) tablet 100 mg, Daily    melatonin tablet 6 mg, HS    senna-docusate sodium (SENOKOT S) 8.6-50 mg per tablet 1 tablet, BID    Administrative Statements   Today, Patient Was Seen By: Mariaa Rios PA-C    **Please Note: This note may have been constructed using a voice recognition system.**

## 2025-03-07 NOTE — PLAN OF CARE
Problem: Potential for Falls  Goal: Patient will remain free of falls  Description: INTERVENTIONS:  - Educate patient/family on patient safety including physical limitations  - Instruct patient to call for assistance with activity   - Consult OT/PT to assist with strengthening/mobility   - Keep Call bell within reach  - Keep bed low and locked with side rails adjusted as appropriate  - Keep care items and personal belongings within reach  - Initiate and maintain comfort rounds  - Make Fall Risk Sign visible to staff  - Offer Toileting every  Hours, in advance of need  - Initiate/Maintain alarm  - Obtain necessary fall risk management equipment:   - Apply yellow socks and bracelet for high fall risk patients  - Consider moving patient to room near nurses station  Outcome: Progressing     Problem: MOBILITY - ADULT  Goal: Maintain or return to baseline ADL function  Description: INTERVENTIONS:  -  Assess patient's ability to carry out ADLs; assess patient's baseline for ADL function and identify physical deficits which impact ability to perform ADLs (bathing, care of mouth/teeth, toileting, grooming, dressing, etc.)  - Assess/evaluate cause of self-care deficits   - Assess range of motion  - Assess patient's mobility; develop plan if impaired  - Assess patient's need for assistive devices and provide as appropriate  - Encourage maximum independence but intervene and supervise when necessary  - Involve family in performance of ADLs  - Assess for home care needs following discharge   - Consider OT consult to assist with ADL evaluation and planning for discharge  - Provide patient education as appropriate  Outcome: Progressing  Goal: Maintains/Returns to pre admission functional level  Description: INTERVENTIONS:  - Perform AM-PAC 6 Click Basic Mobility/ Daily Activity assessment daily.  - Set and communicate daily mobility goal to care team and patient/family/caregiver.   - Collaborate with rehabilitation services on  mobility goals if consulted  - Perform Range of Motion  times a day.  - Reposition patient every  hours.  - Dangle patient  times a day  - Stand patient  times a day  - Ambulate patient  times a day  - Out of bed to chair  times a day   - Out of bed for meals  times a day  - Out of bed for toileting  - Record patient progress and toleration of activity level   Outcome: Progressing     Problem: PAIN - ADULT  Goal: Verbalizes/displays adequate comfort level or baseline comfort level  Description: Interventions:  - Encourage patient to monitor pain and request assistance  - Assess pain using appropriate pain scale  - Administer analgesics based on type and severity of pain and evaluate response  - Implement non-pharmacological measures as appropriate and evaluate response  - Consider cultural and social influences on pain and pain management  - Notify physician/advanced practitioner if interventions unsuccessful or patient reports new pain  Outcome: Progressing     Problem: INFECTION - ADULT  Goal: Absence or prevention of progression during hospitalization  Description: INTERVENTIONS:  - Assess and monitor for signs and symptoms of infection  - Monitor lab/diagnostic results  - Monitor all insertion sites, i.e. indwelling lines, tubes, and drains  - Monitor endotracheal if appropriate and nasal secretions for changes in amount and color  - [place] appropriate cooling/warming therapies per order  - Administer medications as ordered  - Instruct and encourage patient and family to use good hand hygiene technique  - Identify and instruct in appropriate isolation precautions for identified infection/condition  Outcome: Progressing     Problem: SAFETY ADULT  Goal: Patient will remain free of falls  Description: INTERVENTIONS:  - Educate patient/family on patient safety including physical limitations  - Instruct patient to call for assistance with activity   - Consult OT/PT to assist with strengthening/mobility   - Keep  Call bell within reach  - Keep bed low and locked with side rails adjusted as appropriate  - Keep care items and personal belongings within reach  - Initiate and maintain comfort rounds  - Make Fall Risk Sign visible to staff  - Offer Toileting every  Hours, in advance of need  - Initiate/Maintain alarm  - Obtain necessary fall risk management equipment:   - Apply yellow socks and bracelet for high fall risk patients  - Consider moving patient to room near nurses station  Outcome: Progressing  Goal: Maintain or return to baseline ADL function  Description: INTERVENTIONS:  -  Assess patient's ability to carry out ADLs; assess patient's baseline for ADL function and identify physical deficits which impact ability to perform ADLs (bathing, care of mouth/teeth, toileting, grooming, dressing, etc.)  - Assess/evaluate cause of self-care deficits   - Assess range of motion  - Assess patient's mobility; develop plan if impaired  - Assess patient's need for assistive devices and provide as appropriate  - Encourage maximum independence but intervene and supervise when necessary  - Involve family in performance of ADLs  - Assess for home care needs following discharge   - Consider OT consult to assist with ADL evaluation and planning for discharge  - Provide patient education as appropriate  Outcome: Progressing  Goal: Maintains/Returns to pre admission functional level  Description: INTERVENTIONS:  - Perform AM-PAC 6 Click Basic Mobility/ Daily Activity assessment daily.  - Set and communicate daily mobility goal to care team and patient/family/caregiver.   - Collaborate with rehabilitation services on mobility goals if consulted  - Perform Range of Motion  times a day.  - Reposition patient every  hours.  - Dangle patient  times a day  - Stand patient  times a day  - Ambulate patient  times a day  - Out of bed to chair  times a day   - Out of bed for meals times a day  - Out of bed for toileting  - Record patient progress  and toleration of activity level   Outcome: Progressing     Problem: DISCHARGE PLANNING  Goal: Discharge to home or other facility with appropriate resources  Description: INTERVENTIONS:  - Identify barriers to discharge w/patient and caregiver  - Arrange for needed discharge resources and transportation as appropriate  - Identify discharge learning needs (meds, wound care, etc.)  - Arrange for interpretive services to assist at discharge as needed  - Refer to Case Management Department for coordinating discharge planning if the patient needs post-hospital services based on physician/advanced practitioner order or complex needs related to functional status, cognitive ability, or social support system  Outcome: Progressing     Problem: Knowledge Deficit  Goal: Patient/family/caregiver demonstrates understanding of disease process, treatment plan, medications, and discharge instructions  Description: Complete learning assessment and assess knowledge base.  Interventions:  - Provide teaching at level of understanding  - Provide teaching via preferred learning methods  Outcome: Progressing     Problem: CARDIOVASCULAR - ADULT  Goal: Maintains optimal cardiac output and hemodynamic stability  Description: INTERVENTIONS:  - Monitor I/O, vital signs and rhythm  - Monitor for S/S and trends of decreased cardiac output  - Administer and titrate ordered vasoactive medications to optimize hemodynamic stability  - Assess quality of pulses, skin color and temperature  - Assess for signs of decreased coronary artery perfusion  - Instruct patient to report change in severity of symptoms  Outcome: Progressing     Problem: RESPIRATORY - ADULT  Goal: Achieves optimal ventilation and oxygenation  Description: INTERVENTIONS:  - Assess for changes in respiratory status  - Assess for changes in mentation and behavior  - Position to facilitate oxygenation and minimize respiratory effort  - Oxygen administered by appropriate delivery if  ordered  - Initiate smoking cessation education as indicated  - Encourage broncho-pulmonary hygiene including cough, deep breathe, Incentive Spirometry  - Assess the need for suctioning and aspirate as needed  - Assess and instruct to report SOB or any respiratory difficulty  - Respiratory Therapy support as indicated  Outcome: Progressing     Problem: Nutrition/Hydration-ADULT  Goal: Nutrient/Hydration intake appropriate for improving, restoring or maintaining nutritional needs  Description: Monitor and assess patient's nutrition/hydration status for malnutrition. Collaborate with interdisciplinary team and initiate plan and interventions as ordered.  Monitor patient's weight and dietary intake as ordered or per policy. Utilize nutrition screening tool and intervene as necessary. Determine patient's food preferences and provide high-protein, high-caloric foods as appropriate.     INTERVENTIONS:  - Monitor oral intake, urinary output, labs, and treatment plans  - Assess nutrition and hydration status and recommend course of action  - Evaluate amount of meals eaten  - Assist patient with eating if necessary   - Allow adequate time for meals  - Recommend/ encourage appropriate diets, oral nutritional supplements, and vitamin/mineral supplements  - Order, calculate, and assess calorie counts as needed  - Recommend, monitor, and adjust tube feedings and TPN/PPN based on assessed needs  - Assess need for intravenous fluids  - Provide specific nutrition/hydration education as appropriate  - Include patient/family/caregiver in decisions related to nutrition  Outcome: Progressing     Problem: METABOLIC, FLUID AND ELECTROLYTES - ADULT  Goal: Electrolytes maintained within normal limits  Description: INTERVENTIONS:  - Monitor labs and assess patient for signs and symptoms of electrolyte imbalances  - Administer electrolyte replacement as ordered  - Monitor response to electrolyte replacements, including repeat lab results as  appropriate  - Instruct patient on fluid and nutrition as appropriate  Outcome: Progressing  Goal: Fluid balance maintained  Description: INTERVENTIONS:  - Monitor labs   - Monitor I/O and WT  - Instruct patient on fluid and nutrition as appropriate  - Assess for signs & symptoms of volume excess or deficit  Outcome: Progressing     Problem: Prexisting or High Potential for Compromised Skin Integrity  Goal: Skin integrity is maintained or improved  Description: INTERVENTIONS:  - Identify patients at risk for skin breakdown  - Assess and monitor skin integrity  - Assess and monitor nutrition and hydration status  - Monitor labs   - Assess for incontinence   - Turn and reposition patient  - Assist with mobility/ambulation  - Relieve pressure over bony prominences  - Avoid friction and shearing  - Provide appropriate hygiene as needed including keeping skin clean and dry  - Evaluate need for skin moisturizer/barrier cream  - Collaborate with interdisciplinary team   - Patient/family teaching  - Consider wound care consult   Outcome: Progressing

## 2025-03-07 NOTE — ASSESSMENT & PLAN NOTE
"Patient presents today (3/5) from dialysis center with reports of shortness of breath which began during the session  Patient noted associated \"twinge\" of chest pain which has since subsided  Patient denies lightheadedness, dizziness, changes in vision or hearing, loss of consciousness  In ED, patient desaturating into the mid 80s on room air requiring up to 3 L nasal cannula oxygen which is not his baseline  Plain film imaging revealed pulmonary edema  Index suspicion for pulmonary edema in the setting of incomplete HD session  Patient also with history of systolic congestive heart failure with EF 20%  It appears that his dry weight is 55 to 56 kg-presents at 59.9 kg    Wean oxygen to baseline - Now on room air with adequate saturations   Treat underlying contributing factors-ESRD/HFrEF (see below)  Spot check O2  Low index suspicion for PE given systemic anticoagulation with compliance and improvement in symptoms w/ fluid management   In absence of SIRS criteria with low index suspicion for infectious infiltrate  COVID flu negative    "

## 2025-03-07 NOTE — ASSESSMENT & PLAN NOTE
[facility] [place] Chuck: DAVIDF  Access: Left TDC  Target weight: 52 kg currently well above but bed weight  HD 3/6 for 3.9L UF. Preweight 61 kg, post weight 56.1 kg.   - Plan for HD/UF today 3/7.

## 2025-03-07 NOTE — OCCUPATIONAL THERAPY NOTE
Occupational Therapy Evaluation     Patient Name: Matthew Alvarez  Today's Date: 3/7/2025  Problem List  Principal Problem:    Acute hypoxemic respiratory failure (HCC)  Active Problems:    Nonischemic cardiomyopathy (HCC)    Primary hypertension    Protein calorie malnutrition (HCC)    Acute on chronic systolic heart failure (HCC)    Anemia due to chronic kidney disease, on chronic dialysis (HCC)    ESRD (end stage renal disease) on dialysis (HCC)    Paroxysmal atrial fibrillation (HCC)    Chronic kidney disease-mineral and bone disorder (CKD-MBD)    Electrolyte abnormality    Past Medical History  Past Medical History:   Diagnosis Date    Acute hemodialysis patient ([facility]) 12/14/2024    CAD (coronary artery disease)     HFrEF (heart failure with reduced ejection fraction) ([facility]) 09/2021    Hypertension     Rheumatoid factor positive 09/2021    Stage 3 chronic kidney disease (HCC)     Tobacco abuse      Past Surgical History  Past Surgical History:   Procedure Laterality Date    APPENDECTOMY  1965    CARDIAC CATHETERIZATION  9/16/2021    INGUINAL HERNIA REPAIR Right 1991    IR TEMPORARY DIALYSIS CATHETER PLACEMENT  1/22/2025    IR THORACENTESIS  12/18/2024    IR THORACENTESIS  12/23/2024    IR TUNNELED DIALYSIS CATHETER PLACEMENT  11/14/2024    IR TUNNELED DIALYSIS CATHETER PLACEMENT  1/27/2025 03/07/25 1132   Note Type   Note type Evaluation   Pain Assessment   Pain Assessment Tool 0-10   Pain Score No Pain   Restrictions/Precautions   Other Precautions Cognitive;Chair Alarm;Bed Alarm;Fall Risk   Home Living   Type of Home House  (1 BRODY L HR)   Home Layout Multi-level;Performs ADLs on one level;Able to live on main level with bedroom/bathroom   Bathroom Shower/Tub Walk-in shower   Bathroom Toilet Standard   Bathroom Equipment Shower chair   Home Equipment Walker;Cane;Wheelchair-manual;Hospital bed   Additional Comments Arrives to [facility] from [facility] where he had been receiving STR. Uses RW for functional  "mobility.   Prior Function   Level of [place] Needs assistance with ADLs;Needs assistance with functional mobility;Needs assistance with IADLS   Lives With Spouse   Receives Help From Family   IADLs Family/Friend/Other provides transportation;Family/Friend/Other provides meals;Family/Friend/Other provides medication management   Falls in the last 6 months 1 to 4   Comments Per CM note who spoke with [facility] staff: Pt is independent for bed mobility, limited assist for eating, limited assist for transfers, max assist for ambulation, max assist for dressing, extensive assist for toileting   Subjective   Subjective \"Can you scratch my back?\"   ADL   UB Dressing Assistance 5  Supervision/Setup   UB Dressing Deficit Verbal cueing;Supervision/safety;Increased time to complete   LB Dressing Assistance 5  Supervision/Setup   LB Dressing Deficit Don/doff R sock;Don/doff L sock   Additional Comments Pt able to doff/don the L sock while seated in chair. Overall, anticipate pt can complete UB/LB ADLs with setup and increased time. Would require minimal assistance for ADLs in standing due to balance deficits.   Bed Mobility   Additional Comments Not assessed, pt received sitting OOB in recliner chair upon start of session.   Transfers   Sit to Stand 5  Supervision   Additional items Verbal cues   Stand to Sit 5  Supervision   Additional items Verbal cues   Stand pivot 4  Minimal assistance   Additional items Assist x 1;Increased time required;Verbal cues   Additional Comments Using RW   Functional Mobility   Functional Mobility 4  Minimal assistance   Additional Comments Pt participated in short functional distance in room with min assist x 1 and RW.   Balance   Static Sitting Good   Dynamic Sitting Fair +   Static Standing Fair   Dynamic Standing Fair -   Ambulatory Poor +  (wtih RW)   Activity Tolerance   Activity Tolerance   (Pt limited by poor safety awareness)   Medical Staff Made Aware Mariaa VAZQUEZ   Nurse " Made Aware Carolina CHACON Assessment   RUE Assessment WFL  (Grossly 3+/5)   LUE Assessment   LUE Assessment WFL  (Grossly 3+/5)   Hand Function   Gross Motor Coordination Functional   Fine Motor Coordination Functional   Sensation   Light Touch   (no BUE deficits)   Cognition   Overall Cognitive Status Impaired   Arousal/Participation Alert;Cooperative   Attention Within functional limits   Orientation Level Oriented to person;Oriented to place  (Partially to time with year, not month. Partially oriented to situation)   Memory Decreased recall of recent events   Following Commands Follows one step commands with increased time or repetition   Comments Poor safety awareness, limited insight   Assessment   Limitation Decreased ADL status;Decreased UE strength;Decreased Safe judgement during ADL;Decreased cognition;Decreased endurance;Decreased self-care trans;Decreased high-level ADLs   Prognosis Fair   Assessment Pt is a 70 y.o. male, admitted to [facility] 3/5/2025 d/t experiencing fever. Dx: Acute hypoxemic respiratory failure. Pt with PMHx impacting their performance during ADL tasks, including: acute hemodialysis patient, CAD, HFrEF, HTN, rheumatoid factor positive, CKD 3, tobacco abuse, R inguinal repair, nonischemic cardiomyopathy, acute on chronic systolic heart failure, ESRD on dialysis, protein calorie malnutrition, paroxysmal Afib. Prior to admission to the hospital Pt was performing ADLs with physical assistance. IADLs with physical assistance. Functional transfers/ambulation with physical assistance. Cognitive status PTA was WFL. OT order placed to assess Pt's ADLs, cognitive status, and performance during functional tasks in order to maximize safety and independence while making most appropriate d/c recommendations. Pt's clinical presentation is currently unstable/unpredictable given new onset deficits that affect Pt's occupational performance and ability to safely return to OF including decrease activity  tolerance, decrease standing balance, decrease performance during ADL tasks, decrease cognition, decrease safety awareness , increase impulsiveness, decrease UB MS, decrease generalized strength, decrease activity engagement, decrease performance during functional transfers, steps to enter home, limited family support, high fall risk, and limited insight to deficits combined with medical complications of abnormal renal lab values, abnormal H&H, abnormal CBC, abnormal sodium values, and need for input for mobility technique/safety. Personal factors affecting Pt at time of initial evaluation include: step(s) to enter environment, multi-level environment, limited home support, inability to perform IADLs, inability to navigate community distances, limited insight into impairments, decreased initiation and engagement, recent fall(s)/fall history, and questionable non-compliance. Pt will benefit from continued skilled OT services to address deficits as defined above and to maximize level independence/participation during ADLs and functional tasks to facilitate return toward PLOF and improved quality of life. From an occupational therapy standpoint, Level II (Moderate Resource Intensity is recommended upon d/c.   Goals   Patient Goals to go home   Plan   Treatment Interventions ADL retraining;Functional transfer training;UE strengthening/ROM;Endurance training;Equipment evaluation/education;Patient/family training;Cognitive reorientation;Compensatory technique education;Continued evaluation;Activityengagement;Energy conservation   Goal Expiration Date 03/21/25   OT Frequency 2-3x/wk   Discharge Recommendation   Rehab Resource Intensity Level, OT II (Moderate Resource Intensity)   AM-PAC Daily Activity Inpatient   Lower Body Dressing 3   Bathing 3   Toileting 2   Upper Body Dressing 3   Grooming 3   Eating 4   Daily Activity Raw Score 18   Daily Activity Standardized Score (Calc for Raw Score >=11) 38.66   AM-PAC Applied  Cognition Inpatient   Following a Speech/Presentation 2   Understanding Ordinary Conversation 3   Taking Medications 2   Remembering Where Things Are Placed or Put Away 3   Remembering List of 4-5 Errands 2   Taking Care of Complicated Tasks 2   Applied Cognition Raw Score 14   Applied Cognition Standardized Score 32.02   End of Consult   Patient Position at End of Consult Bedside chair;Bed/Chair alarm activated;All needs within reach     The patient's raw score on the AM-PAC Daily Activity Inpatient Short Form is 18. A raw score of less than 19 suggests the patient may benefit from discharge to post-acute rehabilitation services. Please refer to the recommendation of the Occupational Therapist for safe discharge planning.    Pt goals to be met by 3/21/2025:    Pt will demonstrate ability to complete grooming/hygiene tasks @ mod I after set-up.  Pt will demonstrate ability to complete supine<>sit @ mod I in order to increase safety and independence during ADL tasks.  Pt will demonstrate ability to complete UB ADLs including washing/dressing @ mod I in order to increase performance and participation during meaningful tasks  Pt will demonstrate ability to complete LB dressing @ mod I in order to increase safety and independence during meaningful tasks.   Pt will demonstrate ability to tyler/doff socks/shoes while sitting EOB/chair @ mod I in order to increase safety and independence during meaningful tasks.   Pt will demonstrate ability to complete toileting tasks including CM and pericare @ min assist in order to increase safety and independence during meaningful tasks.  Pt will demonstrate ability to complete EOB, chair, toilet/commode transfers @ supervision in order to increase performance and participation during functional tasks.  Pt will demonstrate ability to stand for 10 minutes while maintaining F+ balance with use of RW for UB support PRN.  Pt will demonstrate ability to tolerate 10 minute OT session with no  vc'ing for deep breathing or use of energy conservation techniques in order to increase activity tolerance during functional tasks.   Pt will demonstrate Good carryover of use of energy conservation/compensatory strategies during ADLs and functional tasks in order to increase safety and reduce risk for falls.   Pt will demonstrate Good attention and participation in continued evaluation of functional ambulation house hold distances in order to assist with safe d/c planning.  Pt will attend to continued cognitive assessments 100% of the time in order to provide most appropriate d/c recommendations.   Pt will follow 100% simple 2-step commands and be A&O x4 consistently with environmental cues to increase participation in functional activities.   Pt will identify 3 areas of interest/hobbies and 1 intervention on how to incorporate into daily life in order to increase interaction with environment and peers as well as increase participation in meaningful tasks.   Pt will demonstrate 100% carryover of BUE HEP in order to increase BUE MS and increase performance during functional tasks upon d/c home.    Hal Jean, MS, OTR/L

## 2025-03-07 NOTE — CASE MANAGEMENT
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     Moderate protein-calorie malnutrition (HCC) 2025     Chronic kidney disease-mineral and bone disorder (CKD-MBD) 2025     Paroxysmal atrial fibrillation (HCC) 2025     Bacteremia 2025     COVID 2025     Acute hemodialysis patient ([facility]) 2025     Pleural effusion 2024     Secondary hyperparathyroidism of renal origin ([facility]) 12/15/2024     ESRD (end stage renal disease) on dialysis ([facility]) 2024     Ambulatory dysfunction 2024     Thrombocytopenia ([facility]) 2024     Renal failure 11/15/2024     Goals of care, counseling/discussion 11/15/2024     Anemia due to chronic kidney disease, on chronic dialysis ([facility]) 11/15/2024     Anemia of renal disease 11/15/2024     Vitamin D deficiency, unspecified 11/15/2024     Chronic systolic (congestive) heart failure ([facility]) 2024     Oliguria 2024     Encounter for hemodialysis for ESRD ([facility]) 2024     Hyponatremia 2024     Dilated cardiomyopathy (HCC) 2024     Elevated liver transaminase level 11/10/2024     Acute on chronic systolic heart failure (HCC) 2024     Rheumatoid factor positive 2021     Protein calorie malnutrition (HCC) 2021     Elevated troponin 2021     Tobacco abuse 2021     Nonischemic cardiomyopathy (HCC) 2021     Primary hypertension 2021       LOS (days): 2  Geometric Mean LOS (GMLOS) (days): 3.9  Days to GMLOS:2.1      OBJECTIVE:  Risk of Unplanned Readmission Score: 37.32         Current admission status: Inpatient   Preferred Pharmacy:   [id number] [place] - [address]  [address] [place] [address]  Phone: [phone] Fax: [phone]    [facility] - [place] - [address]  [address]  [place] [address]  Phone: [phone] Fax: [phone]    Primary Care Provider: Olive Rodriguez MD    Primary Insurance: [email or web address] REP  Secondary Insurance:     DISCHARGE DETAILS:        CM initiating prior auth through pts insurance

## 2025-03-07 NOTE — ASSESSMENT & PLAN NOTE
Lab Results   Component Value Date    EGFR 15 03/07/2025    EGFR 9 03/06/2025    EGFR 12 03/05/2025    CREATININE 3.77 (H) 03/07/2025    CREATININE 5.65 (H) 03/06/2025    CREATININE 4.56 (H) 03/05/2025

## 2025-03-08 VITALS
DIASTOLIC BLOOD PRESSURE: 64 MMHG | HEIGHT: 66 IN | HEART RATE: 78 BPM | OXYGEN SATURATION: 97 % | BODY MASS INDEX: 19.06 KG/M2 | TEMPERATURE: 97.3 F | RESPIRATION RATE: 16 BRPM | WEIGHT: 118.61 LBS | SYSTOLIC BLOOD PRESSURE: 110 MMHG

## 2025-03-08 LAB
ANION GAP SERPL CALCULATED.3IONS-SCNC: 5 MMOL/L (ref 4–13)
BUN SERPL-MCNC: 12 MG/DL (ref 5–25)
CALCIUM SERPL-MCNC: 7.7 MG/DL (ref 8.4–10.2)
CHLORIDE SERPL-SCNC: 98 MMOL/L (ref 96–108)
CO2 SERPL-SCNC: 29 MMOL/L (ref 21–32)
CREAT SERPL-MCNC: 2.79 MG/DL (ref 0.6–1.3)
ERYTHROCYTE [DISTWIDTH] IN BLOOD BY AUTOMATED COUNT: 18.7 % (ref 11.6–15.1)
GFR SERPL CREATININE-BSD FRML MDRD: 21 ML/MIN/1.73SQ M
GLUCOSE SERPL-MCNC: 87 MG/DL (ref 65–140)
HCT VFR BLD AUTO: 26.5 % (ref 36.5–49.3)
HGB BLD-MCNC: 8.3 G/DL (ref 12–17)
MAGNESIUM SERPL-MCNC: 1.8 MG/DL (ref 1.9–2.7)
MCH RBC QN AUTO: 30.4 PG (ref 26.8–34.3)
MCHC RBC AUTO-ENTMCNC: 31.3 G/DL (ref 31.4–37.4)
MCV RBC AUTO: 97 FL (ref 82–98)
PLATELET # BLD AUTO: 116 THOUSANDS/UL (ref 149–390)
PMV BLD AUTO: 9.5 FL (ref 8.9–12.7)
POTASSIUM SERPL-SCNC: 3.8 MMOL/L (ref 3.5–5.3)
RBC # BLD AUTO: 2.73 MILLION/UL (ref 3.88–5.62)
SARS-COV-2 RNA RESP QL NAA+PROBE: NEGATIVE
SODIUM SERPL-SCNC: 132 MMOL/L (ref 135–147)
WBC # BLD AUTO: 5.51 THOUSAND/UL (ref 4.31–10.16)

## 2025-03-08 PROCEDURE — 99232 SBSQ HOSP IP/OBS MODERATE 35: CPT

## 2025-03-08 PROCEDURE — 85027 COMPLETE CBC AUTOMATED: CPT

## 2025-03-08 PROCEDURE — 99239 HOSP IP/OBS DSCHRG MGMT >30: CPT

## 2025-03-08 PROCEDURE — 83735 ASSAY OF MAGNESIUM: CPT

## 2025-03-08 PROCEDURE — 80048 BASIC METABOLIC PNL TOTAL CA: CPT

## 2025-03-08 PROCEDURE — 87635 SARS-COV-2 COVID-19 AMP PRB: CPT

## 2025-03-08 RX ORDER — TORSEMIDE 100 MG/1
TABLET ORAL
Start: 2025-03-08

## 2025-03-08 RX ORDER — MAGNESIUM SULFATE HEPTAHYDRATE 40 MG/ML
2 INJECTION, SOLUTION INTRAVENOUS ONCE
Status: COMPLETED | OUTPATIENT
Start: 2025-03-08 | End: 2025-03-08

## 2025-03-08 RX ADMIN — CARVEDILOL 6.25 MG: 6.25 TABLET, FILM COATED ORAL at 08:43

## 2025-03-08 RX ADMIN — HYDRALAZINE HYDROCHLORIDE 25 MG: 25 TABLET ORAL at 08:43

## 2025-03-08 RX ADMIN — LOSARTAN POTASSIUM 100 MG: 50 TABLET, FILM COATED ORAL at 08:43

## 2025-03-08 RX ADMIN — MAGNESIUM SULFATE HEPTAHYDRATE 2 G: 40 INJECTION, SOLUTION INTRAVENOUS at 11:28

## 2025-03-08 RX ADMIN — ISOSORBIDE MONONITRATE 120 MG: 60 TABLET, EXTENDED RELEASE ORAL at 08:43

## 2025-03-08 RX ADMIN — AMLODIPINE BESYLATE 5 MG: 5 TABLET ORAL at 08:43

## 2025-03-08 RX ADMIN — FOLIC ACID 1 MG: 1 TABLET ORAL at 08:43

## 2025-03-08 RX ADMIN — APIXABAN 2.5 MG: 2.5 TABLET, FILM COATED ORAL at 08:43

## 2025-03-08 RX ADMIN — SENNOSIDES AND DOCUSATE SODIUM 1 TABLET: 8.6; 5 TABLET ORAL at 08:43

## 2025-03-08 NOTE — ASSESSMENT & PLAN NOTE
OP regimen: Amlodipine 5 mg daily/carvedilol 6.25 mg twice a day/torsemide 100 mg daily?  Lopressor 25 every 12 hours/hydralazine 25 mg in the morning/isosorbide 120 mg daily/valsartan 80 mg daily  IP regimen: Amlodipine 5 mg daily, carvedilol 6.25 mg daily, hydralazine 25 mg three times daily, isosorbide 120 mg daily, metoprolol 25 mg twice daily, valsartan 100 mg daily. .     As of March 8, the patient's blood pressure is stable and no changes are made at this time

## 2025-03-08 NOTE — ASSESSMENT & PLAN NOTE
Patient presents 3/5 from dialysis center with reports of shortness of breath which began during the session, also had twinge of chest pain which subsided  In ED, patient desaturating into the mid 80s on room air requiring up to 3 L nasal cannula   CXR: pulmonary edema  Suspected pulmonary edema in the setting of incomplete HD session  Patient also with history of systolic congestive heart failure with EF 20%  It appears that his dry weight is 55 to 56 kg-presents at 59.9 kg  Low index suspicion for PE given systemic anticoagulation with compliance and improvement in symptoms w/ fluid management   In absence of SIRS criteria with low index suspicion for infectious infiltrate  COVID flu negative    Wean oxygen to baseline - Now on room air with adequate saturations   Treat underlying contributing factors-ESRD/HFrEF (see below)  Resolved

## 2025-03-08 NOTE — ASSESSMENT & PLAN NOTE
[facility]: MWF  Access: Left TDC  Target weight: 52 kg  Presented with weight of 59.9 kg on bed scale  HD 3/6 for 3.9L UF. Preweight 61 kg, post weight 56.1 kg.   HD on March 7 for 3 hours with 2.4 L removed and post weight 53.8 kg  Patient is feeling well today and appears relatively euvolemic and denies shortness of breath and is saturating well on room air.  I will defer extra dialysis treatment for today.  The patient appears stable for discharge to his outpatient dialysis facility for HD on Monday from a nephrology perspective.  If the patient is still inpatient we will have his next dialysis session be on Monday.

## 2025-03-08 NOTE — ASSESSMENT & PLAN NOTE
-Related to volume overload please see above  - Appears much improved, will defer further HD treatment today

## 2025-03-08 NOTE — DISCHARGE SUMMARY
Discharge Summary - Hospitalist   Name: Matthew Alvarez 70 y.o. male I MRN: [id number]  Unit/Bed#: -01 I Date of Admission: 3/5/2025   Date of Service: 3/8/2025 I Hospital Day: 3     Assessment & Plan  Acute hypoxemic respiratory failure (HCC)  Patient presents 3/5 from dialysis center with reports of shortness of breath which began during the session, also had twinge of chest pain which subsided  In ED, patient desaturating into the mid 80s on room air requiring up to 3 L nasal cannula   CXR: pulmonary edema  Suspected pulmonary edema in the setting of incomplete HD session  Patient also with history of systolic congestive heart failure with EF 20%  It appears that his dry weight is 55 to 56 kg-presents at 59.9 kg  Low index suspicion for PE given systemic anticoagulation with compliance and improvement in symptoms w/ fluid management   In absence of SIRS criteria with low index suspicion for infectious infiltrate  COVID flu negative    Wean oxygen to baseline - Now on room air with adequate saturations   Treat underlying contributing factors-ESRD/HFrEF (see below)  Resolved    Nonischemic cardiomyopathy (HCC)  Hx of gross noncompliance with medication regimen and recommendation for LifeVest (usually doesn't wear)  Since, has been maintained in skilled nursing facility with strict adherence to medication regimen however still refuses LifeVest  Most recent echocardiogram reviewed (1/2025)-global hypokinesis with EF of 20%  PTA: Coreg, Imdur  Follows regularly with cardiology outpatient setting  See Acute CHF exacerbation for further   Acute on chronic systolic heart failure (HCC)  Wt Readings from Last 3 Encounters:   03/05/25 59.9 kg (132 lb 0.9 oz)   03/05/25 64.9 kg (143 lb 1.3 oz)   02/20/25 53.3 kg (117 lb 9.6 oz)   Hx of gross noncompliance with medication regimen and recommendation for LifeVest (usually doesn't wear)  Since, has been maintained in skilled nursing facility with strict adherence to  medication regimen however still refuses LifeVest  Most recent echocardiogram reviewed (1/2025)-global hypokinesis with EF of 20%  PTA: Coreg, Imdur  Follows regularly with cardiology outpatient setting  Appears volume overloaded upon presentation with weight up from previous, pitting edema, pulmonary edema  Volume unload with HD/UF per nephrology recommendations  Nephrology consulted  Has reached euvolemic state   ESRD (end stage renal disease) on dialysis (HCC)  Lab Results   Component Value Date    EGFR 21 03/08/2025    EGFR 15 03/07/2025    EGFR 9 03/06/2025    CREATININE 2.79 (H) 03/08/2025    CREATININE 3.77 (H) 03/07/2025    CREATININE 5.65 (H) 03/06/2025   Follows regularly with nephrology in the outpatient setting and undergoes HD MWF  Was unable to undergo dialysis prior to admission  Nephrology consulted-significantly above baseline weight  S/p ultrafiltration 3/7 and 3/8  Nephrology recommends resuming normal schedule and cleared for DC from their perspective   Primary hypertension  PTA: amldodpine 5mg daily, imdur 120mg daily, carvedilol 6.25mg BID, hydralazine 25mg daily, demadex 100mg on non-dialysis days, valsartan 80mg daily   Elevated systolic blood pressure upon presentation  Multifactorial-missed medications in addition volume overloaded  Improved with UF  Protein calorie malnutrition (HCC)  Malnutrition Findings:    Bitemporal wasting, superior clavicular muscle wasting    BMI Findings:           Body mass index is 21.31 kg/m².     Paroxysmal atrial fibrillation (HCC)  Rate controlled and systemically anticoagulated  Continue beta-blocker and Eliquis  Anemia due to chronic kidney disease, on chronic dialysis (HCC)  Lab Results   Component Value Date    EGFR 21 03/08/2025    EGFR 15 03/07/2025    EGFR 9 03/06/2025    CREATININE 2.79 (H) 03/08/2025    CREATININE 3.77 (H) 03/07/2025    CREATININE 5.65 (H) 03/06/2025   Hemoglobin today 8.4, baseline between 7 and 8  Iron panel reviewed   Continue to  monitor  Electrolyte abnormality  Mg 1.8  Replete with Mg 2g  Continue to monitor electrolytes and replete as indicated      Medical Problems       Resolved Problems  Date Reviewed: 1/22/2025          Resolved    Coronary artery disease involving native coronary artery of native heart without angina pectoris 3/5/2025     Resolved by  Chris Nolasco,           MESSAGE TO PCP (Olive Rodriguez MD) FOR FOLLOW UP:   Thank you for allowing us to participate in the care of your patient, Matthew Alvarez, who was hospitalized from 3/5/2025 through 03/08/25 with the admitting diagnosis of acute respiratory failure secondary to pulmonary edema.  Thought to be secondary to missed dialysis.  Given ultrafiltration and has improved to baseline volume status.      Medication Changes:  None    If you have any additional questions or would like to discuss further, please feel free to contact me.  Kerri Chisholm PA-C  [facility], Hospitalist, [phone]     Admission Date:   Admission Orders (From admission, onward)       Ordered        03/05/25 1900  INPATIENT ADMISSION  Once            03/05/25 1754  Place in Observation  Once                          Discharge Date: 03/08/25    Consultations During Hospital Stay:  Nephrology    Procedures Performed:   Hemodialysis with ultrafiltration    Significant Findings / Test Results:   XR chest portable - Result Date: 3/5/2025  Prominent diffuse pulmonary interstitial edema, with a moderate left pleural effusion and left lower lobe consolidation.       Incidental Findings:   None     Test Results Pending at Discharge (will require follow up):   None     Complications:  None    Reason for Admission: Acute respiratory failure    Hospital Course:   Matthew Alvarez is a 70 y.o. male patient with past medical history of cardiomyopathy, EF 20%, A-fib, hypertension, ESRD on dialysis who originally presented to the hospital on 3/5/2025 due to shortness of breath and a twinge of  "chest pain.  This was noted at hemodialysis session which was not completed day of presentation.  Found to have pulmonary edema.  Nephrology consulted and 2 days of ultrafiltration were pursued.  His initial hypertension improved and became mildly hypotensive.  Blood pressure medications have been adjusted as above.  He has now reached euvolemia and will be discharged back to SNF.       Please see above list of diagnoses and related plan for additional information.     Condition at Discharge: stable    Discharge Day Visit / Exam:   Subjective:.  Patient endorses that he feels well.  He is up in chair today denying any chest pain or shortness of breath.   Vitals: Blood Pressure: 110/64 (03/08/25 1114)  Pulse: 78 (03/08/25 1114)  Temperature: (!) 97.3 °F (36.3 °C) (03/08/25 1114)  Temp Source: Temporal (03/07/25 2133)  Respirations: 16 (03/08/25 1114)  Height: 5' 6\" (167.6 cm) (03/05/25 1821)  Weight - Scale: 53.8 kg (118 lb 9.7 oz) (03/07/25 1650)  SpO2: 97 % (03/08/25 1114)  Physical Exam  Vitals and nursing note reviewed.   Constitutional:       General: He is not in acute distress.     Appearance: Normal appearance. He is not ill-appearing.   HENT:      Head: Normocephalic and atraumatic.      Nose: No congestion.      Mouth/Throat:      Mouth: Mucous membranes are moist.      Pharynx: Oropharynx is clear.   Eyes:      Conjunctiva/sclera: Conjunctivae normal.   Cardiovascular:      Rate and Rhythm: Normal rate.      Pulses: Normal pulses.      Heart sounds: Normal heart sounds.   Pulmonary:      Effort: Pulmonary effort is normal. No respiratory distress.      Breath sounds: Normal breath sounds.   Abdominal:      General: Bowel sounds are normal.      Palpations: Abdomen is soft.      Tenderness: There is no abdominal tenderness.   Musculoskeletal:         General: Normal range of motion.      Cervical back: Normal range of motion.      Right lower leg: No edema.      Left lower leg: No edema.   Skin:     " General: Skin is warm and dry.   Neurological:      Mental Status: He is alert. Mental status is at baseline.   Psychiatric:         Mood and Affect: Mood normal.         Behavior: Behavior normal.          Discussion with Family:  Attempted to call patient's wife and patient's son, voicemail box is full.     Discharge instructions/Information to patient and family:   See after visit summary for information provided to patient and family.      Provisions for Follow-Up Care:  See after visit summary for information related to follow-up care and any pertinent home health orders.      Mobility at time of Discharge:   Basic Mobility Inpatient Raw Score: 18  JH-HLM Goal: 6: Walk 10 steps or more  JH-HLM Achieved: 6: Walk 10 steps or more  HLM Goal achieved. Continue to encourage appropriate mobility.     Disposition:   Other Skilled Nursing Facility at [facility]    Planned Readmission: none    Discharge Medications:  See after visit summary for reconciled discharge medications provided to patient and/or family.      Administrative Statements   Discharge Statement:  I have spent a total time of 45 minutes in caring for this patient on the day of the visit/encounter. >30 minutes of time was spent on: Diagnostic results, Prognosis, Risks and benefits of tx options, Instructions for management, Patient and family education, Importance of tx compliance, Risk factor reductions, Impressions, Counseling / Coordination of care, Documenting in the medical record, Reviewing / ordering tests, medicine, procedures  , and Communicating with other healthcare professionals .    **Please Note: This note may have been constructed using a voice recognition system**

## 2025-03-08 NOTE — ASSESSMENT & PLAN NOTE
Hx of gross noncompliance with medication regimen and recommendation for LifeVest (usually doesn't wear)  Since, has been maintained in skilled nursing facility with strict adherence to medication regimen however still refuses LifeVest  Most recent echocardiogram reviewed (1/2025)-global hypokinesis with EF of 20%  PTA: Coreg, Imdur  Follows regularly with cardiology outpatient setting  See Acute CHF exacerbation for further

## 2025-03-08 NOTE — ASSESSMENT & PLAN NOTE
Lab Results   Component Value Date    EGFR 21 03/08/2025    EGFR 15 03/07/2025    EGFR 9 03/06/2025    CREATININE 2.79 (H) 03/08/2025    CREATININE 3.77 (H) 03/07/2025    CREATININE 5.65 (H) 03/06/2025   Follows regularly with nephrology in the outpatient setting and undergoes HD MWF  Was unable to undergo dialysis prior to admission  Nephrology consulted-significantly above baseline weight  S/p ultrafiltration 3/7 and 3/8  Nephrology recommends resuming normal schedule and cleared for DC from their perspective

## 2025-03-08 NOTE — PROGRESS NOTES
Progress Note - Nephrology   Name: Matthew Alvarez 70 y.o. male I MRN: [id number]  Unit/Bed#: -01 I Date of Admission: 3/5/2025   Date of Service: 3/8/2025 I Hospital Day: 3     Assessment & Plan  ESRD (end stage renal disease) on dialysis (HCC)  [facility]: MWF  Access: Left TDC  Target weight: 52 kg  Presented with weight of 59.9 kg on bed scale  HD 3/6 for 3.9L UF. Preweight 61 kg, post weight 56.1 kg.   HD on March 7 for 3 hours with 2.4 L removed and post weight 53.8 kg  Patient is feeling well today and appears relatively euvolemic and denies shortness of breath and is saturating well on room air.  I will defer extra dialysis treatment for today.  The patient appears stable for discharge to his outpatient dialysis facility for HD on Monday from a nephrology perspective.  If the patient is still inpatient we will have his next dialysis session be on Monday.  Primary hypertension  OP regimen: Amlodipine 5 mg daily/carvedilol 6.25 mg twice a day/torsemide 100 mg daily?  Lopressor 25 every 12 hours/hydralazine 25 mg in the morning/isosorbide 120 mg daily/valsartan 80 mg daily  IP regimen: Amlodipine 5 mg daily, carvedilol 6.25 mg daily, hydralazine 25 mg three times daily, isosorbide 120 mg daily, metoprolol 25 mg twice daily, valsartan 100 mg daily. .     As of March 8, the patient's blood pressure is stable and no changes are made at this time  Chronic kidney disease-mineral and bone disorder (CKD-MBD)  Magnesium slightly low at 1.8 mg/dL on March 8, continue to monitor  Phosphorus 2.6  mg/dL, 3/7.   Currently on no binders and no restrictions which appears appropriate at this time  Anemia due to chronic kidney disease, on chronic dialysis (HCC)  Current hemoglobin 8.3 , trending up from admission hgb 7.9 g/dL on 3/5  Monitor for now on Mircera as an outpatient  Acute hypoxemic respiratory failure (HCC)  -Related to volume overload please see above  - Appears much improved, will defer further HD  treatment today  Nonischemic cardiomyopathy (HCC)  EF 20% refusing LifeVest per cardiology  Paroxysmal atrial fibrillation (HCC)  Per primary service  - On beta-blocker and Eliquis  Electrolyte abnormality  Continue fluid restriction        Subjective   Brief History of Admission -70-year-old male with a history of ESRD on HD presenting with shortness of breath.  Nephrology was consulted for ESRD on HD.  Patient was seen and examined today he reports feeling very well.  Denies complaints.  Denies shortness of breath.        Objective :  Temp:  [97.3 °F (36.3 °C)-97.7 °F (36.5 °C)] 97.7 °F (36.5 °C)  HR:  [80-92] 92  BP: (122-158)/() 143/68  Resp:  [18-20] 20  SpO2:  [94 %-96 %] 95 %  O2 Device: None (Room air)    Current Weight: Weight - Scale: 59.9 kg (132 lb 0.9 oz)  First Weight: Weight - Scale: 63.2 kg (139 lb 5.3 oz)  I/O         03/06 0701  03/07 0700 03/07 0701  03/08 0700 03/08 0701  03/09 0700    P.O. 240 540     I.V. (mL/kg) 460 (7.7) 450 (7.5)     Total Intake(mL/kg) 700 (11.7) 990 (16.5)     Other 4500 3125     Stool 0      Total Output 4500 3125     Net -3800 -2135            Unmeasured Stool Occurrence 5 x 1 x           Physical Exam  Vitals and nursing note reviewed.   Constitutional:       General: He is not in acute distress.     Appearance: He is well-developed.   HENT:      Head: Normocephalic and atraumatic.   Eyes:      Conjunctiva/sclera: Conjunctivae normal.   Cardiovascular:      Rate and Rhythm: Normal rate and regular rhythm.      Heart sounds: No murmur heard.  Pulmonary:      Effort: Pulmonary effort is normal. No respiratory distress.      Breath sounds: Normal breath sounds.   Abdominal:      Palpations: Abdomen is soft.      Tenderness: There is no abdominal tenderness.   Musculoskeletal:         General: No swelling.      Cervical back: Neck supple.   Skin:     General: Skin is warm and dry.      Capillary Refill: Capillary refill takes less than 2 seconds.   Neurological:       Mental Status: He is alert.   Psychiatric:         Mood and Affect: Mood normal.         Medications:    Current Facility-Administered Medications:     amLODIPine (NORVASC) tablet 5 mg, 5 mg, Oral, Daily, Kendall Guerra MD, 5 mg at 03/08/25 0843    apixaban (ELIQUIS) tablet 2.5 mg, 2.5 mg, Oral, BID, Chris Nolasco DO, 2.5 mg at 03/08/25 0843    atorvastatin (LIPITOR) tablet 20 mg, 20 mg, Oral, Daily With Dinner, Chris Nolasco DO, 20 mg at 03/07/25 1710    carvedilol (COREG) tablet 6.25 mg, 6.25 mg, Oral, BID With Meals, Chris Nolasco DO, 6.25 mg at 03/08/25 0843    ferrous sulfate tablet 325 mg, 325 mg, Oral, Every Other Day, Chris Nolasco DO, 325 mg at 03/07/25 0741    folic acid (FOLVITE) tablet 1 mg, 1 mg, Oral, Daily, Chris Nolasco DO, 1 mg at 03/08/25 0843    hydrALAZINE (APRESOLINE) injection 10 mg, 10 mg, Intravenous, Q6H PRN, Chris Nolasco DO    hydrALAZINE (APRESOLINE) tablet 25 mg, 25 mg, Oral, Daily, Kendall Guerra MD, 25 mg at 03/08/25 0843    isosorbide mononitrate (IMDUR) 24 hr tablet 120 mg, 120 mg, Oral, Daily, Chris Nolasco DO, 120 mg at 03/08/25 0843    losartan (COZAAR) tablet 100 mg, 100 mg, Oral, Daily, Kendall Guerra MD, 100 mg at 03/08/25 0843    melatonin tablet 6 mg, 6 mg, Oral, HS, Chris Nolasco DO, 6 mg at 03/07/25 2105    senna-docusate sodium (SENOKOT S) 8.6-50 mg per tablet 1 tablet, 1 tablet, Oral, BID, Chris Nolasco DO, 1 tablet at 03/08/25 0843      Lab Results: I have reviewed the following results:  Results from last 7 days   Lab Units 03/08/25  0500 03/07/25  0713 03/06/25  0454 03/05/25  1647 03/05/25  0253   WBC Thousand/uL 5.51 5.85 6.09 5.99 5.74   HEMOGLOBIN g/dL 8.3* 8.7* 8.4* 7.9* 7.8*   HEMATOCRIT % 26.5* 27.7* 26.7* 25.2* 25.3*   PLATELETS Thousands/uL 116* 118* 114* 106* 94*   POTASSIUM mmol/L 3.8 3.9 3.8 3.7 3.8   CHLORIDE mmol/L 98 99 100 99 100   CO2 mmol/L 29 30 31 33* 31   BUN mg/dL 12 23 39*  "34* 44*   CREATININE mg/dL 2.79* 3.77* 5.65* 4.56* 5.75*   CALCIUM mg/dL 7.7* 8.0* 8.0* 7.7* 7.7*   MAGNESIUM mg/dL 1.8* 1.7* 1.8* 1.7*  --    PHOSPHORUS mg/dL  --  2.6 3.5 2.7  --    ALBUMIN g/dL  --   --  2.6* 2.4* 2.5*       Administrative Statements     Portions of the record may have been created with voice recognition software. Occasional wrong word or \"sound a like\" substitutions may have occurred due to the inherent limitations of voice recognition software. Read the chart carefully and recognize, using context, where substitutions have occurred.If you have any questions, please contact the dictating provider.  "

## 2025-03-08 NOTE — ASSESSMENT & PLAN NOTE
Wt Readings from Last 3 Encounters:   03/05/25 59.9 kg (132 lb 0.9 oz)   03/05/25 64.9 kg (143 lb 1.3 oz)   02/20/25 53.3 kg (117 lb 9.6 oz)   Hx of gross noncompliance with medication regimen and recommendation for LifeVest (usually doesn't wear)  Since, has been maintained in skilled nursing facility with strict adherence to medication regimen however still refuses LifeVest  Most recent echocardiogram reviewed (1/2025)-global hypokinesis with EF of 20%  PTA: Coreg, Imdur  Follows regularly with cardiology outpatient setting  Appears volume overloaded upon presentation with weight up from previous, pitting edema, pulmonary edema  Volume unload with HD/UF per nephrology recommendations  Nephrology consulted  Has reached euvolemic state

## 2025-03-08 NOTE — ASSESSMENT & PLAN NOTE
Lab Results   Component Value Date    EGFR 21 03/08/2025    EGFR 15 03/07/2025    EGFR 9 03/06/2025    CREATININE 2.79 (H) 03/08/2025    CREATININE 3.77 (H) 03/07/2025    CREATININE 5.65 (H) 03/06/2025   Hemoglobin today 8.4, baseline between 7 and 8  Iron panel reviewed   Continue to monitor

## 2025-03-08 NOTE — CASE MANAGEMENT
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     Moderate protein-calorie malnutrition (HCC) 2025     Chronic kidney disease-mineral and bone disorder (CKD-MBD) 2025     Paroxysmal atrial fibrillation (HCC) 2025     Bacteremia 2025     COVID 2025     Acute hemodialysis patient ([facility]) 2025     Pleural effusion 2024     Secondary hyperparathyroidism of renal origin ([facility]) 12/15/2024     ESRD (end stage renal disease) on dialysis ([facility]) 2024     Ambulatory dysfunction 2024     Thrombocytopenia ([facility]) 2024     Renal failure 11/15/2024     Goals of care, counseling/discussion 11/15/2024     Anemia due to chronic kidney disease, on chronic dialysis ([facility]) 11/15/2024     Anemia of renal disease 11/15/2024     Vitamin D deficiency, unspecified 11/15/2024     Chronic systolic (congestive) heart failure ([facility]) 2024     Oliguria 2024     Encounter for hemodialysis for ESRD ([facility]) 2024     Hyponatremia 2024     Dilated cardiomyopathy (HCC) 2024     Elevated liver transaminase level 11/10/2024     Acute on chronic systolic heart failure (HCC) 2024     Rheumatoid factor positive 2021     Protein calorie malnutrition (HCC) 2021     Elevated troponin 2021     Tobacco abuse 2021     Nonischemic cardiomyopathy (HCC) 2021     Primary hypertension 2021       LOS (days): 3  Geometric Mean LOS (GMLOS) (days): 3.9  Days to GMLOS:1.2      OBJECTIVE:  Risk of Unplanned Readmission Score: 36.85         Current admission status: Inpatient   Preferred Pharmacy:   [id number] [place] - [address] [facility]  [address] [place] [address]  Phone: [phone] Fax: [phone]    [facility] - [place] - [address]  [address]  [place] [address]  Phone: [phone] Fax: [phone]    Primary Care Provider: Olive Rodriguez MD    Primary Insurance: [facility]  Secondary Insurance:     DISCHARGE DETAILS:    Discharge planning discussed with:: Patient  Freedom of Choice: Yes  Comments - Freedom of Choice: pt wishes to return to [facility]     Treatment Team Recommendation: Short Term Rehab  Discharge Destination Plan:: Short Term Rehab  Transport at Discharge : Wheelchair van     Number/Name of Dispatcher: [facility]     ETA of Transport (Date): 03/08/25  ETA of Transport (Time): 1500     IMM Given (Date):: 03/08/25  IMM Given to:: Patient       Medical update provided by provider; Per provider, Pt medically stable for DC.     CM messaged [facility] in [place]. They can take Pt even though auth was denied. They asked for a 3pm transport and COVID swab to be done. CM relayed this to [facility] [place].    CM reviewed IMM with pt; No questions or concerns. Pt in agreement with rights, and is aware of the right to appeal d/c once medically stable if desired. IMM signed.     Discussed with patient that transportation via WC is not covered by insurance and patient will be billed for this service. Patient verbalized understanding. CM sent request in [facility] for [facility]. Request time of 1500. Pt aware.     CM will continue to follow for DC planning needs    Received confirmation through [employer] that [facility] confirmed [facility] transport for 3pm. Cm informed provider, bedside RN and [facility] of confirmed time.

## 2025-03-08 NOTE — ASSESSMENT & PLAN NOTE
Magnesium slightly low at 1.8 mg/dL on March 8, continue to monitor  Phosphorus 2.6  mg/dL, 3/7.   Currently on no binders and no restrictions which appears appropriate at this time

## 2025-03-08 NOTE — ASSESSMENT & PLAN NOTE
Current hemoglobin 8.3 , trending up from admission hgb 7.9 g/dL on 3/5  Monitor for now on Mircera as an outpatient

## 2025-03-08 NOTE — ASSESSMENT & PLAN NOTE
PTA: amldodpine 5mg daily, imdur 120mg daily, carvedilol 6.25mg BID, hydralazine 25mg daily, demadex 100mg on non-dialysis days, valsartan 80mg daily   Elevated systolic blood pressure upon presentation  Multifactorial-missed medications in addition volume overloaded  Improved with UF

## 2025-03-08 NOTE — ASSESSMENT & PLAN NOTE
Lab Results   Component Value Date    EGFR 21 03/08/2025    EGFR 15 03/07/2025    EGFR 9 03/06/2025    CREATININE 2.79 (H) 03/08/2025    CREATININE 3.77 (H) 03/07/2025    CREATININE 5.65 (H) 03/06/2025

## 2025-03-09 LAB
BACTERIA BLD CULT: NORMAL
BACTERIA BLD CULT: NORMAL

## 2025-03-09 NOTE — CASE MANAGEMENT
Per [employer] portal, SNF auth pending p2p. CM notified:Jermaine PIKE    @9:21 am    PER CM, p2p was not done and pt dc'd from hospital 3/08.

## 2025-03-10 LAB
BACTERIA BLD CULT: NORMAL
BACTERIA BLD CULT: NORMAL

## 2025-03-10 NOTE — UTILIZATION REVIEW
NOTIFICATION OF ADMISSION DISCHARGE   This is a Notification of Discharge from [facility]. Please be advised that this patient has been discharge from our facility. Below you will find the admission and discharge date and time including the patient’s disposition.   UTILIZATION REVIEW CONTACT:  Leslye Qiu  Utilization   Network Utilization Review Department  Phone: [phone] x carefully listen to the prompts. All voicemails are confidential.  Email: [email or web address]     ADMISSION INFORMATION  PRESENTATION DATE: 3/5/2025  4:35 PM  OBERVATION ADMISSION DATE: 03/05/2025 1754  INPATIENT ADMISSION DATE: 3/5/25  7:00 PM   DISCHARGE DATE: 3/8/2025  3:07 PM   DISPOSITION:Non [facility] SNF/TCU/SNU    Network Utilization Review Department  ATTENTION: Please call with any questions or concerns to [phone] and carefully listen to the prompts so that you are directed to the right person. All voicemails are confidential.   For Discharge needs, contact Care Management DC Support Team at [phone] opt. 2  Send all requests for admission clinical reviews, approved or denied determinations and any other requests to dedicated fax number below belonging to the campus where the patient is receiving treatment. List of dedicated fax numbers for the Facilities:  FACILITY NAME UR FAX NUMBER   ADMISSION DENIALS (Administrative/Medical Necessity) [phone]   DISCHARGE SUPPORT TEAM ([facility]) [phone]   PARENT CHILD HEALTH (Maternity/NICU/Pediatrics) [phone]   [facility] [phone]   [facility] [phone]   [facility] [phone]   [facility] [phone]   [facility] [phone]   [facility] [phone]   [facility] [phone]   [facility]  [facility] [phone]   [facility] [phone]   [facility] [phone]   [facility] [phone]   [facility] [phone]

## 2025-05-01 ENCOUNTER — TELEPHONE (OUTPATIENT)
Dept: CARDIOLOGY CLINIC | Facility: CLINIC | Age: 70
End: 2025-05-01

## 2025-05-01 NOTE — TELEPHONE ENCOUNTER
I received a fax from [facility]  requesting refills for Matthew for all his medications. I have not seen him in over 6 months as he canceled his last follow up appointment. Please find out if he plans to follow up with our office and if he is requesting this pharmacy for his medications.  Thank you

## 2025-05-07 DIAGNOSIS — I50.22 CHRONIC SYSTOLIC (CONGESTIVE) HEART FAILURE (HCC): ICD-10-CM

## 2025-05-07 DIAGNOSIS — I42.0 DILATED CARDIOMYOPATHY (HCC): ICD-10-CM

## 2025-05-07 DIAGNOSIS — I50.23 ACUTE ON CHRONIC SYSTOLIC HEART FAILURE (HCC): ICD-10-CM

## 2025-05-07 DIAGNOSIS — I10 PRIMARY HYPERTENSION: ICD-10-CM

## 2025-05-07 DIAGNOSIS — I50.9 CHF (CONGESTIVE HEART FAILURE) (HCC): ICD-10-CM

## 2025-05-07 DIAGNOSIS — I42.8 NONISCHEMIC CARDIOMYOPATHY (HCC): ICD-10-CM

## 2025-05-07 RX ORDER — ISOSORBIDE MONONITRATE 120 MG/1
120 TABLET, EXTENDED RELEASE ORAL DAILY
Qty: 90 TABLET | Refills: 1 | Status: CANCELLED | OUTPATIENT
Start: 2025-05-07

## 2025-05-07 RX ORDER — HYDRALAZINE HYDROCHLORIDE 25 MG/1
25 TABLET, FILM COATED ORAL DAILY
Qty: 90 TABLET | Refills: 1 | Status: CANCELLED | OUTPATIENT
Start: 2025-05-07

## 2025-05-07 RX ORDER — CARVEDILOL 6.25 MG/1
6.25 TABLET ORAL 2 TIMES DAILY WITH MEALS
Qty: 180 TABLET | Refills: 1 | Status: CANCELLED | OUTPATIENT
Start: 2025-05-07

## 2025-05-07 RX ORDER — ATORVASTATIN CALCIUM 20 MG/1
20 TABLET, FILM COATED ORAL
Qty: 90 TABLET | Refills: 1 | Status: CANCELLED | OUTPATIENT
Start: 2025-05-07

## 2025-05-07 RX ORDER — VALSARTAN 160 MG/1
80 TABLET ORAL DAILY
Qty: 45 TABLET | Refills: 1 | Status: CANCELLED | OUTPATIENT
Start: 2025-05-07

## 2025-05-07 RX ORDER — AMLODIPINE BESYLATE 5 MG/1
5 TABLET ORAL DAILY
Qty: 90 TABLET | Refills: 1 | Status: CANCELLED | OUTPATIENT
Start: 2025-05-07

## 2025-05-07 NOTE — TELEPHONE ENCOUNTER
Reason for call:   [x] Refill   [] Prior Auth  [x] Other: Pharmacy is requesting that [facility] fill all of these scripts. Please review     Office:   [] PCP/Provider -   [x] Specialty/Provider - [facility]     Medication: isosorbide mononitrate (IMDUR) 120 mg 24 hr tablet 120 mg, Oral, Daily    valsartan (DIOVAN) 160 mg tablet 80 mg, Oral, Daily      hydrALAZINE (APRESOLINE) 25 mg tablet 25 mg, Oral, Daily        carvedilol (COREG) 6.25 mg tablet 6.25 mg, Oral, 2 times daily with meals    atorvastatin (LIPITOR) 20 mg tablet 20 mg, Oral, Daily with dinner        apixaban (Eliquis) 2.5 mg 2.5 mg, Oral, 2 times daily    amLODIPine (NORVASC) 5 mg tablet 5 mg, Oral, Daily      Quantity: 90 day requested by pharmacy     Pharmacy: [facility] - [address] [place], [place] [address]  [phone]        Mail Away Pharmacy   Does the patient have enough for 10 days?   [x] Yes   [] No - Send as HP to POD

## 2025-05-14 ENCOUNTER — TELEPHONE (OUTPATIENT)
Dept: UROLOGY | Facility: CLINIC | Age: 70
End: 2025-05-14

## 2025-05-14 NOTE — TELEPHONE ENCOUNTER
Spoke w/pt he is going to call [employer] to find out why his insur is no longer active. Pt wanted to keep the apt until he finds out what the problem is with his insur.

## 2025-05-15 RX ORDER — ERGOCALCIFEROL 1.25 MG/1
50000 CAPSULE, LIQUID FILLED ORAL WEEKLY
COMMUNITY
Start: 2025-04-25

## 2025-05-15 RX ORDER — SEVELAMER CARBONATE 800 MG/1
1 TABLET, FILM COATED ORAL 3 TIMES DAILY
COMMUNITY
Start: 2024-12-09

## 2025-05-15 RX ORDER — CARVEDILOL 12.5 MG/1
12.5 TABLET ORAL 2 TIMES DAILY WITH MEALS
COMMUNITY
Start: 2025-04-25

## 2025-07-18 LAB
DME PARACHUTE DELIVERY DATE ACTUAL: NORMAL
DME PARACHUTE DELIVERY DATE EXPECTED: NORMAL
DME PARACHUTE DELIVERY DATE REQUESTED: NORMAL
DME PARACHUTE ITEM DESCRIPTION: NORMAL
DME PARACHUTE ORDER STATUS: NORMAL
DME PARACHUTE SUPPLIER NAME: NORMAL
DME PARACHUTE SUPPLIER PHONE: NORMAL

## 2025-08-21 DIAGNOSIS — I42.8 NONISCHEMIC CARDIOMYOPATHY (HCC): ICD-10-CM

## 2025-08-21 DIAGNOSIS — I50.9 CHF (CONGESTIVE HEART FAILURE) (HCC): ICD-10-CM

## 2025-08-21 DIAGNOSIS — I42.0 DILATED CARDIOMYOPATHY (HCC): ICD-10-CM

## 2025-08-21 DIAGNOSIS — I50.23 ACUTE ON CHRONIC SYSTOLIC HEART FAILURE (HCC): ICD-10-CM

## 2025-08-21 RX ORDER — ISOSORBIDE MONONITRATE 120 MG/1
120 TABLET, EXTENDED RELEASE ORAL DAILY
Qty: 90 TABLET | Refills: 0 | OUTPATIENT
Start: 2025-08-21